# Patient Record
Sex: FEMALE | Race: WHITE | NOT HISPANIC OR LATINO | Employment: OTHER | URBAN - NONMETROPOLITAN AREA
[De-identification: names, ages, dates, MRNs, and addresses within clinical notes are randomized per-mention and may not be internally consistent; named-entity substitution may affect disease eponyms.]

---

## 2017-01-04 ENCOUNTER — ANTICOAGULATION VISIT (OUTPATIENT)
Dept: CARDIOLOGY | Facility: CLINIC | Age: 82
End: 2017-01-04

## 2017-01-04 LAB — INR PPP: 2.4

## 2017-01-10 ENCOUNTER — ANTICOAGULATION VISIT (OUTPATIENT)
Dept: CARDIOLOGY | Facility: CLINIC | Age: 82
End: 2017-01-10

## 2017-01-11 ENCOUNTER — ANTICOAGULATION VISIT (OUTPATIENT)
Dept: CARDIOLOGY | Facility: CLINIC | Age: 82
End: 2017-01-11

## 2017-01-11 LAB — INR PPP: 2.2

## 2017-01-18 ENCOUNTER — ANTICOAGULATION VISIT (OUTPATIENT)
Dept: CARDIOLOGY | Facility: CLINIC | Age: 82
End: 2017-01-18

## 2017-01-18 LAB — INR PPP: 1.3

## 2017-01-18 RX ORDER — WARFARIN SODIUM 2 MG/1
2 TABLET ORAL
Qty: 30 TABLET | Refills: 0 | Status: SHIPPED | OUTPATIENT
Start: 2017-01-18 | End: 2017-02-04 | Stop reason: HOSPADM

## 2017-01-19 LAB — INR PPP: 2.2

## 2017-01-20 ENCOUNTER — ANTICOAGULATION VISIT (OUTPATIENT)
Dept: CARDIOLOGY | Facility: CLINIC | Age: 82
End: 2017-01-20

## 2017-01-20 LAB — INR PPP: 1.1

## 2017-01-26 ENCOUNTER — ANTICOAGULATION VISIT (OUTPATIENT)
Dept: CARDIOLOGY | Facility: CLINIC | Age: 82
End: 2017-01-26

## 2017-01-31 ENCOUNTER — HOSPITAL ENCOUNTER (INPATIENT)
Facility: HOSPITAL | Age: 82
LOS: 4 days | Discharge: HOME | End: 2017-02-04
Attending: EMERGENCY MEDICINE | Admitting: INTERNAL MEDICINE

## 2017-01-31 ENCOUNTER — APPOINTMENT (OUTPATIENT)
Dept: GENERAL RADIOLOGY | Facility: HOSPITAL | Age: 82
End: 2017-01-31

## 2017-01-31 DIAGNOSIS — R09.02 HYPOXIA: ICD-10-CM

## 2017-01-31 DIAGNOSIS — R00.1 BRADYCARDIA WITH 31-40 BEATS PER MINUTE: Primary | ICD-10-CM

## 2017-01-31 DIAGNOSIS — R53.1 WEAKNESS: ICD-10-CM

## 2017-01-31 DIAGNOSIS — R00.1 JUNCTIONAL BRADYCARDIA: ICD-10-CM

## 2017-01-31 DIAGNOSIS — I49.5 SINUS NODE DYSFUNCTION (HCC): ICD-10-CM

## 2017-01-31 LAB
ALBUMIN SERPL-MCNC: 3.8 G/DL (ref 3.5–5)
ALBUMIN/GLOB SERPL: 1.1 G/DL (ref 1.1–2.5)
ALP SERPL-CCNC: 85 U/L (ref 24–120)
ALT SERPL W P-5'-P-CCNC: 41 U/L (ref 0–54)
ANION GAP SERPL CALCULATED.3IONS-SCNC: 10 MMOL/L (ref 4–13)
AST SERPL-CCNC: 30 U/L (ref 7–45)
BASOPHILS # BLD AUTO: 0.03 10*3/MM3 (ref 0–0.2)
BASOPHILS NFR BLD AUTO: 0.3 % (ref 0–2)
BILIRUB SERPL-MCNC: 0.7 MG/DL (ref 0.1–1)
BUN BLD-MCNC: 35 MG/DL (ref 5–21)
BUN/CREAT SERPL: 23.3 (ref 7–25)
CALCIUM SPEC-SCNC: 9 MG/DL (ref 8.4–10.4)
CHLORIDE SERPL-SCNC: 100 MMOL/L (ref 98–110)
CO2 SERPL-SCNC: 26 MMOL/L (ref 24–31)
CREAT BLD-MCNC: 1.5 MG/DL (ref 0.5–1.4)
DEPRECATED RDW RBC AUTO: 44.6 FL (ref 40–54)
EOSINOPHIL # BLD AUTO: 0.17 10*3/MM3 (ref 0–0.7)
EOSINOPHIL NFR BLD AUTO: 1.9 % (ref 0–4)
ERYTHROCYTE [DISTWIDTH] IN BLOOD BY AUTOMATED COUNT: 14.2 % (ref 12–15)
GFR SERPL CREATININE-BSD FRML MDRD: 33 ML/MIN/1.73
GLOBULIN UR ELPH-MCNC: 3.5 GM/DL
GLUCOSE BLD-MCNC: 163 MG/DL (ref 70–100)
GLUCOSE BLDC GLUCOMTR-MCNC: 193 MG/DL (ref 70–130)
GLUCOSE BLDC GLUCOMTR-MCNC: 193 MG/DL (ref 70–130)
HCT VFR BLD AUTO: 31.5 % (ref 37–47)
HGB BLD-MCNC: 10.2 G/DL (ref 12–16)
IMM GRANULOCYTES # BLD: 0.04 10*3/MM3 (ref 0–0.03)
IMM GRANULOCYTES NFR BLD: 0.4 % (ref 0–5)
INR PPP: 2.59 (ref 0.91–1.09)
LYMPHOCYTES # BLD AUTO: 1.85 10*3/MM3 (ref 0.72–4.86)
LYMPHOCYTES NFR BLD AUTO: 20.6 % (ref 15–45)
MAGNESIUM SERPL-MCNC: 2.1 MG/DL (ref 1.4–2.2)
MCH RBC QN AUTO: 28 PG (ref 28–32)
MCHC RBC AUTO-ENTMCNC: 32.4 G/DL (ref 33–36)
MCV RBC AUTO: 86.5 FL (ref 82–98)
MONOCYTES # BLD AUTO: 1.07 10*3/MM3 (ref 0.19–1.3)
MONOCYTES NFR BLD AUTO: 11.9 % (ref 4–12)
NEUTROPHILS # BLD AUTO: 5.81 10*3/MM3 (ref 1.87–8.4)
NEUTROPHILS NFR BLD AUTO: 64.9 % (ref 39–78)
PLATELET # BLD AUTO: 305 10*3/MM3 (ref 130–400)
PMV BLD AUTO: 12.1 FL (ref 6–12)
POTASSIUM BLD-SCNC: 4.5 MMOL/L (ref 3.5–5.3)
PROT SERPL-MCNC: 7.3 G/DL (ref 6.3–8.7)
PROTHROMBIN TIME: 28.7 SECONDS (ref 11.9–14.6)
RBC # BLD AUTO: 3.64 10*6/MM3 (ref 4.2–5.4)
SODIUM BLD-SCNC: 136 MMOL/L (ref 135–145)
TROPONIN I SERPL-MCNC: 0 NG/ML (ref 0–0.07)
TSH SERPL DL<=0.05 MIU/L-ACNC: 1.52 MIU/ML (ref 0.47–4.68)
WBC NRBC COR # BLD: 8.97 10*3/MM3 (ref 4.8–10.8)

## 2017-01-31 PROCEDURE — 82962 GLUCOSE BLOOD TEST: CPT

## 2017-01-31 PROCEDURE — 84484 ASSAY OF TROPONIN QUANT: CPT

## 2017-01-31 PROCEDURE — 93010 ELECTROCARDIOGRAM REPORT: CPT | Performed by: INTERNAL MEDICINE

## 2017-01-31 PROCEDURE — 85025 COMPLETE CBC W/AUTO DIFF WBC: CPT | Performed by: EMERGENCY MEDICINE

## 2017-01-31 PROCEDURE — 84443 ASSAY THYROID STIM HORMONE: CPT | Performed by: EMERGENCY MEDICINE

## 2017-01-31 PROCEDURE — 83735 ASSAY OF MAGNESIUM: CPT | Performed by: EMERGENCY MEDICINE

## 2017-01-31 PROCEDURE — 71010 HC CHEST PA OR AP: CPT

## 2017-01-31 PROCEDURE — 99285 EMERGENCY DEPT VISIT HI MDM: CPT

## 2017-01-31 PROCEDURE — 85610 PROTHROMBIN TIME: CPT | Performed by: EMERGENCY MEDICINE

## 2017-01-31 PROCEDURE — 93005 ELECTROCARDIOGRAM TRACING: CPT

## 2017-01-31 PROCEDURE — 80053 COMPREHEN METABOLIC PANEL: CPT | Performed by: EMERGENCY MEDICINE

## 2017-01-31 RX ORDER — DEXTROSE MONOHYDRATE 25 G/50ML
25 INJECTION, SOLUTION INTRAVENOUS
Status: DISCONTINUED | OUTPATIENT
Start: 2017-01-31 | End: 2017-02-04 | Stop reason: HOSPADM

## 2017-01-31 RX ORDER — NICOTINE POLACRILEX 4 MG
15 LOZENGE BUCCAL
Status: DISCONTINUED | OUTPATIENT
Start: 2017-01-31 | End: 2017-02-04 | Stop reason: HOSPADM

## 2017-01-31 RX ORDER — ACETAMINOPHEN 325 MG/1
650 TABLET ORAL EVERY 4 HOURS PRN
Status: DISCONTINUED | OUTPATIENT
Start: 2017-01-31 | End: 2017-02-04 | Stop reason: HOSPADM

## 2017-01-31 RX ORDER — ONDANSETRON 2 MG/ML
4 INJECTION INTRAMUSCULAR; INTRAVENOUS EVERY 6 HOURS PRN
Status: DISCONTINUED | OUTPATIENT
Start: 2017-01-31 | End: 2017-02-03 | Stop reason: SDUPTHER

## 2017-01-31 RX ORDER — MAGNESIUM HYDROXIDE/ALUMINUM HYDROXICE/SIMETHICONE 120; 1200; 1200 MG/30ML; MG/30ML; MG/30ML
30 SUSPENSION ORAL EVERY 6 HOURS PRN
Status: DISCONTINUED | OUTPATIENT
Start: 2017-01-31 | End: 2017-02-04 | Stop reason: HOSPADM

## 2017-01-31 RX ORDER — SPIRONOLACTONE 25 MG/1
25 TABLET ORAL DAILY
Status: DISCONTINUED | OUTPATIENT
Start: 2017-02-01 | End: 2017-02-04 | Stop reason: HOSPADM

## 2017-01-31 RX ORDER — PANTOPRAZOLE SODIUM 40 MG/1
40 TABLET, DELAYED RELEASE ORAL DAILY
Status: DISCONTINUED | OUTPATIENT
Start: 2017-02-01 | End: 2017-02-04 | Stop reason: HOSPADM

## 2017-01-31 RX ORDER — ASPIRIN 81 MG/1
81 TABLET ORAL DAILY
Status: DISCONTINUED | OUTPATIENT
Start: 2017-02-01 | End: 2017-02-04 | Stop reason: HOSPADM

## 2017-01-31 RX ORDER — VENLAFAXINE 37.5 MG/1
37.5 TABLET ORAL DAILY
Status: DISCONTINUED | OUTPATIENT
Start: 2017-02-01 | End: 2017-02-04 | Stop reason: HOSPADM

## 2017-01-31 RX ORDER — SODIUM CHLORIDE 0.9 % (FLUSH) 0.9 %
10 SYRINGE (ML) INJECTION AS NEEDED
Status: DISCONTINUED | OUTPATIENT
Start: 2017-01-31 | End: 2017-02-04 | Stop reason: HOSPADM

## 2017-01-31 RX ORDER — ATORVASTATIN CALCIUM 40 MG/1
40 TABLET, FILM COATED ORAL NIGHTLY
Status: DISCONTINUED | OUTPATIENT
Start: 2017-01-31 | End: 2017-02-04 | Stop reason: HOSPADM

## 2017-01-31 RX ORDER — ATENOLOL 25 MG/1
25 TABLET ORAL EVERY EVENING
Status: ON HOLD | COMMUNITY
End: 2017-06-26

## 2017-01-31 RX ORDER — SODIUM CHLORIDE 0.9 % (FLUSH) 0.9 %
1-10 SYRINGE (ML) INJECTION AS NEEDED
Status: DISCONTINUED | OUTPATIENT
Start: 2017-01-31 | End: 2017-02-04 | Stop reason: HOSPADM

## 2017-02-01 ENCOUNTER — ANTICOAGULATION VISIT (OUTPATIENT)
Dept: CARDIOLOGY | Facility: CLINIC | Age: 82
End: 2017-02-01

## 2017-02-01 ENCOUNTER — APPOINTMENT (OUTPATIENT)
Dept: CARDIOLOGY | Facility: HOSPITAL | Age: 82
End: 2017-02-01

## 2017-02-01 PROBLEM — Z79.01 CHRONIC ANTICOAGULATION: Status: ACTIVE | Noted: 2017-02-01

## 2017-02-01 PROBLEM — I49.5 SSS (SICK SINUS SYNDROME) (HCC): Status: ACTIVE | Noted: 2017-02-01

## 2017-02-01 PROBLEM — I25.10 CORONARY ARTERY DISEASE INVOLVING NATIVE CORONARY ARTERY OF NATIVE HEART WITHOUT ANGINA PECTORIS: Status: ACTIVE | Noted: 2017-02-01

## 2017-02-01 PROBLEM — I35.0 MODERATE TO SEVERE AORTIC STENOSIS: Status: ACTIVE | Noted: 2017-02-01

## 2017-02-01 PROBLEM — E11.9 TYPE 2 DIABETES MELLITUS (HCC): Status: ACTIVE | Noted: 2017-02-01

## 2017-02-01 PROBLEM — R00.1 JUNCTIONAL BRADYCARDIA: Status: ACTIVE | Noted: 2017-02-01

## 2017-02-01 PROBLEM — I10 ESSENTIAL HYPERTENSION: Status: ACTIVE | Noted: 2017-02-01

## 2017-02-01 LAB
ANION GAP SERPL CALCULATED.3IONS-SCNC: 9 MMOL/L (ref 4–13)
ARTICHOKE IGE QN: 55 MG/DL (ref 0–99)
BH CV ECHO MEAS - AO MAX PG (FULL): 45.2 MMHG
BH CV ECHO MEAS - AO MAX PG: 48.2 MMHG
BH CV ECHO MEAS - AO MEAN PG (FULL): 24 MMHG
BH CV ECHO MEAS - AO MEAN PG: 26 MMHG
BH CV ECHO MEAS - AO ROOT AREA: 7.1 CM^2
BH CV ECHO MEAS - AO ROOT DIAM: 3 CM
BH CV ECHO MEAS - AO V2 MAX: 347 CM/SEC
BH CV ECHO MEAS - AO V2 MEAN: 240 CM/SEC
BH CV ECHO MEAS - AO V2 VTI: 103 CM
BH CV ECHO MEAS - AVA(I,A): 0.68 CM^2
BH CV ECHO MEAS - AVA(I,D): 0.68 CM^2
BH CV ECHO MEAS - AVA(V,A): 0.78 CM^2
BH CV ECHO MEAS - AVA(V,D): 0.78 CM^2
BH CV ECHO MEAS - CONTRAST EF 4CH: 65.3 ML/M^2
BH CV ECHO MEAS - EDV(CUBED): 66.4 ML
BH CV ECHO MEAS - EDV(MOD-SP4): 53.3 ML
BH CV ECHO MEAS - EDV(TEICH): 72.1 ML
BH CV ECHO MEAS - EF(CUBED): 69.7 %
BH CV ECHO MEAS - EF(MOD-SP4): 65.3 %
BH CV ECHO MEAS - EF(TEICH): 61.8 %
BH CV ECHO MEAS - ESV(CUBED): 20.1 ML
BH CV ECHO MEAS - ESV(MOD-SP4): 18.5 ML
BH CV ECHO MEAS - ESV(TEICH): 27.5 ML
BH CV ECHO MEAS - FS: 32.8 %
BH CV ECHO MEAS - IVS/LVPW: 0.9
BH CV ECHO MEAS - IVSD: 0.92 CM
BH CV ECHO MEAS - LA DIMENSION: 3.7 CM
BH CV ECHO MEAS - LA/AO: 1.2
BH CV ECHO MEAS - LAT PEAK E' VEL: 5.4 CM/SEC
BH CV ECHO MEAS - LV MASS(C)D: 123.9 GRAMS
BH CV ECHO MEAS - LV MAX PG: 2.9 MMHG
BH CV ECHO MEAS - LV MEAN PG: 2 MMHG
BH CV ECHO MEAS - LV V1 MAX: 85.8 CM/SEC
BH CV ECHO MEAS - LV V1 MEAN: 55.1 CM/SEC
BH CV ECHO MEAS - LV V1 VTI: 22.4 CM
BH CV ECHO MEAS - LVIDD: 4.1 CM
BH CV ECHO MEAS - LVIDS: 2.7 CM
BH CV ECHO MEAS - LVLD AP4: 6.7 CM
BH CV ECHO MEAS - LVLS AP4: 5.9 CM
BH CV ECHO MEAS - LVOT AREA (M): 3.1 CM^2
BH CV ECHO MEAS - LVOT AREA: 3.1 CM^2
BH CV ECHO MEAS - LVOT DIAM: 2 CM
BH CV ECHO MEAS - LVPWD: 1 CM
BH CV ECHO MEAS - MR MAX PG: 88 MMHG
BH CV ECHO MEAS - MR MAX VEL: 469 CM/SEC
BH CV ECHO MEAS - MV A MAX VEL: 45.9 CM/SEC
BH CV ECHO MEAS - MV DEC TIME: 0.25 SEC
BH CV ECHO MEAS - MV E MAX VEL: 158 CM/SEC
BH CV ECHO MEAS - MV E/A: 3.4
BH CV ECHO MEAS - PA MAX PG: 3.9 MMHG
BH CV ECHO MEAS - PA V2 MAX: 99.1 CM/SEC
BH CV ECHO MEAS - PI END-D VEL: 149 CM/SEC
BH CV ECHO MEAS - RAP SYSTOLE: 5 MMHG
BH CV ECHO MEAS - RVSP: 42.9 MMHG
BH CV ECHO MEAS - SV(AO): 728.1 ML
BH CV ECHO MEAS - SV(CUBED): 46.3 ML
BH CV ECHO MEAS - SV(LVOT): 70.4 ML
BH CV ECHO MEAS - SV(MOD-SP4): 34.8 ML
BH CV ECHO MEAS - SV(TEICH): 44.6 ML
BH CV ECHO MEAS - TR MAX VEL: 308 CM/SEC
BUN BLD-MCNC: 32 MG/DL (ref 5–21)
BUN/CREAT SERPL: 23.4 (ref 7–25)
CALCIUM SPEC-SCNC: 9.2 MG/DL (ref 8.4–10.4)
CHLORIDE SERPL-SCNC: 102 MMOL/L (ref 98–110)
CHOLEST SERPL-MCNC: 110 MG/DL (ref 130–200)
CO2 SERPL-SCNC: 26 MMOL/L (ref 24–31)
CREAT BLD-MCNC: 1.37 MG/DL (ref 0.5–1.4)
E/E' RATIO: 36
GFR SERPL CREATININE-BSD FRML MDRD: 37 ML/MIN/1.73
GLUCOSE BLD-MCNC: 350 MG/DL (ref 70–100)
GLUCOSE BLDC GLUCOMTR-MCNC: 185 MG/DL (ref 70–130)
GLUCOSE BLDC GLUCOMTR-MCNC: 191 MG/DL (ref 70–130)
GLUCOSE BLDC GLUCOMTR-MCNC: 219 MG/DL (ref 70–130)
GLUCOSE BLDC GLUCOMTR-MCNC: 260 MG/DL (ref 70–130)
GLUCOSE BLDC GLUCOMTR-MCNC: 359 MG/DL (ref 70–130)
HBA1C MFR BLD: 9.3 %
HDLC SERPL-MCNC: 25 MG/DL
INR PPP: 2.5
INR PPP: 2.5 (ref 0.91–1.09)
LDLC/HDLC SERPL: 1.71 {RATIO}
LEFT ATRIUM VOLUME: 60 CM3
POTASSIUM BLD-SCNC: 4.4 MMOL/L (ref 3.5–5.3)
PROTHROMBIN TIME: 27.9 SECONDS (ref 11.9–14.6)
SODIUM BLD-SCNC: 137 MMOL/L (ref 135–145)
TRIGL SERPL-MCNC: 211 MG/DL (ref 0–149)
TSH SERPL DL<=0.05 MIU/L-ACNC: 1.25 MIU/ML (ref 0.47–4.68)

## 2017-02-01 PROCEDURE — 93306 TTE W/DOPPLER COMPLETE: CPT

## 2017-02-01 PROCEDURE — 80048 BASIC METABOLIC PNL TOTAL CA: CPT | Performed by: INTERNAL MEDICINE

## 2017-02-01 PROCEDURE — 63710000001 INSULIN LISPRO (HUMAN) PER 5 UNITS: Performed by: INTERNAL MEDICINE

## 2017-02-01 PROCEDURE — 84443 ASSAY THYROID STIM HORMONE: CPT | Performed by: INTERNAL MEDICINE

## 2017-02-01 PROCEDURE — 83036 HEMOGLOBIN GLYCOSYLATED A1C: CPT | Performed by: INTERNAL MEDICINE

## 2017-02-01 PROCEDURE — 85610 PROTHROMBIN TIME: CPT | Performed by: INTERNAL MEDICINE

## 2017-02-01 PROCEDURE — 80061 LIPID PANEL: CPT | Performed by: INTERNAL MEDICINE

## 2017-02-01 PROCEDURE — 63710000001 INSULIN DETEMIR PER 5 UNITS: Performed by: INTERNAL MEDICINE

## 2017-02-01 PROCEDURE — 93306 TTE W/DOPPLER COMPLETE: CPT | Performed by: INTERNAL MEDICINE

## 2017-02-01 PROCEDURE — 82962 GLUCOSE BLOOD TEST: CPT

## 2017-02-01 PROCEDURE — 99222 1ST HOSP IP/OBS MODERATE 55: CPT | Performed by: NURSE PRACTITIONER

## 2017-02-01 RX ORDER — ONDANSETRON 2 MG/ML
4 INJECTION INTRAMUSCULAR; INTRAVENOUS EVERY 6 HOURS PRN
Status: DISCONTINUED | OUTPATIENT
Start: 2017-02-01 | End: 2017-02-03 | Stop reason: HOSPADM

## 2017-02-01 RX ADMIN — INSULIN LISPRO 4 UNITS: 100 INJECTION, SOLUTION INTRAVENOUS; SUBCUTANEOUS at 21:07

## 2017-02-01 RX ADMIN — PANTOPRAZOLE SODIUM 40 MG: 40 TABLET, DELAYED RELEASE ORAL at 12:20

## 2017-02-01 RX ADMIN — INSULIN LISPRO 20 UNITS: 100 INJECTION, SOLUTION INTRAVENOUS; SUBCUTANEOUS at 00:23

## 2017-02-01 RX ADMIN — DESMOPRESSIN ACETATE 40 MG: 0.2 TABLET ORAL at 21:08

## 2017-02-01 RX ADMIN — INSULIN LISPRO 12 UNITS: 100 INJECTION, SOLUTION INTRAVENOUS; SUBCUTANEOUS at 17:57

## 2017-02-01 RX ADMIN — DESMOPRESSIN ACETATE 40 MG: 0.2 TABLET ORAL at 00:24

## 2017-02-01 RX ADMIN — ASPIRIN 81 MG: 81 TABLET ORAL at 12:20

## 2017-02-01 RX ADMIN — VENLAFAXINE HYDROCHLORIDE 37.5 MG: 37.5 TABLET ORAL at 12:20

## 2017-02-01 RX ADMIN — SPIRONOLACTONE 25 MG: 25 TABLET ORAL at 12:20

## 2017-02-01 RX ADMIN — INSULIN DETEMIR 70 UNITS: 100 INJECTION, SOLUTION SUBCUTANEOUS at 12:18

## 2017-02-01 NOTE — H&P
[facility] HEART GROUP HISTORY AND PHYSICAL      Patient Care Team:  FACUNDO Juarez as PCP - General  FACUNDO Juarez as PCP - Family Medicine  Ehsan Crocker MD as Cardiologist (Cardiology)    Chief complaint: Generalized weakness, bradycardia     Subjective     Lakesha Costello is a 81 y.o.  female with a known PMH significant for Type 2 DM, HTN, Atrial Fibrillation/Flutter, chronically anticoagulated with Coumadin, moderate to severe AS, CAD s/p 4 vessel CABG who presented to [facility] via ED with complaints of generalized weakness, falls, dyspnea, fatigue, and slow heart rate x2 days. Her last dose of Atenolol was 2 days ago. Last dose of Coumadin was 2 nights ago. This is her 3rd hospital admission for symptomatic bradycardia, which has previously resolved.     Review of Systems  Review of Systems   Constitution: Positive for weakness and malaise/fatigue. Negative for diaphoresis and fever.   HENT: Negative for nosebleeds.    Cardiovascular: Positive for dyspnea on exertion and near-syncope. Negative for chest pain, irregular heartbeat, leg swelling, orthopnea, palpitations, paroxysmal nocturnal dyspnea and syncope.   Respiratory: Positive for shortness of breath. Negative for cough, hemoptysis, sleep disturbances due to breathing, sputum production and wheezing.    Hematologic/Lymphatic: Negative for bleeding problem.   Skin: Negative for rash.   Musculoskeletal: Positive for falls.   Gastrointestinal: Negative for bloating, abdominal pain, hematemesis, hematochezia, melena, nausea and vomiting.   Neurological: Positive for light-headedness. Negative for focal weakness.   Psychiatric/Behavioral: Negative for altered mental status.        History  Past Medical History   Diagnosis Date   • Aortic stenosis    • Atrial fibrillation    • Atrial fibrillation by electrocardiogram    • Atrial flutter    • Breast cancer    • CAD (coronary artery disease)    • Chronic anticoagulation       Coumadin    • Diabetes mellitus    • Hypertension      Past Surgical History   Procedure Laterality Date   • Mastectomy     • Hysterectomy     • Cholecystectomy     • Cardiac catheterization  1999, 2010   • Coronary artery bypass graft  1999     4 vessel      History reviewed. No pertinent family history.  Social History   Substance Use Topics   • Smoking status: Never Smoker   • Smokeless tobacco: None   • Alcohol use No       Medications  Prior to Admission medications    Medication Sig Start Date End Date Taking? Authorizing Provider   aspirin 81 MG EC tablet Take 81 mg by mouth Daily.   Yes Historical Provider, MD   atenolol (TENORMIN) 25 MG tablet Take 25 mg by mouth Daily. Take 25mg in AM and 50mg in PM   Yes Historical Provider, MD   calcium carbonate (OS-MICHAEL) 600 MG tablet Take 600 mg by mouth 2 (Two) Times a Day With Meals.   Yes Historical Provider, MD   cloNIDine (CATAPRES) 0.1 MG tablet Take 0.1 mg by mouth Every Night.   Yes Historical Provider, MD   glipiZIDE (GLUCOTROL) 5 MG tablet Take 5 mg by mouth 2 (Two) Times a Day Before Meals.   Yes Historical Provider, MD   insulin aspart (novoLOG) 100 UNIT/ML injection Inject  under the skin 3 (Three) Times a Day Before Meals. Sliding scale (bs-100)/2   Yes Historical Provider, MD   insulin detemir (LEVEMIR) 100 UNIT/ML injection Inject 70 Units under the skin Every 12 (Twelve) Hours.   Yes Historical Provider, MD   magnesium oxide (MAGOX) 400 (241.3 MG) MG tablet tablet Take 400 mg by mouth Daily.   Yes Historical Provider, MD   niacin 500 MG tablet Take 500 mg by mouth Daily With Breakfast. Must be flush free   Yes Historical Provider, MD   NON FORMULARY Take  by mouth 2 (Two) Times a Day. Prespervision Lutein x1 tab twice daily   Yes Historical Provider, MD   pantoprazole (PROTONIX) 40 MG EC tablet Take 40 mg by mouth Daily.   Yes Historical Provider, MD   polyethyl glycol-propyl glycol (SYSTANE) 0.4-0.3 % solution ophthalmic solution Administer 1 drop to  the right eye Daily.   Yes Historical Provider, MD   simvastatin (ZOCOR) 80 MG tablet Take 80 mg by mouth Every Night.   Yes Historical Provider, MD   spironolactone (ALDACTONE) 25 MG tablet Take 25 mg by mouth Daily.   Yes Historical Provider, MD   venlafaxine (EFFEXOR) 37.5 MG tablet Take 37.5 mg by mouth Daily.   Yes Historical Provider, MD   vitamin B-12 (CYANOCOBALAMIN) 500 MCG tablet Take 500 mcg by mouth Daily.   Yes Historical Provider, MD   vitamin E 100 UNIT capsule Take 400 Units by mouth Daily.   Yes Historical Provider, MD   warfarin (COUMADIN) 2 MG tablet Take 1 tablet by mouth Daily.  Patient taking differently: Take 2 mg by mouth Every Evening. 1/18/17  Yes Ehsan Crocker MD   amiodarone (PACERONE) 100 MG tablet Take 100 mg by mouth Daily.    Historical Provider, MD       Current Facility-Administered Medications   Medication Dose Route Frequency Provider Last Rate Last Dose   • acetaminophen (TYLENOL) tablet 650 mg  650 mg Oral Q4H PRN Uli Baker MD       • aluminum-magnesium hydroxide-simethicone (MAALOX/MYLANTA) suspension 30 mL  30 mL Oral Q6H PRN Uli Baker MD       • aspirin EC tablet 81 mg  81 mg Oral Daily Uli Baker MD       • atorvastatin (LIPITOR) tablet 40 mg  40 mg Oral Nightly Uli Baker MD   40 mg at 02/01/17 0024   • bisacodyl (DULCOLAX) EC tablet 5 mg  5 mg Oral Daily PRN Uli Baker MD       • dextrose (D50W) solution 25 g  25 g Intravenous Q15 Min PRN Uli Baker MD       • dextrose (GLUTOSE) oral gel 15 g  15 g Oral Q15 Min PRN Uli Baker MD       • glucagon (human recombinant) (GLUCAGEN DIAGNOSTIC) injection 1 mg  1 mg Subcutaneous Q15 Min PRN Uli Baker MD       • insulin detemir (LEVEMIR) injection 70 Units  70 Units Subcutaneous QAM Uli Baker MD       • insulin lispro (humaLOG) injection 4-24 Units  4-24 Units Subcutaneous 4x Daily AC & at Bedtime Uli Baker MD   20 Units at 02/01/17 0023   • ondansetron (ZOFRAN) injection 4 mg   4 mg Intravenous Q6H PRN Uli Baker MD       • pantoprazole (PROTONIX) EC tablet 40 mg  40 mg Oral Daily Uli Bakre MD       • pneumococcal polysaccharide 23-valent (PNEUMOVAX-23) vaccine 0.5 mL  0.5 mL Intramuscular During Hospitalization Ehsan Crocker MD       • polyethyl glycol-propyl glycol (SYSTANE) 0.4-0.3 % ophthalmic solution 1 drop  1 drop Right Eye Daily Uli Baker MD       • sodium chloride 0.9 % flush 1-10 mL  1-10 mL Intravenous PRN Uli Baker MD       • sodium chloride 0.9 % flush 10 mL  10 mL Intravenous PRN Sherif Early Jr., MD       • spironolactone (ALDACTONE) tablet 25 mg  25 mg Oral Daily Uli Baker MD       • venlafaxine (EFFEXOR) tablet 37.5 mg  37.5 mg Oral Daily Uli Baker MD            Allergies:  Dilaudid [hydromorphone hcl]; Dilaudid [hydromorphone]; Enalapril; Kombiglyze [saxagliptin-metformin er]; Lopid [gemfibrozil]; Sulfa antibiotics; and Ultram [tramadol]    Objective     Vital Signs  Temp:  [96.8 °F (36 °C)-98.9 °F (37.2 °C)] 98.9 °F (37.2 °C)  Heart Rate:  [39-81] 45  Resp:  [20] 20  BP: (130-167)/(45-84) 155/45    Labs  Lab Results   Component Value Date    WBC 8.97 01/31/2017    HGB 10.2 (L) 01/31/2017    HCT 31.5 (L) 01/31/2017    MCV 86.5 01/31/2017     01/31/2017     Lab Results   Component Value Date    GLUCOSE 350 (H) 02/01/2017    CALCIUM 9.2 02/01/2017     02/01/2017    K 4.4 02/01/2017    CO2 26.0 02/01/2017     02/01/2017    BUN 32 (H) 02/01/2017    CREATININE 1.37 02/01/2017    EGFRIFNONA 37 (L) 02/01/2017    BCR 23.4 02/01/2017    ANIONGAP 9.0 02/01/2017     Lab Results   Component Value Date    TSH 1.250 02/01/2017     Lab Results   Component Value Date    HGBA1C 9.3 02/01/2017     Lab Results   Component Value Date    INR 2.50 (H) 02/01/2017    INR 2.59 (H) 01/31/2017    INR 1.10 01/20/2017    PROTIME 27.9 (H) 02/01/2017    PROTIME 28.7 (H) 01/31/2017    PROTIME 22.4 (H) 08/29/2016         Physical Exam:  Physical  Exam   Constitutional: She is oriented to person, place, and time. She appears well-developed and well-nourished. She is cooperative. No distress.   HENT:   Head: Normocephalic and atraumatic.   Cardiovascular: Intact distal pulses.  Bradycardia present.    Murmur heard.   Harsh midsystolic murmur is present with a grade of 3/6  at the upper right sternal border radiating to the neck  Telemetry: Junctional rhythm 38-61 BPM    Pulmonary/Chest: Effort normal and breath sounds normal. No respiratory distress. She exhibits no tenderness.   Musculoskeletal: She exhibits no edema.   Neurological: She is alert and oriented to person, place, and time.   Skin: Skin is warm and dry. No rash noted. She is not diaphoretic.   Psychiatric: She has a normal mood and affect. Her speech is normal and behavior is normal.   Vitals reviewed.      Results Review:    I reviewed the patient's new clinical results.  I personally viewed and interpreted the patient's EKG/Telemetry data    Principal Problem:    -Junctional bradycardia    Active Problems:    -SSS (sick sinus syndrome)    -Atrial fibrillation    -Chronic anticoagulation with Coumadin- on hold     -Moderate to severe AS per 8/2016 Echo     -Essential hypertension    -Type 2 diabetes mellitus    -Coronary artery disease involving native coronary artery of native heart without angina pectoris s/p 4 vessel CABG       Plan   1. Monitor telemetry  2. Continue to hold all rate lowering medications, including Atenolol, Amiodarone, and Clonidine.   3. Continue to hold Coumadin   4. PT/INR in am  5. Echo  6. Healthy Heart, Diabetic Diet   7. Plans for pacemaker in 1-2 days once INR decreases     I discussed the patients findings and my recommendations with patient, nursing staff and Dr. Crocker. Further recommendations per Dr. Crocker upon his evaluation of the patient.     Cami Toure, APRN  02/01/17  10:15 AM      Please note this cardiology history and physical note is the result of a  face to face consultation with the patient, in addition to reviewing medical records at length by myself, FACUNDO Carter.     Time: More than 50% of time spent in counseling and coordination of care:  Total face-to-face/floor time 40 min.  Time spent in counseling 25 min. Counseling included the following topics: lab results, ECG/telemetry, assessment, discussing the plan of care with the patient and daughter at the bedside, as well as the RN.

## 2017-02-01 NOTE — ED PROVIDER NOTES
Subjective   History of Present Illness    Review of Systems    Past Medical History   Diagnosis Date   • Aortic stenosis    • Atrial fibrillation by electrocardiogram    • Atrial flutter    • Breast cancer    • CAD (coronary artery disease)    • Chronic anticoagulation    • Diabetes mellitus    • Hypertension        Allergies   Allergen Reactions   • Dilaudid [Hydromorphone Hcl]    • Dilaudid [Hydromorphone] Nausea And Vomiting   • Enalapril Angioedema   • Kombiglyze [Saxagliptin-Metformin Er]    • Lopid [Gemfibrozil] Nausea And Vomiting   • Sulfa Antibiotics Other (See Comments)     Syncope     • Ultram [Tramadol] Itching       Past Surgical History   Procedure Laterality Date   • Mastectomy     • Hysterectomy     • Cholecystectomy     • Coronary artery bypass graft         No family history on file.    Social History     Social History   • Marital status:      Spouse name: N/A   • Number of children: N/A   • Years of education: N/A     Social History Main Topics   • Smoking status: Never Smoker   • Smokeless tobacco: Not on file   • Alcohol use No   • Drug use: Not on file   • Sexual activity: Not on file     Other Topics Concern   • Not on file     Social History Narrative   • No narrative on file           Objective   Physical Exam   Constitutional: No distress.   Cardiovascular: Regular rhythm.   No extrasystoles are present. Bradycardia present.    Pulmonary/Chest: Effort normal and breath sounds normal.   Skin: She is not diaphoretic. There is pallor.   Psychiatric: She has a normal mood and affect.   Nursing note and vitals reviewed.      Procedures         ED Course  ED Course      Labs Reviewed   COMPREHENSIVE METABOLIC PANEL - Abnormal; Notable for the following:        Result Value    Glucose 163 (*)     BUN 35 (*)     Creatinine 1.50 (*)     eGFR Non  Amer 33 (*)     All other components within normal limits    Narrative:     The MDRD GFR formula is only valid for adults with stable renal  function between ages 18 and 70.   PROTIME-INR - Abnormal; Notable for the following:     Protime 28.7 (*)     INR 2.59 (*)     All other components within normal limits   CBC WITH AUTO DIFFERENTIAL - Abnormal; Notable for the following:     RBC 3.64 (*)     Hemoglobin 10.2 (*)     Hematocrit 31.5 (*)     MCHC 32.4 (*)     MPV 12.1 (*)     Immature Grans, Absolute 0.04 (*)     All other components within normal limits   POCT GLUCOSE FINGERSTICK - Abnormal; Notable for the following:     Glucose 193 (*)     All other components within normal limits   POCT GLUCOSE FINGERSTICK - Abnormal; Notable for the following:     Glucose 193 (*)     All other components within normal limits   TSH - Normal   MAGNESIUM - Normal   POCT TROPONIN I, RAPID - Normal   POCT TROPONIN I, RAPID   CBC AND DIFFERENTIAL    Narrative:     The following orders were created for panel order CBC & Differential.  Procedure                               Abnormality         Status                     ---------                               -----------         ------                     CBC Auto Differential[[id number]]         Abnormal            Final result                 Please view results for these tests on the individual orders.                 MDM  Number of Diagnoses or Management Options  Bradycardia with 31-40 beats per minute: new and requires workup  Sinus node dysfunction: new and requires workup  Weakness: established and worsening     Amount and/or Complexity of Data Reviewed  Clinical lab tests: ordered and reviewed  Tests in the radiology section of CPT®: ordered and reviewed  Decide to obtain previous medical records or to obtain history from someone other than the patient: yes  Obtain history from someone other than the patient: yes  Review and summarize past medical records: yes  Discuss the patient with other providers: yes  Independent visualization of images, tracings, or specimens: yes    Risk of Complications, Morbidity, and/or  Mortality  Presenting problems: high  Diagnostic procedures: high  Management options: high    Patient Progress  Patient progress: stable      Final diagnoses:   Bradycardia with 31-40 beats per minute   Weakness   Sinus node dysfunction            Agapito Dominguez MD  01/31/17 2005

## 2017-02-01 NOTE — PLAN OF CARE
Problem: Patient Care Overview (Adult)  Goal: Plan of Care Review  Outcome: Ongoing (interventions implemented as appropriate)    02/01/17 0407   Coping/Psychosocial Response Interventions   Plan Of Care Reviewed With patient;daughter;spouse   Patient Care Overview   Progress no change   Outcome Evaluation   Outcome Summary/Follow up Plan VSS, Telemetry Junctional w/ HR 38-56. Pt has been NPO since midnight for possible intervention today.         Problem: Fall Risk (Adult)  Goal: Identify Related Risk Factors and Signs and Symptoms  Outcome: Ongoing (interventions implemented as appropriate)    02/01/17 0407   Fall Risk   Fall Risk: Related Risk Factors culprit medication(s);history of falls;impaired vision   Fall Risk: Signs and Symptoms presence of risk factors       Goal: Absence of Falls  Outcome: Ongoing (interventions implemented as appropriate)    Problem: Arrhythmia/Dysrhythmia (Symptomatic) (Adult)  Goal: Signs and Symptoms of Listed Potential Problems Will be Absent or Manageable (Arrhythmia/Dysrhythmia)  Outcome: Ongoing (interventions implemented as appropriate)    02/01/17 0407   Arrhythmia/Dysrhythmia (Symptomatic)   Problems Assessed (Arrhythmia/Dysrhythmia) all   Problems Present (Arrhythmia/Dysrhythmia) electrophysiological conduction defect

## 2017-02-01 NOTE — PAYOR COMM NOTE
"FROM: OLMNA CAMPUZANO  PHONE: [phone]  FAX: [phone]    ID: [id number]    Lakesha Costello (81 y.o. Female)     Date of Birth Social Security Number Address Home Phone MRN    1935  [id number] [facility] [address] [phone] [id number]    Jew Marital Status          Other        Admission Date Admission Type Admitting Provider Attending Provider Department, Room/Bed    1/31/17 Emergency Ehsan Crocker MD Ford, James K, MD [facility] 4B, 409/1    Discharge Date Discharge Disposition Discharge Destination                      Attending Provider: Ehsan Crocker MD     Allergies:  Dilaudid [Hydromorphone Hcl], Dilaudid [Hydromorphone], Enalapril, Kombiglyze [Saxagliptin-metformin Er], Lopid [Gemfibrozil], Sulfa Antibiotics, Ultram [Tramadol]    Isolation:  None   Infection:  None   Code Status:  FULL    Ht:  60\" (152.4 cm)   Wt:  135 lb 5 oz (61.4 kg)    Admission Cmt:  None   Principal Problem:  Junctional bradycardia [R00.1]                 Active Insurance as of 1/31/2017     Primary Coverage     Payor Plan Insurance Group Employer/Plan Group    HUMANA MEDICARE REPL HUMANA MEDICARE REPL [id number]     Payor Plan Address Payor Plan Phone Number Effective From Effective To    [address] [address] [phone] 1/1/2014     [place] [address]       Subscriber Name Subscriber Birth Date Member ID       LAKESHA COSTELLO 1935 [id number]                 Emergency Contacts      (Rel.) Home Phone Work Phone Mobile Phone    Margarita Costello (Daughter) [phone] -- --    Richard Pablo (Spouse) [phone] -- --            History & Physical     No notes of this type exist for this encounter.           Emergency Department Notes      Sherif Early Jr., MD at 1/31/2017  5:58 PM          Subjective   HPI Comments: Patient started feeling SOB 2 days ago.  Had fallen and hit right chest on bed table and pain there.  Noted slow pulse couple of days ago.  Used to " be on atenolol and was supposed to have been stopped but daughter looked through meds and found 50 BID in meds and they stopped it 2 days ago.  Also not taking amiodorone any more for same reason.    Patient is a 81 y.o. female presenting with palpitations.   History provided by:  Patient and relative   used: No    Palpitations   Palpitations quality:  Slow  Onset quality:  Gradual  Duration:  2 days  Timing:  Constant  Progression:  Unchanged  Chronicity:  Recurrent  Context: not anxiety, not appetite suppressants, not blood loss, not bronchodilators, not caffeine, not dehydration, not exercise, not hyperventilation, not illicit drugs, not nicotine and not stimulant use    Relieved by:  Nothing  Worsened by:  Nothing  Ineffective treatments:  None tried  Associated symptoms: shortness of breath    Associated symptoms: no back pain, no chest pain, no chest pressure, no cough, no diaphoresis, no dizziness, no hemoptysis, no leg pain, no lower extremity edema, no malaise/fatigue, no nausea, no near-syncope, no numbness, no orthopnea, no PND, no syncope, no vomiting and no weakness    Risk factors: hx of atrial fibrillation        Review of Systems   Constitutional: Negative.  Negative for diaphoresis and malaise/fatigue.   HENT: Negative.    Respiratory: Positive for shortness of breath. Negative for cough and hemoptysis.    Cardiovascular: Positive for palpitations. Negative for chest pain, orthopnea, syncope, PND and near-syncope.   Gastrointestinal: Negative.  Negative for nausea and vomiting.   Genitourinary: Negative.    Musculoskeletal: Negative.  Negative for back pain.   Neurological: Negative for dizziness, weakness and numbness.   Hematological: Negative.  other systems reviewed and are negative.      Past Medical History   Diagnosis Date   • Aortic stenosis    • Atrial fibrillation by electrocardiogram    • Atrial flutter    • Breast cancer    • CAD (coronary artery disease)    • Chronic  anticoagulation    • Diabetes mellitus    • Hypertension        Allergies   Allergen Reactions   • Dilaudid [Hydromorphone Hcl]    • Dilaudid [Hydromorphone] Nausea And Vomiting   • Enalapril Angioedema   • Kombiglyze [Saxagliptin-Metformin Er]    • Lopid [Gemfibrozil] Nausea And Vomiting   • Sulfa Antibiotics Other (See Comments)     Syncope     • Ultram [Tramadol] Itching       Past Surgical History   Procedure Laterality Date   • Mastectomy     • Hysterectomy     • Cholecystectomy     • Coronary artery bypass graft         No family history on file.    Social History     Social History   • Marital status:      Spouse name: N/A   • Number of children: N/A   • Years of education: N/A     Social History Main Topics   • Smoking status: Never Smoker   • Smokeless tobacco: Not on file   • Alcohol use No   • Drug use: Not on file   • Sexual activity: Not on file     Other Topics Concern   • Not on file     Social History Narrative   • No narrative on file       Prior to Admission medications    Medication Sig Start Date End Date Taking? Authorizing Provider   venlafaxine (EFFEXOR) 37.5 MG tablet Take 37.5 mg by mouth Daily.   Yes Historical Provider, MD   amiodarone (PACERONE) 100 MG tablet Take 100 mg by mouth Daily.    Historical Provider, MD   aspirin 81 MG EC tablet Take 81 mg by mouth Daily.    Historical Provider, MD   calcium carbonate (OS-MICHAEL) 600 MG tablet Take 600 mg by mouth 2 (Two) Times a Day With Meals.    Historical Provider, MD   cloNIDine (CATAPRES) 0.1 MG tablet Take 0.1 mg by mouth Every 8 (Eight) Hours.    Historical Provider, MD   glipiZIDE (GLUCOTROL) 5 MG tablet Take 5 mg by mouth 2 (Two) Times a Day Before Meals.    Historical Provider, MD   insulin detemir (LEVEMIR) 100 UNIT/ML injection Inject  under the skin Daily.    Historical Provider, MD   magnesium oxide (MAGOX) 400 (241.3 MG) MG tablet tablet Take 400 mg by mouth Daily.    Historical Provider, MD   niacin 500 MG tablet Take 500 mg  by mouth Every Night.    Historical Provider, MD   pantoprazole (PROTONIX) 40 MG EC tablet Take 40 mg by mouth Daily.    Historical Provider, MD   simvastatin (ZOCOR) 80 MG tablet Take 80 mg by mouth Every Night.    Historical Provider, MD   spironolactone (ALDACTONE) 25 MG tablet Take 25 mg by mouth Daily.    Historical Provider, MD   vitamin B-12 (CYANOCOBALAMIN) 500 MCG tablet Take 500 mcg by mouth Daily.    Historical Provider, MD   vitamin E 100 UNIT capsule Take 100 Units by mouth Daily.    Historical Provider, MD   warfarin (COUMADIN) 2 MG tablet Take 1 tablet by mouth Daily. 1/18/17   Ehsan Crocker MD       Medications   sodium chloride 0.9 % flush 10 mL (not administered)   pneumococcal polysaccharide 23-valent (PNEUMOVAX-23) vaccine 0.5 mL (not administered)   aspirin EC tablet 81 mg (not administered)   pantoprazole (PROTONIX) EC tablet 40 mg (not administered)   polyethyl glycol-propyl glycol (SYSTANE) 0.4-0.3 % ophthalmic solution 1 drop (not administered)   atorvastatin (LIPITOR) tablet 40 mg (40 mg Oral Given 2/1/17 0024)   spironolactone (ALDACTONE) tablet 25 mg (not administered)   venlafaxine (EFFEXOR) tablet 37.5 mg (not administered)   sodium chloride 0.9 % flush 1-10 mL (not administered)   acetaminophen (TYLENOL) tablet 650 mg (not administered)   aluminum-magnesium hydroxide-simethicone (MAALOX/MYLANTA) suspension 30 mL (not administered)   bisacodyl (DULCOLAX) EC tablet 5 mg (not administered)   ondansetron (ZOFRAN) injection 4 mg (not administered)   dextrose (GLUTOSE) oral gel 15 g (not administered)   dextrose (D50W) solution 25 g (not administered)   glucagon (human recombinant) (GLUCAGEN DIAGNOSTIC) injection 1 mg (not administered)   insulin detemir (LEVEMIR) injection 70 Units (not administered)   insulin lispro (humaLOG) injection 4-24 Units (20 Units Subcutaneous Given 2/1/17 0023)       Vitals:    01/31/17 2100   BP: 150/69   Pulse: 81   Resp: 20   Temp: 96.8 °F (36 °C)   SpO2: 95%          Objective   Physical Exam   Constitutional: She is oriented to person, place, and time. She appears well-developed and well-nourished.   HENT:   Head: Normocephalic and atraumatic.   Eyes: Pupils are equal, round, and reactive to light.   Neck: Normal range of motion. Neck supple.   Cardiovascular: Normal rate and regular rhythm.    Pulmonary/Chest: Effort normal and breath sounds normal.   Abdominal: Soft. Bowel sounds are normal.   Musculoskeletal: Normal range of motion.   Neurological: She is alert and oriented to person, place, and time.   Skin: Skin is warm.   Psychiatric: She has a normal mood and affect. Her behavior is normal. note and vitals reviewed.      Procedures        Lab Results (last 24 hours)     Procedure Component Value Units Date/Time    POC Glucose Fingerstick [[id number]]  (Abnormal) Collected:  01/31/17 1750    Specimen:  Blood Updated:  01/31/17 1753     Glucose 193 (A) mg/dL     POC Glucose Fingerstick [[id number]]  (Abnormal) Collected:  01/31/17 1750    Specimen:  Blood Updated:  01/31/17 1802     Glucose 193 (H) mg/dL       [facility] [id number] [id number]       CBC & Differential [[id number]] Collected:  01/31/17 1758    Specimen:  Blood Updated:  01/31/17 1819    Narrative:       The following orders were created for panel order CBC & Differential.  Procedure                               Abnormality         Status                     ---------                               -----------         ------                     CBC Auto Differential[[id number]]         Abnormal            Final result                 Please view results for these tests on the individual orders.    Comprehensive Metabolic Panel [[id number]]  (Abnormal) Collected:  01/31/17 1758    Specimen:  Blood Updated:  01/31/17 1947     Glucose 163 (H) mg/dL      BUN 35 (H) mg/dL      Creatinine 1.50 (H) mg/dL      Sodium 136 mmol/L      Potassium 4.5 mmol/L      Chloride 100 mmol/L      CO2 26.0 mmol/L       Calcium 9.0 mg/dL      Total Protein 7.3 g/dL      Albumin 3.80 g/dL      ALT (SGPT) 41 U/L      AST (SGOT) 30 U/L      Alkaline Phosphatase 85 U/L      Total Bilirubin 0.7 mg/dL      eGFR Non African Amer 33 (L) mL/min/1.73      Globulin 3.5 gm/dL      A/G Ratio 1.1 g/dL      BUN/Creatinine Ratio 23.3      Anion Gap 10.0 mmol/L     Narrative:       The MDRD GFR formula is only valid for adults with stable renal function between ages 18 and 70.    Protime-INR [[id number]]  (Abnormal) Collected:  01/31/17 1758    Specimen:  Blood Updated:  01/31/17 1835     Protime 28.7 (H) Seconds      INR 2.59 (H)     TSH [[id number]]  (Normal) Collected:  01/31/17 1758    Specimen:  Blood Updated:  01/31/17 1926     TSH 1.520 mIU/mL     Magnesium [[id number]]  (Normal) Collected:  01/31/17 1758    Specimen:  Blood Updated:  01/31/17 1947     Magnesium 2.1 mg/dL     CBC Auto Differential [[id number]]  (Abnormal) Collected:  01/31/17 1758    Specimen:  Blood Updated:  01/31/17 1819     WBC 8.97 10*3/mm3      RBC 3.64 (L) 10*6/mm3      Hemoglobin 10.2 (L) g/dL      Hematocrit 31.5 (L) %      MCV 86.5 fL      MCH 28.0 pg      MCHC 32.4 (L) g/dL      RDW 14.2 %      RDW-SD 44.6 fl      MPV 12.1 (H) fL      Platelets 305 10*3/mm3      Neutrophil % 64.9 %      Lymphocyte % 20.6 %      Monocyte % 11.9 %      Eosinophil % 1.9 %      Basophil % 0.3 %      Immature Grans % 0.4 %      Neutrophils, Absolute 5.81 10*3/mm3      Lymphocytes, Absolute 1.85 10*3/mm3      Monocytes, Absolute 1.07 10*3/mm3      Eosinophils, Absolute 0.17 10*3/mm3      Basophils, Absolute 0.03 10*3/mm3      Immature Grans, Absolute 0.04 (H) 10*3/mm3     POC Troponin, Rapid [[id number]]  (Normal) Collected:  01/31/17 1807    Specimen:  Blood Updated:  01/31/17 1820     Troponin I 0.00 ng/mL       Serial Number: [id number]    : [id number]       POC Glucose Fingerstick [[id number]]  (Abnormal) Collected:  02/01/17 0017    Specimen:  Blood Updated:  02/01/17 0029     Glucose 359  (H) mg/dL       : [id number] Cody AriasMeter ID: [id number]       Basic Metabolic Panel [[id number]]  (Abnormal) Collected:  02/01/17 0302    Specimen:  Blood Updated:  02/01/17 0420     Glucose 350 (H) mg/dL      BUN 32 (H) mg/dL      Creatinine 1.37 mg/dL      Sodium 137 mmol/L      Potassium 4.4 mmol/L      Chloride 102 mmol/L      CO2 26.0 mmol/L      Calcium 9.2 mg/dL      eGFR Non African Amer 37 (L) mL/min/1.73      BUN/Creatinine Ratio 23.4      Anion Gap 9.0 mmol/L     Narrative:       The MDRD GFR formula is only valid for adults with stable renal function between ages 18 and 70.    Protime-INR [[id number]]  (Abnormal) Collected:  02/01/17 0302    Specimen:  Blood Updated:  02/01/17 0415     Protime 27.9 (H) Seconds      INR 2.50 (H)     Hemoglobin A1c [[id number]] Collected:  02/01/17 0302    Specimen:  Blood Updated:  02/01/17 0401    TSH [[id number]]  (Normal) Collected:  02/01/17 0302    Specimen:  Blood Updated:  02/01/17 0448     TSH 1.250 mIU/mL     Lipid Panel [[id number]]  (Abnormal) Collected:  02/01/17 0302    Specimen:  Blood Updated:  02/01/17 0430     Total Cholesterol 110 (L) mg/dL      Triglycerides 211 (H) mg/dL      HDL Cholesterol 25 (L) mg/dL      LDL Cholesterol  55 mg/dL      LDL/HDL Ratio 1.71           XR Chest 1 View   Final Result   1. Moderate cardiomegaly without evidence of acute cardiopulmonary   process.           This report was finalized on 01/31/2017 18:06 by Dr. Luther Farias MD.          ED Course  ED Course   Comment By Time   Turned over to Dr. Dominguez at shift change. Sherif Early Jr., MD 02/01 0529          MDM  Number of Diagnoses or Management Options  Bradycardia with 31-40 beats per minute:   Hypoxia:   Sinus node dysfunction:   Weakness:       Final diagnoses:   Bradycardia with 31-40 beats per minute   Weakness   Sinus node dysfunction   Hypoxia       Sherif Early Jr., MD  02/01/17 0529       Electronically signed by Sherif Early Jr., MD at 2/1/2017   5:29 AM      Agapito Dominguez MD at 1/31/2017  6:22 PM          Subjective   History of Present Illness    Review of Systems    Past Medical History   Diagnosis Date   • Aortic stenosis    • Atrial fibrillation by electrocardiogram    • Atrial flutter    • Breast cancer    • CAD (coronary artery disease)    • Chronic anticoagulation    • Diabetes mellitus    • Hypertension        Allergies   Allergen Reactions   • Dilaudid [Hydromorphone Hcl]    • Dilaudid [Hydromorphone] Nausea And Vomiting   • Enalapril Angioedema   • Kombiglyze [Saxagliptin-Metformin Er]    • Lopid [Gemfibrozil] Nausea And Vomiting   • Sulfa Antibiotics Other (See Comments)     Syncope     • Ultram [Tramadol] Itching       Past Surgical History   Procedure Laterality Date   • Mastectomy     • Hysterectomy     • Cholecystectomy     • Coronary artery bypass graft         No family history on file.    Social History     Social History   • Marital status:      Spouse name: N/A   • Number of children: N/A   • Years of education: N/A     Social History Main Topics   • Smoking status: Never Smoker   • Smokeless tobacco: Not on file   • Alcohol use No   • Drug use: Not on file   • Sexual activity: Not on file     Other Topics Concern   • Not on file     Social History Narrative   • No narrative on file           Objective   Physical Exam   Constitutional: No distress.   Cardiovascular: Regular rhythm.   No extrasystoles are present. Bradycardia present.    Pulmonary/Chest: Effort normal and breath sounds normal.   Skin: She is not diaphoretic. There is pallor.   Psychiatric: She has a normal mood and affect. note and vitals reviewed.      Procedures        ED Course  ED Course      Labs Reviewed   COMPREHENSIVE METABOLIC PANEL - Abnormal; Notable for the following:        Result Value    Glucose 163 (*)     BUN 35 (*)     Creatinine 1.50 (*)     eGFR Non  Amer 33 (*)     All other components within normal limits    Narrative:     The MDRD  GFR formula is only valid for adults with stable renal function between ages 18 and 70.   PROTIME-INR - Abnormal; Notable for the following:     Protime 28.7 (*)     INR 2.59 (*)     All other components within normal limits   CBC WITH AUTO DIFFERENTIAL - Abnormal; Notable for the following:     RBC 3.64 (*)     Hemoglobin 10.2 (*)     Hematocrit 31.5 (*)     MCHC 32.4 (*)     MPV 12.1 (*)     Immature Grans, Absolute 0.04 (*)     All other components within normal limits   POCT GLUCOSE FINGERSTICK - Abnormal; Notable for the following:     Glucose 193 (*)     All other components within normal limits   POCT GLUCOSE FINGERSTICK - Abnormal; Notable for the following:     Glucose 193 (*)     All other components within normal limits   TSH - Normal   MAGNESIUM - Normal   POCT TROPONIN I, RAPID - Normal   POCT TROPONIN I, RAPID   CBC AND DIFFERENTIAL    Narrative:     The following orders were created for panel order CBC & Differential.  Procedure                               Abnormality         Status                     ---------                               -----------         ------                     CBC Auto Differential[[id number]]         Abnormal            Final result                 Please view results for these tests on the individual orders.                 MDM  Number of Diagnoses or Management Options  Bradycardia with 31-40 beats per minute: new and requires workup  Sinus node dysfunction: new and requires workup  Weakness: established and worsening     Amount and/or Complexity of Data Reviewed  Clinical lab tests: ordered and reviewed  Tests in the radiology section of CPT®:  ordered and reviewed  Decide to obtain previous medical records or to obtain history from someone other than the patient: yes  Obtain history from someone other than the patient: yes  Review and summarize past medical records: yes  Discuss the patient with other providers: yes  Independent visualization of images, tracings, or  specimens: yes    Risk of Complications, Morbidity, and/or Mortality  Presenting problems: high  Diagnostic procedures: high  Management options: high    Patient Progress  Patient progress: stable      Final diagnoses:   Bradycardia with 31-40 beats per minute   Weakness   Sinus node dysfunction           Agapito Dominguez MD  01/31/17 2005       Electronically signed by Agapito Dominguez MD at 1/31/2017  8:05 PM        Vital Signs (last 24 hours)       01/31 0700  -  02/01 0659 02/01 0700  -  02/01 0941   Most Recent    Temp (°F) 96.8 -  97.4      98.9     98.9 (37.2)    Heart Rate (!)39 -  81      (!)45     (!) 45    Resp   20      20     20    /48 -  167/54      155/45     155/45    SpO2 (%) (!)89 -  100      92     92          Hospital Medications (all)       Dose Frequency Start End    acetaminophen (TYLENOL) tablet 650 mg 650 mg Every 4 Hours PRN 1/31/2017     Sig - Route: Take 2 tablets by mouth Every 4 (Four) Hours As Needed for mild pain (1-3). - Oral    aluminum-magnesium hydroxide-simethicone (MAALOX/MYLANTA) suspension 30 mL 30 mL Every 6 Hours PRN 1/31/2017     Sig - Route: Take 30 mL by mouth Every 6 (Six) Hours As Needed for heartburn. - Oral    aspirin EC tablet 81 mg 81 mg Daily 2/1/2017     Sig - Route: Take 1 tablet by mouth Daily. - Oral    atorvastatin (LIPITOR) tablet 40 mg 40 mg Nightly 1/31/2017     Sig - Route: Take 1 tablet by mouth Every Night. - Oral    bisacodyl (DULCOLAX) EC tablet 5 mg 5 mg Daily PRN 1/31/2017     Sig - Route: Take 1 tablet by mouth Daily As Needed for constipation. - Oral    dextrose (D50W) solution 25 g 25 g Every 15 Minutes PRN 1/31/2017     Sig - Route: Infuse 50 mL into a venous catheter Every 15 (Fifteen) Minutes As Needed for low blood sugar (Blood Sugar Less Than 70, Patient Has IV Access - Unresponsive, NPO or Unable To Safely Swallow). - Intravenous    dextrose (GLUTOSE) oral gel 15 g 15 g Every 15 Minutes PRN 1/31/2017     Sig - Route: Take 15 g by  "mouth Every 15 (Fifteen) Minutes As Needed for low blood sugar (Blood Sugar Less Than 70, Patient Alert, Is Not NPO & Can Safely Swallow). - Oral    glucagon (human recombinant) (GLUCAGEN DIAGNOSTIC) injection 1 mg 1 mg Every 15 Minutes PRN 1/31/2017     Sig - Route: Inject 1 mg under the skin Every 15 (Fifteen) Minutes As Needed (Blood Glucose Less Than 70 - Patient Without IV Access - Unresponsive, NPO or Unable To Safely Swallow). - Subcutaneous    insulin detemir (LEVEMIR) injection 70 Units 70 Units Every Morning 2/1/2017     Sig - Route: Inject 70 Units under the skin Every Morning. - Subcutaneous    insulin lispro (humaLOG) injection 4-24 Units 4-24 Units 4 Times Daily Before Meals & Nightly 2/1/2017     Sig - Route: Inject 4-24 Units under the skin 4 (Four) Times a Day Before Meals & at Bedtime. - Subcutaneous    ondansetron (ZOFRAN) injection 4 mg 4 mg Every 6 Hours PRN 1/31/2017     Sig - Route: Infuse 2 mL into a venous catheter Every 6 (Six) Hours As Needed for nausea or vomiting. - Intravenous    pantoprazole (PROTONIX) EC tablet 40 mg 40 mg Daily 2/1/2017     Sig - Route: Take 1 tablet by mouth Daily. - Oral    pneumococcal polysaccharide 23-valent (PNEUMOVAX-23) vaccine 0.5 mL 0.5 mL During Hospitalization 1/31/2017     Sig - Route: Inject 0.5 mL into the shoulder, thigh, or buttocks During Hospitalization for immunization. - Intramuscular    polyethyl glycol-propyl glycol (SYSTANE) 0.4-0.3 % ophthalmic solution 1 drop 1 drop Daily 2/1/2017     Sig - Route: Administer 1 drop to the right eye Daily. - Right Eye    sodium chloride 0.9 % flush 1-10 mL 1-10 mL As Needed 1/31/2017     Sig - Route: Infuse 1-10 mL into a venous catheter As Needed for line care. - Intravenous    sodium chloride 0.9 % flush 10 mL 10 mL As Needed 1/31/2017     Sig - Route: Infuse 10 mL into a venous catheter As Needed for line care. - Intravenous    Linked Group 1:  \"And\" Linked Group Details        spironolactone (ALDACTONE) " tablet 25 mg 25 mg Daily 2/1/2017     Sig - Route: Take 1 tablet by mouth Daily. - Oral    venlafaxine (EFFEXOR) tablet 37.5 mg 37.5 mg Daily 2/1/2017     Sig - Route: Take 1 tablet by mouth Daily. - Oral          Lab Results (last 24 hours)     Procedure Component Value Units Date/Time    POC Glucose Fingerstick [[id number]]  (Abnormal) Collected:  01/31/17 1750    Specimen:  Blood Updated:  01/31/17 1753     Glucose 193 (A) mg/dL     POC Glucose Fingerstick [[id number]]  (Abnormal) Collected:  01/31/17 1750    Specimen:  Blood Updated:  01/31/17 1802     Glucose 193 (H) mg/dL       : [id number] Granger ID: [id number]       CBC & Differential [[id number]] Collected:  01/31/17 1758    Specimen:  Blood Updated:  01/31/17 1819    Narrative:       The following orders were created for panel order CBC & Differential.  Procedure                               Abnormality         Status                     ---------                               -----------         ------                     CBC Auto Differential[[id number]]         Abnormal            Final result                 Please view results for these tests on the individual orders.    CBC Auto Differential [[id number]]  (Abnormal) Collected:  01/31/17 1758    Specimen:  Blood Updated:  01/31/17 1819     WBC 8.97 10*3/mm3      RBC 3.64 (L) 10*6/mm3      Hemoglobin 10.2 (L) g/dL      Hematocrit 31.5 (L) %      MCV 86.5 fL      MCH 28.0 pg      MCHC 32.4 (L) g/dL      RDW 14.2 %      RDW-SD 44.6 fl      MPV 12.1 (H) fL      Platelets 305 10*3/mm3      Neutrophil % 64.9 %      Lymphocyte % 20.6 %      Monocyte % 11.9 %      Eosinophil % 1.9 %      Basophil % 0.3 %      Immature Grans % 0.4 %      Neutrophils, Absolute 5.81 10*3/mm3      Lymphocytes, Absolute 1.85 10*3/mm3      Monocytes, Absolute 1.07 10*3/mm3      Eosinophils, Absolute 0.17 10*3/mm3      Basophils, Absolute 0.03 10*3/mm3      Immature Grans, Absolute 0.04 (H) 10*3/mm3     POC Troponin, Rapid  [[id number]]  (Normal) Collected:  01/31/17 1807    Specimen:  Blood Updated:  01/31/17 1820     Troponin I 0.00 ng/mL       Serial Number: [id number]    : [id number]       Protime-INR [[id number]]  (Abnormal) Collected:  01/31/17 1758    Specimen:  Blood Updated:  01/31/17 1835     Protime 28.7 (H) Seconds      INR 2.59 (H)     TSH [[id number]]  (Normal) Collected:  01/31/17 1758    Specimen:  Blood Updated:  01/31/17 1926     TSH 1.520 mIU/mL     Comprehensive Metabolic Panel [[id number]]  (Abnormal) Collected:  01/31/17 1758    Specimen:  Blood Updated:  01/31/17 1947     Glucose 163 (H) mg/dL      BUN 35 (H) mg/dL      Creatinine 1.50 (H) mg/dL      Sodium 136 mmol/L      Potassium 4.5 mmol/L      Chloride 100 mmol/L      CO2 26.0 mmol/L      Calcium 9.0 mg/dL      Total Protein 7.3 g/dL      Albumin 3.80 g/dL      ALT (SGPT) 41 U/L      AST (SGOT) 30 U/L      Alkaline Phosphatase 85 U/L      Total Bilirubin 0.7 mg/dL      eGFR Non African Amer 33 (L) mL/min/1.73      Globulin 3.5 gm/dL      A/G Ratio 1.1 g/dL      BUN/Creatinine Ratio 23.3      Anion Gap 10.0 mmol/L     Narrative:       The MDRD GFR formula is only valid for adults with stable renal function between ages 18 and 70.    Magnesium [[id number]]  (Normal) Collected:  01/31/17 1758    Specimen:  Blood Updated:  01/31/17 1947     Magnesium 2.1 mg/dL     POC Glucose Fingerstick [[id number]]  (Abnormal) Collected:  02/01/17 0017    Specimen:  Blood Updated:  02/01/17 0029     Glucose 359 (H) mg/dL       : [id number] Cody MelendrezistieMeter ID: [id number]       Protime-INR [[id number]]  (Abnormal) Collected:  02/01/17 0302    Specimen:  Blood Updated:  02/01/17 0415     Protime 27.9 (H) Seconds      INR 2.50 (H)     Basic Metabolic Panel [[id number]]  (Abnormal) Collected:  02/01/17 0302    Specimen:  Blood Updated:  02/01/17 0420     Glucose 350 (H) mg/dL      BUN 32 (H) mg/dL      Creatinine 1.37 mg/dL      Sodium 137 mmol/L      Potassium 4.4 mmol/L      Chloride  102 mmol/L      CO2 26.0 mmol/L      Calcium 9.2 mg/dL      eGFR Non African Amer 37 (L) mL/min/1.73      BUN/Creatinine Ratio 23.4      Anion Gap 9.0 mmol/L     Narrative:       The MDRD GFR formula is only valid for adults with stable renal function between ages 18 and 70.    Lipid Panel [[id number]]  (Abnormal) Collected:  02/01/17 0302    Specimen:  Blood Updated:  02/01/17 0430     Total Cholesterol 110 (L) mg/dL      Triglycerides 211 (H) mg/dL      HDL Cholesterol 25 (L) mg/dL      LDL Cholesterol  55 mg/dL      LDL/HDL Ratio 1.71     TSH [[id number]]  (Normal) Collected:  02/01/17 0302    Specimen:  Blood Updated:  02/01/17 0448     TSH 1.250 mIU/mL     Hemoglobin A1c [[id number]] Collected:  02/01/17 0302    Specimen:  Blood Updated:  02/01/17 0658     Hemoglobin A1C 9.3 %     Narrative:       Less than 6.0           Non-Diabetic Range  6.0-7.0                 ADA Therapeutic Target  Greater than 7.0        Action Suggested    POC Glucose Fingerstick [[id number]]  (Abnormal) Collected:  02/01/17 0904    Specimen:  Blood Updated:  02/01/17 0916     Glucose 219 (H) mg/dL       : [id number] Nilson TaylorMeter ID: [id number]             Imaging Results (last 24 hours)     Procedure Component Value Units Date/Time    XR Chest 1 View [[id number]] Collected:  01/31/17 1805     Updated:  01/31/17 1808    Narrative:       XR CHEST 1 VW- 1/31/2017 6:03 PM CST     HISTORY: SOB       COMPARISON: 03/23/2016.     FINDINGS:   The lungs are clear. The heart is enlarged. 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      The osseous structures and surrounding soft tissues demonstrate no acute  abnormality.       Impression:       1. Moderate cardiomegaly without evidence of acute cardiopulmonary  process.        This report was finalized on 01/31/2017 18:06 by Dr. Luther Farias MD.          Orders (last 24 hrs)     Start     Ordered    02/01/17 0917  POC Glucose Fingerstick  Once      02/01/17 0916    02/01/17 0900  aspirin EC tablet  81 mg  Daily      01/31/17 2320 02/01/17 0900  pantoprazole (PROTONIX) EC tablet 40 mg  Daily      01/31/17 2320 02/01/17 0900  polyethyl glycol-propyl glycol (SYSTANE) 0.4-0.3 % ophthalmic solution 1 drop  Daily      01/31/17 2320 02/01/17 0900  spironolactone (ALDACTONE) tablet 25 mg  Daily      01/31/17 2320 02/01/17 0900  venlafaxine (EFFEXOR) tablet 37.5 mg  Daily      01/31/17 2320 02/01/17 0700  POC Glucose Fingerstick  4 Times Daily Before Meals & at Bedtime      01/31/17 2323 02/01/17 0700  insulin detemir (LEVEMIR) injection 70 Units  Every Morning      01/31/17 2323 02/01/17 0600  Basic Metabolic Panel  Morning Draw      01/31/17 2320 02/01/17 0600  Protime-INR  Morning Draw      01/31/17 2320 02/01/17 0600  CBC Auto Differential  Morning Draw,   Status:  Canceled      01/31/17 2320 02/01/17 0600  Hemoglobin A1c  Morning Draw      01/31/17 2320 02/01/17 0600  TSH  Morning Draw      01/31/17 2320 02/01/17 0600  Lipid Panel  Morning Draw      01/31/17 2320 02/01/17 0600  POC Glucose Fingerstick  Daily      01/31/17 2323 02/01/17 0030  POC Glucose Fingerstick  Once      02/01/17 0029    02/01/17 0000  Vital Signs  Every 4 Hours      01/31/17 2320 02/01/17 0000  Strict Intake and Output  Every Hour      01/31/17 2320 02/01/17 0000  insulin lispro (humaLOG) injection 4-24 Units  4 Times Daily Before Meals & Nightly      01/31/17 2323 01/31/17 2359  NPO Diet  Diet Effective Now     Comments:  May eat now    01/31/17 2003 01/31/17 2330  atorvastatin (LIPITOR) tablet 40 mg  Nightly      01/31/17 2320 01/31/17 2322  Do NOT Hold Basal Insulin When Patient is NPO, Hold Bolus Dose if NPO  Continuous      01/31/17 2323 01/31/17 2322  Follow [facility] Hypoglycemia Protocol For Blood Glucose Less Than 70 mg/dL  Until Discontinued      01/31/17 2323    01/31/17 2322  Hypoglycemia Treatment - Alert Patient That is Not NPO and Can Safely Swallow  Until Discontinued      Comments:  Administer 4 oz Fruit Juice OR 4 oz Regular Soda OR 8 oz Milk OR 15-30 grams (1 tube) of Glucose Gel  Recheck Blood Glucose 15 Minutes After Ingestion, Repeat Treatment & Continue to Recheck Blood Sugar Every 15 Minutes Until Blood Glucose is 70 or Higher  Once Blood Glucose is 70 or Higher, Give Snack (Peanut Butter & Crackers) if Next Meal Will Be Given in More Than 60 Minutes, Provide Meal Tray As Soon As Possible    01/31/17 2323 01/31/17 2322  Hypoglycemia Treatment - Patient Has IV Access - Unresponsive, NPO or Unable To Safely Swallow  Until Discontinued     Comments:  Administer 25g D50W IV Push (1 Whole Vial)  Recheck Blood Glucose 15 Minutes After Administration, if Blood Glucose Remains Less Than 70, Repeat Treatment   Recheck Blood Glucose 15 Minutes After 2nd Administration, if Blood Glucose Remains Less Than 70 After 2nd Dose of D50W Contact Provider for Further Treatment Orders & Consider Adding IVF With D5 for Maintenance    01/31/17 2323 01/31/17 2322  Hypoglycemia Treatment - Patient Without IV Access - Unresponsive, NPO or Unable To Safely Swallow  Until Discontinued     Comments:  Administer 1mg Glucagon SQ & Establish IV Access  Turn Patient on Side - Nausea / Vomiting May Occur  Recheck Blood Glucose 15 Minutes After Administration  If IV Access Has Not Been Established & Blood Glucose Remains Less Than 70, Repeat Treatment With Glucagon  If IV Access Established, Administer 25g D50W IV Push (1 Whole Vial)  Recheck Blood Glucose 15 Minutes After Administration of 2nd Medication, if Blood Glucose Remains Less Than 70, Contact Provider for Further Treatment Orders & Consider Adding IVF With D5 for Maintenance    01/31/17 2323 01/31/17 2321  dextrose (GLUTOSE) oral gel 15 g  Every 15 Minutes PRN      01/31/17 2323    01/31/17 2321  dextrose (D50W) solution 25 g  Every 15 Minutes PRN      01/31/17 2323    01/31/17 2321  glucagon (human recombinant) (GLUCAGEN DIAGNOSTIC)  injection 1 mg  Every 15 Minutes PRN      01/31/17 2323    01/31/17 2319  pneumococcal polysaccharide 23-valent (PNEUMOVAX-23) vaccine 0.5 mL  During Hospitalization      01/31/17 2319    01/31/17 2314  ondansetron (ZOFRAN) injection 4 mg  Every 6 Hours PRN      01/31/17 2320    01/31/17 2314  bisacodyl (DULCOLAX) EC tablet 5 mg  Daily PRN      01/31/17 2320    01/31/17 2314  aluminum-magnesium hydroxide-simethicone (MAALOX/MYLANTA) suspension 30 mL  Every 6 Hours PRN      01/31/17 2320    01/31/17 2314  acetaminophen (TYLENOL) tablet 650 mg  Every 4 Hours PRN      01/31/17 2320    01/31/17 2313  Weigh patient  Once      01/31/17 2320    01/31/17 2313  Oxygen Therapy-  Continuous      01/31/17 2320    01/31/17 2313  Insert Peripheral IV  Once      01/31/17 2320    01/31/17 2313  Saline Lock & Maintain IV Access  Continuous      01/31/17 2320    01/31/17 2313  Full Code  Continuous      01/31/17 2320    01/31/17 2313  VTE Risk Assessment - Moderate Risk  Once      01/31/17 2320    01/31/17 2313  Pharmacologic VTE Prophylaxis Not Indicated: Anticipated Invasive Procedure / Surgery  Once      01/31/17 2320    01/31/17 2313  Place Sequential Compression Device  Once      01/31/17 2320    01/31/17 2313  Maintain Sequential Compression Device  Continuous      01/31/17 2320 01/31/17 2312  sodium chloride 0.9 % flush 1-10 mL  As Needed      01/31/17 2320    01/31/17 2003  Inpatient Admission  Once      01/31/17 2003 01/31/17 1821  POC Troponin, Rapid  Once      01/31/17 1820    01/31/17 1803  POC Glucose Fingerstick  Once      01/31/17 1802    01/31/17 1747  POC Glucose Fingerstick  Once      01/31/17 1747    01/31/17 1745  TSH  Once      01/31/17 1744    01/31/17 1745  Magnesium  Once      01/31/17 1744    01/31/17 1744  CBC & Differential  Once      01/31/17 1744    01/31/17 1744  Comprehensive Metabolic Panel  Once      01/31/17 1744    01/31/17 1744  Protime-INR  Once      01/31/17 1744    01/31/17 1744  XR Chest  1 View  1 Time Imaging      01/31/17 1744    01/31/17 1744  Insert peripheral IV  Once      01/31/17 1744    01/31/17 1744  Pulse Oximetry, Continuous  Per Hospital Policy      01/31/17 1744    01/31/17 1744  POCT Troponin, rapid  Once      01/31/17 1744    01/31/17 1744  CBC Auto Differential  PROCEDURE ONCE      01/31/17 1744    01/31/17 1743  sodium chloride 0.9 % flush 10 mL  As Needed      01/31/17 1744    01/31/17 1706  ECG 12 Lead  Once      01/31/17 1706    Unscheduled  Oxygen Therapy- Nasal Cannula; 2 LPM; Titrate for spO2: equal to or greater than, 92%  As Needed      01/31/17 1744    --  atenolol (TENORMIN) 25 MG tablet  Daily      01/31/17 1759    --  insulin aspart (novoLOG) 100 UNIT/ML injection  3 Times Daily Before Meals      01/31/17 1759    --  NON FORMULARY  2 Times Daily      01/31/17 2231    --  polyethyl glycol-propyl glycol (SYSTANE) 0.4-0.3 % solution ophthalmic solution  Daily      01/31/17 2231          Physician Progress Notes (last 24 hours) (Notes from 1/31/2017  9:42 AM through 2/1/2017  9:42 AM)     No notes of this type exist for this encounter.        Consult Notes (last 24 hours) (Notes from 1/31/2017  9:42 AM through 2/1/2017  9:42 AM)     No notes of this type exist for this encounter.

## 2017-02-01 NOTE — PROGRESS NOTES
Discharge Planning Assessment  [facility]     Patient Name: Lakesha Costello  MRN: [id number]  Today's Date: 2/1/2017    Admit Date: 1/31/2017          Discharge Needs Assessment       02/01/17 1017    Living Environment    Lives With (P)  spouse    Living Arrangements (P)  house    Quality Of Family Relationships (P)  supportive    Able to Return to Prior Living Arrangements (P)  yes    Discharge Needs Assessment    Concerns To Be Addressed (P)  no discharge needs identified;denies needs/concerns at this time    Anticipated Changes Related to Illness (P)  none    Equipment Currently Used at Home (P)  commode;walker, standard;cane, straight    Equipment Needed After Discharge (P)  none    Transportation Available (P)  family or friend will provide            Discharge Plan       02/01/17 1018    Case Management/Social Work Plan    Plan (P)  Pt plans to dc home with spouse. Pt has no dc needs at this time.     Patient/Family In Agreement With Plan (P)  yes        Discharge Placement     No information found                Demographic Summary     None            Functional Status     None            Psychosocial     None            Abuse/Neglect     None            Legal     None            Substance Abuse     None            Patient Forms     None          Marcella Contreras

## 2017-02-02 PROBLEM — I51.89 DIASTOLIC DYSFUNCTION WITHOUT HEART FAILURE: Status: ACTIVE | Noted: 2017-02-02

## 2017-02-02 LAB
GLUCOSE BLDC GLUCOMTR-MCNC: 119 MG/DL (ref 70–130)
GLUCOSE BLDC GLUCOMTR-MCNC: 152 MG/DL (ref 70–130)
GLUCOSE BLDC GLUCOMTR-MCNC: 210 MG/DL (ref 70–130)
GLUCOSE BLDC GLUCOMTR-MCNC: 314 MG/DL (ref 70–130)
INR PPP: 2.43 (ref 0.91–1.09)
PROTHROMBIN TIME: 27.3 SECONDS (ref 11.9–14.6)

## 2017-02-02 PROCEDURE — 82962 GLUCOSE BLOOD TEST: CPT

## 2017-02-02 PROCEDURE — 63710000001 INSULIN DETEMIR PER 5 UNITS: Performed by: INTERNAL MEDICINE

## 2017-02-02 PROCEDURE — 99233 SBSQ HOSP IP/OBS HIGH 50: CPT | Performed by: INTERNAL MEDICINE

## 2017-02-02 PROCEDURE — 85610 PROTHROMBIN TIME: CPT | Performed by: NURSE PRACTITIONER

## 2017-02-02 RX ORDER — PHYTONADIONE 10 MG/ML
2.5 INJECTION, EMULSION INTRAMUSCULAR; INTRAVENOUS; SUBCUTANEOUS ONCE
Status: DISCONTINUED | OUTPATIENT
Start: 2017-02-02 | End: 2017-02-02 | Stop reason: SDUPTHER

## 2017-02-02 RX ORDER — PHYTONADIONE 5 MG/1
2.5 TABLET ORAL ONCE
Status: COMPLETED | OUTPATIENT
Start: 2017-02-02 | End: 2017-02-02

## 2017-02-02 RX ADMIN — PHYTONADIONE 2.5 MG: 5 TABLET ORAL at 14:58

## 2017-02-02 RX ADMIN — PANTOPRAZOLE SODIUM 40 MG: 40 TABLET, DELAYED RELEASE ORAL at 08:31

## 2017-02-02 RX ADMIN — DESMOPRESSIN ACETATE 40 MG: 0.2 TABLET ORAL at 20:26

## 2017-02-02 RX ADMIN — ASPIRIN 81 MG: 81 TABLET ORAL at 08:31

## 2017-02-02 RX ADMIN — SPIRONOLACTONE 25 MG: 25 TABLET ORAL at 08:31

## 2017-02-02 RX ADMIN — INSULIN DETEMIR 70 UNITS: 100 INJECTION, SOLUTION SUBCUTANEOUS at 08:32

## 2017-02-02 RX ADMIN — INSULIN LISPRO 16 UNITS: 100 INJECTION, SOLUTION INTRAVENOUS; SUBCUTANEOUS at 17:47

## 2017-02-02 RX ADMIN — INSULIN LISPRO 8 UNITS: 100 INJECTION, SOLUTION INTRAVENOUS; SUBCUTANEOUS at 12:59

## 2017-02-02 RX ADMIN — POLYETHYLENE GLYCOL AND PROPYLENE GLYCOL 1 DROP: 4; 3 SOLUTION/ DROPS OPHTHALMIC at 08:32

## 2017-02-02 RX ADMIN — VENLAFAXINE HYDROCHLORIDE 37.5 MG: 37.5 TABLET ORAL at 08:31

## 2017-02-02 NOTE — PLAN OF CARE
Problem: Patient Care Overview (Adult)  Goal: Plan of Care Review  Outcome: Ongoing (interventions implemented as appropriate)    02/02/17 0501   Coping/Psychosocial Response Interventions   Plan Of Care Reviewed With patient   Patient Care Overview   Progress no change   Outcome Evaluation   Outcome Summary/Follow up Plan Junctional rhythm 43-54 on tele. VSS. No compalints. Awaiting pacemaker insertion.          Problem: Fall Risk (Adult)  Goal: Identify Related Risk Factors and Signs and Symptoms  Outcome: Ongoing (interventions implemented as appropriate)  Goal: Absence of Falls  Outcome: Ongoing (interventions implemented as appropriate)    Problem: Arrhythmia/Dysrhythmia (Symptomatic) (Adult)  Goal: Signs and Symptoms of Listed Potential Problems Will be Absent or Manageable (Arrhythmia/Dysrhythmia)  Outcome: Ongoing (interventions implemented as appropriate)

## 2017-02-02 NOTE — PAYOR COMM NOTE
"2/2 additional clinical    Lakesha Costello (81 y.o. Female)     Date of Birth Social Security Number Address Home Phone MRN    1935  [address] [id number] [phone] [id number]    Tenriism Marital Status          Other        Admission Date Admission Type Admitting Provider Attending Provider Department, Room/Bed    1/31/17 Emergency Ehsan Crocker MD Ford, James K, MD [address], 409/1    Discharge Date Discharge Disposition Discharge Destination                      Attending Provider: Ehsan Crocker MD     Allergies:  Dilaudid [Hydromorphone Hcl], Dilaudid [Hydromorphone], Enalapril, Kombiglyze [Saxagliptin-metformin Er], Lopid [Gemfibrozil], Sulfa Antibiotics, Ultram [Tramadol]    Isolation:  None   Infection:  None   Code Status:  FULL    Ht:  60\" (152.4 cm)   Wt:  134 lb 4.8 oz (60.9 kg)    Admission Cmt:  None   Principal Problem:  Junctional bradycardia [R00.1]                 Active Insurance as of 1/31/2017     Primary Coverage     Payor Plan Insurance Group Employer/Plan Group    HUMANA MEDICARE REPL HUMANA MEDICARE REPL [id number]     Payor Plan Address Payor Plan Phone Number Effective From Effective To    [address] [address] [phone] 1/1/2014     [place] [address]       Subscriber Name Subscriber Birth Date Member ID       LAKESHA COSTELLO 1935 [id number]                 Emergency Contacts      (Rel.) Home Phone Work Phone Mobile Phone    Margarita Costello (Daughter) [phone] -- --    Richard Pablo (Spouse) [phone] -- --            Vital Signs (last 24 hours)       02/01 0700  -  02/02 0659 02/02 0700  -  02/02 1231   Most Recent    Temp (°F) 97.1 -  99    97.9 -  98.3     98.3 (36.8)    Heart Rate (!)44 -  52    56 -  61     56    Resp 18 -  22      18     18    /74 -  157/42    (!) 150/37 -  (!) 150/38     (!) 150/38    SpO2 (%) 91 -  99      98     98          Intake & Output (last day)       02/01 0701 - " 02/02 0700 02/02 0701 - 02/03 0700    P.O. 360     Total Intake(mL/kg) 360 (5.9)     Net +360            Unmeasured Urine Occurrence 4 x         Hospital Medications (all)       Dose Frequency Start End    acetaminophen (TYLENOL) tablet 650 mg 650 mg Every 4 Hours PRN 1/31/2017     Sig - Route: Take 2 tablets by mouth Every 4 (Four) Hours As Needed for mild pain (1-3). - Oral    aluminum-magnesium hydroxide-simethicone (MAALOX/MYLANTA) suspension 30 mL 30 mL Every 6 Hours PRN 1/31/2017     Sig - Route: Take 30 mL by mouth Every 6 (Six) Hours As Needed for heartburn. - Oral    aspirin EC tablet 81 mg 81 mg Daily 2/1/2017     Sig - Route: Take 1 tablet by mouth Daily. - Oral    atorvastatin (LIPITOR) tablet 40 mg 40 mg Nightly 1/31/2017     Sig - Route: Take 1 tablet by mouth Every Night. - Oral    bisacodyl (DULCOLAX) EC tablet 5 mg 5 mg Daily PRN 1/31/2017     Sig - Route: Take 1 tablet by mouth Daily As Needed for constipation. - Oral    dextrose (D50W) solution 25 g 25 g Every 15 Minutes PRN 1/31/2017     Sig - Route: Infuse 50 mL into a venous catheter Every 15 (Fifteen) Minutes As Needed for low blood sugar (Blood Sugar Less Than 70, Patient Has IV Access - Unresponsive, NPO or Unable To Safely Swallow). - Intravenous    dextrose (GLUTOSE) oral gel 15 g 15 g Every 15 Minutes PRN 1/31/2017     Sig - Route: Take 15 g by mouth Every 15 (Fifteen) Minutes As Needed for low blood sugar (Blood Sugar Less Than 70, Patient Alert, Is Not NPO & Can Safely Swallow). - Oral    glucagon (human recombinant) (GLUCAGEN DIAGNOSTIC) injection 1 mg 1 mg Every 15 Minutes PRN 1/31/2017     Sig - Route: Inject 1 mg under the skin Every 15 (Fifteen) Minutes As Needed (Blood Glucose Less Than 70 - Patient Without IV Access - Unresponsive, NPO or Unable To Safely Swallow). - Subcutaneous    insulin detemir (LEVEMIR) injection 70 Units 70 Units Every Morning 2/1/2017     Sig - Route: Inject 70 Units under the skin Every Morning. -  "Subcutaneous    insulin lispro (humaLOG) injection 4-24 Units 4-24 Units 4 Times Daily Before Meals & Nightly 2/1/2017     Sig - Route: Inject 4-24 Units under the skin 4 (Four) Times a Day Before Meals & at Bedtime. - Subcutaneous    ondansetron (ZOFRAN) injection 4 mg 4 mg Every 6 Hours PRN 1/31/2017     Sig - Route: Infuse 2 mL into a venous catheter Every 6 (Six) Hours As Needed for nausea or vomiting. - Intravenous    ondansetron (ZOFRAN) injection 4 mg 4 mg Every 6 Hours PRN 2/1/2017     Sig - Route: Infuse 2 mL into a venous catheter Every 6 (Six) Hours As Needed for nausea or vomiting. - Intravenous    pantoprazole (PROTONIX) EC tablet 40 mg 40 mg Daily 2/1/2017     Sig - Route: Take 1 tablet by mouth Daily. - Oral    phytonadione (MEPHYTON, VITAMIN K) tablet 2.5 mg 2.5 mg Once 2/2/2017     Sig - Route: Take 0.5 tablets by mouth 1 (One) Time. - Oral    pneumococcal polysaccharide 23-valent (PNEUMOVAX-23) vaccine 0.5 mL 0.5 mL During Hospitalization 1/31/2017     Sig - Route: Inject 0.5 mL into the shoulder, thigh, or buttocks During Hospitalization for immunization. - Intramuscular    polyethyl glycol-propyl glycol (SYSTANE) 0.4-0.3 % ophthalmic solution 1 drop 1 drop Daily 2/1/2017     Sig - Route: Administer 1 drop to the right eye Daily. - Right Eye    sodium chloride 0.9 % flush 1-10 mL 1-10 mL As Needed 1/31/2017     Sig - Route: Infuse 1-10 mL into a venous catheter As Needed for line care. - Intravenous    sodium chloride 0.9 % flush 10 mL 10 mL As Needed 1/31/2017     Sig - Route: Infuse 10 mL into a venous catheter As Needed for line care. - Intravenous    Linked Group 1:  \"And\" Linked Group Details        spironolactone (ALDACTONE) tablet 25 mg 25 mg Daily 2/1/2017     Sig - Route: Take 1 tablet by mouth Daily. - Oral    venlafaxine (EFFEXOR) tablet 37.5 mg 37.5 mg Daily 2/1/2017     Sig - Route: Take 1 tablet by mouth Daily. - Oral    phytonadione (AQUA-MEPHYTON, VITAMIN K) 2.5 mg in sodium " chloride 0.9 % 50 mL IVPB (Discontinued) 2.5 mg Once 2/2/2017 2/2/2017    Sig - Route: Infuse 2.5 mg into a venous catheter 1 (One) Time. - Intravenous    phytonadione (AQUA-MEPHYTON, VITAMIN K) injection 2.5 mg (Discontinued) 2.5 mg Once 2/2/2017 2/2/2017    Sig - Route: Infuse 0.25 mL into a venous catheter 1 (One) Time. - Intravenous    Notes to Pharmacy: For Coumadin reversal    Reason for Discontinue: Reorder          Lab Results (last 24 hours)     Procedure Component Value Units Date/Time    POC Glucose Fingerstick [[id number]]  (Abnormal) Collected:  02/01/17 1529    Specimen:  Blood Updated:  02/01/17 1546     Glucose 260 (H) mg/dL       : [id number] Nilson TaylorMeter ID: [id number]       POC Glucose Fingerstick [[id number]]  (Abnormal) Collected:  02/01/17 1951    Specimen:  Blood Updated:  02/01/17 2007     Glucose 185 (H) mg/dL       : [id number] Mick GiemnezeyMeter ID: [id number]       Protime-INR [[id number]]  (Abnormal) Collected:  02/02/17 0514    Specimen:  Blood Updated:  02/02/17 0623     Protime 27.3 (H) Seconds      INR 2.43 (H)     POC Glucose Fingerstick [[id number]]  (Abnormal) Collected:  02/02/17 0808    Specimen:  Blood Updated:  02/02/17 0839     Glucose 152 (H) mg/dL       : [id number] Carolina FloresaMeter ID: [id number]       POC Glucose Fingerstick [[id number]]  (Abnormal) Collected:  02/02/17 1113    Specimen:  Blood Updated:  02/02/17 1220     Glucose 210 (H) mg/dL       : [id number] Carolina FloresaMeter ID: [id number]             Imaging Results (last 24 hours)     ** No results found for the last 24 hours. **        ECG/EMG Results (last 24 hours)     Procedure Component Value Units Date/Time    Adult Transthoracic Echo Complete [[id number]] Resulted:  02/01/17 1249     IVSd 0.92 cm Updated:  02/01/17 1254     LVIDd 4.1 cm      LVIDs 2.7 cm      LVPWd 1.0 cm      IVS/LVPW 0.9      FS 32.8 %      EDV(Teich) 72.1 ml      ESV(Teich) 27.5 ml      EF(Teich) 61.8 %       EDV(cubed) 66.4 ml      ESV(cubed) 20.1 ml      EF(cubed) 69.7 %      LV mass(C)d 123.9 grams      SV(Teich) 44.6 ml      SV(cubed) 46.3 ml      Ao root diam 3.0 cm      Ao root area 7.1 cm^2      LA dimension 3.7 cm      LA/Ao 1.2      LVOT diam 2.0 cm      LVOT area 3.1 cm^2      LVOT area(traced) 3.1 cm^2      LVLd ap4 6.7 cm      EDV(MOD-sp4) 53.3 ml      LVLs ap4 5.9 cm      ESV(MOD-sp4) 18.5 ml      EF(MOD-sp4) 65.3 %      SV(MOD-sp4) 34.8 ml      CONTRAST EF 4CH 65.3 ml/m^2      MV E max mau 158.0 cm/sec      MV A max mau 45.9 cm/sec      MV E/A 3.4      MV dec time 0.25 sec      Ao pk mau 347.0 cm/sec      Ao max PG 48.2 mmHg      Ao max PG (full) 45.2 mmHg      Ao V2 mean 240.0 cm/sec      Ao mean PG 26.0 mmHg      Ao mean PG (full) 24.0 mmHg      Ao V2 .0 cm      CHELSEA(I,A) 0.68 cm^2      CHELSEA(I,D) 0.68 cm^2      CHELSEA(V,A) 0.78 cm^2      CHELSEA(V,D) 0.78 cm^2      LV V1 max PG 2.9 mmHg      LV V1 mean PG 2.0 mmHg      LV V1 max 85.8 cm/sec      LV V1 mean 55.1 cm/sec      LV V1 VTI 22.4 cm      MR max mau 469.0 cm/sec      MR max PG 88.0 mmHg      SV(Ao) 728.1 ml      SV(LVOT) 70.4 ml      PA V2 max 99.1 cm/sec      PA max PG 3.9 mmHg      PI end-d mau 149.0 cm/sec      TR max mau 308.0 cm/sec      RVSP(TR) 42.9 mmHg      RAP systole 5.0 mmHg      LA volume 60.0 cm3      E/E' ratio 36.0      Lat Peak E' Mau 5.4 cm/sec     Narrative:       · Left atrial cavity size is mild-to-moderately dilated.  · Left ventricular diastolic dysfunction (grade III) consistent with   reversible restrictive pattern.  · Severe aortic valve stenosis is present.  · Right ventricular cavity is moderately dilated.  · Moderately reduced right ventricular systolic function noted.       ECG 12 Lead [[id number]] Collected:  01/31/17 1712     Updated:  02/02/17 0017    Narrative:       Test Reason : bradycardia  Blood Pressure : **/** mmHG  Vent. Rate : 038 BPM     Atrial Rate : 038 BPM     P-R Int : 000 ms          QRS Dur : 108  ms      QT Int : 610 ms       P-R-T Axes : 000 -59 012 degrees     QTc Int : 484 ms    Junctional bradycardia  Left axis deviation  Anterior infarct (cited on or before 31-JAN-2017)  Abnormal ECG  When compared with ECG of 27-AUG-2016 23:16,  Inverted T waves have replaced nonspecific T wave abnormality in Anterior  leads  QT has lengthened    Referred By:  OTF           Confirmed By:Roc Hudson MD          Orders (last 24 hrs)     Start     Ordered    02/02/17 1300  phytonadione (MEPHYTON, VITAMIN K) tablet 2.5 mg  Once      02/02/17 1215    02/02/17 1220  POC Glucose Fingerstick  Once      02/02/17 1220    02/02/17 1204  EP/CRM Study  Once      02/02/17 1203    02/02/17 1203  Case Request Cath Lab: Pacemaker DC new  Once      02/02/17 1205    02/02/17 1200  phytonadione (AQUA-MEPHYTON, VITAMIN K) injection 2.5 mg  Once,   Status:  Discontinued     Comments:  For Coumadin reversal    02/02/17 1122    02/02/17 1200  phytonadione (AQUA-MEPHYTON, VITAMIN K) 2.5 mg in sodium chloride 0.9 % 50 mL IVPB  Once,   Status:  Discontinued      02/02/17 1124    02/02/17 0840  POC Glucose Fingerstick  Once      02/02/17 0839    02/02/17 0600  Protime-INR  Morning Draw      02/01/17 1016    02/01/17 2326  Record actual height and weight prior to procedure.  Once      02/01/17 2325    02/01/17 2326  Confirm NPO Status.  Until Discontinued      02/01/17 2325    02/01/17 2326  CPAP or BiPAP as indicated per respiratory protocol  Until Discontinued      02/01/17 2325    02/01/17 2326  No telemetry pads or tape over implantation site.  Until Discontinued      02/01/17 2325    02/01/17 2325  ondansetron (ZOFRAN) injection 4 mg  Every 6 Hours PRN      02/01/17 2325    02/01/17 2008  POC Glucose Fingerstick  Once      02/01/17 2007 02/01/17 1547  POC Glucose Fingerstick  Once      02/01/17 1546    02/01/17 1339  Case Request Cath Lab: Pacemaker DC new  Once      02/01/17 1340    02/01/17 0900  aspirin EC tablet 81 mg  Daily       01/31/17 2320 02/01/17 0900  pantoprazole (PROTONIX) EC tablet 40 mg  Daily      01/31/17 2320 02/01/17 0900  polyethyl glycol-propyl glycol (SYSTANE) 0.4-0.3 % ophthalmic solution 1 drop  Daily      01/31/17 2320 02/01/17 0900  spironolactone (ALDACTONE) tablet 25 mg  Daily      01/31/17 2320 02/01/17 0900  venlafaxine (EFFEXOR) tablet 37.5 mg  Daily      01/31/17 2320 02/01/17 0700  POC Glucose Fingerstick  4 Times Daily Before Meals & at Bedtime      01/31/17 2323    02/01/17 0700  insulin detemir (LEVEMIR) injection 70 Units  Every Morning      01/31/17 2323 02/01/17 0600  POC Glucose Fingerstick  Daily      01/31/17 2323 02/01/17 0000  insulin lispro (humaLOG) injection 4-24 Units  4 Times Daily Before Meals & Nightly      01/31/17 2323 01/31/17 2330  atorvastatin (LIPITOR) tablet 40 mg  Nightly      01/31/17 2320 01/31/17 2321  dextrose (GLUTOSE) oral gel 15 g  Every 15 Minutes PRN      01/31/17 2323    01/31/17 2321  dextrose (D50W) solution 25 g  Every 15 Minutes PRN      01/31/17 2323 01/31/17 2321  glucagon (human recombinant) (GLUCAGEN DIAGNOSTIC) injection 1 mg  Every 15 Minutes PRN      01/31/17 2323    01/31/17 2319  pneumococcal polysaccharide 23-valent (PNEUMOVAX-23) vaccine 0.5 mL  During Hospitalization      01/31/17 2319 01/31/17 2314  ondansetron (ZOFRAN) injection 4 mg  Every 6 Hours PRN      01/31/17 2320 01/31/17 2314  bisacodyl (DULCOLAX) EC tablet 5 mg  Daily PRN      01/31/17 2320 01/31/17 2314  aluminum-magnesium hydroxide-simethicone (MAALOX/MYLANTA) suspension 30 mL  Every 6 Hours PRN      01/31/17 2320 01/31/17 2314  acetaminophen (TYLENOL) tablet 650 mg  Every 4 Hours PRN      01/31/17 2320    01/31/17 2312  sodium chloride 0.9 % flush 1-10 mL  As Needed      01/31/17 2320    01/31/17 1743  sodium chloride 0.9 % flush 10 mL  As Needed      01/31/17 1744    Unscheduled  Oxygen Therapy- Nasal Cannula; 2 LPM; Titrate for spO2: equal to or greater  than, 92%  As Needed      01/31/17 1744    Unscheduled  Have patient void prior to procedure.  As Needed      02/01/17 2325    Unscheduled  Oxygen Therapy- Nasal Cannula; 2 LPM; Titrate for spO2: 92%  As Needed      02/01/17 2325    --  atenolol (TENORMIN) 25 MG tablet  Daily      01/31/17 1759    --  insulin aspart (novoLOG) 100 UNIT/ML injection  3 Times Daily Before Meals      01/31/17 1759    --  NON FORMULARY  2 Times Daily      01/31/17 2231    --  polyethyl glycol-propyl glycol (SYSTANE) 0.4-0.3 % solution ophthalmic solution  Daily      01/31/17 2231    Signed and Held  Record Actual Height & Weight Prior to Procedure  Once      Signed and Held    Signed and Held  Cardiac Monitoring  Continuous      Signed and Held    Signed and Held  Confirm NPO Status  Once      Signed and Held    Signed and Held  Insert Peripheral IV x2 - 20G - One in Each Arm  Once      Signed and Held    Signed and Held  Have Patient Void Prior to Procedure.  Once      Signed and Held    Signed and Held  No Telemetry Pads or Tape Over Implantation Site  Continuous      Signed and Held    Signed and Held  Chlorhexidine Skin Prep  Once      Signed and Held    Signed and Held  CPAP or BiPAP As Indicated Per Respiratory Protocol  Continuous      Signed and Held    Signed and Held  Oxygen Therapy- Nasal Cannula; 2 LPM; Titrate for spO2: 92%  As Needed      Signed and Held    Signed and Held  Insert Peripheral IV  Once      Signed and Held    Signed and Held  Saline Lock & Maintain IV Access  Continuous      Signed and Held    Signed and Held  sodium chloride 0.9 % flush 1-10 mL  As Needed      Signed and Held    Signed and Held  lidocaine PF (XYLOCAINE) 1 % injection 0.1 mL  Once As Needed      Signed and Held    Signed and Held  sodium chloride 0.9 % infusion  Continuous      Signed and Held    Signed and Held  ceFAZolin (ANCEF) IVPB 1 g  60 Minutes Pre-Op      Signed and Held             Physician Progress Notes (last 24 hours) (Notes from  2/1/2017 12:31 PM through 2/2/2017 12:31 PM)      Cami FACUNDO Pelletier at 2/2/2017 11:00 AM  Version 2 of 2    Attestation signed by Ehsan Crocker MD at 2/2/2017 12:06 PM        I have personally reviewed EKG and telemetry which reveals sinus jody 30-40's    Heart- RRR with 3/6 sys m  Lungs- Clear  Ext-    Problems that need Evaluation:  Symptomatic irreversible bradycardia, for pacemaker tomorrow afternoon  Severe Aortic Stenosis, will evaluate for AVR in 2 weeks  New problems that are stable:  CAD  HLD  HTN    Medical Decision Making:   High Complexity    I have seen and examined the patient and agree with the findings below                                   [facility] -  Progress Note     LOS: 2 days   Patient Care Team:  FACUNDO Juarez as PCP - General  FACUNDO Juarez as PCP - Family Medicine  Ehsan Crocker MD as Cardiologist (Cardiology)    Chief Complaint: Generalized weakness, bradycardia       Subjective     Interval History: to have Junctional rhythm. Patient denies any concerns.        Review of Systems:   Review of Systems   Constitution: Negative for diaphoresis, fever, weakness and malaise/fatigue.   Cardiovascular: Negative for chest pain, irregular heartbeat, leg swelling, near-syncope, orthopnea, palpitations, paroxysmal nocturnal dyspnea and syncope.   Respiratory: Negative for cough, shortness of breath, sputum production and wheezing.    Skin: Negative for rash.   Musculoskeletal: Negative for falls.   Gastrointestinal: Negative for bloating, abdominal pain, constipation, diarrhea, heartburn, nausea and vomiting.   Neurological: Negative for dizziness and focal weakness.   Psychiatric/Behavioral: Negative for altered mental status.        Objective     Vital Sign Min/Max for last 24 hours  Temp  Min: 97.1 °F (36.2 °C)  Max: 99 °F (37.2 °C)   BP  Min: 110/74  Max: 157/42   Pulse  Min: 45  Max: 61   Resp  Min: 18  Max: 22   SpO2  Min: 95 %  Max: 99 %   No Data  Recorded   Weight  Min: 134 lb 4.8 oz (60.9 kg)  Max: 134 lb 4.8 oz (60.9 kg)     Last Weight    02/01/17 2000   Weight: 134 lb 4.8 oz (60.9 kg)         Intake/Output Summary (Last 24 hours) at 02/02/17 1117  Last data filed at 02/02/17 0400   Gross per 24 hour   Intake    360 ml   Output      0 ml   Net    360 ml         Physical Exam:  Physical Exam   Constitutional: She is oriented to person, place, and time. She appears well-developed and well-nourished. She is cooperative. No distress.   HENT:   Head: Normocephalic and atraumatic.   Cardiovascular: Regular rhythm and intact distal pulses.  Bradycardia present.    Murmur heard.  Telemetry: Junctional rhythm 39-40's.    Pulmonary/Chest: Effort normal and breath sounds normal. No respiratory distress. She exhibits no tenderness.   Abdominal: Soft. Bowel sounds are normal. She exhibits no distension. There is no tenderness.   Musculoskeletal: She exhibits no edema.   Neurological: She is alert and oriented to person, place, and time.   Skin: Skin is dry. No rash noted. She is not diaphoretic.   Psychiatric: She has a normal mood and affect. Her speech is normal and behavior is normal. reviewed.       Results Review:   Lab Results   Component Value Date    WBC 8.97 01/31/2017    HGB 10.2 (L) 01/31/2017    HCT 31.5 (L) 01/31/2017    MCV 86.5 01/31/2017     01/31/2017     Lab Results   Component Value Date    GLUCOSE 350 (H) 02/01/2017    CALCIUM 9.2 02/01/2017     02/01/2017    K 4.4 02/01/2017    CO2 26.0 02/01/2017     02/01/2017    BUN 32 (H) 02/01/2017    CREATININE 1.37 02/01/2017    EGFRIFNONA 37 (L) 02/01/2017    BCR 23.4 02/01/2017    ANIONGAP 9.0 02/01/2017     Lab Results   Component Value Date    INR 2.43 (H) 02/02/2017    INR 2.50 (H) 02/01/2017    INR 2.50 02/01/2017    PROTIME 27.3 (H) 02/02/2017    PROTIME 27.9 (H) 02/01/2017    PROTIME 28.7 (H) 01/31/2017     · 2/1/2017- Echo: Left atrial cavity size is mild-to-moderately  dilated.  · Left ventricular diastolic dysfunction (grade III) consistent with reversible restrictive pattern.  · Severe aortic valve stenosis is present.  · Right ventricular cavity is moderately dilated.          Moderately reduced right ventricular systolic function noted.      Medication Review: yes  Current Facility-Administered Medications   Medication Dose Route Frequency Provider Last Rate Last Dose   • acetaminophen (TYLENOL) tablet 650 mg  650 mg Oral Q4H PRN Uli Baker MD       • aluminum-magnesium hydroxide-simethicone (MAALOX/MYLANTA) suspension 30 mL  30 mL Oral Q6H PRN Uli Baker MD       • aspirin EC tablet 81 mg  81 mg Oral Daily Uli Baker MD   81 mg at 02/02/17 0831   • atorvastatin (LIPITOR) tablet 40 mg  40 mg Oral Nightly Uli Baker MD   40 mg at 02/01/17 2108   • bisacodyl (DULCOLAX) EC tablet 5 mg  5 mg Oral Daily PRN Uli Baker MD       • dextrose (D50W) solution 25 g  25 g Intravenous Q15 Min PRN Uli Baker MD       • dextrose (GLUTOSE) oral gel 15 g  15 g Oral Q15 Min PRN Uli Baker MD       • glucagon (human recombinant) (GLUCAGEN DIAGNOSTIC) injection 1 mg  1 mg Subcutaneous Q15 Min PRN Uli Baker MD       • insulin detemir (LEVEMIR) injection 70 Units  70 Units Subcutaneous QAM Uli Baker MD   70 Units at 02/02/17 0832   • insulin lispro (humaLOG) injection 4-24 Units  4-24 Units Subcutaneous 4x Daily AC & at Bedtime Uli Baker MD   4 Units at 02/01/17 2107   • ondansetron (ZOFRAN) injection 4 mg  4 mg Intravenous Q6H PRN Uli Baker MD       • ondansetron (ZOFRAN) injection 4 mg  4 mg Intravenous Q6H PRN Ehsan Crocker MD       • pantoprazole (PROTONIX) EC tablet 40 mg  40 mg Oral Daily Uli Baker MD   40 mg at 02/02/17 0831   • pneumococcal polysaccharide 23-valent (PNEUMOVAX-23) vaccine 0.5 mL  0.5 mL Intramuscular During Hospitalization Ehsan Crocker MD       • polyethyl glycol-propyl glycol (SYSTANE) 0.4-0.3 % ophthalmic  solution 1 drop  1 drop Right Eye Daily Uli Baker MD   1 drop at 02/02/17 0832   • sodium chloride 0.9 % flush 1-10 mL  1-10 mL Intravenous PRN Uli Baker MD       • sodium chloride 0.9 % flush 10 mL  10 mL Intravenous PRN Sherif Early Jr., MD       • spironolactone (ALDACTONE) tablet 25 mg  25 mg Oral Daily Uli Baker MD   25 mg at 02/02/17 0831   • venlafaxine (EFFEXOR) tablet 37.5 mg  37.5 mg Oral Daily Uli Baker MD   37.5 mg at 02/02/17 0831         Assessment&#47;Plan     Principal Problem:    1. Junctional bradycardia, HR 30-40's.     Active Problems:    2. SSS (sick sinus syndrome)    3. Aortic stenosis, severe    4. Diastolic dysfunction without heart failure, Grade III     5. Atrial fibrillation--> now Junctional     6. Essential hypertension    7. Type 2 diabetes mellitus    8. Chronic anticoagulation with Coumadin on hold     9. Coronary artery disease involving native coronary artery of native heart without angina pectoris. No clinical evidence of ischemia.     Plan   1. Pacemaker once INR acceptable- give 2.5mg Vitamin K IV x1. PT/INR Qam. If INR >2 in the am, will give 2 units FFP.   Monitor telemetry  3. Plans for cath in 2 weeks per Dr. Crocker. Needs AVR in the near future.   4. Continue to hold Coumadin and all rate lowering medications.     FACUNDO Salazar  02/02/17  11:17 AM                 Electronically signed by Ehsan Crocker MD at 2/2/2017 12:06 PM      FACUNDO Salazar at 2/2/2017 11:00 AM  Version 1 of 2         [facility] -  Progress Note     LOS: 2 days   Patient Care Team:  FACUNDO Juarez as PCP - General  FACUNDO Juarez as PCP - Family Medicine  Ehsan Crocker MD as Cardiologist (Cardiology)    Chief Complaint: Generalized weakness, bradycardia       Subjective     Interval History: to have Junctional rhythm. Patient denies any concerns.        Review of Systems:   Review of Systems   Constitution: Negative for  diaphoresis, fever, weakness and malaise/fatigue.   Cardiovascular: Negative for chest pain, irregular heartbeat, leg swelling, near-syncope, orthopnea, palpitations, paroxysmal nocturnal dyspnea and syncope.   Respiratory: Negative for cough, shortness of breath, sputum production and wheezing.    Skin: Negative for rash.   Musculoskeletal: Negative for falls.   Gastrointestinal: Negative for bloating, abdominal pain, constipation, diarrhea, heartburn, nausea and vomiting.   Neurological: Negative for dizziness and focal weakness.   Psychiatric/Behavioral: Negative for altered mental status.        Objective     Vital Sign Min/Max for last 24 hours  Temp  Min: 97.1 °F (36.2 °C)  Max: 99 °F (37.2 °C)   BP  Min: 110/74  Max: 157/42   Pulse  Min: 45  Max: 61   Resp  Min: 18  Max: 22   SpO2  Min: 95 %  Max: 99 %   No Data Recorded   Weight  Min: 134 lb 4.8 oz (60.9 kg)  Max: 134 lb 4.8 oz (60.9 kg)     Last Weight    02/01/17 2000   Weight: 134 lb 4.8 oz (60.9 kg)         Intake/Output Summary (Last 24 hours) at 02/02/17 1117  Last data filed at 02/02/17 0400   Gross per 24 hour   Intake    360 ml   Output      0 ml   Net    360 ml         Physical Exam:  Physical Exam   Constitutional: She is oriented to person, place, and time. She appears well-developed and well-nourished. She is cooperative. No distress.   HENT:   Head: Normocephalic and atraumatic.   Cardiovascular: Regular rhythm and intact distal pulses.  Bradycardia present.    Murmur heard.  Telemetry: Junctional rhythm 39-40's.    Pulmonary/Chest: Effort normal and breath sounds normal. No respiratory distress. She exhibits no tenderness.   Abdominal: Soft. Bowel sounds are normal. She exhibits no distension. There is no tenderness.   Musculoskeletal: She exhibits no edema.   Neurological: She is alert and oriented to person, place, and time.   Skin: Skin is dry. No rash noted. She is not diaphoretic.   Psychiatric: She has a normal mood and affect. Her  speech is normal and behavior is normal. reviewed.       Results Review:   Lab Results   Component Value Date    WBC 8.97 01/31/2017    HGB 10.2 (L) 01/31/2017    HCT 31.5 (L) 01/31/2017    MCV 86.5 01/31/2017     01/31/2017     Lab Results   Component Value Date    GLUCOSE 350 (H) 02/01/2017    CALCIUM 9.2 02/01/2017     02/01/2017    K 4.4 02/01/2017    CO2 26.0 02/01/2017     02/01/2017    BUN 32 (H) 02/01/2017    CREATININE 1.37 02/01/2017    EGFRIFNONA 37 (L) 02/01/2017    BCR 23.4 02/01/2017    ANIONGAP 9.0 02/01/2017     Lab Results   Component Value Date    INR 2.43 (H) 02/02/2017    INR 2.50 (H) 02/01/2017    INR 2.50 02/01/2017    PROTIME 27.3 (H) 02/02/2017    PROTIME 27.9 (H) 02/01/2017    PROTIME 28.7 (H) 01/31/2017     · 2/1/2017- Echo: Left atrial cavity size is mild-to-moderately dilated.  · Left ventricular diastolic dysfunction (grade III) consistent with reversible restrictive pattern.  · Severe aortic valve stenosis is present.  · Right ventricular cavity is moderately dilated.          Moderately reduced right ventricular systolic function noted.      Medication Review: yes  Current Facility-Administered Medications   Medication Dose Route Frequency Provider Last Rate Last Dose   • acetaminophen (TYLENOL) tablet 650 mg  650 mg Oral Q4H PRN Uli Baker MD       • aluminum-magnesium hydroxide-simethicone (MAALOX/MYLANTA) suspension 30 mL  30 mL Oral Q6H PRN Uli Baker MD       • aspirin EC tablet 81 mg  81 mg Oral Daily Uli Baker MD   81 mg at 02/02/17 0831   • atorvastatin (LIPITOR) tablet 40 mg  40 mg Oral Nightly Uli Baker MD   40 mg at 02/01/17 2108   • bisacodyl (DULCOLAX) EC tablet 5 mg  5 mg Oral Daily PRN Uli Baker MD       • dextrose (D50W) solution 25 g  25 g Intravenous Q15 Min PRN Uli Baker MD       • dextrose (GLUTOSE) oral gel 15 g  15 g Oral Q15 Min PRN Uli Baker MD       • glucagon (human recombinant) (GLUCAGEN DIAGNOSTIC)  injection 1 mg  1 mg Subcutaneous Q15 Min PRN Uli Baker MD       • insulin detemir (LEVEMIR) injection 70 Units  70 Units Subcutaneous QAM Uli Baker MD   70 Units at 02/02/17 0832   • insulin lispro (humaLOG) injection 4-24 Units  4-24 Units Subcutaneous 4x Daily AC & at Bedtime Uli Baker MD   4 Units at 02/01/17 2107   • ondansetron (ZOFRAN) injection 4 mg  4 mg Intravenous Q6H PRN Uli Baker MD       • ondansetron (ZOFRAN) injection 4 mg  4 mg Intravenous Q6H PRN Ehsan Crocker MD       • pantoprazole (PROTONIX) EC tablet 40 mg  40 mg Oral Daily Uli Baker MD   40 mg at 02/02/17 0831   • pneumococcal polysaccharide 23-valent (PNEUMOVAX-23) vaccine 0.5 mL  0.5 mL Intramuscular During Hospitalization Ehsan Crocker MD       • polyethyl glycol-propyl glycol (SYSTANE) 0.4-0.3 % ophthalmic solution 1 drop  1 drop Right Eye Daily Uli Baker MD   1 drop at 02/02/17 0832   • sodium chloride 0.9 % flush 1-10 mL  1-10 mL Intravenous PRN Uli Baker MD       • sodium chloride 0.9 % flush 10 mL  10 mL Intravenous PRN Sherif Early Jr., MD       • spironolactone (ALDACTONE) tablet 25 mg  25 mg Oral Daily Uli Baker MD   25 mg at 02/02/17 0831   • venlafaxine (EFFEXOR) tablet 37.5 mg  37.5 mg Oral Daily Uli Baker MD   37.5 mg at 02/02/17 0831         Assessment&#47;Plan     Principal Problem:    1. Junctional bradycardia, HR 30-40's.     Active Problems:    2. SSS (sick sinus syndrome)    3. Aortic stenosis, severe    4. Diastolic dysfunction without heart failure, Grade III     5. Atrial fibrillation--> now Junctional     6. Essential hypertension    7. Type 2 diabetes mellitus    8. Chronic anticoagulation with Coumadin on hold     9. Coronary artery disease involving native coronary artery of native heart without angina pectoris. No clinical evidence of ischemia.     Plan   1. Pacemaker once INR acceptable. PT/INR Qam.   2. Monitor telemetry  3. Plans for cath in 2 weeks per  Dr. Crocker. Needs AVR in the near future.   4. Continue to hold Coumadin and all rate lowering medications.     FACUNDO Salazar  02/02/17  11:17 AM                 Electronically signed by FACUNDO Salazar at 2/2/2017 11:17 AM        Consult Notes (last 24 hours) (Notes from 2/1/2017 12:31 PM through 2/2/2017 12:31 PM)     No notes of this type exist for this encounter.

## 2017-02-02 NOTE — PROGRESS NOTES
[facility] -  Progress Note     LOS: 2 days   Patient Care Team:  FACUNDO Juarez as PCP - General  FACUNDO Juarez as PCP - Family Medicine  Ehsan Crocker MD as Cardiologist (Cardiology)    Chief Complaint: Generalized weakness, bradycardia       Subjective     Interval History:   Continues to have Junctional rhythm. Patient denies any concerns.        Review of Systems:   Review of Systems   Constitution: Negative for diaphoresis, fever, weakness and malaise/fatigue.   Cardiovascular: Negative for chest pain, irregular heartbeat, leg swelling, near-syncope, orthopnea, palpitations, paroxysmal nocturnal dyspnea and syncope.   Respiratory: Negative for cough, shortness of breath, sputum production and wheezing.    Skin: Negative for rash.   Musculoskeletal: Negative for falls.   Gastrointestinal: Negative for bloating, abdominal pain, constipation, diarrhea, heartburn, nausea and vomiting.   Neurological: Negative for dizziness and focal weakness.   Psychiatric/Behavioral: Negative for altered mental status.        Objective     Vital Sign Min/Max for last 24 hours  Temp  Min: 97.1 °F (36.2 °C)  Max: 99 °F (37.2 °C)   BP  Min: 110/74  Max: 157/42   Pulse  Min: 45  Max: 61   Resp  Min: 18  Max: 22   SpO2  Min: 95 %  Max: 99 %   No Data Recorded   Weight  Min: 134 lb 4.8 oz (60.9 kg)  Max: 134 lb 4.8 oz (60.9 kg)     Last Weight    02/01/17 2000   Weight: 134 lb 4.8 oz (60.9 kg)         Intake/Output Summary (Last 24 hours) at 02/02/17 1117  Last data filed at 02/02/17 0400   Gross per 24 hour   Intake    360 ml   Output      0 ml   Net    360 ml         Physical Exam:  Physical Exam   Constitutional: She is oriented to person, place, and time. She appears well-developed and well-nourished. She is cooperative. No distress.   HENT:   Head: Normocephalic and atraumatic.   Cardiovascular: Regular rhythm and intact distal pulses.  Bradycardia present.    Murmur heard.  Telemetry:  Junctional rhythm 39-40's.    Pulmonary/Chest: Effort normal and breath sounds normal. No respiratory distress. She exhibits no tenderness.   Abdominal: Soft. Bowel sounds are normal. She exhibits no distension. There is no tenderness.   Musculoskeletal: She exhibits no edema.   Neurological: She is alert and oriented to person, place, and time.   Skin: Skin is dry. No rash noted. She is not diaphoretic.   Psychiatric: She has a normal mood and affect. Her speech is normal and behavior is normal.   Vitals reviewed.       Results Review:   Lab Results   Component Value Date    WBC 8.97 01/31/2017    HGB 10.2 (L) 01/31/2017    HCT 31.5 (L) 01/31/2017    MCV 86.5 01/31/2017     01/31/2017     Lab Results   Component Value Date    GLUCOSE 350 (H) 02/01/2017    CALCIUM 9.2 02/01/2017     02/01/2017    K 4.4 02/01/2017    CO2 26.0 02/01/2017     02/01/2017    BUN 32 (H) 02/01/2017    CREATININE 1.37 02/01/2017    EGFRIFNONA 37 (L) 02/01/2017    BCR 23.4 02/01/2017    ANIONGAP 9.0 02/01/2017     Lab Results   Component Value Date    INR 2.43 (H) 02/02/2017    INR 2.50 (H) 02/01/2017    INR 2.50 02/01/2017    PROTIME 27.3 (H) 02/02/2017    PROTIME 27.9 (H) 02/01/2017    PROTIME 28.7 (H) 01/31/2017     · 2/1/2017- Echo: Left atrial cavity size is mild-to-moderately dilated.  · Left ventricular diastolic dysfunction (grade III) consistent with reversible restrictive pattern.  · Severe aortic valve stenosis is present.  · Right ventricular cavity is moderately dilated.          Moderately reduced right ventricular systolic function noted.      Medication Review: yes  Current Facility-Administered Medications   Medication Dose Route Frequency Provider Last Rate Last Dose   • acetaminophen (TYLENOL) tablet 650 mg  650 mg Oral Q4H PRN Uli Baker MD       • aluminum-magnesium hydroxide-simethicone (MAALOX/MYLANTA) suspension 30 mL  30 mL Oral Q6H PRN Uli Baker MD       • aspirin EC tablet 81 mg  81 mg  Oral Daily Uli Baker MD   81 mg at 02/02/17 0831   • atorvastatin (LIPITOR) tablet 40 mg  40 mg Oral Nightly Uli Baker MD   40 mg at 02/01/17 2108   • bisacodyl (DULCOLAX) EC tablet 5 mg  5 mg Oral Daily PRN Uli Baker MD       • dextrose (D50W) solution 25 g  25 g Intravenous Q15 Min PRN Uli Baker MD       • dextrose (GLUTOSE) oral gel 15 g  15 g Oral Q15 Min PRN Uli Baker MD       • glucagon (human recombinant) (GLUCAGEN DIAGNOSTIC) injection 1 mg  1 mg Subcutaneous Q15 Min PRN Uli Baker MD       • insulin detemir (LEVEMIR) injection 70 Units  70 Units Subcutaneous QAM Uli Baker MD   70 Units at 02/02/17 0832   • insulin lispro (humaLOG) injection 4-24 Units  4-24 Units Subcutaneous 4x Daily AC & at Bedtime Uli Baker MD   4 Units at 02/01/17 2107   • ondansetron (ZOFRAN) injection 4 mg  4 mg Intravenous Q6H PRN Uli Baker MD       • ondansetron (ZOFRAN) injection 4 mg  4 mg Intravenous Q6H PRN Ehsan Crocker MD       • pantoprazole (PROTONIX) EC tablet 40 mg  40 mg Oral Daily Uli Baker MD   40 mg at 02/02/17 0831   • pneumococcal polysaccharide 23-valent (PNEUMOVAX-23) vaccine 0.5 mL  0.5 mL Intramuscular During Hospitalization Ehsan Crocker MD       • polyethyl glycol-propyl glycol (SYSTANE) 0.4-0.3 % ophthalmic solution 1 drop  1 drop Right Eye Daily Uli Baker MD   1 drop at 02/02/17 0832   • sodium chloride 0.9 % flush 1-10 mL  1-10 mL Intravenous PRN Uli Baker MD       • sodium chloride 0.9 % flush 10 mL  10 mL Intravenous PRN Sherif Early Jr., MD       • spironolactone (ALDACTONE) tablet 25 mg  25 mg Oral Daily Uli Baker MD   25 mg at 02/02/17 0831   • venlafaxine (EFFEXOR) tablet 37.5 mg  37.5 mg Oral Daily Uli Baker MD   37.5 mg at 02/02/17 0831         Assessment/Plan     Principal Problem:    1. Junctional bradycardia, HR 30-40's.     Active Problems:    2. SSS (sick sinus syndrome)    3. Aortic stenosis, severe    4.  Diastolic dysfunction without heart failure, Grade III     5. Atrial fibrillation--> now Junctional     6. Essential hypertension    7. Type 2 diabetes mellitus    8. Chronic anticoagulation with Coumadin on hold     9. Coronary artery disease involving native coronary artery of native heart without angina pectoris. No clinical evidence of ischemia.     Plan   1. Pacemaker once INR acceptable- give 2.5mg Vitamin K IV x1. PT/INR Qam. If INR >2 in the am, will give 2 units FFP.   2. Monitor telemetry  3. Plans for cath in 2 weeks per Dr. Crocker. Needs AVR in the near future.   4. Continue to hold Coumadin and all rate lowering medications.     Cami Toure, FACUNDO  02/02/17  11:17 AM

## 2017-02-02 NOTE — PAYOR COMM NOTE
"ADMIT INPT 1-31-17      Lakesha Costello (81 y.o. Female)     Date of Birth Social Security Number Address Home Phone MRN    1935  [address] [facility] [phone] [id number]    L[employer] Mar[employer] Status          Other        Admission Date Admission Type Admitting Provider Attending Provider Department, Room/Bed    1/31/17 Emergency Ehsan Crocker MD Ford, James K, MD [facility] 4B, 409/1    Discharge Date Discharge Disposition Discharge Destination                      Attending Provider: Ehsan Crocker MD     Allergies:  Dilaudid [Hydromorphone Hcl], Dilaudid [Hydromorphone], Enalapril, Kombiglyze [Saxagliptin-metformin Er], Lopid [Gemfibrozil], Sulfa Antibiotics, Ultram [Tramadol]    Isolation:  None   Infection:  None   Code Status:  FULL    Ht:  60\" (152.4 cm)   Wt:  134 lb 4.8 oz (60.9 kg)    Admission Cmt:  None   Principal Problem:  Junctional bradycardia [R00.1]                 Active Insurance as of 1/31/2017     Primary Coverage     Payor Plan Insurance Group Employer/Plan Group    HUMANA MEDICARE REPL HUMANA MEDICARE REPL [id number]     Payor Plan Address Payor Plan Phone Number Effective From Effective To    [address] [address] [phone] 1/1/2014     [place] [address]       Subscriber Name Subscriber Birth Date Member ID       LAKESHA COSTELLO 1935 [id number]                 Emergency Contacts      (Rel.) Home Phone Work Phone Mobile Phone    Margarita Costello (Daughter) [phone] -- --    Richard Pablo (Spouse) [phone] -- --               History & Physical      FACUNDO Salazar at 2/1/2017  9:02 AM     Attestation signed by Ehsan Crocker MD at 2/1/2017  1:37 PM (Updated)        I have personally reviewed EKG and telemetry which reveals sinus jody    Heart- RRR with 3/6 sys m  Lungs- Clear  Ext-no edema    Problems that need Evaluation:  1. Symptomatic bradycardia. Will plan pacemaker when INR acceptable  2. Severe " AS, will need AVR in near future. Will plan cath in 2 weeks    New problems that are stable:  CAD  HTN  HLD  afib      Medical Decision Making: High Complexity    I have seen and examined the patient and agree with the findings below                                 [facility]ART GROUP HISTORY AND PHYSICAL      Patient Care Team:  FACUNDO Juarez as PCP - General  FACUNDO Juarez as PCP - Family Medicine  Ehsan Crocker MD as Cardiologist (Cardiology)    Chief complaint: Generalized weakness, bradycardia     Subjective     Lakesha Costello is a 81 y.o.  female with a known PMH significant for Type 2 DM, HTN, Atrial Fibrillation/Flutter, chronically anticoagulated with Coumadin, moderate to severe AS, CAD s/p 4 vessel CABG who presented to [facility] via ED with complaints of generalized weakness, falls, dyspnea, fatigue, and slow heart rate x2 days. Her last dose of Atenolol was 2 days ago. Last dose of Coumadin was 2 nights ago. This is her 3rd hospital admission for symptomatic bradycardia, which has previously resolved.     Review of Systems  Review of Systems   Constitution: Positive for weakness and malaise/fatigue. Negative for diaphoresis and fever.   HENT: Negative for nosebleeds.    Cardiovascular: Positive for dyspnea on exertion and near-syncope. Negative for chest pain, irregular heartbeat, leg swelling, orthopnea, palpitations, paroxysmal nocturnal dyspnea and syncope.   Respiratory: Positive for shortness of breath. Negative for cough, hemoptysis, sleep disturbances due to breathing, sputum production and wheezing.    Hematologic/Lymphatic: Negative for bleeding problem.   Skin: Negative for rash.   Musculoskeletal: Positive for falls.   Gastrointestinal: Negative for bloating, abdominal pain, hematemesis, hematochezia, melena, nausea and vomiting.   Neurological: Positive for light-headedness. Negative for focal weakness.   Psychiatric/Behavioral: Negative for altered mental  status.        History  Past Medical History   Diagnosis Date   • Aortic stenosis    • Atrial fibrillation    • Atrial fibrillation by electrocardiogram    • Atrial flutter    • Breast cancer    • CAD (coronary artery disease)    • Chronic anticoagulation      Coumadin    • Diabetes mellitus    • Hypertension      Past Surgical History   Procedure Laterality Date   • Mastectomy     • Hysterectomy     • Cholecystectomy     • Cardiac catheterization  1999, 2010   • Coronary artery bypass graft  1999     4 vessel      History reviewed. No pertinent family history.  Social History   Substance Use Topics   • Smoking status: Never Smoker   • Smokeless tobacco: None   • Alcohol use No       Medications  Prior to Admission medications    Medication Sig Start Date End Date Taking? Authorizing Provider   aspirin 81 MG EC tablet Take 81 mg by mouth Daily.   Yes Historical Provider, MD   atenolol (TENORMIN) 25 MG tablet Take 25 mg by mouth Daily. Take 25mg in AM and 50mg in PM   Yes Historical Provider, MD   calcium carbonate (OS-MICHAEL) 600 MG tablet Take 600 mg by mouth 2 (Two) Times a Day With Meals.   Yes Historical Provider, MD   cloNIDine (CATAPRES) 0.1 MG tablet Take 0.1 mg by mouth Every Night.   Yes Historical Provider, MD   glipiZIDE (GLUCOTROL) 5 MG tablet Take 5 mg by mouth 2 (Two) Times a Day Before Meals.   Yes Historical Provider, MD   insulin aspart (novoLOG) 100 UNIT/ML injection Inject  under the skin 3 (Three) Times a Day Before Meals. Sliding scale (bs-100)/2   Yes Historical Provider, MD   insulin detemir (LEVEMIR) 100 UNIT/ML injection Inject 70 Units under the skin Every 12 (Twelve) Hours.   Yes Historical Provider, MD   magnesium oxide (MAGOX) 400 (241.3 MG) MG tablet tablet Take 400 mg by mouth Daily.   Yes Historical Provider, MD   niacin 500 MG tablet Take 500 mg by mouth Daily With Breakfast. Must be flush free   Yes Historical Provider, MD   NON FORMULARY Take  by mouth 2 (Two) Times a Day.  Prespervision Lutein x1 tab twice daily   Yes Historical Provider, MD   pantoprazole (PROTONIX) 40 MG EC tablet Take 40 mg by mouth Daily.   Yes Historical Provider, MD   polyethyl glycol-propyl glycol (SYSTANE) 0.4-0.3 % solution ophthalmic solution Administer 1 drop to the right eye Daily.   Yes Historical Provider, MD   simvastatin (ZOCOR) 80 MG tablet Take 80 mg by mouth Every Night.   Yes Historical Provider, MD   spironolactone (ALDACTONE) 25 MG tablet Take 25 mg by mouth Daily.   Yes Historical Provider, MD   venlafaxine (EFFEXOR) 37.5 MG tablet Take 37.5 mg by mouth Daily.   Yes Historical Provider, MD   vitamin B-12 (CYANOCOBALAMIN) 500 MCG tablet Take 500 mcg by mouth Daily.   Yes Historical Provider, MD   vitamin E 100 UNIT capsule Take 400 Units by mouth Daily.   Yes Historical Provider, MD   warfarin (COUMADIN) 2 MG tablet Take 1 tablet by mouth Daily.  Patient taking differently: Take 2 mg by mouth Every Evening. 1/18/17  Yes Ehsan Crocker MD   amiodarone (PACERONE) 100 MG tablet Take 100 mg by mouth Daily.    Historical Provider, MD       Current Facility-Administered Medications   Medication Dose Route Frequency Provider Last Rate Last Dose   • acetaminophen (TYLENOL) tablet 650 mg  650 mg Oral Q4H PRN Uli Baker MD       • aluminum-magnesium hydroxide-simethicone (MAALOX/MYLANTA) suspension 30 mL  30 mL Oral Q6H PRN Uil Baker MD       • aspirin EC tablet 81 mg  81 mg Oral Daily Uli Baker MD       • atorvastatin (LIPITOR) tablet 40 mg  40 mg Oral Nightly Uli Baker MD   40 mg at 02/01/17 0024   • bisacodyl (DULCOLAX) EC tablet 5 mg  5 mg Oral Daily PRN Uli Baker MD       • dextrose (D50W) solution 25 g  25 g Intravenous Q15 Min PRN Uli Baker MD       • dextrose (GLUTOSE) oral gel 15 g  15 g Oral Q15 Min PRN Uli Baker MD       • glucagon (human recombinant) (GLUCAGEN DIAGNOSTIC) injection 1 mg  1 mg Subcutaneous Q15 Min PRN Uli Baker MD       • insulin  detemir (LEVEMIR) injection 70 Units  70 Units Subcutaneous QAM Uli Baker MD       • insulin lispro (humaLOG) injection 4-24 Units  4-24 Units Subcutaneous 4x Daily AC & at Bedtime Uli Baker MD   20 Units at 02/01/17 0023   • ondansetron (ZOFRAN) injection 4 mg  4 mg Intravenous Q6H PRN Uli Baker MD       • pantoprazole (PROTONIX) EC tablet 40 mg  40 mg Oral Daily Uli Baker MD       • pneumococcal polysaccharide 23-valent (PNEUMOVAX-23) vaccine 0.5 mL  0.5 mL Intramuscular During Hospitalization Ehsan Crocker MD       • polyethyl glycol-propyl glycol (SYSTANE) 0.4-0.3 % ophthalmic solution 1 drop  1 drop Right Eye Daily Uli Baker MD       • sodium chloride 0.9 % flush 1-10 mL  1-10 mL Intravenous PRN Uli Baker MD       • sodium chloride 0.9 % flush 10 mL  10 mL Intravenous PRN Sherif Early Jr., MD       • spironolactone (ALDACTONE) tablet 25 mg  25 mg Oral Daily Uli Baker MD       • venlafaxine (EFFEXOR) tablet 37.5 mg  37.5 mg Oral Daily Uli Baker MD            Allergies:  Dilaudid [hydromorphone hcl]; Dilaudid [hydromorphone]; Enalapril; Kombiglyze [saxagliptin-metformin er]; Lopid [gemfibrozil]; Sulfa antibiotics; and Ultram [tramadol]    Objective     Vital Signs  Temp:  [96.8 °F (36 °C)-98.9 °F (37.2 °C)] 98.9 °F (37.2 °C)  Heart Rate:  [39-81] 45  Resp:  [20] 20  BP: (130-167)/(45-84) 155/45    Labs  Lab Results   Component Value Date    WBC 8.97 01/31/2017    HGB 10.2 (L) 01/31/2017    HCT 31.5 (L) 01/31/2017    MCV 86.5 01/31/2017     01/31/2017     Lab Results   Component Value Date    GLUCOSE 350 (H) 02/01/2017    CALCIUM 9.2 02/01/2017     02/01/2017    K 4.4 02/01/2017    CO2 26.0 02/01/2017     02/01/2017    BUN 32 (H) 02/01/2017    CREATININE 1.37 02/01/2017    EGFRIFNONA 37 (L) 02/01/2017    BCR 23.4 02/01/2017    ANIONGAP 9.0 02/01/2017     Lab Results   Component Value Date    TSH 1.250 02/01/2017     Lab Results   Component  Value Date    HGBA1C 9.3 02/01/2017     Lab Results   Component Value Date    INR 2.50 (H) 02/01/2017    INR 2.59 (H) 01/31/2017    INR 1.10 01/20/2017    PROTIME 27.9 (H) 02/01/2017    PROTIME 28.7 (H) 01/31/2017    PROTIME 22.4 (H) 08/29/2016         Physical Exam:  Physical Exam   Constitutional: She is oriented to person, place, and time. She appears well-developed and well-nourished. She is cooperative. No distress.   HENT:   Head: Normocephalic and atraumatic.   Cardiovascular: Intact distal pulses.  Bradycardia present.    Murmur heard.   Harsh midsystolic murmur is present with a grade of 3/6  at the upper right sternal border radiating to the neck  Telemetry: Junctional rhythm 38-61 BPM    Pulmonary/Chest: Effort normal and breath sounds normal. No respiratory distress. She exhibits no tenderness.   Musculoskeletal: She exhibits no edema.   Neurological: She is alert and oriented to person, place, and time.   Skin: Skin is warm and dry. No rash noted. She is not diaphoretic.   Psychiatric: She has a normal mood and affect. Her speech is normal and behavior is normal.   Vitals reviewed.      Results Review:    I reviewed the patient's new clinical results.  I personally viewed and interpreted the patient's EKG/Telemetry data    Principal Problem:    -Junctional bradycardia    Active Problems:    -SSS (sick sinus syndrome)    -Atrial fibrillation    -Chronic anticoagulation with Coumadin- on hold     -Moderate to severe AS per 8/2016 Echo     -Essential hypertension    -Type 2 diabetes mellitus    -Coronary artery disease involving native coronary artery of native heart without angina pectoris s/p 4 vessel CABG       Plan   1. Monitor telemetry  2. Continue to hold all rate lowering medications, including Atenolol, Amiodarone, and Clonidine.   3. Continue to hold Coumadin   4. PT/INR in am  5. Echo  6. Healthy Heart, Diabetic Diet   7. Plans for pacemaker in 1-2 days once INR decreases     I discussed the  patients findings and my recommendations with patient, nursing staff and Dr. Crocker. Further recommendations per Dr. Crocker upon his evaluation of the patient.     FACUNDO Salazar  02/01/17  10:15 AM      Please note this cardiology history and physical note is the result of a face to face consultation with the patient, in addition to reviewing medical records at length by myself, FACUNDO Carter.     Time: More than 50% of time spent in counseling and coordination of care:  Total face-to-face/floor time 40 min.  Time spent in counseling 25 min. Counseling included the following topics: lab results, ECG/telemetry, assessment, discussing the plan of care with the patient and daughter at the bedside, as well as the RN.          Electronically signed by Ehsan Crocker MD at 2/1/2017  1:37 PM           Emergency Department Notes      Sherif Early Jr., MD at 1/31/2017  5:58 PM          Subjective   HPI Comments: Patient started feeling SOB 2 days ago.  Had fallen and hit right chest on bed table and pain there.  Noted slow pulse couple of days ago.  Used to be on atenolol and was supposed to have been stopped but daughter looked through meds and found 50 BID in meds and they stopped it 2 days ago.  Also not taking amiodorone any more for same reason.    Patient is a 81 y.o. female presenting with palpitations.   History provided by:  Patient and relative   used: No    Palpitations   Palpitations quality:  Slow  Onset quality:  Gradual  Duration:  2 days  Timing:  Constant  Progression:  Unchanged  Chronicity:  Recurrent  Context: not anxiety, not appetite suppressants, not blood loss, not bronchodilators, not caffeine, not dehydration, not exercise, not hyperventilation, not illicit drugs, not nicotine and not stimulant use    Relieved by:  Nothing  Worsened by:  Nothing  Ineffective treatments:  None tried  Associated symptoms: shortness of breath    Associated symptoms: no back pain, no  chest pain, no chest pressure, no cough, no diaphoresis, no dizziness, no hemoptysis, no leg pain, no lower extremity edema, no malaise/fatigue, no nausea, no near-syncope, no numbness, no orthopnea, no PND, no syncope, no vomiting and no weakness    Risk factors: hx of atrial fibrillation        Review of Systems   Constitutional: Negative.  Negative for diaphoresis and malaise/fatigue.   HENT: Negative.    Respiratory: Positive for shortness of breath. Negative for cough and hemoptysis.    Cardiovascular: Positive for palpitations. Negative for chest pain, orthopnea, syncope, PND and near-syncope.   Gastrointestinal: Negative.  Negative for nausea and vomiting.   Genitourinary: Negative.    Musculoskeletal: Negative.  Negative for back pain.   Neurological: Negative for dizziness, weakness and numbness.   Hematological: Negative.  other systems reviewed and are negative.      Past Medical History   Diagnosis Date   • Aortic stenosis    • Atrial fibrillation by electrocardiogram    • Atrial flutter    • Breast cancer    • CAD (coronary artery disease)    • Chronic anticoagulation    • Diabetes mellitus    • Hypertension        Allergies   Allergen Reactions   • Dilaudid [Hydromorphone Hcl]    • Dilaudid [Hydromorphone] Nausea And Vomiting   • Enalapril Angioedema   • Kombiglyze [Saxagliptin-Metformin Er]    • Lopid [Gemfibrozil] Nausea And Vomiting   • Sulfa Antibiotics Other (See Comments)     Syncope     • Ultram [Tramadol] Itching       Past Surgical History   Procedure Laterality Date   • Mastectomy     • Hysterectomy     • Cholecystectomy     • Coronary artery bypass graft         No family history on file.    Social History     Social History   • Marital status:      Spouse name: N/A   • Number of children: N/A   • Years of education: N/A     Social History Main Topics   • Smoking status: Never Smoker   • Smokeless tobacco: Not on file   • Alcohol use No   • Drug use: Not on file   • Sexual activity:  Not on file     Other Topics Concern   • Not on file     Social History Narrative   • No narrative on file       Prior to Admission medications    Medication Sig Start Date End Date Taking? Authorizing Provider   venlafaxine (EFFEXOR) 37.5 MG tablet Take 37.5 mg by mouth Daily.   Yes Historical Provider, MD   amiodarone (PACERONE) 100 MG tablet Take 100 mg by mouth Daily.    Historical Provider, MD   aspirin 81 MG EC tablet Take 81 mg by mouth Daily.    Historical Provider, MD   calcium carbonate (OS-MICHAEL) 600 MG tablet Take 600 mg by mouth 2 (Two) Times a Day With Meals.    Historical Provider, MD   cloNIDine (CATAPRES) 0.1 MG tablet Take 0.1 mg by mouth Every 8 (Eight) Hours.    Historical Provider, MD   glipiZIDE (GLUCOTROL) 5 MG tablet Take 5 mg by mouth 2 (Two) Times a Day Before Meals.    Historical Provider, MD   insulin detemir (LEVEMIR) 100 UNIT/ML injection Inject  under the skin Daily.    Historical Provider, MD   magnesium oxide (MAGOX) 400 (241.3 MG) MG tablet tablet Take 400 mg by mouth Daily.    Historical Provider, MD   niacin 500 MG tablet Take 500 mg by mouth Every Night.    Historical Provider, MD   pantoprazole (PROTONIX) 40 MG EC tablet Take 40 mg by mouth Daily.    Historical Provider, MD   simvastatin (ZOCOR) 80 MG tablet Take 80 mg by mouth Every Night.    Historical Provider, MD   spironolactone (ALDACTONE) 25 MG tablet Take 25 mg by mouth Daily.    Historical Provider, MD   vitamin B-12 (CYANOCOBALAMIN) 500 MCG tablet Take 500 mcg by mouth Daily.    Historical Provider, MD   vitamin E 100 UNIT capsule Take 100 Units by mouth Daily.    Historical Provider, MD   warfarin (COUMADIN) 2 MG tablet Take 1 tablet by mouth Daily. 1/18/17   Ehsan Crocker MD       Medications   sodium chloride 0.9 % flush 10 mL (not administered)   pneumococcal polysaccharide 23-valent (PNEUMOVAX-23) vaccine 0.5 mL (not administered)   aspirin EC tablet 81 mg (not administered)   pantoprazole (PROTONIX) EC tablet 40 mg  (not administered)   polyethyl glycol-propyl glycol (SYSTANE) 0.4-0.3 % ophthalmic solution 1 drop (not administered)   atorvastatin (LIPITOR) tablet 40 mg (40 mg Oral Given 2/1/17 0024)   spironolactone (ALDACTONE) tablet 25 mg (not administered)   venlafaxine (EFFEXOR) tablet 37.5 mg (not administered)   sodium chloride 0.9 % flush 1-10 mL (not administered)   acetaminophen (TYLENOL) tablet 650 mg (not administered)   aluminum-magnesium hydroxide-simethicone (MAALOX/MYLANTA) suspension 30 mL (not administered)   bisacodyl (DULCOLAX) EC tablet 5 mg (not administered)   ondansetron (ZOFRAN) injection 4 mg (not administered)   dextrose (GLUTOSE) oral gel 15 g (not administered)   dextrose (D50W) solution 25 g (not administered)   glucagon (human recombinant) (GLUCAGEN DIAGNOSTIC) injection 1 mg (not administered)   insulin detemir (LEVEMIR) injection 70 Units (not administered)   insulin lispro (humaLOG) injection 4-24 Units (20 Units Subcutaneous Given 2/1/17 0023)       Vitals:    01/31/17 2100   BP: 150/69   Pulse: 81   Resp: 20   Temp: 96.8 °F (36 °C)   SpO2: 95%         Objective   Physical Exam   Constitutional: She is oriented to person, place, and time. She appears well-developed and well-nourished.   HENT:   Head: Normocephalic and atraumatic.   Eyes: Pupils are equal, round, and reactive to light.   Neck: Normal range of motion. Neck supple.   Cardiovascular: Normal rate and regular rhythm.    Pulmonary/Chest: Effort normal and breath sounds normal.   Abdominal: Soft. Bowel sounds are normal.   Musculoskeletal: Normal range of motion.   Neurological: She is alert and oriented to person, place, and time.   Skin: Skin is warm.   Psychiatric: She has a normal mood and affect. Her behavior is normal. note and vitals reviewed.      Procedures        Lab Results (last 24 hours)     Procedure Component Value Units Date/Time    POC Glucose Fingerstick [[id number]]  (Abnormal) Collected:  01/31/17 1750    Specimen:   Blood Updated:  01/31/17 1753     Glucose 193 (A) mg/dL     POC Glucose Fingerstick [[id number]]  (Abnormal) Collected:  01/31/17 1750    Specimen:  Blood Updated:  01/31/17 1802     Glucose 193 (H) mg/dL       : [id number] Mauro Granger ID: [id number]       CBC & Differential [[id number]] Collected:  01/31/17 1758    Specimen:  Blood Updated:  01/31/17 1819    Narrative:       The following orders were created for panel order CBC & Differential.  Procedure                               Abnormality         Status                     ---------                               -----------         ------                     CBC Auto Differential[[id number]]         Abnormal            Final result                 Please view results for these tests on the individual orders.    Comprehensive Metabolic Panel [[id number]]  (Abnormal) Collected:  01/31/17 1758    Specimen:  Blood Updated:  01/31/17 1947     Glucose 163 (H) mg/dL      BUN 35 (H) mg/dL      Creatinine 1.50 (H) mg/dL      Sodium 136 mmol/L      Potassium 4.5 mmol/L      Chloride 100 mmol/L      CO2 26.0 mmol/L      Calcium 9.0 mg/dL      Total Protein 7.3 g/dL      Albumin 3.80 g/dL      ALT (SGPT) 41 U/L      AST (SGOT) 30 U/L      Alkaline Phosphatase 85 U/L      Total Bilirubin 0.7 mg/dL      eGFR Non African Amer 33 (L) mL/min/1.73      Globulin 3.5 gm/dL      A/G Ratio 1.1 g/dL      BUN/Creatinine Ratio 23.3      Anion Gap 10.0 mmol/L     Narrative:       The MDRD GFR formula is only valid for adults with stable renal function between ages 18 and 70.    Protime-INR [[id number]]  (Abnormal) Collected:  01/31/17 1758    Specimen:  Blood Updated:  01/31/17 1835     Protime 28.7 (H) Seconds      INR 2.59 (H)     TSH [[id number]]  (Normal) Collected:  01/31/17 1758    Specimen:  Blood Updated:  01/31/17 1926     TSH 1.520 mIU/mL     Magnesium [[id number]]  (Normal) Collected:  01/31/17 1758    Specimen:  Blood Updated:  01/31/17 1947     Magnesium 2.1 mg/dL     CBC  Auto Differential [[id number]]  (Abnormal) Collected:  01/31/17 1758    Specimen:  Blood Updated:  01/31/17 1819     WBC 8.97 10*3/mm3      RBC 3.64 (L) 10*6/mm3      Hemoglobin 10.2 (L) g/dL      Hematocrit 31.5 (L) %      MCV 86.5 fL      MCH 28.0 pg      MCHC 32.4 (L) g/dL      RDW 14.2 %      RDW-SD 44.6 fl      MPV 12.1 (H) fL      Platelets 305 10*3/mm3      Neutrophil % 64.9 %      Lymphocyte % 20.6 %      Monocyte % 11.9 %      Eosinophil % 1.9 %      Basophil % 0.3 %      Immature Grans % 0.4 %      Neutrophils, Absolute 5.81 10*3/mm3      Lymphocytes, Absolute 1.85 10*3/mm3      Monocytes, Absolute 1.07 10*3/mm3      Eosinophils, Absolute 0.17 10*3/mm3      Basophils, Absolute 0.03 10*3/mm3      Immature Grans, Absolute 0.04 (H) 10*3/mm3     POC Troponin, Rapid [[id number]]  (Normal) Collected:  01/31/17 1807    Specimen:  Blood Updated:  01/31/17 1820     Troponin I 0.00 ng/mL       Serial Number: [id number]    : [id number]       POC Glucose Fingerstick [[id number]]  (Abnormal) Collected:  02/01/17 0017    Specimen:  Blood Updated:  02/01/17 0029     Glucose 359 (H) mg/dL       [facility] [id number]       Basic Metabolic Panel [[id number]]  (Abnormal) Collected:  02/01/17 0302    Specimen:  Blood Updated:  02/01/17 0420     Glucose 350 (H) mg/dL      BUN 32 (H) mg/dL      Creatinine 1.37 mg/dL      Sodium 137 mmol/L      Potassium 4.4 mmol/L      Chloride 102 mmol/L      CO2 26.0 mmol/L      Calcium 9.2 mg/dL      eGFR Non African Amer 37 (L) mL/min/1.73      BUN/Creatinine Ratio 23.4      Anion Gap 9.0 mmol/L     Narrative:       The MDRD GFR formula is only valid for adults with stable renal function between ages 18 and 70.    Protime-INR [[id number]]  (Abnormal) Collected:  02/01/17 0302    Specimen:  Blood Updated:  02/01/17 0415     Protime 27.9 (H) Seconds      INR 2.50 (H)     Hemoglobin A1c [[id number]] Collected:  02/01/17 0302    Specimen:  Blood Updated:  02/01/17 0401     TSH [[id number]]  (Normal) Collected:  02/01/17 0302    Specimen:  Blood Updated:  02/01/17 0448     TSH 1.250 mIU/mL     Lipid Panel [[id number]]  (Abnormal) Collected:  02/01/17 0302    Specimen:  Blood Updated:  02/01/17 0430     Total Cholesterol 110 (L) mg/dL      Triglycerides 211 (H) mg/dL      HDL Cholesterol 25 (L) mg/dL      LDL Cholesterol  55 mg/dL      LDL/HDL Ratio 1.71           XR Chest 1 View   Final Result   1. Moderate cardiomegaly without evidence of acute cardiopulmonary   process.           This report was finalized on 01/31/2017 18:06 by Dr. Luther Farias MD.          ED Course  ED Course   Comment By Time   Turned over to Dr. Dominguez at shift change. Sherif Early Jr., MD 02/01 0529          MDM  Number of Diagnoses or Management Options  Bradycardia with 31-40 beats per minute:   Hypoxia:   Sinus node dysfunction:   Weakness:       Final diagnoses:   Bradycardia with 31-40 beats per minute   Weakness   Sinus node dysfunction   Hypoxia       Sherif Early Jr., MD  02/01/17 0529       Electronically signed by Sherif Early Jr., MD at 2/1/2017  5:29 AM      Agapito Dominguez MD at 1/31/2017  6:22 PM          Subjective   History of Present Illness    Review of Systems    Past Medical History   Diagnosis Date   • Aortic stenosis    • Atrial fibrillation by electrocardiogram    • Atrial flutter    • Breast cancer    • CAD (coronary artery disease)    • Chronic anticoagulation    • Diabetes mellitus    • Hypertension        Allergies   Allergen Reactions   • Dilaudid [Hydromorphone Hcl]    • Dilaudid [Hydromorphone] Nausea And Vomiting   • Enalapril Angioedema   • Kombiglyze [Saxagliptin-Metformin Er]    • Lopid [Gemfibrozil] Nausea And Vomiting   • Sulfa Antibiotics Other (See Comments)     Syncope     • Ultram [Tramadol] Itching       Past Surgical History   Procedure Laterality Date   • Mastectomy     • Hysterectomy     • Cholecystectomy     • Coronary artery bypass graft         No family  history on file.    Social History     Social History   • Marital status:      Spouse name: N/A   • Number of children: N/A   • Years of education: N/A     Social History Main Topics   • Smoking status: Never Smoker   • Smokeless tobacco: Not on file   • Alcohol use No   • Drug use: Not on file   • Sexual activity: Not on file     Other Topics Concern   • Not on file     Social History Narrative   • No narrative on file           Objective   Physical Exam   Constitutional: No distress.   Cardiovascular: Regular rhythm.   No extrasystoles are present. Bradycardia present.    Pulmonary/Chest: Effort normal and breath sounds normal.   Skin: She is not diaphoretic. There is pallor.   Psychiatric: She has a normal mood and affect. note and vitals reviewed.      Procedures        ED Course  ED Course      Labs Reviewed   COMPREHENSIVE METABOLIC PANEL - Abnormal; Notable for the following:        Result Value    Glucose 163 (*)     BUN 35 (*)     Creatinine 1.50 (*)     eGFR Non  Amer 33 (*)     All other components within normal limits    Narrative:     The MDRD GFR formula is only valid for adults with stable renal function between ages 18 and 70.   PROTIME-INR - Abnormal; Notable for the following:     Protime 28.7 (*)     INR 2.59 (*)     All other components within normal limits   CBC WITH AUTO DIFFERENTIAL - Abnormal; Notable for the following:     RBC 3.64 (*)     Hemoglobin 10.2 (*)     Hematocrit 31.5 (*)     MCHC 32.4 (*)     MPV 12.1 (*)     Immature Grans, Absolute 0.04 (*)     All other components within normal limits   POCT GLUCOSE FINGERSTICK - Abnormal; Notable for the following:     Glucose 193 (*)     All other components within normal limits   POCT GLUCOSE FINGERSTICK - Abnormal; Notable for the following:     Glucose 193 (*)     All other components within normal limits   TSH - Normal   MAGNESIUM - Normal   POCT TROPONIN I, RAPID - Normal   POCT TROPONIN I, RAPID   CBC AND DIFFERENTIAL     Narrative:     The following orders were created for panel order CBC & Differential.  Procedure                               Abnormality         Status                     ---------                               -----------         ------                     CBC Auto Differential[[id number]]         Abnormal            Final result                 Please view results for these tests on the individual orders.                 MDM  Number of Diagnoses or Management Options  Bradycardia with 31-40 beats per minute: new and requires workup  Sinus node dysfunction: new and requires workup  Weakness: established and worsening     Amount and/or Complexity of Data Reviewed  Clinical lab tests: ordered and reviewed  Tests in the radiology section of CPT®:  ordered and reviewed  Decide to obtain previous medical records or to obtain history from someone other than the patient: yes  Obtain history from someone other than the patient: yes  Review and summarize past medical records: yes  Discuss the patient with other providers: yes  Independent visualization of images, tracings, or specimens: yes    Risk of Complications, Morbidity, and/or Mortality  Presenting problems: high  Diagnostic procedures: high  Management options: high    Patient Progress  Patient progress: stable      Final diagnoses:   Bradycardia with 31-40 beats per minute   Weakness   Sinus node dysfunction           Agapito Dominguez MD  01/31/17 2005       Electronically signed by Agapito Dominguez MD at 1/31/2017  8:05 PM        Intake & Output (last 3 days)       01/30 0701 - 01/31 0700 01/31 0701 - 02/01 0700 02/01 0701 - 02/02 0700 02/02 0701 - 02/03 0700    P.O.   360     Total Intake(mL/kg)   360 (5.9)     Net     +360              Unmeasured Urine Occurrence  1 x 4 x         Lines, Drains & Airways    Active LDAs     Name:   Placement date:   Placement time:   Site:   Days:    Peripheral IV Line - Single Lumen 01/31/17 1750 median cubital vein  (antecubital fossa), left 20 gauge  01/31/17    1750      1                Hospital Medications (all)       Dose Frequency Start End    acetaminophen (TYLENOL) tablet 650 mg 650 mg Every 4 Hours PRN 1/31/2017     Sig - Route: Take 2 tablets by mouth Every 4 (Four) Hours As Needed for mild pain (1-3). - Oral    aluminum-magnesium hydroxide-simethicone (MAALOX/MYLANTA) suspension 30 mL 30 mL Every 6 Hours PRN 1/31/2017     Sig - Route: Take 30 mL by mouth Every 6 (Six) Hours As Needed for heartburn. - Oral    aspirin EC tablet 81 mg 81 mg Daily 2/1/2017     Sig - Route: Take 1 tablet by mouth Daily. - Oral    atorvastatin (LIPITOR) tablet 40 mg 40 mg Nightly 1/31/2017     Sig - Route: Take 1 tablet by mouth Every Night. - Oral    bisacodyl (DULCOLAX) EC tablet 5 mg 5 mg Daily PRN 1/31/2017     Sig - Route: Take 1 tablet by mouth Daily As Needed for constipation. - Oral    dextrose (D50W) solution 25 g 25 g Every 15 Minutes PRN 1/31/2017     Sig - Route: Infuse 50 mL into a venous catheter Every 15 (Fifteen) Minutes As Needed for low blood sugar (Blood Sugar Less Than 70, Patient Has IV Access - Unresponsive, NPO or Unable To Safely Swallow). - Intravenous    dextrose (GLUTOSE) oral gel 15 g 15 g Every 15 Minutes PRN 1/31/2017     Sig - Route: Take 15 g by mouth Every 15 (Fifteen) Minutes As Needed for low blood sugar (Blood Sugar Less Than 70, Patient Alert, Is Not NPO & Can Safely Swallow). - Oral    glucagon (human recombinant) (GLUCAGEN DIAGNOSTIC) injection 1 mg 1 mg Every 15 Minutes PRN 1/31/2017     Sig - Route: Inject 1 mg under the skin Every 15 (Fifteen) Minutes As Needed (Blood Glucose Less Than 70 - Patient Without IV Access - Unresponsive, NPO or Unable To Safely Swallow). - Subcutaneous    insulin detemir (LEVEMIR) injection 70 Units 70 Units Every Morning 2/1/2017     Sig - Route: Inject 70 Units under the skin Every Morning. - Subcutaneous    insulin lispro (humaLOG) injection 4-24 Units 4-24 Units 4  "Times Daily Before Meals & Nightly 2/1/2017     Sig - Route: Inject 4-24 Units under the skin 4 (Four) Times a Day Before Meals & at Bedtime. - Subcutaneous    ondansetron (ZOFRAN) injection 4 mg 4 mg Every 6 Hours PRN 1/31/2017     Sig - Route: Infuse 2 mL into a venous catheter Every 6 (Six) Hours As Needed for nausea or vomiting. - Intravenous    ondansetron (ZOFRAN) injection 4 mg 4 mg Every 6 Hours PRN 2/1/2017     Sig - Route: Infuse 2 mL into a venous catheter Every 6 (Six) Hours As Needed for nausea or vomiting. - Intravenous    pantoprazole (PROTONIX) EC tablet 40 mg 40 mg Daily 2/1/2017     Sig - Route: Take 1 tablet by mouth Daily. - Oral    pneumococcal polysaccharide 23-valent (PNEUMOVAX-23) vaccine 0.5 mL 0.5 mL During Hospitalization 1/31/2017     Sig - Route: Inject 0.5 mL into the shoulder, thigh, or buttocks During Hospitalization for immunization. - Intramuscular    polyethyl glycol-propyl glycol (SYSTANE) 0.4-0.3 % ophthalmic solution 1 drop 1 drop Daily 2/1/2017     Sig - Route: Administer 1 drop to the right eye Daily. - Right Eye    sodium chloride 0.9 % flush 1-10 mL 1-10 mL As Needed 1/31/2017     Sig - Route: Infuse 1-10 mL into a venous catheter As Needed for line care. - Intravenous    sodium chloride 0.9 % flush 10 mL 10 mL As Needed 1/31/2017     Sig - Route: Infuse 10 mL into a venous catheter As Needed for line care. - Intravenous    Linked Group 1:  \"And\" Linked Group Details        spironolactone (ALDACTONE) tablet 25 mg 25 mg Daily 2/1/2017     Sig - Route: Take 1 tablet by mouth Daily. - Oral    venlafaxine (EFFEXOR) tablet 37.5 mg 37.5 mg Daily 2/1/2017     Sig - Route: Take 1 tablet by mouth Daily. - Oral          Lab Results (last 72 hours)     Procedure Component Value Units Date/Time    POC Glucose Fingerstick [[id number]]  (Abnormal) Collected:  01/31/17 1750    Specimen:  Blood Updated:  01/31/17 1753     Glucose 193 (A) mg/dL     POC Glucose Fingerstick [[id number]]  " (Abnormal) Collected:  01/31/17 1750    Specimen:  Blood Updated:  01/31/17 1802     Glucose 193 (H) mg/dL       : [id number] Mauro Granger ID: [id number]       CBC & Differential [[id number]] Collected:  01/31/17 1758    Specimen:  Blood Updated:  01/31/17 1819    Narrative:       The following orders were created for panel order CBC & Differential.  Procedure                               Abnormality         Status                     ---------                               -----------         ------                     CBC Auto Differential[[id number]]         Abnormal            Final result                 Please view results for these tests on the individual orders.    CBC Auto Differential [[id number]]  (Abnormal) Collected:  01/31/17 1758    Specimen:  Blood Updated:  01/31/17 1819     WBC 8.97 10*3/mm3      RBC 3.64 (L) 10*6/mm3      Hemoglobin 10.2 (L) g/dL      Hematocrit 31.5 (L) %      MCV 86.5 fL      MCH 28.0 pg      MCHC 32.4 (L) g/dL      RDW 14.2 %      RDW-SD 44.6 fl      MPV 12.1 (H) fL      Platelets 305 10*3/mm3      Neutrophil % 64.9 %      Lymphocyte % 20.6 %      Monocyte % 11.9 %      Eosinophil % 1.9 %      Basophil % 0.3 %      Immature Grans % 0.4 %      Neutrophils, Absolute 5.81 10*3/mm3      Lymphocytes, Absolute 1.85 10*3/mm3      Monocytes, Absolute 1.07 10*3/mm3      Eosinophils, Absolute 0.17 10*3/mm3      Basophils, Absolute 0.03 10*3/mm3      Immature Grans, Absolute 0.04 (H) 10*3/mm3     POC Troponin, Rapid [[id number]]  (Normal) Collected:  01/31/17 1807    Specimen:  Blood Updated:  01/31/17 1820     Troponin I 0.00 ng/mL       Serial Number: [id number]    : [id number]       Protime-INR [[id number]]  (Abnormal) Collected:  01/31/17 1758    Specimen:  Blood Updated:  01/31/17 1835     Protime 28.7 (H) Seconds      INR 2.59 (H)     TSH [[id number]]  (Normal) Collected:  01/31/17 1758    Specimen:  Blood Updated:  01/31/17 1926     TSH 1.520 mIU/mL     Comprehensive Metabolic Panel  [[id number]]  (Abnormal) Collected:  01/31/17 1758    Specimen:  Blood Updated:  01/31/17 1947     Glucose 163 (H) mg/dL      BUN 35 (H) mg/dL      Creatinine 1.50 (H) mg/dL      Sodium 136 mmol/L      Potassium 4.5 mmol/L      Chloride 100 mmol/L      CO2 26.0 mmol/L      Calcium 9.0 mg/dL      Total Protein 7.3 g/dL      Albumin 3.80 g/dL      ALT (SGPT) 41 U/L      AST (SGOT) 30 U/L      Alkaline Phosphatase 85 U/L      Total Bilirubin 0.7 mg/dL      eGFR Non African Amer 33 (L) mL/min/1.73      Globulin 3.5 gm/dL      A/G Ratio 1.1 g/dL      BUN/Creatinine Ratio 23.3      Anion Gap 10.0 mmol/L     Narrative:       The MDRD GFR formula is only valid for adults with stable renal function between ages 18 and 70.    Magnesium [[id number]]  (Normal) Collected:  01/31/17 1758    Specimen:  Blood Updated:  01/31/17 1947     Magnesium 2.1 mg/dL     POC Glucose Fingerstick [[id number]]  (Abnormal) Collected:  02/01/17 0017    Specimen:  Blood Updated:  02/01/17 0029     Glucose 359 (H) mg/dL       : [id number] Cody KristieMeter ID: [id number]       Protime-INR [[id number]]  (Abnormal) Collected:  02/01/17 0302    Specimen:  Blood Updated:  02/01/17 0415     Protime 27.9 (H) Seconds      INR 2.50 (H)     Basic Metabolic Panel [[id number]]  (Abnormal) Collected:  02/01/17 0302    Specimen:  Blood Updated:  02/01/17 0420     Glucose 350 (H) mg/dL      BUN 32 (H) mg/dL      Creatinine 1.37 mg/dL      Sodium 137 mmol/L      Potassium 4.4 mmol/L      Chloride 102 mmol/L      CO2 26.0 mmol/L      Calcium 9.2 mg/dL      eGFR Non African Amer 37 (L) mL/min/1.73      BUN/Creatinine Ratio 23.4      Anion Gap 9.0 mmol/L     Narrative:       The MDRD GFR formula is only valid for adults with stable renal function between ages 18 and 70.    Lipid Panel [[id number]]  (Abnormal) Collected:  02/01/17 0302    Specimen:  Blood Updated:  02/01/17 0430     Total Cholesterol 110 (L) mg/dL      Triglycerides 211 (H) mg/dL      HDL Cholesterol 25 (L)  mg/dL      LDL Cholesterol  55 mg/dL      LDL/HDL Ratio 1.71     TSH [[id number]]  (Normal) Collected:  02/01/17 0302    Specimen:  Blood Updated:  02/01/17 0448     TSH 1.250 mIU/mL     Hemoglobin A1c [[id number]] Collected:  02/01/17 0302    Specimen:  Blood Updated:  02/01/17 0658     Hemoglobin A1C 9.3 %     Narrative:       Less than 6.0           Non-Diabetic Range  6.0-7.0                 ADA Therapeutic Target  Greater than 7.0        Action Suggested    POC Glucose Fingerstick [[id number]]  (Abnormal) Collected:  02/01/17 0904    Specimen:  Blood Updated:  02/01/17 0916     Glucose 219 (H) mg/dL       : [id number] Nilson TaylorMeter ID: [id number]       POC Glucose Fingerstick [[id number]]  (Abnormal) Collected:  02/01/17 1108    Specimen:  Blood Updated:  02/01/17 1123     Glucose 191 (H) mg/dL       : [id number] Nilson TaylorMeter ID: [id number]       POC Glucose Fingerstick [[id number]]  (Abnormal) Collected:  02/01/17 1529    Specimen:  Blood Updated:  02/01/17 1546     Glucose 260 (H) mg/dL       : [id number] Nilson TaylorMeter ID: [id number]       POC Glucose Fingerstick [[id number]]  (Abnormal) Collected:  02/01/17 1951    Specimen:  Blood Updated:  02/01/17 2007     Glucose 185 (H) mg/dL       : [id number] Mick CareytaneyMeter ID: [id number]       Protime-INR [[id number]]  (Abnormal) Collected:  02/02/17 0514    Specimen:  Blood Updated:  02/02/17 0623     Protime 27.3 (H) Seconds      INR 2.43 (H)     POC Glucose Fingerstick [[id number]]  (Abnormal) Collected:  02/02/17 0808    Specimen:  Blood Updated:  02/02/17 0839     Glucose 152 (H) mg/dL       : [id number] Carolina FloresaMeter ID: [id number]             Imaging Results (last 72 hours)     Procedure Component Value Units Date/Time    XR Chest 1 View [[id number]] Collected:  01/31/17 1805     Updated:  01/31/17 1808    Narrative:       XR CHEST 1 VW- 1/31/2017 6:03 PM CST     HISTORY: SOB       COMPARISON: 03/23/2016.     FINDINGS:    The lungs are clear. The heart is enlarged. 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      The osseous structures and surrounding soft tissues demonstrate no acute  abnormality.       Impression:       1. Moderate cardiomegaly without evidence of acute cardiopulmonary  process.        This report was finalized on 01/31/2017 18:06 by Dr. Luther Farias MD.          ECG/EMG Results (last 72 hours)     Procedure Component Value Units Date/Time    Adult Transthoracic Echo Complete [[id number]] Resulted:  02/01/17 1249     IVSd 0.92 cm Updated:  02/01/17 1254     LVIDd 4.1 cm      LVIDs 2.7 cm      LVPWd 1.0 cm      IVS/LVPW 0.9      FS 32.8 %      EDV(Teich) 72.1 ml      ESV(Teich) 27.5 ml      EF(Teich) 61.8 %      EDV(cubed) 66.4 ml      ESV(cubed) 20.1 ml      EF(cubed) 69.7 %      LV mass(C)d 123.9 grams      SV(Teich) 44.6 ml      SV(cubed) 46.3 ml      Ao root diam 3.0 cm      Ao root area 7.1 cm^2      LA dimension 3.7 cm      LA/Ao 1.2      LVOT diam 2.0 cm      LVOT area 3.1 cm^2      LVOT area(traced) 3.1 cm^2      LVLd ap4 6.7 cm      EDV(MOD-sp4) 53.3 ml      LVLs ap4 5.9 cm      ESV(MOD-sp4) 18.5 ml      EF(MOD-sp4) 65.3 %      SV(MOD-sp4) 34.8 ml      CONTRAST EF 4CH 65.3 ml/m^2      MV E max rosalind 158.0 cm/sec      MV A max rosalind 45.9 cm/sec      MV E/A 3.4      MV dec time 0.25 sec      Ao pk rosalind 347.0 cm/sec      Ao max PG 48.2 mmHg      Ao max PG (full) 45.2 mmHg      Ao V2 mean 240.0 cm/sec      Ao mean PG 26.0 mmHg      Ao mean PG (full) 24.0 mmHg      Ao V2 .0 cm      CHELSEA(I,A) 0.68 cm^2      CHELSEA(I,D) 0.68 cm^2      CHELSEA(V,A) 0.78 cm^2      CHELSEA(V,D) 0.78 cm^2      LV V1 max PG 2.9 mmHg      LV V1 mean PG 2.0 mmHg      LV V1 max 85.8 cm/sec      LV V1 mean 55.1 cm/sec      LV V1 VTI 22.4 cm      MR max rosalind 469.0 cm/sec      MR max PG 88.0 mmHg      SV(Ao) 728.1 ml      SV(LVOT) 70.4 ml      PA V2 max 99.1 cm/sec      PA max PG 3.9 mmHg      PI end-d rosalind 149.0 cm/sec      TR max rosalind 308.0 cm/sec       RVSP(TR) 42.9 mmHg      RAP systole 5.0 mmHg      LA volume 60.0 cm3      E/E' ratio 36.0      Lat Peak E' Mau 5.4 cm/sec     Narrative:       · Left atrial cavity size is mild-to-moderately dilated.  · Left ventricular diastolic dysfunction (grade III) consistent with   reversible restrictive pattern.  · Severe aortic valve stenosis is present.  · Right ventricular cavity is moderately dilated.  · Moderately reduced right ventricular systolic function noted.       ECG 12 Lead [[id number]] Collected:  01/31/17 1712     Updated:  02/02/17 0017    Narrative:       Test Reason : bradycardia  Blood Pressure : **/** mmHG  Vent. Rate : 038 BPM     Atrial Rate : 038 BPM     P-R Int : 000 ms          QRS Dur : 108 ms      QT Int : 610 ms       P-R-T Axes : 000 -59 012 degrees     QTc Int : 484 ms    Junctional bradycardia  Left axis deviation  Anterior infarct (cited on or before 31-JAN-2017)  Abnormal ECG  When compared with ECG of 27-AUG-2016 23:16,  Inverted T waves have replaced nonspecific T wave abnormality in Anterior  leads  QT has lengthened    Referred By:  OTF           Confirmed By:Roc Hudson MD          Orders (last 72 hrs)     Start     Ordered    02/02/17 0840  POC Glucose Fingerstick  Once      02/02/17 0839    02/02/17 0600  Protime-INR  Morning Draw      02/01/17 1016    02/01/17 2326  Record actual height and weight prior to procedure.  Once      02/01/17 2325    02/01/17 2326  Confirm NPO Status.  Until Discontinued      02/01/17 2325    02/01/17 2326  CPAP or BiPAP as indicated per respiratory protocol  Until Discontinued      02/01/17 2325    02/01/17 2326  No telemetry pads or tape over implantation site.  Until Discontinued      02/01/17 2325    02/01/17 2325  ondansetron (ZOFRAN) injection 4 mg  Every 6 Hours PRN      02/01/17 2325    02/01/17 2008  POC Glucose Fingerstick  Once      02/01/17 2007 02/01/17 1547  POC Glucose Fingerstick  Once      02/01/17 1546    02/01/17 1339  Case Request Cath  Lab: Pacemaker DC new  Once      02/01/17 1340    02/01/17 1159  Adult Transthoracic Echo Complete  Once      02/01/17 1017    02/01/17 1124  POC Glucose Fingerstick  Once      02/01/17 1123    02/01/17 1018  Diet Regular; Cardiac, Consistent Carbohydrate  Diet Effective Now      02/01/17 1017    02/01/17 1018  Adult Transthoracic Echo Complete With Contrast  Once,   Status:  Canceled      02/01/17 1017    02/01/17 0917  POC Glucose Fingerstick  Once      02/01/17 0916    02/01/17 0900  aspirin EC tablet 81 mg  Daily      01/31/17 2320 02/01/17 0900  pantoprazole (PROTONIX) EC tablet 40 mg  Daily      01/31/17 2320 02/01/17 0900  polyethyl glycol-propyl glycol (SYSTANE) 0.4-0.3 % ophthalmic solution 1 drop  Daily      01/31/17 2320 02/01/17 0900  spironolactone (ALDACTONE) tablet 25 mg  Daily      01/31/17 2320 02/01/17 0900  venlafaxine (EFFEXOR) tablet 37.5 mg  Daily      01/31/17 2320 02/01/17 0700  POC Glucose Fingerstick  4 Times Daily Before Meals & at Bedtime      01/31/17 2323 02/01/17 0700  insulin detemir (LEVEMIR) injection 70 Units  Every Morning      01/31/17 2323 02/01/17 0600  Basic Metabolic Panel  Morning Draw      01/31/17 2320 02/01/17 0600  Protime-INR  Morning Draw      01/31/17 2320 02/01/17 0600  CBC Auto Differential  Morning Draw,   Status:  Canceled      01/31/17 2320 02/01/17 0600  Hemoglobin A1c  Morning Draw      01/31/17 2320 02/01/17 0600  TSH  Morning Draw      01/31/17 2320 02/01/17 0600  Lipid Panel  Morning Draw      01/31/17 2320 02/01/17 0600  POC Glucose Fingerstick  Daily      01/31/17 2323 02/01/17 0030  POC Glucose Fingerstick  Once      02/01/17 0029    02/01/17 0000  Vital Signs  Every 4 Hours      01/31/17 2320 02/01/17 0000  Strict Intake and Output  Every Hour      01/31/17 2320 02/01/17 0000  insulin lispro (humaLOG) injection 4-24 Units  4 Times Daily Before Meals & Nightly      01/31/17 2323 01/31/17 1264  NPO Diet   Diet Effective Now,   Status:  Canceled     Comments:  May eat now    01/31/17 2003 01/31/17 2330  atorvastatin (LIPITOR) tablet 40 mg  Nightly      01/31/17 2320 01/31/17 2322  Do NOT Hold Basal Insulin When Patient is NPO, Hold Bolus Dose if NPO  Continuous      01/31/17 2323 01/31/17 2322  Follow [facility] Hypoglycemia Protocol For Blood Glucose Less Than 70 mg/dL  Until Discontinued      01/31/17 2323 01/31/17 2322  Hypoglycemia Treatment - Alert Patient That is Not NPO and Can Safely Swallow  Until Discontinued     Comments:  Administer 4 oz Fruit Juice OR 4 oz Regular Soda OR 8 oz Milk OR 15-30 grams (1 tube) of Glucose Gel  Recheck Blood Glucose 15 Minutes After Ingestion, Repeat Treatment & Continue to Recheck Blood Sugar Every 15 Minutes Until Blood Glucose is 70 or Higher  Once Blood Glucose is 70 or Higher, Give Snack (Peanut Butter & Crackers) if Next Meal Will Be Given in More Than 60 Minutes, Provide Meal Tray As Soon As Possible    01/31/17 2323 01/31/17 2322  Hypoglycemia Treatment - Patient Has IV Access - Unresponsive, NPO or Unable To Safely Swallow  Until Discontinued     Comments:  Administer 25g D50W IV Push (1 Whole Vial)  Recheck Blood Glucose 15 Minutes After Administration, if Blood Glucose Remains Less Than 70, Repeat Treatment   Recheck Blood Glucose 15 Minutes After 2nd Administration, if Blood Glucose Remains Less Than 70 After 2nd Dose of D50W Contact Provider for Further Treatment Orders & Consider Adding IVF With D5 for Maintenance    01/31/17 2323 01/31/17 2322  Hypoglycemia Treatment - Patient Without IV Access - Unresponsive, NPO or Unable To Safely Swallow  Until Discontinued     Comments:  Administer 1mg Glucagon SQ & Establish IV Access  Turn Patient on Side - Nausea / Vomiting May Occur  Recheck Blood Glucose 15 Minutes After Administration  If IV Access Has Not Been Established & Blood Glucose Remains Less Than 70, Repeat Treatment With Glucagon  If IV Access  Established, Administer 25g D50W IV Push (1 Whole Vial)  Recheck Blood Glucose 15 Minutes After Administration of 2nd Medication, if Blood Glucose Remains Less Than 70, Contact Provider for Further Treatment Orders & Consider Adding IVF With D5 for Maintenance    01/31/17 2323 01/31/17 2321  dextrose (GLUTOSE) oral gel 15 g  Every 15 Minutes PRN      01/31/17 2323    01/31/17 2321  dextrose (D50W) solution 25 g  Every 15 Minutes PRN      01/31/17 2323    01/31/17 2321  glucagon (human recombinant) (GLUCAGEN DIAGNOSTIC) injection 1 mg  Every 15 Minutes PRN      01/31/17 2323    01/31/17 2319  pneumococcal polysaccharide 23-valent (PNEUMOVAX-23) vaccine 0.5 mL  During Hospitalization      01/31/17 2319 01/31/17 2314  ondansetron (ZOFRAN) injection 4 mg  Every 6 Hours PRN      01/31/17 2320 01/31/17 2314  bisacodyl (DULCOLAX) EC tablet 5 mg  Daily PRN      01/31/17 2320 01/31/17 2314  aluminum-magnesium hydroxide-simethicone (MAALOX/MYLANTA) suspension 30 mL  Every 6 Hours PRN      01/31/17 2320 01/31/17 2314  acetaminophen (TYLENOL) tablet 650 mg  Every 4 Hours PRN      01/31/17 2320 01/31/17 2313  Weigh patient  Once      01/31/17 2320 01/31/17 2313  Oxygen Therapy-  Continuous      01/31/17 2320 01/31/17 2313  Insert Peripheral IV  Once      01/31/17 2320 01/31/17 2313  Saline Lock & Maintain IV Access  Continuous      01/31/17 2320 01/31/17 2313  Full Code  Continuous      01/31/17 2320 01/31/17 2313  VTE Risk Assessment - Moderate Risk  Once      01/31/17 2320 01/31/17 2313  Pharmacologic VTE Prophylaxis Not Indicated: Anticipated Invasive Procedure / Surgery  Once      01/31/17 2320 01/31/17 2313  Place Sequential Compression Device  Once      01/31/17 2320 01/31/17 2313  Maintain Sequential Compression Device  Continuous      01/31/17 2320 01/31/17 2312  sodium chloride 0.9 % flush 1-10 mL  As Needed      01/31/17 2320    01/31/17 2003  Inpatient Admission  Once       01/31/17 2003 01/31/17 1821  POC Troponin, Rapid  Once      01/31/17 1820    01/31/17 1803  POC Glucose Fingerstick  Once      01/31/17 1802    01/31/17 1747  POC Glucose Fingerstick  Once      01/31/17 1747    01/31/17 1745  TSH  Once      01/31/17 1744    01/31/17 1745  Magnesium  Once      01/31/17 1744    01/31/17 1744  CBC & Differential  Once      01/31/17 1744    01/31/17 1744  Comprehensive Metabolic Panel  Once      01/31/17 1744    01/31/17 1744  Protime-INR  Once      01/31/17 1744    01/31/17 1744  XR Chest 1 View  1 Time Imaging      01/31/17 1744    01/31/17 1744  Insert peripheral IV  Once      01/31/17 1744    01/31/17 1744  Pulse Oximetry, Continuous  Per Hospital Policy      01/31/17 1744    01/31/17 1744  POCT Troponin, rapid  Once      01/31/17 1744    01/31/17 1744  CBC Auto Differential  PROCEDURE ONCE      01/31/17 1744    01/31/17 1743  sodium chloride 0.9 % flush 10 mL  As Needed      01/31/17 1744    01/31/17 1706  ECG 12 Lead  Once      01/31/17 1706    Unscheduled  Oxygen Therapy- Nasal Cannula; 2 LPM; Titrate for spO2: equal to or greater than, 92%  As Needed      01/31/17 1744    Unscheduled  Have patient void prior to procedure.  As Needed      02/01/17 2325    Unscheduled  Oxygen Therapy- Nasal Cannula; 2 LPM; Titrate for spO2: 92%  As Needed      02/01/17 2325    --  atenolol (TENORMIN) 25 MG tablet  Daily      01/31/17 1759    --  insulin aspart (novoLOG) 100 UNIT/ML injection  3 Times Daily Before Meals      01/31/17 1759    --  NON FORMULARY  2 Times Daily      01/31/17 2231    --  polyethyl glycol-propyl glycol (SYSTANE) 0.4-0.3 % solution ophthalmic solution  Daily      01/31/17 2231          Physician Progress Notes (last 72 hours) (Notes from 1/30/2017  9:05 AM through 2/2/2017  9:05 AM)     No notes of this type exist for this encounter.        Consult Notes (last 72 hours) (Notes from 1/30/2017  9:05 AM through 2/2/2017  9:05 AM)     No notes of this type exist for this  encounter.

## 2017-02-02 NOTE — PAYOR COMM NOTE
"PENDING [id number]  2/2 ADDITIONAL CLINICAL    Lakesha Costello (81 y.o. Female)     Date of Birth Social Security Number Address Home Phone MRN    1935  [address] [facility] [address] [phone] [id number]    Religious Marital Status          Other        Admission Date Admission Type Admitting Provider Attending Provider Department, Room/Bed    1/31/17 Emergency Ehsan Crocker MD Ford, James K, MD [facility] 4B, 409/1    Discharge Date Discharge Disposition Discharge Destination                      Attending Provider: Ehsan Crocker MD     Allergies:  Dilaudid [Hydromorphone Hcl], Dilaudid [Hydromorphone], Enalapril, Kombiglyze [Saxagliptin-metformin Er], Lopid [Gemfibrozil], Sulfa Antibiotics, Ultram [Tramadol]    Isolation:  None   Infection:  None   Code Status:  FULL    Ht:  60\" (152.4 cm)   Wt:  134 lb 4.8 oz (60.9 kg)    Admission Cmt:  None   Principal Problem:  Junctional bradycardia [R00.1]                 Active Insurance as of 1/31/2017     Primary Coverage     Payor Plan Insurance Group Employer/Plan Group    HUMANA MEDICARE REPL HUMANA MEDICARE REPL [id number]     Payor Plan Address Payor Plan Phone Number Effective From Effective To    [address] [address] [phone] 1/1/2014     [place] [address]       Subscriber Name Subscriber Birth Date Member ID       LAKESHA COSTELLO 1935 [id number]                 Emergency Contacts      (Rel.) Home Phone Work Phone Mobile Phone    Margarita Costello (Daughter) [phone] -- --    Richard Pablo (Spouse) [phone] -- --            Vital Signs (last 24 hours)       02/01 0700  -  02/02 0659 02/02 0700  -  02/02 0711   Most Recent    Temp (°F) 97.1 -  99       98 (36.7)    Heart Rate (!)44 -  52       (!) 48    Resp 18 -  22       18    /74 -  157/42       157/42    SpO2 (%) 91 -  99       99          Intake & Output (last day)       02/01 0701 - 02/02 0700 02/02 0701 - 02/03 0700    [address]  "    Total Intake(mL/kg) 360 (5.9)     Net +360            Unmeasured Urine Occurrence 4 x         Hospital Medications (active)       Dose Frequency Start End    acetaminophen (TYLENOL) tablet 650 mg 650 mg Every 4 Hours PRN 1/31/2017     Sig - Route: Take 2 tablets by mouth Every 4 (Four) Hours As Needed for mild pain (1-3). - Oral    aluminum-magnesium hydroxide-simethicone (MAALOX/MYLANTA) suspension 30 mL 30 mL Every 6 Hours PRN 1/31/2017     Sig - Route: Take 30 mL by mouth Every 6 (Six) Hours As Needed for heartburn. - Oral    aspirin EC tablet 81 mg 81 mg Daily 2/1/2017     Sig - Route: Take 1 tablet by mouth Daily. - Oral    atorvastatin (LIPITOR) tablet 40 mg 40 mg Nightly 1/31/2017     Sig - Route: Take 1 tablet by mouth Every Night. - Oral    bisacodyl (DULCOLAX) EC tablet 5 mg 5 mg Daily PRN 1/31/2017     Sig - Route: Take 1 tablet by mouth Daily As Needed for constipation. - Oral    dextrose (D50W) solution 25 g 25 g Every 15 Minutes PRN 1/31/2017     Sig - Route: Infuse 50 mL into a venous catheter Every 15 (Fifteen) Minutes As Needed for low blood sugar (Blood Sugar Less Than 70, Patient Has IV Access - Unresponsive, NPO or Unable To Safely Swallow). - Intravenous    dextrose (GLUTOSE) oral gel 15 g 15 g Every 15 Minutes PRN 1/31/2017     Sig - Route: Take 15 g by mouth Every 15 (Fifteen) Minutes As Needed for low blood sugar (Blood Sugar Less Than 70, Patient Alert, Is Not NPO & Can Safely Swallow). - Oral    glucagon (human recombinant) (GLUCAGEN DIAGNOSTIC) injection 1 mg 1 mg Every 15 Minutes PRN 1/31/2017     Sig - Route: Inject 1 mg under the skin Every 15 (Fifteen) Minutes As Needed (Blood Glucose Less Than 70 - Patient Without IV Access - Unresponsive, NPO or Unable To Safely Swallow). - Subcutaneous    insulin detemir (LEVEMIR) injection 70 Units 70 Units Every Morning 2/1/2017     Sig - Route: Inject 70 Units under the skin Every Morning. - Subcutaneous    insulin lispro (humaLOG) injection  "4-24 Units 4-24 Units 4 Times Daily Before Meals & Nightly 2/1/2017     Sig - Route: Inject 4-24 Units under the skin 4 (Four) Times a Day Before Meals & at Bedtime. - Subcutaneous    ondansetron (ZOFRAN) injection 4 mg 4 mg Every 6 Hours PRN 1/31/2017     Sig - Route: Infuse 2 mL into a venous catheter Every 6 (Six) Hours As Needed for nausea or vomiting. - Intravenous    ondansetron (ZOFRAN) injection 4 mg 4 mg Every 6 Hours PRN 2/1/2017     Sig - Route: Infuse 2 mL into a venous catheter Every 6 (Six) Hours As Needed for nausea or vomiting. - Intravenous    pantoprazole (PROTONIX) EC tablet 40 mg 40 mg Daily 2/1/2017     Sig - Route: Take 1 tablet by mouth Daily. - Oral    pneumococcal polysaccharide 23-valent (PNEUMOVAX-23) vaccine 0.5 mL 0.5 mL During Hospitalization 1/31/2017     Sig - Route: Inject 0.5 mL into the shoulder, thigh, or buttocks During Hospitalization for immunization. - Intramuscular    polyethyl glycol-propyl glycol (SYSTANE) 0.4-0.3 % ophthalmic solution 1 drop 1 drop Daily 2/1/2017     Sig - Route: Administer 1 drop to the right eye Daily. - Right Eye    sodium chloride 0.9 % flush 1-10 mL 1-10 mL As Needed 1/31/2017     Sig - Route: Infuse 1-10 mL into a venous catheter As Needed for line care. - Intravenous    sodium chloride 0.9 % flush 10 mL 10 mL As Needed 1/31/2017     Sig - Route: Infuse 10 mL into a venous catheter As Needed for line care. - Intravenous    Linked Group 1:  \"And\" Linked Group Details        spironolactone (ALDACTONE) tablet 25 mg 25 mg Daily 2/1/2017     Sig - Route: Take 1 tablet by mouth Daily. - Oral    venlafaxine (EFFEXOR) tablet 37.5 mg 37.5 mg Daily 2/1/2017     Sig - Route: Take 1 tablet by mouth Daily. - Oral          Lab Results (last 24 hours)     Procedure Component Value Units Date/Time    POC Glucose Fingerstick [[id number]]  (Abnormal) Collected:  02/01/17 0904    Specimen:  Blood Updated:  02/01/17 0916     Glucose 219 (H) mg/dL       : [id number] " Nilson SimpsonMeter ID: [id number]       POC Glucose Fingerstick [[id number]]  (Abnormal) Collected:  02/01/17 1108    Specimen:  Blood Updated:  02/01/17 1123     Glucose 191 (H) mg/dL       : [id number] Nilson SimpsonMeter ID: [id number]       POC Glucose Fingerstick [[id number]]  (Abnormal) Collected:  02/01/17 1529    Specimen:  Blood Updated:  02/01/17 1546     Glucose 260 (H) mg/dL       : [id number] Nilson SimpsonMeter ID: [id number]       POC Glucose Fingerstick [[id number]]  (Abnormal) Collected:  02/01/17 1951    Specimen:  Blood Updated:  02/01/17 2007     Glucose 185 (H) mg/dL       : [id number] Mick GimenezeyMeter ID: [id number]       Protime-INR [[id number]]  (Abnormal) Collected:  02/02/17 0514    Specimen:  Blood Updated:  02/02/17 0623     Protime 27.3 (H) Seconds      INR 2.43 (H)           Imaging Results (last 24 hours)     ** No results found for the last 24 hours. **        ECG/EMG Results (last 24 hours)     Procedure Component Value Units Date/Time    Adult Transthoracic Echo Complete [[id number]] Resulted:  02/01/17 1249     IVSd 0.92 cm Updated:  02/01/17 1254     LVIDd 4.1 cm      LVIDs 2.7 cm      LVPWd 1.0 cm      IVS/LVPW 0.9      FS 32.8 %      EDV(Teich) 72.1 ml      ESV(Teich) 27.5 ml      EF(Teich) 61.8 %      EDV(cubed) 66.4 ml      ESV(cubed) 20.1 ml      EF(cubed) 69.7 %      LV mass(C)d 123.9 grams      SV(Teich) 44.6 ml      SV(cubed) 46.3 ml      Ao root diam 3.0 cm      Ao root area 7.1 cm^2      LA dimension 3.7 cm      LA/Ao 1.2      LVOT diam 2.0 cm      LVOT area 3.1 cm^2      LVOT area(traced) 3.1 cm^2      LVLd ap4 6.7 cm      EDV(MOD-sp4) 53.3 ml      LVLs ap4 5.9 cm      ESV(MOD-sp4) 18.5 ml      EF(MOD-sp4) 65.3 %      SV(MOD-sp4) 34.8 ml      CONTRAST EF 4CH 65.3 ml/m^2      MV E max rosalind 158.0 cm/sec      MV A max rosalind 45.9 cm/sec      MV E/A 3.4      MV dec time 0.25 sec      Ao pk rosalind 347.0 cm/sec      Ao max PG 48.2 mmHg      Ao max PG (full) 45.2 mmHg      Ao  V2 mean 240.0 cm/sec      Ao mean PG 26.0 mmHg      Ao mean PG (full) 24.0 mmHg      Ao V2 .0 cm      CHELSEA(I,A) 0.68 cm^2      CHELSEA(I,D) 0.68 cm^2      CHELSEA(V,A) 0.78 cm^2      CHELSEA(V,D) 0.78 cm^2      LV V1 max PG 2.9 mmHg      LV V1 mean PG 2.0 mmHg      LV V1 max 85.8 cm/sec      LV V1 mean 55.1 cm/sec      LV V1 VTI 22.4 cm      MR max mau 469.0 cm/sec      MR max PG 88.0 mmHg      SV(Ao) 728.1 ml      SV(LVOT) 70.4 ml      PA V2 max 99.1 cm/sec      PA max PG 3.9 mmHg      PI end-d mau 149.0 cm/sec      TR max mau 308.0 cm/sec      RVSP(TR) 42.9 mmHg      RAP systole 5.0 mmHg      LA volume 60.0 cm3      E/E' ratio 36.0      Lat Peak E' Mau 5.4 cm/sec     Narrative:       · Left atrial cavity size is mild-to-moderately dilated.  · Left ventricular diastolic dysfunction (grade III) consistent with   reversible restrictive pattern.  · Severe aortic valve stenosis is present.  · Right ventricular cavity is moderately dilated.  · Moderately reduced right ventricular systolic function noted.       ECG 12 Lead [[id number]] Collected:  01/31/17 1712     Updated:  02/02/17 0017    Narrative:       Test Reason : bradycardia  Blood Pressure : **/** mmHG  Vent. Rate : 038 BPM     Atrial Rate : 038 BPM     P-R Int : 000 ms          QRS Dur : 108 ms      QT Int : 610 ms       P-R-T Axes : 000 -59 012 degrees     QTc Int : 484 ms    Junctional bradycardia  Left axis deviation  Anterior infarct (cited on or before 31-JAN-2017)  Abnormal ECG  When compared with ECG of 27-AUG-2016 23:16,  Inverted T waves have replaced nonspecific T wave abnormality in Anterior  leads  QT has lengthened    Referred By:  OTF           Confirmed By:Roc Hudson MD          Orders (last 24 hrs)     Start     Ordered    02/02/17 0600  Protime-INR  Morning Draw      02/01/17 1016    02/01/17 2326  Record actual height and weight prior to procedure.  Once      02/01/17 2325 02/01/17 2326  Confirm NPO Status.  Until Discontinued      02/01/17  2325 02/01/17 2326  CPAP or BiPAP as indicated per respiratory protocol  Until Discontinued      02/01/17 2325 02/01/17 2326  No telemetry pads or tape over implantation site.  Until Discontinued      02/01/17 2325 02/01/17 2325  ondansetron (ZOFRAN) injection 4 mg  Every 6 Hours PRN      02/01/17 2325    02/01/17 2008  POC Glucose Fingerstick  Once      02/01/17 2007    02/01/17 1547  POC Glucose Fingerstick  Once      02/01/17 1546    02/01/17 1339  Case Request Cath Lab: Pacemaker DC new  Once      02/01/17 1340    02/01/17 1159  Adult Transthoracic Echo Complete  Once      02/01/17 1017    02/01/17 1124  POC Glucose Fingerstick  Once      02/01/17 1123    02/01/17 1018  Diet Regular; Cardiac, Consistent Carbohydrate  Diet Effective Now      02/01/17 1017    02/01/17 1018  Adult Transthoracic Echo Complete With Contrast  Once,   Status:  Canceled      02/01/17 1017    02/01/17 0917  POC Glucose Fingerstick  Once      02/01/17 0916    02/01/17 0900  aspirin EC tablet 81 mg  Daily      01/31/17 2320 02/01/17 0900  pantoprazole (PROTONIX) EC tablet 40 mg  Daily      01/31/17 2320 02/01/17 0900  polyethyl glycol-propyl glycol (SYSTANE) 0.4-0.3 % ophthalmic solution 1 drop  Daily      01/31/17 2320 02/01/17 0900  spironolactone (ALDACTONE) tablet 25 mg  Daily      01/31/17 2320 02/01/17 0900  venlafaxine (EFFEXOR) tablet 37.5 mg  Daily      01/31/17 2320 02/01/17 0700  POC Glucose Fingerstick  4 Times Daily Before Meals & at Bedtime      01/31/17 2323 02/01/17 0700  insulin detemir (LEVEMIR) injection 70 Units  Every Morning      01/31/17 2323 02/01/17 0600  POC Glucose Fingerstick  Daily      01/31/17 2323 02/01/17 0000  insulin lispro (humaLOG) injection 4-24 Units  4 Times Daily Before Meals & Nightly      01/31/17 2323 01/31/17 2330  atorvastatin (LIPITOR) tablet 40 mg  Nightly      01/31/17 2320 01/31/17 2321  dextrose (GLUTOSE) oral gel 15 g  Every 15 Minutes PRN       01/31/17 2323    01/31/17 2321  dextrose (D50W) solution 25 g  Every 15 Minutes PRN      01/31/17 2323    01/31/17 2321  glucagon (human recombinant) (GLUCAGEN DIAGNOSTIC) injection 1 mg  Every 15 Minutes PRN      01/31/17 2323    01/31/17 2319  pneumococcal polysaccharide 23-valent (PNEUMOVAX-23) vaccine 0.5 mL  During Hospitalization      01/31/17 2319 01/31/17 2314  ondansetron (ZOFRAN) injection 4 mg  Every 6 Hours PRN      01/31/17 2320 01/31/17 2314  bisacodyl (DULCOLAX) EC tablet 5 mg  Daily PRN      01/31/17 2320 01/31/17 2314  aluminum-magnesium hydroxide-simethicone (MAALOX/MYLANTA) suspension 30 mL  Every 6 Hours PRN      01/31/17 2320 01/31/17 2314  acetaminophen (TYLENOL) tablet 650 mg  Every 4 Hours PRN      01/31/17 2320 01/31/17 2312  sodium chloride 0.9 % flush 1-10 mL  As Needed      01/31/17 2320 01/31/17 1743  sodium chloride 0.9 % flush 10 mL  As Needed      01/31/17 1744    Unscheduled  Oxygen Therapy- Nasal Cannula; 2 LPM; Titrate for spO2: equal to or greater than, 92%  As Needed      01/31/17 1744    Unscheduled  Have patient void prior to procedure.  As Needed      02/01/17 2325    Unscheduled  Oxygen Therapy- Nasal Cannula; 2 LPM; Titrate for spO2: 92%  As Needed      02/01/17 2325    --  atenolol (TENORMIN) 25 MG tablet  Daily      01/31/17 1759    --  insulin aspart (novoLOG) 100 UNIT/ML injection  3 Times Daily Before Meals      01/31/17 1759    --  NON FORMULARY  2 Times Daily      01/31/17 2231    --  polyethyl glycol-propyl glycol (SYSTANE) 0.4-0.3 % solution ophthalmic solution  Daily      01/31/17 2231        [facility] CARDIOLOGY  [address] [place] [id number] [phone]             Lakesha Costello   Acquired echocardiogram 2D complete   Order# [id number]   Reading physician: Ehsan Crocker MD Ordering physician: FACUNDO Salazar Study date: 2/1/17   Patient Information   Name MRN Description   Lakesha Costello [id number] 81 y.o.  "Female   Interpretation Summary   · Left atrial cavity size is mild-to-moderately dilated.  · Left ventricular diastolic dysfunction (grade III) consistent with reversible restrictive pattern.  · Severe aortic valve stenosis is present.  · Right ventricular cavity is moderately dilated.  · Moderately reduced right ventricular systolic function noted.       Patient Height & Weight   Height Weight BSA (Calculated - sq m) BMI (kg/m2) Pulse   60\" (152.4 cm) 134 lb 4.8 oz (60.9 kg) 1.58 sq meters 26.48 48   Patient Vitals   BP Pulse   157/42 48   Reason For Exam   Murmur or Click; Known Valve Disease - Moderate to severe AS per 8/2016 Echo   Cardiac History   Diagnosis Date Comment   CAD (coronary artery disease)     Diabetes mellitus  Type II   Hypertension     Study Description   2D echo was performed with color flow and doppler. The study is technically difficult for diagnosis.   Echocardiogram Findings   Left Ventricle  Calculated EF = 65.3%. Left ventricular diastolic dysfunction is noted (grade III w/high LAP) consistent with reversible restrictive pattern.   Right Ventricle  Right ventricular cavity is moderately dilated. Moderately reduced systolic function noted.   Left Atrium  Left atrial cavity size is mild-to-moderately dilated.   Right Atrium  Normal right atrial size noted.   Aortic Valve  The aortic valve is abnormal in structure. No aortic valve regurgitation is present. Severe aortic valve stenosis is present.   Mitral Valve  The mitral valve is abnormal in structure. Moderate mitral annular calcification is present. Trace mitral valve regurgitation is present. No significant mitral valve stenosis is present.   Tricuspid Valve  The tricuspid valve is grossly normal. Trace tricuspid valve regurgitation is present. Estimated right ventricular systolic pressure from tricuspid regurgitation is mildly elevated (35-45 mmHg). Mild pulmonary hypertension is present.   Pulmonic Valve  The pulmonic valve is " grossly normal in structure. There is no significant pulmonic valve stenosis present. There is trace pulmonic valve regurgitation present.   Greater Vessels  No dilation of the aortic root is present. No dilation of the sinuses of Valsalva is present.   Pericardium  There is no evidence of pericardial effusion.      LV Measurements   Dimensions   LVIDd 4.1 cm      LVIDs 2.7 cm      IVSd 0.92 cm      LVPWd 1.0 cm      FS 32.8 %      IVS/LVPW 0.9       ESV(cubed) 20.1 ml      EDV(cubed) 66.4 ml      LVOT area 3.1 cm^2      Diastolic Filling   MV E/A 3.4       MV E max mau 158.0 cm/sec      E/E' ratio 36       MV A max mau 45.9 cm/sec      Lat Peak E' Mau 5.4 cm/sec      MV dec time 0.25 sec       Dimensions   LV mass(C)d 123.9 grams      LVOT diam 2.0 cm      EDV(MOD-sp4) 53.3 ml      ESV(MOD-sp4) 18.5 ml      EF(MOD-sp4) 65.3 %      SV(MOD-sp4) 34.8 ml      Shunt Ratio   SV(LVOT) 70.4 ml         LA Measurements   Measurements   LA dimension 3.7 cm      LA volume 60.0 cm3      LA/Ao 1.2          Aortic Valve Measurements   Stenosis   LVOT diam 2.0 cm      LV V1 max 85.8 cm/sec      LV V1 max PG 2.9 mmHg      LV V1 mean PG 2.0 mmHg      LV V1 VTI 22.4 cm      Ao pk mau 347.0 cm/sec       Stenosis   Ao max PG (full) 45.2 mmHg      Ao mean PG 26.0 mmHg      Ao V2 .0 cm      CHELSEA(I,D) 0.68 cm^2         Mitral Valve Measurements   Stenosis   MV dec time 0.25 sec       Regurgitation   MR max mau 469.0 cm/sec      MR max PG 88.0 mmHg         Tricuspid Valve Measurements   TR max mau 308.0 cm/sec      RVSP(TR) 42.9 mmHg      RAP systole 5.0 mmHg      TR max mau 308.0 cm/sec         Pulmonic Valve Measurements   PA V2 max 99.1 cm/sec      PA max PG 3.9 mmHg      PI end-d mau 149.0 cm/sec         Greater Vessels Measurements   Ao root diam 3.0 cm         Study Information   Physician Technologist Supporting Staff    [facility]    PACS Images   Show images for Adult Transthoracic Echo Complete   Signed   Electronically  signed by Ehsan Crocker MD on 2/1/17 at 1254 CST   Printable Result Report   Result Report    Encounter   View Encounter      Results Routing Tracking   View Results Routing Information   Adult Transthoracic Echo Complete [ECH10] (Order [id number])   Echocardiography   Date: 2/1/2017 Department: [address] Released By: Elias Ortiz Authorizing: FACUNDO Salazar   Procedure Abnormality Status   Adult Transthoracic Echo Complete     Order History   Inpatient   Date/Time Action Taken User Additional Information   02/01/17 1157 Result Interface, Rad Results Venetie IRA In In process   02/01/17 1158 Result Elias Ortiz In process   02/01/17 1158 Release Elias Ortiz From Order: [id number]   02/01/17 1158 Result Elias Ortiz In process   02/01/17 1249 Result Ehsan Crocker MD Final   Order Details   Frequency Duration Priority Order Class   Once 1  occurrence Routine Hospital Performed   Start Date/Time   Start Date Start Time   02/01/17 1159   Order Questions   Question Answer Comment   Where should test be performed? Department    Reason for exam? Murmur or Click    Murmur or Click specification? Known Valve Disease Moderate to severe AS per 8/2016 Echo   Procedures Performed   Code Procedure Name   ECH26 ACQUIRED ECHOCARDIOGRAM 2D COMPLETE   Verbal Order Info   Action Created on Order Mode Entered by Responsible Provider Signed by Signed on   Ordering 02/01/17 1158 Per protocol: cosign required Elias Ortiz  Signature Not Required    Cosign Order Info   Action Created on Responsible Provider Signed by Signed on   Ordering 02/01/17 1158 Cami Toure, FACUNDO Toure, FACUNDO 02/01/17 1326   Acknowledgement Info   For At Acknowledged By Acknowledged On   Placing Order 02/01/17 1017 Elias Ortiz 02/01/17 1158   Reprint Order Requisition   Adult Transthoracic Echo Complete (Order #[id number]) on 2/1/17   Encounter-Level Cardiology Documents:   There are no encounter-level cardiology documents.   Encounter    View Encounter   Appointments for this Order   2/1/2017 11:30 AM - 45 min PAD ECHO ROOM 1 (Resource) [facility]   Order Provider Info       Office phone Pager E-mail   Ordering User Elias Ortiz -- -- --   Authorizing Provider FACUNDO Salazar [phone] -- --   Attending Provider Ehsan Crocker [phone] -- --   Billing Provider Ehsan Crocker [phone] -- --   Linked Charges   Charge Code Quantity Modifiers Diagnosis   HC ECHO 2D COMP W SPEC COLR DOPL ADULT  02862 1      OR ECHO HEART XTHORACIC,COMPLETE W DOPPLER  25057 1      OR ECHO HEART XTHORACIC,COMPLETE W DOPPLER  41960 1      Order Transmittal Tracking   Adult Transthoracic Echo Complete (Order #[id number]) on 2/1/17   Authorized by: FACUNDO Salazar (NPI: [id number])       Reviewed By List   FACUNDO Salazar on 2/1/2017  1:26 PM     Results   ECG 12 Lead (Order [id number])   Order-Level Documents:   Scan on 1/31/2017 5:12 PM : ECG 12-LEADScan on 1/31/2017 5:12 PM : ECG 12-LEAD   Order Questions   Question Answer Comment   Reason for Exam: bradycardia    Time ECG Complete 5:13 PM    Note:  Enter the time the ECG was completed   Date ECG Complete 1/31/2017    Note:  Enter the date the ECG was completed on   Given to physician date 1/31/2017    ECG 12 Lead   Order: [id number]   Status:  Final result   Visible to patient:  No (Not Released)   Next appt:  02/24/2017 at 09:30 AM in Cardiology (Ehsan Crocker MD)   Narrative   Test Reason : bradycardia  Blood Pressure : **/** mmHG  Vent. Rate : 038 BPM     Atrial Rate : 038 BPM     P-R Int : 000 ms          QRS Dur : 108 ms      QT Int : 610 ms       P-R-T Axes : 000 -59 012 degrees     QTc Int : 484 ms    Junctional bradycardia  Left axis deviation  Anterior infarct (cited on or before 31-JAN-2017)  Abnormal ECG  When compared with ECG of 27-AUG-2016 23:16,  Inverted T waves have replaced nonspecific T wave abnormality in Anterior  leads  QT has lengthened    Referred By:   WELLS           Confirmed By:Roc Hudson MD      Specimen Collected: 01/31/17  5:12 PM Last Resulted: 02/02/17 12:17 AM                Scans on Order [id number]   Scan on 1/31/2017  5:12 PM : ECG 12-LEADScan on 1/31/2017  5:12 PM : ECG 12-LEAD               Signed   Electronically signed by Roc Hudson MD on 2/2/17 at 0017 CST   Order History   Inpatient   Date/Time Action Taken User Additional Information   01/31/17 1706 Release Marce Kothari RN (auto-released) From Order: [id number]   01/31/17 1713 Result Interface, Ekg Results In Preliminary   01/31/17 1727 Complete Marce Kothari RN    02/02/17 0017 Result Interface, Ekg Results In Final   ED Provider Notes   Author Status Last  Updated Created   Sherif Early Jr, MD Signed Sherif Early Jr, MD 2/1/2017  5:29 AM 1/31/2017  5:58 PM   Expand All Collapse All    Subjective     HPI Comments: Patient started feeling SOB 2 days ago. Had fallen and hit right chest on bed table and pain there. Noted slow pulse couple of days ago. Used to be on atenolol and was supposed to have been stopped but daughter looked through meds and found 50 BID in meds and they stopped it 2 days ago. Also not taking amiodorone any more for same reason.     Patient is a 81 y.o. female presenting with palpitations.   History provided by: Patient and relative   used: No   Palpitations   Palpitations quality: Slow  Onset quality: Gradual  Duration: 2 days  Timing: Constant  Progression: Unchanged  Chronicity: Recurrent  Context: not anxiety, not appetite suppressants, not blood loss, not bronchodilators, not caffeine, not dehydration, not exercise, not hyperventilation, not illicit drugs, not nicotine and not stimulant use   Relieved by: Nothing  Worsened by: Nothing  Ineffective treatments: None tried  Associated symptoms: shortness of breath   Associated symptoms: no back pain, no chest pain, no chest pressure, no cough, no diaphoresis, no dizziness, no  hemoptysis, no leg pain, no lower extremity edema, no malaise/fatigue, no nausea, no near-syncope, no numbness, no orthopnea, no PND, no syncope, no vomiting and no weakness   Risk factors: hx of atrial fibrillation         Review of Systems   Constitutional: Negative. Negative for diaphoresis and malaise/fatigue.   HENT: Negative.   Respiratory: Positive for shortness of breath. Negative for cough and hemoptysis.   Cardiovascular: Positive for palpitations. Negative for chest pain, orthopnea, syncope, PND and near-syncope.   Gastrointestinal: Negative. Negative for nausea and vomiting.   Genitourinary: Negative.   Musculoskeletal: Negative. Negative for back pain.   Neurological: Negative for dizziness, weakness and numbness.   Hematological: Negative.   All other systems reviewed and are negative.         Medical History         Past Medical History   Diagnosis Date   • Aortic stenosis     • Atrial fibrillation by electrocardiogram     • Atrial flutter     • Breast cancer     • CAD (coronary artery disease)     • Chronic anticoagulation     • Diabetes mellitus     • Hypertension                    Allergies   Allergen Reactions   • Dilaudid [Hydromorphone Hcl]     • Dilaudid [Hydromorphone] Nausea And Vomiting   • Enalapril Angioedema   • Kombiglyze [Saxagliptin-Metformin Er]     • Lopid [Gemfibrozil] Nausea And Vomiting   • Sulfa Antibiotics Other (See Comments)       Syncope   • Ultram [Tramadol] Itching          Surgical History    Past Surgical History   Procedure Laterality Date   • Mastectomy       • Hysterectomy       • Cholecystectomy       • Coronary artery bypass graft                No family history on file.      Social History    Social History            Social History   • Marital status:        Spouse name: N/A   • Number of children: N/A   • Years of education: N/A           Social History Main Topics   • Smoking status: Never Smoker   • Smokeless tobacco: Not on file   • Alcohol use No   •  Drug use: Not on file   • Sexual activity: Not on file           Other Topics Concern   • Not on file          Social History Narrative   • No narrative on file                    Prior to Admission medications    Medication Sig Start Date End Date Taking? Authorizing Provider   venlafaxine (EFFEXOR) 37.5 MG tablet Take 37.5 mg by mouth Daily.     Yes Historical Provider, MD   amiodarone (PACERONE) 100 MG tablet Take 100 mg by mouth Daily.       Historical Provider, MD   aspirin 81 MG EC tablet Take 81 mg by mouth Daily.       Historical Provider, MD   calcium carbonate (OS-MICHAEL) 600 MG tablet Take 600 mg by mouth 2 (Two) Times a Day With Meals.       Historical Provider, MD   cloNIDine (CATAPRES) 0.1 MG tablet Take 0.1 mg by mouth Every 8 (Eight) Hours.       Historical Provider, MD   glipiZIDE (GLUCOTROL) 5 MG tablet Take 5 mg by mouth 2 (Two) Times a Day Before Meals.       Historical Provider, MD   insulin detemir (LEVEMIR) 100 UNIT/ML injection Inject under the skin Daily.       Historical Provider, MD   magnesium oxide (MAGOX) 400 (241.3 MG) MG tablet tablet Take 400 mg by mouth Daily.       Historical Provider, MD   niacin 500 MG tablet Take 500 mg by mouth Every Night.       Historical Provider, MD   pantoprazole (PROTONIX) 40 MG EC tablet Take 40 mg by mouth Daily.       Historical Provider, MD   simvastatin (ZOCOR) 80 MG tablet Take 80 mg by mouth Every Night.       Historical Provider, MD   spironolactone (ALDACTONE) 25 MG tablet Take 25 mg by mouth Daily.       Historical Provider, MD   vitamin B-12 (CYANOCOBALAMIN) 500 MCG tablet Take 500 mcg by mouth Daily.       Historical Provider, MD   vitamin E 100 UNIT capsule Take 100 Units by mouth Daily.       Historical Provider, MD   warfarin (COUMADIN) 2 MG tablet Take 1 tablet by mouth Daily. 1/18/17     Ehsan Crocker MD         Medications   sodium chloride 0.9 % flush 10 mL (not administered)   pneumococcal polysaccharide 23-valent (PNEUMOVAX-23) vaccine  0.5 mL (not administered)   aspirin EC tablet 81 mg (not administered)   pantoprazole (PROTONIX) EC tablet 40 mg (not administered)   polyethyl glycol-propyl glycol (SYSTANE) 0.4-0.3 % ophthalmic solution 1 drop (not administered)   atorvastatin (LIPITOR) tablet 40 mg (40 mg Oral Given 2/1/17 0024)   spironolactone (ALDACTONE) tablet 25 mg (not administered)   venlafaxine (EFFEXOR) tablet 37.5 mg (not administered)   sodium chloride 0.9 % flush 1-10 mL (not administered)   acetaminophen (TYLENOL) tablet 650 mg (not administered)   aluminum-magnesium hydroxide-simethicone (MAALOX/MYLANTA) suspension 30 mL (not administered)   bisacodyl (DULCOLAX) EC tablet 5 mg (not administered)   ondansetron (ZOFRAN) injection 4 mg (not administered)   dextrose (GLUTOSE) oral gel 15 g (not administered)   dextrose (D50W) solution 25 g (not administered)   glucagon (human recombinant) (GLUCAGEN DIAGNOSTIC) injection 1 mg (not administered)   insulin detemir (LEVEMIR) injection 70 Units (not administered)   insulin lispro (humaLOG) injection 4-24 Units (20 Units Subcutaneous Given 2/1/17 0023)             Vitals:     01/31/17 2100   BP: 150/69   Pulse: 81   Resp: 20   Temp: 96.8 °F (36 °C)   SpO2: 95%            Objective     Physical Exam   Constitutional: She is oriented to person, place, and time. She appears well-developed and well-nourished.   HENT:   Head: Normocephalic and atraumatic.   Eyes: Pupils are equal, round, and reactive to light.   Neck: Normal range of motion. Neck supple.   Cardiovascular: Normal rate and regular rhythm.   Pulmonary/Chest: Effort normal and breath sounds normal.   Abdominal: Soft. Bowel sounds are normal.   Musculoskeletal: Normal range of motion.   Neurological: She is alert and oriented to person, place, and time.   Skin: Skin is warm.   Psychiatric: She has a normal mood and affect. Her behavior is normal.   Nursing note and vitals reviewed.        Procedures           Lab Results (last 24  hours)     Procedure Component Value Units Date/Time             POC Glucose Fingerstick [[id number]] (Abnormal) Collected: 01/31/17 1750     Specimen: Blood Updated: 01/31/17 1753       Glucose 193 (A) mg/dL       POC Glucose Fingerstick [[id number]] (Abnormal) Collected: 01/31/17 1750     Specimen: Blood Updated: 01/31/17 1802       Glucose 193 (H) mg/dL           : [id number] Mauro FriedmanylaMeter ID: [id number]         CBC & Differential [[id number]] Collected: 01/31/17 1758     Specimen: Blood Updated: 01/31/17 1819     Narrative:       The following orders were created for panel order CBC & Differential.  Procedure Abnormality Status   --------- ----------- ------   CBC Auto Differential[[id number]] Abnormal Final result      Please view results for these tests on the individual orders.     Comprehensive Metabolic Panel [[id number]] (Abnormal) Collected: 01/31/17 1758     Specimen: Blood Updated: 01/31/17 1947       Glucose 163 (H) mg/dL         BUN 35 (H) mg/dL         Creatinine 1.50 (H) mg/dL         Sodium 136 mmol/L         Potassium 4.5 mmol/L         Chloride 100 mmol/L         CO2 26.0 mmol/L         Calcium 9.0 mg/dL         Total Protein 7.3 g/dL         Albumin 3.80 g/dL         ALT (SGPT) 41 U/L         AST (SGOT) 30 U/L         Alkaline Phosphatase 85 U/L         Total Bilirubin 0.7 mg/dL         eGFR Non African Amer 33 (L) mL/min/1.73         Globulin 3.5 gm/dL         A/G Ratio 1.1 g/dL         BUN/Creatinine Ratio 23.3         Anion Gap 10.0 mmol/L       Narrative:       The MDRD GFR formula is only valid for adults with stable renal function between ages 18 and 70.     Protime-INR [[id number]] (Abnormal) Collected: 01/31/17 1758     Specimen: Blood Updated: 01/31/17 1835       Protime 28.7 (H) Seconds         INR 2.59 (H)       TSH [[id number]] (Normal) Collected: 01/31/17 1758     Specimen: Blood Updated: 01/31/17 1926       TSH 1.520 mIU/mL       Magnesium [[id number]] (Normal) Collected: 01/31/17 4065      Specimen: Blood Updated: 01/31/17 1947       Magnesium 2.1 mg/dL       CBC Auto Differential [[id number]] (Abnormal) Collected: 01/31/17 1758     Specimen: Blood Updated: 01/31/17 1819       WBC 8.97 10*3/mm3         RBC 3.64 (L) 10*6/mm3         Hemoglobin 10.2 (L) g/dL         Hematocrit 31.5 (L) %         MCV 86.5 fL         MCH 28.0 pg         MCHC 32.4 (L) g/dL         RDW 14.2 %         RDW-SD 44.6 fl         MPV 12.1 (H) fL         Platelets 305 10*3/mm3         Neutrophil % 64.9 %         Lymphocyte % 20.6 %         Monocyte % 11.9 %         Eosinophil % 1.9 %         Basophil % 0.3 %         Immature Grans % 0.4 %         Neutrophils, Absolute 5.81 10*3/mm3         Lymphocytes, Absolute 1.85 10*3/mm3         Monocytes, Absolute 1.07 10*3/mm3         Eosinophils, Absolute 0.17 10*3/mm3         Basophils, Absolute 0.03 10*3/mm3         Immature Grans, Absolute 0.04 (H) 10*3/mm3       POC Troponin, Rapid [[id number]] (Normal) Collected: 01/31/17 1807     Specimen: Blood Updated: 01/31/17 1820       Troponin I 0.00 ng/mL           Serial Number: [id number] : [id number]         POC Glucose Fingerstick [[id number]] (Abnormal) Collected: 02/01/17 0017     Specimen: Blood Updated: 02/01/17 0029       Glucose 359 (H) mg/dL           [facility] [id number] [facility] [id number]         Basic Metabolic Panel [[id number]] (Abnormal) Collected: 02/01/17 0302     Specimen: Blood Updated: 02/01/17 0420       Glucose 350 (H) mg/dL         BUN 32 (H) mg/dL         Creatinine 1.37 mg/dL         Sodium 137 mmol/L         Potassium 4.4 mmol/L         Chloride 102 mmol/L         CO2 26.0 mmol/L         Calcium 9.2 mg/dL         eGFR Non African Amer 37 (L) mL/min/1.73         BUN/Creatinine Ratio 23.4         Anion Gap 9.0 mmol/L       Narrative:       The MDRD GFR formula is only valid for adults with stable renal function between ages 18 and 70.     Protime-INR [[id number]] (Abnormal) Collected: 02/01/17 0302     Specimen:  Blood Updated: 02/01/17 0415       Protime 27.9 (H) Seconds         INR 2.50 (H)       Hemoglobin A1c [[id number]] Collected: 02/01/17 0302     Specimen: Blood Updated: 02/01/17 0401     TSH [[id number]] (Normal) Collected: 02/01/17 0302     Specimen: Blood Updated: 02/01/17 0448       TSH 1.250 mIU/mL       Lipid Panel [[id number]] (Abnormal) Collected: 02/01/17 0302     Specimen: Blood Updated: 02/01/17 0430       Total Cholesterol 110 (L) mg/dL         Triglycerides 211 (H) mg/dL         HDL Cholesterol 25 (L) mg/dL         LDL Cholesterol  55 mg/dL         LDL/HDL Ratio 1.71              XR Chest 1 View   Final Result   1. Moderate cardiomegaly without evidence of acute cardiopulmonary   process.             This report was finalized on 01/31/2017 18:06 by Dr. Luther Farias MD.             ED Course        ED Course    Comment By Time   Turned over to Dr. Dominguez at shift change. Sherif Early Jr., MD 02/01 0529            MDM  Number of Diagnoses or Management Options  Bradycardia with 31-40 beats per minute:   Hypoxia:   Sinus node dysfunction:   Weakness:         Final diagnoses:   Bradycardia with 31-40 beats per minute   Weakness   Sinus node dysfunction   Hypoxia      Sherif Early Jr., MD  02/01/17 0529         Author Status Last  Updated Created   Agapito Dominguez MD Signed Agapito Dominguez MD 1/31/2017  8:05 PM 1/31/2017  6:22 PM   Expand All Collapse All    Subjective     History of Present Illness     Review of Systems      Medical History         Past Medical History   Diagnosis Date   • Aortic stenosis     • Atrial fibrillation by electrocardiogram     • Atrial flutter     • Breast cancer     • CAD (coronary artery disease)     • Chronic anticoagulation     • Diabetes mellitus     • Hypertension                    Allergies   Allergen Reactions   • Dilaudid [Hydromorphone Hcl]     • Dilaudid [Hydromorphone] Nausea And Vomiting   • Enalapril Angioedema   • Kombiglyze [Saxagliptin-Metformin  Er]     • Lopid [Gemfibrozil] Nausea And Vomiting   • Sulfa Antibiotics Other (See Comments)       Syncope   • Ultram [Tramadol] Itching          Surgical History          Past Surgical History   Procedure Laterality Date   • Mastectomy       • Hysterectomy       • Cholecystectomy       • Coronary artery bypass graft                No family history on file.      Social History    Social History            Social History   • Marital status:        Spouse name: N/A   • Number of children: N/A   • Years of education: N/A           Social History Main Topics   • Smoking status: Never Smoker   • Smokeless tobacco: Not on file   • Alcohol use No   • Drug use: Not on file   • Sexual activity: Not on file           Other Topics Concern   • Not on file          Social History Narrative   • No narrative on file                  Objective     Physical Exam   Constitutional: No distress.   Cardiovascular: Regular rhythm. No extrasystoles are present. Bradycardia present.   Pulmonary/Chest: Effort normal and breath sounds normal.   Skin: She is not diaphoretic. There is pallor.   Psychiatric: She has a normal mood and affect.   Nursing note and vitals reviewed.        Procedures           ED Course  ED Course            Labs Reviewed   COMPREHENSIVE METABOLIC PANEL - Abnormal; Notable for the following:    Result Value      Glucose 163 (*)       BUN 35 (*)       Creatinine 1.50 (*)       eGFR Non  Amer 33 (*)       All other components within normal limits     Narrative:      The MDRD GFR formula is only valid for adults with stable renal function between ages 18 and 70.   PROTIME-INR - Abnormal; Notable for the following:      Protime 28.7 (*)       INR 2.59 (*)       All other components within normal limits   CBC WITH AUTO DIFFERENTIAL - Abnormal; Notable for the following:      RBC 3.64 (*)       Hemoglobin 10.2 (*)       Hematocrit 31.5 (*)       MCHC 32.4 (*)       MPV 12.1 (*)       Immature Grans, Absolute  0.04 (*)       All other components within normal limits   POCT GLUCOSE FINGERSTICK - Abnormal; Notable for the following:      Glucose 193 (*)       All other components within normal limits   POCT GLUCOSE FINGERSTICK - Abnormal; Notable for the following:      Glucose 193 (*)       All other components within normal limits   TSH - Normal   MAGNESIUM - Normal   POCT TROPONIN I, RAPID - Normal   POCT TROPONIN I, RAPID   CBC AND DIFFERENTIAL     Narrative:      The following orders were created for panel order CBC & Differential.  Procedure Abnormality Status   --------- ----------- ------   CBC Auto Differential[[id number]] Abnormal Final result      Please view results for these tests on the individual orders.                      MDM  Number of Diagnoses or Management Options  Bradycardia with 31-40 beats per minute: new and requires workup  Sinus node dysfunction: new and requires workup  Weakness: established and worsening  Amount and/or Complexity of Data Reviewed  Clinical lab tests: ordered and reviewed  Tests in the radiology section of CPT®: ordered and reviewed  Decide to obtain previous medical records or to obtain history from someone other than the patient: yes  Obtain history from someone other than the patient: yes  Review and summarize past medical records: yes  Discuss the patient with other providers: yes  Independent visualization of images, tracings, or specimens: yes  Risk of Complications, Morbidity, and/or Mortality  Presenting problems: high  Diagnostic procedures: high  Management options: high   Patient Progress  Patient progress: stable        Final diagnoses:   Bradycardia with 31-40 beats per minute   Weakness   Sinus node dysfunction            Agapito Dominguez MD  01/31/17 2005         Routing History   Priority Sent On From To Message Type    2/1/2017 12:54 PM MD Cami Bloom APRN Results   Results Routing Tracking   View Results Routing Information   ECG 12 Lead  [ECG1] (Order [id number])   ECG   Date: 1/31/2017 Department: [address] Released By: Marce Kothrai RN (auto-released) Authorizing: Sherif Early Jr., MD   Order-Level Documents:   Scan on 1/31/2017 5:12 PM : ECG 12-LEADScan on 1/31/2017 5:12 PM : ECG 12-LEAD   Order History   Inpatient   Date/Time Action Taken User Additional Information   01/31/17 1706 Release Marce Kothari RN (auto-released) From Order: [id number]   01/31/17 1713 Result Interface, Ekg Results In Preliminary   01/31/17 1727 Complete Marce Kothari RN    02/02/17 0017 Result Interface, Ekg Results In Final   Order Details   Frequency Duration Priority Order Class   Once 1  occurrence STAT ED Performed   Start Date/Time   Start Date Start Time   01/31/17 1706   Order Questions   Question Answer Comment   Reason for Exam: bradycardia    Time ECG Complete 5:13 PM    Note:  Enter the time the ECG was completed   Date ECG Complete 1/31/2017    Note:  Enter the date the ECG was completed on   Given to physician date 1/31/2017    Verbal Order Info   Action Created on Order Mode Entered by Responsible Provider Signed by Signed on   Ordering 01/31/17 1706 Per protocol: cosign required Marce Kothari RN  Signature Not Required    Cosign Order Info   Action Created on Responsible Provider Signed by Signed on   Ordering 01/31/17 1706 MD Sherif Vanessa Jr., Jr., MD 02/01/17 0513   Completion Info   User Date/Time   Marce Kothari RN 01/31/17 1727   Acknowledgement Info   For At Acknowledged By Acknowledged On   Placing Order 01/31/17 1706 Marce Kothari RN 01/31/17 1727   Reprint Order Requisition   ECG 12 Lead (Order #[id number]) on 1/31/17   Encounter-Level Cardiology Documents:   There are no encounter-level cardiology documents.   Encounter   View Encounter   Order Provider Info       Office phone Pager E-mail   Ordering User Marce Kothari RN -- -- --   Authorizing Provider Sherif Early Jr., MD  [phone] -- --   Attending Provider Ehsan Crocker [phone] -- --   Billing Provider Triage Protocol Emergency, [phone] -- --   Linked Charges   Charge Code Quantity Modifiers Diagnosis   [facility] EKG 12 LEAD  67452 1      [place] ELECTROCARDIOGRAM, COMPLETE  25154 1      Order Transmittal Tracking   ECG 12 Lead (Order #[id number]) on 1/31/17   Authorized by: Sherif Early Jr, MD (NPI: [id number])

## 2017-02-03 ENCOUNTER — APPOINTMENT (OUTPATIENT)
Dept: GENERAL RADIOLOGY | Facility: HOSPITAL | Age: 82
End: 2017-02-03

## 2017-02-03 ENCOUNTER — ANTICOAGULATION VISIT (OUTPATIENT)
Dept: CARDIOLOGY | Facility: CLINIC | Age: 82
End: 2017-02-03

## 2017-02-03 LAB
GLUCOSE BLDC GLUCOMTR-MCNC: 122 MG/DL (ref 70–130)
GLUCOSE BLDC GLUCOMTR-MCNC: 218 MG/DL (ref 70–130)
GLUCOSE BLDC GLUCOMTR-MCNC: 221 MG/DL (ref 70–130)
INR PPP: 1.72 (ref 0.91–1.09)
PROTHROMBIN TIME: 20.8 SECONDS (ref 11.9–14.6)

## 2017-02-03 PROCEDURE — 25010000003 CEFAZOLIN PER 500 MG: Performed by: INTERNAL MEDICINE

## 2017-02-03 PROCEDURE — 02HK3JZ INSERTION OF PACEMAKER LEAD INTO RIGHT VENTRICLE, PERCUTANEOUS APPROACH: ICD-10-PCS | Performed by: INTERNAL MEDICINE

## 2017-02-03 PROCEDURE — C1898 LEAD, PMKR, OTHER THAN TRANS: HCPCS | Performed by: INTERNAL MEDICINE

## 2017-02-03 PROCEDURE — 71010 HC CHEST PA OR AP: CPT

## 2017-02-03 PROCEDURE — 85610 PROTHROMBIN TIME: CPT | Performed by: INTERNAL MEDICINE

## 2017-02-03 PROCEDURE — C1785 PMKR, DUAL, RATE-RESP: HCPCS | Performed by: INTERNAL MEDICINE

## 2017-02-03 PROCEDURE — 25010000002 MIDAZOLAM PER 1 MG: Performed by: INTERNAL MEDICINE

## 2017-02-03 PROCEDURE — 0JH606Z INSERTION OF PACEMAKER, DUAL CHAMBER INTO CHEST SUBCUTANEOUS TISSUE AND FASCIA, OPEN APPROACH: ICD-10-PCS | Performed by: INTERNAL MEDICINE

## 2017-02-03 PROCEDURE — 82962 GLUCOSE BLOOD TEST: CPT

## 2017-02-03 PROCEDURE — C1892 INTRO/SHEATH,FIXED,PEEL-AWAY: HCPCS | Performed by: INTERNAL MEDICINE

## 2017-02-03 PROCEDURE — 25010000002 FENTANYL CITRATE (PF) 100 MCG/2ML SOLUTION: Performed by: INTERNAL MEDICINE

## 2017-02-03 PROCEDURE — 33208 INSRT HEART PM ATRIAL & VENT: CPT | Performed by: INTERNAL MEDICINE

## 2017-02-03 PROCEDURE — 02H73JZ INSERTION OF PACEMAKER LEAD INTO LEFT ATRIUM, PERCUTANEOUS APPROACH: ICD-10-PCS | Performed by: INTERNAL MEDICINE

## 2017-02-03 PROCEDURE — 93641 EP EVL 1/2CHMB PAC CVDFB TST: CPT | Performed by: INTERNAL MEDICINE

## 2017-02-03 DEVICE — IMPLANTABLE DEVICE: Type: IMPLANTABLE DEVICE | Status: FUNCTIONAL

## 2017-02-03 DEVICE — ENDOCARDIAL STEROID-ELUTING MR CONDITIONAL STYLET DELIVERED PACE/SENSE LEAD
Type: IMPLANTABLE DEVICE | Status: FUNCTIONAL
Brand: INGEVITY™ MRI

## 2017-02-03 DEVICE — PACEMAKER
Type: IMPLANTABLE DEVICE | Status: FUNCTIONAL
Brand: ESSENTIO™ MRI EL DR

## 2017-02-03 RX ORDER — SODIUM CHLORIDE 9 MG/ML
50 INJECTION, SOLUTION INTRAVENOUS CONTINUOUS
Status: DISCONTINUED | OUTPATIENT
Start: 2017-02-03 | End: 2017-02-04 | Stop reason: HOSPADM

## 2017-02-03 RX ORDER — LIDOCAINE HYDROCHLORIDE 20 MG/ML
INJECTION, SOLUTION INFILTRATION; PERINEURAL AS NEEDED
Status: DISCONTINUED | OUTPATIENT
Start: 2017-02-03 | End: 2017-02-03 | Stop reason: HOSPADM

## 2017-02-03 RX ORDER — NEOMYCIN AND POLYMYXIN B SULFATES 40; 200000 MG/ML; [USP'U]/ML
SOLUTION IRRIGATION AS NEEDED
Status: DISCONTINUED | OUTPATIENT
Start: 2017-02-03 | End: 2017-02-03 | Stop reason: HOSPADM

## 2017-02-03 RX ORDER — FENTANYL CITRATE 50 UG/ML
INJECTION, SOLUTION INTRAMUSCULAR; INTRAVENOUS AS NEEDED
Status: DISCONTINUED | OUTPATIENT
Start: 2017-02-03 | End: 2017-02-03 | Stop reason: HOSPADM

## 2017-02-03 RX ORDER — ONDANSETRON 2 MG/ML
4 INJECTION INTRAMUSCULAR; INTRAVENOUS EVERY 6 HOURS PRN
Status: DISCONTINUED | OUTPATIENT
Start: 2017-02-03 | End: 2017-02-04 | Stop reason: HOSPADM

## 2017-02-03 RX ORDER — ATENOLOL 25 MG/1
25 TABLET ORAL DAILY
Status: DISCONTINUED | OUTPATIENT
Start: 2017-02-03 | End: 2017-02-04 | Stop reason: HOSPADM

## 2017-02-03 RX ORDER — MIDAZOLAM HYDROCHLORIDE 1 MG/ML
INJECTION INTRAMUSCULAR; INTRAVENOUS AS NEEDED
Status: DISCONTINUED | OUTPATIENT
Start: 2017-02-03 | End: 2017-02-03 | Stop reason: HOSPADM

## 2017-02-03 RX ORDER — AMIODARONE HYDROCHLORIDE 200 MG/1
100 TABLET ORAL DAILY
Status: DISCONTINUED | OUTPATIENT
Start: 2017-02-03 | End: 2017-02-04 | Stop reason: HOSPADM

## 2017-02-03 RX ORDER — LIDOCAINE HYDROCHLORIDE 10 MG/ML
0.1 INJECTION, SOLUTION INFILTRATION; PERINEURAL ONCE AS NEEDED
Status: DISCONTINUED | OUTPATIENT
Start: 2017-02-03 | End: 2017-02-03 | Stop reason: HOSPADM

## 2017-02-03 RX ORDER — SODIUM CHLORIDE 0.9 % (FLUSH) 0.9 %
1-10 SYRINGE (ML) INJECTION AS NEEDED
Status: DISCONTINUED | OUTPATIENT
Start: 2017-02-03 | End: 2017-02-03 | Stop reason: HOSPADM

## 2017-02-03 RX ADMIN — SPIRONOLACTONE 25 MG: 25 TABLET ORAL at 09:43

## 2017-02-03 RX ADMIN — AMIODARONE HYDROCHLORIDE 100 MG: 200 TABLET ORAL at 18:12

## 2017-02-03 RX ADMIN — DESMOPRESSIN ACETATE 40 MG: 0.2 TABLET ORAL at 20:53

## 2017-02-03 RX ADMIN — CEFAZOLIN SODIUM 1 G: 1 INJECTION, SOLUTION INTRAVENOUS at 20:57

## 2017-02-03 RX ADMIN — INSULIN LISPRO 8 UNITS: 100 INJECTION, SOLUTION INTRAVENOUS; SUBCUTANEOUS at 18:11

## 2017-02-03 RX ADMIN — PANTOPRAZOLE SODIUM 40 MG: 40 TABLET, DELAYED RELEASE ORAL at 09:45

## 2017-02-03 RX ADMIN — ASPIRIN 81 MG: 81 TABLET ORAL at 09:44

## 2017-02-03 RX ADMIN — CEFAZOLIN 1 G: 1 INJECTION, POWDER, FOR SOLUTION INTRAMUSCULAR; INTRAVENOUS; PARENTERAL at 12:45

## 2017-02-03 RX ADMIN — ATENOLOL 25 MG: 25 TABLET ORAL at 18:11

## 2017-02-03 RX ADMIN — INSULIN LISPRO 8 UNITS: 100 INJECTION, SOLUTION INTRAVENOUS; SUBCUTANEOUS at 21:04

## 2017-02-03 RX ADMIN — SODIUM CHLORIDE 50 ML/HR: 9 INJECTION, SOLUTION INTRAVENOUS at 12:22

## 2017-02-03 RX ADMIN — ACETAMINOPHEN 650 MG: 325 TABLET ORAL at 20:53

## 2017-02-03 RX ADMIN — POLYETHYLENE GLYCOL AND PROPYLENE GLYCOL 1 DROP: 4; 3 SOLUTION/ DROPS OPHTHALMIC at 09:44

## 2017-02-03 RX ADMIN — VENLAFAXINE HYDROCHLORIDE 37.5 MG: 37.5 TABLET ORAL at 09:43

## 2017-02-03 NOTE — NURSING NOTE
Sling to left arm per order.  Instructions given to patient regarding use of left arm post pacemaker insertion

## 2017-02-03 NOTE — PROGRESS NOTES
Referring Provider: Ehsan Crocker MD    Length of Stay: 3    Chief Complaint:   Chief Complaint   Patient presents with   • Slow Heart Rate       Subjective: ready to go    Medications  Current Facility-Administered Medications   Medication Dose Route Frequency Provider Last Rate Last Dose   • [MAR Hold] acetaminophen (TYLENOL) tablet 650 mg  650 mg Oral Q4H PRN Uli Baker MD       • [MAR Hold] aluminum-magnesium hydroxide-simethicone (MAALOX/MYLANTA) suspension 30 mL  30 mL Oral Q6H PRN Uli Baker MD       • [MAR Hold] aspirin EC tablet 81 mg  81 mg Oral Daily Uli Baker MD   81 mg at 02/03/17 0944   • [MAR Hold] atorvastatin (LIPITOR) tablet 40 mg  40 mg Oral Nightly Uli Baker MD   40 mg at 02/02/17 2026   • [MAR Hold] bisacodyl (DULCOLAX) EC tablet 5 mg  5 mg Oral Daily PRN Uli Baker MD       • ceFAZolin (ANCEF) 1 g/100 mL 0.9% NS IVPB (mbp)  1 g Intravenous 60 Min Pre-Op FACUNDO Salazar       • [MAR Hold] dextrose (D50W) solution 25 g  25 g Intravenous Q15 Min PRN Uli Baker MD       • [MAR Hold] dextrose (GLUTOSE) oral gel 15 g  15 g Oral Q15 Min PRN Uli Baker MD       • [MAR Hold] glucagon (human recombinant) (GLUCAGEN DIAGNOSTIC) injection 1 mg  1 mg Subcutaneous Q15 Min PRN Uli Baker MD       • [MAR Hold] insulin detemir (LEVEMIR) injection 70 Units  70 Units Subcutaneous QAM Uli Baker MD   70 Units at 02/02/17 0832   • [MAR Hold] insulin lispro (humaLOG) injection 4-24 Units  4-24 Units Subcutaneous 4x Daily AC & at Bedtime Uli Baker MD   16 Units at 02/02/17 1747   • lidocaine (XYLOCAINE) 1 % injection 0.1 mL  0.1 mL Intradermal Once PRN FACUNDO Salazar       • [MAR Hold] ondansetron (ZOFRAN) injection 4 mg  4 mg Intravenous Q6H PRN Uli Bakre MD       • ondansetron (ZOFRAN) injection 4 mg  4 mg Intravenous Q6H PRN Ehsan Crocker MD       • [MAR Hold] pantoprazole (PROTONIX) EC tablet 40 mg  40 mg Oral Daily Uli Baker MD    40 mg at 02/03/17 0945   • [MAR Hold] pneumococcal polysaccharide 23-valent (PNEUMOVAX-23) vaccine 0.5 mL  0.5 mL Intramuscular During Hospitalization Ehsan Crocker MD       • [MAR Hold] polyethyl glycol-propyl glycol (SYSTANE) 0.4-0.3 % ophthalmic solution 1 drop  1 drop Right Eye Daily Uli Baker MD   1 drop at 02/03/17 0944   • [MAR Hold] sodium chloride 0.9 % flush 1-10 mL  1-10 mL Intravenous PRN Uli Baker MD       • sodium chloride 0.9 % flush 1-10 mL  1-10 mL Intravenous PRN Cami Toure APRN       • [MAR Hold] sodium chloride 0.9 % flush 10 mL  10 mL Intravenous PRN Sherif Early Jr., MD       • sodium chloride 0.9 % infusion  50 mL/hr Intravenous Continuous Cami Toure, APRN 50 mL/hr at 02/03/17 1222 50 mL/hr at 02/03/17 1222   • [MAR Hold] spironolactone (ALDACTONE) tablet 25 mg  25 mg Oral Daily Uli Baker MD   25 mg at 02/03/17 0943   • [MAR Hold] venlafaxine (EFFEXOR) tablet 37.5 mg  37.5 mg Oral Daily Uli Baker MD   37.5 mg at 02/03/17 0943       Past Medical History   Diagnosis Date   • Aortic stenosis    • Atrial fibrillation    • Atrial fibrillation by electrocardiogram    • Atrial flutter    • Breast cancer    • CAD (coronary artery disease)    • Chronic anticoagulation      Coumadin    • Diabetes mellitus      Type II   • Hospital discharge follow-up    • Hypertension    ,   Past Surgical History   Procedure Laterality Date   • Mastectomy     • Hysterectomy     • Cholecystectomy     • Cardiac catheterization  1999, 2010   • Coronary artery bypass graft  1999     4 vessel    ,   Family History   Problem Relation Age of Onset   • Coronary artery disease Father    ,   Social History   Substance Use Topics   • Smoking status: Never Smoker   • Smokeless tobacco: None   • Alcohol use No   ,    Review of Systems  Review of Systems   HENT: Negative for nosebleeds.    Cardiovascular: Negative for chest pain, claudication, dyspnea on exertion, irregular heartbeat, leg  swelling, near-syncope, orthopnea, palpitations, paroxysmal nocturnal dyspnea and syncope.   Respiratory: Negative for cough, hemoptysis and shortness of breath.    Gastrointestinal: Negative for dysphagia, hematemesis and melena.   Genitourinary: Negative for hematuria.       Objective     Physical Exam:  Patient Vitals for the past 24 hrs:   BP Temp Temp src Pulse Resp SpO2 Weight   02/03/17 1215 167/59 98.5 °F (36.9 °C) - 60 14 99 % -   02/03/17 0700 134/50 98 °F (36.7 °C) Temporal Art 51 18 98 % -   02/03/17 0402 139/53 98 °F (36.7 °C) Oral 54 16 95 % -   02/02/17 2342 153/43 97.8 °F (36.6 °C) Oral 51 16 100 % -   02/02/17 2030 159/52 98.3 °F (36.8 °C) Oral 52 20 94 % 133 lb 6.4 oz (60.5 kg)   02/02/17 1500 148/51 98.2 °F (36.8 °C) Temporal Art 50 18 98 % -     Physical Exam   Constitutional: She is oriented to person, place, and time. She appears well-developed and well-nourished. No distress.   HENT:   Head: Normocephalic and atraumatic.   Cardiovascular: Normal rate, regular rhythm, S1 normal and S2 normal.  Exam reveals no gallop and no friction rub.    Murmur heard.   Harsh midsystolic murmur is present with a grade of 3/6  at the upper right sternal border radiating to the neck  Pulmonary/Chest: Effort normal and breath sounds normal. No respiratory distress. She has no wheezes. She has no rales.   Abdominal: Soft. Bowel sounds are normal. She exhibits no distension. There is no tenderness. There is no rebound and no guarding.   Musculoskeletal: Normal range of motion. She exhibits no edema.   Neurological: She is alert and oriented to person, place, and time. No cranial nerve deficit.   Skin: Skin is warm and dry. No rash noted. She is not diaphoretic. No erythema.   Psychiatric: She has a normal mood and affect. Her behavior is normal.       Results Review:   I reviewed the patient's new clinical results.  Lab Results (last 24 hours)     Procedure Component Value Units Date/Time    POC Glucose  Fingerstick [[id number]]  (Abnormal) Collected:  02/02/17 1606    Specimen:  Blood Updated:  02/02/17 1626     Glucose 314 (H) mg/dL       : [id number] Carolina WoodndaMeter ID: [id number]       POC Glucose Fingerstick [[id number]]  (Normal) Collected:  02/02/17 2035    Specimen:  Blood Updated:  02/02/17 2046     Glucose 119 mg/dL       : [id number] Martinez SmallahMeter ID: [id number]       Protime-INR [[id number]]  (Abnormal) Collected:  02/03/17 0420    Specimen:  Blood Updated:  02/03/17 0501     Protime 20.8 (H) Seconds      INR 1.72 (H)     POC Glucose Fingerstick [[id number]]  (Normal) Collected:  02/03/17 0724    Specimen:  Blood Updated:  02/03/17 0809     Glucose 122 mg/dL       : [id number] GraemeLorriamara WoodndaMeter ID: [id number]           No results found for: ECHOEFEST  Imaging Results (last 24 hours)     ** No results found for the last 24 hours. **          I have reviewed telemetry which reveals SR    Assessment/Plan   Principal Problem:    Junctional bradycardia  Active Problems:    Atrial fibrillation [I48.91]    Essential hypertension    Type 2 diabetes mellitus    Chronic anticoagulation    Coronary artery disease involving native coronary artery of native heart without angina pectoris    SSS (sick sinus syndrome)    Aortic stenosis, severe    Diastolic dysfunction without heart failure      New Problems that need treatment:  1. Symptomatic irreversible bradycardia  For pacemaker today  2. Severe AS cath in 2 weeks

## 2017-02-03 NOTE — PLAN OF CARE
Problem: Patient Care Overview (Adult)  Goal: Plan of Care Review  Outcome: Ongoing (interventions implemented as appropriate)    02/03/17 1566   Coping/Psychosocial Response Interventions   Plan Of Care Reviewed With patient   Patient Care Overview   Progress no change   Outcome Evaluation   Outcome Summary/Follow up Plan Initial RD assessment. Pt's BMI wnl for age. reports very good appetite. Pt refuses Cardiac/ADA diet education, reports she has all the info at home and does not want any further edu at this time.

## 2017-02-04 VITALS
DIASTOLIC BLOOD PRESSURE: 52 MMHG | BODY MASS INDEX: 26.16 KG/M2 | HEIGHT: 60 IN | OXYGEN SATURATION: 96 % | WEIGHT: 133.25 LBS | HEART RATE: 61 BPM | SYSTOLIC BLOOD PRESSURE: 148 MMHG | RESPIRATION RATE: 18 BRPM | TEMPERATURE: 97.4 F

## 2017-02-04 LAB — GLUCOSE BLDC GLUCOMTR-MCNC: 177 MG/DL (ref 70–130)

## 2017-02-04 PROCEDURE — 93010 ELECTROCARDIOGRAM REPORT: CPT | Performed by: INTERNAL MEDICINE

## 2017-02-04 PROCEDURE — 99238 HOSP IP/OBS DSCHRG MGMT 30/<: CPT | Performed by: NURSE PRACTITIONER

## 2017-02-04 PROCEDURE — 93005 ELECTROCARDIOGRAM TRACING: CPT | Performed by: INTERNAL MEDICINE

## 2017-02-04 PROCEDURE — 82962 GLUCOSE BLOOD TEST: CPT

## 2017-02-04 PROCEDURE — 63710000001 INSULIN DETEMIR PER 5 UNITS: Performed by: INTERNAL MEDICINE

## 2017-02-04 PROCEDURE — 25010000003 CEFAZOLIN PER 500 MG: Performed by: INTERNAL MEDICINE

## 2017-02-04 RX ORDER — ACETAMINOPHEN 325 MG/1
650 TABLET ORAL EVERY 4 HOURS PRN
Refills: 0 | Status: ON HOLD
Start: 2017-02-04 | End: 2017-06-06

## 2017-02-04 RX ADMIN — ACETAMINOPHEN 650 MG: 325 TABLET ORAL at 04:37

## 2017-02-04 RX ADMIN — ATENOLOL 25 MG: 25 TABLET ORAL at 08:44

## 2017-02-04 RX ADMIN — POLYETHYLENE GLYCOL AND PROPYLENE GLYCOL 1 DROP: 4; 3 SOLUTION/ DROPS OPHTHALMIC at 08:44

## 2017-02-04 RX ADMIN — ASPIRIN 81 MG: 81 TABLET ORAL at 08:43

## 2017-02-04 RX ADMIN — PANTOPRAZOLE SODIUM 40 MG: 40 TABLET, DELAYED RELEASE ORAL at 08:44

## 2017-02-04 RX ADMIN — INSULIN DETEMIR 70 UNITS: 100 INJECTION, SOLUTION SUBCUTANEOUS at 08:44

## 2017-02-04 RX ADMIN — VENLAFAXINE HYDROCHLORIDE 37.5 MG: 37.5 TABLET ORAL at 08:44

## 2017-02-04 RX ADMIN — CEFAZOLIN SODIUM 1 G: 1 INJECTION, SOLUTION INTRAVENOUS at 05:41

## 2017-02-04 RX ADMIN — INSULIN LISPRO 4 UNITS: 100 INJECTION, SOLUTION INTRAVENOUS; SUBCUTANEOUS at 08:44

## 2017-02-04 RX ADMIN — AMIODARONE HYDROCHLORIDE 100 MG: 200 TABLET ORAL at 08:43

## 2017-02-04 RX ADMIN — SPIRONOLACTONE 25 MG: 25 TABLET ORAL at 08:44

## 2017-02-04 NOTE — DISCHARGE INSTRUCTIONS
1. Closely monitor blood pressure daily at home.   2. Monitor pacemaker insertion site and call with any drainage, signs and symptoms of infection, hematoma. No lifting >5lbs x2 weeks. Wear left arm sling as a reminder to not lifting your arm above the level of the shoulder x2 weeks.   3. Plans for cardiac cath to evaluate aortic stenosis in 2 weeks.   4. Hold Coumadin.   5. Call with any questions or concerns.

## 2017-02-04 NOTE — PLAN OF CARE
Problem: Patient Care Overview (Adult)  Goal: Plan of Care Review  Outcome: Ongoing (interventions implemented as appropriate)    02/03/17 2015   Coping/Psychosocial Response Interventions   Plan Of Care Reviewed With patient   Outcome Evaluation   Outcome Summary/Follow up Plan Pt had pacemaker this afternoon, to the right subclavian with dermabond.        Goal: Adult Individualization and Mutuality  Outcome: Ongoing (interventions implemented as appropriate)  Goal: Discharge Needs Assessment  Outcome: Ongoing (interventions implemented as appropriate)    Problem: Fall Risk (Adult)  Goal: Identify Related Risk Factors and Signs and Symptoms  Outcome: Ongoing (interventions implemented as appropriate)  Goal: Absence of Falls  Outcome: Ongoing (interventions implemented as appropriate)    Problem: Arrhythmia/Dysrhythmia (Symptomatic) (Adult)  Goal: Signs and Symptoms of Listed Potential Problems Will be Absent or Manageable (Arrhythmia/Dysrhythmia)  Outcome: Ongoing (interventions implemented as appropriate)

## 2017-02-04 NOTE — DISCHARGE SUMMARY
[facility] DISCHARGE    Date of Admission: 1/31/2017   Date of Discharge:  2/4/2017    Attending: Dr. ALYX Crocker     Discharge Diagnosis:     Junctional bradycardia s/p pacemaker     Active Problems:    SSS (sick sinus syndrome)    Aortic stenosis, severe    Diastolic dysfunction without heart failure    Atrial fibrillation- no events during the hospital course     Essential hypertension    Type 2 diabetes mellitus    Chronic anticoagulation with Coumadin    Coronary artery disease involving native coronary artery of native heart without angina pectoris      Presenting Problem/History of Present Illness  Bradycardia with 31-40 beats per minute [R00.1]      Hospital Course  Lakesha Costello is a pleasant 81 y.o. female who presented to [facility] with a complaint of generalized weakness, falls, dyspnea, fatigue, and slow heart rate x2 days. She was admitted to  telemetry under the care of Dr. Crocker. She was found to be in a Junctional Rhythm with a HR in the 30's and 40's which did not resolved after discontinuation of all HR lowering medications. Her Coumadin was held in preparation for pacemaker implantation. Vitamin K was administered for reversal on 2/2/17, and she underwent pacemaker implantation per Dr. Crocker once her INR was <2. Pacemaker ([employer] MRI model 7741 serial #[id number],DDD mode) was implanted on 2/3. Patient continues to do well for the remainder of the hospital course and she was ready for discharge on 2/4 with plans for close PCP follow up and cardiac cath in 2 weeks to further evaluate her severe AS. There is consideration for referring the patient to [place] for TAVR. Patient and daughter are comfortable being discharged home today. Please note that her Coumadin has been held until her cardiac cath in 2 weeks, Amiodarone has been discontinued (she has not taken in several months), and Atenolol was restarted.     Procedures Performed  Procedure(s):  Pacemaker DC new    · 2/1/17- Echo: Left atrial cavity size is mild-to-moderately dilated.  · Left ventricular diastolic dysfunction (grade III) consistent with reversible restrictive pattern.  · Severe aortic valve stenosis is present.  · Right ventricular cavity is moderately dilated.          Moderately reduced right ventricular systolic function noted.         Pertinent Test Results:   Lab Results   Component Value Date    WBC 8.97 01/31/2017    HGB 10.2 (L) 01/31/2017    HCT 31.5 (L) 01/31/2017    MCV 86.5 01/31/2017     01/31/2017     Lab Results   Component Value Date    GLUCOSE 350 (H) 02/01/2017    CALCIUM 9.2 02/01/2017     02/01/2017    K 4.4 02/01/2017    CO2 26.0 02/01/2017     02/01/2017    BUN 32 (H) 02/01/2017    CREATININE 1.37 02/01/2017    EGFRIFNONA 37 (L) 02/01/2017    BCR 23.4 02/01/2017    ANIONGAP 9.0 02/01/2017     Lab Results   Component Value Date    INR 1.72 (H) 02/03/2017    INR 2.43 (H) 02/02/2017    INR 2.50 (H) 02/01/2017    PROTIME 20.8 (H) 02/03/2017    PROTIME 27.3 (H) 02/02/2017    PROTIME 27.9 (H) 02/01/2017       Condition on Discharge:  Stable     Physical Exam at Discharge  Patient Vitals for the past 24 hrs:   BP Temp Temp src Pulse Resp SpO2 Weight   02/04/17 0700 148/52 97.4 °F (36.3 °C) Temporal Art 61 18 96 % -   02/04/17 0446 153/57 97.2 °F (36.2 °C) Temporal Art 63 18 92 % -   02/03/17 2346 138/42 98 °F (36.7 °C) Temporal Art 60 18 93 % -   02/03/17 2000 159/53 98.3 °F (36.8 °C) Temporal Art 59 20 99 % 133 lb 4 oz (60.4 kg)   02/03/17 1445 166/55 - - 60 12 99 % -   02/03/17 1430 172/65 - - 64 14 - -   02/03/17 1425 - - - 60 - 97 % -   02/03/17 1420 157/65 - - 60 14 96 % -   02/03/17 1405 170/65 - - 59 14 99 % -   02/03/17 1402 173/62 - - 59 18 99 % -   02/03/17 1215 167/59 98.5 °F (36.9 °C) - 60 14 99 % -     Physical Exam   Constitutional: She is oriented to person, place, and time. She appears well-developed and well-nourished. She is cooperative.  No distress.   HENT:   Head: Normocephalic and atraumatic.   Cardiovascular: Normal rate, regular rhythm, intact distal pulses and normal pulses.    Murmur heard.   Harsh midsystolic murmur is present with a grade of 4/6  at the upper right sternal border radiating to the neck  Telemetry: A-paced    Pulmonary/Chest: Effort normal and breath sounds normal. No respiratory distress. She exhibits no tenderness.   Left chest pacemaker- well approximately no drainage or hematoma. Minimal ecchymosis. Denies any significant pain.    Abdominal: Soft. Bowel sounds are normal. She exhibits no distension. There is no tenderness.   Musculoskeletal: She exhibits no edema.   Neurological: She is alert and oriented to person, place, and time.   Skin: Skin is warm and dry. No rash noted. She is not diaphoretic.   Psychiatric: She has a normal mood and affect. Her speech is normal and behavior is normal.   Vitals reviewed.      Discharge Disposition  Home or Self Care    Discharge Medications   Lakesha Costello   Home Medication Instructions BRADLEY:[id number]    Printed on:02/04/17 7505   Medication Information                      acetaminophen (TYLENOL) 325 MG tablet  Take 2 tablets by mouth Every 4 (Four) Hours As Needed for mild pain (1-3).             aspirin 81 MG EC tablet  Take 81 mg by mouth Daily.             atenolol (TENORMIN) 25 MG tablet  Take 25 mg by mouth Daily. Take 25mg in AM and 50mg in PM             calcium carbonate (OS-MICHAEL) 600 MG tablet  Take 600 mg by mouth 2 (Two) Times a Day With Meals.             cloNIDine (CATAPRES) 0.1 MG tablet  Take 0.1 mg by mouth Every Night.             glipiZIDE (GLUCOTROL) 5 MG tablet  Take 5 mg by mouth 2 (Two) Times a Day Before Meals.             insulin aspart (novoLOG) 100 UNIT/ML injection  Inject  under the skin 3 (Three) Times a Day Before Meals. Sliding scale (bs-100)/2             insulin detemir (LEVEMIR) 100 UNIT/ML injection  Inject 70 Units under the skin Every 12  (Twelve) Hours.             magnesium oxide (MAGOX) 400 (241.3 MG) MG tablet tablet  Take 400 mg by mouth Daily.             niacin 500 MG tablet  Take 500 mg by mouth Daily With Breakfast. Must be flush free             NON FORMULARY  Take  by mouth 2 (Two) Times a Day. Prespervision Lutein x1 tab twice daily             pantoprazole (PROTONIX) 40 MG EC tablet  Take 40 mg by mouth Daily.             polyethyl glycol-propyl glycol (SYSTANE) 0.4-0.3 % solution ophthalmic solution  Administer 1 drop to the right eye Daily.             simvastatin (ZOCOR) 80 MG tablet  Take 80 mg by mouth Every Night.             spironolactone (ALDACTONE) 25 MG tablet  Take 25 mg by mouth Daily.             venlafaxine (EFFEXOR) 37.5 MG tablet  Take 37.5 mg by mouth Daily.             vitamin B-12 (CYANOCOBALAMIN) 500 MCG tablet  Take 500 mcg by mouth Daily.             vitamin E 100 UNIT capsule  Take 400 Units by mouth Daily.                 Education/Activity at Discharge:   1. Closely monitor blood pressure daily at home.   2. Monitor pacemaker insertion site and call with any drainage, signs and symptoms of infection, hematoma. No lifting >5lbs x2 weeks. Wear left arm sling as a reminder to not lifting your arm above the level of the shoulder x2 weeks.   3. Plans for cardiac cath to evaluate aortic stenosis in 2 weeks.   4. Hold Coumadin.   5. Call with any questions or concerns.     Discharge Diet:   Diet Instructions     Cardiac Diet             Healthy Heart, Diabetic Diet     Follow-up Appointments  Future Appointments  Date Time Provider Department Center   2/24/2017 9:30 AM MD [facility] None   3/14/2017 12:15 PM [facility] None     PCP- 1 week   Cardiac Cath- 2 weeks        Cami Toure, FACUNDO  02/04/17  9:47 AM    Time: Discharge 40 min

## 2017-02-07 ENCOUNTER — TELEPHONE (OUTPATIENT)
Dept: CARDIOLOGY | Facility: CLINIC | Age: 82
End: 2017-02-07

## 2017-02-07 ENCOUNTER — ANTICOAGULATION VISIT (OUTPATIENT)
Dept: CARDIOLOGY | Facility: CLINIC | Age: 82
End: 2017-02-07

## 2017-02-07 DIAGNOSIS — I35.0 SEVERE AORTIC STENOSIS: Primary | ICD-10-CM

## 2017-02-07 NOTE — TELEPHONE ENCOUNTER
Pt's daughter called and asked about her mom's heart cath. I told her we would get it ordered and scheduled soon. Also she asked to have clarification of her mom's atenolol. The DC papers list two dosages. I spoke with FACUNDO Renee and she said 25 mg daily was the correct dose. I told Margarita that. She verbalized understanding.-KK

## 2017-02-15 ENCOUNTER — HOSPITAL ENCOUNTER (OUTPATIENT)
Facility: HOSPITAL | Age: 82
Discharge: HOME | End: 2017-02-15
Attending: INTERNAL MEDICINE | Admitting: INTERNAL MEDICINE

## 2017-02-15 ENCOUNTER — APPOINTMENT (OUTPATIENT)
Dept: CARDIOLOGY | Facility: HOSPITAL | Age: 82
End: 2017-02-15
Attending: INTERNAL MEDICINE

## 2017-02-15 VITALS
BODY MASS INDEX: 23.98 KG/M2 | SYSTOLIC BLOOD PRESSURE: 154 MMHG | OXYGEN SATURATION: 99 % | HEIGHT: 61 IN | WEIGHT: 127 LBS | RESPIRATION RATE: 18 BRPM | HEART RATE: 60 BPM | DIASTOLIC BLOOD PRESSURE: 74 MMHG | TEMPERATURE: 97.8 F

## 2017-02-15 DIAGNOSIS — I35.0 SEVERE AORTIC STENOSIS: ICD-10-CM

## 2017-02-15 LAB
BDY SITE: ABNORMAL
COHGB MFR BLD: 0.6 % (ref 0–5.1)
COHGB MFR BLD: 0.7 % (ref 0–5.1)
COHGB MFR BLD: 1.4 % (ref 0–5.1)
COHGB MFR BLD: 1.9 % (ref 0–5.1)
HGB BLDA-MCNC: 10.6 G/DL (ref 12–16)
HGB BLDA-MCNC: 11.5 G/DL (ref 12–16)
HGB BLDA-MCNC: 12.1 G/DL (ref 12–16)
HGB BLDA-MCNC: 12.8 G/DL (ref 12–16)
METHGB BLD QL: 0.1 % (ref 0.4–1.5)
METHGB BLD QL: 0.3 % (ref 0.4–1.5)
MODALITY: ABNORMAL
OXYHGB MFR BLDV: 63.7 % (ref 94–97)
OXYHGB MFR BLDV: 72.1 % (ref 94–97)
OXYHGB MFR BLDV: 92.2 % (ref 94–97)
OXYHGB MFR BLDV: 92.3 % (ref 94–97)

## 2017-02-15 PROCEDURE — 93461 R&L HRT ART/VENTRICLE ANGIO: CPT | Performed by: INTERNAL MEDICINE

## 2017-02-15 PROCEDURE — C1887 CATHETER, GUIDING: HCPCS | Performed by: INTERNAL MEDICINE

## 2017-02-15 PROCEDURE — 25010000002 ENOXAPARIN PER 10 MG: Performed by: INTERNAL MEDICINE

## 2017-02-15 PROCEDURE — 63710000001 ASPIRIN 325 MG TABLET

## 2017-02-15 PROCEDURE — 25010000002 MIDAZOLAM PER 1 MG: Performed by: INTERNAL MEDICINE

## 2017-02-15 PROCEDURE — 25010000002 DIPHENHYDRAMINE PER 50 MG: Performed by: INTERNAL MEDICINE

## 2017-02-15 PROCEDURE — C1760 CLOSURE DEV, VASC: HCPCS | Performed by: INTERNAL MEDICINE

## 2017-02-15 PROCEDURE — 93662 INTRACARDIAC ECG (ICE): CPT | Performed by: INTERNAL MEDICINE

## 2017-02-15 PROCEDURE — C1769 GUIDE WIRE: HCPCS | Performed by: INTERNAL MEDICINE

## 2017-02-15 PROCEDURE — C1751 CATH, INF, PER/CENT/MIDLINE: HCPCS | Performed by: INTERNAL MEDICINE

## 2017-02-15 PROCEDURE — 93662 INTRACARDIAC ECG (ICE): CPT

## 2017-02-15 PROCEDURE — 93462 L HRT CATH TRNSPTL PUNCTURE: CPT

## 2017-02-15 PROCEDURE — C1759 CATH, INTRA ECHOCARDIOGRAPHY: HCPCS | Performed by: INTERNAL MEDICINE

## 2017-02-15 PROCEDURE — 25010000002 FENTANYL CITRATE (PF) 100 MCG/2ML SOLUTION: Performed by: INTERNAL MEDICINE

## 2017-02-15 PROCEDURE — C1893 INTRO/SHEATH, FIXED,NON-PEEL: HCPCS | Performed by: INTERNAL MEDICINE

## 2017-02-15 PROCEDURE — C1894 INTRO/SHEATH, NON-LASER: HCPCS | Performed by: INTERNAL MEDICINE

## 2017-02-15 PROCEDURE — 0 IOPAMIDOL 61 % SOLUTION: Performed by: INTERNAL MEDICINE

## 2017-02-15 PROCEDURE — A9270 NON-COVERED ITEM OR SERVICE: HCPCS

## 2017-02-15 PROCEDURE — 83050 HGB METHEMOGLOBIN QUAN: CPT

## 2017-02-15 PROCEDURE — 82820 HEMOGLOBIN-OXYGEN AFFINITY: CPT

## 2017-02-15 PROCEDURE — 93457 R HRT ART/GRFT ANGIO: CPT | Performed by: INTERNAL MEDICINE

## 2017-02-15 PROCEDURE — 82375 ASSAY CARBOXYHB QUANT: CPT

## 2017-02-15 RX ORDER — ACETAMINOPHEN 325 MG/1
650 TABLET ORAL EVERY 4 HOURS PRN
Status: DISCONTINUED | OUTPATIENT
Start: 2017-02-15 | End: 2017-02-15 | Stop reason: HOSPADM

## 2017-02-15 RX ORDER — ASPIRIN 325 MG
TABLET ORAL
Status: COMPLETED
Start: 2017-02-15 | End: 2017-02-15

## 2017-02-15 RX ORDER — SODIUM CHLORIDE 9 MG/ML
75 INJECTION, SOLUTION INTRAVENOUS CONTINUOUS
Status: DISCONTINUED | OUTPATIENT
Start: 2017-02-15 | End: 2017-02-15 | Stop reason: HOSPADM

## 2017-02-15 RX ORDER — SODIUM CHLORIDE 9 MG/ML
125 INJECTION, SOLUTION INTRAVENOUS CONTINUOUS
Status: DISPENSED | OUTPATIENT
Start: 2017-02-15 | End: 2017-02-15

## 2017-02-15 RX ORDER — SODIUM CHLORIDE 0.9 % (FLUSH) 0.9 %
1-10 SYRINGE (ML) INJECTION AS NEEDED
Status: DISCONTINUED | OUTPATIENT
Start: 2017-02-15 | End: 2017-02-15 | Stop reason: HOSPADM

## 2017-02-15 RX ORDER — DIPHENHYDRAMINE HYDROCHLORIDE 50 MG/ML
INJECTION INTRAMUSCULAR; INTRAVENOUS AS NEEDED
Status: DISCONTINUED | OUTPATIENT
Start: 2017-02-15 | End: 2017-02-15 | Stop reason: HOSPADM

## 2017-02-15 RX ORDER — ASPIRIN 325 MG
325 TABLET, DELAYED RELEASE (ENTERIC COATED) ORAL DAILY
Status: DISCONTINUED | OUTPATIENT
Start: 2017-02-16 | End: 2017-02-15 | Stop reason: HOSPADM

## 2017-02-15 RX ORDER — LIDOCAINE HYDROCHLORIDE 20 MG/ML
INJECTION, SOLUTION INFILTRATION; PERINEURAL AS NEEDED
Status: DISCONTINUED | OUTPATIENT
Start: 2017-02-15 | End: 2017-02-15 | Stop reason: HOSPADM

## 2017-02-15 RX ORDER — FENTANYL CITRATE 50 UG/ML
INJECTION, SOLUTION INTRAMUSCULAR; INTRAVENOUS AS NEEDED
Status: DISCONTINUED | OUTPATIENT
Start: 2017-02-15 | End: 2017-02-15 | Stop reason: HOSPADM

## 2017-02-15 RX ORDER — MIDAZOLAM HYDROCHLORIDE 1 MG/ML
INJECTION INTRAMUSCULAR; INTRAVENOUS AS NEEDED
Status: DISCONTINUED | OUTPATIENT
Start: 2017-02-15 | End: 2017-02-15 | Stop reason: HOSPADM

## 2017-02-15 RX ORDER — ASPIRIN 325 MG
325 TABLET ORAL ONCE
Status: COMPLETED | OUTPATIENT
Start: 2017-02-15 | End: 2017-02-15

## 2017-02-15 RX ADMIN — SODIUM CHLORIDE 75 ML/HR: 9 INJECTION, SOLUTION INTRAVENOUS at 10:05

## 2017-02-15 RX ADMIN — ENOXAPARIN SODIUM 30 MG: 30 INJECTION SUBCUTANEOUS at 10:50

## 2017-02-15 RX ADMIN — ASPIRIN 325 MG: 325 TABLET, COATED ORAL at 10:05

## 2017-02-15 RX ADMIN — Medication 325 MG: at 10:05

## 2017-02-15 NOTE — H&P (VIEW-ONLY)
Referring Provider: Ehsan Crocker MD    Length of Stay: 3    Chief Complaint:   Chief Complaint   Patient presents with   • Slow Heart Rate       Subjective: ready to go    Medications  Current Facility-Administered Medications   Medication Dose Route Frequency Provider Last Rate Last Dose   • [MAR Hold] acetaminophen (TYLENOL) tablet 650 mg  650 mg Oral Q4H PRN Uli Baker MD       • [MAR Hold] aluminum-magnesium hydroxide-simethicone (MAALOX/MYLANTA) suspension 30 mL  30 mL Oral Q6H PRN Uli Baker MD       • [MAR Hold] aspirin EC tablet 81 mg  81 mg Oral Daily Uli Baker MD   81 mg at 02/03/17 0944   • [MAR Hold] atorvastatin (LIPITOR) tablet 40 mg  40 mg Oral Nightly Uli Baker MD   40 mg at 02/02/17 2026   • [MAR Hold] bisacodyl (DULCOLAX) EC tablet 5 mg  5 mg Oral Daily PRN Uli Baker MD       • ceFAZolin (ANCEF) 1 g/100 mL 0.9% NS IVPB (mbp)  1 g Intravenous 60 Min Pre-Op FACUNDO Salazar       • [MAR Hold] dextrose (D50W) solution 25 g  25 g Intravenous Q15 Min PRN Uli Baker MD       • [MAR Hold] dextrose (GLUTOSE) oral gel 15 g  15 g Oral Q15 Min PRN Uli Baker MD       • [MAR Hold] glucagon (human recombinant) (GLUCAGEN DIAGNOSTIC) injection 1 mg  1 mg Subcutaneous Q15 Min PRN Uli Baker MD       • [MAR Hold] insulin detemir (LEVEMIR) injection 70 Units  70 Units Subcutaneous QAM Uli Baker MD   70 Units at 02/02/17 0832   • [MAR Hold] insulin lispro (humaLOG) injection 4-24 Units  4-24 Units Subcutaneous 4x Daily AC & at Bedtime Uli Baker MD   16 Units at 02/02/17 1747   • lidocaine (XYLOCAINE) 1 % injection 0.1 mL  0.1 mL Intradermal Once PRN FACUNDO Salazar       • [MAR Hold] ondansetron (ZOFRAN) injection 4 mg  4 mg Intravenous Q6H PRN Uli Baker MD       • ondansetron (ZOFRAN) injection 4 mg  4 mg Intravenous Q6H PRN Ehsan Crocker MD       • [MAR Hold] pantoprazole (PROTONIX) EC tablet 40 mg  40 mg Oral Daily Uli Baker MD    40 mg at 02/03/17 0945   • [MAR Hold] pneumococcal polysaccharide 23-valent (PNEUMOVAX-23) vaccine 0.5 mL  0.5 mL Intramuscular During Hospitalization Ehsan Crocker MD       • [MAR Hold] polyethyl glycol-propyl glycol (SYSTANE) 0.4-0.3 % ophthalmic solution 1 drop  1 drop Right Eye Daily Uli Baker MD   1 drop at 02/03/17 0944   • [MAR Hold] sodium chloride 0.9 % flush 1-10 mL  1-10 mL Intravenous PRN Uli Baker MD       • sodium chloride 0.9 % flush 1-10 mL  1-10 mL Intravenous PRN Cami Toure APRN       • [MAR Hold] sodium chloride 0.9 % flush 10 mL  10 mL Intravenous PRN Sherif Early Jr., MD       • sodium chloride 0.9 % infusion  50 mL/hr Intravenous Continuous Cami Toure, APRN 50 mL/hr at 02/03/17 1222 50 mL/hr at 02/03/17 1222   • [MAR Hold] spironolactone (ALDACTONE) tablet 25 mg  25 mg Oral Daily Uli Baker MD   25 mg at 02/03/17 0943   • [MAR Hold] venlafaxine (EFFEXOR) tablet 37.5 mg  37.5 mg Oral Daily Uli Baker MD   37.5 mg at 02/03/17 0943       Past Medical History   Diagnosis Date   • Aortic stenosis    • Atrial fibrillation    • Atrial fibrillation by electrocardiogram    • Atrial flutter    • Breast cancer    • CAD (coronary artery disease)    • Chronic anticoagulation      Coumadin    • Diabetes mellitus      Type II   • Hospital discharge follow-up    • Hypertension    ,   Past Surgical History   Procedure Laterality Date   • Mastectomy     • Hysterectomy     • Cholecystectomy     • Cardiac catheterization  1999, 2010   • Coronary artery bypass graft  1999     4 vessel    ,   Family History   Problem Relation Age of Onset   • Coronary artery disease Father    ,   Social History   Substance Use Topics   • Smoking status: Never Smoker   • Smokeless tobacco: None   • Alcohol use No   ,    Review of Systems  Review of Systems   HENT: Negative for nosebleeds.    Cardiovascular: Negative for chest pain, claudication, dyspnea on exertion, irregular heartbeat, leg  swelling, near-syncope, orthopnea, palpitations, paroxysmal nocturnal dyspnea and syncope.   Respiratory: Negative for cough, hemoptysis and shortness of breath.    Gastrointestinal: Negative for dysphagia, hematemesis and melena.   Genitourinary: Negative for hematuria.       Objective     Physical Exam:  Patient Vitals for the past 24 hrs:   BP Temp Temp src Pulse Resp SpO2 Weight   02/03/17 1215 167/59 98.5 °F (36.9 °C) - 60 14 99 % -   02/03/17 0700 134/50 98 °F (36.7 °C) Temporal Art 51 18 98 % -   02/03/17 0402 139/53 98 °F (36.7 °C) Oral 54 16 95 % -   02/02/17 2342 153/43 97.8 °F (36.6 °C) Oral 51 16 100 % -   02/02/17 2030 159/52 98.3 °F (36.8 °C) Oral 52 20 94 % 133 lb 6.4 oz (60.5 kg)   02/02/17 1500 148/51 98.2 °F (36.8 °C) Temporal Art 50 18 98 % -     Physical Exam   Constitutional: She is oriented to person, place, and time. She appears well-developed and well-nourished. No distress.   HENT:   Head: Normocephalic and atraumatic.   Cardiovascular: Normal rate, regular rhythm, S1 normal and S2 normal.  Exam reveals no gallop and no friction rub.    Murmur heard.   Harsh midsystolic murmur is present with a grade of 3/6  at the upper right sternal border radiating to the neck  Pulmonary/Chest: Effort normal and breath sounds normal. No respiratory distress. She has no wheezes. She has no rales.   Abdominal: Soft. Bowel sounds are normal. She exhibits no distension. There is no tenderness. There is no rebound and no guarding.   Musculoskeletal: Normal range of motion. She exhibits no edema.   Neurological: She is alert and oriented to person, place, and time. No cranial nerve deficit.   Skin: Skin is warm and dry. No rash noted. She is not diaphoretic. No erythema.   Psychiatric: She has a normal mood and affect. Her behavior is normal.       Results Review:   I reviewed the patient's new clinical results.  Lab Results (last 24 hours)     Procedure Component Value Units Date/Time    POC Glucose  Fingerstick [[id number]]  (Abnormal) Collected:  02/02/17 1606    Specimen:  Blood Updated:  02/02/17 1626     Glucose 314 (H) mg/dL       : [id number] Carolina WoodndaMeter ID: [id number]       POC Glucose Fingerstick [[id number]]  (Normal) Collected:  02/02/17 2035    Specimen:  Blood Updated:  02/02/17 2046     Glucose 119 mg/dL       : [id number] Martinez SmallahMeter ID: [id number]       Protime-INR [[id number]]  (Abnormal) Collected:  02/03/17 0420    Specimen:  Blood Updated:  02/03/17 0501     Protime 20.8 (H) Seconds      INR 1.72 (H)     POC Glucose Fingerstick [[id number]]  (Normal) Collected:  02/03/17 0724    Specimen:  Blood Updated:  02/03/17 0809     Glucose 122 mg/dL       : [id number] GraemeLorriamara WoodndaMeter ID: [id number]           No results found for: ECHOEFEST  Imaging Results (last 24 hours)     ** No results found for the last 24 hours. **          I have reviewed telemetry which reveals SR    Assessment/Plan   Principal Problem:    Junctional bradycardia  Active Problems:    Atrial fibrillation [I48.91]    Essential hypertension    Type 2 diabetes mellitus    Chronic anticoagulation    Coronary artery disease involving native coronary artery of native heart without angina pectoris    SSS (sick sinus syndrome)    Aortic stenosis, severe    Diastolic dysfunction without heart failure      New Problems that need treatment:  1. Symptomatic irreversible bradycardia  For pacemaker today  2. Severe AS cath in 2 weeks

## 2017-02-16 ENCOUNTER — TELEPHONE (OUTPATIENT)
Dept: CARDIAC SURGERY | Facility: CLINIC | Age: 82
End: 2017-02-16

## 2017-02-17 ENCOUNTER — ANTICOAGULATION VISIT (OUTPATIENT)
Dept: CARDIOLOGY | Facility: CLINIC | Age: 82
End: 2017-02-17

## 2017-02-20 ENCOUNTER — OFFICE VISIT (OUTPATIENT)
Dept: CARDIAC SURGERY | Facility: CLINIC | Age: 82
End: 2017-02-20

## 2017-02-20 ENCOUNTER — ANTICOAGULATION VISIT (OUTPATIENT)
Dept: CARDIOLOGY | Facility: CLINIC | Age: 82
End: 2017-02-20

## 2017-02-20 VITALS
SYSTOLIC BLOOD PRESSURE: 106 MMHG | BODY MASS INDEX: 24.55 KG/M2 | DIASTOLIC BLOOD PRESSURE: 56 MMHG | WEIGHT: 130 LBS | HEART RATE: 65 BPM | OXYGEN SATURATION: 98 % | HEIGHT: 61 IN

## 2017-02-20 DIAGNOSIS — Z79.01 CHRONIC ANTICOAGULATION: ICD-10-CM

## 2017-02-20 DIAGNOSIS — I48.91 ATRIAL FIBRILLATION, UNSPECIFIED TYPE (HCC): Primary | ICD-10-CM

## 2017-02-20 DIAGNOSIS — I48.91 ATRIAL FIBRILLATION, UNSPECIFIED TYPE (HCC): ICD-10-CM

## 2017-02-20 DIAGNOSIS — I25.10 CORONARY ARTERY DISEASE INVOLVING NATIVE CORONARY ARTERY OF NATIVE HEART WITHOUT ANGINA PECTORIS: ICD-10-CM

## 2017-02-20 DIAGNOSIS — I51.89 DIASTOLIC DYSFUNCTION WITHOUT HEART FAILURE: ICD-10-CM

## 2017-02-20 DIAGNOSIS — I35.0 AORTIC STENOSIS, SEVERE: ICD-10-CM

## 2017-02-20 DIAGNOSIS — I49.5 SSS (SICK SINUS SYNDROME) (HCC): ICD-10-CM

## 2017-02-20 LAB — INR PPP: 1.2

## 2017-02-20 PROCEDURE — 99204 OFFICE O/P NEW MOD 45 MIN: CPT | Performed by: THORACIC SURGERY (CARDIOTHORACIC VASCULAR SURGERY)

## 2017-02-20 RX ORDER — WARFARIN SODIUM 2 MG/1
1 TABLET ORAL
Status: ON HOLD | COMMUNITY
End: 2017-06-06

## 2017-02-24 ENCOUNTER — ANTICOAGULATION VISIT (OUTPATIENT)
Dept: CARDIOLOGY | Facility: CLINIC | Age: 82
End: 2017-02-24

## 2017-02-24 ENCOUNTER — OFFICE VISIT (OUTPATIENT)
Dept: CARDIOLOGY | Facility: CLINIC | Age: 82
End: 2017-02-24

## 2017-02-24 VITALS
WEIGHT: 129 LBS | SYSTOLIC BLOOD PRESSURE: 90 MMHG | HEIGHT: 60 IN | BODY MASS INDEX: 25.32 KG/M2 | DIASTOLIC BLOOD PRESSURE: 60 MMHG | HEART RATE: 61 BPM

## 2017-02-24 DIAGNOSIS — I10 ESSENTIAL HYPERTENSION: ICD-10-CM

## 2017-02-24 DIAGNOSIS — I35.0 AORTIC STENOSIS, SEVERE: Primary | ICD-10-CM

## 2017-02-24 DIAGNOSIS — I51.89 DIASTOLIC DYSFUNCTION WITHOUT HEART FAILURE: ICD-10-CM

## 2017-02-24 DIAGNOSIS — I48.0 PAROXYSMAL ATRIAL FIBRILLATION (HCC): ICD-10-CM

## 2017-02-24 DIAGNOSIS — I25.10 CORONARY ARTERY DISEASE INVOLVING NATIVE CORONARY ARTERY OF NATIVE HEART WITHOUT ANGINA PECTORIS: ICD-10-CM

## 2017-02-24 PROBLEM — R00.1 JUNCTIONAL BRADYCARDIA: Status: RESOLVED | Noted: 2017-02-01 | Resolved: 2017-02-24

## 2017-02-24 LAB — INR PPP: 2.7

## 2017-02-24 PROCEDURE — 99024 POSTOP FOLLOW-UP VISIT: CPT | Performed by: INTERNAL MEDICINE

## 2017-02-24 PROCEDURE — 93000 ELECTROCARDIOGRAM COMPLETE: CPT | Performed by: INTERNAL MEDICINE

## 2017-02-24 RX ORDER — FENOFIBRATE 160 MG/1
160 TABLET ORAL DAILY
COMMUNITY
End: 2019-03-08 | Stop reason: ALTCHOICE

## 2017-02-24 RX ORDER — CLONIDINE HYDROCHLORIDE 0.1 MG/1
0.1 TABLET ORAL 2 TIMES DAILY
Status: ON HOLD | COMMUNITY
End: 2017-06-06

## 2017-02-24 RX ORDER — MELATONIN
1000 DAILY
COMMUNITY
End: 2018-12-20

## 2017-02-24 NOTE — PROGRESS NOTES
Referring Provider: FACUNDO Juarez    Reason for Follow-up Visit: Severe AS    Subjective .   Chief Complaint:   Chief Complaint   Patient presents with   • Follow-up     6 month fu.  she had a pacemaker put in and wants to discuss the rate.  She also wants to discuss the TAVR.  She saw Dr. Fraire.   • Atrial Fibrillation     doing well no compliants   • Coronary Artery Disease     no chest pains   • Shortness of Breath     a little with exertion   • Fatigue     still recovering from the pacemaker inplantatoin.       History of present illness:  Lakesha Costello is a 81 y.o. yo female with history of AS        History  Past Medical History   Diagnosis Date   • Aortic stenosis    • Atrial fibrillation    • Atrial fibrillation by electrocardiogram    • Atrial flutter    • Breast cancer    • CAD (coronary artery disease)    • Chronic anticoagulation      Coumadin    • Diabetes mellitus      Type II   • Hospital discharge follow-up    • Hyperlipidemia    • Hypertension    ,   Past Surgical History   Procedure Laterality Date   • Mastectomy     • Hysterectomy     • Cholecystectomy     • Cardiac catheterization  1999, 2010   • Coronary artery bypass graft  1999     4 vessel    • Cardiac electrophysiology procedure N/A 2/3/2017     Procedure: Pacemaker DC new;  Surgeon: Ehsan Crocker MD;  Location:  [facility];  Service:    • Cardiac catheterization N/A 2/15/2017     Procedure: Left Heart Cath;  Surgeon: Ehsan Crocker MD;  Location:  [facility];  Service:    • Cardiac catheterization N/A 2/15/2017     Procedure: Right Heart Cath;  Surgeon: Ehsan Crocker MD;  Location:  [facility];  Service:    • Cardiac catheterization N/A 2/15/2017     Procedure: Coronary angiography;  Surgeon: Ehsan Crocker MD;  Location:  [facility];  Service:    • Cardiac catheterization N/A 2/15/2017     Procedure: Left ventriculography;  Surgeon: Ehsan Crocker MD;   Location:  [facility];  Service:    • Cardiac catheterization N/A 2/15/2017     Procedure: Intracardiac echocardiogram;  Surgeon: Ehsan Crocker MD;  Location:  [facility];  Service:    • Insert / replace / remove pacemaker     • Cataract extraction     • Belpharoptosis repair     ,   Family History   Problem Relation Age of Onset   • Breast cancer Mother    • Coronary artery disease Father    • Heart attack Father    • Cancer Brother    • No Known Problems Maternal Grandmother    • No Known Problems Maternal Grandfather    • No Known Problems Paternal Grandmother    • No Known Problems Paternal Grandfather    ,   Social History   Substance Use Topics   • Smoking status: Never Smoker   • Smokeless tobacco: Never Used   • Alcohol use No   ,     Medications  Current Outpatient Prescriptions   Medication Sig Dispense Refill   • acetaminophen (TYLENOL) 325 MG tablet Take 2 tablets by mouth Every 4 (Four) Hours As Needed for mild pain (1-3).  0   • aspirin 81 MG EC tablet Take 81 mg by mouth Daily.     • atenolol (TENORMIN) 25 MG tablet Take 25 mg by mouth Daily. Take 25mg in AM and 50mg in PM     • calcium carbonate (OS-MICHAEL) 600 MG tablet Take 600 mg by mouth 2 (Two) Times a Day With Meals.     • cholecalciferol (VITAMIN D3) 1000 UNITS tablet Take 1,000 Units by mouth Daily.     • CloNIDine (CATAPRES) 0.1 MG tablet Take 0.1 mg by mouth 2 (Two) Times a Day.     • fenofibrate 160 MG tablet Take 160 mg by mouth Daily.     • glipiZIDE (GLUCOTROL) 5 MG tablet Take 5 mg by mouth 2 (Two) Times a Day Before Meals.     • insulin aspart (novoLOG) 100 UNIT/ML injection Inject  under the skin 3 (Three) Times a Day Before Meals. Sliding scale (bs-100)/2     • insulin detemir (LEVEMIR) 100 UNIT/ML injection Inject 70 Units under the skin Every 12 (Twelve) Hours.     • magnesium oxide (MAGOX) 400 (241.3 MG) MG tablet tablet Take 400 mg by mouth Daily.     • niacin 500 MG tablet Take 500 mg by mouth  "Daily With Breakfast. Must be flush free     • NON FORMULARY Take  by mouth 2 (Two) Times a Day. Prespervision Lutein x1 tab twice daily     • pantoprazole (PROTONIX) 40 MG EC tablet Take 40 mg by mouth Daily.     • polyethyl glycol-propyl glycol (SYSTANE) 0.4-0.3 % solution ophthalmic solution Administer 1 drop to the right eye Daily.     • simvastatin (ZOCOR) 80 MG tablet Take 80 mg by mouth Every Night.     • spironolactone (ALDACTONE) 25 MG tablet Take 25 mg by mouth Daily.     • venlafaxine (EFFEXOR) 37.5 MG tablet Take 37.5 mg by mouth Daily.     • vitamin B-12 (CYANOCOBALAMIN) 500 MCG tablet Take 500 mcg by mouth Daily.     • vitamin E 100 UNIT capsule Take 400 Units by mouth Daily.     • warfarin (COUMADIN) 2 MG tablet Take 2 mg by mouth Daily.       No current facility-administered medications for this visit.        Allergies:  Dilaudid [hydromorphone hcl]; Dilaudid [hydromorphone]; Enalapril; Janumet [sitagliptin-metformin hcl]; Lopid [gemfibrozil]; Sulfa antibiotics; and Ultram [tramadol]    Review of Systems  Review of Systems   HENT: Negative for nosebleeds.    Cardiovascular: Positive for dyspnea on exertion. Negative for chest pain, claudication, irregular heartbeat, leg swelling, near-syncope, orthopnea, palpitations, paroxysmal nocturnal dyspnea and syncope.   Respiratory: Positive for shortness of breath. Negative for cough and hemoptysis.    Gastrointestinal: Negative for dysphagia, hematemesis and melena.   Genitourinary: Negative for hematuria.       Objective     Physical Exam:  Visit Vitals   • BP 90/60 (BP Location: Left arm, Patient Position: Sitting, Cuff Size: Adult)   • Pulse 61   • Ht 60\" (152.4 cm)   • Wt 129 lb (58.5 kg)   • BMI 25.19 kg/m2     Physical Exam   Constitutional: She is oriented to person, place, and time. She appears well-developed and well-nourished. No distress.   HENT:   Head: Normocephalic and atraumatic.   Cardiovascular: Normal rate, regular rhythm, S1 normal and S2 " normal.  Exam reveals no gallop and no friction rub.    Murmur heard.   Harsh midsystolic murmur is present  at the upper right sternal border radiating to the neck  Pulmonary/Chest: Effort normal and breath sounds normal. No respiratory distress. She has no wheezes. She has no rales. She exhibits no tenderness.   Abdominal: Soft. Bowel sounds are normal. She exhibits no distension. There is no tenderness. There is no rebound and no guarding.   Musculoskeletal: Normal range of motion. She exhibits no edema.   Neurological: She is alert and oriented to person, place, and time. No cranial nerve deficit.   Skin: Skin is warm and dry. No rash noted. She is not diaphoretic. No erythema.   Psychiatric: She has a normal mood and affect. Her behavior is normal.       Results Review:    ECG 12 Lead  Date/Time: 2/24/2017 11:37 AM  Performed by: VINCE RIBERA  Authorized by: VINCE RIBERA   Comparison: compared with previous ECG   Similar to previous ECG  Rhythm: sinus rhythm and paced  Rate: normal  Conduction: conduction normal  ST Segments: ST segments normal  QRS axis: normal  Clinical impression: non-specific ECG  Comments: Atrial paced             Anticoagulation Visit on 02/20/2017   Component Date Value Ref Range Status   • INR 02/20/2017 1.20   Final       Assessment/Plan   Lakesha was seen today for follow-up, atrial fibrillation, coronary artery disease, shortness of breath and fatigue.    Diagnoses and all orders for this visit:    Paroxysmal atrial fibrillation, maintaining Sr    Coronary artery disease involving native coronary artery of native heart without angina pectoris, The patient denies chest pain, shortness of breath, dyspnea on exertion, orthopnea, PND, edema. There is no evidence of ongoing ischemia      Aortic stenosis, severe, apparently not a surgical candidate. Her mean gradient is only 27 mmHg even though her CHELSEA = 0.8cm2. Will repeat echo in 6 mo and consider referral to [facility] for TAVR    Diastolic  dysfunction without heart failure could explain a lot of her symptoms.     Essential hypertension    Chronic renal dysfunction, GFR 37

## 2017-03-03 ENCOUNTER — ANTICOAGULATION VISIT (OUTPATIENT)
Dept: CARDIOLOGY | Facility: CLINIC | Age: 82
End: 2017-03-03

## 2017-03-03 LAB
INR PPP: 4.5

## 2017-03-07 ENCOUNTER — ANTICOAGULATION VISIT (OUTPATIENT)
Dept: CARDIOLOGY | Facility: CLINIC | Age: 82
End: 2017-03-07

## 2017-03-07 LAB — INR PPP: 3.6

## 2017-03-10 ENCOUNTER — ANTICOAGULATION VISIT (OUTPATIENT)
Dept: CARDIOLOGY | Facility: CLINIC | Age: 82
End: 2017-03-10

## 2017-03-10 LAB — INR PPP: 2.6

## 2017-03-12 NOTE — PROGRESS NOTES
Chief Complaint   Patient presents with   • Pre-op Exam     patient states she is here to talk about getting surgery for valve          Subjective     History of Present Illness  Lakesha Costello is a 81 y.o.  female with a history of severe aortic valve stenossi, previous CABG -4vessel bypass grafting, atrial fibrillation, and acquired coagulation factor deficiency presents with weakness, falls, dyspnea, and slow heart rate.  She did undergo previous evaluation identifying a junction rhythm that was symptomatic.  A pacemaker was implanted.  Recently.  She has not had a stroke and continues with no difficulties with coumadin.        Review of Systems   Constitutional: Positive for activity change and fatigue.   HENT: Negative.    Eyes: Negative.    Respiratory: Positive for cough and shortness of breath.    Cardiovascular: Positive for chest pain and leg swelling.   Gastrointestinal: Negative.    Endocrine: Positive for cold intolerance.   Genitourinary: Negative.    Musculoskeletal: Positive for arthralgias.   Allergic/Immunologic: Negative.    Neurological: Positive for dizziness, syncope and light-headedness.        Falling   Hematological: Bruises/bleeds easily.   Psychiatric/Behavioral: The patient is nervous/anxious.           Past Medical History   Diagnosis Date   • Aortic stenosis    • Atrial fibrillation    • Atrial fibrillation by electrocardiogram    • Atrial flutter    • Breast cancer    • CAD (coronary artery disease)    • Chronic anticoagulation      Coumadin    • Diabetes mellitus      Type II   • Hospital discharge follow-up    • Hyperlipidemia    • Hypertension      Past Surgical History   Procedure Laterality Date   • Mastectomy     • Hysterectomy     • Cholecystectomy     • Cardiac catheterization  1999, 2010   • Coronary artery bypass graft  1999     4 vessel    • Cardiac electrophysiology procedure N/A 2/3/2017     Procedure: Pacemaker DC new;  Surgeon: Ehsan Crocker MD;  Location:   "[facility];  Service:    • Cardiac catheterization N/A 2/15/2017     Procedure: Left Heart Cath;  Surgeon: Ehsan Crocker MD;  Location:  [facility];  Service:    • Cardiac catheterization N/A 2/15/2017     Procedure: Right Heart Cath;  Surgeon: Ehsan Crocker MD;  Location:  [facility];  Service:    • Cardiac catheterization N/A 2/15/2017     Procedure: Coronary angiography;  Surgeon: Ehsan Crocker MD;  Location:  [facility];  Service:    • Cardiac catheterization N/A 2/15/2017     Procedure: Left ventriculography;  Surgeon: Ehsan Crocker MD;  Location:  [facility];  Service:    • Cardiac catheterization N/A 2/15/2017     Procedure: Intracardiac echocardiogram;  Surgeon: Ehsan Crocker MD;  Location:  [facility];  Service:    • Insert / replace / remove pacemaker     • Cataract extraction     • Belpharoptosis repair       Family History   Problem Relation Age of Onset   • Breast cancer Mother    • Coronary artery disease Father    • Heart attack Father    • Cancer Brother    • No Known Problems Maternal Grandmother    • No Known Problems Maternal Grandfather    • No Known Problems Paternal Grandmother    • No Known Problems Paternal Grandfather      Social History   Substance Use Topics   • Smoking status: Never Smoker   • Smokeless tobacco: Never Used   • Alcohol use No       (Not in a hospital admission)  Allergies:  Dilaudid [hydromorphone hcl]; Dilaudid [hydromorphone]; Enalapril; Janumet [sitagliptin-metformin hcl]; Lopid [gemfibrozil]; Sulfa antibiotics; and Ultram [tramadol]    Objective      Vital Signs      Visit Vitals   • /56 (BP Location: Left arm, Patient Position: Sitting, Cuff Size: Adult)   • Pulse 65   • Ht 61\" (154.9 cm)   • Wt 130 lb (59 kg)   • SpO2 98%   • BMI 24.56 kg/m2         Physical Exam   Constitutional: She is oriented to person, place, and time. She appears well-developed. "   HENT:   Head: Normocephalic and atraumatic.   Mouth/Throat: Oropharynx is clear and moist.   Eyes: EOM are normal. Pupils are equal, round, and reactive to light.   Neck: Normal range of motion. Neck supple. No JVD present. No tracheal deviation present. No thyromegaly present.   Cardiovascular: Normal rate, regular rhythm and intact distal pulses.  Exam reveals no gallop and no friction rub.    Murmur heard.  Pulmonary/Chest: Effort normal and breath sounds normal. No respiratory distress. She has no wheezes. She has no rales. She exhibits no tenderness.   Abdominal: Soft. She exhibits no distension. There is no tenderness.   Musculoskeletal: Normal range of motion. She exhibits no edema.   Lymphadenopathy:     She has no cervical adenopathy.   Neurological: She is alert and oriented to person, place, and time. No cranial nerve deficit.   Skin: Skin is warm and dry.   Psychiatric: She has a normal mood and affect.       Results Review:   LHC and Echo cardiogram are reviewed.  She has AS severe with RV dilation and reduced systolic function.    Formal cath report is the following:  : ALYX Crocker MD  Secondary : Daryl Perez MD     Procedure: Right and left heart catheterization, atrial septal puncture with direct hemodynamic measurement of the left atrium using intracardiac ultrasound guidance (ICE), ventriculography and selective coronary angiography with angiography of SVG's and LIMA  Indications: Severe AS  Type and Site of Entry: percutaneous right femoral artery and vein using the Seldinger technique   Contrast used: Isovue  Catheters used: 8F SL0, brockenbrough needle, 7F swan darcy, 5 Eritrean pigtail, JL4, JR4,   Findings: Because of the presence of V waves on the wedge tracing, an inaccurate wedge pressure was suspected and transseptal puncture was performed with ICE guidance to accurately measure LA pressure  ICE: The intra atrial septum is intact with no visible defects.  The AV is normal with stenosis or regurgitation. The MV and TV morphology is normal. There is mild TR. There is no pericardial effusion     Hemodynamics: RA (5)  LA 30/15  RV 56/5  PA 52/14 (30)  PCWP (16)  left ventricular pressure 188/12  Aortic pressure 155/46 (85)      Mean AV gradient is 46 mm Hg and the Pk gradient is 81 mmHg.   Calculated CHELSEA = 0.80 cm2      Ventriculography: Left ventricular left ventricular ejection fraction is 65%.     No significant regional wall motion or mitral regurgitation.     Selective coronary angiography:  #1. The left main coronary artery is an average size vessel with no significant disease.  #2. The left anterior descending coronary artery is totally occluded in the mid portion #3. Left circumflex coronary artery is an average size vessel with one visible distal OM. There is no significant disease  #4 Right coronary artery is a small diffusely diseased vessel.     Angiograph of SVG's and LIMA  1. SVG to the LAD is widely patent  2. SVG to the OM is patent but there is a 85% stenosis in the vessel distal to the insertion site  3. SVG to the RPDA is widely patent  4. The LIMA is totally occluded and was apparently never used     Conclusion:  1. Patent SVG's to the LAD, OM, and RPDA  2. Severe AS  3. Normal LV function  4. Prominent V waves due to significant diastolic dysfunction     I reviewed the patient's new clinical results.  Discussed with patient and daughter      Assessment/Plan       Symptomatic aortic valve stenosis  Advanced age  Atrial fibrillation  Acquired coagulation factor deficiency  Diabetes mellitus  Coronary artery disease now post CABG  Mild pulmonary hypertension with right ventricular dysfunction- systolic  frailty features  Physical debilitation.      I discussed the patients findings and my recommendations with patient and wife and daughter, We discussed the options for treatment of aortic valvular disease to include medical therapy, surgical aortic valve  replacement, balloon valvuloplasty, and percutaneous aortic valve replacement. We discussed the pros and cons of each option and how it pertains to the current case. The best option given patient's findings and risk factors is likely to consider TAVR.  She will be moderate to high risk candidate for redo median sternotomy, aortic valve replacement.  The STS Risk score was calculated using the STS Risk Calculator and discussed with the patient and family.  We discussed she is estimated to be at least 7.922% risk of death, 3% risk of stroke, 9% renal failure  and 25% risk of prolonged mechanical ventilation .  The patient and family understand the risks, benefits to both TAVR and SAVR.  She would like to proceed forward with TAVR. We discussed the operative conduct and expected hospital and outpatient recovery for SAVR and TAVR.   I will defer to Dr. Crocker to make referral for TAVR.  She is a non smoker.  We discussed her weight is acceptable for her age and height.  She was congratulated on her success.    I do believe it is very reasonable to proceed with TAVR as this likely is in her best interest,.    Total time was 70 min reviewing cath images, cath data, echo, history in epic, history via patient, exam, and counseling the patient.  Greater than 50% time was spent counseling the patient.

## 2017-03-14 ENCOUNTER — CLINICAL SUPPORT (OUTPATIENT)
Dept: CARDIOLOGY | Facility: CLINIC | Age: 82
End: 2017-03-14

## 2017-03-14 ENCOUNTER — DOCUMENTATION (OUTPATIENT)
Dept: CARDIOLOGY | Facility: CLINIC | Age: 82
End: 2017-03-14

## 2017-03-14 ENCOUNTER — HOSPITAL ENCOUNTER (OUTPATIENT)
Dept: CARDIOLOGY | Facility: HOSPITAL | Age: 82
Discharge: HOME | End: 2017-03-14
Admitting: INTERNAL MEDICINE

## 2017-03-14 DIAGNOSIS — I49.5 SSS (SICK SINUS SYNDROME) (HCC): Primary | ICD-10-CM

## 2017-03-14 DIAGNOSIS — T82.9XXA PACEMAKER COMPLICATIONS, INITIAL ENCOUNTER: Primary | ICD-10-CM

## 2017-03-14 DIAGNOSIS — T82.9XXA PACEMAKER COMPLICATIONS, INITIAL ENCOUNTER: ICD-10-CM

## 2017-03-14 PROCEDURE — 87147 CULTURE TYPE IMMUNOLOGIC: CPT | Performed by: NURSE PRACTITIONER

## 2017-03-14 PROCEDURE — 93280 PM DEVICE PROGR EVAL DUAL: CPT | Performed by: INTERNAL MEDICINE

## 2017-03-14 PROCEDURE — G0463 HOSPITAL OUTPT CLINIC VISIT: HCPCS

## 2017-03-14 PROCEDURE — 87186 SC STD MICRODIL/AGAR DIL: CPT | Performed by: NURSE PRACTITIONER

## 2017-03-14 PROCEDURE — 87070 CULTURE OTHR SPECIMN AEROBIC: CPT | Performed by: NURSE PRACTITIONER

## 2017-03-14 PROCEDURE — 87205 SMEAR GRAM STAIN: CPT | Performed by: NURSE PRACTITIONER

## 2017-03-14 PROCEDURE — 87185 SC STD ENZYME DETCJ PER NZM: CPT | Performed by: NURSE PRACTITIONER

## 2017-03-14 RX ORDER — CEPHALEXIN 500 MG/1
500 CAPSULE ORAL 2 TIMES DAILY
Qty: 14 CAPSULE | Refills: 0 | Status: ON HOLD | OUTPATIENT
Start: 2017-03-14 | End: 2017-06-06

## 2017-03-14 NOTE — PROGRESS NOTES
Patient presented today to the office for pacemaker check. Check is okay. Discussed with Dr. Crocker and starting patient on antibiotic (Keflex X7 days). Patient will also go to the COU for a culture.   Patient got new pacemaker February 3rd. 6 Days ago site started draining. Site looks good other than on the outer left part of pacemaker incision has a small area that is open and draining.   Patient will follow up in 1 week with pacer check. Triple Antibiotic ointment, Keflex and culture.  Patient has had no fevers. Patient will call with fever or site worsens. Patient's daughter is with her today.   Tirso Gonsales, FACUNDO  03/14/17  1:24 PM

## 2017-03-14 NOTE — NURSING NOTE
Patient presented to [facility].  Culture obtained from left subclavian pacer site.  Small, red opening at right end of chest incision.  Patient tolerated well and discharged home.

## 2017-03-15 NOTE — PROGRESS NOTES
Dual Chamber Pacemaker Evaluation Report  In office    March 15, 2017    Primary Cardiologist: Obi  : Guidant Model: Essentio  Implant date: February 3, 2017    Reason for evaluation:routine  Indication for pacemaker: sick sinus syndrome    Measurements  Atrial sensing - P wave: n/r @ 30 ppm  Atrial threshold: 0.4V@ 0.4ms  Atrial lead impedance: 635 ohms  Ventricular sensing - R wave: 16.1 mV  Ventricular threshold: 0.6 V @ 0.4 ms  Ventricular lead impedance:   819 ohms     Diagnostic Data  Atrial paced: 95 %  Ventricular paced: 2 %  Other: no new events   Incision oozing from outside corner.  Lor June & FACUNDO Acosta looked at the incision.  Pt is being cultured and started on antibiotics.  Will have another site check in 1 week.  Advised pt to notify us if she developed fever, worsening drainage, swelling, new redness, or pain in the area before then.     Pt has been c/o fatigue.  Daughter stated Dr. Crocker did not want lower rate raised when they asked last.  It looks like pt may benefit from a little more rate response.  Spoke with Dr. Crocker.  He okayed an adjustment.  Battery status: satisfactory   Est 13 years      Final Parameters  Mode:  DDDR  Lower rate: 60 bpm   Upper rate: 130 bpm  AV Delay: paced- 220-300 ms  Aiqpyu-980-926 ms  Atrial - Amplitude: 2 V   Pulse width: 0.4 ms   Sensitivity: 0.25 mV     Ventricular - Amplitude: 2 V  Pulse width: 0.4 ms  Sensitivity: 0.6 mV    Changes made: increased minute ventilation to 10, was 8 previously, changed atrial and ventricular output to 2V.  Conclusions: normal pacemaker function and stable pacing and sensing thresholds    Follow up: 1 week site check.

## 2017-03-15 NOTE — TELEPHONE ENCOUNTER
Call received from patient's daughter r/t Rx for antibiotic.  Family had requested it be sent to [facility] but it went to [facility].  Call placed to [facility] to request Rx be transferred.  Call returned to patient's daughter w/update.    HV

## 2017-03-16 ENCOUNTER — DOCUMENTATION (OUTPATIENT)
Dept: CARDIOLOGY | Facility: CLINIC | Age: 82
End: 2017-03-16

## 2017-03-16 DIAGNOSIS — T82.9XXA PACEMAKER COMPLICATIONS, INITIAL ENCOUNTER: Primary | ICD-10-CM

## 2017-03-16 DIAGNOSIS — B95.8 STAPHYLOCOCCAL INFECTION: Primary | ICD-10-CM

## 2017-03-16 LAB
B-LACTAMASE USUAL SUSC ISLT: POSITIVE
BACTERIA SPEC AEROBE CULT: ABNORMAL
GRAM STN SPEC: ABNORMAL
GRAM STN SPEC: ABNORMAL

## 2017-03-16 RX ORDER — LEVOFLOXACIN 750 MG/1
750 TABLET ORAL DAILY
Qty: 10 TABLET | Refills: 0 | Status: SHIPPED | OUTPATIENT
Start: 2017-03-16 | End: 2017-03-26

## 2017-03-16 NOTE — PROGRESS NOTES
"Spoke with patient on the phone about results of wound culture and sensitivity. Called in Levofloxacin to pharmacy of patients choice ([facility]). Also instructed patient to have blood cultures done, patient cannot come to [place] will have drawn in [place].  Zoila ANDERSON will discuss with daughter, and fax blood culture orders to [place].  Patient continues to feel good, no fevers, chills, aches, fatigue. Patient states \"I feel great\"  "

## 2017-03-17 ENCOUNTER — ANTICOAGULATION VISIT (OUTPATIENT)
Dept: CARDIOLOGY | Facility: CLINIC | Age: 82
End: 2017-03-17

## 2017-03-17 LAB — INR PPP: 2.6

## 2017-03-24 ENCOUNTER — DOCUMENTATION (OUTPATIENT)
Dept: CARDIOLOGY | Facility: CLINIC | Age: 82
End: 2017-03-24

## 2017-03-24 NOTE — PROGRESS NOTES
Patient came in yesterday for a site check.  She has been on antibiotics for a week now. Incision well approximated, free from redness, drainage, swelling, and warmth.    Pt reports that she has had none of those symptoms in the last several days.  She mentioned that her antibiotic made her feel bad.  I advised to take with food and to be sure to finish the whole prescription.  Pt voiced understanding.

## 2017-03-27 ENCOUNTER — ANTICOAGULATION VISIT (OUTPATIENT)
Dept: CARDIOLOGY | Facility: CLINIC | Age: 82
End: 2017-03-27

## 2017-03-27 LAB — INR PPP: 3.1

## 2017-04-07 ENCOUNTER — ANTICOAGULATION VISIT (OUTPATIENT)
Dept: CARDIOLOGY | Facility: CLINIC | Age: 82
End: 2017-04-07

## 2017-04-10 ENCOUNTER — ANTICOAGULATION VISIT (OUTPATIENT)
Dept: CARDIOLOGY | Facility: CLINIC | Age: 82
End: 2017-04-10

## 2017-04-10 LAB — INR PPP: 3.3

## 2017-04-18 ENCOUNTER — ANTICOAGULATION VISIT (OUTPATIENT)
Dept: CARDIOLOGY | Facility: CLINIC | Age: 82
End: 2017-04-18

## 2017-04-18 LAB — INR PPP: 2.1

## 2017-04-24 ENCOUNTER — ANTICOAGULATION VISIT (OUTPATIENT)
Dept: CARDIOLOGY | Facility: CLINIC | Age: 82
End: 2017-04-24

## 2017-04-24 LAB — INR PPP: 2.2

## 2017-05-01 ENCOUNTER — ANTICOAGULATION VISIT (OUTPATIENT)
Dept: CARDIOLOGY | Facility: CLINIC | Age: 82
End: 2017-05-01

## 2017-05-01 LAB — INR PPP: 2.8

## 2017-05-08 ENCOUNTER — ANTICOAGULATION VISIT (OUTPATIENT)
Dept: CARDIOLOGY | Facility: CLINIC | Age: 82
End: 2017-05-08

## 2017-05-08 LAB — INR PPP: 2.8

## 2017-05-15 ENCOUNTER — ANTICOAGULATION VISIT (OUTPATIENT)
Dept: CARDIOLOGY | Facility: CLINIC | Age: 82
End: 2017-05-15

## 2017-05-15 LAB — INR PPP: 2.4

## 2017-05-22 ENCOUNTER — ANTICOAGULATION VISIT (OUTPATIENT)
Dept: CARDIOLOGY | Facility: CLINIC | Age: 82
End: 2017-05-22

## 2017-05-22 LAB — INR PPP: 3.4

## 2017-05-23 ENCOUNTER — HOSPITAL ENCOUNTER (OUTPATIENT)
Dept: MRI IMAGING | Age: 82
Discharge: HOME | End: 2017-05-23
Payer: MEDICARE

## 2017-05-23 DIAGNOSIS — M54.31 BACK PAIN WITH RIGHT-SIDED SCIATICA: ICD-10-CM

## 2017-05-23 PROCEDURE — 72148 MRI LUMBAR SPINE W/O DYE: CPT

## 2017-05-30 ENCOUNTER — ANTICOAGULATION VISIT (OUTPATIENT)
Dept: CARDIOLOGY | Facility: CLINIC | Age: 82
End: 2017-05-30

## 2017-05-30 LAB — INR PPP: 4.7

## 2017-06-02 ENCOUNTER — ANTICOAGULATION VISIT (OUTPATIENT)
Dept: CARDIOLOGY | Facility: CLINIC | Age: 82
End: 2017-06-02

## 2017-06-02 LAB — INR PPP: 3.1

## 2017-06-05 ENCOUNTER — ANTICOAGULATION VISIT (OUTPATIENT)
Dept: CARDIOLOGY | Facility: CLINIC | Age: 82
End: 2017-06-05

## 2017-06-05 ENCOUNTER — APPOINTMENT (OUTPATIENT)
Dept: GENERAL RADIOLOGY | Facility: HOSPITAL | Age: 82
End: 2017-06-05

## 2017-06-05 ENCOUNTER — HOSPITAL ENCOUNTER (OUTPATIENT)
Facility: HOSPITAL | Age: 82
Setting detail: OBSERVATION
Discharge: HOME | End: 2017-06-07
Attending: FAMILY MEDICINE | Admitting: INTERNAL MEDICINE

## 2017-06-05 DIAGNOSIS — I10 ESSENTIAL HYPERTENSION: ICD-10-CM

## 2017-06-05 DIAGNOSIS — R73.9 HYPERGLYCEMIA: ICD-10-CM

## 2017-06-05 DIAGNOSIS — R07.9 CHEST PAIN, UNSPECIFIED TYPE: Primary | ICD-10-CM

## 2017-06-05 LAB
INR PPP: 1.8
TROPONIN I SERPL-MCNC: 0 NG/ML (ref 0–0.07)

## 2017-06-05 PROCEDURE — 83880 ASSAY OF NATRIURETIC PEPTIDE: CPT | Performed by: FAMILY MEDICINE

## 2017-06-05 PROCEDURE — 82553 CREATINE MB FRACTION: CPT | Performed by: FAMILY MEDICINE

## 2017-06-05 PROCEDURE — 99285 EMERGENCY DEPT VISIT HI MDM: CPT

## 2017-06-05 PROCEDURE — 83874 ASSAY OF MYOGLOBIN: CPT | Performed by: FAMILY MEDICINE

## 2017-06-05 PROCEDURE — 85730 THROMBOPLASTIN TIME PARTIAL: CPT | Performed by: FAMILY MEDICINE

## 2017-06-05 PROCEDURE — 84443 ASSAY THYROID STIM HORMONE: CPT | Performed by: FAMILY MEDICINE

## 2017-06-05 PROCEDURE — 86140 C-REACTIVE PROTEIN: CPT | Performed by: FAMILY MEDICINE

## 2017-06-05 PROCEDURE — 93010 ELECTROCARDIOGRAM REPORT: CPT | Performed by: INTERNAL MEDICINE

## 2017-06-05 PROCEDURE — 83690 ASSAY OF LIPASE: CPT | Performed by: FAMILY MEDICINE

## 2017-06-05 PROCEDURE — 85610 PROTHROMBIN TIME: CPT | Performed by: FAMILY MEDICINE

## 2017-06-05 PROCEDURE — 84439 ASSAY OF FREE THYROXINE: CPT | Performed by: FAMILY MEDICINE

## 2017-06-05 PROCEDURE — 71010 HC CHEST PA OR AP: CPT

## 2017-06-05 PROCEDURE — 80053 COMPREHEN METABOLIC PANEL: CPT | Performed by: FAMILY MEDICINE

## 2017-06-05 PROCEDURE — 82150 ASSAY OF AMYLASE: CPT | Performed by: FAMILY MEDICINE

## 2017-06-05 PROCEDURE — 85025 COMPLETE CBC W/AUTO DIFF WBC: CPT | Performed by: FAMILY MEDICINE

## 2017-06-05 PROCEDURE — 84484 ASSAY OF TROPONIN QUANT: CPT

## 2017-06-05 PROCEDURE — 93005 ELECTROCARDIOGRAM TRACING: CPT

## 2017-06-05 RX ORDER — ONDANSETRON 2 MG/ML
2 INJECTION INTRAMUSCULAR; INTRAVENOUS ONCE
Status: COMPLETED | OUTPATIENT
Start: 2017-06-05 | End: 2017-06-06

## 2017-06-05 RX ORDER — MAGNESIUM HYDROXIDE/ALUMINUM HYDROXICE/SIMETHICONE 120; 1200; 1200 MG/30ML; MG/30ML; MG/30ML
30 SUSPENSION ORAL ONCE
Status: COMPLETED | OUTPATIENT
Start: 2017-06-05 | End: 2017-06-06

## 2017-06-05 RX ORDER — SODIUM CHLORIDE 0.9 % (FLUSH) 0.9 %
10 SYRINGE (ML) INJECTION AS NEEDED
Status: DISCONTINUED | OUTPATIENT
Start: 2017-06-05 | End: 2017-06-07 | Stop reason: HOSPADM

## 2017-06-05 RX ORDER — HYDROCODONE BITARTRATE AND ACETAMINOPHEN 5; 325 MG/1; MG/1
1 TABLET ORAL EVERY 4 HOURS PRN
COMMUNITY
End: 2018-04-13

## 2017-06-06 ENCOUNTER — APPOINTMENT (OUTPATIENT)
Dept: CARDIOLOGY | Facility: HOSPITAL | Age: 82
End: 2017-06-06
Attending: INTERNAL MEDICINE

## 2017-06-06 PROBLEM — R07.9 CHEST PAIN: Status: ACTIVE | Noted: 2017-06-06

## 2017-06-06 LAB
ALBUMIN SERPL-MCNC: 3.8 G/DL (ref 3.5–5)
ALBUMIN/GLOB SERPL: 1.2 G/DL (ref 1.1–2.5)
ALP SERPL-CCNC: 89 U/L (ref 24–120)
ALT SERPL W P-5'-P-CCNC: 18 U/L (ref 0–54)
AMYLASE SERPL-CCNC: 67 U/L (ref 30–110)
ANION GAP SERPL CALCULATED.3IONS-SCNC: 13 MMOL/L (ref 4–13)
APTT PPP: 34.8 SECONDS (ref 24.1–34.8)
AST SERPL-CCNC: 31 U/L (ref 7–45)
BACTERIA UR QL AUTO: ABNORMAL /HPF
BASOPHILS # BLD AUTO: 0.04 10*3/MM3 (ref 0–0.2)
BASOPHILS NFR BLD AUTO: 0.5 % (ref 0–2)
BILIRUB SERPL-MCNC: 0.5 MG/DL (ref 0.1–1)
BILIRUB UR QL STRIP: NEGATIVE
BUN BLD-MCNC: 37 MG/DL (ref 5–21)
BUN/CREAT SERPL: 27 (ref 7–25)
CALCIUM SPEC-SCNC: 9.2 MG/DL (ref 8.4–10.4)
CHLORIDE SERPL-SCNC: 102 MMOL/L (ref 98–110)
CK MB SERPL-CCNC: 1.78 NG/ML (ref 0–5)
CLARITY UR: CLEAR
CO2 SERPL-SCNC: 25 MMOL/L (ref 24–31)
COLOR UR: YELLOW
CREAT BLD-MCNC: 1.37 MG/DL (ref 0.5–1.4)
CRP SERPL-MCNC: <0.5 MG/DL (ref 0–0.99)
DEPRECATED RDW RBC AUTO: 47.4 FL (ref 40–54)
EOSINOPHIL # BLD AUTO: 0.26 10*3/MM3 (ref 0–0.7)
EOSINOPHIL NFR BLD AUTO: 3.3 % (ref 0–4)
ERYTHROCYTE [DISTWIDTH] IN BLOOD BY AUTOMATED COUNT: 14.9 % (ref 12–15)
GFR SERPL CREATININE-BSD FRML MDRD: 37 ML/MIN/1.73
GLOBULIN UR ELPH-MCNC: 3.2 GM/DL
GLUCOSE BLD-MCNC: 248 MG/DL (ref 70–100)
GLUCOSE BLDC GLUCOMTR-MCNC: 180 MG/DL (ref 70–130)
GLUCOSE BLDC GLUCOMTR-MCNC: 206 MG/DL (ref 70–130)
GLUCOSE BLDC GLUCOMTR-MCNC: 274 MG/DL (ref 70–130)
GLUCOSE UR STRIP-MCNC: ABNORMAL MG/DL
HCT VFR BLD AUTO: 32.3 % (ref 37–47)
HGB BLD-MCNC: 10.1 G/DL (ref 12–16)
HGB UR QL STRIP.AUTO: NEGATIVE
HOLD SPECIMEN: NORMAL
HOLD SPECIMEN: NORMAL
HYALINE CASTS UR QL AUTO: ABNORMAL /LPF
IMM GRANULOCYTES # BLD: 0.07 10*3/MM3 (ref 0–0.03)
IMM GRANULOCYTES NFR BLD: 0.9 % (ref 0–5)
INR PPP: 1.71 (ref 0.91–1.09)
KETONES UR QL STRIP: NEGATIVE
LEUKOCYTE ESTERASE UR QL STRIP.AUTO: ABNORMAL
LIPASE SERPL-CCNC: 143 U/L (ref 23–203)
LYMPHOCYTES # BLD AUTO: 1.63 10*3/MM3 (ref 0.72–4.86)
LYMPHOCYTES NFR BLD AUTO: 20.9 % (ref 15–45)
MCH RBC QN AUTO: 27.2 PG (ref 28–32)
MCHC RBC AUTO-ENTMCNC: 31.3 G/DL (ref 33–36)
MCV RBC AUTO: 87.1 FL (ref 82–98)
MONOCYTES # BLD AUTO: 0.9 10*3/MM3 (ref 0.19–1.3)
MONOCYTES NFR BLD AUTO: 11.6 % (ref 4–12)
MYOGLOBIN SERPL-MCNC: 54.7 NG/ML (ref 0–110)
NEUTROPHILS # BLD AUTO: 4.89 10*3/MM3 (ref 1.87–8.4)
NEUTROPHILS NFR BLD AUTO: 62.8 % (ref 39–78)
NITRITE UR QL STRIP: NEGATIVE
NT-PROBNP SERPL-MCNC: 1610 PG/ML (ref 0–1800)
PH UR STRIP.AUTO: 6.5 [PH] (ref 5–8)
PLATELET # BLD AUTO: 345 10*3/MM3 (ref 130–400)
PMV BLD AUTO: 11.5 FL (ref 6–12)
POTASSIUM BLD-SCNC: 4.4 MMOL/L (ref 3.5–5.3)
PROT SERPL-MCNC: 7 G/DL (ref 6.3–8.7)
PROT UR QL STRIP: NEGATIVE
PROTHROMBIN TIME: 20.7 SECONDS (ref 11.9–14.6)
RBC # BLD AUTO: 3.71 10*6/MM3 (ref 4.2–5.4)
RBC # UR: ABNORMAL /HPF
REF LAB TEST METHOD: ABNORMAL
SODIUM BLD-SCNC: 140 MMOL/L (ref 135–145)
SP GR UR STRIP: 1.02 (ref 1–1.03)
SQUAMOUS #/AREA URNS HPF: ABNORMAL /HPF
T4 FREE SERPL-MCNC: 1.59 NG/DL (ref 0.78–2.19)
TROPONIN I SERPL-MCNC: 0.11 NG/ML (ref 0–0.03)
TROPONIN I SERPL-MCNC: 0.53 NG/ML (ref 0–0.03)
TSH SERPL DL<=0.05 MIU/L-ACNC: 1.5 MIU/ML (ref 0.47–4.68)
UROBILINOGEN UR QL STRIP: ABNORMAL
WBC NRBC COR # BLD: 7.79 10*3/MM3 (ref 4.8–10.8)
WBC UR QL AUTO: ABNORMAL /HPF
WHOLE BLOOD HOLD SPECIMEN: NORMAL
WHOLE BLOOD HOLD SPECIMEN: NORMAL

## 2017-06-06 PROCEDURE — G0378 HOSPITAL OBSERVATION PER HR: HCPCS

## 2017-06-06 PROCEDURE — 84484 ASSAY OF TROPONIN QUANT: CPT | Performed by: INTERNAL MEDICINE

## 2017-06-06 PROCEDURE — 93306 TTE W/DOPPLER COMPLETE: CPT | Performed by: INTERNAL MEDICINE

## 2017-06-06 PROCEDURE — 63710000001 INSULIN LISPRO (HUMAN) PER 5 UNITS: Performed by: INTERNAL MEDICINE

## 2017-06-06 PROCEDURE — 99214 OFFICE O/P EST MOD 30 MIN: CPT | Performed by: INTERNAL MEDICINE

## 2017-06-06 PROCEDURE — 82962 GLUCOSE BLOOD TEST: CPT

## 2017-06-06 PROCEDURE — 96375 TX/PRO/DX INJ NEW DRUG ADDON: CPT

## 2017-06-06 PROCEDURE — 96374 THER/PROPH/DIAG INJ IV PUSH: CPT

## 2017-06-06 PROCEDURE — 93306 TTE W/DOPPLER COMPLETE: CPT

## 2017-06-06 PROCEDURE — 81001 URINALYSIS AUTO W/SCOPE: CPT | Performed by: FAMILY MEDICINE

## 2017-06-06 PROCEDURE — 87086 URINE CULTURE/COLONY COUNT: CPT | Performed by: FAMILY MEDICINE

## 2017-06-06 PROCEDURE — 63710000001 INSULIN DETEMIR PER 5 UNITS: Performed by: INTERNAL MEDICINE

## 2017-06-06 PROCEDURE — 25010000002 ONDANSETRON PER 1 MG: Performed by: FAMILY MEDICINE

## 2017-06-06 PROCEDURE — 94799 UNLISTED PULMONARY SVC/PX: CPT

## 2017-06-06 RX ORDER — PANTOPRAZOLE SODIUM 40 MG/1
40 TABLET, DELAYED RELEASE ORAL
Status: DISCONTINUED | OUTPATIENT
Start: 2017-06-06 | End: 2017-06-07 | Stop reason: HOSPADM

## 2017-06-06 RX ORDER — LEVOFLOXACIN 250 MG/1
250 TABLET ORAL
Status: DISCONTINUED | OUTPATIENT
Start: 2017-06-06 | End: 2017-06-06

## 2017-06-06 RX ORDER — IPRATROPIUM BROMIDE AND ALBUTEROL SULFATE 2.5; .5 MG/3ML; MG/3ML
3 SOLUTION RESPIRATORY (INHALATION) EVERY 4 HOURS PRN
Status: DISCONTINUED | OUTPATIENT
Start: 2017-06-06 | End: 2017-06-07 | Stop reason: HOSPADM

## 2017-06-06 RX ORDER — NICOTINE POLACRILEX 4 MG
15 LOZENGE BUCCAL
Status: DISCONTINUED | OUTPATIENT
Start: 2017-06-06 | End: 2017-06-07 | Stop reason: HOSPADM

## 2017-06-06 RX ORDER — WARFARIN SODIUM 3 MG/1
3 TABLET ORAL
Status: DISCONTINUED | OUTPATIENT
Start: 2017-06-06 | End: 2017-06-07

## 2017-06-06 RX ORDER — MAGNESIUM HYDROXIDE/ALUMINUM HYDROXICE/SIMETHICONE 120; 1200; 1200 MG/30ML; MG/30ML; MG/30ML
30 SUSPENSION ORAL EVERY 6 HOURS PRN
Status: DISCONTINUED | OUTPATIENT
Start: 2017-06-06 | End: 2017-06-07 | Stop reason: HOSPADM

## 2017-06-06 RX ORDER — SPIRONOLACTONE 25 MG/1
25 TABLET ORAL DAILY
Status: DISCONTINUED | OUTPATIENT
Start: 2017-06-06 | End: 2017-06-07 | Stop reason: HOSPADM

## 2017-06-06 RX ORDER — DEXTROSE MONOHYDRATE 25 G/50ML
25 INJECTION, SOLUTION INTRAVENOUS
Status: DISCONTINUED | OUTPATIENT
Start: 2017-06-06 | End: 2017-06-07 | Stop reason: HOSPADM

## 2017-06-06 RX ORDER — PANTOPRAZOLE SODIUM 40 MG/10ML
40 INJECTION, POWDER, LYOPHILIZED, FOR SOLUTION INTRAVENOUS
Status: DISCONTINUED | OUTPATIENT
Start: 2017-06-06 | End: 2017-06-06

## 2017-06-06 RX ORDER — TIZANIDINE 4 MG/1
4 TABLET ORAL EVERY 8 HOURS PRN
COMMUNITY
End: 2018-04-13

## 2017-06-06 RX ORDER — VENLAFAXINE HYDROCHLORIDE 37.5 MG/1
37.5 CAPSULE, EXTENDED RELEASE ORAL DAILY
COMMUNITY
End: 2019-01-01 | Stop reason: SDUPTHER

## 2017-06-06 RX ORDER — ONDANSETRON 2 MG/ML
4 INJECTION INTRAMUSCULAR; INTRAVENOUS EVERY 6 HOURS PRN
Status: DISCONTINUED | OUTPATIENT
Start: 2017-06-06 | End: 2017-06-07 | Stop reason: HOSPADM

## 2017-06-06 RX ORDER — WARFARIN SODIUM 1 MG/1
1 TABLET ORAL
COMMUNITY
End: 2017-06-26 | Stop reason: HOSPADM

## 2017-06-06 RX ORDER — ACETAMINOPHEN 325 MG/1
650 TABLET ORAL EVERY 6 HOURS PRN
Status: DISCONTINUED | OUTPATIENT
Start: 2017-06-06 | End: 2017-06-07 | Stop reason: HOSPADM

## 2017-06-06 RX ORDER — ATENOLOL 25 MG/1
25 TABLET ORAL DAILY
Status: DISCONTINUED | OUTPATIENT
Start: 2017-06-06 | End: 2017-06-07 | Stop reason: HOSPADM

## 2017-06-06 RX ORDER — ASPIRIN 81 MG/1
81 TABLET ORAL DAILY
Status: DISCONTINUED | OUTPATIENT
Start: 2017-06-06 | End: 2017-06-07 | Stop reason: HOSPADM

## 2017-06-06 RX ORDER — ATORVASTATIN CALCIUM 40 MG/1
40 TABLET, FILM COATED ORAL NIGHTLY
Status: DISCONTINUED | OUTPATIENT
Start: 2017-06-06 | End: 2017-06-07 | Stop reason: HOSPADM

## 2017-06-06 RX ORDER — SODIUM CHLORIDE 0.9 % (FLUSH) 0.9 %
1-10 SYRINGE (ML) INJECTION AS NEEDED
Status: DISCONTINUED | OUTPATIENT
Start: 2017-06-06 | End: 2017-06-07 | Stop reason: HOSPADM

## 2017-06-06 RX ADMIN — INSULIN LISPRO 2 UNITS: 100 INJECTION, SOLUTION INTRAVENOUS; SUBCUTANEOUS at 17:14

## 2017-06-06 RX ADMIN — LIDOCAINE HYDROCHLORIDE 15 ML: 20 SOLUTION ORAL; TOPICAL at 00:05

## 2017-06-06 RX ADMIN — ASPIRIN 81 MG: 81 TABLET ORAL at 09:11

## 2017-06-06 RX ADMIN — ALUMINUM HYDROXIDE, MAGNESIUM HYDROXIDE, AND SIMETHICONE 30 ML: 200; 200; 20 SUSPENSION ORAL at 02:22

## 2017-06-06 RX ADMIN — ACETAMINOPHEN 650 MG: 325 TABLET, FILM COATED ORAL at 02:37

## 2017-06-06 RX ADMIN — PANTOPRAZOLE SODIUM 40 MG: 40 TABLET, DELAYED RELEASE ORAL at 17:14

## 2017-06-06 RX ADMIN — LEVOFLOXACIN 250 MG: 250 TABLET, FILM COATED ORAL at 09:11

## 2017-06-06 RX ADMIN — INSULIN LISPRO 6 UNITS: 100 INJECTION, SOLUTION INTRAVENOUS; SUBCUTANEOUS at 12:18

## 2017-06-06 RX ADMIN — ALUMINUM HYDROXIDE, MAGNESIUM HYDROXIDE, AND SIMETHICONE 30 ML: 200; 200; 20 SUSPENSION ORAL at 00:04

## 2017-06-06 RX ADMIN — INSULIN LISPRO 4 UNITS: 100 INJECTION, SOLUTION INTRAVENOUS; SUBCUTANEOUS at 21:21

## 2017-06-06 RX ADMIN — ATENOLOL 25 MG: 25 TABLET ORAL at 09:11

## 2017-06-06 RX ADMIN — DESMOPRESSIN ACETATE 40 MG: 0.2 TABLET ORAL at 21:20

## 2017-06-06 RX ADMIN — ACETAMINOPHEN 650 MG: 325 TABLET, FILM COATED ORAL at 12:23

## 2017-06-06 RX ADMIN — INSULIN DETEMIR 50 UNITS: 100 INJECTION, SOLUTION SUBCUTANEOUS at 21:21

## 2017-06-06 RX ADMIN — ONDANSETRON HYDROCHLORIDE 2 MG: 2 SOLUTION INTRAMUSCULAR; INTRAVENOUS at 00:08

## 2017-06-06 RX ADMIN — PANTOPRAZOLE SODIUM 40 MG: 40 INJECTION, POWDER, FOR SOLUTION INTRAVENOUS at 09:37

## 2017-06-06 RX ADMIN — INSULIN DETEMIR 50 UNITS: 100 INJECTION, SOLUTION SUBCUTANEOUS at 12:13

## 2017-06-06 RX ADMIN — WARFARIN SODIUM 3 MG: 3 TABLET ORAL at 17:15

## 2017-06-06 RX ADMIN — SPIRONOLACTONE 25 MG: 25 TABLET, FILM COATED ORAL at 09:11

## 2017-06-06 RX ADMIN — Medication 400 MG: at 12:20

## 2017-06-06 NOTE — ED NOTES
Patient reports that she a surgery on Friday for a lumbar compression fracture. Patient reports that she began experiencing pain in her epigastrium, radiating to her left arm, and occasionally radiating to her right arm. Patient reports that the pain in her left arm is more severe than the pain she feels anywhere else.     Rafia Petersen RN  06/06/17 0021

## 2017-06-06 NOTE — H&P
"   TIME:  04:45 a.m.    ADMITTING PHYSICIAN:  Jenaro Conklin MD for the Hospitalist service.    HISTORY:  Ms. Costello is an 82-year-old  female who presents at [facility] due to a chief complaint of chest pain. Ms. Costello describes her chest pain as \"indigestion.\" Her symptoms started at approximately 8 p.m. last evening. Her symptoms are made \"somewhat better,\" by belching and using Gas-X. Ms. Costello has a history of coronary artery disease and congestive heart failure due to diastolic dysfunction. She also has aortic stenosis. Due to the above, her daughter was concerned and they now present requesting further evaluation and treatment. Presently Ms. Costello is resting comfortably and is in no distress. I am informed that she recently underwent a kyphoplasty procedure.     REVIEW OF SYSTEMS: Otherwise unremarkable from a cardiovascular, pulmonary, gastrointestinal, genitourinary, neurologic, psychiatric, metabolic and constitutional standpoint except as noted. She has had no unusual fatigue or weakness. She has had no fevers, chills, or sweats. Her appetite is good and her weight is stable. She has had no chest pain otherwise. She has had no shortness of breath. She has had no lower extremity swelling, orthopnea, cough, wheezing, or hemoptysis. She relates mid epigastric abdominal pain or perhaps substernal chest pain. It is difficult for her to differentiate between the 2. She has had no nausea or vomiting. She has had no diarrhea or constipation. She has had bloating and prominent belching. She has had no dysphagia or odynophagia. She has had no hematemesis, hematochezia, or melena. She has had no flank pain, pelvic pain, hematuria, or dysuria. She has had no arthralgias, myalgias, swollen joints or skin rashes. She has had no headache, confusion, memory deficits or loss of consciousness. She has had no recent changes in her vision or hearing. She has had no acute motor, sensory, " or gait deficits.     PAST MEDICAL HISTORY:  1.  Hypertension.   2.  Dyslipidemia.   3.  Diabetes mellitus type 2.   4.  Coronary artery disease.   5.  Congestive heart failure due to diastolic dysfunction.   6.  Pneumonia.   7.  Osteoarthritis.   8.  Osteoporosis.   9.  Urinary tract infection.   10.  Aortic stenosis.   11.  Depression.   12.  Vitamin B12 deficiency.   13.  Macular degeneration.   14.  Breast cancer.   15.  Peripheral vascular disease.   16.  Gastroesophageal reflux disease.     PAST SURGICAL HISTORY:  1.  Status post cholecystectomy.   2.  Status post right mastectomy.   3.  Status post hysterectomy.   4.  Status post bilateral cataract resection.   5.  Status post 4 vessel coronary artery bypass grafting.   6.  Status post pacemaker placement.   7.  Status post cardiac catheterization.     ALLERGIES:  1.  DILAUDID.  2.  VASOTEC.    3.  JANUMET.    4.  LOPID.    5.  SULFA.    6.  ULTRAM.     HOME MEDICATIONS:  1.  Atenolol 25 mg p.o. every morning and 50 mg p.o. every evening.  2.  Os-Hank 600 mg p.o. b.i.d.   3.  Glucotrol 5 mg p.o. b.i.d.   4.  Norco 5 mg take 1 p.o. q.4 h. p.r.n. for pain.   5.  NovoLog sliding scale uncertain dose t.i.d. with meals.   6.  Levemir 65 units subcutaneous q.12 h.   7.  Mag-Ox 400 mg p.o. daily.   8.  Protonix 40 mg p.o. daily.   9.  Systane ophthalmic solution 1 one drop to the right eye daily.   10.  Zocor 80 mg p.o. daily.   11.  Aldactone 25 mg p.o. daily.   12.  Effexor 37.5 mg p.o. daily.   13.  Vitamin B12 take 0.5 mg p.o. daily.   14.  Vitamin D 400 units p.o. daily.   15.  Warfarin 2 mg p.o. daily.   16.  Tylenol 650 mg p.o. q.4 h. p.r.n. for fever and/or discomfort.   17.  Aspirin 81 mg p.o. daily.   18.  Keflex 500 mg p.o. b.i.d.   19.  Vitamin D3 take 1000 units p.o. daily.   20.  Clonidine 0.1 mg p.o. b.i.d.   21.  Fenofibrate 160 mg p.o. daily.   22.  Niacin 500 mg p.o. daily.     Note, the above medications cannot be independently confirmed.      SOCIAL HISTORY: Significant for being a resident of [place], [place]. She is . She has 2 daughters in good health. She is a retired homemaker. She has a high school education. She has no history of tobacco, alcohol or drug use. She attends [employer]. She has no recent history of travel outside this region.     She is a FULL CODE.     She designates her daughter, Margarita Ignacio, to serve as a surrogate for healthcare matters should such become necessary.     FAMILY HISTORY: Significant for having a brother now  due to lung cancer. Her father is  due to heart disease. Her mother is  due to breast cancer.     PHYSICAL EXAMINATION:  VITAL SIGNS: Temperature 97.6, pulse is 70, respirations are 18 and unlabored, blood pressure is 132/43 and O2 saturation is 95% breathing ambient air, weight is 130 pounds.     GENERAL: This is an 82-year-old  female appearing her documented age. She is resting comfortably in bed. She is in no apparent distress. She is articulate in her speech. She is interactive and cooperative. She proves to be a fairly good historian, however, her daughter provided much of the information contained in this document. Note that the patient was sleeping soundly upon my arrival and was somewhat difficult to arouse.     HEAD AND NECK: Essentially unremarkable except as noted. I see no signs of acute trauma. Eyes, nose, and throat appear grossly unremarkable. Sclerae are clear. There is no discharge from the nostrils. Mucous membranes are moist. Neck is supple. She has no cervical or clavicular adenopathy. She has no definite carotid bruits. There are no masses of the head or neck. Neck veins do not appear pathologically distended.     CARDIAC: Reveals S1 and S2 with a regular rhythm. She has a 3/6 systolic murmur heard throughout the precordium. This murmur has characteristics of aortic stenosis.     LUNGS: Reveals bilateral breath sounds that are clear  to auscultation throughout. She has no rales, wheezes, or rhonchi.     ABDOMEN: Reveals bowel sounds to be present. Her abdomen is nontender, nondistended and soft.     EXTREMITIES: No lower extremity edema, erythema or calf tenderness although she appears to have a mild sunburn on the left lower leg.     NEUROLOGIC: Reveals the patient to be awake and alert. She seems oriented to person, place, time and situation. Cranial nerves 2-12 appear grossly intact. She exhibits no definite focal, motor or sensory deficits. She seems able to move all extremities without difficulty and at will. Her gait was not tested.     PSYCHIATRIC: Reveals her mood to be stable. Affect seems appropriate. Thought processes are organized in that she is able to answer questions appropriately and provide a coherent history. Speech is fluent. There is no flight of ideas. There are no obvious short-term or long-term memory deficits.     DIAGNOSTIC DATA: Metabolic panel is essentially unremarkable except for glucose of 248 and a BUN of 37.     CBC demonstrates a white blood cell count of 7.8, hemoglobin 10.1, hematocrit 32.3, platelet count of 345,000.     Urinalysis demonstrates a specific gravity of 1.019 with a pH of 6.5. Her urine contains a small amount of leukocyte esterase. She has 3-5 white blood cells per high-power field.     ProBNP is 1610.     Amylase and lipase are unremarkable.     Prothrombin time is 20.7 with an INR 1.71.     Free T4 is 1.59 with a TSH of 1.5.     Chest x-ray demonstrates cardiomegaly. Pacemaker device is present. She has no definite infiltrates. Formal interpretation by the radiologist is pending.     EKG demonstrates a paced rhythm 60 beats per minute.     IMPRESSION:  1.  Atypical chest pain?   2.  Coronary artery disease.   3.  Hypertension.   4.  Dyslipidemia.   5.  Diabetes mellitus type 2.   6.  Anemia.   7.  Chronic anticoagulation with warfarin.     PLAN: At this time, Ms. Costello will be admitted to  [facility] for further evaluation and treatment. Her admitting diagnoses are as noted. Her condition at this time is judged to be stable. She will be placed on telemetry.     I have asked the nursing staff to obtain vital signs per protocol. She will be confined to bedrest with bathroom privileges with assistance. Her allergies are as noted above. I have asked the nursing staff to monitor input and output. Daily weights will be obtained. She will be maintained on ADA cardiac diet. IV fluids will be saline locked. Oxygen will be used as needed to maintain her O2 saturation greater than 92%. She is a FULL CODE. Fall precautions are to be instituted. I will consult the cardiology service.     INITIAL ADMITTING MEDICATIONS:   1.  Aspirin 81 mg p.o. daily.   2.  Tenormin 25 mg p.o. daily.   3.  Lipitor 40 mg p.o. daily.   4.  Protonix 40 mg p.o. daily.   5.  Aldactone 25 mg p.o. daily.   6.  Warfarin 3 mg p.o. at bedtime.   7.  Additional p.r.n. medications will include Tylenol, Mylanta, DuoNeb and Zofran.     I will obtain a follow up laboratory studies.     I will continue to follow Ms. Costello closely through the night pending return of the hospitalist team in the morning. Nursing staff may call should they have any questions or concerns. Please refer to the medical record for additional information, orders and/or comments.         cc:           BEN Whitmore/[id number]  D:  06/06/2017 06:00:20(Eastern Time)  T:  06/06/2017 06:27:25(Eastern Time)  Voice ID:  [id number]/Document ID:  [id number]

## 2017-06-06 NOTE — ED PROVIDER NOTES
Subjective   Patient is a 82 y.o. female presenting with chest pain.   Chest Pain   Pain location:  L chest  Pain quality: sharp    Pain radiates to:  L arm  Pain severity:  Moderate  Onset quality:  Sudden  Duration:  6 hours  Timing:  Intermittent  Progression:  Waxing and waning  Chronicity:  New  Context: at rest    Relieved by: belching.  Worsened by:  Nothing  Ineffective treatments:  None tried  Associated symptoms: anxiety, back pain, heartburn and weakness    Associated symptoms: no abdominal pain, no altered mental status, no anorexia, no claudication, no cough, no diaphoresis, no dizziness, no dysphagia, no fatigue, no fever, no headache, no lower extremity edema, no nausea, no near-syncope, no numbness, no orthopnea, no palpitations, no PND, no shortness of breath, no syncope and no vomiting    Risk factors: aortic disease and coronary artery disease        Review of Systems   Constitutional: Negative for activity change, appetite change, chills, diaphoresis, fatigue, fever and unexpected weight change.   HENT: Negative for congestion, dental problem, ear pain, hearing loss, mouth sores, nosebleeds, postnasal drip, rhinorrhea, sinus pressure, sneezing, sore throat, trouble swallowing and voice change.    Eyes: Negative for photophobia, pain, redness and visual disturbance.   Respiratory: Negative.  Negative for cough and shortness of breath.    Cardiovascular: Positive for chest pain. Negative for palpitations, orthopnea, claudication, leg swelling, syncope, PND and near-syncope.   Gastrointestinal: Positive for heartburn. Negative for abdominal distention, abdominal pain, anorexia, blood in stool, constipation, nausea and vomiting.   Endocrine: Negative for cold intolerance and heat intolerance.   Genitourinary: Negative for decreased urine volume, difficulty urinating, dysuria, flank pain, hematuria, pelvic pain and urgency.   Musculoskeletal: Positive for back pain. Negative for arthralgias, neck pain  and neck stiffness.   Skin: Negative for color change, pallor, rash and wound.   Allergic/Immunologic: Negative for environmental allergies.   Neurological: Positive for weakness. Negative for dizziness, seizures, syncope, facial asymmetry, speech difficulty, light-headedness, numbness and headaches.   Hematological: Negative for adenopathy. Does not bruise/bleed easily.   Psychiatric/Behavioral: Negative for confusion and sleep disturbance. The patient is not nervous/anxious.    All other systems reviewed and are negative.      Past Medical History:   Diagnosis Date   • Aortic stenosis    • Atrial fibrillation    • Atrial fibrillation by electrocardiogram    • Atrial flutter    • Breast cancer    • CAD (coronary artery disease)    • Chronic anticoagulation     Coumadin    • Diabetes mellitus     Type II   • Hospital discharge follow-up    • Hyperlipidemia    • Hypertension        Allergies   Allergen Reactions   • Dilaudid [Hydromorphone Hcl]    • Dilaudid [Hydromorphone] Nausea And Vomiting   • Enalapril Angioedema   • Janumet [Sitagliptin-Metformin Hcl]    • Lopid [Gemfibrozil] Nausea And Vomiting   • Sulfa Antibiotics Other (See Comments)     Syncope     • Ultram [Tramadol] Itching       Past Surgical History:   Procedure Laterality Date   • BELPHAROPTOSIS REPAIR     • CARDIAC CATHETERIZATION  1999, 2010   • CARDIAC CATHETERIZATION N/A 2/15/2017    Procedure: Left Heart Cath;  Surgeon: Ehsan Crocker MD;  Location:  [facility];  Service:    • CARDIAC CATHETERIZATION N/A 2/15/2017    Procedure: Right Heart Cath;  Surgeon: Ehsan Crocker MD;  Location:  [facility];  Service:    • CARDIAC CATHETERIZATION N/A 2/15/2017    Procedure: Coronary angiography;  Surgeon: Ehsan Crocker MD;  Location:  [facility];  Service:    • CARDIAC CATHETERIZATION N/A 2/15/2017    Procedure: Left ventriculography;  Surgeon: Ehsan Crocker MD;  Location:  [facility];   Service:    • CARDIAC CATHETERIZATION N/A 2/15/2017    Procedure: Intracardiac echocardiogram;  Surgeon: Ehsan Crocker MD;  Location:  [facility];  Service:    • CARDIAC ELECTROPHYSIOLOGY PROCEDURE N/A 2/3/2017    Procedure: Pacemaker DC new;  Surgeon: Ehsan Crocker MD;  Location:  [facility];  Service:    • CATARACT EXTRACTION     • CHOLECYSTECTOMY     • CORONARY ARTERY BYPASS GRAFT  1999    4 vessel    • HYSTERECTOMY     • INSERT / REPLACE / REMOVE PACEMAKER     • MASTECTOMY         Family History   Problem Relation Age of Onset   • Breast cancer Mother    • Coronary artery disease Father    • Heart attack Father    • Cancer Brother    • No Known Problems Maternal Grandmother    • No Known Problems Maternal Grandfather    • No Known Problems Paternal Grandmother    • No Known Problems Paternal Grandfather        Social History     Social History   • Marital status:      Spouse name: N/A   • Number of children: N/A   • Years of education: N/A     Social History Main Topics   • Smoking status: Never Smoker   • Smokeless tobacco: Never Used   • Alcohol use No   • Drug use: No   • Sexual activity: Defer     Other Topics Concern   • None     Social History Narrative           Objective   Physical Exam   Constitutional: She is oriented to person, place, and time. She appears well-developed and well-nourished.   HENT:   Head: Normocephalic.   Nose: Nose normal.   Mouth/Throat: Oropharynx is clear and moist.   Eyes: Conjunctivae and EOM are normal. Pupils are equal, round, and reactive to light.   Neck: Normal range of motion. Neck supple. No JVD present. No thyromegaly present.   Cardiovascular: Normal rate, regular rhythm, normal heart sounds and intact distal pulses.    Pulmonary/Chest: Effort normal and breath sounds normal.   Abdominal: Soft. Bowel sounds are normal. She exhibits no distension and no mass. There is no tenderness. There is no rebound and no guarding.    Musculoskeletal: Normal range of motion.   Lymphadenopathy:     She has no cervical adenopathy.   Neurological: She is alert and oriented to person, place, and time.   Skin: Skin is warm and dry. No rash noted. No erythema.   Psychiatric: She has a normal mood and affect. Her behavior is normal. Judgment and thought content normal.   Nursing note and vitals reviewed.      Procedures         ED Course  ED Course            HEART Score  History: Highly suspicious (+2)  ECG: Normal (+0)  Age: Greater than or equal to 65 (+2)  Risk Factors: 3 or more risk factors OR history of atherosclerotic disease (+2)  Troponin: Normal limit or lower (+0)  Total: 6         MDM  Number of Diagnoses or Management Options  Chest pain, unspecified type: new and requires workup  Essential hypertension: established and worsening  Hyperglycemia: established and worsening     Amount and/or Complexity of Data Reviewed  Clinical lab tests: ordered and reviewed  Tests in the radiology section of CPT®: ordered and reviewed  Decide to obtain previous medical records or to obtain history from someone other than the patient: yes  Obtain history from someone other than the patient: yes  Review and summarize past medical records: yes  Discuss the patient with other providers: yes  Independent visualization of images, tracings, or specimens: yes    Risk of Complications, Morbidity, and/or Mortality  Presenting problems: high  Diagnostic procedures: high  Management options: high    Patient Progress  Patient progress: improved      Final diagnoses:   Chest pain, unspecified type   Essential hypertension   Hyperglycemia            Agapito Dominguez MD  06/06/17 0032

## 2017-06-06 NOTE — ED NOTES
Nurse assisted patient to restroom via wheelchair at this time     Rafia Petersen, RN  06/06/17 0022

## 2017-06-06 NOTE — CONSULTS
Patient Care Team:  FACUNDO Juarez as PCP - General  FACUNDO Juarez as PCP - Family Medicine  Ehsan Crocker MD as Cardiologist (Cardiology)  Jenaro Conklin MD  REASON FOR REFERRAL: chest pain   Chief complaint: chest pain     Subjective     Patient is a 82 y.o. female presents with chest pain. She states her pain starting last night around 8 pm. She describes it as a heartburn, substernal region, associated with belching and left arm pain, non exertional. At home and in the ER she was treated with a combination of gas X, GI cocktail, and Mylanta. All of these temporarily relieved her symptoms. The pain lasted for approximately 5 hours and has not returned. This is not similar to her previous angina. She has no associated diaphoresis, dizziness, dyspnea, edema, syncope, pre syncope. Troponin is 0.1. EKG shows NSR with atrial pacing, no acute STT changes. BNP is normal.     Dr. Crocker following the patient for SSS s/p pacemaker, CAD s/p CABG, PAF, and severe aortic stenosis. She underwent recent cath with ICE revealing severe AS, patent SVGs to LAD, OM and RPDA, diastolic dysfunction, and normal LVEF. She was seen by Dr. Fraire and determined not to be a surgical candidate. There are plans for referral to [facility] for TAVR after repeat 6 month echo or with new or worsening symptoms.    Of note, the patient also had kyphoplasty on Friday.    Review of Systems   Review of Systems   Constitutional: Negative for diaphoresis, fatigue, fever and unexpected weight change.   HENT: Negative for nosebleeds.    Respiratory: Negative for apnea, cough, chest tightness, shortness of breath and wheezing.    Cardiovascular: Positive for chest pain. Negative for palpitations and leg swelling.   Gastrointestinal: Negative for abdominal distention, nausea and vomiting.        Belching    Genitourinary: Negative for hematuria.   Musculoskeletal: Negative for gait problem.   Skin: Negative for color change.   Neurological: Negative  for dizziness, syncope, weakness and light-headedness.       History  Past Medical History:   Diagnosis Date   • Aortic stenosis    • Atrial fibrillation    • Atrial fibrillation by electrocardiogram    • Atrial flutter    • Breast cancer    • CAD (coronary artery disease)    • Chronic anticoagulation     Coumadin    • Diabetes mellitus     Type II   • Hospital discharge follow-up    • Hyperlipidemia    • Hypertension      Past Surgical History:   Procedure Laterality Date   • BELPHAROPTOSIS REPAIR     • CARDIAC CATHETERIZATION  1999, 2010   • CARDIAC CATHETERIZATION N/A 2/15/2017    Procedure: Left Heart Cath;  Surgeon: Ehsan Crocker MD;  Location:  [facility];  Service:    • CARDIAC CATHETERIZATION N/A 2/15/2017    Procedure: Right Heart Cath;  Surgeon: Ehsan Crocker MD;  Location:  [facility];  Service:    • CARDIAC CATHETERIZATION N/A 2/15/2017    Procedure: Coronary angiography;  Surgeon: Ehsan Crocker MD;  Location:  [facility];  Service:    • CARDIAC CATHETERIZATION N/A 2/15/2017    Procedure: Left ventriculography;  Surgeon: Ehsan Crocker MD;  Location:  [facility];  Service:    • CARDIAC CATHETERIZATION N/A 2/15/2017    Procedure: Intracardiac echocardiogram;  Surgeon: Ehsan Crocker MD;  Location:  [facility];  Service:    • CARDIAC ELECTROPHYSIOLOGY PROCEDURE N/A 2/3/2017    Procedure: Pacemaker DC new;  Surgeon: Ehsan Crocker MD;  Location:  [facility];  Service:    • CATARACT EXTRACTION     • CHOLECYSTECTOMY     • CORONARY ARTERY BYPASS GRAFT  1999    4 vessel    • HYSTERECTOMY     • INSERT / REPLACE / REMOVE PACEMAKER     • MASTECTOMY       Family History   Problem Relation Age of Onset   • Breast cancer Mother    • Coronary artery disease Father    • Heart attack Father    • Cancer Brother    • No Known Problems Maternal Grandmother    • No Known Problems Maternal Grandfather    • No Known  Problems Paternal Grandmother    • No Known Problems Paternal Grandfather      Social History   Substance Use Topics   • Smoking status: Never Smoker   • Smokeless tobacco: Never Used   • Alcohol use No     Prescriptions Prior to Admission   Medication Sig Dispense Refill Last Dose   • atenolol (TENORMIN) 25 MG tablet Take 25 mg by mouth Daily. Take 25mg in AM and 50mg in PM   Taking   • calcium carbonate (OS-MICHAEL) 600 MG tablet Take 600 mg by mouth 2 (Two) Times a Day With Meals.   Taking   • glipiZIDE (GLUCOTROL) 5 MG tablet Take 5 mg by mouth 2 (Two) Times a Day Before Meals.   Taking   • HYDROcodone-acetaminophen (NORCO) 5-325 MG per tablet Take 1 tablet by mouth Every 4 (Four) Hours As Needed.      • insulin aspart (novoLOG) 100 UNIT/ML injection Inject  under the skin 3 (Three) Times a Day Before Meals. Sliding scale (bs-100)/2   Taking   • insulin detemir (LEVEMIR) 100 UNIT/ML injection Inject 65 Units under the skin Every 12 (Twelve) Hours.   Taking   • magnesium oxide (MAGOX) 400 (241.3 MG) MG tablet tablet Take 400 mg by mouth Daily.   Taking   • pantoprazole (PROTONIX) 40 MG EC tablet Take 40 mg by mouth Daily.   Taking   • polyethyl glycol-propyl glycol (SYSTANE) 0.4-0.3 % solution ophthalmic solution Administer 1 drop to the right eye Daily.   Taking   • simvastatin (ZOCOR) 80 MG tablet Take 80 mg by mouth Every Night.   Taking   • spironolactone (ALDACTONE) 25 MG tablet Take 25 mg by mouth Daily.   Taking   • venlafaxine (EFFEXOR) 37.5 MG tablet Take 37.5 mg by mouth Daily.   Taking   • vitamin B-12 (CYANOCOBALAMIN) 500 MCG tablet Take 500 mcg by mouth Daily.   Taking   • vitamin E 100 UNIT capsule Take 400 Units by mouth Daily.   Taking   • warfarin (COUMADIN) 2 MG tablet Take 2 mg by mouth Daily.   Taking   • acetaminophen (TYLENOL) 325 MG tablet Take 2 tablets by mouth Every 4 (Four) Hours As Needed for mild pain (1-3).  0 Taking   • aspirin 81 MG EC tablet Take 81 mg by mouth Daily.   Taking   •  cephalexin (KEFLEX) 500 MG capsule Take 1 capsule by mouth 2 (Two) Times a Day. 14 capsule 0    • cholecalciferol (VITAMIN D3) 1000 UNITS tablet Take 1,000 Units by mouth Daily.   Taking   • CloNIDine (CATAPRES) 0.1 MG tablet Take 0.1 mg by mouth 2 (Two) Times a Day.   Taking   • fenofibrate 160 MG tablet Take 160 mg by mouth Daily.   Taking   • niacin 500 MG tablet Take 500 mg by mouth Daily With Breakfast. Must be flush free   Taking   • NON FORMULARY Take  by mouth 2 (Two) Times a Day. Prespervision Lutein x1 tab twice daily   Taking       Current Facility-Administered Medications:   •  acetaminophen (TYLENOL) tablet 650 mg, 650 mg, Oral, Q6H PRN, Jenaro Conklin MD, 650 mg at 06/06/17 0237  •  aluminum-magnesium hydroxide-simethicone (MAALOX/MYLANTA) suspension 30 mL, 30 mL, Oral, Q6H PRN, Jenaro Conklin MD, 30 mL at 06/06/17 0222  •  aspirin EC tablet 81 mg, 81 mg, Oral, Daily, Jenaro Conklin MD, 81 mg at 06/06/17 0911  •  atenolol (TENORMIN) tablet 25 mg, 25 mg, Oral, Daily, Jenaro Conklin MD, 25 mg at 06/06/17 0911  •  atorvastatin (LIPITOR) tablet 40 mg, 40 mg, Oral, Nightly, Jenaro Conklin MD  •  dextrose (D50W) solution 25 g, 25 g, Intravenous, Q15 Min PRN, Leland Johnson MD  •  dextrose (GLUTOSE) oral gel 15 g, 15 g, Oral, Q15 Min PRN, Leland Johnson MD  •  glucagon (human recombinant) (GLUCAGEN DIAGNOSTIC) injection 1 mg, 1 mg, Subcutaneous, Q15 Min PRN, Leland Johnson MD  •  insulin detemir (LEVEMIR) injection 50 Units, 50 Units, Subcutaneous, Q12H, Leland Johnson MD  •  insulin lispro (humaLOG) injection 0-9 Units, 0-9 Units, Subcutaneous, 4x Daily With Meals & Nightly, Leland Johnson MD  •  ipratropium-albuterol (DUO-NEB) nebulizer solution 3 mL, 3 mL, Nebulization, Q4H PRN, Jenaro Conklin MD  •  magnesium oxide (MAGOX) tablet 400 mg, 400 mg, Oral, Daily, Leland Johnson MD  •  ondansetron (ZOFRAN) injection 4 mg, 4 mg, Intravenous, Q6H PRN, Jenaro Conklin MD  •   pantoprazole (PROTONIX) EC tablet 40 mg, 40 mg, Oral, BID AC, Leland Johnson MD  •  sodium chloride 0.9 % flush 1-10 mL, 1-10 mL, Intravenous, PRN, Jenaro Conklin MD  •  Insert peripheral IV, , , Once **AND** sodium chloride 0.9 % flush 10 mL, 10 mL, Intravenous, PRN, Agapito Dominguez MD  •  spironolactone (ALDACTONE) tablet 25 mg, 25 mg, Oral, Daily, Jenaro Conklin MD, 25 mg at 06/06/17 0911  •  warfarin (COUMADIN) tablet 3 mg, 3 mg, Oral, Daily, Jenaro Conklin MD  Allergies:  Dilaudid [hydromorphone hcl]; Dilaudid [hydromorphone]; Enalapril; Janumet [sitagliptin-metformin hcl]; Lopid [gemfibrozil]; Sulfa antibiotics; and Ultram [tramadol]    Objective     Vital Signs  Temp:  [97 °F (36.1 °C)-97.8 °F (36.6 °C)] 97 °F (36.1 °C)  Heart Rate:  [60-70] 60  Resp:  [16-20] 16  BP: (130-173)/(43-71) 130/44    Physical Exam:   Physical Exam   Constitutional: She is oriented to person, place, and time. She appears well-developed and well-nourished. No distress.   HENT:   Head: Normocephalic and atraumatic.   Eyes: Pupils are equal, round, and reactive to light.   Neck: Normal range of motion. Neck supple. No JVD present. No thyromegaly present.   Cardiovascular: Normal rate, regular rhythm and intact distal pulses.  Exam reveals no gallop and no friction rub.    Murmur (3/6 systolic murmur ) heard.  Pulses:       Dorsalis pedis pulses are 2+ on the right side, and 2+ on the left side.   Pulmonary/Chest: Effort normal and breath sounds normal. No respiratory distress. She has no wheezes. She has no rales. She exhibits no tenderness.   Abdominal: Soft. Bowel sounds are normal. She exhibits no distension. There is no tenderness.   Musculoskeletal: Normal range of motion. She exhibits no edema.   Neurological: She is alert and oriented to person, place, and time. No cranial nerve deficit.   Skin: Skin is warm and dry. She is not diaphoretic.   Psychiatric: She has a normal mood and affect. Her behavior is normal.      Results Review:     Lab Results (last 72 hours)     Procedure Component Value Units Date/Time    POC Troponin, Rapid [[id number]]  (Normal) Collected:  06/05/17 2334    Specimen:  Blood Updated:  06/05/17 2345     Troponin I 0.00 ng/mL       Serial Number: [id number]    : [id number]       Protime-INR [[id number]]  (Abnormal) Collected:  06/05/17 2325    Specimen:  Blood Updated:  06/06/17 0000     Protime 20.7 (H) Seconds      INR 1.71 (H)    aPTT [[id number]]  (Normal) Collected:  06/05/17 2325    Specimen:  Blood Updated:  06/06/17 0000     PTT 34.8 seconds     Comprehensive Metabolic Panel [[id number]]  (Abnormal) Collected:  06/05/17 2325    Specimen:  Blood Updated:  06/06/17 0005     Glucose 248 (H) mg/dL      BUN 37 (H) mg/dL      Creatinine 1.37 mg/dL      Sodium 140 mmol/L      Potassium 4.4 mmol/L      Chloride 102 mmol/L      CO2 25.0 mmol/L      Calcium 9.2 mg/dL      Total Protein 7.0 g/dL      Albumin 3.80 g/dL      ALT (SGPT) 18 U/L      AST (SGOT) 31 U/L      Alkaline Phosphatase 89 U/L      Total Bilirubin 0.5 mg/dL      eGFR Non African Amer 37 (L) mL/min/1.73      Globulin 3.2 gm/dL      A/G Ratio 1.2 g/dL      BUN/Creatinine Ratio 27.0 (H)     Anion Gap 13.0 mmol/L     Narrative:       The MDRD GFR formula is only valid for adults with stable renal function between ages 18 and 70.    Amylase [[id number]]  (Normal) Collected:  06/05/17 2325    Specimen:  Blood Updated:  06/06/17 0005     Amylase 67 U/L     C-reactive Protein [[id number]]  (Normal) Collected:  06/05/17 2325    Specimen:  Blood Updated:  06/06/17 0005     C-Reactive Protein <0.50 mg/dL     Lipase [[id number]]  (Normal) Collected:  06/05/17 2325    Specimen:  Blood Updated:  06/06/17 0005     Lipase 143 U/L     CBC & Differential [[id number]] Collected:  06/05/17 2325    Specimen:  Blood Updated:  06/06/17 0008    Narrative:       The following orders were created for panel order CBC & Differential.  Procedure                                Abnormality         Status                     ---------                               -----------         ------                     CBC Auto Differential[[id number]]        Abnormal            Final result                 Please view results for these tests on the individual orders.    CBC Auto Differential [[id number]]  (Abnormal) Collected:  06/05/17 2325    Specimen:  Blood Updated:  06/06/17 0008     WBC 7.79 10*3/mm3      RBC 3.71 (L) 10*6/mm3      Hemoglobin 10.1 (L) g/dL      Hematocrit 32.3 (L) %      MCV 87.1 fL      MCH 27.2 (L) pg      MCHC 31.3 (L) g/dL      RDW 14.9 %      RDW-SD 47.4 fl      MPV 11.5 fL      Platelets 345 10*3/mm3      Neutrophil % 62.8 %      Lymphocyte % 20.9 %      Monocyte % 11.6 %      Eosinophil % 3.3 %      Basophil % 0.5 %      Immature Grans % 0.9 %      Neutrophils, Absolute 4.89 10*3/mm3      Lymphocytes, Absolute 1.63 10*3/mm3      Monocytes, Absolute 0.90 10*3/mm3      Eosinophils, Absolute 0.26 10*3/mm3      Basophils, Absolute 0.04 10*3/mm3      Immature Grans, Absolute 0.07 (H) 10*3/mm3     BNP [[id number]]  (Normal) Collected:  06/05/17 2325    Specimen:  Blood Updated:  06/06/17 0012     proBNP 1610.0 pg/mL     Myoglobin, Serum [[id number]]  (Normal) Collected:  06/05/17 2325    Specimen:  Blood Updated:  06/06/17 0012     Myoglobin 54.7 ng/mL     CK-MB [[id number]]  (Normal) Collected:  06/05/17 2325    Specimen:  Blood Updated:  06/06/17 0012     CKMB 1.78 ng/mL     Narrative:       CKMB Index not indicated    T4, Free [[id number]]  (Normal) Collected:  06/05/17 2325    Specimen:  Blood Updated:  06/06/17 0020     Free T4 1.59 ng/dL     Urine Culture [[id number]] Collected:  06/06/17 0027    Specimen:  Urine from Urine, Clean Catch Updated:  06/06/17 0055    Urinalysis With / Culture If Indicated [[id number]]  (Abnormal) Collected:  06/06/17 0027    Specimen:  Urine from Urine, Clean Catch Updated:  06/06/17 0059     Color, UA Yellow     Appearance, UA Clear     pH,  UA 6.5     Specific Gravity, UA 1.019     Glucose,  mg/dL (1+) (A)     Ketones, UA Negative     Bilirubin, UA Negative     Blood, UA Negative     Protein, UA Negative     Leuk Esterase, UA Small (1+) (A)     Nitrite, UA Negative     Urobilinogen, UA 0.2 E.U./dL    Urinalysis, Microscopic Only [[id number]]  (Abnormal) Collected:  06/06/17 0027    Specimen:  Urine from Urine, Clean Catch Updated:  06/06/17 0059     RBC, UA 0-2 (A) /HPF      WBC, UA 3-5 (A) /HPF      Bacteria, UA 1+ (A) /HPF      Squamous Epithelial Cells, UA 7-12 (A) /HPF      Hyaline Casts, UA 3-6 /LPF      Methodology Manual Light Microscopy    [place] Draw [[id number]] Collected:  06/05/17 2325    Specimen:  Blood Updated:  06/06/17 0101    Narrative:       The following orders were created for panel order [place] Draw.  Procedure                               Abnormality         Status                     ---------                               -----------         ------                     Light Blue Top[[id number]]                                    Final result               Green Top (Gel)[[id number]]                                   Final result               Lavender Top[[id number]]                                      Final result               Red Top[[id number]]                                           Final result               Green Top (No Gel)[[id number]]                                                             Please view results for these tests on the individual orders.    Light Blue Top [[id number]] Collected:  06/05/17 2325    Specimen:  Blood Updated:  06/06/17 0101     Extra Tube hold for add-on      Auto resulted       Green Top (Gel) [[id number]] Collected:  06/05/17 2325    Specimen:  Blood Updated:  06/06/17 0101     Extra Tube Hold for add-ons.      Auto resulted.       Lavender Top [[id number]] Collected:  06/05/17 2325    Specimen:  Blood Updated:  06/06/17 0101     Extra Tube hold for add-on      Auto resulted       Red Top  [[id number]] Collected:  06/05/17 2325    Specimen:  Blood Updated:  06/06/17 0101     Extra Tube Hold for add-ons.      Auto resulted.       TSH [[id number]]  (Normal) Collected:  06/05/17 2325    Specimen:  Blood Updated:  06/06/17 0136     TSH 1.500 mIU/mL     Troponin [[id number]]  (Abnormal) Collected:  06/06/17 0624    Specimen:  Blood Updated:  06/06/17 0712     Troponin I 0.113 (H) ng/mL           Assessment/Plan     Atypical chest pain   Severe aortic stenosis  Coronary artery disease  Diastolic dysfunction  Sick sinus syndrome status post pacemaker  Paroxysmal atrial fibrillation- maintaining normal sinus rhythm   Diabetes mellitus   Essential Hypertension  Hyperlipidemia     Plan-  Continue current medical therapy- asa, statin, bb, aldactone, warfarin   Daily INRs; on warfarin   Trend cardiac markers  Await echo results  NPO for now  Will likely need referral for TAVR at [facility] soon  Will discuss the need for further testing with Dr. Crocker    I discussed the patients findings and my recommendations with patient and family.     FACUNDO Joy  06/06/17  10:19 AM

## 2017-06-06 NOTE — PROGRESS NOTES
Discharge Planning Assessment  [facility]     Patient Name: Lakesha Costello  MRN: [id number]  Today's Date: 6/6/2017    Admit Date: 6/5/2017          Discharge Needs Assessment       06/06/17 1057    Living Environment    Lives With spouse    Living Arrangements house    Type of Financial/Environmental Concern none    Transportation Available car    Living Environment    Provides Primary Care For no one    Quality Of Family Relationships supportive    Able to Return to Prior Living Arrangements yes    Discharge Needs Assessment    Concerns To Be Addressed no discharge needs identified    Readmission Within The Last 30 Days no previous admission in last 30 days    Anticipated Changes Related to Illness none    Equipment Currently Used at Home none    Equipment Needed After Discharge none            Discharge Plan       06/06/17 1059    Case Management/Social Work Plan    Plan Home    Additional Comments Spoke with pt to assess for home needs. Pt lives with spouse and plans same. Pt is independent and no home needs identified.         Discharge Placement     No information found                Demographic Summary     None            Functional Status     None            Psychosocial     None            Abuse/Neglect     None            Legal     None            Substance Abuse     None            Patient Forms     None          NORA Martinez

## 2017-06-06 NOTE — PROGRESS NOTES
Patient was admitted earlier this morning by Dr. Conklin given concern for atypical chest pain.  Patient has a known history of severe aortic stenosis in addition to coronary artery disease.  She had a pacemaker placed earlier this year for sick sinus syndrome, and a couple of weeks after her discharge had a left heart catheterization (performed in February 2017) which revealed severe aortic stenosis, normal LV function, there was mention of the SVG to the OM being patent but there was 85% stenosis in the vessel distal to the insertion site.  Patient subsequently was seen by cardiothoracic surgery, and plans were in place for consideration of TAVR, the patient reports that this is not been approved yet through the insurance.    Patient presented to our facility after experiencing pain in her that she attributed to indigestion, however the pain did radiate into her left upper extremity.  This pain was not associated with exertion.  Her first set of cardiac enzymes were negative, but her second troponin has had a small bump.  She is currently chest pain-free.  We will continue to trend her cardiac enzymes.  She is on aspirin, beta blocker, in addition to statin.  She is anticoagulated with Coumadin, but her INR is currently subtherapeutic.  I will ask for cardiology recommendations given her comorbidities and cardiac history followed by Dr. Crocker on an outpatient basis.    Please see Dr. Conklin's history and physical for additional details.  I will go ahead and discontinue Levaquin as patient does not appear to have any signs or symptoms of urinary tract infection.  I have increased her proton pump inhibitor to twice a day therapy and will monitor her GERD/indigestion symptoms closely.  Workup ongoing.

## 2017-06-07 VITALS
SYSTOLIC BLOOD PRESSURE: 128 MMHG | WEIGHT: 135.5 LBS | RESPIRATION RATE: 18 BRPM | TEMPERATURE: 97.8 F | HEIGHT: 61 IN | DIASTOLIC BLOOD PRESSURE: 42 MMHG | BODY MASS INDEX: 25.58 KG/M2 | HEART RATE: 70 BPM | OXYGEN SATURATION: 98 %

## 2017-06-07 PROBLEM — Z95.1 S/P CABG X 3: Status: ACTIVE | Noted: 2017-06-07

## 2017-06-07 PROBLEM — Z95.0 ARTIFICIAL PACEMAKER: Status: ACTIVE | Noted: 2017-06-07

## 2017-06-07 LAB
ANION GAP SERPL CALCULATED.3IONS-SCNC: 7 MMOL/L (ref 4–13)
BH CV ECHO MEAS - AO MAX PG (FULL): 40.8 MMHG
BH CV ECHO MEAS - AO MAX PG: 43.8 MMHG
BH CV ECHO MEAS - AO MEAN PG (FULL): 21 MMHG
BH CV ECHO MEAS - AO MEAN PG: 22 MMHG
BH CV ECHO MEAS - AO ROOT AREA (BSA CORRECTED): 1.5
BH CV ECHO MEAS - AO ROOT AREA: 4.2 CM^2
BH CV ECHO MEAS - AO ROOT DIAM: 2.3 CM
BH CV ECHO MEAS - AO V2 MAX: 331 CM/SEC
BH CV ECHO MEAS - AO V2 MEAN: 209 CM/SEC
BH CV ECHO MEAS - AO V2 VTI: 84.2 CM
BH CV ECHO MEAS - AVA(I,A): 0.8 CM^2
BH CV ECHO MEAS - AVA(I,D): 0.8 CM^2
BH CV ECHO MEAS - AVA(V,A): 0.82 CM^2
BH CV ECHO MEAS - AVA(V,D): 0.82 CM^2
BH CV ECHO MEAS - BSA(HAYCOCK): 1.6 M^2
BH CV ECHO MEAS - BSA: 1.6 M^2
BH CV ECHO MEAS - BZI_BMI: 24.6 KILOGRAMS/M^2
BH CV ECHO MEAS - BZI_METRIC_HEIGHT: 154.9 CM
BH CV ECHO MEAS - BZI_METRIC_WEIGHT: 59 KG
BH CV ECHO MEAS - CONTRAST EF 4CH: 67 ML/M^2
BH CV ECHO MEAS - EDV(CUBED): 91.1 ML
BH CV ECHO MEAS - EDV(MOD-SP4): 97.5 ML
BH CV ECHO MEAS - EDV(TEICH): 92.4 ML
BH CV ECHO MEAS - EF(CUBED): 73.2 %
BH CV ECHO MEAS - EF(MOD-SP4): 67 %
BH CV ECHO MEAS - EF(TEICH): 65.2 %
BH CV ECHO MEAS - ESV(CUBED): 24.4 ML
BH CV ECHO MEAS - ESV(MOD-SP4): 32.2 ML
BH CV ECHO MEAS - ESV(TEICH): 32.2 ML
BH CV ECHO MEAS - FS: 35.6 %
BH CV ECHO MEAS - IVS/LVPW: 1
BH CV ECHO MEAS - IVSD: 1 CM
BH CV ECHO MEAS - LA DIMENSION: 4.6 CM
BH CV ECHO MEAS - LA/AO: 2
BH CV ECHO MEAS - LAT PEAK E' VEL: 3.8 CM/SEC
BH CV ECHO MEAS - LV DIASTOLIC VOL/BSA (35-75): 62 ML/M^2
BH CV ECHO MEAS - LV MASS(C)D: 153.3 GRAMS
BH CV ECHO MEAS - LV MASS(C)DI: 97.4 GRAMS/M^2
BH CV ECHO MEAS - LV MAX PG: 3 MMHG
BH CV ECHO MEAS - LV MEAN PG: 1 MMHG
BH CV ECHO MEAS - LV SYSTOLIC VOL/BSA (12-30): 20.5 ML/M^2
BH CV ECHO MEAS - LV V1 MAX: 86.6 CM/SEC
BH CV ECHO MEAS - LV V1 MEAN: 53.7 CM/SEC
BH CV ECHO MEAS - LV V1 VTI: 21.4 CM
BH CV ECHO MEAS - LVIDD: 4.5 CM
BH CV ECHO MEAS - LVIDS: 2.9 CM
BH CV ECHO MEAS - LVLD AP4: 7.2 CM
BH CV ECHO MEAS - LVLS AP4: 5.7 CM
BH CV ECHO MEAS - LVOT AREA (M): 3.1 CM^2
BH CV ECHO MEAS - LVOT AREA: 3.1 CM^2
BH CV ECHO MEAS - LVOT DIAM: 2 CM
BH CV ECHO MEAS - LVPWD: 1 CM
BH CV ECHO MEAS - MED PEAK E' VEL: 3.59 CM/SEC
BH CV ECHO MEAS - MR ALIAS VEL: 30.5 CM/SEC
BH CV ECHO MEAS - MR ERO: 0.03 CM^2
BH CV ECHO MEAS - MR FLOW RATE: 17.2 CM^3/SEC
BH CV ECHO MEAS - MR MAX PG: 128.6 MMHG
BH CV ECHO MEAS - MR MAX VEL: 567 CM/SEC
BH CV ECHO MEAS - MR MEAN PG: 74 MMHG
BH CV ECHO MEAS - MR MEAN VEL: 378 CM/SEC
BH CV ECHO MEAS - MR PISA RADIUS: 0.3 CM
BH CV ECHO MEAS - MR PISA: 0.57 CM^2
BH CV ECHO MEAS - MR VOLUME: 7.1 ML
BH CV ECHO MEAS - MR VTI: 232 CM
BH CV ECHO MEAS - MV A MAX VEL: 40.6 CM/SEC
BH CV ECHO MEAS - MV DEC TIME: 0.22 SEC
BH CV ECHO MEAS - MV E MAX VEL: 152 CM/SEC
BH CV ECHO MEAS - MV E/A: 3.7
BH CV ECHO MEAS - RAP SYSTOLE: 10 MMHG
BH CV ECHO MEAS - RVDD: 3.5 CM
BH CV ECHO MEAS - RVSP: 40 MMHG
BH CV ECHO MEAS - SI(AO): 222.4 ML/M^2
BH CV ECHO MEAS - SI(CUBED): 42.4 ML/M^2
BH CV ECHO MEAS - SI(LVOT): 42.7 ML/M^2
BH CV ECHO MEAS - SI(MOD-SP4): 41.5 ML/M^2
BH CV ECHO MEAS - SI(TEICH): 38.3 ML/M^2
BH CV ECHO MEAS - SV(AO): 349.8 ML
BH CV ECHO MEAS - SV(CUBED): 66.7 ML
BH CV ECHO MEAS - SV(LVOT): 67.2 ML
BH CV ECHO MEAS - SV(MOD-SP4): 65.3 ML
BH CV ECHO MEAS - SV(TEICH): 60.2 ML
BH CV ECHO MEAS - TR MAX VEL: 274 CM/SEC
BUN BLD-MCNC: 33 MG/DL (ref 5–21)
BUN/CREAT SERPL: 22 (ref 7–25)
CALCIUM SPEC-SCNC: 9.3 MG/DL (ref 8.4–10.4)
CHLORIDE SERPL-SCNC: 106 MMOL/L (ref 98–110)
CO2 SERPL-SCNC: 29 MMOL/L (ref 24–31)
CREAT BLD-MCNC: 1.5 MG/DL (ref 0.5–1.4)
DEPRECATED RDW RBC AUTO: 48.5 FL (ref 40–54)
E/E' RATIO: 42.3
ERYTHROCYTE [DISTWIDTH] IN BLOOD BY AUTOMATED COUNT: 15.2 % (ref 12–15)
GFR SERPL CREATININE-BSD FRML MDRD: 33 ML/MIN/1.73
GLUCOSE BLD-MCNC: 102 MG/DL (ref 70–100)
GLUCOSE BLDC GLUCOMTR-MCNC: 133 MG/DL (ref 70–130)
GLUCOSE BLDC GLUCOMTR-MCNC: 189 MG/DL (ref 70–130)
GLUCOSE BLDC GLUCOMTR-MCNC: 196 MG/DL (ref 70–130)
HCT VFR BLD AUTO: 31.3 % (ref 37–47)
HGB BLD-MCNC: 9.6 G/DL (ref 12–16)
INR PPP: 1.83 (ref 0.91–1.09)
LEFT ATRIUM VOLUME INDEX: 31.7 ML/M2
LEFT ATRIUM VOLUME: 49.8 CM3
LV EF 2D ECHO EST: 65 %
MAGNESIUM SERPL-MCNC: 2.1 MG/DL (ref 1.4–2.2)
MCH RBC QN AUTO: 26.9 PG (ref 28–32)
MCHC RBC AUTO-ENTMCNC: 30.7 G/DL (ref 33–36)
MCV RBC AUTO: 87.7 FL (ref 82–98)
PLATELET # BLD AUTO: 297 10*3/MM3 (ref 130–400)
PMV BLD AUTO: 10.5 FL (ref 6–12)
POTASSIUM BLD-SCNC: 4.6 MMOL/L (ref 3.5–5.3)
PROTHROMBIN TIME: 21.8 SECONDS (ref 11.9–14.6)
RBC # BLD AUTO: 3.57 10*6/MM3 (ref 4.2–5.4)
SODIUM BLD-SCNC: 142 MMOL/L (ref 135–145)
TROPONIN I SERPL-MCNC: 0.26 NG/ML (ref 0–0.03)
WBC NRBC COR # BLD: 6.1 10*3/MM3 (ref 4.8–10.8)

## 2017-06-07 PROCEDURE — 85610 PROTHROMBIN TIME: CPT | Performed by: INTERNAL MEDICINE

## 2017-06-07 PROCEDURE — 90732 PPSV23 VACC 2 YRS+ SUBQ/IM: CPT | Performed by: INTERNAL MEDICINE

## 2017-06-07 PROCEDURE — 85027 COMPLETE CBC AUTOMATED: CPT | Performed by: INTERNAL MEDICINE

## 2017-06-07 PROCEDURE — 84484 ASSAY OF TROPONIN QUANT: CPT | Performed by: INTERNAL MEDICINE

## 2017-06-07 PROCEDURE — 99225 PR SBSQ OBSERVATION CARE/DAY 25 MINUTES: CPT | Performed by: INTERNAL MEDICINE

## 2017-06-07 PROCEDURE — 83735 ASSAY OF MAGNESIUM: CPT | Performed by: INTERNAL MEDICINE

## 2017-06-07 PROCEDURE — G0009 ADMIN PNEUMOCOCCAL VACCINE: HCPCS | Performed by: INTERNAL MEDICINE

## 2017-06-07 PROCEDURE — 82962 GLUCOSE BLOOD TEST: CPT

## 2017-06-07 PROCEDURE — 25010000002 PNEUMOCOCCAL VAC POLYVALENT PER 0.5 ML: Performed by: INTERNAL MEDICINE

## 2017-06-07 PROCEDURE — G0378 HOSPITAL OBSERVATION PER HR: HCPCS

## 2017-06-07 PROCEDURE — 80048 BASIC METABOLIC PNL TOTAL CA: CPT | Performed by: INTERNAL MEDICINE

## 2017-06-07 RX ORDER — WARFARIN SODIUM 1 MG/1
1 TABLET ORAL
Status: DISCONTINUED | OUTPATIENT
Start: 2017-06-08 | End: 2017-06-07

## 2017-06-07 RX ORDER — VENLAFAXINE HYDROCHLORIDE 37.5 MG/1
37.5 CAPSULE, EXTENDED RELEASE ORAL
Status: DISCONTINUED | OUTPATIENT
Start: 2017-06-07 | End: 2017-06-07 | Stop reason: HOSPADM

## 2017-06-07 RX ORDER — WARFARIN SODIUM 2 MG/1
2 TABLET ORAL 3 TIMES WEEKLY
Status: DISCONTINUED | OUTPATIENT
Start: 2017-06-07 | End: 2017-06-07

## 2017-06-07 RX ORDER — NITROGLYCERIN 0.4 MG/1
TABLET SUBLINGUAL
Qty: 30 TABLET | Refills: 3 | Status: SHIPPED | OUTPATIENT
Start: 2017-06-07 | End: 2019-01-01 | Stop reason: SDUPTHER

## 2017-06-07 RX ORDER — WARFARIN SODIUM 3 MG/1
3 TABLET ORAL
Status: DISCONTINUED | OUTPATIENT
Start: 2017-06-07 | End: 2017-06-07 | Stop reason: HOSPADM

## 2017-06-07 RX ADMIN — SPIRONOLACTONE 25 MG: 25 TABLET, FILM COATED ORAL at 08:39

## 2017-06-07 RX ADMIN — VENLAFAXINE HYDROCHLORIDE 37.5 MG: 37.5 CAPSULE, EXTENDED RELEASE ORAL at 10:15

## 2017-06-07 RX ADMIN — PANTOPRAZOLE SODIUM 40 MG: 40 TABLET, DELAYED RELEASE ORAL at 08:37

## 2017-06-07 RX ADMIN — ATENOLOL 25 MG: 25 TABLET ORAL at 08:38

## 2017-06-07 RX ADMIN — PNEUMOCOCCAL VACCINE POLYVALENT 0.5 ML
25; 25; 25; 25; 25; 25; 25; 25; 25; 25; 25; 25; 25; 25; 25; 25; 25; 25; 25; 25; 25; 25; 25 INJECTION, SOLUTION INTRAMUSCULAR; SUBCUTANEOUS at 17:21

## 2017-06-07 RX ADMIN — INSULIN LISPRO 2 UNITS: 100 INJECTION, SOLUTION INTRAVENOUS; SUBCUTANEOUS at 12:42

## 2017-06-07 RX ADMIN — WARFARIN SODIUM 3 MG: 3 TABLET ORAL at 17:21

## 2017-06-07 RX ADMIN — PANTOPRAZOLE SODIUM 40 MG: 40 TABLET, DELAYED RELEASE ORAL at 17:31

## 2017-06-07 RX ADMIN — INSULIN LISPRO 2 UNITS: 100 INJECTION, SOLUTION INTRAVENOUS; SUBCUTANEOUS at 17:25

## 2017-06-07 RX ADMIN — Medication 400 MG: at 08:38

## 2017-06-07 RX ADMIN — ASPIRIN 81 MG: 81 TABLET ORAL at 08:38

## 2017-06-07 RX ADMIN — INSULIN DETEMIR 50 UNITS: 100 INJECTION, SOLUTION SUBCUTANEOUS at 08:39

## 2017-06-07 NOTE — DISCHARGE INSTRUCTIONS
Continue drawing your Protime/INR at home as previously doing, next due on Friday, June 9th, call results to Dr Porras office.

## 2017-06-07 NOTE — PROGRESS NOTES
[facility] -  Progress Note     LOS: 0 days   Patient Care Team:  FACUNDO Juarez as PCP - General  FACUNDO Juarez as PCP - Family Medicine  Ehsan Crocker MD as Cardiologist (Cardiology)    Chief Complaint: Chest pain    Subjective     Interval History: 2D echo reveals left ventricular hypertrophy, ejection fraction of 65%, left ventricular diastolic dysfunction, mild dilation of right ventricle, mild to moderate dilation of left atrium, mild to moderate mitral valve regurgitation and severe aortic stenosis.  Aortic valve area is 0.8 cm^2, peak velocity is 3.31 m/sec and mean pressure gradient is 22.0 mmHg.  Pt states she is feeling better and denies chest pain, shortness of breath, palpitations, dizziness, syncope, orthopnea, PND or swelling.     Patient Complaints: none        Review of Systems:     Review of Systems   Constitutional: Negative for chills, fatigue and fever.   HENT: Negative.    Eyes: Negative.    Respiratory: Negative for cough, chest tightness, shortness of breath, wheezing and stridor.    Cardiovascular: Negative for chest pain, palpitations and leg swelling.   Gastrointestinal: Negative for abdominal distention, abdominal pain, blood in stool, constipation, diarrhea, nausea and vomiting.   Endocrine: Negative.    Genitourinary: Negative for difficulty urinating, dysuria, flank pain and hematuria.   Musculoskeletal: Negative.    Skin: Negative for rash and wound.   Allergic/Immunologic: Negative.    Neurological: Negative for dizziness, syncope, weakness, light-headedness and headaches.   Hematological: Does not bruise/bleed easily.   Psychiatric/Behavioral: Negative for agitation, behavioral problems, confusion, hallucinations, sleep disturbance and suicidal ideas. The patient is not nervous/anxious.      Objective     Vital Sign Min/Max for last 24 hours  Temp  Min: 97.7 °F (36.5 °C)  Max: 98.2 °F (36.8 °C)   BP  Min: 118/48  Max: 151/42   Pulse  Min: 62   Max: 76   Resp  Min: 17  Max: 18   SpO2  Min: 82 %  Max: 99 %   No Data Recorded   Weight  Min: 135 lb 8 oz (61.5 kg)  Max: 135 lb 8 oz (61.5 kg)     Last Weight    06/06/17 1945   Weight: 135 lb 8 oz (61.5 kg)     Telemetry: A-paced 60    Physical Exam:    Physical Exam   Constitutional: She is oriented to person, place, and time. Vital signs are normal. She appears well-developed and well-nourished. No distress.   HENT:   Head: Normocephalic and atraumatic.   Right Ear: External ear normal.   Left Ear: External ear normal.   Nose: Nose normal.   Eyes: Conjunctivae are normal. Pupils are equal, round, and reactive to light. Right eye exhibits no discharge. Left eye exhibits no discharge.   Neck: Normal range of motion. Neck supple. No JVD present. Carotid bruit is not present. No tracheal deviation present. No thyromegaly present.   Cardiovascular: Normal rate, regular rhythm, intact distal pulses and normal pulses.  PMI is not displaced.  Exam reveals no gallop and no friction rub.    Murmur heard.   Harsh midsystolic murmur is present with a grade of 3/6  at the upper right sternal border radiating to the neck  Pulses:       Radial pulses are 2+ on the right side, and 2+ on the left side.        Dorsalis pedis pulses are 2+ on the right side, and 2+ on the left side.        Posterior tibial pulses are 2+ on the right side, and 2+ on the left side.   Pulmonary/Chest: Effort normal and breath sounds normal. No respiratory distress. She has no decreased breath sounds. She has no wheezes. She has no rhonchi. She has no rales. She exhibits no tenderness.   Abdominal: Soft. She exhibits no distension. There is no tenderness.   Musculoskeletal: Normal range of motion. She exhibits no edema, tenderness or deformity.   Neurological: She is alert and oriented to person, place, and time.   Skin: Skin is warm and dry. No rash noted. She is not diaphoretic. No erythema. No pallor.   Psychiatric: She has a normal mood and  affect. Her behavior is normal. Judgment and thought content normal.   Vitals reviewed.    Results Review:   Lab Results (last 72 hours)     Procedure Component Value Units Date/Time    POC Troponin, Rapid [[id number]]  (Normal) Collected:  06/05/17 2334    Specimen:  Blood Updated:  06/05/17 2345     Troponin I 0.00 ng/mL       Serial Number: [id number]    : [id number]       Protime-INR [[id number]]  (Abnormal) Collected:  06/05/17 2325    Specimen:  Blood Updated:  06/06/17 0000     Protime 20.7 (H) Seconds      INR 1.71 (H)    aPTT [[id number]]  (Normal) Collected:  06/05/17 2325    Specimen:  Blood Updated:  06/06/17 0000     PTT 34.8 seconds     Comprehensive Metabolic Panel [[id number]]  (Abnormal) Collected:  06/05/17 2325    Specimen:  Blood Updated:  06/06/17 0005     Glucose 248 (H) mg/dL      BUN 37 (H) mg/dL      Creatinine 1.37 mg/dL      Sodium 140 mmol/L      Potassium 4.4 mmol/L      Chloride 102 mmol/L      CO2 25.0 mmol/L      Calcium 9.2 mg/dL      Total Protein 7.0 g/dL      Albumin 3.80 g/dL      ALT (SGPT) 18 U/L      AST (SGOT) 31 U/L      Alkaline Phosphatase 89 U/L      Total Bilirubin 0.5 mg/dL      eGFR Non African Amer 37 (L) mL/min/1.73      Globulin 3.2 gm/dL      A/G Ratio 1.2 g/dL      BUN/Creatinine Ratio 27.0 (H)     Anion Gap 13.0 mmol/L     Narrative:       The MDRD GFR formula is only valid for adults with stable renal function between ages 18 and 70.    Amylase [[id number]]  (Normal) Collected:  06/05/17 2325    Specimen:  Blood Updated:  06/06/17 0005     Amylase 67 U/L     C-reactive Protein [[id number]]  (Normal) Collected:  06/05/17 2325    Specimen:  Blood Updated:  06/06/17 0005     C-Reactive Protein <0.50 mg/dL     Lipase [[id number]]  (Normal) Collected:  06/05/17 2325    Specimen:  Blood Updated:  06/06/17 0005     Lipase 143 U/L     CBC & Differential [[id number]] Collected:  06/05/17 2325    Specimen:  Blood Updated:  06/06/17 0008    Narrative:       The following orders  were created for panel order CBC & Differential.  Procedure                               Abnormality         Status                     ---------                               -----------         ------                     CBC Auto Differential[[id number]]        Abnormal            Final result                 Please view results for these tests on the individual orders.    CBC Auto Differential [[id number]]  (Abnormal) Collected:  06/05/17 2325    Specimen:  Blood Updated:  06/06/17 0008     WBC 7.79 10*3/mm3      RBC 3.71 (L) 10*6/mm3      Hemoglobin 10.1 (L) g/dL      Hematocrit 32.3 (L) %      MCV 87.1 fL      MCH 27.2 (L) pg      MCHC 31.3 (L) g/dL      RDW 14.9 %      RDW-SD 47.4 fl      MPV 11.5 fL      Platelets 345 10*3/mm3      Neutrophil % 62.8 %      Lymphocyte % 20.9 %      Monocyte % 11.6 %      Eosinophil % 3.3 %      Basophil % 0.5 %      Immature Grans % 0.9 %      Neutrophils, Absolute 4.89 10*3/mm3      Lymphocytes, Absolute 1.63 10*3/mm3      Monocytes, Absolute 0.90 10*3/mm3      Eosinophils, Absolute 0.26 10*3/mm3      Basophils, Absolute 0.04 10*3/mm3      Immature Grans, Absolute 0.07 (H) 10*3/mm3     BNP [[id number]]  (Normal) Collected:  06/05/17 2325    Specimen:  Blood Updated:  06/06/17 0012     proBNP 1610.0 pg/mL     Myoglobin, Serum [[id number]]  (Normal) Collected:  06/05/17 2325    Specimen:  Blood Updated:  06/06/17 0012     Myoglobin 54.7 ng/mL     CK-MB [[id number]]  (Normal) Collected:  06/05/17 2325    Specimen:  Blood Updated:  06/06/17 0012     CKMB 1.78 ng/mL     Narrative:       CKMB Index not indicated    T4, Free [[id number]]  (Normal) Collected:  06/05/17 2325    Specimen:  Blood Updated:  06/06/17 0020     Free T4 1.59 ng/dL     Urinalysis With / Culture If Indicated [[id number]]  (Abnormal) Collected:  06/06/17 0027    Specimen:  Urine from Urine, Clean Catch Updated:  06/06/17 0059     Color, UA Yellow     Appearance, UA Clear     pH, UA 6.5     Specific Gravity, UA 1.019      Glucose,  mg/dL (1+) (A)     Ketones, UA Negative     Bilirubin, UA Negative     Blood, UA Negative     Protein, UA Negative     Leuk Esterase, UA Small (1+) (A)     Nitrite, UA Negative     Urobilinogen, UA 0.2 E.U./dL    Urinalysis, Microscopic Only [[id number]]  (Abnormal) Collected:  06/06/17 0027    Specimen:  Urine from Urine, Clean Catch Updated:  06/06/17 0059     RBC, UA 0-2 (A) /HPF      WBC, UA 3-5 (A) /HPF      Bacteria, UA 1+ (A) /HPF      Squamous Epithelial Cells, UA 7-12 (A) /HPF      Hyaline Casts, UA 3-6 /LPF      Methodology Manual Light Microscopy    TSH [[id number]]  (Normal) Collected:  06/05/17 2325    Specimen:  Blood Updated:  06/06/17 0136     TSH 1.500 mIU/mL     Troponin [[id number]]  (Abnormal) Collected:  06/06/17 0624    Specimen:  Blood Updated:  06/06/17 0712     Troponin I 0.113 (H) ng/mL     POC Glucose Fingerstick [[id number]]  (Abnormal) Collected:  06/06/17 1130    Specimen:  Blood Updated:  06/06/17 1142     Glucose 274 (H) mg/dL       : [id number] Zaheer Caldera ID: [id number]       POC Glucose Fingerstick [[id number]]  (Abnormal) Collected:  06/06/17 1605    Specimen:  Blood Updated:  06/06/17 1617     Glucose 180 (H) mg/dL       : [id number] Isaak Bone ID: [id number]       Troponin [[id number]]  (Abnormal) Collected:  06/06/17 1740    Specimen:  Blood Updated:  06/06/17 1814     Troponin I 0.529 (H) ng/mL     POC Glucose Fingerstick [[id number]]  (Abnormal) Collected:  06/06/17 2118    Specimen:  Blood Updated:  06/06/17 2138     Glucose 206 (H) mg/dL       : [id number] Ashlee Bazan ID: [id number]       CBC (No Diff) [[id number]]  (Abnormal) Collected:  06/07/17 0636    Specimen:  Blood Updated:  06/07/17 0645     WBC 6.10 10*3/mm3      RBC 3.57 (L) 10*6/mm3      Hemoglobin 9.6 (L) g/dL      Hematocrit 31.3 (L) %      MCV 87.7 fL      MCH 26.9 (L) pg      MCHC 30.7 (L) g/dL      RDW 15.2 (H) %      RDW-SD 48.5 fl      MPV 10.5 fL       Platelets 297 10*3/mm3     Protime-INR [[id number]]  (Abnormal) Collected:  06/07/17 0636    Specimen:  Blood Updated:  06/07/17 0654     Protime 21.8 (H) Seconds      INR 1.83 (H)    Basic Metabolic Panel [[id number]]  (Abnormal) Collected:  06/07/17 0636    Specimen:  Blood Updated:  06/07/17 0656     Glucose 102 (H) mg/dL      BUN 33 (H) mg/dL      Creatinine 1.50 (H) mg/dL      Sodium 142 mmol/L      Potassium 4.6 mmol/L      Chloride 106 mmol/L      CO2 29.0 mmol/L      Calcium 9.3 mg/dL      eGFR Non African Amer 33 (L) mL/min/1.73      BUN/Creatinine Ratio 22.0     Anion Gap 7.0 mmol/L     Narrative:       The MDRD GFR formula is only valid for adults with stable renal function between ages 18 and 70.    Magnesium [[id number]]  (Normal) Collected:  06/07/17 0636    Specimen:  Blood Updated:  06/07/17 0656     Magnesium 2.1 mg/dL     POC Glucose Fingerstick [[id number]]  (Abnormal) Collected:  06/07/17 0831    Specimen:  Blood Updated:  06/07/17 0851     Glucose 133 (H) mg/dL       : [id number] Cummings KimberlyMeter ID: [id number]       Troponin [[id number]]  (Abnormal) Collected:  06/07/17 0949    Specimen:  Blood Updated:  06/07/17 1016     Troponin I 0.258 (H) ng/mL     Urine Culture [[id number]]  (Abnormal) Collected:  06/06/17 0027    Specimen:  Urine from Urine, Clean Catch Updated:  06/07/17 1148     Urine Culture >100,000 CFU/mL Mixed Gram Positive [place] (A)    Narrative:       Probable Contaminant    POC Glucose Fingerstick [[id number]]  (Abnormal) Collected:  06/07/17 1241    Specimen:  Blood Updated:  06/07/17 1310     Glucose 189 (H) mg/dL       : [id number] Cummings KimberlyMeter ID: [id number]                 Echo EF Estimated  Lab Results   Component Value Date    ECHOEFEST 65 06/06/2017         Medication Review: yes  Current Facility-Administered Medications   Medication Dose Route Frequency Provider Last Rate Last Dose   • acetaminophen (TYLENOL) tablet 650 mg  650 mg Oral Q6H PRN Jenaro Conklin MD    650 mg at 06/06/17 1223   • aluminum-magnesium hydroxide-simethicone (MAALOX/MYLANTA) suspension 30 mL  30 mL Oral Q6H PRN Jenaro Conklin MD   30 mL at 06/06/17 0222   • aspirin EC tablet 81 mg  81 mg Oral Daily Jenaro Conklin MD   81 mg at 06/07/17 0838   • atenolol (TENORMIN) tablet 25 mg  25 mg Oral Daily Jenaro Conklin MD   25 mg at 06/07/17 0838   • atorvastatin (LIPITOR) tablet 40 mg  40 mg Oral Nightly Jenaro Conklin MD   40 mg at 06/06/17 2120   • dextrose (D50W) solution 25 g  25 g Intravenous Q15 Min PRN Leland Johnson MD       • dextrose (GLUTOSE) oral gel 15 g  15 g Oral Q15 Min PRN Leland Johnson MD       • glucagon (human recombinant) (GLUCAGEN DIAGNOSTIC) injection 1 mg  1 mg Subcutaneous Q15 Min PRN Leland Johnson MD       • insulin detemir (LEVEMIR) injection 50 Units  50 Units Subcutaneous Q12H Leland Johnson MD   50 Units at 06/07/17 0839   • insulin lispro (humaLOG) injection 0-9 Units  0-9 Units Subcutaneous 4x Daily With Meals & Nightly Leland Johnson MD   2 Units at 06/07/17 1242   • ipratropium-albuterol (DUO-NEB) nebulizer solution 3 mL  3 mL Nebulization Q4H PRN Jenaro Conklin MD       • magnesium oxide (MAGOX) tablet 400 mg  400 mg Oral Daily Leland Johnson MD   400 mg at 06/07/17 0838   • ondansetron (ZOFRAN) injection 4 mg  4 mg Intravenous Q6H PRN Jenaro Conklin MD       • pantoprazole (PROTONIX) EC tablet 40 mg  40 mg Oral BID AC Leland Johnson MD   40 mg at 06/07/17 0837   • pneumococcal polysaccharide 23-valent (PNEUMOVAX-23) vaccine 0.5 mL  0.5 mL Intramuscular During Hospitalization Leland Johnson MD       • sodium chloride 0.9 % flush 1-10 mL  1-10 mL Intravenous PRN Jenaro Conklin MD       • sodium chloride 0.9 % flush 10 mL  10 mL Intravenous PRN Agapito Dominguez MD       • spironolactone (ALDACTONE) tablet 25 mg  25 mg Oral Daily Jenaro Conklin MD   25 mg at 06/07/17 0839   • venlafaxine XR (EFFEXOR-XR) 24 hr capsule 37.5 mg  37.5 mg  Oral Daily With Breakfast Leland Johnson MD   37.5 mg at 06/07/17 1015   • [START ON 6/8/2017] warfarin (COUMADIN) tablet 1 mg  1 mg Oral Once per day on Sun Tue Thu Sat FACUNDO Escobar       • warfarin (COUMADIN) tablet 2 mg  2 mg Oral Once per day on Mon Wed Fri FACUNDO Escobar             Assessment/Plan     Principal Problem:    Chest pain  Active Problems:    Paroxysmal atrial fibrillation    Essential hypertension    Type 2 diabetes mellitus    Chronic anticoagulation    Coronary artery disease involving native coronary artery of native heart without angina pectoris    SSS (sick sinus syndrome)    Aortic stenosis, severe    Diastolic dysfunction without heart failure    S/P CABG x 3    Artificial pacemaker      Plan    1. Ok to discharge home from cardiology standpoint.  2. Repeat 2D echo and follow up appointment with Dr. Crocker in September.  3. Call office is symptoms worsen.  4. Continue aspirin, atenolol, atorvastatin, spironolactone and warfarin.  5. PT/INR Friday.      FACUNDO Escobar  06/07/17  2:32 PM

## 2017-06-07 NOTE — DISCHARGE SUMMARY
[facility] Medicine Services  DISCHARGE SUMMARY       Date of Admission: 6/5/2017  Date of Discharge:  6/7/2017  Primary Care Physician: FACUNDO Juarez    Presenting Problem/History of Present Illness:  Chest pain, unspecified type [R07.9]     Final Discharge Diagnoses:  Hospital Problem List     * (Principal)Chest pain    Paroxysmal atrial fibrillation    Essential hypertension    Type 2 diabetes mellitus    Chronic anticoagulation    Overview Signed 2/1/2017  9:21 AM by FACUNDO Salazar     Coumadin          Coronary artery disease involving native coronary artery of native heart without angina pectoris    SSS (sick sinus syndrome)    Aortic stenosis, severe    Overview Signed 2/1/2017 10:16 AM by FACUNDO Salazar     Per 8/2016 Echo         Diastolic dysfunction without heart failure    Overview Signed 2/2/2017 11:04 AM by FACUNDO Salazar     Grade III per 2/1/17 Echo          S/P CABG x 3    Artificial pacemaker        Discharge Diagnosis:  1.  Chest pain  2.  Paroxysmal Atrial Fibrillation - on outpatient Coumadin therapy  3.  Sick Sinus Syndrome with pacemaker  4.  Chronic Diastolic Congestive Heart Failure  5.  Severe Aortic Stenosis  6.  Insulin dependent diabetes mellitus  7.  GERD  8.  Hypertension  9.  Coronary Artery Disease with h/o CABG    Consults: Dr. Crocker with Cardiology    Procedures Performed:   Echocardiogram:  · Left ventricular wall thickness is consistent with mild concentric hypertrophy.  · Left ventricular systolic function is normal. Estimated EF = 65%.  · Left ventricular diastolic dysfunction (grade II) consistent with pseudonormalization.  · Right ventricular cavity is mildly dilated.  · Left atrial cavity size is mild-to-moderately dilated.  · calcification of the aortic valve  · Severe aortic valve stenosis is present.  · Mild-to-moderate mitral valve regurgitation is present        Pertinent Test Results:   Imaging  Results (last 72 hours)     Procedure Component Value Units Date/Time    XR Chest 1 View [[id number]] Collected:  06/06/17 0702     Updated:  06/06/17 0816    Narrative:       EXAMINATION:   XR CHEST 1 VW-  6/6/2017 6:55 AM CDT     HISTORY: Chest pain     Prior exam 02/03/2017     Single view of the chest is obtained. The lungs are clear. The cardiac  silhouette is upper limits of normal. Wires are present from previous  median sternotomy. Pacing device is present soft tissues of the left  chest.       Impression:       Stable chest.  This report was finalized on 06/06/2017 08:13 by Dr. Philip Styles MD.        Lab Results (last 24 hours)     Procedure Component Value Units Date/Time    POC Glucose Fingerstick [[id number]]  (Abnormal) Collected:  06/06/17 1605    Specimen:  Blood Updated:  06/06/17 1617     Glucose 180 (H) mg/dL       : [id number] [facility] [id number]       Troponin [[id number]]  (Abnormal) Collected:  06/06/17 1740    Specimen:  Blood Updated:  06/06/17 1814     Troponin I 0.529 (H) ng/mL     POC Glucose Fingerstick [[id number]]  (Abnormal) Collected:  06/06/17 2118    Specimen:  Blood Updated:  06/06/17 2138     Glucose 206 (H) mg/dL       : [id number] [facility] ID: [id number]       CBC (No Diff) [[id number]]  (Abnormal) Collected:  06/07/17 0636    Specimen:  Blood Updated:  06/07/17 0645     WBC 6.10 10*3/mm3      RBC 3.57 (L) 10*6/mm3      Hemoglobin 9.6 (L) g/dL      Hematocrit 31.3 (L) %      MCV 87.7 fL      MCH 26.9 (L) pg      MCHC 30.7 (L) g/dL      RDW 15.2 (H) %      RDW-SD 48.5 fl      MPV 10.5 fL      Platelets 297 10*3/mm3     Protime-INR [[id number]]  (Abnormal) Collected:  06/07/17 0636    Specimen:  Blood Updated:  06/07/17 0654     Protime 21.8 (H) Seconds      INR 1.83 (H)    Basic Metabolic Panel [[id number]]  (Abnormal) Collected:  06/07/17 0636    Specimen:  Blood Updated:  06/07/17 0656     Glucose 102 (H) mg/dL      BUN 33 (H) mg/dL       "Creatinine 1.50 (H) mg/dL      Sodium 142 mmol/L      Potassium 4.6 mmol/L      Chloride 106 mmol/L      CO2 29.0 mmol/L      Calcium 9.3 mg/dL      eGFR Non African Amer 33 (L) mL/min/1.73      BUN/Creatinine Ratio 22.0     Anion Gap 7.0 mmol/L     Narrative:       The MDRD GFR formula is only valid for adults with stable renal function between ages 18 and 70.    Magnesium [[id number]]  (Normal) Collected:  06/07/17 0636    Specimen:  Blood Updated:  06/07/17 0656     Magnesium 2.1 mg/dL     POC Glucose Fingerstick [[id number]]  (Abnormal) Collected:  06/07/17 0831    Specimen:  Blood Updated:  06/07/17 0851     Glucose 133 (H) mg/dL       : [id number] [facility] ID: [id number]       Troponin [[id number]]  (Abnormal) Collected:  06/07/17 0949    Specimen:  Blood Updated:  06/07/17 1016     Troponin I 0.258 (H) ng/mL     Urine Culture [[id number]]  (Abnormal) Collected:  06/06/17 0027    Specimen:  Urine from Urine, Clean Catch Updated:  06/07/17 1148     Urine Culture >100,000 CFU/mL Mixed Gram Positive [place] (A)    Narrative:       Probable Contaminant    POC Glucose Fingerstick [[id number]]  (Abnormal) Collected:  06/07/17 1241    Specimen:  Blood Updated:  06/07/17 1310     Glucose 189 (H) mg/dL       : [id number] [facility] ID: [id number]             Chief Complaint on Day of Discharge: No complaints; no chest pain        Hospital Course:  The patient is a 82 y.o. female who presented to [facility] with a chief complaint of chest pain.  In fact, patient actually describes having symptoms that she thought were related to indigestion and \"gas\" however proceeded to our facility for further workup and management when she started experiencing pain that radiated up into her left shoulder.  She has a known history of coronary artery disease and coronary artery bypass grafting.  She also has a known history of chronic diastolic congestive heart failure, severe aortic stenosis, in addition " to paroxysmal atrial fibrillation on outpatient anticoagulation therapy with Coumadin.    A repeat echocardiogram was obtained with results as noted above.  Her EKG remained stable.  Her cardiac enzymes initially trended from normal up into the positive range and peaked at 0.529 and trended back down to 0.258.  Please note that patient has essentially remained chest pain-free during this hospitalization.  Dr. Crocker of cardiology has been following patient for an extended period of time, and he evaluated patient during this hospitalization.  Review of old records indicated that during her previous heart catheterization performed in February 2017 there was mention of the SVG to the OM being patent but there was 85% stenosis in the vessel distal to the insertion site.    After cardiology's evaluation today, but have okayed the patient for discharge.  She has remained chest pain-free.  Her cardiac enzymes have trended down.  Her INR is slightly subtherapeutic, and this will continue to be followed on an outpatient basis through the cardiology clinic.  Patient was given a prescription for nitroglycerin.  She was given instructions if she has any recurrence of chest discomfort to take nitroglycerin as directed, and to contact Dr. Crocker who may consider performing a heart catheterization in the future to reevaluate the site seen on the previous heart cath back in February.    Patient does report having some trouble with indigestion and reflux disease, and I recommended that she continue her PPI therapy but transitioned to twice a day dosing for the next couple of weeks to see if this will alleviate some of her symptoms.  I would prefer her not to be on twice a day therapy long-term.  She will follow up closely with her primary care provider on an outpatient basis regarding these issues.  She will continue her other outpatient cardiac medications as prescribed.    All in all, patient is doing well.  She was just evaluated by  "Dr. Crocker has been cleared for discharge with close outpatient follow-up.  Please note that plans are in place for her to have a repeat echocardiogram in August or September of this year to reevaluate her severe aortic stenosis, as she has been evaluated by CT surgery in the past, and it sounds like decision was made to potentially proceed with TAVR therapy in the future.    Condition on Discharge:  stable    Physical Exam on Discharge:  /48 (BP Location: Right arm, Patient Position: Lying)  Pulse 62  Temp 97.8 °F (36.6 °C) (Temporal Artery )   Resp 18  Ht 61\" (154.9 cm)  Wt 135 lb 8 oz (61.5 kg)  SpO2 99%  BMI 25.6 kg/m2  Physical Exam  No acute distress, sitting up in the bedside chair playing Kii on her computer, no chest discomfort, nonlabored breathing, no new lower extremity edema, alert and ready for discharge, she is now put her makeup on and is wearing street clothes and is ready for discharge.    Discharge Disposition:  Home or Self Care    Discharge Medications:   Lakesha Costello   Home Medication Instructions BRADLEY:[id number]    Printed on:06/07/17 6815   Medication Information                      aspirin 81 MG EC tablet  Take 81 mg by mouth Daily.             atenolol (TENORMIN) 25 MG tablet  Take 25 mg by mouth Every Evening.             calcium carbonate (OS-MICHAEL) 600 MG tablet  Take 600 mg by mouth 2 (Two) Times a Day With Meals.             cholecalciferol (VITAMIN D3) 1000 UNITS tablet  Take 1,000 Units by mouth Daily.             fenofibrate 160 MG tablet  Take 160 mg by mouth Daily.             glipiZIDE (GLUCOTROL) 5 MG tablet  Take 5 mg by mouth 2 (Two) Times a Day Before Meals.             HYDROcodone-acetaminophen (NORCO) 5-325 MG per tablet  Take 1 tablet by mouth Every 4 (Four) Hours As Needed.             Inositol Niacinate (NIACIN FLUSH FREE) 500 MG capsule  Take 1 capsule by mouth Daily.             insulin aspart (novoLOG) 100 UNIT/ML injection  Inject 2-3 Units under " the skin 3 (Three) Times a Day Before Meals. Sliding scale (bs-100)/2             insulin detemir (LEVEMIR) 100 UNIT/ML injection  Inject 65 Units under the skin Every 12 (Twelve) Hours.             magnesium oxide (MAGOX) 400 (241.3 MG) MG tablet tablet  Take 400 mg by mouth Daily.             Multiple Vitamins-Minerals (PRESERVISION/LUTEIN PO)  Take 1 tablet by mouth 2 (Two) Times a Day.             nitroglycerin (NITROSTAT) 0.4 MG SL tablet  1 under the tongue as needed for angina, may repeat q5mins for up three doses             pantoprazole (PROTONIX) 40 MG EC tablet  Take 40 mg by mouth Daily.             polyethyl glycol-propyl glycol (SYSTANE) 0.4-0.3 % solution ophthalmic solution  Administer 1 drop to the right eye Daily.             simvastatin (ZOCOR) 80 MG tablet  Take 80 mg by mouth Every Night.             spironolactone (ALDACTONE) 25 MG tablet  Take 25 mg by mouth Daily.             tiZANidine (ZANAFLEX) 4 MG tablet  Take 4 mg by mouth Every 8 (Eight) Hours As Needed for Muscle Spasms.             venlafaxine XR (EFFEXOR-XR) 37.5 MG 24 hr capsule  Take 37.5 mg by mouth Daily.             vitamin B-12 (CYANOCOBALAMIN) 500 MCG tablet  Take 500 mcg by mouth Daily.             vitamin E 100 UNIT capsule  Take 400 Units by mouth Daily.             warfarin (COUMADIN) 1 MG tablet  Take 1 mg by mouth Daily.                 Discharge Diet:  cardiac and diabetic    Activity at Discharge:  as tolerated    Follow-up Appointments:   Future Appointments  Date Time Provider Department Center   8/10/2017 10:00 AM [facility] ECHO ROOM 1  [facility]   8/25/2017 10:15 AM Ehsan Crocker MD [facility] None   9/22/2017 8:45 AM [facility] HE[facility] None   Follow-up with Dr. Crocker in the cardiology clinic, in addition to primary care provider following discharge.  She has plans for repeat PT/INR on Friday.      Leland Johnson MD  06/07/17  3:27 PM    Time: 25 min

## 2017-06-08 LAB — BACTERIA SPEC AEROBE CULT: ABNORMAL

## 2017-06-09 ENCOUNTER — ANTICOAGULATION VISIT (OUTPATIENT)
Dept: CARDIOLOGY | Facility: CLINIC | Age: 82
End: 2017-06-09

## 2017-06-09 LAB — INR PPP: 3.4

## 2017-06-13 ENCOUNTER — ANTICOAGULATION VISIT (OUTPATIENT)
Dept: CARDIOLOGY | Facility: CLINIC | Age: 82
End: 2017-06-13

## 2017-06-13 LAB — INR PPP: 3

## 2017-06-20 ENCOUNTER — CLINICAL SUPPORT (OUTPATIENT)
Dept: CARDIOLOGY | Facility: CLINIC | Age: 82
End: 2017-06-20

## 2017-06-20 DIAGNOSIS — I49.5 SSS (SICK SINUS SYNDROME) (HCC): ICD-10-CM

## 2017-06-20 DIAGNOSIS — Z95.0 CARDIAC PACEMAKER IN SITU: Primary | ICD-10-CM

## 2017-06-20 PROCEDURE — 93296 REM INTERROG EVL PM/IDS: CPT | Performed by: INTERNAL MEDICINE

## 2017-06-20 PROCEDURE — 93294 REM INTERROG EVL PM/LDLS PM: CPT | Performed by: INTERNAL MEDICINE

## 2017-06-20 NOTE — PROGRESS NOTES
Dual Chamber Pacemaker Evaluation Report  [facility]    June 20, 2017    Primary Cardiologist: Obi  : Guidant Model: Essentio  Implant date: February 3, 2017    Reason for evaluation: routine  Indication for pacemaker: sick sinus syndrome    Measurements  Atrial sensing - P wave: 3.2 mV  Atrial threshold: 0.4V@ 0.4ms  Atrial lead impedance: 787 ohms  Ventricular sensing - R wave: 21.5 mV  Ventricular threshold: 2.5 V @ 0.4 ms  Ventricular lead impedance:   920 ohms     Diagnostic Data  Atrial paced: 89 %  Ventricular paced: 5 %  Other: 10 episodes of atrial flutter- longest 20 seconds, highest average v rate 116 bpm   meds include coumadin   Ventricular threshold reading high, looks like most days it is reading below 1 V- likely just did not read accurately this day.  Will monitor.  Battery status: satisfactory   Est 15 years      Final Parameters  Mode:  DDDR  Lower rate: 60 bpm   Upper rate: 130 bpm  AV Delay: paced- 220-300 ms  Dzublr-589-500 ms  Atrial - Amplitude: 2 V   Pulse width: 0.4 ms   Sensitivity: 0.25 mV     Ventricular - Amplitude: 2 V  Pulse width: 0.4 ms  Sensitivity: 0.6 mV    Changes made: n/a  Conclusions: normal pacemaker function    Follow up: 3 months     I have reviewed the above findings and agree with the assessment

## 2017-06-21 ENCOUNTER — TELEPHONE (OUTPATIENT)
Dept: CARDIOLOGY | Facility: CLINIC | Age: 82
End: 2017-06-21

## 2017-06-21 ENCOUNTER — ANTICOAGULATION VISIT (OUTPATIENT)
Dept: CARDIOLOGY | Facility: CLINIC | Age: 82
End: 2017-06-21

## 2017-06-21 LAB — INR PPP: 2

## 2017-06-25 ENCOUNTER — APPOINTMENT (OUTPATIENT)
Dept: GENERAL RADIOLOGY | Facility: HOSPITAL | Age: 82
End: 2017-06-25

## 2017-06-25 ENCOUNTER — HOSPITAL ENCOUNTER (OUTPATIENT)
Facility: HOSPITAL | Age: 82
Setting detail: OBSERVATION
Discharge: HOME | End: 2017-06-26
Attending: EMERGENCY MEDICINE | Admitting: INTERNAL MEDICINE

## 2017-06-25 DIAGNOSIS — I44.7 LBBB (LEFT BUNDLE BRANCH BLOCK): ICD-10-CM

## 2017-06-25 DIAGNOSIS — R00.0 TACHYCARDIA: Primary | ICD-10-CM

## 2017-06-25 DIAGNOSIS — R00.2 PALPITATION: ICD-10-CM

## 2017-06-25 LAB
ALBUMIN SERPL-MCNC: 4.4 G/DL (ref 3.5–5)
ALBUMIN/GLOB SERPL: 1.4 G/DL (ref 1.1–2.5)
ALP SERPL-CCNC: 62 U/L (ref 24–120)
ALT SERPL W P-5'-P-CCNC: 34 U/L (ref 0–54)
ANION GAP SERPL CALCULATED.3IONS-SCNC: 11 MMOL/L (ref 4–13)
APTT PPP: 37.3 SECONDS (ref 24.1–34.8)
AST SERPL-CCNC: 52 U/L (ref 7–45)
BASOPHILS # BLD AUTO: 0.03 10*3/MM3 (ref 0–0.2)
BASOPHILS NFR BLD AUTO: 0.3 % (ref 0–2)
BILIRUB SERPL-MCNC: 0.5 MG/DL (ref 0.1–1)
BUN BLD-MCNC: 38 MG/DL (ref 5–21)
BUN/CREAT SERPL: 27.3 (ref 7–25)
CALCIUM SPEC-SCNC: 9.7 MG/DL (ref 8.4–10.4)
CHLORIDE SERPL-SCNC: 103 MMOL/L (ref 98–110)
CO2 SERPL-SCNC: 28 MMOL/L (ref 24–31)
CREAT BLD-MCNC: 1.39 MG/DL (ref 0.5–1.4)
D DIMER PPP FEU-MCNC: 0.42 MG/L (FEU) (ref 0–0.5)
DEPRECATED RDW RBC AUTO: 46 FL (ref 40–54)
EOSINOPHIL # BLD AUTO: 0.26 10*3/MM3 (ref 0–0.7)
EOSINOPHIL NFR BLD AUTO: 2.9 % (ref 0–4)
ERYTHROCYTE [DISTWIDTH] IN BLOOD BY AUTOMATED COUNT: 14.6 % (ref 12–15)
GFR SERPL CREATININE-BSD FRML MDRD: 36 ML/MIN/1.73
GLOBULIN UR ELPH-MCNC: 3.1 GM/DL
GLUCOSE BLD-MCNC: 112 MG/DL (ref 70–100)
GLUCOSE BLDC GLUCOMTR-MCNC: 202 MG/DL (ref 70–130)
GLUCOSE BLDC GLUCOMTR-MCNC: 265 MG/DL (ref 70–130)
HCT VFR BLD AUTO: 35.3 % (ref 37–47)
HGB BLD-MCNC: 11.3 G/DL (ref 12–16)
HOLD SPECIMEN: NORMAL
HOLD SPECIMEN: NORMAL
IMM GRANULOCYTES # BLD: 0.04 10*3/MM3 (ref 0–0.03)
IMM GRANULOCYTES NFR BLD: 0.4 % (ref 0–5)
INR PPP: 1.79 (ref 0.91–1.09)
LYMPHOCYTES # BLD AUTO: 1.68 10*3/MM3 (ref 0.72–4.86)
LYMPHOCYTES NFR BLD AUTO: 18.9 % (ref 15–45)
MAGNESIUM SERPL-MCNC: 2.1 MG/DL (ref 1.4–2.2)
MCH RBC QN AUTO: 28 PG (ref 28–32)
MCHC RBC AUTO-ENTMCNC: 32 G/DL (ref 33–36)
MCV RBC AUTO: 87.4 FL (ref 82–98)
MONOCYTES # BLD AUTO: 0.86 10*3/MM3 (ref 0.19–1.3)
MONOCYTES NFR BLD AUTO: 9.7 % (ref 4–12)
MYOGLOBIN SERPL-MCNC: 92.1 NG/ML (ref 0–110)
NEUTROPHILS # BLD AUTO: 6.02 10*3/MM3 (ref 1.87–8.4)
NEUTROPHILS NFR BLD AUTO: 67.8 % (ref 39–78)
PLATELET # BLD AUTO: 415 10*3/MM3 (ref 130–400)
PMV BLD AUTO: 11.1 FL (ref 6–12)
POTASSIUM BLD-SCNC: 4.8 MMOL/L (ref 3.5–5.3)
PROT SERPL-MCNC: 7.5 G/DL (ref 6.3–8.7)
PROTHROMBIN TIME: 21.4 SECONDS (ref 11.9–14.6)
RBC # BLD AUTO: 4.04 10*6/MM3 (ref 4.2–5.4)
SODIUM BLD-SCNC: 142 MMOL/L (ref 135–145)
TROPONIN I SERPL-MCNC: 0.01 NG/ML (ref 0–0.07)
TROPONIN I SERPL-MCNC: 0.04 NG/ML (ref 0–0.03)
TSH SERPL DL<=0.05 MIU/L-ACNC: 0.65 MIU/ML (ref 0.47–4.68)
WBC NRBC COR # BLD: 8.89 10*3/MM3 (ref 4.8–10.8)
WHOLE BLOOD HOLD SPECIMEN: NORMAL
WHOLE BLOOD HOLD SPECIMEN: NORMAL

## 2017-06-25 PROCEDURE — 84484 ASSAY OF TROPONIN QUANT: CPT

## 2017-06-25 PROCEDURE — G0378 HOSPITAL OBSERVATION PER HR: HCPCS

## 2017-06-25 PROCEDURE — 99221 1ST HOSP IP/OBS SF/LOW 40: CPT | Performed by: INTERNAL MEDICINE

## 2017-06-25 PROCEDURE — 80053 COMPREHEN METABOLIC PANEL: CPT | Performed by: EMERGENCY MEDICINE

## 2017-06-25 PROCEDURE — 63710000001 INSULIN DETEMIR PER 5 UNITS: Performed by: PHYSICIAN ASSISTANT

## 2017-06-25 PROCEDURE — 84443 ASSAY THYROID STIM HORMONE: CPT | Performed by: PHYSICIAN ASSISTANT

## 2017-06-25 PROCEDURE — 99284 EMERGENCY DEPT VISIT MOD MDM: CPT

## 2017-06-25 PROCEDURE — 25010000002 AMIODARONE IN DEXTROSE 5% 150-4.21 MG/100ML-% SOLUTION: Performed by: EMERGENCY MEDICINE

## 2017-06-25 PROCEDURE — 85610 PROTHROMBIN TIME: CPT | Performed by: EMERGENCY MEDICINE

## 2017-06-25 PROCEDURE — 83735 ASSAY OF MAGNESIUM: CPT | Performed by: PHYSICIAN ASSISTANT

## 2017-06-25 PROCEDURE — 96365 THER/PROPH/DIAG IV INF INIT: CPT

## 2017-06-25 PROCEDURE — 93010 ELECTROCARDIOGRAM REPORT: CPT | Performed by: INTERNAL MEDICINE

## 2017-06-25 PROCEDURE — 82962 GLUCOSE BLOOD TEST: CPT

## 2017-06-25 PROCEDURE — 71010 HC CHEST PA OR AP: CPT

## 2017-06-25 PROCEDURE — 63710000001 INSULIN LISPRO (HUMAN) PER 5 UNITS: Performed by: INTERNAL MEDICINE

## 2017-06-25 PROCEDURE — 85730 THROMBOPLASTIN TIME PARTIAL: CPT | Performed by: EMERGENCY MEDICINE

## 2017-06-25 PROCEDURE — 25010000002 AMIODARONE IN DEXTROSE 5% 360-4.14 MG/200ML-% SOLUTION: Performed by: EMERGENCY MEDICINE

## 2017-06-25 PROCEDURE — 93005 ELECTROCARDIOGRAM TRACING: CPT | Performed by: EMERGENCY MEDICINE

## 2017-06-25 PROCEDURE — 83874 ASSAY OF MYOGLOBIN: CPT | Performed by: EMERGENCY MEDICINE

## 2017-06-25 PROCEDURE — 85379 FIBRIN DEGRADATION QUANT: CPT | Performed by: EMERGENCY MEDICINE

## 2017-06-25 PROCEDURE — 85025 COMPLETE CBC W/AUTO DIFF WBC: CPT | Performed by: EMERGENCY MEDICINE

## 2017-06-25 PROCEDURE — 84484 ASSAY OF TROPONIN QUANT: CPT | Performed by: PHYSICIAN ASSISTANT

## 2017-06-25 RX ORDER — GLIPIZIDE 5 MG/1
5 TABLET ORAL
Status: DISCONTINUED | OUTPATIENT
Start: 2017-06-25 | End: 2017-06-26 | Stop reason: HOSPADM

## 2017-06-25 RX ORDER — CHOLECALCIFEROL (VITAMIN D3) 125 MCG
500 CAPSULE ORAL DAILY
Status: DISCONTINUED | OUTPATIENT
Start: 2017-06-25 | End: 2017-06-26 | Stop reason: HOSPADM

## 2017-06-25 RX ORDER — SPIRONOLACTONE 25 MG/1
25 TABLET ORAL DAILY
Status: DISCONTINUED | OUTPATIENT
Start: 2017-06-25 | End: 2017-06-26 | Stop reason: HOSPADM

## 2017-06-25 RX ORDER — NITROGLYCERIN 0.4 MG/1
0.4 TABLET SUBLINGUAL
Status: DISCONTINUED | OUTPATIENT
Start: 2017-06-25 | End: 2017-06-26 | Stop reason: HOSPADM

## 2017-06-25 RX ORDER — TIZANIDINE 4 MG/1
4 TABLET ORAL EVERY 8 HOURS PRN
Status: DISCONTINUED | OUTPATIENT
Start: 2017-06-25 | End: 2017-06-26 | Stop reason: HOSPADM

## 2017-06-25 RX ORDER — METOPROLOL TARTRATE 5 MG/5ML
5 INJECTION INTRAVENOUS EVERY 6 HOURS PRN
Status: DISCONTINUED | OUTPATIENT
Start: 2017-06-25 | End: 2017-06-26 | Stop reason: HOSPADM

## 2017-06-25 RX ORDER — METOPROLOL TARTRATE 5 MG/5ML
5 INJECTION INTRAVENOUS EVERY 4 HOURS PRN
Status: DISCONTINUED | OUTPATIENT
Start: 2017-06-25 | End: 2017-06-25

## 2017-06-25 RX ORDER — SODIUM CHLORIDE 0.9 % (FLUSH) 0.9 %
1-10 SYRINGE (ML) INJECTION AS NEEDED
Status: DISCONTINUED | OUTPATIENT
Start: 2017-06-25 | End: 2017-06-26 | Stop reason: HOSPADM

## 2017-06-25 RX ORDER — ATENOLOL 25 MG/1
25 TABLET ORAL EVERY 12 HOURS SCHEDULED
Status: DISCONTINUED | OUTPATIENT
Start: 2017-06-25 | End: 2017-06-26 | Stop reason: HOSPADM

## 2017-06-25 RX ORDER — VENLAFAXINE HYDROCHLORIDE 37.5 MG/1
37.5 CAPSULE, EXTENDED RELEASE ORAL DAILY
Status: DISCONTINUED | OUTPATIENT
Start: 2017-06-25 | End: 2017-06-26 | Stop reason: HOSPADM

## 2017-06-25 RX ORDER — PANTOPRAZOLE SODIUM 40 MG/1
40 TABLET, DELAYED RELEASE ORAL DAILY
Status: DISCONTINUED | OUTPATIENT
Start: 2017-06-25 | End: 2017-06-26 | Stop reason: HOSPADM

## 2017-06-25 RX ORDER — ASPIRIN 81 MG/1
81 TABLET ORAL DAILY
Status: DISCONTINUED | OUTPATIENT
Start: 2017-06-25 | End: 2017-06-26 | Stop reason: HOSPADM

## 2017-06-25 RX ORDER — ATORVASTATIN CALCIUM 40 MG/1
40 TABLET, FILM COATED ORAL NIGHTLY
Status: DISCONTINUED | OUTPATIENT
Start: 2017-06-25 | End: 2017-06-26 | Stop reason: HOSPADM

## 2017-06-25 RX ORDER — VITAMIN E 268 MG
400 CAPSULE ORAL DAILY
Status: DISCONTINUED | OUTPATIENT
Start: 2017-06-25 | End: 2017-06-26 | Stop reason: HOSPADM

## 2017-06-25 RX ORDER — MELATONIN
1000 DAILY
Status: DISCONTINUED | OUTPATIENT
Start: 2017-06-25 | End: 2017-06-26 | Stop reason: HOSPADM

## 2017-06-25 RX ORDER — WARFARIN SODIUM 2 MG/1
2 TABLET ORAL
Status: DISCONTINUED | OUTPATIENT
Start: 2017-06-25 | End: 2017-06-26 | Stop reason: HOSPADM

## 2017-06-25 RX ADMIN — DESMOPRESSIN ACETATE 40 MG: 0.2 TABLET ORAL at 21:51

## 2017-06-25 RX ADMIN — CALCIUM CARBONATE 625 MG: 1250 SUSPENSION ORAL at 16:28

## 2017-06-25 RX ADMIN — VITAMIN E CAP 400 UNIT 400 UNITS: 400 CAP at 16:27

## 2017-06-25 RX ADMIN — INSULIN LISPRO 4 UNITS: 100 INJECTION, SOLUTION INTRAVENOUS; SUBCUTANEOUS at 18:19

## 2017-06-25 RX ADMIN — Medication 400 MG: at 16:27

## 2017-06-25 RX ADMIN — Medication 500 MCG: at 16:27

## 2017-06-25 RX ADMIN — VENLAFAXINE HYDROCHLORIDE 37.5 MG: 37.5 CAPSULE, EXTENDED RELEASE ORAL at 16:27

## 2017-06-25 RX ADMIN — GLIPIZIDE 5 MG: 5 TABLET ORAL at 18:19

## 2017-06-25 RX ADMIN — POLYETHYLENE GLYCOL AND PROPYLENE GLYCOL 1 DROP: 4; 3 SOLUTION/ DROPS OPHTHALMIC at 16:29

## 2017-06-25 RX ADMIN — WARFARIN SODIUM 2 MG: 2 TABLET ORAL at 18:19

## 2017-06-25 RX ADMIN — AMIODARONE HYDROCHLORIDE 0.5 MG/MIN: 1.8 INJECTION, SOLUTION INTRAVENOUS at 12:07

## 2017-06-25 RX ADMIN — VITAMIN D, TAB 1000IU (100/BT) 1000 UNITS: 25 TAB at 16:28

## 2017-06-25 RX ADMIN — SPIRONOLACTONE 25 MG: 25 TABLET ORAL at 16:27

## 2017-06-25 RX ADMIN — ASPIRIN 81 MG: 81 TABLET ORAL at 16:28

## 2017-06-25 RX ADMIN — AMIODARONE HYDROCHLORIDE 1 MG/MIN: 1.8 INJECTION, SOLUTION INTRAVENOUS at 13:00

## 2017-06-25 RX ADMIN — ATENOLOL 25 MG: 25 TABLET ORAL at 21:51

## 2017-06-25 RX ADMIN — INSULIN DETEMIR 65 UNITS: 100 INJECTION, SOLUTION SUBCUTANEOUS at 21:51

## 2017-06-25 RX ADMIN — AMIODARONE HYDROCHLORIDE 150 MG: 1.5 INJECTION, SOLUTION INTRAVENOUS at 11:51

## 2017-06-26 ENCOUNTER — ANTICOAGULATION VISIT (OUTPATIENT)
Dept: CARDIOLOGY | Facility: CLINIC | Age: 82
End: 2017-06-26

## 2017-06-26 VITALS
DIASTOLIC BLOOD PRESSURE: 49 MMHG | HEART RATE: 60 BPM | TEMPERATURE: 98 F | SYSTOLIC BLOOD PRESSURE: 147 MMHG | WEIGHT: 126 LBS | OXYGEN SATURATION: 96 % | RESPIRATION RATE: 18 BRPM | HEIGHT: 61 IN | BODY MASS INDEX: 23.79 KG/M2

## 2017-06-26 PROBLEM — R00.2 PALPITATIONS: Status: ACTIVE | Noted: 2017-06-26

## 2017-06-26 LAB
ANION GAP SERPL CALCULATED.3IONS-SCNC: 9 MMOL/L (ref 4–13)
BUN BLD-MCNC: 38 MG/DL (ref 5–21)
BUN/CREAT SERPL: 27.1 (ref 7–25)
CALCIUM SPEC-SCNC: 9.2 MG/DL (ref 8.4–10.4)
CHLORIDE SERPL-SCNC: 103 MMOL/L (ref 98–110)
CO2 SERPL-SCNC: 27 MMOL/L (ref 24–31)
CREAT BLD-MCNC: 1.4 MG/DL (ref 0.5–1.4)
DEPRECATED RDW RBC AUTO: 46.6 FL (ref 40–54)
ERYTHROCYTE [DISTWIDTH] IN BLOOD BY AUTOMATED COUNT: 14.6 % (ref 12–15)
GFR SERPL CREATININE-BSD FRML MDRD: 36 ML/MIN/1.73
GLUCOSE BLD-MCNC: 210 MG/DL (ref 70–100)
GLUCOSE BLDC GLUCOMTR-MCNC: 109 MG/DL (ref 70–130)
GLUCOSE BLDC GLUCOMTR-MCNC: 256 MG/DL (ref 70–130)
HCT VFR BLD AUTO: 31.4 % (ref 37–47)
HGB BLD-MCNC: 9.9 G/DL (ref 12–16)
INR PPP: 1.83 (ref 0.91–1.09)
MCH RBC QN AUTO: 27.4 PG (ref 28–32)
MCHC RBC AUTO-ENTMCNC: 31.5 G/DL (ref 33–36)
MCV RBC AUTO: 87 FL (ref 82–98)
PLATELET # BLD AUTO: 345 10*3/MM3 (ref 130–400)
PMV BLD AUTO: 11.2 FL (ref 6–12)
POTASSIUM BLD-SCNC: 4.2 MMOL/L (ref 3.5–5.3)
PROTHROMBIN TIME: 21.8 SECONDS (ref 11.9–14.6)
RBC # BLD AUTO: 3.61 10*6/MM3 (ref 4.2–5.4)
SODIUM BLD-SCNC: 139 MMOL/L (ref 135–145)
WBC NRBC COR # BLD: 7.89 10*3/MM3 (ref 4.8–10.8)

## 2017-06-26 PROCEDURE — 85027 COMPLETE CBC AUTOMATED: CPT | Performed by: PHYSICIAN ASSISTANT

## 2017-06-26 PROCEDURE — 85610 PROTHROMBIN TIME: CPT | Performed by: PHYSICIAN ASSISTANT

## 2017-06-26 PROCEDURE — 80048 BASIC METABOLIC PNL TOTAL CA: CPT | Performed by: PHYSICIAN ASSISTANT

## 2017-06-26 PROCEDURE — 82962 GLUCOSE BLOOD TEST: CPT

## 2017-06-26 PROCEDURE — 99238 HOSP IP/OBS DSCHRG MGMT 30/<: CPT | Performed by: INTERNAL MEDICINE

## 2017-06-26 PROCEDURE — G0378 HOSPITAL OBSERVATION PER HR: HCPCS

## 2017-06-26 RX ORDER — ATENOLOL 25 MG/1
25 TABLET ORAL 2 TIMES DAILY
Qty: 60 TABLET | Refills: 11 | Status: SHIPPED | OUTPATIENT
Start: 2017-06-26 | End: 2018-04-04 | Stop reason: SDUPTHER

## 2017-06-26 RX ORDER — WARFARIN SODIUM 2 MG/1
TABLET ORAL
Qty: 30 TABLET | Refills: 11 | Status: SHIPPED | OUTPATIENT
Start: 2017-06-26 | End: 2018-04-13 | Stop reason: SDUPTHER

## 2017-06-26 RX ADMIN — ATENOLOL 25 MG: 25 TABLET ORAL at 09:47

## 2017-06-26 RX ADMIN — VENLAFAXINE HYDROCHLORIDE 37.5 MG: 37.5 CAPSULE, EXTENDED RELEASE ORAL at 09:47

## 2017-06-26 RX ADMIN — INSULIN LISPRO 4 UNITS: 100 INJECTION, SOLUTION INTRAVENOUS; SUBCUTANEOUS at 13:02

## 2017-06-26 RX ADMIN — ASPIRIN 81 MG: 81 TABLET ORAL at 09:47

## 2017-06-26 RX ADMIN — CALCIUM CARBONATE 625 MG: 1250 SUSPENSION ORAL at 09:46

## 2017-06-26 RX ADMIN — Medication 500 MCG: at 09:47

## 2017-06-26 RX ADMIN — SPIRONOLACTONE 25 MG: 25 TABLET ORAL at 09:47

## 2017-06-26 RX ADMIN — GLIPIZIDE 5 MG: 5 TABLET ORAL at 09:47

## 2017-06-26 RX ADMIN — VITAMIN E CAP 400 UNIT 400 UNITS: 400 CAP at 09:47

## 2017-06-26 RX ADMIN — VITAMIN D, TAB 1000IU (100/BT) 1000 UNITS: 25 TAB at 09:47

## 2017-06-26 RX ADMIN — PANTOPRAZOLE SODIUM 40 MG: 40 TABLET, DELAYED RELEASE ORAL at 09:47

## 2017-06-26 RX ADMIN — Medication 400 MG: at 09:47

## 2017-06-26 RX ADMIN — POLYETHYLENE GLYCOL AND PROPYLENE GLYCOL 1 DROP: 4; 3 SOLUTION/ DROPS OPHTHALMIC at 09:49

## 2017-06-26 NOTE — PROGRESS NOTES
Spoke to pt's daughter.  Pt is currently admitted to the hospital.  Daughter is with her.  She stated that the pt has not had anymore chest pain.  She was admitted for tachycardia.

## 2017-06-28 ENCOUNTER — ANTICOAGULATION VISIT (OUTPATIENT)
Dept: CARDIOLOGY | Facility: CLINIC | Age: 82
End: 2017-06-28

## 2017-06-28 LAB — INR PPP: 2.9

## 2017-07-05 ENCOUNTER — ANTICOAGULATION VISIT (OUTPATIENT)
Dept: CARDIOLOGY | Facility: CLINIC | Age: 82
End: 2017-07-05

## 2017-07-05 LAB — INR PPP: 2.6

## 2017-07-11 ENCOUNTER — ANTICOAGULATION VISIT (OUTPATIENT)
Dept: CARDIOLOGY | Facility: CLINIC | Age: 82
End: 2017-07-11

## 2017-07-11 LAB
INR PPP: 2.2
INR PPP: 2.3

## 2017-07-18 ENCOUNTER — ANTICOAGULATION VISIT (OUTPATIENT)
Dept: CARDIOLOGY | Facility: CLINIC | Age: 82
End: 2017-07-18

## 2017-07-24 ENCOUNTER — ANTICOAGULATION VISIT (OUTPATIENT)
Dept: CARDIOLOGY | Facility: CLINIC | Age: 82
End: 2017-07-24

## 2017-07-24 LAB — INR PPP: 2.3

## 2017-08-01 ENCOUNTER — ANTICOAGULATION VISIT (OUTPATIENT)
Dept: CARDIOLOGY | Facility: CLINIC | Age: 82
End: 2017-08-01

## 2017-08-01 LAB — INR PPP: 2

## 2017-08-08 ENCOUNTER — ANTICOAGULATION VISIT (OUTPATIENT)
Dept: CARDIOLOGY | Facility: CLINIC | Age: 82
End: 2017-08-08

## 2017-08-08 LAB — INR PPP: 2.6

## 2017-08-18 ENCOUNTER — ANTICOAGULATION VISIT (OUTPATIENT)
Dept: CARDIOLOGY | Facility: CLINIC | Age: 82
End: 2017-08-18

## 2017-08-18 LAB — INR PPP: 2.2

## 2017-08-23 ENCOUNTER — ANTICOAGULATION VISIT (OUTPATIENT)
Dept: CARDIOLOGY | Facility: CLINIC | Age: 82
End: 2017-08-23

## 2017-08-23 LAB — INR PPP: 2.3

## 2017-08-29 ENCOUNTER — ANTICOAGULATION VISIT (OUTPATIENT)
Dept: CARDIOLOGY | Facility: CLINIC | Age: 82
End: 2017-08-29

## 2017-08-29 LAB — INR PPP: 1.7

## 2017-09-06 ENCOUNTER — ANTICOAGULATION VISIT (OUTPATIENT)
Dept: CARDIOLOGY | Facility: CLINIC | Age: 82
End: 2017-09-06

## 2017-09-06 LAB — INR PPP: 1.6

## 2017-09-07 ENCOUNTER — HOSPITAL ENCOUNTER (OUTPATIENT)
Dept: CARDIOLOGY | Facility: HOSPITAL | Age: 82
Discharge: HOME | End: 2017-09-07
Attending: INTERNAL MEDICINE | Admitting: INTERNAL MEDICINE

## 2017-09-07 VITALS
HEIGHT: 61 IN | BODY MASS INDEX: 23.79 KG/M2 | SYSTOLIC BLOOD PRESSURE: 130 MMHG | DIASTOLIC BLOOD PRESSURE: 74 MMHG | WEIGHT: 126 LBS

## 2017-09-07 DIAGNOSIS — I35.0 AORTIC STENOSIS, SEVERE: ICD-10-CM

## 2017-09-07 LAB
BH CV ECHO MEAS - AO MAX PG (FULL): 43.5 MMHG
BH CV ECHO MEAS - AO MAX PG: 45.2 MMHG
BH CV ECHO MEAS - AO MEAN PG (FULL): 25 MMHG
BH CV ECHO MEAS - AO MEAN PG: 26 MMHG
BH CV ECHO MEAS - AO ROOT AREA (BSA CORRECTED): 1.4
BH CV ECHO MEAS - AO ROOT AREA: 3.8 CM^2
BH CV ECHO MEAS - AO ROOT DIAM: 2.2 CM
BH CV ECHO MEAS - AO V2 MAX: 336 CM/SEC
BH CV ECHO MEAS - AO V2 MEAN: 239 CM/SEC
BH CV ECHO MEAS - AO V2 VTI: 99.1 CM
BH CV ECHO MEAS - AVA(I,A): 0.48 CM^2
BH CV ECHO MEAS - AVA(I,D): 0.48 CM^2
BH CV ECHO MEAS - AVA(V,A): 0.55 CM^2
BH CV ECHO MEAS - AVA(V,D): 0.55 CM^2
BH CV ECHO MEAS - BSA(HAYCOCK): 1.6 M^2
BH CV ECHO MEAS - BSA: 1.6 M^2
BH CV ECHO MEAS - BZI_BMI: 23.8 KILOGRAMS/M^2
BH CV ECHO MEAS - BZI_METRIC_HEIGHT: 154.9 CM
BH CV ECHO MEAS - BZI_METRIC_WEIGHT: 57.2 KG
BH CV ECHO MEAS - CONTRAST EF (2CH): 55.6 ML/M^2
BH CV ECHO MEAS - CONTRAST EF 4CH: 51.4 ML/M^2
BH CV ECHO MEAS - EDV(CUBED): 86.4 ML
BH CV ECHO MEAS - EDV(MOD-SP2): 42.8 ML
BH CV ECHO MEAS - EDV(MOD-SP4): 40.3 ML
BH CV ECHO MEAS - EDV(TEICH): 88.6 ML
BH CV ECHO MEAS - EF(CUBED): 82.3 %
BH CV ECHO MEAS - EF(MOD-SP2): 55.6 %
BH CV ECHO MEAS - EF(MOD-SP4): 51.4 %
BH CV ECHO MEAS - EF(TEICH): 75.3 %
BH CV ECHO MEAS - ESV(CUBED): 15.3 ML
BH CV ECHO MEAS - ESV(MOD-SP2): 19 ML
BH CV ECHO MEAS - ESV(MOD-SP4): 19.6 ML
BH CV ECHO MEAS - ESV(TEICH): 21.9 ML
BH CV ECHO MEAS - FS: 43.9 %
BH CV ECHO MEAS - IVS/LVPW: 1.4
BH CV ECHO MEAS - IVSD: 0.93 CM
BH CV ECHO MEAS - LA DIMENSION: 4 CM
BH CV ECHO MEAS - LA/AO: 1.8
BH CV ECHO MEAS - LAT PEAK E' VEL: 6.2 CM/SEC
BH CV ECHO MEAS - LV DIASTOLIC VOL/BSA (35-75): 26 ML/M^2
BH CV ECHO MEAS - LV MASS(C)D: 108.7 GRAMS
BH CV ECHO MEAS - LV MASS(C)DI: 70 GRAMS/M^2
BH CV ECHO MEAS - LV MAX PG: 1.7 MMHG
BH CV ECHO MEAS - LV MEAN PG: 1 MMHG
BH CV ECHO MEAS - LV SYSTOLIC VOL/BSA (12-30): 12.6 ML/M^2
BH CV ECHO MEAS - LV V1 MAX: 65.3 CM/SEC
BH CV ECHO MEAS - LV V1 MEAN: 47.9 CM/SEC
BH CV ECHO MEAS - LV V1 VTI: 16.8 CM
BH CV ECHO MEAS - LVIDD: 4.4 CM
BH CV ECHO MEAS - LVIDS: 2.5 CM
BH CV ECHO MEAS - LVLD AP2: 5.3 CM
BH CV ECHO MEAS - LVLD AP4: 5.1 CM
BH CV ECHO MEAS - LVLS AP2: 4.3 CM
BH CV ECHO MEAS - LVLS AP4: 4.3 CM
BH CV ECHO MEAS - LVOT AREA (M): 2.8 CM^2
BH CV ECHO MEAS - LVOT AREA: 2.8 CM^2
BH CV ECHO MEAS - LVOT DIAM: 1.9 CM
BH CV ECHO MEAS - LVPWD: 0.65 CM
BH CV ECHO MEAS - MED PEAK E' VEL: 4.43 CM/SEC
BH CV ECHO MEAS - MR MAX PG: 132.7 MMHG
BH CV ECHO MEAS - MR MAX VEL: 576 CM/SEC
BH CV ECHO MEAS - MV A MAX VEL: 76.3 CM/SEC
BH CV ECHO MEAS - MV DEC TIME: 0.21 SEC
BH CV ECHO MEAS - MV E MAX VEL: 178 CM/SEC
BH CV ECHO MEAS - MV E/A: 2.3
BH CV ECHO MEAS - RAP SYSTOLE: 5 MMHG
BH CV ECHO MEAS - RVDD: 3.8 CM
BH CV ECHO MEAS - RVSP: 42.2 MMHG
BH CV ECHO MEAS - SI(AO): 242.7 ML/M^2
BH CV ECHO MEAS - SI(CUBED): 45.8 ML/M^2
BH CV ECHO MEAS - SI(LVOT): 30.7 ML/M^2
BH CV ECHO MEAS - SI(MOD-SP2): 15.3 ML/M^2
BH CV ECHO MEAS - SI(MOD-SP4): 13.3 ML/M^2
BH CV ECHO MEAS - SI(TEICH): 43 ML/M^2
BH CV ECHO MEAS - SV(AO): 376.7 ML
BH CV ECHO MEAS - SV(CUBED): 71.1 ML
BH CV ECHO MEAS - SV(LVOT): 47.6 ML
BH CV ECHO MEAS - SV(MOD-SP2): 23.8 ML
BH CV ECHO MEAS - SV(MOD-SP4): 20.7 ML
BH CV ECHO MEAS - SV(TEICH): 66.8 ML
BH CV ECHO MEAS - TR MAX VEL: 305 CM/SEC
E/E' RATIO: 40.2
LEFT ATRIUM VOLUME INDEX: 30.6 ML/M2
LEFT ATRIUM VOLUME: 47.4 CM3
LV EF 2D ECHO EST: 60 %

## 2017-09-07 PROCEDURE — 93306 TTE W/DOPPLER COMPLETE: CPT | Performed by: INTERNAL MEDICINE

## 2017-09-07 PROCEDURE — 93306 TTE W/DOPPLER COMPLETE: CPT

## 2017-09-13 ENCOUNTER — ANTICOAGULATION VISIT (OUTPATIENT)
Dept: CARDIOLOGY | Facility: CLINIC | Age: 82
End: 2017-09-13

## 2017-09-13 ENCOUNTER — CLINICAL SUPPORT (OUTPATIENT)
Dept: CARDIOLOGY | Facility: CLINIC | Age: 82
End: 2017-09-13

## 2017-09-13 DIAGNOSIS — I49.5 SSS (SICK SINUS SYNDROME) (HCC): ICD-10-CM

## 2017-09-13 LAB — INR PPP: 1.8

## 2017-09-13 PROCEDURE — 93294 REM INTERROG EVL PM/LDLS PM: CPT | Performed by: PHYSICIAN ASSISTANT

## 2017-09-13 PROCEDURE — 93296 REM INTERROG EVL PM/IDS: CPT | Performed by: PHYSICIAN ASSISTANT

## 2017-09-17 NOTE — PROGRESS NOTES
Dual Chamber Pacemaker Evaluation Report  [facility]    September 16, 2017    Primary Cardiologist: Obi  : Guidant Model: Essentio  Implant date: 2/3/17    Reason for evaluation: routine  Indication for pacemaker: sick sinus syndrome    Measurements  Atrial sensing - P wave: 3.2 mV  Atrial threshold: 0.4 V@ 0.4 ms  Atrial lead impedance: 725 ohms  Ventricular sensing - R wave: 17.8 mV  Ventricular threshold: 0.7 V @ 0.4 ms  Ventricular lead impedance:   903 ohms     Diagnostic Data  Atrial paced: 91 %  Ventricular paced: 9 %  Other: no significant events noted  Battery status: satisfactory         Final Parameters  Mode:  DDDR  Lower rate: 60 bpm   Upper rate: 130 bpm  AV Delay: paced- 220-300 ms  Wmnfpx-424-486 ms  Atrial - Amplitude: 2.0 V   Pulse width: 0.4 ms   Sensitivity: 0.25 mV     Ventricular - Amplitude: 2.0 V  Pulse width: 0.4 ms  Sensitivity: 0.6 mV    Changes made: n/a  Conclusions: normal pacemaker function    Follow up: 6 months

## 2017-09-18 ENCOUNTER — OFFICE VISIT (OUTPATIENT)
Dept: CARDIOLOGY | Facility: CLINIC | Age: 82
End: 2017-09-18

## 2017-09-18 ENCOUNTER — CLINICAL SUPPORT (OUTPATIENT)
Dept: CARDIOLOGY | Facility: CLINIC | Age: 82
End: 2017-09-18

## 2017-09-18 VITALS
DIASTOLIC BLOOD PRESSURE: 60 MMHG | HEART RATE: 60 BPM | WEIGHT: 130 LBS | SYSTOLIC BLOOD PRESSURE: 141 MMHG | BODY MASS INDEX: 24.55 KG/M2 | HEIGHT: 61 IN

## 2017-09-18 DIAGNOSIS — I35.0 AORTIC STENOSIS, SEVERE: ICD-10-CM

## 2017-09-18 DIAGNOSIS — I10 ESSENTIAL HYPERTENSION: ICD-10-CM

## 2017-09-18 DIAGNOSIS — I48.0 PAROXYSMAL ATRIAL FIBRILLATION (HCC): Primary | ICD-10-CM

## 2017-09-18 DIAGNOSIS — I49.5 SSS (SICK SINUS SYNDROME) (HCC): Primary | ICD-10-CM

## 2017-09-18 DIAGNOSIS — I25.10 CORONARY ARTERY DISEASE INVOLVING NATIVE CORONARY ARTERY OF NATIVE HEART WITHOUT ANGINA PECTORIS: ICD-10-CM

## 2017-09-18 PROCEDURE — 93288 INTERROG EVL PM/LDLS PM IP: CPT | Performed by: INTERNAL MEDICINE

## 2017-09-18 PROCEDURE — 93000 ELECTROCARDIOGRAM COMPLETE: CPT | Performed by: INTERNAL MEDICINE

## 2017-09-18 PROCEDURE — 99214 OFFICE O/P EST MOD 30 MIN: CPT | Performed by: INTERNAL MEDICINE

## 2017-09-18 RX ORDER — ATENOLOL 50 MG/1
50 TABLET ORAL DAILY
COMMUNITY
End: 2018-04-04 | Stop reason: ALTCHOICE

## 2017-09-18 NOTE — PROGRESS NOTES
Dual Chamber Pacemaker Evaluation Report  In Office     September 18, 2017    Primary Cardiologist: Obi  : Guidant Model: Essentio  Implant date: Feb 03,2017    Reason for evaluation: routine  Indication for pacemaker: sick sinus syndrome    Measurements  Atrial sensing - P wave: 2.5 mV  Atrial threshold: 0.8V@ 0.4ms  Atrial lead impedance: 745 ohms  Ventricular sensing - R wave: 16.8 mV  Ventricular threshold: 0.7 V @ 0.4 ms  Ventricular lead impedance:   953 ohms     Diagnostic Data  Atrial paced: 91 %  Ventricular paced: 9 %  Other: No Events   Battery status: satisfactory   Est 15 Years       Final Parameters  Mode:  DDDR  Lower rate: 60 bpm   Upper rate: 130 bpm  AV Delay: paced- 220-300 ms  Jhiovz-485-644 ms  Atrial - Amplitude: 2 V   Pulse width: 0.4 ms   Sensitivity: 0.25 mV     Ventricular - Amplitude: 2 V  Pulse width: 0.4 ms  Sensitivity: 0.6 mV    Changes made: None  Conclusions: normal pacemaker function and stable pacing and sensing thresholds    Follow up: 6 Months [facility].     Interrogation done by company device rep.

## 2017-09-18 NOTE — PROGRESS NOTES
Referring Provider: FACUNDO Juarez    Reason for Follow-up Visit: Severe AS    Subjective .   Chief Complaint:   Chief Complaint   Patient presents with   • Follow-up     3 month fu for Afib, CAD.  last echo 6/6/17   • Atrial Fibrillation     no symptoms   • Coronary Artery Disease     no chest pain but does have sob at times   • Shortness of Breath     when walking to answer the phone she gets sob.(per daughter)       History of present illness:  Lakesha Costello is a 82 y.o. yo female with history of CAD, has slowly progressive AS. She says she is not very SOB but her daughter disagrees        History  Past Medical History:   Diagnosis Date   • Aortic stenosis    • Atrial fibrillation    • Atrial fibrillation by electrocardiogram    • Atrial flutter    • Breast cancer    • CAD (coronary artery disease)    • Chronic anticoagulation     Coumadin    • Diabetes mellitus     Type II   • Hospital discharge follow-up    • Hyperlipidemia    • Hypertension    ,   Past Surgical History:   Procedure Laterality Date   • BACK SURGERY     • BELPHAROPTOSIS REPAIR     • CARDIAC CATHETERIZATION  1999, 2010   • CARDIAC CATHETERIZATION N/A 2/15/2017    Procedure: Left Heart Cath;  Surgeon: Ehsan Crocker MD;  Location: [facility] CATH INVASIVE LOCATION;  Service:    • CARDIAC CATHETERIZATION N/A 2/15/2017    Procedure: Right Heart Cath;  Surgeon: Ehsan Crocker MD;  Location:  [facility] CATH INVASIVE LOCATION;  Service:    • CARDIAC CATHETERIZATION N/A 2/15/2017    Procedure: Coronary angiography;  Surgeon: Ehsan Crocker MD;  Location: [facility] CATH INVASIVE LOCATION;  Service:    • CARDIAC CATHETERIZATION N/A 2/15/2017    Procedure: Left ventriculography;  Surgeon: Ehsan Crocker MD;  Location: [facility] CATH INVASIVE LOCATION;  Service:    • CARDIAC CATHETERIZATION N/A 2/15/2017    Procedure: Intracardiac echocardiogram;  Surgeon: Ehsan Crocker MD;  Location: [facility] CATH INVASIVE LOCATION;  Service:    • CARDIAC ELECTROPHYSIOLOGY PROCEDURE  N/A 2/3/2017    Procedure: Pacemaker DC new;  Surgeon: Ehsan Crocker MD;  Location: [facility] CATH INVASIVE LOCATION;  Service:    • CATARACT EXTRACTION     • CHOLECYSTECTOMY     • CORONARY ARTERY BYPASS GRAFT  1999 4 vessel    • HYSTERECTOMY     • INSERT / REPLACE / REMOVE PACEMAKER     • MASTECTOMY     ,   Family History   Problem Relation Age of Onset   • Breast cancer Mother    • Coronary artery disease Father    • Heart attack Father    • Cancer Brother    • No Known Problems Maternal Grandmother    • No Known Problems Maternal Grandfather    • No Known Problems Paternal Grandmother    • No Known Problems Paternal Grandfather    ,   Social History   Substance Use Topics   • Smoking status: Never Smoker   • Smokeless tobacco: Never Used   • Alcohol use No   ,     Medications  Current Outpatient Prescriptions   Medication Sig Dispense Refill   • aspirin 81 MG EC tablet Take 81 mg by mouth Daily.     • atenolol (TENORMIN) 50 MG tablet Take 50 mg by mouth Daily. 1/2 tablet bid     • calcium carbonate (OS-MICHAEL) 600 MG tablet Take 600 mg by mouth 2 (Two) Times a Day With Meals.     • cholecalciferol (VITAMIN D3) 1000 UNITS tablet Take 1,000 Units by mouth Daily.     • fenofibrate 160 MG tablet Take 160 mg by mouth Daily.     • glipiZIDE (GLUCOTROL) 5 MG tablet Take 5 mg by mouth 2 (Two) Times a Day Before Meals.     • Inositol Niacinate (NIACIN FLUSH FREE) 500 MG capsule Take 1 capsule by mouth Daily.     • insulin aspart (novoLOG) 100 UNIT/ML injection Inject 2-3 Units under the skin 3 (Three) Times a Day Before Meals. Sliding scale (bs-100)/2     • insulin detemir (LEVEMIR) 100 UNIT/ML injection Inject 65 Units under the skin Every 12 (Twelve) Hours.     • magnesium oxide (MAGOX) 400 (241.3 MG) MG tablet tablet Take 400 mg by mouth Daily.     • Multiple Vitamins-Minerals (PRESERVISION/LUTEIN PO) Take 1 tablet by mouth 2 (Two) Times a Day.     • nitroglycerin (NITROSTAT) 0.4 MG SL tablet 1 under the tongue as needed  for angina, may repeat q5mins for up three doses 30 tablet 3   • pantoprazole (PROTONIX) 40 MG EC tablet Take 40 mg by mouth Daily.     • polyethyl glycol-propyl glycol (SYSTANE) 0.4-0.3 % solution ophthalmic solution Administer 1 drop to the right eye Daily.     • simvastatin (ZOCOR) 80 MG tablet Take 80 mg by mouth Every Night.     • venlafaxine XR (EFFEXOR-XR) 37.5 MG 24 hr capsule Take 37.5 mg by mouth Daily.     • vitamin B-12 (CYANOCOBALAMIN) 500 MCG tablet Take 500 mcg by mouth Daily.     • vitamin E 100 UNIT capsule Take 400 Units by mouth Daily.     • warfarin (COUMADIN) 2 MG tablet Take 1 tablet by mouth daily on Monday, Wednesday and Friday. Take 1/2 tablet by mouth daily on Tuesday, Thursday, Saturday and Sunday. 30 tablet 11   • atenolol (TENORMIN) 25 MG tablet Take 1 tablet by mouth 2 (Two) Times a Day. 60 tablet 11   • HYDROcodone-acetaminophen (NORCO) 5-325 MG per tablet Take 1 tablet by mouth Every 4 (Four) Hours As Needed.     • spironolactone (ALDACTONE) 25 MG tablet Take 25 mg by mouth Daily.     • tiZANidine (ZANAFLEX) 4 MG tablet Take 4 mg by mouth Every 8 (Eight) Hours As Needed for Muscle Spasms.       No current facility-administered medications for this visit.        Allergies:  Dilaudid [hydromorphone hcl]; Dilaudid [hydromorphone]; Enalapril; Janumet [sitagliptin-metformin hcl]; Lopid [gemfibrozil]; Sulfa antibiotics; and Ultram [tramadol]    Review of Systems  Review of Systems   HENT: Negative for nosebleeds.    Cardiovascular: Positive for dyspnea on exertion. Negative for chest pain, claudication, irregular heartbeat, leg swelling, near-syncope, orthopnea, palpitations, paroxysmal nocturnal dyspnea and syncope.   Respiratory: Positive for shortness of breath. Negative for cough and hemoptysis.    Gastrointestinal: Negative for dysphagia, hematemesis and melena.   Genitourinary: Negative for hematuria.       Objective     Physical Exam:  /60 (BP Location: Left arm, Patient  "Position: Sitting, Cuff Size: Adult)  Pulse 60  Ht 61\" (154.9 cm)  Wt 130 lb (59 kg)  BMI 24.56 kg/m2  Physical Exam   Constitutional: She is oriented to person, place, and time.   HENT:   Head: Normocephalic and atraumatic.   Cardiovascular: Normal rate, regular rhythm, S1 normal and S2 normal.  Exam reveals no gallop and no friction rub.    Murmur heard.   Harsh midsystolic murmur is present with a grade of 3/6  at the upper right sternal border radiating to the neck  Pulmonary/Chest: Effort normal and breath sounds normal. No respiratory distress. She has no wheezes. She has no rales. She exhibits no tenderness.   Abdominal: Soft. Bowel sounds are normal. She exhibits no distension. There is no tenderness. There is no rebound and no guarding.   Musculoskeletal: Normal range of motion. She exhibits no edema.   Neurological: She is alert and oriented to person, place, and time. No cranial nerve deficit.   Skin: Skin is warm and dry. No rash noted. No erythema.   Psychiatric: She has a normal mood and affect. Her behavior is normal.       Results Review:    ECG 12 Lead  Date/Time: 9/18/2017 9:43 AM  Performed by: VINCE RIBERA  Authorized by: VINCE RIBERA   Comparison: compared with previous ECG   Similar to previous ECG  Rhythm: sinus rhythm and paced  Rate: normal  Conduction: LAFB  ST Segments: ST segments normal  T Waves: T waves normal  QRS axis: left  Q waves: V1, V2 and V3  Clinical impression: abnormal ECG            Anticoagulation Visit on 09/13/2017   Component Date Value Ref Range Status   • INR 09/12/2017 1.80   Final       Assessment/Plan   Lakesha was seen today for follow-up, atrial fibrillation, coronary artery disease and shortness of breath.    Diagnoses and all orders for this visit:    Paroxysmal atrial fibrillation, maintaining SR, anticoagulated    Essential hypertension, adequate control    Coronary artery disease involving native coronary artery of native heart without angina pectoris, " The patient denies chest pain, orthopnea, PND, edema. There is no evidence of ongoing ischemia    Aortic stenosis, severe,  Will refer to [facility] for TAVR consideration

## 2017-09-22 ENCOUNTER — ANTICOAGULATION VISIT (OUTPATIENT)
Dept: CARDIOLOGY | Facility: CLINIC | Age: 82
End: 2017-09-22

## 2017-09-22 LAB — INR PPP: 1.7

## 2017-09-29 ENCOUNTER — TELEPHONE (OUTPATIENT)
Dept: CARDIOLOGY | Facility: CLINIC | Age: 82
End: 2017-09-29

## 2017-09-29 ENCOUNTER — ANTICOAGULATION VISIT (OUTPATIENT)
Dept: CARDIOLOGY | Facility: CLINIC | Age: 82
End: 2017-09-29

## 2017-09-29 LAB — INR PPP: 2

## 2017-10-02 NOTE — TELEPHONE ENCOUNTER
Pt's daughter informed.  I told her if she has not heard by the end of the week to call me back.  Adrian Sweeney, CMA

## 2017-10-09 NOTE — TELEPHONE ENCOUNTER
Who did you give the number to?  They have not called her yet.  She has called several times asking.  Adrian Sweeney, CMA

## 2017-10-10 ENCOUNTER — ANTICOAGULATION VISIT (OUTPATIENT)
Dept: CARDIOLOGY | Facility: CLINIC | Age: 82
End: 2017-10-10

## 2017-10-10 LAB — INR PPP: 1.6

## 2017-10-11 NOTE — TELEPHONE ENCOUNTER
Just letting you know that Dr. Cabrera's office called her and they have appt for Monday the 16 th.  Adrian Sweeney, CMA

## 2017-10-17 ENCOUNTER — ANTICOAGULATION VISIT (OUTPATIENT)
Dept: CARDIOLOGY | Facility: CLINIC | Age: 82
End: 2017-10-17

## 2017-10-17 LAB — INR PPP: 2.3

## 2017-11-01 ENCOUNTER — ANTICOAGULATION VISIT (OUTPATIENT)
Dept: CARDIOLOGY | Facility: CLINIC | Age: 82
End: 2017-11-01

## 2017-11-01 LAB — INR PPP: 1.8

## 2017-11-15 ENCOUNTER — CLINICAL SUPPORT (OUTPATIENT)
Dept: CARDIOLOGY | Facility: CLINIC | Age: 82
End: 2017-11-15

## 2017-11-15 DIAGNOSIS — I49.5 SSS (SICK SINUS SYNDROME) (HCC): ICD-10-CM

## 2017-11-16 NOTE — PROGRESS NOTES
Dual Chamber Pacemaker Evaluation Report  [facility]    November 15, 2017    Primary Cardiologist: Obi  : Guidant Model: Essentio  Implant date: 2/3/17    Reason for evaluation: routine  Indication for pacemaker: sick sinus syndrome    Measurements  Atrial sensing - P wave: 2.3 mV  Atrial threshold: 0.5 V@ 0.4 ms  Atrial lead impedance: 722 ohms  Ventricular sensing - R wave: 23.1 mV  Ventricular threshold: 0.7 V @ 0.4 ms  Ventricular lead impedance:   924 ohms     Diagnostic Data  Atrial paced: 92 %  Ventricular paced: 9 %  Other: 2 episodes of NSVT noted.  28 sec.  Battery status: satisfactory         Final Parameters  Mode:  DDDR  Lower rate: 60 bpm   Upper rate: 130 bpm  AV Delay: paced- 220-300 ms  [id number]-[id number] ms  Atrial - Amplitude: 2.0 V   Pulse width: 0.4 ms   Sensitivity: 0.25 mV     Ventricular - Amplitude: 2.0 V  Pulse width: 0.4 ms  Sensitivity: 0.6 mV    Changes made: none  Conclusions: normal pacemaker function    Follow up: 6 months

## 2017-11-17 ENCOUNTER — ANTICOAGULATION VISIT (OUTPATIENT)
Dept: CARDIOLOGY | Facility: CLINIC | Age: 82
End: 2017-11-17

## 2017-11-17 LAB — INR PPP: 1.5

## 2017-11-22 ENCOUNTER — ANTICOAGULATION VISIT (OUTPATIENT)
Dept: CARDIOLOGY | Facility: CLINIC | Age: 82
End: 2017-11-22

## 2017-11-22 LAB — INR PPP: 3.2

## 2017-11-24 LAB — INR PPP: 4

## 2017-11-27 ENCOUNTER — ANTICOAGULATION VISIT (OUTPATIENT)
Dept: CARDIOLOGY | Facility: CLINIC | Age: 82
End: 2017-11-27

## 2017-11-28 ENCOUNTER — ANTICOAGULATION VISIT (OUTPATIENT)
Dept: CARDIOLOGY | Facility: CLINIC | Age: 82
End: 2017-11-28

## 2017-11-28 LAB — INR PPP: 3.6

## 2017-12-01 ENCOUNTER — ANTICOAGULATION VISIT (OUTPATIENT)
Dept: CARDIOLOGY | Facility: CLINIC | Age: 82
End: 2017-12-01

## 2017-12-01 LAB — INR PPP: 1.6

## 2017-12-11 ENCOUNTER — ANTICOAGULATION VISIT (OUTPATIENT)
Dept: CARDIOLOGY | Facility: CLINIC | Age: 82
End: 2017-12-11

## 2017-12-11 LAB — INR PPP: 3.3

## 2017-12-19 ENCOUNTER — ANTICOAGULATION VISIT (OUTPATIENT)
Dept: CARDIOLOGY | Facility: CLINIC | Age: 82
End: 2017-12-19

## 2017-12-19 LAB — INR PPP: 1.9

## 2017-12-27 ENCOUNTER — ANTICOAGULATION VISIT (OUTPATIENT)
Dept: CARDIOLOGY | Facility: CLINIC | Age: 82
End: 2017-12-27

## 2017-12-27 LAB — INR PPP: 2.5

## 2018-01-03 ENCOUNTER — ANTICOAGULATION VISIT (OUTPATIENT)
Dept: CARDIOLOGY | Facility: CLINIC | Age: 83
End: 2018-01-03

## 2018-01-03 LAB — INR PPP: 2.7

## 2018-01-08 ENCOUNTER — TELEPHONE (OUTPATIENT)
Dept: CARDIOLOGY | Facility: CLINIC | Age: 83
End: 2018-01-08

## 2018-01-08 NOTE — TELEPHONE ENCOUNTER
Spoke with patients daughter to reschedule todays appointment and she said since she has just been to [place] recently she did not think she would need to be seen here so soon. Wants to know when Dr Crocker would like for patient to be seen. Please advise.

## 2018-01-10 LAB — INR PPP: 3

## 2018-01-10 NOTE — TELEPHONE ENCOUNTER
Zoey Crocker wants this patient to be seen in 6-8 weeks. Could you make her an appointment and call her daughter Margarita at [phone] with her date and time? I would do it but I know his schedule is full and you know how to work them in.   Thank You!!!

## 2018-01-17 ENCOUNTER — ANTICOAGULATION VISIT (OUTPATIENT)
Dept: CARDIOLOGY | Facility: CLINIC | Age: 83
End: 2018-01-17

## 2018-01-22 LAB — INR PPP: 3.2

## 2018-01-23 ENCOUNTER — ANTICOAGULATION VISIT (OUTPATIENT)
Dept: CARDIOLOGY | Facility: CLINIC | Age: 83
End: 2018-01-23

## 2018-02-23 ENCOUNTER — CLINICAL SUPPORT NO REQUIREMENTS (OUTPATIENT)
Dept: CARDIOLOGY | Facility: CLINIC | Age: 83
End: 2018-02-23

## 2018-02-23 DIAGNOSIS — Z95.0 ARTIFICIAL PACEMAKER: ICD-10-CM

## 2018-02-23 PROCEDURE — 93294 REM INTERROG EVL PM/LDLS PM: CPT | Performed by: PHYSICIAN ASSISTANT

## 2018-02-23 PROCEDURE — 93296 REM INTERROG EVL PM/IDS: CPT | Performed by: PHYSICIAN ASSISTANT

## 2018-02-24 NOTE — PROGRESS NOTES
Dual Chamber Pacemaker Evaluation Report  [facility]    February 24, 2018    Primary Cardiologist: Obi  : Guidant Model: Essentio  Implant date: 2/3/17    Reason for evaluation: routine  Indication for pacemaker: sick sinus syndrome    Measurements  Atrial sensing - P wave: 2.7 mV  Atrial threshold: n/r  Atrial lead impedance: 735 ohms  Ventricular sensing - R wave: 21.9 mV  Ventricular threshold: 0.8 V @ 0.4 ms  Ventricular lead impedance:   947 ohms     Diagnostic Data  Atrial paced: 83 %  Ventricular paced: 14 %  Other: Few episodes of atrial flutter noted; v rate controlled.  Pt anticoagulated.  Battery status: satisfactory         Final Parameters  Mode:  DDDR  Lower rate: 60 bpm   Upper rate: 130 bpm  AV Delay: paced- 220-300 ms  Viksuq-790-424 ms  Atrial - Amplitude: 2.0 V   Pulse width: 0.4 ms   Sensitivity: 0.25 mV     Ventricular - Amplitude: 2.0 V  Pulse width: 0.4 ms  Sensitivity: 0.6 mV    Changes made: n/a  Conclusions: normal pacemaker function    Follow up: 6 months

## 2018-03-02 ENCOUNTER — LAB (OUTPATIENT)
Dept: LAB | Facility: HOSPITAL | Age: 83
End: 2018-03-02
Attending: INTERNAL MEDICINE

## 2018-03-02 ENCOUNTER — ANTICOAGULATION VISIT (OUTPATIENT)
Dept: CARDIOLOGY | Facility: CLINIC | Age: 83
End: 2018-03-02

## 2018-03-02 ENCOUNTER — OFFICE VISIT (OUTPATIENT)
Dept: CARDIOLOGY | Facility: CLINIC | Age: 83
End: 2018-03-02

## 2018-03-02 VITALS
HEART RATE: 52 BPM | DIASTOLIC BLOOD PRESSURE: 68 MMHG | WEIGHT: 120 LBS | OXYGEN SATURATION: 97 % | BODY MASS INDEX: 22.66 KG/M2 | SYSTOLIC BLOOD PRESSURE: 134 MMHG | HEIGHT: 61 IN

## 2018-03-02 DIAGNOSIS — I35.0 AORTIC STENOSIS, SEVERE: ICD-10-CM

## 2018-03-02 DIAGNOSIS — I48.0 PAROXYSMAL ATRIAL FIBRILLATION (HCC): ICD-10-CM

## 2018-03-02 DIAGNOSIS — I10 ESSENTIAL HYPERTENSION: ICD-10-CM

## 2018-03-02 DIAGNOSIS — Z95.1 S/P CABG X 3: ICD-10-CM

## 2018-03-02 DIAGNOSIS — I48.0 PAROXYSMAL ATRIAL FIBRILLATION (HCC): Primary | ICD-10-CM

## 2018-03-02 LAB
INR PPP: 2.3
TSH SERPL DL<=0.05 MIU/L-ACNC: 0.98 MIU/ML (ref 0.47–4.68)

## 2018-03-02 PROCEDURE — 36415 COLL VENOUS BLD VENIPUNCTURE: CPT

## 2018-03-02 PROCEDURE — 84443 ASSAY THYROID STIM HORMONE: CPT | Performed by: INTERNAL MEDICINE

## 2018-03-02 PROCEDURE — 99214 OFFICE O/P EST MOD 30 MIN: CPT | Performed by: INTERNAL MEDICINE

## 2018-03-02 PROCEDURE — 93000 ELECTROCARDIOGRAM COMPLETE: CPT | Performed by: INTERNAL MEDICINE

## 2018-03-02 RX ORDER — AMLODIPINE BESYLATE 10 MG/1
10 TABLET ORAL DAILY
COMMUNITY
End: 2018-04-04

## 2018-03-02 RX ORDER — SIMVASTATIN 20 MG
20 TABLET ORAL NIGHTLY
COMMUNITY
End: 2018-04-13 | Stop reason: SDUPTHER

## 2018-03-02 NOTE — PROGRESS NOTES
Referring Provider: Jorje Zhong MD    Reason for Follow-up Visit: s/p TAVR    Subjective .   Chief Complaint:   Chief Complaint   Patient presents with   • Follow-up     4 mo follow up after TAVR at [facility].  pt states she has been doing well.   • Edema     her feet have been swelling.  thinks it was from the amlodipine.  she has been off for a few days.   • Atrial Fibrillation     pulse staying high.   • Excessive Sweating     having night sweats to the point she has to change clothes in the middle of the night.  she has been evaluated but no answer.       History of present illness:  Lakesha Costello is a 82 y.o. yo female with history of AS, s/p TAVR at [facility] 4 mo ago. Feeling very well. Exercise tolerance has dramatically improved. Pacemaker interrogation reveals she is having more afib.        History  Past Medical History:   Diagnosis Date   • Aortic stenosis    • Atrial fibrillation    • Atrial fibrillation by electrocardiogram    • Atrial flutter    • Breast cancer    • CAD (coronary artery disease)    • Chronic anticoagulation     Coumadin    • Diabetes mellitus     Type II   • Hospital discharge follow-up    • Hyperlipidemia    • Hypertension    ,   Past Surgical History:   Procedure Laterality Date   • ANOMALOUS PULMONARY VENOUS RETURN REPAIR, TOTAL     • BACK SURGERY     • BELPHAROPTOSIS REPAIR     • CARDIAC CATHETERIZATION  1999, 2010   • CARDIAC CATHETERIZATION N/A 2/15/2017    Procedure: Left Heart Cath;  Surgeon: Ehsan Crocker MD;  Location:  [facility];  Service:    • CARDIAC CATHETERIZATION N/A 2/15/2017    Procedure: Right Heart Cath;  Surgeon: Ehsan Crocker MD;  Location:  [facility];  Service:    • CARDIAC CATHETERIZATION N/A 2/15/2017    Procedure: Coronary angiography;  Surgeon: Ehsan Crocker MD;  Location:  [facility];  Service:    • CARDIAC CATHETERIZATION N/A 2/15/2017    Procedure: Left ventriculography;  Surgeon: Ehsan BOYLE  MD Obi;  Location:  [facility];  Service:    • CARDIAC CATHETERIZATION N/A 2/15/2017    Procedure: Intracardiac echocardiogram;  Surgeon: Ehsan Crocker MD;  Location:  [facility];  Service:    • CARDIAC ELECTROPHYSIOLOGY PROCEDURE N/A 2/3/2017    Procedure: Pacemaker DC new;  Surgeon: Ehsan Crocker MD;  Location:  [facility];  Service:    • CATARACT EXTRACTION     • CHOLECYSTECTOMY     • CORONARY ARTERY BYPASS GRAFT  1999    4 vessel    • CORONARY STENT PLACEMENT     • HYSTERECTOMY     • INSERT / REPLACE / REMOVE PACEMAKER     • MASTECTOMY     ,   Family History   Problem Relation Age of Onset   • Breast cancer Mother    • Coronary artery disease Father    • Heart attack Father    • Cancer Brother    • No Known Problems Maternal Grandmother    • No Known Problems Maternal Grandfather    • No Known Problems Paternal Grandmother    • No Known Problems Paternal Grandfather    ,   Social History   Substance Use Topics   • Smoking status: Never Smoker   • Smokeless tobacco: Never Used   • Alcohol use No   ,     Medications  Current Outpatient Prescriptions   Medication Sig Dispense Refill   • amLODIPine (NORVASC) 10 MG tablet Take 10 mg by mouth Daily.     • aspirin 81 MG EC tablet Take 81 mg by mouth Daily.     • atenolol (TENORMIN) 50 MG tablet Take 50 mg by mouth Daily. 1/2 tablet bid     • calcium carbonate (OS-MICHAEL) 600 MG tablet Take 600 mg by mouth 2 (Two) Times a Day With Meals.     • cholecalciferol (VITAMIN D3) 1000 UNITS tablet Take 1,000 Units by mouth Daily.     • fenofibrate 160 MG tablet Take 160 mg by mouth Daily.     • glipiZIDE (GLUCOTROL) 5 MG tablet Take 5 mg by mouth 2 (Two) Times a Day Before Meals.     • insulin detemir (LEVEMIR) 100 UNIT/ML injection Inject 65 Units under the skin Every 12 (Twelve) Hours.     • magnesium oxide (MAGOX) 400 (241.3 MG) MG tablet tablet Take 400 mg by mouth Daily.     • Multiple Vitamins-Minerals  (PRESERVISION/LUTEIN PO) Take 1 tablet by mouth 2 (Two) Times a Day.     • nitroglycerin (NITROSTAT) 0.4 MG SL tablet 1 under the tongue as needed for angina, may repeat q5mins for up three doses 30 tablet 3   • pantoprazole (PROTONIX) 40 MG EC tablet Take 40 mg by mouth Daily.     • polyethyl glycol-propyl glycol (SYSTANE) 0.4-0.3 % solution ophthalmic solution Administer 1 drop to the right eye Daily.     • simvastatin (ZOCOR) 20 MG tablet Take 20 mg by mouth Every Night.     • spironolactone (ALDACTONE) 25 MG tablet Take 25 mg by mouth Daily.     • venlafaxine XR (EFFEXOR-XR) 37.5 MG 24 hr capsule Take 37.5 mg by mouth Daily.     • vitamin B-12 (CYANOCOBALAMIN) 500 MCG tablet Take 500 mcg by mouth Daily.     • warfarin (COUMADIN) 2 MG tablet Take 1 tablet by mouth daily on Monday, Wednesday and Friday. Take 1/2 tablet by mouth daily on Tuesday, Thursday, Saturday and Sunday. (Patient taking differently: 1 mg. Managed by Dr. Crocker/ Zoila) 30 tablet 11   • atenolol (TENORMIN) 25 MG tablet Take 1 tablet by mouth 2 (Two) Times a Day. 60 tablet 11   • HYDROcodone-acetaminophen (NORCO) 5-325 MG per tablet Take 1 tablet by mouth Every 4 (Four) Hours As Needed.     • Inositol Niacinate (NIACIN FLUSH FREE) 500 MG capsule Take 1 capsule by mouth Daily.     • insulin aspart (novoLOG) 100 UNIT/ML injection Inject 2-3 Units under the skin 3 (Three) Times a Day Before Meals. Sliding scale (bs-100)/2     • simvastatin (ZOCOR) 80 MG tablet Take 40 mg by mouth Every Night.     • tiZANidine (ZANAFLEX) 4 MG tablet Take 4 mg by mouth Every 8 (Eight) Hours As Needed for Muscle Spasms.     • vitamin E 100 UNIT capsule Take 400 Units by mouth Daily.       No current facility-administered medications for this visit.        Allergies:  Dilaudid [hydromorphone hcl]; Dilaudid [hydromorphone]; Enalapril; Janumet [sitagliptin-metformin hcl]; Lopid [gemfibrozil]; Sulfa antibiotics; and Ultram [tramadol]    Review of Systems  Review of  "Systems   HENT: Negative for nosebleeds.    Cardiovascular: Negative for chest pain, claudication, dyspnea on exertion, irregular heartbeat, leg swelling, near-syncope, orthopnea, palpitations, paroxysmal nocturnal dyspnea and syncope.   Respiratory: Negative for cough, hemoptysis and shortness of breath.    Gastrointestinal: Negative for dysphagia, hematemesis and melena.   Genitourinary: Negative for hematuria.   All other systems reviewed and are negative.      Objective     Physical Exam:  /68 (BP Location: Left arm, Patient Position: Sitting, Cuff Size: Adult)  Pulse 52  Ht 154.9 cm (61\")  Wt 54.4 kg (120 lb)  SpO2 97%  BMI 22.67 kg/m2  Physical Exam   Constitutional: She is oriented to person, place, and time. She appears well-developed and well-nourished. No distress.   HENT:   Head: Normocephalic and atraumatic.   Eyes: No scleral icterus.   Neck: Normal range of motion.   Cardiovascular: Normal rate and normal heart sounds.  An irregularly irregular rhythm present. PMI is not displaced.  Exam reveals no gallop and no friction rub.    No murmur heard.  Pulmonary/Chest: Effort normal and breath sounds normal. No respiratory distress. She has no wheezes. She has no rales.   Abdominal: Soft. Normal appearance and bowel sounds are normal. She exhibits no distension. There is no hepatosplenomegaly. There is no tenderness. There is no CVA tenderness.   Musculoskeletal: She exhibits no edema.   Neurological: She is alert and oriented to person, place, and time.   Skin: Skin is warm and dry. She is not diaphoretic. No erythema.   Psychiatric: She has a normal mood and affect. Her behavior is normal.       Results Review:    ECG 12 Lead  Date/Time: 3/2/2018 10:38 AM  Performed by: VINCE RIBERA  Authorized by: VINCE RIBERA   Comparison: compared with previous ECG   Rhythm: atrial fibrillation  Rate: normal  Conduction: incomplete RBBB  Q waves: V2, V3 and V4  Clinical impression: abnormal " ECG            Anticoagulation Visit on 01/23/2018   Component Date Value Ref Range Status   • INR 01/22/2018 3.20   Corrected       Assessment/Plan   Lakesha was seen today for follow-up, edema, atrial fibrillation and excessive sweating.    Diagnoses and all orders for this visit:    Paroxysmal atrial fibrillation, more frequent. Will repeat pacemaker interrogation in 1 mo. If it becomes chronic will repeat cardioversion    Essential hypertension, good control off norvasc    Aortic stenosis, severe s/p TAVR doing very well    S/P CABG x 3, The patient denies chest pain, shortness of breath, dyspnea on exertion, orthopnea, PND, edema. There is no evidence of ongoing ischemia      Other orders  -     ECG 12 Lead

## 2018-03-16 ENCOUNTER — TELEPHONE (OUTPATIENT)
Dept: CARDIOLOGY | Facility: CLINIC | Age: 83
End: 2018-03-16

## 2018-03-16 NOTE — TELEPHONE ENCOUNTER
----- Message from Ehsan Crocker MD sent at 3/2/2018  2:33 PM CST -----  Normal TSH    Pt informed.  Adrian Sweeney, CMA

## 2018-03-20 ENCOUNTER — ANTICOAGULATION VISIT (OUTPATIENT)
Dept: CARDIOLOGY | Facility: CLINIC | Age: 83
End: 2018-03-20

## 2018-03-20 LAB — INR PPP: 2.5

## 2018-03-27 ENCOUNTER — ANTICOAGULATION VISIT (OUTPATIENT)
Dept: CARDIOLOGY | Facility: CLINIC | Age: 83
End: 2018-03-27

## 2018-03-27 LAB — INR PPP: 2.6

## 2018-03-30 ENCOUNTER — ANTICOAGULATION VISIT (OUTPATIENT)
Dept: CARDIOLOGY | Facility: CLINIC | Age: 83
End: 2018-03-30

## 2018-03-30 LAB — INR PPP: 2.5

## 2018-04-04 ENCOUNTER — CLINICAL SUPPORT NO REQUIREMENTS (OUTPATIENT)
Dept: CARDIOLOGY | Facility: CLINIC | Age: 83
End: 2018-04-04

## 2018-04-04 ENCOUNTER — TELEPHONE (OUTPATIENT)
Dept: CARDIOLOGY | Facility: CLINIC | Age: 83
End: 2018-04-04

## 2018-04-04 ENCOUNTER — OFFICE VISIT (OUTPATIENT)
Dept: CARDIOLOGY | Facility: CLINIC | Age: 83
End: 2018-04-04

## 2018-04-04 VITALS
DIASTOLIC BLOOD PRESSURE: 61 MMHG | SYSTOLIC BLOOD PRESSURE: 120 MMHG | HEART RATE: 80 BPM | OXYGEN SATURATION: 96 % | BODY MASS INDEX: 23.11 KG/M2 | WEIGHT: 122.4 LBS | HEIGHT: 61 IN

## 2018-04-04 DIAGNOSIS — I49.5 SSS (SICK SINUS SYNDROME) (HCC): ICD-10-CM

## 2018-04-04 DIAGNOSIS — E78.2 MIXED HYPERLIPIDEMIA: ICD-10-CM

## 2018-04-04 DIAGNOSIS — I25.10 CORONARY ARTERY DISEASE INVOLVING NATIVE CORONARY ARTERY OF NATIVE HEART WITHOUT ANGINA PECTORIS: ICD-10-CM

## 2018-04-04 DIAGNOSIS — I10 ESSENTIAL HYPERTENSION: ICD-10-CM

## 2018-04-04 DIAGNOSIS — Z95.0 ARTIFICIAL PACEMAKER: ICD-10-CM

## 2018-04-04 DIAGNOSIS — I35.0 AORTIC STENOSIS, SEVERE: ICD-10-CM

## 2018-04-04 DIAGNOSIS — I48.0 PAROXYSMAL ATRIAL FIBRILLATION (HCC): Primary | ICD-10-CM

## 2018-04-04 PROCEDURE — 99214 OFFICE O/P EST MOD 30 MIN: CPT | Performed by: NURSE PRACTITIONER

## 2018-04-04 PROCEDURE — 93288 INTERROG EVL PM/LDLS PM IP: CPT | Performed by: PHYSICIAN ASSISTANT

## 2018-04-04 PROCEDURE — 93000 ELECTROCARDIOGRAM COMPLETE: CPT | Performed by: NURSE PRACTITIONER

## 2018-04-04 RX ORDER — ATENOLOL 25 MG/1
25 TABLET ORAL 2 TIMES DAILY
Qty: 60 TABLET | Refills: 11
Start: 2018-04-04 | End: 2018-04-13 | Stop reason: SDUPTHER

## 2018-04-04 NOTE — TELEPHONE ENCOUNTER
----- Message from Ehsan Crocker MD sent at 4/4/2018  1:08 PM CDT -----  No antiarrhythmic yet  ----- Message -----  From: FACUNDO Krueger  Sent: 4/4/2018  12:12 PM  To: Ehsan Crocker MD    See office note. I ordered the cardioversion because she was still in atrial fibrillation today. Her daughter was asking if you would want her on an antiarrhythmic ? She believes she was intolerant to Multaq or it did not work for her in the past. She is asymptomatic and rate controlled at this point and feeling well since her TAVR in 4-5 months ago. Thanks.

## 2018-04-04 NOTE — PROGRESS NOTES
"    Subjective:     Encounter Date:04/04/2018      Patient ID: Lakesha Costello is a 83 y.o. female.    Chief Complaint:  The patient reports she is feeling well overall. She is feeling much better since her TAVR at [place] in November. She denies chest pain, shortness of breath, edema, sudden weight gain, syncope or pre syncope. She admits occasional palpitations. She states there have only been 2 days recently when she has felt poorly/fatigued. Home BP and HR recordings reviewed. BP is under good control. HR 80s-110s, but has been under 100 in recent days.         The following portions of the patient's history were reviewed and updated as appropriate: allergies, current medications, past family history, past medical history, past social history, past surgical history and problem list.  /61 (BP Location: Right arm, Patient Position: Sitting)   Pulse 80   Ht 154.9 cm (61\")   Wt 55.5 kg (122 lb 6.4 oz)   SpO2 96%   BMI 23.13 kg/m²   Allergies   Allergen Reactions   • Dilaudid [Hydromorphone Hcl]    • Dilaudid [Hydromorphone] Nausea And Vomiting   • Enalapril Angioedema   • Janumet [Sitagliptin-Metformin Hcl]    • Lopid [Gemfibrozil] Nausea And Vomiting   • Sulfa Antibiotics Other (See Comments)     Syncope     • Ultram [Tramadol] Itching       Current Outpatient Prescriptions:   •  aspirin 81 MG EC tablet, Take 81 mg by mouth Daily., Disp: , Rfl:   •  atenolol (TENORMIN) 25 MG tablet, Take 1 tablet by mouth 2 (Two) Times a Day. Pt takes 50 mg in am and 25 mg in pm, Disp: 60 tablet, Rfl: 11  •  calcium carbonate (OS-MICHAEL) 600 MG tablet, Take 600 mg by mouth 2 (Two) Times a Day With Meals., Disp: , Rfl:   •  cholecalciferol (VITAMIN D3) 1000 UNITS tablet, Take 1,000 Units by mouth Daily., Disp: , Rfl:   •  fenofibrate 160 MG tablet, Take 160 mg by mouth Daily., Disp: , Rfl:   •  glipiZIDE (GLUCOTROL) 5 MG tablet, Take 5 mg by mouth 2 (Two) Times a Day Before Meals., Disp: , Rfl:   •  " HYDROcodone-acetaminophen (NORCO) 5-325 MG per tablet, Take 1 tablet by mouth Every 4 (Four) Hours As Needed., Disp: , Rfl:   •  Inositol Niacinate (NIACIN FLUSH FREE) 500 MG capsule, Take 1 capsule by mouth Daily., Disp: , Rfl:   •  insulin aspart (novoLOG) 100 UNIT/ML injection, Inject 2-3 Units under the skin 3 (Three) Times a Day Before Meals. Sliding scale (bs-100)/2, Disp: , Rfl:   •  insulin detemir (LEVEMIR) 100 UNIT/ML injection, Inject 65 Units under the skin Every 12 (Twelve) Hours., Disp: , Rfl:   •  magnesium oxide (MAGOX) 400 (241.3 MG) MG tablet tablet, Take 400 mg by mouth Daily., Disp: , Rfl:   •  Multiple Vitamins-Minerals (PRESERVISION/LUTEIN PO), Take 1 tablet by mouth 2 (Two) Times a Day., Disp: , Rfl:   •  nitroglycerin (NITROSTAT) 0.4 MG SL tablet, 1 under the tongue as needed for angina, may repeat q5mins for up three doses, Disp: 30 tablet, Rfl: 3  •  pantoprazole (PROTONIX) 40 MG EC tablet, Take 40 mg by mouth Daily., Disp: , Rfl:   •  polyethyl glycol-propyl glycol (SYSTANE) 0.4-0.3 % solution ophthalmic solution, Administer 1 drop to the right eye Daily., Disp: , Rfl:   •  simvastatin (ZOCOR) 20 MG tablet, Take 20 mg by mouth Every Night., Disp: , Rfl:   •  spironolactone (ALDACTONE) 25 MG tablet, Take 25 mg by mouth Daily., Disp: , Rfl:   •  tiZANidine (ZANAFLEX) 4 MG tablet, Take 4 mg by mouth Every 8 (Eight) Hours As Needed for Muscle Spasms., Disp: , Rfl:   •  venlafaxine XR (EFFEXOR-XR) 37.5 MG 24 hr capsule, Take 37.5 mg by mouth Daily., Disp: , Rfl:   •  vitamin B-12 (CYANOCOBALAMIN) 500 MCG tablet, Take 500 mcg by mouth Daily., Disp: , Rfl:   •  vitamin E 100 UNIT capsule, Take 400 Units by mouth Daily., Disp: , Rfl:   •  warfarin (COUMADIN) 2 MG tablet, Take 1 tablet by mouth daily on Monday, Wednesday and Friday. Take 1/2 tablet by mouth daily on Tuesday, Thursday, Saturday and Sunday. (Patient taking differently: 1 mg. Managed by Dr. Crocker/ Zoila), Disp: 30 tablet, Rfl:  11  Past Medical History:   Diagnosis Date   • Aortic stenosis    • Atrial fibrillation    • Atrial fibrillation by electrocardiogram    • Atrial flutter    • Breast cancer    • CAD (coronary artery disease)    • Chronic anticoagulation     Coumadin    • Diabetes mellitus     Type II   • Hospital discharge follow-up    • Hyperlipidemia    • Hypertension      Past Surgical History:   Procedure Laterality Date   • ANOMALOUS PULMONARY VENOUS RETURN REPAIR, TOTAL     • BACK SURGERY     • BELPHAROPTOSIS REPAIR     • CARDIAC CATHETERIZATION  1999, 2010   • CARDIAC CATHETERIZATION N/A 2/15/2017    Procedure: Left Heart Cath;  Surgeon: Ehsan Crocker MD;  Location:  [facility];  Service:    • CARDIAC CATHETERIZATION N/A 2/15/2017    Procedure: Right Heart Cath;  Surgeon: Ehsan Crocker MD;  Location:  [facility];  Service:    • CARDIAC CATHETERIZATION N/A 2/15/2017    Procedure: Coronary angiography;  Surgeon: Ehsan Crocker MD;  Location:  [facility];  Service:    • CARDIAC CATHETERIZATION N/A 2/15/2017    Procedure: Left ventriculography;  Surgeon: Ehsan Crocker MD;  Location:  [facility];  Service:    • CARDIAC CATHETERIZATION N/A 2/15/2017    Procedure: Intracardiac echocardiogram;  Surgeon: Ehsan Crocker MD;  Location:  [facility];  Service:    • CARDIAC ELECTROPHYSIOLOGY PROCEDURE N/A 2/3/2017    Procedure: Pacemaker DC new;  Surgeon: Ehsan Crocker MD;  Location:  [facility];  Service:    • CATARACT EXTRACTION     • CHOLECYSTECTOMY     • CORONARY ARTERY BYPASS GRAFT  1999    4 vessel    • CORONARY STENT PLACEMENT     • HYSTERECTOMY     • INSERT / REPLACE / REMOVE PACEMAKER     • MASTECTOMY       Social History     Social History   • Marital status:      Spouse name: N/A   • Number of children: N/A   • Years of education: N/A     Occupational History   • Not on file.     Social History Main Topics   • Smoking  status: Never Smoker   • Smokeless tobacco: Never Used   • Alcohol use No   • Drug use: No   • Sexual activity: Defer     Other Topics Concern   • Not on file     Social History Narrative   • No narrative on file     Family History   Problem Relation Age of Onset   • Breast cancer Mother    • Coronary artery disease Father    • Heart attack Father    • Cancer Brother    • No Known Problems Maternal Grandmother    • No Known Problems Maternal Grandfather    • No Known Problems Paternal Grandmother    • No Known Problems Paternal Grandfather        Review of Systems   Constitution: Positive for malaise/fatigue. Negative for chills, diaphoresis, fever and weakness.   HENT: Negative for nosebleeds.    Eyes: Negative for visual disturbance.   Cardiovascular: Positive for palpitations. Negative for chest pain, claudication, cyanosis, dyspnea on exertion, irregular heartbeat, leg swelling, near-syncope, orthopnea, paroxysmal nocturnal dyspnea and syncope.   Respiratory: Negative for cough, hemoptysis, shortness of breath, sputum production and wheezing.    Hematologic/Lymphatic: Negative for bleeding problem.   Skin: Negative for color change and flushing.   Musculoskeletal: Negative for falls.   Gastrointestinal: Negative for bloating, abdominal pain, hematemesis, hematochezia, melena, nausea and vomiting.   Genitourinary: Negative for hematuria.   Neurological: Negative for dizziness and light-headedness.   Psychiatric/Behavioral: Negative for altered mental status.         ECG 12 Lead  Date/Time: 4/4/2018 11:57 AM  Performed by: MELVIN BOOGIE  Authorized by: MELVIN BOOGIE   Comparison: compared with previous ECG from 3/2/2018  Similar to previous ECG  Rhythm: atrial fibrillation               Objective:     Physical Exam   Constitutional: She is oriented to person, place, and time. She appears well-developed and well-nourished. No distress.   HENT:   Head: Normocephalic and atraumatic.   Eyes: Pupils are equal, round, and  reactive to light.   Neck: Normal range of motion. Neck supple. No JVD present. No thyromegaly present.   Cardiovascular: Normal rate, regular rhythm and intact distal pulses.  Exam reveals no gallop and no friction rub.    Murmur (2/6 systolic murmur ) heard.  Pulmonary/Chest: Effort normal and breath sounds normal. No respiratory distress. She has no wheezes. She has no rales. She exhibits no tenderness.   Abdominal: Soft. Bowel sounds are normal. She exhibits no distension. There is no tenderness.   Musculoskeletal: Normal range of motion. She exhibits no edema.   Neurological: She is alert and oriented to person, place, and time. No cranial nerve deficit.   Skin: Skin is warm and dry. She is not diaphoretic.   Psychiatric: She has a normal mood and affect. Her behavior is normal.       Lab Review:       Assessment:          Diagnosis Plan   1. Paroxysmal atrial fibrillation  Interrogation today reveals afib since 12/2017- She is mostly rate controlled in the 60s. HR is 80 today in office.   Anticoagulated with warfarin   Schedule cardioversion as previously recommended by Dr. Crocker    2. Coronary artery disease involving native coronary artery of native heart without angina pectoris  Stable. No angina. The patient reports her last PCI was stent placement at [facility] 10/2017- decision was made per [facility] cardiology not to continue DAPT for a full year, and she is now on ASA 81 and warfarin   3. Aortic stenosis, severe  S/p TAVR 11/2017 at [facility]    4. SSS (sick sinus syndrome)  S/p dc pacer placement; interrogated today   5. Essential hypertension  Controlled    6. Mixed hyperlipidemia  Continue statin, followed by pcp      Peripheral edema resolved after Dr. Crocker stopped Norvasc at last OV.    2/2017 cath at [facility] she had patent VG to OM but 85% stenosis distal to the insertion site- the patient's daughter believes she received a stent for this during her 10/2017 cath at [facility]- request records.   Obtain most recent echo  from [facility]- LVEF normal by most recent [facility] echo.         Plan:       As noted above  Schedule cardioversion   Continue ASA, coumadin, beta blocker, statin, and aldactone   Follow up 6 weeks after cardioversion, sooner if needed for new or worsening symptoms or concerns

## 2018-04-06 ENCOUNTER — ANTICOAGULATION VISIT (OUTPATIENT)
Dept: CARDIOLOGY | Facility: CLINIC | Age: 83
End: 2018-04-06

## 2018-04-06 LAB — INR PPP: 2.7

## 2018-04-12 ENCOUNTER — CLINICAL SUPPORT (OUTPATIENT)
Dept: CARDIOLOGY | Facility: CLINIC | Age: 83
End: 2018-04-12

## 2018-04-12 DIAGNOSIS — Z95.0 ARTIFICIAL PACEMAKER: Primary | ICD-10-CM

## 2018-04-12 DIAGNOSIS — I49.5 SSS (SICK SINUS SYNDROME) (HCC): ICD-10-CM

## 2018-04-12 PROCEDURE — 93296 REM INTERROG EVL PM/IDS: CPT | Performed by: INTERNAL MEDICINE

## 2018-04-12 PROCEDURE — 93294 REM INTERROG EVL PM/LDLS PM: CPT | Performed by: INTERNAL MEDICINE

## 2018-04-13 ENCOUNTER — LAB (OUTPATIENT)
Dept: LAB | Facility: HOSPITAL | Age: 83
End: 2018-04-13

## 2018-04-13 ENCOUNTER — ANTICOAGULATION VISIT (OUTPATIENT)
Dept: CARDIOLOGY | Facility: CLINIC | Age: 83
End: 2018-04-13

## 2018-04-13 ENCOUNTER — OFFICE VISIT (OUTPATIENT)
Dept: INTERNAL MEDICINE | Facility: CLINIC | Age: 83
End: 2018-04-13

## 2018-04-13 VITALS
WEIGHT: 119.2 LBS | DIASTOLIC BLOOD PRESSURE: 50 MMHG | HEIGHT: 61 IN | SYSTOLIC BLOOD PRESSURE: 139 MMHG | RESPIRATION RATE: 16 BRPM | BODY MASS INDEX: 22.51 KG/M2 | HEART RATE: 71 BPM | OXYGEN SATURATION: 97 %

## 2018-04-13 DIAGNOSIS — Z79.4 TYPE 2 DIABETES MELLITUS WITH DIABETIC PERIPHERAL ANGIOPATHY WITHOUT GANGRENE, WITH LONG-TERM CURRENT USE OF INSULIN (HCC): ICD-10-CM

## 2018-04-13 DIAGNOSIS — R61 NIGHT SWEATS: ICD-10-CM

## 2018-04-13 DIAGNOSIS — I50.32 CHRONIC DIASTOLIC CONGESTIVE HEART FAILURE (HCC): ICD-10-CM

## 2018-04-13 DIAGNOSIS — I48.92 ATRIAL FLUTTER, UNSPECIFIED TYPE (HCC): ICD-10-CM

## 2018-04-13 DIAGNOSIS — E11.51 TYPE 2 DIABETES MELLITUS WITH DIABETIC PERIPHERAL ANGIOPATHY WITHOUT GANGRENE, WITH LONG-TERM CURRENT USE OF INSULIN (HCC): ICD-10-CM

## 2018-04-13 DIAGNOSIS — I48.0 PAROXYSMAL ATRIAL FIBRILLATION (HCC): ICD-10-CM

## 2018-04-13 DIAGNOSIS — E78.2 MIXED HYPERLIPIDEMIA: ICD-10-CM

## 2018-04-13 DIAGNOSIS — R25.2 LEG CRAMPS: ICD-10-CM

## 2018-04-13 DIAGNOSIS — I48.0 PAROXYSMAL ATRIAL FIBRILLATION (HCC): Primary | ICD-10-CM

## 2018-04-13 DIAGNOSIS — I49.5 SSS (SICK SINUS SYNDROME) (HCC): ICD-10-CM

## 2018-04-13 DIAGNOSIS — I50.9 CONGESTIVE HEART FAILURE, UNSPECIFIED CONGESTIVE HEART FAILURE CHRONICITY, UNSPECIFIED CONGESTIVE HEART FAILURE TYPE: ICD-10-CM

## 2018-04-13 PROBLEM — C50.919 MALIGNANT NEOPLASM OF BREAST (HCC): Status: ACTIVE | Noted: 2018-04-13

## 2018-04-13 PROBLEM — Z95.2 S/P TAVR (TRANSCATHETER AORTIC VALVE REPLACEMENT): Status: ACTIVE | Noted: 2018-04-13

## 2018-04-13 PROBLEM — Q24.5 CORONARY ARTERY ABNORMALITY: Status: ACTIVE | Noted: 2018-04-13

## 2018-04-13 PROBLEM — I35.0 AORTIC STENOSIS, SEVERE: Status: RESOLVED | Noted: 2017-02-01 | Resolved: 2018-04-13

## 2018-04-13 PROBLEM — M81.0 AGE-RELATED OSTEOPOROSIS WITHOUT CURRENT PATHOLOGICAL FRACTURE: Status: ACTIVE | Noted: 2017-12-15

## 2018-04-13 LAB
ALBUMIN SERPL-MCNC: 4.4 G/DL (ref 3.5–5)
ALBUMIN/GLOB SERPL: 1.3 G/DL (ref 1.1–2.5)
ALP SERPL-CCNC: 53 U/L (ref 24–120)
ALT SERPL W P-5'-P-CCNC: 25 U/L (ref 0–54)
ANION GAP SERPL CALCULATED.3IONS-SCNC: 9 MMOL/L (ref 4–13)
ARTICHOKE IGE QN: 77 MG/DL (ref 0–99)
AST SERPL-CCNC: 43 U/L (ref 7–45)
BILIRUB SERPL-MCNC: 0.2 MG/DL (ref 0.1–1)
BUN BLD-MCNC: 52 MG/DL (ref 5–21)
BUN/CREAT SERPL: 33.5 (ref 7–25)
CALCIUM SPEC-SCNC: 10.2 MG/DL (ref 8.4–10.4)
CHLORIDE SERPL-SCNC: 99 MMOL/L (ref 98–110)
CHOLEST SERPL-MCNC: 158 MG/DL (ref 130–200)
CK SERPL-CCNC: 78 U/L (ref 0–203)
CO2 SERPL-SCNC: 30 MMOL/L (ref 24–31)
CREAT BLD-MCNC: 1.55 MG/DL (ref 0.5–1.4)
CRP SERPL-MCNC: <0.5 MG/DL (ref 0–0.99)
DEPRECATED RDW RBC AUTO: 47.6 FL (ref 40–54)
ERYTHROCYTE [DISTWIDTH] IN BLOOD BY AUTOMATED COUNT: 15.3 % (ref 12–15)
ERYTHROCYTE [SEDIMENTATION RATE] IN BLOOD: 27 MM/HR (ref 0–20)
GFR SERPL CREATININE-BSD FRML MDRD: 32 ML/MIN/1.73
GLOBULIN UR ELPH-MCNC: 3.4 GM/DL
GLUCOSE BLD-MCNC: 167 MG/DL (ref 70–100)
HBA1C MFR BLD: 9.8 %
HCT VFR BLD AUTO: 36.6 % (ref 37–47)
HDLC SERPL-MCNC: 34 MG/DL
HGB BLD-MCNC: 11.6 G/DL (ref 12–16)
INR PPP: 2.4
LDLC/HDLC SERPL: 1.98 {RATIO}
MAGNESIUM SERPL-MCNC: 2.3 MG/DL (ref 1.4–2.2)
MCH RBC QN AUTO: 27 PG (ref 28–32)
MCHC RBC AUTO-ENTMCNC: 31.7 G/DL (ref 33–36)
MCV RBC AUTO: 85.3 FL (ref 82–98)
PLATELET # BLD AUTO: 1022 10*3/MM3 (ref 130–400)
PMV BLD AUTO: 11.5 FL (ref 6–12)
POTASSIUM BLD-SCNC: 4.8 MMOL/L (ref 3.5–5.3)
PROT SERPL-MCNC: 7.8 G/DL (ref 6.3–8.7)
RBC # BLD AUTO: 4.29 10*6/MM3 (ref 4.2–5.4)
SODIUM BLD-SCNC: 138 MMOL/L (ref 135–145)
TRIGL SERPL-MCNC: 284 MG/DL (ref 0–149)
WBC NRBC COR # BLD: 9.31 10*3/MM3 (ref 4.8–10.8)

## 2018-04-13 PROCEDURE — 87040 BLOOD CULTURE FOR BACTERIA: CPT | Performed by: INTERNAL MEDICINE

## 2018-04-13 PROCEDURE — 86140 C-REACTIVE PROTEIN: CPT | Performed by: INTERNAL MEDICINE

## 2018-04-13 PROCEDURE — 80053 COMPREHEN METABOLIC PANEL: CPT | Performed by: INTERNAL MEDICINE

## 2018-04-13 PROCEDURE — 85651 RBC SED RATE NONAUTOMATED: CPT | Performed by: INTERNAL MEDICINE

## 2018-04-13 PROCEDURE — 83036 HEMOGLOBIN GLYCOSYLATED A1C: CPT | Performed by: INTERNAL MEDICINE

## 2018-04-13 PROCEDURE — 85027 COMPLETE CBC AUTOMATED: CPT | Performed by: INTERNAL MEDICINE

## 2018-04-13 PROCEDURE — 83735 ASSAY OF MAGNESIUM: CPT | Performed by: INTERNAL MEDICINE

## 2018-04-13 PROCEDURE — 99204 OFFICE O/P NEW MOD 45 MIN: CPT | Performed by: INTERNAL MEDICINE

## 2018-04-13 PROCEDURE — 80061 LIPID PANEL: CPT | Performed by: INTERNAL MEDICINE

## 2018-04-13 PROCEDURE — 82550 ASSAY OF CK (CPK): CPT | Performed by: INTERNAL MEDICINE

## 2018-04-13 PROCEDURE — 36415 COLL VENOUS BLD VENIPUNCTURE: CPT

## 2018-04-13 RX ORDER — AMOXICILLIN 500 MG/1
2000 CAPSULE ORAL ONCE
Qty: 4 CAPSULE | Refills: 0 | Status: SHIPPED | OUTPATIENT
Start: 2018-04-13 | End: 2018-04-13

## 2018-04-13 RX ORDER — SIMVASTATIN 20 MG
20 TABLET ORAL NIGHTLY
Qty: 90 TABLET | Refills: 3 | Status: SHIPPED | OUTPATIENT
Start: 2018-04-13 | End: 2019-01-01 | Stop reason: SDUPTHER

## 2018-04-13 RX ORDER — WARFARIN SODIUM 1 MG/1
TABLET ORAL
Qty: 120 TABLET | Refills: 3 | Status: SHIPPED | OUTPATIENT
Start: 2018-04-13 | End: 2018-04-17 | Stop reason: SDUPTHER

## 2018-04-13 RX ORDER — ATENOLOL 50 MG/1
TABLET ORAL
Qty: 135 TABLET | Refills: 3 | Status: SHIPPED | OUTPATIENT
Start: 2018-04-13 | End: 2019-01-01 | Stop reason: SDUPTHER

## 2018-04-13 RX ORDER — GLIPIZIDE 5 MG/1
5 TABLET ORAL
Qty: 60 TABLET | Refills: 0 | Status: SHIPPED | OUTPATIENT
Start: 2018-04-13 | End: 2018-04-14

## 2018-04-13 RX ORDER — GLIPIZIDE 5 MG/1
5 TABLET ORAL
Qty: 180 TABLET | Refills: 3 | Status: SHIPPED | OUTPATIENT
Start: 2018-04-13 | End: 2019-01-01 | Stop reason: SDUPTHER

## 2018-04-13 NOTE — PROGRESS NOTES
CC: Establish care for night sweats    History:  Lakesha Costello is a 83 y.o. female who presents today for evaluation of the above problems.  She reports she has been doing reasonably well.  She has a complicated cardiac history including atrial fibrillation, congestive heart failure, coronary artery disease status post CABG, history of severe aortic stenosis status post TAVR in October 2017.  Additionally, she has sick sinus syndrome with implantation of a pacemaker/ICD.  This is a [employer] device and can be MRI compatible.  She notes her overall function has improved dramatically since her TAVR.  Unfortunately, she has continued with cramping and fatigue in her legs since this procedure.  She does continue with atrial fibrillation and is anticoagulated on warfarin with home monitoring per Dr. Crocker's office.  She has continued on Levemir and glipizide related to her diabetes.  She notes no symptoms of hyperglycemia or hypoglycemia, though last labs reviewed were from December 2017 per Dr. Diaz.  She has been having night sweats that occur every night and are drenching.  These happen only at night.    ROS:  Review of Systems   Constitutional: Positive for fatigue. Negative for chills and fever.   HENT: Negative for congestion and sore throat.    Eyes: Negative for visual disturbance.   Respiratory: Negative for cough and shortness of breath.    Cardiovascular: Negative for chest pain and palpitations.   Gastrointestinal: Negative for abdominal pain, constipation and nausea.   Endocrine: Negative for cold intolerance and heat intolerance.   Genitourinary: Negative for difficulty urinating and frequency.   Musculoskeletal: Positive for gait problem (with distance) and myalgias.   Skin: Negative for rash.   Neurological: Positive for weakness. Negative for dizziness and headaches.   Psychiatric/Behavioral: Negative for dysphoric mood. The patient is not nervous/anxious.        Allergies   Allergen Reactions    • Dilaudid [Hydromorphone Hcl]    • Dilaudid [Hydromorphone] Nausea And Vomiting   • Enalapril Angioedema   • Janumet [Sitagliptin-Metformin Hcl]    • Lopid [Gemfibrozil] Nausea And Vomiting   • Sulfa Antibiotics Other (See Comments)     Syncope     • Ultram [Tramadol] Itching     Past Medical History:   Diagnosis Date   • Aortic stenosis    • Atrial fibrillation    • Atrial fibrillation by electrocardiogram    • Atrial flutter    • Breast cancer    • CAD (coronary artery disease)    • Cataract    • Chronic anticoagulation     Coumadin    • Diabetes mellitus     Type II   • GERD (gastroesophageal reflux disease)    • Hospital discharge follow-up    • Hyperlipidemia    • Hypertension    • Macular degeneration    • Pacemaker    • Pancreatitis    • Rheumatic fever     during childhood     Past Surgical History:   Procedure Laterality Date   • ANOMALOUS PULMONARY VENOUS RETURN REPAIR, TOTAL     • BACK SURGERY     • BELPHAROPTOSIS REPAIR     • CARDIAC CATHETERIZATION  1999, 2010   • CARDIAC CATHETERIZATION N/A 2/15/2017    Procedure: Left Heart Cath;  Surgeon: Ehsan Crocker MD;  Location: [facility] CATH INVASIVE LOCATION;  Service:    • CARDIAC CATHETERIZATION N/A 2/15/2017    Procedure: Right Heart Cath;  Surgeon: Ehsan Crocker MD;  Location:  [facility] CATH INVASIVE LOCATION;  Service:    • CARDIAC CATHETERIZATION N/A 2/15/2017    Procedure: Coronary angiography;  Surgeon: Ehsan Crocker MD;  Location:  [facility] CATH INVASIVE LOCATION;  Service:    • CARDIAC CATHETERIZATION N/A 2/15/2017    Procedure: Left ventriculography;  Surgeon: Ehsan Crocker MD;  Location:  [facility] CATH INVASIVE LOCATION;  Service:    • CARDIAC CATHETERIZATION N/A 2/15/2017    Procedure: Intracardiac echocardiogram;  Surgeon: Ehsan Crocker MD;  Location:  [facility] CATH INVASIVE LOCATION;  Service:    • CARDIAC ELECTROPHYSIOLOGY PROCEDURE N/A 2/3/2017    Procedure: Pacemaker DC new;  Surgeon: Ehsan Crocker MD;  Location:  [facility] CATH INVASIVE LOCATION;  Service:     • CATARACT EXTRACTION     • CHOLECYSTECTOMY     • CORONARY ARTERY BYPASS GRAFT  1999    4 vessel    • CORONARY ARTERY BYPASS GRAFT     • CORONARY STENT PLACEMENT     • HYSTERECTOMY  1962   • INSERT / REPLACE / REMOVE PACEMAKER     • MASTECTOMY  2000   • OTHER SURGICAL HISTORY      TAVR 2017     Family History   Problem Relation Age of Onset   • Breast cancer Mother    • Coronary artery disease Father    • Heart attack Father    • Diabetes Father    • Cancer Brother    • No Known Problems Maternal Grandmother    • No Known Problems Maternal Grandfather    • No Known Problems Paternal Grandmother    • No Known Problems Paternal Grandfather       reports that she has never smoked. She has never used smokeless tobacco. She reports that she does not drink alcohol or use drugs.      Current Outpatient Prescriptions:   •  aspirin 81 MG EC tablet, Take 81 mg by mouth Daily., Disp: , Rfl:   •  atenolol (TENORMIN) 50 MG tablet, Take 1 tab PO qam & 1/2 tab PO qpm., Disp: 135 tablet, Rfl: 3  •  calcium carbonate (OS-MICHAEL) 600 MG tablet, Take 600 mg by mouth 2 (Two) Times a Day With Meals., Disp: , Rfl:   •  cholecalciferol (VITAMIN D3) 1000 UNITS tablet, Take 1,000 Units by mouth Daily., Disp: , Rfl:   •  fenofibrate 160 MG tablet, Take 160 mg by mouth Daily., Disp: , Rfl:   •  insulin detemir (LEVEMIR) 100 UNIT/ML injection, Inject 30 Units under the skin Every 12 (Twelve) Hours. 30 units qam 30 units qpm, Disp: , Rfl:   •  magnesium oxide (MAGOX) 400 (241.3 MG) MG tablet tablet, Take 400 mg by mouth Daily., Disp: , Rfl:   •  nitroglycerin (NITROSTAT) 0.4 MG SL tablet, 1 under the tongue as needed for angina, may repeat q5mins for up three doses, Disp: 30 tablet, Rfl: 3  •  pantoprazole (PROTONIX) 40 MG EC tablet, Take 40 mg by mouth Daily., Disp: , Rfl:   •  polyethyl glycol-propyl glycol (SYSTANE) 0.4-0.3 % solution ophthalmic solution, Administer 1 drop to the right eye Daily., Disp: , Rfl:   •  simvastatin (ZOCOR) 20 MG  "tablet, Take 1 tablet by mouth Every Night., Disp: 90 tablet, Rfl: 3  •  spironolactone (ALDACTONE) 25 MG tablet, Take 25 mg by mouth Daily., Disp: , Rfl:   •  venlafaxine XR (EFFEXOR-XR) 37.5 MG 24 hr capsule, Take 37.5 mg by mouth Daily., Disp: , Rfl:   •  vitamin B-12 (CYANOCOBALAMIN) 500 MCG tablet, Take 500 mcg by mouth Daily., Disp: , Rfl:   •  warfarin (COUMADIN) 1 MG tablet, Take 2 tablet by mouth daily on Monday, Wednesday and Friday. Take 1 tablet by mouth daily on Tuesday, Thursday, Saturday and Sunday., Disp: 120 tablet, Rfl: 3  •  glipiZIDE (GLUCOTROL) 5 MG tablet, Take 1 tablet by mouth 2 (Two) Times a Day Before Meals., Disp: 180 tablet, Rfl: 3    OBJECTIVE:  /50 (BP Location: Left arm, Patient Position: Sitting, Cuff Size: Adult)   Pulse 71   Resp 16   Ht 154.9 cm (60.98\")   Wt 54.1 kg (119 lb 3.2 oz)   SpO2 97%   BMI 22.53 kg/m²    Physical Exam   Constitutional: She is oriented to person, place, and time. She appears well-developed and well-nourished. No distress.   HENT:   Head: Normocephalic and atraumatic.   Right Ear: External ear normal.   Left Ear: External ear normal.   Nose: Nose normal.   Mouth/Throat: Oropharynx is clear and moist. No oropharyngeal exudate.   Eyes: EOM are normal. No scleral icterus.   Neck: Normal range of motion. Neck supple. No tracheal deviation present.   Cardiovascular: Normal rate.  An irregularly irregular rhythm present.   Murmur heard.   Systolic murmur is present with a grade of 3/6   Pulmonary/Chest: Effort normal and breath sounds normal. No accessory muscle usage. No respiratory distress. She has no wheezes.   Abdominal: Soft. Bowel sounds are normal. She exhibits no distension. There is no tenderness.   Musculoskeletal: Normal range of motion.   Neurological: She is alert and oriented to person, place, and time. Coordination and gait normal.   Skin: Skin is warm and dry. No cyanosis. Nails show no clubbing.   No jaundice   Psychiatric: She has a " normal mood and affect. Her mood appears not anxious. She does not exhibit a depressed mood.       Assessment/Plan    Diagnoses and all orders for this visit:    Paroxysmal atrial fibrillation  -     atenolol (TENORMIN) 50 MG tablet; Take 1 tab PO qam & 1/2 tab PO qpm.  -     CBC (No Diff); Future  -     warfarin (COUMADIN) 1 MG tablet; Take 2 tablet by mouth daily on Monday, Wednesday and Friday. Take 1 tablet by mouth daily on Tuesday, Thursday, Saturday and Sunday.  She is rate controlled with atenolol.  and with pacemaker.  Anticoagulation managed per Dr. Crocker with a home monitor.    Type 2 diabetes mellitus with diabetic peripheral angiopathy without gangrene, with long-term current use of insulin  -     CBC (No Diff); Future  -     Comprehensive Metabolic Panel; Future  -     Hemoglobin A1c; Future  -     Lipid Panel; Future  -     glipiZIDE (GLUCOTROL) 5 MG tablet; Take 1 tablet by mouth 2 (Two) Times a Day Before Meals.  We will check labs as above.  Plan to continue her current medications unless changes are indicated based on results.    SSS (sick sinus syndrome)  She continues under the care of Dr. Crocker and has pacemaker/ICD.    Congestive heart failure, unspecified congestive heart failure chronicity, unspecified congestive heart failure type  Chronic diastolic congestive heart failure  -     atenolol (TENORMIN) 50 MG tablet; Take 1 tab PO qam & 1/2 tab PO qpm.  She is on atenolol and spironolactone.  She is euvolemic on today's exam.    Mixed hyperlipidemia  -     simvastatin (ZOCOR) 20 MG tablet; Take 1 tablet by mouth Every Night.  Stable on simvastatin, though if de-prescribing is desired in the future, we would consider this among the first.    Night sweats  -     C-reactive protein; Future  -     Sedimentation Rate; Future  -     Blood Culture - Blood,; Future  -     Blood Culture - Blood,; Future  We will check labs as above for inflammatory markers.  I have low suspicion for an ongoing infectious  etiology, though we will begin our workup here.    Leg cramps  -     Magnesium; Future  -     CK; Future  We will check electrolytes and CK for further evaluation.  However, I feel that her leg cramps likely represent claudication and deconditioning related to her severe cardiac comorbidities.    Other orders  -     amoxicillin (AMOXIL) 500 MG capsule; Take 4 capsules by mouth 1 (One) Time for 1 dose.  Amoxicillin for cardiac prophylaxis in the setting of history of rheumatic heart disease and recent TAVR.      An After Visit Summary was printed and given to the patient at discharge.  Return in about 2 months (around 6/13/2018).         Leonardo Zepeda D.O. 4/14/2018

## 2018-04-14 PROBLEM — I50.32 CHRONIC DIASTOLIC CONGESTIVE HEART FAILURE (HCC): Status: ACTIVE | Noted: 2018-04-13

## 2018-04-15 NOTE — PROGRESS NOTES
Dual Chamber Pacemaker Evaluation Report  Clinic Interrogation by Company Rep    April 14, 2018    Primary Cardiologist: Obi  : Guidant Model: [employer] MRI  Implant date: 2/3/17    Reason for evaluation: check for AF burden  Indication for pacemaker: sick sinus syndrome    100% AF burden.  Few high rates lasting only a brief amount of time.  Plans for CV soon.    Follow up: 3 months

## 2018-04-16 ENCOUNTER — TELEPHONE (OUTPATIENT)
Dept: INTERNAL MEDICINE | Facility: CLINIC | Age: 83
End: 2018-04-16

## 2018-04-16 PROBLEM — N18.30 CKD (CHRONIC KIDNEY DISEASE) STAGE 3, GFR 30-59 ML/MIN (HCC): Status: ACTIVE | Noted: 2018-04-16

## 2018-04-16 NOTE — TELEPHONE ENCOUNTER
Gave results and medication change to Margarita.        ----- Message from Leonardo Zepeda DO sent at 4/16/2018  9:20 AM CDT -----  Please call. A1c is 9.8%. I would like her to increase insulin to 35 units BID. No obvious cause of night sweats, but this increase may help.

## 2018-04-17 DIAGNOSIS — I48.0 PAROXYSMAL ATRIAL FIBRILLATION (HCC): ICD-10-CM

## 2018-04-17 RX ORDER — WARFARIN SODIUM 1 MG/1
TABLET ORAL
Qty: 120 TABLET | Refills: 3 | Status: ON HOLD | OUTPATIENT
Start: 2018-04-17 | End: 2018-09-20

## 2018-04-18 LAB
BACTERIA SPEC AEROBE CULT: NORMAL
BACTERIA SPEC AEROBE CULT: NORMAL

## 2018-04-23 ENCOUNTER — ANTICOAGULATION VISIT (OUTPATIENT)
Dept: CARDIOLOGY | Facility: CLINIC | Age: 83
End: 2018-04-23

## 2018-04-23 LAB — INR PPP: 2

## 2018-04-26 ENCOUNTER — ANESTHESIA (OUTPATIENT)
Dept: CARDIOLOGY | Facility: HOSPITAL | Age: 83
End: 2018-04-26

## 2018-04-26 ENCOUNTER — ANESTHESIA EVENT (OUTPATIENT)
Dept: CARDIOLOGY | Facility: HOSPITAL | Age: 83
End: 2018-04-26

## 2018-04-26 ENCOUNTER — HOSPITAL ENCOUNTER (OUTPATIENT)
Dept: CARDIOLOGY | Facility: HOSPITAL | Age: 83
Discharge: HOME | End: 2018-04-26
Admitting: INTERNAL MEDICINE

## 2018-04-26 ENCOUNTER — ANTICOAGULATION VISIT (OUTPATIENT)
Dept: CARDIOLOGY | Facility: CLINIC | Age: 83
End: 2018-04-26

## 2018-04-26 VITALS
DIASTOLIC BLOOD PRESSURE: 70 MMHG | HEIGHT: 60 IN | SYSTOLIC BLOOD PRESSURE: 153 MMHG | WEIGHT: 123.8 LBS | HEART RATE: 77 BPM | BODY MASS INDEX: 24.3 KG/M2 | OXYGEN SATURATION: 100 % | RESPIRATION RATE: 20 BRPM

## 2018-04-26 DIAGNOSIS — I48.0 PAROXYSMAL ATRIAL FIBRILLATION (HCC): ICD-10-CM

## 2018-04-26 LAB
INR PPP: 1.5 (ref 0.91–1.09)
PROTHROMBIN TIME: 17.8 SECONDS (ref 10–13.8)

## 2018-04-26 PROCEDURE — 85610 PROTHROMBIN TIME: CPT

## 2018-04-26 NOTE — NURSING NOTE
INR was 1.5, Dr. Crocker notified. Cardioversion cancelled per Dr. Crocker.  Patient discharged home.

## 2018-04-30 ENCOUNTER — ANTICOAGULATION VISIT (OUTPATIENT)
Dept: CARDIOLOGY | Facility: CLINIC | Age: 83
End: 2018-04-30

## 2018-04-30 LAB — INR PPP: 1.5

## 2018-05-01 ENCOUNTER — ANTICOAGULATION VISIT (OUTPATIENT)
Dept: CARDIOLOGY | Facility: CLINIC | Age: 83
End: 2018-05-01

## 2018-05-01 LAB — INR PPP: 2.1

## 2018-05-07 ENCOUNTER — ANTICOAGULATION VISIT (OUTPATIENT)
Dept: CARDIOLOGY | Facility: CLINIC | Age: 83
End: 2018-05-07

## 2018-05-07 LAB — INR PPP: 1.8

## 2018-05-14 ENCOUNTER — ANTICOAGULATION VISIT (OUTPATIENT)
Dept: CARDIOLOGY | Facility: CLINIC | Age: 83
End: 2018-05-14

## 2018-05-14 LAB — INR PPP: 3.1

## 2018-05-16 ENCOUNTER — ANTICOAGULATION VISIT (OUTPATIENT)
Dept: CARDIOLOGY | Facility: CLINIC | Age: 83
End: 2018-05-16

## 2018-05-16 LAB — INR PPP: 3.1

## 2018-05-21 ENCOUNTER — ANTICOAGULATION VISIT (OUTPATIENT)
Dept: CARDIOLOGY | Facility: CLINIC | Age: 83
End: 2018-05-21

## 2018-05-21 LAB — INR PPP: 4.2

## 2018-05-29 ENCOUNTER — ANTICOAGULATION VISIT (OUTPATIENT)
Dept: CARDIOLOGY | Facility: CLINIC | Age: 83
End: 2018-05-29

## 2018-05-29 LAB — INR PPP: 3.4

## 2018-06-04 ENCOUNTER — ANTICOAGULATION VISIT (OUTPATIENT)
Dept: CARDIOLOGY | Facility: CLINIC | Age: 83
End: 2018-06-04

## 2018-06-04 LAB — INR PPP: 1.9

## 2018-06-11 ENCOUNTER — ANTICOAGULATION VISIT (OUTPATIENT)
Dept: CARDIOLOGY | Facility: CLINIC | Age: 83
End: 2018-06-11

## 2018-06-11 LAB — INR PPP: 2.3

## 2018-06-18 ENCOUNTER — ANTICOAGULATION VISIT (OUTPATIENT)
Dept: CARDIOLOGY | Facility: CLINIC | Age: 83
End: 2018-06-18

## 2018-06-18 LAB — INR PPP: 2.4

## 2018-06-25 ENCOUNTER — ANTICOAGULATION VISIT (OUTPATIENT)
Dept: CARDIOLOGY | Facility: CLINIC | Age: 83
End: 2018-06-25

## 2018-06-25 LAB — INR PPP: 2.3

## 2018-06-26 DIAGNOSIS — I48.19 PERSISTENT ATRIAL FIBRILLATION (HCC): Primary | ICD-10-CM

## 2018-06-29 ENCOUNTER — OFFICE VISIT (OUTPATIENT)
Dept: INTERNAL MEDICINE | Facility: CLINIC | Age: 83
End: 2018-06-29

## 2018-06-29 VITALS
WEIGHT: 121.4 LBS | SYSTOLIC BLOOD PRESSURE: 126 MMHG | BODY MASS INDEX: 23.84 KG/M2 | DIASTOLIC BLOOD PRESSURE: 54 MMHG | HEIGHT: 60 IN | HEART RATE: 95 BPM | OXYGEN SATURATION: 96 % | RESPIRATION RATE: 16 BRPM

## 2018-06-29 DIAGNOSIS — Z79.4 TYPE 2 DIABETES MELLITUS WITH DIABETIC PERIPHERAL ANGIOPATHY WITHOUT GANGRENE, WITH LONG-TERM CURRENT USE OF INSULIN (HCC): ICD-10-CM

## 2018-06-29 DIAGNOSIS — N18.30 CKD (CHRONIC KIDNEY DISEASE) STAGE 3, GFR 30-59 ML/MIN (HCC): ICD-10-CM

## 2018-06-29 DIAGNOSIS — E78.2 MIXED HYPERLIPIDEMIA: ICD-10-CM

## 2018-06-29 DIAGNOSIS — I25.10 CORONARY ARTERY DISEASE INVOLVING NATIVE CORONARY ARTERY OF NATIVE HEART WITHOUT ANGINA PECTORIS: ICD-10-CM

## 2018-06-29 DIAGNOSIS — E11.51 TYPE 2 DIABETES MELLITUS WITH DIABETIC PERIPHERAL ANGIOPATHY WITHOUT GANGRENE, WITH LONG-TERM CURRENT USE OF INSULIN (HCC): ICD-10-CM

## 2018-06-29 DIAGNOSIS — Z23 ENCOUNTER FOR IMMUNIZATION: ICD-10-CM

## 2018-06-29 DIAGNOSIS — I50.32 CHRONIC DIASTOLIC CONGESTIVE HEART FAILURE (HCC): ICD-10-CM

## 2018-06-29 DIAGNOSIS — I48.0 PAROXYSMAL ATRIAL FIBRILLATION (HCC): Primary | ICD-10-CM

## 2018-06-29 DIAGNOSIS — M81.0 AGE-RELATED OSTEOPOROSIS WITHOUT CURRENT PATHOLOGICAL FRACTURE: ICD-10-CM

## 2018-06-29 DIAGNOSIS — I48.92 ATRIAL FLUTTER, UNSPECIFIED TYPE (HCC): ICD-10-CM

## 2018-06-29 PROCEDURE — 99214 OFFICE O/P EST MOD 30 MIN: CPT | Performed by: INTERNAL MEDICINE

## 2018-06-29 PROCEDURE — 96160 PT-FOCUSED HLTH RISK ASSMT: CPT | Performed by: INTERNAL MEDICINE

## 2018-06-29 PROCEDURE — 90670 PCV13 VACCINE IM: CPT | Performed by: INTERNAL MEDICINE

## 2018-06-29 PROCEDURE — G0438 PPPS, INITIAL VISIT: HCPCS | Performed by: INTERNAL MEDICINE

## 2018-06-29 PROCEDURE — G0009 ADMIN PNEUMOCOCCAL VACCINE: HCPCS | Performed by: INTERNAL MEDICINE

## 2018-06-29 NOTE — PROGRESS NOTES
CC: f/u diabetes    History:  Lakesha Costello is a 83 y.o. female who presents today for follow-up for evaluation of the above:  She notes she has been doing reasonably well without any acute illness.  She continues on warfarin given her history of atrial fibrillation.  She is planning on having an ablation next week pending therapeutic range of her INR.  She did have 3 nosebleeds yesterday that stopped with compression.  She notes her blood sugars have been between 130 and 230, though they tend to vary widely with the food she eats.  She did have a value of 61 in the past couple of weeks.  She continues on her current medications and denies regular hyperglycemia or hypoglycemia.  She tolerates her blood pressure medication without side effects and notes no changes in the frequency nor quality of her urine given history of CKD.     ROS:  Review of Systems   Constitutional: Positive for diaphoresis. Negative for chills, fatigue and fever.   HENT: Positive for nosebleeds. Negative for congestion and sore throat.    Respiratory: Negative for cough and shortness of breath.    Cardiovascular: Negative for chest pain, palpitations and leg swelling.   Gastrointestinal: Negative for abdominal pain, constipation and nausea.       Ms. Costello  reports that she has never smoked. She has never used smokeless tobacco. She reports that she does not drink alcohol or use drugs.      Current Outpatient Prescriptions:   •  aspirin 81 MG EC tablet, Take 81 mg by mouth Daily., Disp: , Rfl:   •  atenolol (TENORMIN) 50 MG tablet, Take 1 tab PO qam & 1/2 tab PO qpm., Disp: 135 tablet, Rfl: 3  •  calcium carbonate (OS-MICHAEL) 600 MG tablet, Take 600 mg by mouth 2 (Two) Times a Day With Meals., Disp: , Rfl:   •  cholecalciferol (VITAMIN D3) 1000 UNITS tablet, Take 1,000 Units by mouth Daily., Disp: , Rfl:   •  fenofibrate 160 MG tablet, Take 160 mg by mouth Daily., Disp: , Rfl:   •  glipiZIDE (GLUCOTROL) 5 MG tablet, Take 1 tablet by mouth 2  "(Two) Times a Day Before Meals., Disp: 180 tablet, Rfl: 3  •  insulin detemir (LEVEMIR) 100 UNIT/ML injection, Inject 30 Units under the skin Every 12 (Twelve) Hours. 30 units qam 30 units qpm, Disp: 10 mL, Rfl: 6  •  magnesium oxide (MAGOX) 400 (241.3 MG) MG tablet tablet, Take 400 mg by mouth Daily., Disp: , Rfl:   •  nitroglycerin (NITROSTAT) 0.4 MG SL tablet, 1 under the tongue as needed for angina, may repeat q5mins for up three doses, Disp: 30 tablet, Rfl: 3  •  pantoprazole (PROTONIX) 40 MG EC tablet, Take 40 mg by mouth Daily., Disp: , Rfl:   •  polyethyl glycol-propyl glycol (SYSTANE) 0.4-0.3 % solution ophthalmic solution, Administer 1 drop to the right eye Daily., Disp: , Rfl:   •  simvastatin (ZOCOR) 20 MG tablet, Take 1 tablet by mouth Every Night., Disp: 90 tablet, Rfl: 3  •  spironolactone (ALDACTONE) 25 MG tablet, Take 25 mg by mouth Daily., Disp: , Rfl:   •  venlafaxine XR (EFFEXOR-XR) 37.5 MG 24 hr capsule, Take 37.5 mg by mouth Daily., Disp: , Rfl:   •  vitamin B-12 (CYANOCOBALAMIN) 500 MCG tablet, Take 500 mcg by mouth Daily., Disp: , Rfl:   •  warfarin (COUMADIN) 1 MG tablet, Take 2 tablet by mouth daily on Monday, Wednesday and Friday. Take 1 tablet by mouth daily on Tuesday, Thursday, Saturday and Sunday., Disp: 120 tablet, Rfl: 3      OBJECTIVE:  /54 (BP Location: Left arm, Patient Position: Sitting, Cuff Size: Adult)   Pulse 95   Resp 16   Ht 152.4 cm (60\")   Wt 55.1 kg (121 lb 6.4 oz)   SpO2 96%   BMI 23.71 kg/m²    Physical Exam   Constitutional: She is oriented to person, place, and time. She appears well-nourished. No distress.   Cardiovascular: Normal rate and normal heart sounds.  An irregularly irregular rhythm present.   No murmur heard.  Pulmonary/Chest: Effort normal and breath sounds normal. She has no wheezes.   Abdominal: Soft. There is no tenderness.   Neurological: She is alert and oriented to person, place, and time.   Psychiatric: She has a normal mood and " affect.       Assessment/Plan    Diagnoses and all orders for this visit:    Paroxysmal atrial fibrillation  Atrial flutter, unspecified type  Rate control on atenolol.  Anticoagulation with warfarin.  She is planning for ablation pending therapeutic INR next week.    Chronic diastolic congestive heart failure  She is euvolemic on exam and continues on atenolol.  She does take spironolactone.    Coronary artery disease involving native coronary artery of native heart without angina pectoris  She has no angina and continues on aspirin, beta blocker, and statin.    Type 2 diabetes mellitus with diabetic peripheral angiopathy without gangrene, with long-term current use of insulin  -     Hemoglobin A1c; Future  We will check A1c prior to her next visit, though given variability in her sugar including one hypoglycemic value, we will leave her medication doses stable.    CKD (chronic kidney disease) stage 3, GFR 30-59 ml/min  -     CBC (No Diff); Future  -     Comprehensive Metabolic Panel; Future  -     Urinalysis With Microscopic If Indicated (No Culture) - Urine, Clean Catch; Future  -     Protein / Creatinine Ratio, Urine - Urine, Clean Catch; Future  GFR had decreased on labs 2 months ago.  We will plan on blood and urine prior to her next visit.  She has had angioedema with ACE inhibitor's in the past.    Age-related osteoporosis without current pathological fracture  -     DEXA Bone Density Axial; Future  Given history of osteoporosis, we will order DEXA for further evaluation.    Mixed hyperlipidemia  -     Lipid Panel; Future  Stable on simvastatin without side effects.    Encounter for immunization  -     Pneumococcal Conjugate Vaccine 13-Valent All    An After Visit Summary was printed and given to the patient at discharge.  Return in about 3 months (around 9/29/2018) for Recheck. Sooner if problems arise.         Leonardo Zepeda D.O. 6/29/2018

## 2018-06-29 NOTE — PATIENT INSTRUCTIONS
Medicare Wellness  Personal Prevention Plan of Service     Date of Office Visit:  2018  Encounter Provider:  Leonardo Zepeda DO  Place of Service:  [facility]  Patient Name: Lakesha Costello  :  1935    As part of the Medicare Wellness portion of your visit today, we are providing you with this personalized preventive plan of services (PPPS). This plan is based upon recommendations of the [place] Preventive Services Task Force (USPSTF) and the Advisory Committee on Immunization Practices (ACIP).    This lists the preventive care services that should be considered, and provides dates of when you are due. Items listed as completed are up-to-date and do not require any further intervention.    Health Maintenance   Topic Date Due   • URINE MICROALBUMIN  1935   • TDAP/TD VACCINES (1 - Tdap) 1956   • ZOSTER VACCINE (1 of 2) 1987   • MEDICARE ANNUAL WELLNESS  2017   • DIABETIC FOOT EXAM  2017   • DXA SCAN  2018   • PNEUMOCOCCAL VACCINES (65+ LOW/MEDIUM RISK) (2 of 2 - PCV13) 2018   • INFLUENZA VACCINE  2018   • HEMOGLOBIN A1C  10/13/2018   • LIPID PANEL  2019       Orders Placed This Encounter   Procedures   • DEXA Bone Density Axial     Standing Status:   Future     Standing Expiration Date:   2019     Order Specific Question:   Reason for Exam:     Answer:   screening   • Pneumococcal Conjugate Vaccine 13-Valent All   • CBC (No Diff)     Standing Status:   Future     Standing Expiration Date:   2019   • Comprehensive Metabolic Panel     Standing Status:   Future     Standing Expiration Date:   2019   • Hemoglobin A1c     Standing Status:   Future     Standing Expiration Date:   2019   • Lipid Panel     Standing Status:   Future     Standing Expiration Date:   2019   • Urinalysis With Microscopic If Indicated (No Culture) - Urine, Clean Catch     Standing Status:   Future      Standing Expiration Date:   6/29/2019   • Protein / Creatinine Ratio, Urine - Urine, Clean Catch     Standing Status:   Future     Standing Expiration Date:   6/29/2019       Return in about 3 months (around 9/29/2018) for Recheck.

## 2018-06-29 NOTE — PROGRESS NOTES
QUICK REFERENCE INFORMATION:  The ABCs of the Annual Wellness Visit    Initial Medicare Wellness Visit    HEALTH RISK ASSESSMENT    1935    Recent Hospitalizations:  Recently treated at the following:  [facility] and Other: [facility].        Current Medical Providers:  Patient Care Team:  Leonardo Zepeda DO as PCP - General (Internal Medicine)  Ehsan Corcker MD as Cardiologist (Cardiology)  Gordon Fernandez MD as Consulting Physician (Ophthalmology)        Smoking Status:  History   Smoking Status   • Never Smoker   Smokeless Tobacco   • Never Used       Alcohol Consumption:  History   Alcohol Use No       Depression Screen:   PHQ-2/PHQ-9 Depression Screening 6/29/2018   Little interest or pleasure in doing things 0   Feeling down, depressed, or hopeless 0   Total Score 0       Health Habits and Functional and Cognitive Screening:  Functional & Cognitive Status 6/29/2018   Do you have difficulty preparing food and eating? No   Do you have difficulty bathing yourself, getting dressed or grooming yourself? No   Do you have difficulty using the toilet? No   Do you have difficulty moving around from place to place? No   Do you have trouble with steps or getting out of a bed or a chair? No   In the past year have you fallen or experienced a near fall? No   Current Diet Well Balanced Diet   Dental Exam Up to date   Eye Exam Up to date   Exercise (times per week) 3 times per week   Current Exercise Activities Include Housecleaning   Do you need help using the phone?  No   Are you deaf or do you have serious difficulty hearing?  No   Do you need help with transportation? No   Do you need help shopping? No   Do you need help preparing meals?  No   Do you need help with housework?  No   Do you need help with laundry? No   Do you need help taking your medications? No   Do you need help managing money? No   Do you ever drive or ride in a car without wearing a seat belt? No   Have you felt unusual stress, anger  or loneliness in the last month? No   Who do you live with? Spouse   If you need help, do you have trouble finding someone available to you? No   Have you been bothered in the last four weeks by sexual problems? No   Do you have difficulty concentrating, remembering or making decisions? No           Does the patient have evidence of cognitive impairment? No    Asiprin use counseling: Taking ASA appropriately as indicated      Recent Lab Results:    Visual Acuity:  No exam data present    Age-appropriate Screening Schedule:  Refer to the list below for future screening recommendations based on patient's age, sex and/or medical conditions. Orders for these recommended tests are listed in the plan section. The patient has been provided with a written plan.    Health Maintenance   Topic Date Due   • URINE MICROALBUMIN  1935   • TDAP/TD VACCINES (1 - Tdap) 04/01/1956   • ZOSTER VACCINE (1 of 2) 04/01/1987   • DIABETIC FOOT EXAM  04/07/2017   • DXA SCAN  04/13/2018   • PNEUMOCOCCAL VACCINES (65+ LOW/MEDIUM RISK) (2 of 2 - PCV13) 06/07/2018   • INFLUENZA VACCINE  08/01/2018   • HEMOGLOBIN A1C  10/13/2018   • LIPID PANEL  04/13/2019        Subjective   History of Present Illness    Lakesha Costello is a 83 y.o. female who presents for an Annual Wellness Visit.    The following portions of the patient's history were reviewed and updated as appropriate: allergies, current medications, past family history, past medical history, past social history, past surgical history and problem list.    Outpatient Medications Prior to Visit   Medication Sig Dispense Refill   • aspirin 81 MG EC tablet Take 81 mg by mouth Daily.     • atenolol (TENORMIN) 50 MG tablet Take 1 tab PO qam & 1/2 tab PO qpm. 135 tablet 3   • calcium carbonate (OS-MICHAEL) 600 MG tablet Take 600 mg by mouth 2 (Two) Times a Day With Meals.     • cholecalciferol (VITAMIN D3) 1000 UNITS tablet Take 1,000 Units by mouth Daily.     • fenofibrate 160 MG tablet Take 160  mg by mouth Daily.     • glipiZIDE (GLUCOTROL) 5 MG tablet Take 1 tablet by mouth 2 (Two) Times a Day Before Meals. 180 tablet 3   • insulin detemir (LEVEMIR) 100 UNIT/ML injection Inject 30 Units under the skin Every 12 (Twelve) Hours. 30 units qam 30 units qpm 10 mL 6   • magnesium oxide (MAGOX) 400 (241.3 MG) MG tablet tablet Take 400 mg by mouth Daily.     • nitroglycerin (NITROSTAT) 0.4 MG SL tablet 1 under the tongue as needed for angina, may repeat q5mins for up three doses 30 tablet 3   • pantoprazole (PROTONIX) 40 MG EC tablet Take 40 mg by mouth Daily.     • polyethyl glycol-propyl glycol (SYSTANE) 0.4-0.3 % solution ophthalmic solution Administer 1 drop to the right eye Daily.     • simvastatin (ZOCOR) 20 MG tablet Take 1 tablet by mouth Every Night. 90 tablet 3   • spironolactone (ALDACTONE) 25 MG tablet Take 25 mg by mouth Daily.     • venlafaxine XR (EFFEXOR-XR) 37.5 MG 24 hr capsule Take 37.5 mg by mouth Daily.     • vitamin B-12 (CYANOCOBALAMIN) 500 MCG tablet Take 500 mcg by mouth Daily.     • warfarin (COUMADIN) 1 MG tablet Take 2 tablet by mouth daily on Monday, Wednesday and Friday. Take 1 tablet by mouth daily on Tuesday, Thursday, Saturday and Sunday. 120 tablet 3     No facility-administered medications prior to visit.        Patient Active Problem List   Diagnosis   • Paroxysmal atrial fibrillation   • Atrial flutter   • Essential hypertension   • Type 2 diabetes mellitus with diabetic peripheral angiopathy without gangrene, with long-term current use of insulin   • Chronic anticoagulation   • Coronary artery disease involving native coronary artery of native heart without angina pectoris   • SSS (sick sinus syndrome)   • Chest pain   • S/P CABG x 3   • Artificial pacemaker   • Tachycardia   • Palpitations   • Mixed hyperlipidemia   • S/P TAVR (transcatheter aortic valve replacement)   • Age-related osteoporosis without current pathological fracture   • Coronary artery abnormality   • Chronic  "diastolic congestive heart failure   • Malignant neoplasm of breast   • CKD (chronic kidney disease) stage 3, GFR 30-59 ml/min       Advance Care Planning:  has an advance directive - a copy HAS NOT been provided. Have asked the patient to send this to us to add to record.    Identification of Risk Factors:  Risk factors include: weight , unhealthy diet, cardiovascular risk and inactivity.    Review of Systems See  note    Compared to one year ago, the patient feels her physical health is better.  Compared to one year ago, the patient feels her mental health is the same.    Objective     Physical Exam See  note    Vitals:    06/29/18 1400   BP: 126/54   BP Location: Left arm   Patient Position: Sitting   Cuff Size: Adult   Pulse: 95   Resp: 16   SpO2: 96%   Weight: 55.1 kg (121 lb 6.4 oz)   Height: 152.4 cm (60\")       Patient's Body mass index is 23.71 kg/m². BMI is within normal parameters. No follow-up required.      Assessment/Plan   Patient Self-Management and Personalized Health Advice  The patient has been provided with information about: diet, exercise and weight management and preventive services including:   · Advance directive, Bone densitometry screening, Exercise counseling provided, Fall Risk assessment done, Influenza vaccine, Pneumococcal vaccine , TdaP vaccine, Zostavax vaccine (Herpes Zoster).    Visit Diagnoses:    ICD-10-CM ICD-9-CM   1. Chronic diastolic congestive heart failure I50.32 428.32     428.0   2. CKD (chronic kidney disease) stage 3, GFR 30-59 ml/min N18.3 585.3   3. Atrial flutter, unspecified type I48.92 427.32   4. Age-related osteoporosis without current pathological fracture M81.0 733.01   5. Paroxysmal atrial fibrillation I48.0 427.31   6. Mixed hyperlipidemia E78.2 272.2   7. Type 2 diabetes mellitus with diabetic peripheral angiopathy without gangrene, with long-term current use of insulin E11.51 250.70    Z79.4 443.81     V58.67   8. Coronary artery disease " involving native coronary artery of native heart without angina pectoris I25.10 414.01   9. Encounter for immunization Z23 V03.89       Orders Placed This Encounter   Procedures   • DEXA Bone Density Axial     Standing Status:   Future     Standing Expiration Date:   6/29/2019     Order Specific Question:   Reason for Exam:     Answer:   screening   • Pneumococcal Conjugate Vaccine 13-Valent All   • CBC (No Diff)     Standing Status:   Future     Standing Expiration Date:   6/29/2019   • Comprehensive Metabolic Panel     Standing Status:   Future     Standing Expiration Date:   6/29/2019   • Hemoglobin A1c     Standing Status:   Future     Standing Expiration Date:   6/29/2019   • Lipid Panel     Standing Status:   Future     Standing Expiration Date:   6/29/2019   • Urinalysis With Microscopic If Indicated (No Culture) - Urine, Clean Catch     Standing Status:   Future     Standing Expiration Date:   6/29/2019   • Protein / Creatinine Ratio, Urine - Urine, Clean Catch     Standing Status:   Future     Standing Expiration Date:   6/29/2019       Outpatient Encounter Prescriptions as of 6/29/2018   Medication Sig Dispense Refill   • aspirin 81 MG EC tablet Take 81 mg by mouth Daily.     • atenolol (TENORMIN) 50 MG tablet Take 1 tab PO qam & 1/2 tab PO qpm. 135 tablet 3   • calcium carbonate (OS-MICHAEL) 600 MG tablet Take 600 mg by mouth 2 (Two) Times a Day With Meals.     • cholecalciferol (VITAMIN D3) 1000 UNITS tablet Take 1,000 Units by mouth Daily.     • fenofibrate 160 MG tablet Take 160 mg by mouth Daily.     • glipiZIDE (GLUCOTROL) 5 MG tablet Take 1 tablet by mouth 2 (Two) Times a Day Before Meals. 180 tablet 3   • insulin detemir (LEVEMIR) 100 UNIT/ML injection Inject 30 Units under the skin Every 12 (Twelve) Hours. 30 units qam 30 units qpm 10 mL 6   • magnesium oxide (MAGOX) 400 (241.3 MG) MG tablet tablet Take 400 mg by mouth Daily.     • nitroglycerin (NITROSTAT) 0.4 MG SL tablet 1 under the tongue as  needed for angina, may repeat q5mins for up three doses 30 tablet 3   • pantoprazole (PROTONIX) 40 MG EC tablet Take 40 mg by mouth Daily.     • polyethyl glycol-propyl glycol (SYSTANE) 0.4-0.3 % solution ophthalmic solution Administer 1 drop to the right eye Daily.     • simvastatin (ZOCOR) 20 MG tablet Take 1 tablet by mouth Every Night. 90 tablet 3   • spironolactone (ALDACTONE) 25 MG tablet Take 25 mg by mouth Daily.     • venlafaxine XR (EFFEXOR-XR) 37.5 MG 24 hr capsule Take 37.5 mg by mouth Daily.     • vitamin B-12 (CYANOCOBALAMIN) 500 MCG tablet Take 500 mcg by mouth Daily.     • warfarin (COUMADIN) 1 MG tablet Take 2 tablet by mouth daily on Monday, Wednesday and Friday. Take 1 tablet by mouth daily on Tuesday, Thursday, Saturday and Sunday. 120 tablet 3     No facility-administered encounter medications on file as of 6/29/2018.        Reviewed use of high risk medication in the elderly: yes  Reviewed for potential of harmful drug interactions in the elderly: yes    Follow Up:  Return in about 3 months (around 9/29/2018) for Recheck.     An After Visit Summary and PPPS with all of these plans were given to the patient.

## 2018-07-02 LAB — INR PPP: 1.9

## 2018-07-03 ENCOUNTER — ANTICOAGULATION VISIT (OUTPATIENT)
Dept: CARDIOLOGY | Facility: CLINIC | Age: 83
End: 2018-07-03

## 2018-07-06 ENCOUNTER — HOSPITAL ENCOUNTER (OUTPATIENT)
Dept: BONE DENSITY | Facility: HOSPITAL | Age: 83
Discharge: HOME | End: 2018-07-06
Attending: INTERNAL MEDICINE | Admitting: INTERNAL MEDICINE

## 2018-07-06 DIAGNOSIS — M81.0 AGE-RELATED OSTEOPOROSIS WITHOUT CURRENT PATHOLOGICAL FRACTURE: ICD-10-CM

## 2018-07-06 PROCEDURE — 77080 DXA BONE DENSITY AXIAL: CPT

## 2018-07-09 ENCOUNTER — ANTICOAGULATION VISIT (OUTPATIENT)
Dept: CARDIOLOGY | Facility: CLINIC | Age: 83
End: 2018-07-09

## 2018-07-09 LAB — INR PPP: 2.3

## 2018-07-11 ENCOUNTER — APPOINTMENT (OUTPATIENT)
Dept: CARDIOLOGY | Facility: HOSPITAL | Age: 83
End: 2018-07-11
Attending: INTERNAL MEDICINE

## 2018-07-16 ENCOUNTER — ANTICOAGULATION VISIT (OUTPATIENT)
Dept: CARDIOLOGY | Facility: CLINIC | Age: 83
End: 2018-07-16

## 2018-07-16 LAB — INR PPP: 2

## 2018-07-23 ENCOUNTER — ANTICOAGULATION VISIT (OUTPATIENT)
Dept: CARDIOLOGY | Facility: CLINIC | Age: 83
End: 2018-07-23

## 2018-07-23 LAB — INR PPP: 2.9

## 2018-07-30 ENCOUNTER — ANTICOAGULATION VISIT (OUTPATIENT)
Dept: CARDIOLOGY | Facility: CLINIC | Age: 83
End: 2018-07-30

## 2018-07-30 LAB — INR PPP: 3.3

## 2018-08-02 ENCOUNTER — ANESTHESIA EVENT (OUTPATIENT)
Dept: CARDIOLOGY | Facility: HOSPITAL | Age: 83
End: 2018-08-02

## 2018-08-02 ENCOUNTER — ANESTHESIA (OUTPATIENT)
Dept: CARDIOLOGY | Facility: HOSPITAL | Age: 83
End: 2018-08-02

## 2018-08-02 ENCOUNTER — HOSPITAL ENCOUNTER (OUTPATIENT)
Dept: CARDIOLOGY | Facility: HOSPITAL | Age: 83
Discharge: HOME | End: 2018-08-02
Attending: INTERNAL MEDICINE | Admitting: INTERNAL MEDICINE

## 2018-08-02 VITALS
HEIGHT: 61 IN | BODY MASS INDEX: 23.41 KG/M2 | OXYGEN SATURATION: 91 % | DIASTOLIC BLOOD PRESSURE: 44 MMHG | WEIGHT: 124 LBS | HEART RATE: 60 BPM | SYSTOLIC BLOOD PRESSURE: 131 MMHG | RESPIRATION RATE: 17 BRPM | TEMPERATURE: 97.5 F

## 2018-08-02 DIAGNOSIS — I48.19 PERSISTENT ATRIAL FIBRILLATION (HCC): ICD-10-CM

## 2018-08-02 LAB
INR PPP: 3.33 (ref 0.91–1.09)
PROTHROMBIN TIME: 35.1 SECONDS (ref 11.9–14.6)

## 2018-08-02 PROCEDURE — 92960 CARDIOVERSION ELECTRIC EXT: CPT | Performed by: INTERNAL MEDICINE

## 2018-08-02 PROCEDURE — 85610 PROTHROMBIN TIME: CPT | Performed by: INTERNAL MEDICINE

## 2018-08-02 PROCEDURE — 92960 CARDIOVERSION ELECTRIC EXT: CPT

## 2018-08-02 PROCEDURE — 99235 HOSP IP/OBS SAME DATE MOD 70: CPT | Performed by: INTERNAL MEDICINE

## 2018-08-02 PROCEDURE — 25010000002 PROPOFOL 10 MG/ML EMULSION: Performed by: NURSE ANESTHETIST, CERTIFIED REGISTERED

## 2018-08-02 RX ORDER — PROPOFOL 10 MG/ML
VIAL (ML) INTRAVENOUS AS NEEDED
Status: DISCONTINUED | OUTPATIENT
Start: 2018-08-02 | End: 2018-08-02 | Stop reason: SURG

## 2018-08-02 RX ORDER — LIDOCAINE HYDROCHLORIDE 20 MG/ML
INJECTION, SOLUTION INFILTRATION; PERINEURAL AS NEEDED
Status: DISCONTINUED | OUTPATIENT
Start: 2018-08-02 | End: 2018-08-02 | Stop reason: SURG

## 2018-08-02 RX ADMIN — LIDOCAINE HYDROCHLORIDE 100 MG: 20 INJECTION, SOLUTION INFILTRATION; PERINEURAL at 09:14

## 2018-08-02 RX ADMIN — PROPOFOL 50 MG: 10 INJECTION, EMULSION INTRAVENOUS at 09:14

## 2018-08-02 NOTE — ANESTHESIA PREPROCEDURE EVALUATION
Anesthesia Evaluation     Patient summary reviewed   no history of anesthetic complications:  NPO Solid Status: > 8 hours  NPO Liquid Status: > 8 hours           Airway   Mallampati: II  TM distance: >3 FB  Neck ROM: full  Dental - normal exam         Pulmonary - normal exam    breath sounds clear to auscultation  (-) asthma, recent URI, sleep apnea, not a smoker  Cardiovascular - normal exam  Exercise tolerance: good (4-7 METS)    ECG reviewed  Patient on routine beta blocker and Beta blocker given within 24 hours of surgery  Rhythm: regular  Rate: normal    (+) pacemaker (Guidant Essentio, placed for bradycardia) pacemaker interrogated 3-6 months ago, hypertension well controlled, valvular problems/murmurs (TAVR November 10, 2017, no issues since) AS, CAD, CABG (4 vessels, 2000), dysrhythmias Atrial Fib, hyperlipidemia,   (-) past MI, angina, cardiac stents    ROS comment: TTE 6/7/17 - ·Left ventricular wall thickness is consistent with mild concentric hypertrophy.  ·Left ventricular systolic function is normal. Estimated EF = 65%.  ·Left ventricular diastolic dysfunction (grade II) consistent with pseudonormalization.  ·Right ventricular cavity is mildly dilated.  ·Left atrial cavity size is mild-to-moderately dilated.  ·calcification of the aortic valve  ·Severe aortic valve stenosis is present.  ·Mild-to-moderate mitral valve regurgitation is present    Neuro/Psych  (-) seizures, TIA, CVA  GI/Hepatic/Renal/Endo    (+)  GERD well controlled,  diabetes mellitus,   (-) liver disease, no renal disease, hypothyroidism, hyperthyroidism    Musculoskeletal     Abdominal    Substance History      OB/GYN          Other                      Anesthesia Plan    ASA 3     general   total IV anesthesia  intravenous induction   Anesthetic plan and risks discussed with patient.

## 2018-08-02 NOTE — H&P
No chief complaint on file.      Subjective .     History of present illness:  Lakesha Costello is a 83 y.o. yo female with history of valvular heart disease and persistent afib who presents today for cardioversion.    History  Past Medical History:   Diagnosis Date   • Aortic stenosis    • Atrial fibrillation (CMS/HCC)    • Atrial fibrillation by electrocardiogram (CMS/[facility])    • Atrial flutter (CMS/HCC)    • Breast cancer (CMS/[facility])    • CAD (coronary artery disease)    • Cataract    • Chronic anticoagulation     Coumadin    • CKD (chronic kidney disease) stage 3, GFR 30-59 ml/min 4/16/2018   • Diabetes mellitus (CMS/[facility])     Type II   • GERD (gastroesophageal reflux disease)    • Hospital discharge follow-up    • Hyperlipidemia    • Hypertension    • Macular degeneration    • Pacemaker    • Pancreatitis    • Rheumatic fever     during childhood   ,   Past Surgical History:   Procedure Laterality Date   • ANOMALOUS PULMONARY VENOUS RETURN REPAIR, TOTAL     • BACK SURGERY     • BELPHAROPTOSIS REPAIR     • CARDIAC CATHETERIZATION  1999, 2010   • CARDIAC CATHETERIZATION N/A 2/15/2017    Procedure: Left Heart Cath;  Surgeon: Ehsan Crocker MD;  Location:  [facility] CATH [facility]VASIVE [facility];  Service:    • CARDIAC CATHETERIZATION N/A 2/15/2017    Procedure: Right Heart Cath;  Surgeon: Ehsan Crocker MD;  Location:  [facility];  Service:    • CARDIAC CATHETERIZATION N/A 2/15/2017    Procedure: Coronary angiography;  Surgeon: Ehsan Crocker MD;  Location:  [facility];  Service:    • CARDIAC CATHETERIZATION N/A 2/15/2017    Procedure: Left ventriculography;  Surgeon: Ehsan Crocker MD;  Location:  [facility];  Service:    • CARDIAC CATHETERIZATION N/A 2/15/2017    Procedure: Intracardiac echocardiogram;  Surgeon: Ehsan Crocker MD;  Location:  [facility];  Service:    • CARDIAC ELECTROPHYSIOLOGY PROCEDURE N/A 2/3/2017    Procedure: Pacemaker DC new;  Surgeon: Ehsan  TAMAR Crocker MD;  Location:  [facility]ATH [facility];  Service:    • CATARACT EXTRACTION     • CHOLECYSTECTOMY     • CORONARY ARTERY BYPASS GRAFT  1999    4 vessel    • CORONARY ARTERY BYPASS GRAFT     • CORONARY STENT PLACEMENT     • HYSTERECTOMY  1962   • INSERT / REPLACE / REMOVE PACEMAKER     • MASTECTOMY  2000   • OTHER SURGICAL HISTORY      TAVR 2017   ,   Family History   Problem Relation Age of Onset   • Breast cancer Mother    • Coronary artery disease Father    • Heart attack Father    • Diabetes Father    • Cancer Brother    • No Known Problems Maternal Grandmother    • No Known Problems Maternal Grandfather    • No Known Problems Paternal Grandmother    • No Known Problems Paternal Grandfather    ,   Social History   Substance Use Topics   • Smoking status: Never Smoker   • Smokeless tobacco: Never Used   • Alcohol use No   ,     Medications  Current Outpatient Prescriptions   Medication Sig Dispense Refill   • aspirin 81 MG EC tablet Take 81 mg by mouth Daily.     • atenolol (TENORMIN) 50 MG tablet Take 1 tab PO qam & 1/2 tab PO qpm. 135 tablet 3   • calcium carbonate (OS-MICHAEL) 600 MG tablet Take 600 mg by mouth 2 (Two) Times a Day With Meals.     • cholecalciferol (VITAMIN D3) 1000 UNITS tablet Take 1,000 Units by mouth Daily.     • fenofibrate 160 MG tablet Take 160 mg by mouth Daily.     • glipiZIDE (GLUCOTROL) 5 MG tablet Take 1 tablet by mouth 2 (Two) Times a Day Before Meals. 180 tablet 3   • insulin detemir (LEVEMIR) 100 UNIT/ML injection Inject 30 Units under the skin Every 12 (Twelve) Hours. 30 units qam 30 units qpm 10 mL 6   • magnesium oxide (MAGOX) 400 (241.3 MG) MG tablet tablet Take 400 mg by mouth Daily.     • pantoprazole (PROTONIX) 40 MG EC tablet Take 40 mg by mouth Daily.     • polyethyl glycol-propyl glycol (SYSTANE) 0.4-0.3 % solution ophthalmic solution Administer 1 drop to the right eye Daily.     • simvastatin (ZOCOR) 20 MG tablet Take 1 tablet by mouth Every Night. 90 tablet 3  "  • spironolactone (ALDACTONE) 25 MG tablet Take 25 mg by mouth Daily.     • venlafaxine XR (EFFEXOR-XR) 37.5 MG 24 hr capsule Take 37.5 mg by mouth Daily.     • vitamin B-12 (CYANOCOBALAMIN) 500 MCG tablet Take 500 mcg by mouth Daily.     • warfarin (COUMADIN) 1 MG tablet Take 2 tablet by mouth daily on Monday, Wednesday and Friday. Take 1 tablet by mouth daily on Tuesday, Thursday, Saturday and Sunday. 120 tablet 3   • nitroglycerin (NITROSTAT) 0.4 MG SL tablet 1 under the tongue as needed for angina, may repeat q5mins for up three doses 30 tablet 3     No current facility-administered medications for this encounter.      Facility-Administered Medications Ordered in Other Encounters   Medication Dose Route Frequency Provider Last Rate Last Dose   • lidocaine (XYLOCAINE) 2% injection    PRN Michael Crespo, CRNA   100 mg at 08/02/18 0914   • Propofol (DIPRIVAN) injection    PRN Michael Crespo, CRNA   50 mg at 08/02/18 0914       Allergies:  Dilaudid [hydromorphone hcl]; Dilaudid [hydromorphone]; Enalapril; Janumet [sitagliptin-metformin hcl]; Lopid [gemfibrozil]; Sulfa antibiotics; and Ultram [tramadol]    Review of Systems  Review of Systems   Constitution: Positive for weakness.   HENT: Negative for nosebleeds.    Cardiovascular: Positive for dyspnea on exertion and palpitations. Negative for chest pain, claudication, irregular heartbeat, leg swelling, near-syncope, orthopnea, paroxysmal nocturnal dyspnea and syncope.   Respiratory: Negative for cough, hemoptysis and shortness of breath.    Gastrointestinal: Negative for dysphagia, hematemesis and melena.   Genitourinary: Negative for hematuria.   All other systems reviewed and are negative.      Objective     Physical Exam:  Patient Vitals for the past 24 hrs:   BP Temp Temp src Pulse Resp SpO2 Height Weight   08/02/18 0816 148/56 97.5 °F (36.4 °C) Temporal Art 80 22 99 % 154.9 cm (61\") 56.2 kg (124 lb)     Physical Exam   Constitutional: She is oriented " to person, place, and time. She appears well-developed and well-nourished. No distress.   HENT:   Head: Normocephalic and atraumatic.   Eyes: No scleral icterus.   Neck: Normal range of motion.   Cardiovascular: Normal rate.  An irregularly irregular rhythm present. PMI is not displaced.  Exam reveals no gallop and no friction rub.    Murmur heard.   Harsh midsystolic murmur is present with a grade of 1/6  at the upper right sternal border  Pulmonary/Chest: Effort normal and breath sounds normal. No respiratory distress. She has no wheezes. She has no rales.   Abdominal: Soft. Normal appearance and bowel sounds are normal. She exhibits no distension. There is no hepatosplenomegaly. There is no tenderness. There is no CVA tenderness.   Musculoskeletal: She exhibits no edema.   Neurological: She is alert and oriented to person, place, and time.   Skin: Skin is warm and dry. She is not diaphoretic. No erythema.   Psychiatric: She has a normal mood and affect. Her behavior is normal.       Results Review:   I reviewed the patient's new clinical results.  Lab Results (last 24 hours)     Procedure Component Value Units Date/Time    Protime-INR [[id number]]  (Abnormal) Collected:  08/02/18 0820    Specimen:  Blood Updated:  08/02/18 0853     Protime 35.1 (H) Seconds      INR 3.33 (H)        Imaging Results (last 24 hours)     ** No results found for the last 24 hours. **        Lab Results   Component Value Date    ECHOEFEST 60 09/07/2017         Assessment/Plan   Active Problems:    * No active hospital problems. *    IMP: Persistent afib, for cardioversion today

## 2018-08-02 NOTE — ANESTHESIA POSTPROCEDURE EVALUATION
"Patient: Lakesha Costello    Procedure Summary     Date:  08/02/18 Room / Location:  [facility] CLOSE OBSERVATION UNIT    Anesthesia Start:  0910 Anesthesia Stop:  0923    Procedure:  CARDIOVERSION EXTERNAL IN CARDIOLOGY DEPARTMENT Diagnosis:       Persistent atrial fibrillation (CMS/HCC)      (PAF)    Scheduled Providers:  Ehsan Crocker MD Provider:  Michael Crespo CRNA    Anesthesia Type:  general ASA Status:  3          Anesthesia Type: general  Last vitals  BP   148/56 (08/02/18 0816)   Temp   97.5 °F (36.4 °C) (08/02/18 0816)   Pulse   80 (08/02/18 0816)   Resp   22 (08/02/18 0816)     SpO2   99 % (08/02/18 0816)     Post Anesthesia Care and Evaluation    Patient location during evaluation: PHASE II  Patient participation: complete - patient participated  Level of consciousness: awake and awake and alert  Pain score: 0  Pain management: adequate  Airway patency: patent  Anesthetic complications: No anesthetic complications  PONV Status: none  Cardiovascular status: acceptable and blood pressure returned to baseline  Respiratory status: acceptable  Hydration status: acceptable    Comments: Blood pressure 148/56, pulse 80, temperature 97.5 °F (36.4 °C), temperature source Temporal Artery , resp. rate 22, height 154.9 cm (61\"), weight 56.2 kg (124 lb), SpO2 99 %.        "

## 2018-08-06 ENCOUNTER — ANTICOAGULATION VISIT (OUTPATIENT)
Dept: CARDIOLOGY | Facility: CLINIC | Age: 83
End: 2018-08-06

## 2018-08-06 LAB
INR PPP: 3.3
INR PPP: 3.4

## 2018-08-09 ENCOUNTER — ANTICOAGULATION VISIT (OUTPATIENT)
Dept: CARDIOLOGY | Facility: CLINIC | Age: 83
End: 2018-08-09

## 2018-08-10 ENCOUNTER — CLINICAL SUPPORT (OUTPATIENT)
Dept: CARDIOLOGY | Facility: CLINIC | Age: 83
End: 2018-08-10

## 2018-08-10 DIAGNOSIS — I49.5 SSS (SICK SINUS SYNDROME) (HCC): ICD-10-CM

## 2018-08-10 DIAGNOSIS — I48.0 PAROXYSMAL ATRIAL FIBRILLATION (HCC): Primary | ICD-10-CM

## 2018-08-10 DIAGNOSIS — Z95.0 ARTIFICIAL PACEMAKER: Primary | ICD-10-CM

## 2018-08-13 ENCOUNTER — HOSPITAL ENCOUNTER (OUTPATIENT)
Dept: CARDIOLOGY | Facility: HOSPITAL | Age: 83
Discharge: HOME | End: 2018-08-13
Attending: INTERNAL MEDICINE | Admitting: INTERNAL MEDICINE

## 2018-08-13 DIAGNOSIS — I48.0 PAROXYSMAL ATRIAL FIBRILLATION (HCC): ICD-10-CM

## 2018-08-13 PROCEDURE — 93010 ELECTROCARDIOGRAM REPORT: CPT | Performed by: INTERNAL MEDICINE

## 2018-08-13 PROCEDURE — 93005 ELECTROCARDIOGRAM TRACING: CPT | Performed by: INTERNAL MEDICINE

## 2018-08-14 ENCOUNTER — ANTICOAGULATION VISIT (OUTPATIENT)
Dept: CARDIOLOGY | Facility: CLINIC | Age: 83
End: 2018-08-14

## 2018-08-14 LAB — INR PPP: 2.5

## 2018-08-20 ENCOUNTER — ANTICOAGULATION VISIT (OUTPATIENT)
Dept: CARDIOLOGY | Facility: CLINIC | Age: 83
End: 2018-08-20

## 2018-08-20 DIAGNOSIS — I48.92 ATRIAL FLUTTER, UNSPECIFIED TYPE (HCC): Primary | ICD-10-CM

## 2018-08-20 LAB — INR PPP: 3.2

## 2018-08-21 ENCOUNTER — ANESTHESIA EVENT (OUTPATIENT)
Dept: CARDIOLOGY | Facility: HOSPITAL | Age: 83
End: 2018-08-21

## 2018-08-21 ENCOUNTER — ANESTHESIA (OUTPATIENT)
Dept: CARDIOLOGY | Facility: HOSPITAL | Age: 83
End: 2018-08-21

## 2018-08-21 ENCOUNTER — HOSPITAL ENCOUNTER (OUTPATIENT)
Dept: CARDIOLOGY | Facility: HOSPITAL | Age: 83
Discharge: HOME | End: 2018-08-21
Attending: INTERNAL MEDICINE | Admitting: INTERNAL MEDICINE

## 2018-08-21 VITALS — DIASTOLIC BLOOD PRESSURE: 58 MMHG | OXYGEN SATURATION: 95 % | SYSTOLIC BLOOD PRESSURE: 99 MMHG

## 2018-08-21 VITALS
HEART RATE: 60 BPM | SYSTOLIC BLOOD PRESSURE: 133 MMHG | DIASTOLIC BLOOD PRESSURE: 63 MMHG | BODY MASS INDEX: 23.18 KG/M2 | RESPIRATION RATE: 23 BRPM | TEMPERATURE: 97.8 F | WEIGHT: 122.8 LBS | HEIGHT: 61 IN | OXYGEN SATURATION: 98 %

## 2018-08-21 DIAGNOSIS — I48.92 ATRIAL FLUTTER, UNSPECIFIED TYPE (HCC): ICD-10-CM

## 2018-08-21 PROCEDURE — 25010000002 PROPOFOL 10 MG/ML EMULSION: Performed by: NURSE ANESTHETIST, CERTIFIED REGISTERED

## 2018-08-21 PROCEDURE — 93005 ELECTROCARDIOGRAM TRACING: CPT | Performed by: INTERNAL MEDICINE

## 2018-08-21 PROCEDURE — 92960 CARDIOVERSION ELECTRIC EXT: CPT | Performed by: INTERNAL MEDICINE

## 2018-08-21 PROCEDURE — 92960 CARDIOVERSION ELECTRIC EXT: CPT

## 2018-08-21 PROCEDURE — 93010 ELECTROCARDIOGRAM REPORT: CPT | Performed by: INTERNAL MEDICINE

## 2018-08-21 RX ORDER — PROPOFOL 10 MG/ML
VIAL (ML) INTRAVENOUS AS NEEDED
Status: DISCONTINUED | OUTPATIENT
Start: 2018-08-21 | End: 2018-08-21 | Stop reason: SURG

## 2018-08-21 RX ORDER — LIDOCAINE HYDROCHLORIDE 20 MG/ML
INJECTION, SOLUTION INFILTRATION; PERINEURAL AS NEEDED
Status: DISCONTINUED | OUTPATIENT
Start: 2018-08-21 | End: 2018-08-21 | Stop reason: SURG

## 2018-08-21 RX ORDER — SODIUM CHLORIDE 9 MG/ML
INJECTION, SOLUTION INTRAVENOUS CONTINUOUS PRN
Status: DISCONTINUED | OUTPATIENT
Start: 2018-08-21 | End: 2018-08-21 | Stop reason: SURG

## 2018-08-21 RX ADMIN — SODIUM CHLORIDE: 9 INJECTION, SOLUTION INTRAVENOUS at 09:46

## 2018-08-21 RX ADMIN — PROPOFOL 60 MG: 10 INJECTION, EMULSION INTRAVENOUS at 09:54

## 2018-08-21 RX ADMIN — LIDOCAINE HYDROCHLORIDE 120 MG: 20 INJECTION, SOLUTION INFILTRATION; PERINEURAL at 09:54

## 2018-08-21 NOTE — INTERVAL H&P NOTE
H&P updated. The patient was examined and the following changes are noted:  Her previous cardioversion lasted a week and now pacemaker interrogation reveals recurrent afib /flutter

## 2018-08-21 NOTE — ANESTHESIA PREPROCEDURE EVALUATION
Anesthesia Evaluation     Patient summary reviewed   no history of anesthetic complications:  NPO Solid Status: > 8 hours  NPO Liquid Status: > 8 hours           Airway   Mallampati: II  TM distance: >3 FB  Neck ROM: full  Dental          Pulmonary    (-) asthma, recent URI, sleep apnea, not a smoker  Cardiovascular   Exercise tolerance: good (4-7 METS)    ECG reviewed  Patient on routine beta blocker and Beta blocker given within 24 hours of surgery  Rhythm: irregular    (+) pacemaker (Guidant Essentio, placed for bradycardia) pacemaker interrogated 3-6 months ago, hypertension well controlled, valvular problems/murmurs (TAVR November 10, 2017, no issues since) AS, CAD, CABG (1999,4V), cardiac stents (10/2017) dysrhythmias Atrial Fib, CHF (chronic diastolic), hyperlipidemia,   (-) past MI, angina    ROS comment: TTE 6/7/17 - ·Left ventricular wall thickness is consistent with mild concentric hypertrophy.  ·Left ventricular systolic function is normal. Estimated EF = 65%.  ·Left ventricular diastolic dysfunction (grade II) consistent with pseudonormalization.  ·Right ventricular cavity is mildly dilated.  ·Left atrial cavity size is mild-to-moderately dilated.  ·calcification of the aortic valve  ·Severe aortic valve stenosis is present.  ·Mild-to-moderate mitral valve regurgitation is present    Neuro/Psych  (-) seizures, TIA, CVA  GI/Hepatic/Renal/Endo    (+)  GERD well controlled,  diabetes mellitus,   (-) liver disease, no renal disease, hypothyroidism, hyperthyroidism    Musculoskeletal     Abdominal    Substance History      OB/GYN          Other                          Anesthesia Plan    ASA 3     general   total IV anesthesia  intravenous induction   Anesthetic plan and risks discussed with patient.

## 2018-08-21 NOTE — H&P (VIEW-ONLY)
Dual Chamber Pacemaker Evaluation Report  REMOTE/LATITUDE    August 13, 2018    Primary Cardiologist: Obi  : Guidant Model: [employer]ent[employer] MRI EL L131  Implant date: 2/3/2017    Reason for evaluation: remote transmission post-cardioversion for AF  Indication for pacemaker: sick sinus syndrome    Measurements  Atrial sensing - P wave: 6.1 mV  Atrial threshold: N/R  Atrial lead impedance: 812 ohms  Ventricular sensing - R wave: 20.3 mV  Ventricular threshold: 0.6 V @ 0.4 ms  Ventricular lead impedance:   1036 ohms     Diagnostic Data  Atrial paced: 3 %  Ventricular paced: 3 %      Episodes:  Cardioversion performed on 8/2/2018.  Patient was out of AF until 8/8/2018 when AF episodes recurred.  AF burden has been 100% since 8/8/2018; ventricular rates controlled.  Anticoagulated with coumadin.      Battery status: satisfactory, estimated 10.5 years remaining      Final Parameters  Mode:  DDDR  Lower rate: 60 bpm   Upper rate: 130 bpm  AV Delay: paced- 220-300 ms  Ujnwsh-964-063 ms  Atrial - Amplitude: 2 V   Pulse width: 0.4 ms   Sensitivity: 0.25 mV     Ventricular - Amplitude: 2 V  Pulse width: 0.4 ms  Sensitivity: 0.6 mV    Changes made: N/A--remote transmission  Conclusions: normal pacemaker function and adequate battery reserve    Follow up: Every 6 months

## 2018-08-21 NOTE — ANESTHESIA POSTPROCEDURE EVALUATION
Patient: Lakesha Costello    Procedure Summary     Date:  08/21/18 Room / Location:  [facility] CLOSE OBSERVATION UNIT    Anesthesia Start:  0946 Anesthesia Stop:  0957    Procedure:  CARDIOVERSION EXTERNAL IN CARDIOLOGY DEPARTMENT Diagnosis:       Atrial flutter, unspecified type (CMS/HCC)      (Aflutter)    Scheduled Providers:  Ehsan Crocker MD Provider:  Nghia Pena CRNA    Anesthesia Type:  general ASA Status:  3          Anesthesia Type: general  Last vitals  BP       Temp       Pulse       Resp         SpO2         Post Anesthesia Care and Evaluation    Patient location: cou.  Patient participation: complete - patient participated  Level of consciousness: awake and alert  Pain management: adequate  Airway patency: patent  Anesthetic complications: No anesthetic complications    Cardiovascular status: acceptable  Respiratory status: acceptable  Hydration status: acceptable    Comments: There were no vitals taken for this visit.    Pt discharged from PACU based on low score >8

## 2018-08-23 RX ORDER — AMIODARONE HYDROCHLORIDE 200 MG/1
200 TABLET ORAL 2 TIMES DAILY
Qty: 60 TABLET | Refills: 2 | Status: SHIPPED | OUTPATIENT
Start: 2018-08-23 | End: 2018-11-05

## 2018-08-27 ENCOUNTER — ANTICOAGULATION VISIT (OUTPATIENT)
Dept: CARDIOLOGY | Facility: CLINIC | Age: 83
End: 2018-08-27

## 2018-08-27 LAB — INR PPP: 2.8

## 2018-08-31 ENCOUNTER — CLINICAL SUPPORT NO REQUIREMENTS (OUTPATIENT)
Dept: CARDIOLOGY | Facility: CLINIC | Age: 83
End: 2018-08-31

## 2018-08-31 DIAGNOSIS — Z95.0 ARTIFICIAL PACEMAKER: Primary | ICD-10-CM

## 2018-08-31 DIAGNOSIS — I49.5 SSS (SICK SINUS SYNDROME) (HCC): ICD-10-CM

## 2018-08-31 PROCEDURE — 93280 PM DEVICE PROGR EVAL DUAL: CPT | Performed by: INTERNAL MEDICINE

## 2018-09-03 LAB — INR PPP: 4.5

## 2018-09-04 ENCOUNTER — ANTICOAGULATION VISIT (OUTPATIENT)
Dept: CARDIOLOGY | Facility: CLINIC | Age: 83
End: 2018-09-04

## 2018-09-07 ENCOUNTER — ANTICOAGULATION VISIT (OUTPATIENT)
Dept: CARDIOLOGY | Facility: CLINIC | Age: 83
End: 2018-09-07

## 2018-09-07 LAB — INR PPP: 3.1

## 2018-09-07 NOTE — PROGRESS NOTES
Dual Chamber Pacemaker Evaluation Report  IN OFFICE INTERROGATION    September 7, 2018     Interrogation Date:  8/31/2018    Primary Cardiologist: Obi  : Guidant Model: Essent[employer] MRI EL L131  Implant date: 2/3/2017    Reason for evaluation: patient reported symptoms, dyspnea, fatigue.  Indication for pacemaker: sick sinus syndrome    Measurements  Atrial sensing - P wave: 3.1 mV  Atrial threshold: 0.9V@ 0.4ms  Atrial lead impedance: 772 ohms  Ventricular sensing - R wave: PACED  Ventricular threshold: 0.8 V @ 0.4 ms  Ventricular lead impedance:   1067 ohms     Diagnostic Data  Atrial paced: 12 %  Ventricular paced: 17 %    Episodes recorded since 8/10/2018  No AT/AF noted since cardioversion on 8/21/2018.  No arrhythmias associated with current symptoms.    Battery status: satisfactory, estimated 13 years remaining      Final Parameters  Mode:  DDDR  Lower rate: 60 bpm   Upper rate: 130 bpm  AV Delay: paced- 220-300 ms  Ljwqcw-980-438 ms  Atrial - Amplitude: 1.8 V   Pulse width: 0.4 ms   Sensitivity: 0.25 mV     Ventricular - Amplitude: 2 V  Pulse width: 0.4 ms  Sensitivity: 0.6 mV    Changes made: Normal jody right atrial output changed to 1.8V based on threshold testing.  Conclusions: normal pacemaker function, stable pacing and sensing thresholds and adequate battery reserve    Follow up: Every 3 months via latitude, annually in office

## 2018-09-10 ENCOUNTER — ANTICOAGULATION VISIT (OUTPATIENT)
Dept: CARDIOLOGY | Facility: CLINIC | Age: 83
End: 2018-09-10

## 2018-09-11 ENCOUNTER — ANTICOAGULATION VISIT (OUTPATIENT)
Dept: CARDIOLOGY | Facility: CLINIC | Age: 83
End: 2018-09-11

## 2018-09-11 LAB — INR PPP: 4.4

## 2018-09-12 LAB — INR PPP: 3.1

## 2018-09-13 ENCOUNTER — ANTICOAGULATION VISIT (OUTPATIENT)
Dept: CARDIOLOGY | Facility: CLINIC | Age: 83
End: 2018-09-13

## 2018-09-17 ENCOUNTER — ANTICOAGULATION VISIT (OUTPATIENT)
Dept: CARDIOLOGY | Facility: CLINIC | Age: 83
End: 2018-09-17

## 2018-09-17 LAB — INR PPP: 1.9

## 2018-09-18 ENCOUNTER — APPOINTMENT (OUTPATIENT)
Dept: CT IMAGING | Facility: HOSPITAL | Age: 83
End: 2018-09-18

## 2018-09-18 ENCOUNTER — APPOINTMENT (OUTPATIENT)
Dept: GENERAL RADIOLOGY | Facility: HOSPITAL | Age: 83
End: 2018-09-18

## 2018-09-18 ENCOUNTER — HOSPITAL ENCOUNTER (OUTPATIENT)
Facility: HOSPITAL | Age: 83
Setting detail: OBSERVATION
Discharge: HOME HEALTH | End: 2018-09-20
Attending: EMERGENCY MEDICINE | Admitting: EMERGENCY MEDICINE

## 2018-09-18 DIAGNOSIS — E83.10 DISORDER OF IRON METABOLISM: ICD-10-CM

## 2018-09-18 DIAGNOSIS — I48.0 PAROXYSMAL ATRIAL FIBRILLATION (HCC): ICD-10-CM

## 2018-09-18 DIAGNOSIS — Z74.09 IMPAIRED MOBILITY: ICD-10-CM

## 2018-09-18 DIAGNOSIS — S32.110A CLOSED NONDISPLACED ZONE I FRACTURE OF SACRUM, INITIAL ENCOUNTER (HCC): ICD-10-CM

## 2018-09-18 DIAGNOSIS — S32.10XA CLOSED FRACTURE OF SACRUM AND COCCYX, INITIAL ENCOUNTER (HCC): ICD-10-CM

## 2018-09-18 DIAGNOSIS — N39.0 ACUTE UTI (URINARY TRACT INFECTION): Primary | ICD-10-CM

## 2018-09-18 DIAGNOSIS — Z78.9 DECREASED ACTIVITIES OF DAILY LIVING (ADL): ICD-10-CM

## 2018-09-18 DIAGNOSIS — N28.9 RENAL INSUFFICIENCY: ICD-10-CM

## 2018-09-18 DIAGNOSIS — E61.1 IRON DEFICIENCY: ICD-10-CM

## 2018-09-18 DIAGNOSIS — S32.2XXA CLOSED FRACTURE OF SACRUM AND COCCYX, INITIAL ENCOUNTER (HCC): ICD-10-CM

## 2018-09-18 LAB
ALBUMIN SERPL-MCNC: 4.1 G/DL (ref 3.5–5)
ALBUMIN/GLOB SERPL: 1.4 G/DL (ref 1.1–2.5)
ALP SERPL-CCNC: 46 U/L (ref 24–120)
ALT SERPL W P-5'-P-CCNC: 28 U/L (ref 0–54)
ANION GAP SERPL CALCULATED.3IONS-SCNC: 10 MMOL/L (ref 4–13)
APTT PPP: 47.8 SECONDS (ref 24.1–34.8)
AST SERPL-CCNC: 52 U/L (ref 7–45)
BACTERIA UR QL AUTO: ABNORMAL /HPF
BASOPHILS # BLD AUTO: 0.07 10*3/MM3 (ref 0–0.2)
BASOPHILS NFR BLD AUTO: 0.6 % (ref 0–2)
BILIRUB SERPL-MCNC: 0.4 MG/DL (ref 0.1–1)
BILIRUB UR QL STRIP: NEGATIVE
BUN BLD-MCNC: 42 MG/DL (ref 5–21)
BUN/CREAT SERPL: 24.9 (ref 7–25)
CALCIUM SPEC-SCNC: 9.3 MG/DL (ref 8.4–10.4)
CHLORIDE SERPL-SCNC: 100 MMOL/L (ref 98–110)
CLARITY UR: ABNORMAL
CO2 SERPL-SCNC: 27 MMOL/L (ref 24–31)
COLOR UR: YELLOW
CREAT BLD-MCNC: 1.69 MG/DL (ref 0.5–1.4)
DEPRECATED RDW RBC AUTO: 53.3 FL (ref 40–54)
EOSINOPHIL # BLD AUTO: 0.14 10*3/MM3 (ref 0–0.7)
EOSINOPHIL NFR BLD AUTO: 1.2 % (ref 0–4)
ERYTHROCYTE [DISTWIDTH] IN BLOOD BY AUTOMATED COUNT: 16.3 % (ref 12–15)
GFR SERPL CREATININE-BSD FRML MDRD: 29 ML/MIN/1.73
GLOBULIN UR ELPH-MCNC: 3 GM/DL
GLUCOSE BLD-MCNC: 313 MG/DL (ref 70–100)
GLUCOSE BLDC GLUCOMTR-MCNC: 123 MG/DL (ref 70–130)
GLUCOSE BLDC GLUCOMTR-MCNC: 246 MG/DL (ref 70–130)
GLUCOSE UR STRIP-MCNC: ABNORMAL MG/DL
HCT VFR BLD AUTO: 35.3 % (ref 37–47)
HGB BLD-MCNC: 11.4 G/DL (ref 12–16)
HGB UR QL STRIP.AUTO: NEGATIVE
HYALINE CASTS UR QL AUTO: ABNORMAL /LPF
INR PPP: 1.69 (ref 0.91–1.09)
INR PPP: 1.73 (ref 0.91–1.09)
KETONES UR QL STRIP: NEGATIVE
LEUKOCYTE ESTERASE UR QL STRIP.AUTO: ABNORMAL
LYMPHOCYTES # BLD AUTO: 0.87 10*3/MM3 (ref 0.72–4.86)
LYMPHOCYTES NFR BLD AUTO: 7.5 % (ref 15–45)
MCH RBC QN AUTO: 28.7 PG (ref 28–32)
MCHC RBC AUTO-ENTMCNC: 32.3 G/DL (ref 33–36)
MCV RBC AUTO: 88.9 FL (ref 82–98)
MONOCYTES # BLD AUTO: 0.91 10*3/MM3 (ref 0.19–1.3)
MONOCYTES NFR BLD AUTO: 7.8 % (ref 4–12)
NEUTROPHILS # BLD AUTO: 9.5 10*3/MM3 (ref 1.87–8.4)
NEUTROPHILS NFR BLD AUTO: 81.8 % (ref 39–78)
NITRITE UR QL STRIP: NEGATIVE
PH UR STRIP.AUTO: 5.5 [PH] (ref 5–8)
PLATELET # BLD AUTO: 1179 10*3/MM3 (ref 130–400)
PLATELET # BLD AUTO: 1271 10*3/MM3 (ref 130–400)
PMV BLD AUTO: 11.4 FL (ref 6–12)
POTASSIUM BLD-SCNC: 5.3 MMOL/L (ref 3.5–5.3)
PROT SERPL-MCNC: 7.1 G/DL (ref 6.3–8.7)
PROT UR QL STRIP: ABNORMAL
PROTHROMBIN TIME: 20.5 SECONDS (ref 11.9–14.6)
PROTHROMBIN TIME: 20.9 SECONDS (ref 11.9–14.6)
RBC # BLD AUTO: 3.97 10*6/MM3 (ref 4.2–5.4)
RBC # UR: ABNORMAL /HPF
REF LAB TEST METHOD: ABNORMAL
SODIUM BLD-SCNC: 137 MMOL/L (ref 135–145)
SP GR UR STRIP: 1.02 (ref 1–1.03)
SQUAMOUS #/AREA URNS HPF: ABNORMAL /HPF
UROBILINOGEN UR QL STRIP: ABNORMAL
WBC NRBC COR # BLD: 11.62 10*3/MM3 (ref 4.8–10.8)
WBC UR QL AUTO: ABNORMAL /HPF

## 2018-09-18 PROCEDURE — 82962 GLUCOSE BLOOD TEST: CPT

## 2018-09-18 PROCEDURE — 25010000002 CEFTRIAXONE PER 250 MG: Performed by: EMERGENCY MEDICINE

## 2018-09-18 PROCEDURE — 85730 THROMBOPLASTIN TIME PARTIAL: CPT | Performed by: EMERGENCY MEDICINE

## 2018-09-18 PROCEDURE — 96375 TX/PRO/DX INJ NEW DRUG ADDON: CPT

## 2018-09-18 PROCEDURE — 94799 UNLISTED PULMONARY SVC/PX: CPT

## 2018-09-18 PROCEDURE — 99284 EMERGENCY DEPT VISIT MOD MDM: CPT

## 2018-09-18 PROCEDURE — G0378 HOSPITAL OBSERVATION PER HR: HCPCS

## 2018-09-18 PROCEDURE — 73700 CT LOWER EXTREMITY W/O DYE: CPT

## 2018-09-18 PROCEDURE — 63710000001 INSULIN DETEMIR PER 5 UNITS: Performed by: NURSE PRACTITIONER

## 2018-09-18 PROCEDURE — 85610 PROTHROMBIN TIME: CPT | Performed by: NURSE PRACTITIONER

## 2018-09-18 PROCEDURE — 63710000001 INSULIN LISPRO (HUMAN) PER 5 UNITS: Performed by: NURSE PRACTITIONER

## 2018-09-18 PROCEDURE — 85025 COMPLETE CBC W/AUTO DIFF WBC: CPT | Performed by: EMERGENCY MEDICINE

## 2018-09-18 PROCEDURE — 85610 PROTHROMBIN TIME: CPT | Performed by: EMERGENCY MEDICINE

## 2018-09-18 PROCEDURE — 80053 COMPREHEN METABOLIC PANEL: CPT | Performed by: EMERGENCY MEDICINE

## 2018-09-18 PROCEDURE — 72192 CT PELVIS W/O DYE: CPT

## 2018-09-18 PROCEDURE — 73552 X-RAY EXAM OF FEMUR 2/>: CPT

## 2018-09-18 PROCEDURE — 94760 N-INVAS EAR/PLS OXIMETRY 1: CPT

## 2018-09-18 PROCEDURE — 74176 CT ABD & PELVIS W/O CONTRAST: CPT

## 2018-09-18 PROCEDURE — 85049 AUTOMATED PLATELET COUNT: CPT | Performed by: NURSE PRACTITIONER

## 2018-09-18 PROCEDURE — 81001 URINALYSIS AUTO W/SCOPE: CPT | Performed by: EMERGENCY MEDICINE

## 2018-09-18 RX ORDER — AMIODARONE HYDROCHLORIDE 200 MG/1
200 TABLET ORAL 2 TIMES DAILY
Status: DISCONTINUED | OUTPATIENT
Start: 2018-09-18 | End: 2018-09-20 | Stop reason: HOSPADM

## 2018-09-18 RX ORDER — SPIRONOLACTONE 25 MG/1
25 TABLET ORAL DAILY
Status: DISCONTINUED | OUTPATIENT
Start: 2018-09-18 | End: 2018-09-19

## 2018-09-18 RX ORDER — DOCUSATE SODIUM 100 MG/1
100 CAPSULE, LIQUID FILLED ORAL 2 TIMES DAILY
Status: DISCONTINUED | OUTPATIENT
Start: 2018-09-18 | End: 2018-09-20 | Stop reason: HOSPADM

## 2018-09-18 RX ORDER — ASPIRIN 81 MG/1
81 TABLET ORAL DAILY
Status: DISCONTINUED | OUTPATIENT
Start: 2018-09-19 | End: 2018-09-20 | Stop reason: HOSPADM

## 2018-09-18 RX ORDER — ONDANSETRON 2 MG/ML
4 INJECTION INTRAMUSCULAR; INTRAVENOUS EVERY 6 HOURS PRN
Status: DISCONTINUED | OUTPATIENT
Start: 2018-09-18 | End: 2018-09-20 | Stop reason: HOSPADM

## 2018-09-18 RX ORDER — POLYVINYL ALCOHOL 14 MG/ML
1 SOLUTION/ DROPS OPHTHALMIC DAILY
Status: DISCONTINUED | OUTPATIENT
Start: 2018-09-19 | End: 2018-09-20 | Stop reason: HOSPADM

## 2018-09-18 RX ORDER — PANTOPRAZOLE SODIUM 40 MG/1
40 TABLET, DELAYED RELEASE ORAL
Status: DISCONTINUED | OUTPATIENT
Start: 2018-09-19 | End: 2018-09-20 | Stop reason: HOSPADM

## 2018-09-18 RX ORDER — SODIUM CHLORIDE 0.9 % (FLUSH) 0.9 %
1-10 SYRINGE (ML) INJECTION AS NEEDED
Status: DISCONTINUED | OUTPATIENT
Start: 2018-09-18 | End: 2018-09-20 | Stop reason: HOSPADM

## 2018-09-18 RX ORDER — ONDANSETRON 4 MG/1
4 TABLET, ORALLY DISINTEGRATING ORAL EVERY 6 HOURS PRN
Status: DISCONTINUED | OUTPATIENT
Start: 2018-09-18 | End: 2018-09-20 | Stop reason: HOSPADM

## 2018-09-18 RX ORDER — ATENOLOL 50 MG/1
50 TABLET ORAL DAILY
Status: DISCONTINUED | OUTPATIENT
Start: 2018-09-18 | End: 2018-09-20 | Stop reason: HOSPADM

## 2018-09-18 RX ORDER — ACETAMINOPHEN 500 MG
1000 TABLET ORAL ONCE
Status: COMPLETED | OUTPATIENT
Start: 2018-09-18 | End: 2018-09-18

## 2018-09-18 RX ORDER — ACETAMINOPHEN 500 MG
1000 TABLET ORAL EVERY 6 HOURS PRN
Status: DISCONTINUED | OUTPATIENT
Start: 2018-09-18 | End: 2018-09-20 | Stop reason: HOSPADM

## 2018-09-18 RX ORDER — ATORVASTATIN CALCIUM 10 MG/1
10 TABLET, FILM COATED ORAL NIGHTLY
Status: DISCONTINUED | OUTPATIENT
Start: 2018-09-18 | End: 2018-09-20 | Stop reason: HOSPADM

## 2018-09-18 RX ORDER — NITROGLYCERIN 0.4 MG/1
0.4 TABLET SUBLINGUAL
Status: DISCONTINUED | OUTPATIENT
Start: 2018-09-18 | End: 2018-09-20 | Stop reason: HOSPADM

## 2018-09-18 RX ORDER — SODIUM CHLORIDE 9 MG/ML
50 INJECTION, SOLUTION INTRAVENOUS CONTINUOUS
Status: DISCONTINUED | OUTPATIENT
Start: 2018-09-18 | End: 2018-09-19

## 2018-09-18 RX ORDER — NICOTINE POLACRILEX 4 MG
15 LOZENGE BUCCAL
Status: DISCONTINUED | OUTPATIENT
Start: 2018-09-18 | End: 2018-09-20 | Stop reason: HOSPADM

## 2018-09-18 RX ORDER — WARFARIN SODIUM 1 MG/1
1 TABLET ORAL
Status: DISCONTINUED | OUTPATIENT
Start: 2018-09-18 | End: 2018-09-20 | Stop reason: HOSPADM

## 2018-09-18 RX ORDER — ONDANSETRON 4 MG/1
4 TABLET, FILM COATED ORAL EVERY 6 HOURS PRN
Status: DISCONTINUED | OUTPATIENT
Start: 2018-09-18 | End: 2018-09-20 | Stop reason: HOSPADM

## 2018-09-18 RX ORDER — DEXTROSE MONOHYDRATE 25 G/50ML
25 INJECTION, SOLUTION INTRAVENOUS
Status: DISCONTINUED | OUTPATIENT
Start: 2018-09-18 | End: 2018-09-20 | Stop reason: HOSPADM

## 2018-09-18 RX ORDER — GLIPIZIDE 5 MG/1
5 TABLET ORAL
Status: DISCONTINUED | OUTPATIENT
Start: 2018-09-18 | End: 2018-09-20 | Stop reason: HOSPADM

## 2018-09-18 RX ORDER — VENLAFAXINE HYDROCHLORIDE 37.5 MG/1
37.5 CAPSULE, EXTENDED RELEASE ORAL DAILY
Status: DISCONTINUED | OUTPATIENT
Start: 2018-09-18 | End: 2018-09-20 | Stop reason: HOSPADM

## 2018-09-18 RX ADMIN — INSULIN DETEMIR 30 UNITS: 100 INJECTION, SOLUTION SUBCUTANEOUS at 20:46

## 2018-09-18 RX ADMIN — CEFTRIAXONE SODIUM 1 G: 1 INJECTION, POWDER, FOR SOLUTION INTRAMUSCULAR; INTRAVENOUS at 14:17

## 2018-09-18 RX ADMIN — ATENOLOL 50 MG: 50 TABLET ORAL at 17:10

## 2018-09-18 RX ADMIN — VENLAFAXINE HYDROCHLORIDE 37.5 MG: 37.5 CAPSULE, EXTENDED RELEASE ORAL at 17:11

## 2018-09-18 RX ADMIN — POLYETHYLENE GLYCOL 3350 17 G: 17 POWDER, FOR SOLUTION ORAL at 17:22

## 2018-09-18 RX ADMIN — SPIRONOLACTONE 25 MG: 25 TABLET ORAL at 17:10

## 2018-09-18 RX ADMIN — GLIPIZIDE 5 MG: 5 TABLET ORAL at 17:10

## 2018-09-18 RX ADMIN — SODIUM CHLORIDE 75 ML/HR: 9 INJECTION, SOLUTION INTRAVENOUS at 17:10

## 2018-09-18 RX ADMIN — AMIODARONE HYDROCHLORIDE 200 MG: 200 TABLET ORAL at 22:41

## 2018-09-18 RX ADMIN — INSULIN LISPRO 3 UNITS: 100 INJECTION, SOLUTION INTRAVENOUS; SUBCUTANEOUS at 20:47

## 2018-09-18 RX ADMIN — ATORVASTATIN CALCIUM 10 MG: 10 TABLET, FILM COATED ORAL at 20:23

## 2018-09-18 RX ADMIN — WARFARIN SODIUM 1 MG: 1 TABLET ORAL at 17:11

## 2018-09-18 RX ADMIN — DOCUSATE SODIUM 100 MG: 100 CAPSULE ORAL at 21:00

## 2018-09-18 RX ADMIN — ACETAMINOPHEN 1000 MG: 500 TABLET, FILM COATED ORAL at 15:11

## 2018-09-18 NOTE — CONSULTS
MD Fabian Florze PA-C, [facility]         Orthopaedic Surgery Consult Note      9/18/2018   5:41 PM    Name:  Lakesha Costello  MRN:    [id number]     Acct:     [id number]   Room:  [address] [place] [address] Day: 0     Admit Date: 9/18/2018  PCP: Leonardo Zepeda DO        Subjective:     HPI: 83 y.o. female presented to the ED via family with chief complaint of sacral pain after suffering a fall 3-4 days ago. She states that she was at her cardiologist where they x-rayed her hips and found no fracture. Her pain was worsening and she began to experience left thigh pain and weakness. She denies any bowel or bladder difficulty. This prompted her daughter to bring her to the ED where she was seen to have a non displaced fracture of the 3rd sacral body. She was admitted for pain control and physical therapy and possible placement.        Medications:     Allergies:   Allergies   Allergen Reactions   • Dilaudid [Hydromorphone Hcl]    • Dilaudid [Hydromorphone] Nausea And Vomiting   • Enalapril Angioedema   • Janumet [Sitagliptin-Metformin Hcl] Other (See Comments)     Chest pain   • Lopid [Gemfibrozil] Nausea And Vomiting   • Sulfa Antibiotics Other (See Comments)     Syncope     • Ultram [Tramadol] Itching       Current Meds:   Current Facility-Administered Medications   Medication Dose Route Frequency Provider Last Rate Last Dose   • acetaminophen (TYLENOL) tablet 1,000 mg  1,000 mg Oral Q6H PRN Minal Johnson, APRN       • amiodarone (PACERONE) tablet 200 mg  200 mg Oral BID Minal Johnson, FACUNDO       • [START ON 9/19/2018] aspirin EC tablet 81 mg  81 mg Oral Daily Minal Johnson, APRN       • atenolol (TENORMIN) tablet 50 mg  50 mg Oral Daily Minal Johnson APRN   50 mg at 09/18/18 1710   • atorvastatin (LIPITOR) tablet 10 mg  10 mg Oral Nightly Minal Johnson, APRN       • [START ON 9/19/2018] cefTRIAXone (ROCEPHIN) 1 g/10mL IV PUSH syringe  1 g Intravenous Q24H Minal Johnson, APRN       •  dextrose (D50W) 25 g/ 50mL Intravenous Solution 25 g  25 g Intravenous Q15 Min PRN Minal Johnson, APRN       • dextrose (GLUTOSE) oral gel 15 g  15 g Oral Q15 Min PRN Minal Johnson APRN       • docusate sodium (COLACE) capsule 100 mg  100 mg Oral BID Minal Johnson, APRN       • glipiZIDE (GLUCOTROL) tablet 5 mg  5 mg Oral BID AC Minal Johnson APRN   5 mg at 09/18/18 1710   • glucagon (human recombinant) (GLUCAGEN DIAGNOSTIC) injection 1 mg  1 mg Subcutaneous PRN Minal Johnson, APRN       • insulin detemir (LEVEMIR) injection 30 Units  30 Units Subcutaneous BID Minal Johnson APRN       • insulin lispro (humaLOG) injection 2-7 Units  2-7 Units Subcutaneous 4x Daily With Meals & Nightly Minal Johnson, FACUNDO       • [START ON 9/19/2018] magnesium oxide (MAGOX) tablet 400 mg  400 mg Oral Daily Minal Johnson, FACUNDO       • nitroglycerin (NITROSTAT) SL tablet 0.4 mg  0.4 mg Sublingual Q5 Min PRN Minal Johnson, APRN       • ondansetron (ZOFRAN) tablet 4 mg  4 mg Oral Q6H PRN Minal Johnson APRN        Or   • ondansetron ODT (ZOFRAN-ODT) disintegrating tablet 4 mg  4 mg Oral Q6H PRN Minal Johnson, FACUNDO        Or   • ondansetron (ZOFRAN) injection 4 mg  4 mg Intravenous Q6H PRN Minal Johnson, FACUNDO       • [START ON 9/19/2018] pantoprazole (PROTONIX) EC tablet 40 mg  40 mg Oral Q AM Minal Johnson, APRN       • pneumococcal polysaccharide 23-valent (PNEUMOVAX-23) vaccine 0.5 mL  0.5 mL Intramuscular During Hospitalization Myron Huynh MD       • polyethylene glycol 3350 powder (packet)  17 g Oral Daily Minal Johnson, APRN   17 g at 09/18/18 1722   • [START ON 9/19/2018] polyvinyl alcohol (LIQUIFILM) 1.4 % ophthalmic solution 1 drop  1 drop Right Eye Daily Minal Johnson, APRN       • sodium chloride 0.9 % flush 1-10 mL  1-10 mL Intravenous PRN Minal Johnson APRN       • sodium chloride 0.9 % infusion  75 mL/hr Intravenous Continuous Minal Johnson,  APRN 75 mL/hr at 09/18/18 1710 75 mL/hr at 09/18/18 1710   • spironolactone (ALDACTONE) tablet 25 mg  25 mg Oral Daily Minal Johnson APRN   25 mg at 09/18/18 1710   • venlafaxine XR (EFFEXOR-XR) 24 hr capsule 37.5 mg  37.5 mg Oral Daily Minal Johnson APRN   37.5 mg at 09/18/18 1711   • warfarin (COUMADIN) tablet 1 mg  1 mg Oral Daily Minal Johnson APRN   1 mg at 09/18/18 1711           Data:     Code Status:    Code Status and Medical Interventions:   Ordered at: 09/18/18 1550     Level Of Support Discussed With:    Patient     Code Status:    CPR     Medical Interventions (Level of Support Prior to Arrest):    Full       Family History   Problem Relation Age of Onset   • Breast cancer Mother    • Coronary artery disease Father    • Heart attack Father    • Diabetes Father    • Cancer Brother    • No Known Problems Maternal Grandmother    • No Known Problems Maternal Grandfather    • No Known Problems Paternal Grandmother    • No Known Problems Paternal Grandfather        Social History     Social History   • Marital status:      Spouse name: N/A   • Number of children: N/A   • Years of education: N/A     Occupational History   • Not on file.     Social History Main Topics   • Smoking status: Never Smoker   • Smokeless tobacco: Never Used   • Alcohol use No   • Drug use: No   • Sexual activity: No     Other Topics Concern   • Not on file     Social History Narrative   • No narrative on file       Review of Symptoms:  CONSTITUTIONAL:  negative for  fevers, chills, sweats, fatigue, malaise, anorexia and weight loss  EYES:  negative for  double vision, blurred vision and visual disturbance  HEENT:  negative for  hearing loss, tinnitus, ear drainage, earaches, nasal congestion, epistaxis, snoring, sore mouth, sore throat, hoarseness and voice change  RESPIRATORY:  negative for  dry cough, cough with sputum, dyspnea, wheezing, hemoptysis, chest pain, pleuritic pain and cyanosis  CARDIOVASCULAR:   "negative for  chest pain, palpitations, orthopnea, exertional chest pressure/discomfort, edema  GASTROINTESTINAL:  negative for nausea, vomiting, change in bowel habits, diarrhea, constipation, abdominal pain, jaundice, dysphagia, regurgitation, hematemesis and hemtochezia  GENITOURINARY:  negative for frequency, dysuria, nocturia, hesitancy and hematuria  INTEGUMENT/BREAST:  negative for rash, skin lesion(s), dryness, skin color change, pruritus, changes in hair and changes in nails  HEMATOLOGIC/LYMPHATIC:  negative for easy bruising, bleeding, lymphadenopathy, petechiae and swelling/edema  ALLERGIC/IMMUNOLOGIC:  negative for recurrent infections and urticaria  ENDOCRINE:  negative for heat intolerance, cold intolerance, tremor, weight changes, hair loss and diabetic symptoms including neither polyuria nor polydipsia  MUSCULOSKELETAL:  See HPI  NEUROLOGICAL:  negative for headaches, dizziness, seizures, memory problems, speech problems, visual disturbance, coordination problems, gait problems, tremor, syncope and near syncope  BEHAVIOR/PSYCH:  negative for depressed mood, elated mood, increased agitation and anxiety      Vitals:  /47 (BP Location: Left arm, Patient Position: Sitting)   Pulse 59   Temp 98.2 °F (36.8 °C) (Oral)   Resp 18   Ht 154.9 cm (61\")   Wt 55.3 kg (122 lb)   SpO2 94%   BMI 23.05 kg/m²   Temp (24hrs), Av.9 °F (36.6 °C), Min:97.6 °F (36.4 °C), Max:98.2 °F (36.8 °C)      I/O (24Hr):  No intake or output data in the 24 hours ending 18 8071    Labs:  Lab Results (last 72 hours)     Procedure Component Value Units Date/Time    Protime-INR [[id number]]  (Abnormal) Collected:  18 1627    Specimen:  Blood Updated:  18 1649     Protime 20.9 (H) Seconds      INR 1.73 (H)    Platelet Count [[id number]]  (Abnormal) Collected:  18 1645    Specimen:  Blood Updated:  18 164     Platelets 1,271 (H) 10*3/mm3     Comprehensive Metabolic Panel [[id number]]  (Abnormal) " Collected:  09/18/18 1106    Specimen:  Blood Updated:  09/18/18 1157     Glucose 313 (H) mg/dL      BUN 42 (H) mg/dL      Creatinine 1.69 (H) mg/dL      Sodium 137 mmol/L      Potassium 5.3 mmol/L      Chloride 100 mmol/L      CO2 27.0 mmol/L      Calcium 9.3 mg/dL      Total Protein 7.1 g/dL      Albumin 4.10 g/dL      ALT (SGPT) 28 U/L      AST (SGOT) 52 (H) U/L      Alkaline Phosphatase 46 U/L      Total Bilirubin 0.4 mg/dL      eGFR Non African Amer 29 (L) mL/min/1.73      Globulin 3.0 gm/dL      A/G Ratio 1.4 g/dL      BUN/Creatinine Ratio 24.9     Anion Gap 10.0 mmol/L     Narrative:       The MDRD GFR formula is only valid for adults with stable renal function between ages 18 and 70.    Urinalysis With Microscopic If Indicated (No Culture) - Urine, Catheter [[id number]]  (Abnormal) Collected:  09/18/18 1140    Specimen:  Urine from Urine, Catheter Updated:  09/18/18 1154     Color, UA Yellow     Appearance, UA Cloudy (A)     pH, UA 5.5     Specific Gravity, UA 1.022     Glucose,  mg/dL (2+) (A)     Ketones, UA Negative     Bilirubin, UA Negative     Blood, UA Negative     Protein, UA Trace (A)     Leuk Esterase, UA Large (3+) (A)     Nitrite, UA Negative     Urobilinogen, UA 0.2 E.U./dL    Urinalysis, Microscopic Only - Urine, Clean Catch [[id number]]  (Abnormal) Collected:  09/18/18 1140    Specimen:  Urine from Urine, Catheter Updated:  09/18/18 1154     RBC, UA 0-2 (A) /HPF      WBC, UA Too Numerous to Count (A) /HPF      Bacteria, UA 3+ (A) /HPF      Squamous Epithelial Cells, UA None Seen /HPF      Hyaline Casts, UA 0-2 /LPF      Methodology Automated Microscopy    CBC & Differential [[id number]] Collected:  09/18/18 1106    Specimen:  Blood Updated:  09/18/18 1128    Narrative:       The following orders were created for panel order CBC & Differential.  Procedure                               Abnormality         Status                     ---------                               -----------          ------                     Manual Differential[[id number]]                                                         CBC Auto Differential[[id number]]        Abnormal            Final result                 Please view results for these tests on the individual orders.    CBC Auto Differential [[id number]]  (Abnormal) Collected:  09/18/18 1106    Specimen:  Blood Updated:  09/18/18 1127     WBC 11.62 (H) 10*3/mm3      RBC 3.97 (L) 10*6/mm3      Hemoglobin 11.4 (L) g/dL      Hematocrit 35.3 (L) %      MCV 88.9 fL      MCH 28.7 pg      MCHC 32.3 (L) g/dL      RDW 16.3 (H) %      RDW-SD 53.3 fl      MPV 11.4 fL      Platelets 1,179 (H) 10*3/mm3      Neutrophil % 81.8 (H) %      Lymphocyte % 7.5 (L) %      Monocyte % 7.8 %      Eosinophil % 1.2 %      Basophil % 0.6 %      Neutrophils, Absolute 9.50 (H) 10*3/mm3      Lymphocytes, Absolute 0.87 10*3/mm3      Monocytes, Absolute 0.91 10*3/mm3      Eosinophils, Absolute 0.14 10*3/mm3      Basophils, Absolute 0.07 10*3/mm3     Protime-INR [[id number]]  (Abnormal) Collected:  09/18/18 1106    Specimen:  Blood Updated:  09/18/18 1125     Protime 20.5 (H) Seconds      INR 1.69 (H)    aPTT [[id number]]  (Abnormal) Collected:  09/18/18 1106    Specimen:  Blood Updated:  09/18/18 1125     PTT 47.8 (H) seconds           Imaging:  CT PELVIS WO CONTRAST-, CT LOWER EXTREMITY RIGHT WO CONTRAST- 9/18/2018  1:23 PM CDT     HISTORY: Pelvis trauma, fx known or suspected, xray insufficient       DOSE LENGTH PRODUCT: 135 mGy cm. Automated exposure control was also  utilized to decrease patient radiation dose.     Technique:   Axial CT of the pelvis without IV contrast. Sagittal and coronal  reformations are also provided for review.      Comparison: CT scan dated 3/23/2016.     Findings:      There appears to be a horizontally oriented fracture traversing the S3  vertebra. There is mild buckling of the anterior vertebral body cortex.  No obvious vertically oriented fracture components in the sacral  ala.  Normal alignment at the sacroiliac joints. Bony pelvis is otherwise  intact. Normal alignment at the hip joints. The joint spaces are fairly  well-maintained. No acute fracture identified at the hip joints.     Prominent stool in the rectum. Sigmoid diverticulosis. No pelvic  hematoma.     IMPRESSION:  Impression:    1. Horizontally oriented fracture through the S3 vertebra with mild  buckling in the anterior cortex. Normal alignment at the SI joints.  2. Normal alignment at the hip joints with no visualized hip fracture.  This report was finalized on 09/18/2018 13:59 by Dr Eagle Eric, .        Physical Exam:     General: Pleasant mood and appropriate affect. Well nourished.  HEENT: NC/AT, ANDIE, EOMI, Nares patent bilaterally with no epistaxis noted.  Neck: Soft throughout with no obvious masses.  Chest: +2 distal pulses throughout, no heaves or lifts seen.  Respiratory: Equal rise and fall bilaterally, no retractions or rhonchi or wheezes noted.  Abdomen: Obese and non tender.  Skin: Pink and dry with appropriate turgor.  Neuro: CN II-XII grossly intact, no focal deficits noted.  Pelvis: TTP posterior at level of proximal buttock crease. Globally NTTP about the remainder of pelvis. No pain with compression. Some TTP about left anterior thigh, no numbness noted. NVI distally bilateral LE.      Assessment:     Non displaced 3rd sacral body fracture without obvious neurologic compromise.  New onset left thigh pain with history of lumbar spine fracture and pain, no acute fracture seen.         Plan:     1. Ok for WBAT bilateral LE.  2. Padded seat for comfort, consider donut pillow.  3. PT for ambulation and transfer help and training.  4. Continue to observe left leg, if worsening, will consult ortho spine for eval.        Electronically signed by Fabian Guadalupe Jr., PA on 9/18/2018 at 5:41 PM

## 2018-09-18 NOTE — PLAN OF CARE
Problem: Fall Risk (Adult)  Goal: Identify Related Risk Factors and Signs and Symptoms  Outcome: Outcome(s) achieved Date Met: 09/18/18 09/18/18 1651   Fall Risk (Adult)   Related Risk Factors (Fall Risk) history of falls;fear of falling;environment unfamiliar   Signs and Symptoms (Fall Risk) presence of risk factors       Problem: Fracture Orthopaedic (Adult)  Goal: Signs and Symptoms of Listed Potential Problems Will be Absent, Minimized or Managed (Fracture Orthopaedic)  Outcome: Outcome(s) achieved Date Met: 09/18/18 09/18/18 1651   Goal/Outcome Evaluation   Problems Assessed (Orthopaedic Fracture) pain;situational response   Problems Present (Orthopaedic Fracture) pain;situational response

## 2018-09-18 NOTE — ED PROVIDER NOTES
Subjective   Fall 3-4 days ago still hurting xrays neg        Fall   Mechanism of injury: fall    Injury location:  Pelvis  Pelvic injury location:  R hip  Incident location:  Home  Arrived directly from scene: no    Fall:     Fall occurred:  Standing    Impact surface:  Hard floor    Entrapped after fall: no    Protective equipment: eye protection    Suspicion of alcohol use: no    Suspicion of drug use: no    Tetanus status:  Out of date  Prior to arrival data:     Bystander interventions:  None    Loss of consciousness: no      Amnesic to event: no      Airway condition since incident:  Stable    Breathing condition since incident:  Stable  Associated symptoms: no back pain and no neck pain    Hip Pain       Review of Systems   Constitutional: Negative.    HENT: Negative.    Eyes: Negative.    Respiratory: Negative.    Cardiovascular: Negative.    Gastrointestinal: Negative.    Musculoskeletal: Negative.  Negative for back pain and neck pain.   Skin: Negative.    Neurological: Negative.    All other systems reviewed and are negative.      Past Medical History:   Diagnosis Date   • Aortic stenosis    • Arrhythmia    • Atrial fibrillation (CMS/HCC)    • Atrial fibrillation by electrocardiogram (CMS/HCC)    • Atrial flutter (CMS/HCC)    • Breast cancer (CMS/[facility])    • CAD (coronary artery disease)    • Cataract    • Chronic anticoagulation     Coumadin    • CKD (chronic kidney disease) stage 3, GFR 30-59 ml/min 4/16/2018   • Diabetes mellitus (CMS/[facility])     Type II   • GERD (gastroesophageal reflux disease)    • Hospital discharge follow-up    • Hyperlipidemia    • Hypertension    • Macular degeneration    • Pacemaker    • Pancreatitis    • Rheumatic fever     during childhood       Allergies   Allergen Reactions   • Dilaudid [Hydromorphone Hcl]    • Dilaudid [Hydromorphone] Nausea And Vomiting   • Enalapril Angioedema   • Janumet [Sitagliptin-Metformin Hcl]    • Lopid [Gemfibrozil] Nausea And Vomiting   • Sulfa  Antibiotics Other (See Comments)     Syncope     • Ultram [Tramadol] Itching       Past Surgical History:   Procedure Laterality Date   • ANOMALOUS PULMONARY VENOUS RETURN REPAIR, TOTAL     • BACK SURGERY     • BELPHAROPTOSIS REPAIR     • CARDIAC CATHETERIZATION  1999, 2010   • CARDIAC CATHETERIZATION N/A 2/15/2017    Procedure: Left Heart Cath;  Surgeon: Ehsan Crocker MD;  Location:  [facility] CATH INVASIVE LOCATION;  Service:    • CARDIAC CATHETERIZATION N/A 2/15/2017    Procedure: Right Heart Cath;  Surgeon: Ehsan Crocker MD;  Location:  [facility] CATH INVASIVE LOCATION;  Service:    • CARDIAC CATHETERIZATION N/A 2/15/2017    Procedure: Coronary angiography;  Surgeon: Ehsan Crocker MD;  Location:  [facility] CATH INVASIVE [facility];  Service:    • CARDIAC CATHETERIZATION N/A 2/15/2017    Procedure: Left ventriculography;  Surgeon: Ehsan Crocker MD;  Location:  [facility] CATH INVASIVE LOCATION;  Service:    • CARDIAC CATHETERIZATION N/A 2/15/2017    Procedure: Intracardiac echocardiogram;  Surgeon: Ehsan Crocker MD;  Location:  [facility] CATH INVASIVE LOCATION;  Service:    • CARDIAC ELECTROPHYSIOLOGY PROCEDURE N/A 2/3/2017    Procedure: Pacemaker DC new;  Surgeon: Ehsan Crocker MD;  Location:  [facility] CATH INVASIVE LOCATION;  Service:    • CATARACT EXTRACTION     • CHOLECYSTECTOMY     • CORONARY ARTERY BYPASS GRAFT  1999    4 vessel    • CORONARY ARTERY BYPASS GRAFT     • CORONARY STENT PLACEMENT     • HYSTERECTOMY  1962   • INSERT / REPLACE / REMOVE PACEMAKER     • MASTECTOMY  2000   • OTHER SURGICAL HISTORY      TAVR 2017       Family History   Problem Relation Age of Onset   • Breast cancer Mother    • Coronary artery disease Father    • Heart attack Father    • Diabetes Father    • Cancer Brother    • No Known Problems Maternal Grandmother    • No Known Problems Maternal Grandfather    • No Known Problems Paternal Grandmother    • No Known Problems Paternal Grandfather        Social History     Social History   • Marital status:       Social History Main Topics   • Smoking status: Never Smoker   • Smokeless tobacco: Never Used   • Alcohol use No   • Drug use: No   • Sexual activity: No     Other Topics Concern   • Not on file           Objective   Physical Exam   Constitutional: She is oriented to person, place, and time. She appears well-developed and well-nourished.   HENT:   Head: Normocephalic and atraumatic.   Eyes: Pupils are equal, round, and reactive to light. Conjunctivae and EOM are normal.   Neck: Normal range of motion. Neck supple.   Cardiovascular: Normal rate, regular rhythm, normal heart sounds and intact distal pulses.    Pulmonary/Chest: Effort normal and breath sounds normal.   Abdominal: Soft. Bowel sounds are normal.   Musculoskeletal:        Right hip: She exhibits decreased range of motion and bony tenderness. She exhibits normal strength, no tenderness, no deformity and no laceration.   Neurological: She is alert and oriented to person, place, and time. She has normal reflexes.   Skin: Skin is warm and dry.   Psychiatric: She has a normal mood and affect.   Nursing note and vitals reviewed.      Procedures           ED Course  ED Course as of Sep 18 1456   Tue Sep 18, 2018   1452 Case discussed pt unstable with high rislk of fall along with pain in the sacrum will admit physical therapy and teaching help and support with adls   [TS]      ED Course User Index  [TS] Steve Edwards MD                  [facility]      Final diagnoses:   Acute UTI (urinary tract infection)   Renal insufficiency   Closed nondisplaced zone I fracture of sacrum, initial encounter (CMS/[facility])            Steve Edwards MD  09/18/18 1454

## 2018-09-18 NOTE — H&P
[facility] Medicine Services  HISTORY AND PHYSICAL    Date of Admission: 9/18/2018  Primary Care Physician: Leonardo Zepeda DO    Subjective     Chief Complaint: Pain and increasing inability to ambulate    History of Present Illness  Lakesha Pablo is a 83-year-old  female with a past medical history of paroxysmal atrial fibrillation having undergone 2 recent cardioversions by Dr. Crocker most recent 8/9/2018.  She is also followed by Dr. Glass, cardiology in [facility] and did see him on Friday to discuss AV node ablation and Watchman procedure.  Unfortunately prior to her appointment on Friday she had a fall.  She did not seek evaluation at emergency Department as she did not want to miss her appointment and her daughter did drive her there stating if she became unable to tolerate pain from the fall they would seek evaluation at the emergency department in [place].  Patient returned home, unfortunately she has been having worsening pain and weakness and now is unable to lift her left leg nor ambulate without assistance.  She did present to [facility] emergency department today for evaluation was noted to have sacral fracture, urinary tract infection and significant thrombocytosis of greater than 1 million.  Patient states she has never been told that she had any problems with her platelets in the past.  She is also reporting constipation, burning with urination and foul smell.  She has no complaints of chest pain, shortness of breathing, abdominal pain.  She is admitted for further evaluation and treatment.    Review of Systems   10 point review of system was completed, all negative except for those discussed in HPI.    Past Medical History:   Past Medical History:   Diagnosis Date   • Aortic stenosis    • Breast cancer (CMS/HCC)    • CAD (coronary artery disease)    • Cataract    • Chronic anticoagulation     Coumadin    • CKD (chronic kidney disease)  stage 3, GFR 30-59 ml/min 4/16/2018   • Diabetes mellitus (CMS/HCC)     Type II   • Elevated cholesterol    • GERD (gastroesophageal reflux disease)    • Hyperlipidemia    • Hypertension    • Macular degeneration    • Pancreatitis    • Paroxysmal atrial fibrillation (CMS/HCC)    • Pulmonary hypertension    • Rheumatic fever     during childhood   • Sick sinus syndrome (CMS/HCC)     with pacemaker       Past Surgical History:   Past Surgical History:   Procedure Laterality Date   • ANOMALOUS PULMONARY VENOUS RETURN REPAIR, TOTAL     • BACK SURGERY     • BELPHAROPTOSIS REPAIR     • CARDIAC CATHETERIZATION  1999, 2010   • CARDIAC CATHETERIZATION N/A 2/15/2017    Procedure: Left Heart Cath;  Surgeon: Ehsan Crocker MD;  Location:  [facility] CATH INVASIVE LOCATION;  Service:    • CARDIAC CATHETERIZATION N/A 2/15/2017    Procedure: Right Heart Cath;  Surgeon: Ehsan Crocker MD;  Location:  [facility] CATH INVASIVE LOCATION;  Service:    • CARDIAC CATHETERIZATION N/A 2/15/2017    Procedure: Coronary angiography;  Surgeon: Ehsan Crocker MD;  Location:  [facility] CATH INVASIVE LOC[facility];  Service:    • CARDIAC CATHETERIZATION N/A 2/15/2017    Procedure: Left ventriculography;  Surgeon: Ehsan Crocker MD;  Location:  [facility] CATH INVASIVE LOC[facility];  Service:    • CARDIAC CATHETERIZATION N/A 2/15/2017    Procedure: Intracardiac echocardiogram;  Surgeon: Ehsan Crocker MD;  Location:  [facility] CATH INVASIVE LOCATION;  Service:    • CARDIAC ELECTROPHYSIOLOGY PROCEDURE N/A 2/3/2017    Procedure: Pacemaker DC new;  Surgeon: Ehsan Crocker MD;  Location:  [facility] CATH INVASIVE LOCATION;  Service:    • CATARACT EXTRACTION     • CHOLECYSTECTOMY     • CORONARY ARTERY BYPASS GRAFT  1999    4 vessel    • CORONARY ARTERY BYPASS GRAFT     • CORONARY STENT PLACEMENT     • HYSTERECTOMY  1962   • INSERT / REPLACE / REMOVE PACEMAKER     • MASTECTOMY  2000   • OTHER SURGICAL HISTORY      TAVR 2017       Family History: family history includes Breast cancer in her  mother; Cancer in her brother; Coronary artery disease in her father; Diabetes in her father; Heart attack in her father; No Known Problems in her maternal grandfather, maternal grandmother, paternal grandfather, and paternal grandmother.    Social History:  reports that she has never smoked. She has never used smokeless tobacco. She reports that she does not drink alcohol or use drugs.    Code Status: Full, if unable to speak for herself her daughter DAVON Sood      Allergies:  Allergies   Allergen Reactions   • Dilaudid [Hydromorphone Hcl]    • Dilaudid [Hydromorphone] Nausea And Vomiting   • Enalapril Angioedema   • Janumet [Sitagliptin-Metformin Hcl] Other (See Comments)     Chest pain   • Lopid [Gemfibrozil] Nausea And Vomiting   • Sulfa Antibiotics Other (See Comments)     Syncope     • Ultram [Tramadol] Itching       Medications:  Prior to Admission medications    Medication Sig Start Date End Date Taking? Authorizing Provider   amiodarone (PACERONE) 200 MG tablet Take 1 tablet by mouth 2 (Two) Times a Day. 8/23/18   Ehsan Crocker MD   aspirin 81 MG EC tablet Take 81 mg by mouth Daily.    ProviderMinda MD   atenolol (TENORMIN) 50 MG tablet Take 1 tab PO qam & 1/2 tab PO qpm. 4/13/18   Leonardo Zepeda,    calcium carbonate (OS-MICHAEL) 600 MG tablet Take 600 mg by mouth 2 (Two) Times a Day With Meals.    Minda Salvador MD   cholecalciferol (VITAMIN D3) 1000 UNITS tablet Take 1,000 Units by mouth Daily.    Minda Salvador MD   fenofibrate 160 MG tablet Take 160 mg by mouth Daily.    Minda Salvador MD   glipiZIDE (GLUCOTROL) 5 MG tablet Take 1 tablet by mouth 2 (Two) Times a Day Before Meals. 4/13/18   Leonardo Zepeda DO   insulin detemir (LEVEMIR) 100 UNIT/ML injection Inject 30 Units under the skin Every 12 (Twelve) Hours. 30 units qam 30 units qpm 6/19/18   Linda Johnson APRN   magnesium oxide (MAGOX) 400 (241.3 MG) MG tablet tablet Take 400 mg by mouth Daily.     "Minda Salvador MD   nitroglycerin (NITROSTAT) 0.4 MG SL tablet 1 under the tongue as needed for angina, may repeat q5mins for up three doses 6/7/17   Ehsan Crocker MD   pantoprazole (PROTONIX) 40 MG EC tablet Take 40 mg by mouth Daily.    Minda Salvador MD   polyethyl glycol-propyl glycol (SYSTANE) 0.4-0.3 % solution ophthalmic solution Administer 1 drop to the right eye Daily.    Minda Salvador MD   simvastatin (ZOCOR) 20 MG tablet Take 1 tablet by mouth Every Night. 4/13/18   Leonardo Zepeda DO   spironolactone (ALDACTONE) 25 MG tablet Take 25 mg by mouth Daily.    Minda Salvador MD   venlafaxine XR (EFFEXOR-XR) 37.5 MG 24 hr capsule Take 37.5 mg by mouth Daily.    Minda Salvador MD   vitamin B-12 (CYANOCOBALAMIN) 500 MCG tablet Take 500 mcg by mouth Daily.    Minda Salvador MD   warfarin (COUMADIN) 1 MG tablet Take 2 tablet by mouth daily on Monday, Wednesday and Friday. Take 1 tablet by mouth daily on Tuesday, Thursday, Saturday and Sunday. 4/17/18   Leonardo Zepeda DO       Objective     /47 (BP Location: Left arm, Patient Position: Sitting)   Pulse 59   Temp 98.2 °F (36.8 °C) (Oral)   Resp 18   Ht 154.9 cm (61\")   Wt 55.3 kg (122 lb)   SpO2 94%   BMI 23.05 kg/m²      Physical Exam   Constitutional: She is oriented to person, place, and time. She appears well-developed and well-nourished. No distress.   HENT:   Head: Normocephalic and atraumatic.   Eyes: Pupils are equal, round, and reactive to light. Conjunctivae and EOM are normal. No scleral icterus.   Neck: Normal range of motion. Neck supple. No JVD present. No tracheal deviation present.   Cardiovascular: Normal rate, regular rhythm and intact distal pulses.  Exam reveals no gallop.    Murmur heard.  Pulmonary/Chest: Effort normal and breath sounds normal. No respiratory distress. She has no wheezes. She has no rales.   Abdominal: Soft. Bowel sounds are normal. She exhibits no distension. " There is no tenderness. There is no guarding.   Musculoskeletal: Normal range of motion. She exhibits no edema.   Difficulty rising from sitting position due to severe fracture pain   Neurological: She is alert and oriented to person, place, and time.   No obvious deficits noted.   Skin: Skin is warm and dry. No rash noted. She is not diaphoretic. No erythema. No pallor.   Psychiatric: She has a normal mood and affect. Her behavior is normal.   Vitals reviewed.        Pertinent Data:   Lab Results (last 24 hours)     Procedure Component Value Units Date/Time    Comprehensive Metabolic Panel [[id number]]  (Abnormal) Collected:  09/18/18 1106    Specimen:  Blood Updated:  09/18/18 1157     Glucose 313 (H) mg/dL      BUN 42 (H) mg/dL      Creatinine 1.69 (H) mg/dL      Sodium 137 mmol/L      Potassium 5.3 mmol/L      Chloride 100 mmol/L      CO2 27.0 mmol/L      Calcium 9.3 mg/dL      Total Protein 7.1 g/dL      Albumin 4.10 g/dL      ALT (SGPT) 28 U/L      AST (SGOT) 52 (H) U/L      Alkaline Phosphatase 46 U/L      Total Bilirubin 0.4 mg/dL      eGFR Non African Amer 29 (L) mL/min/1.73      Globulin 3.0 gm/dL      A/G Ratio 1.4 g/dL      BUN/Creatinine Ratio 24.9     Anion Gap 10.0 mmol/L     Narrative:       The MDRD GFR formula is only valid for adults with stable renal function between ages 18 and 70.    Urinalysis With Microscopic If Indicated (No Culture) - Urine, Catheter [[id number]]  (Abnormal) Collected:  09/18/18 1140    Specimen:  Urine from Urine, Catheter Updated:  09/18/18 1154     Color, UA Yellow     Appearance, UA Cloudy (A)     pH, UA 5.5     Specific Gravity, UA 1.022     Glucose,  mg/dL (2+) (A)     Ketones, UA Negative     Bilirubin, UA Negative     Blood, UA Negative     Protein, UA Trace (A)     Leuk Esterase, UA Large (3+) (A)     Nitrite, UA Negative     Urobilinogen, UA 0.2 E.U./dL    Urinalysis, Microscopic Only - Urine, Clean Catch [[id number]]  (Abnormal) Collected:  09/18/18 1140     Specimen:  Urine from Urine, Catheter Updated:  09/18/18 1154     RBC, UA 0-2 (A) /HPF      WBC, UA Too Numerous to Count (A) /HPF      Bacteria, UA 3+ (A) /HPF      Squamous Epithelial Cells, UA None Seen /HPF      Hyaline Casts, UA 0-2 /LPF      Methodology Automated Microscopy    CBC Auto Differential [[id number]]  (Abnormal) Collected:  09/18/18 1106    Specimen:  Blood Updated:  09/18/18 1127     WBC 11.62 (H) 10*3/mm3      RBC 3.97 (L) 10*6/mm3      Hemoglobin 11.4 (L) g/dL      Hematocrit 35.3 (L) %      MCV 88.9 fL      MCH 28.7 pg      MCHC 32.3 (L) g/dL      RDW 16.3 (H) %      RDW-SD 53.3 fl      MPV 11.4 fL      Platelets 1,179 (H) 10*3/mm3      Neutrophil % 81.8 (H) %      Lymphocyte % 7.5 (L) %      Monocyte % 7.8 %      Eosinophil % 1.2 %      Basophil % 0.6 %      Neutrophils, Absolute 9.50 (H) 10*3/mm3      Lymphocytes, Absolute 0.87 10*3/mm3      Monocytes, Absolute 0.91 10*3/mm3      Eosinophils, Absolute 0.14 10*3/mm3      Basophils, Absolute 0.07 10*3/mm3     Protime-INR [[id number]]  (Abnormal) Collected:  09/18/18 1106    Specimen:  Blood Updated:  09/18/18 1125     Protime 20.5 (H) Seconds      INR 1.69 (H)    aPTT [[id number]]  (Abnormal) Collected:  09/18/18 1106    Specimen:  Blood Updated:  09/18/18 1125     PTT 47.8 (H) seconds         Imaging Results (last 24 hours)     Procedure Component Value Units Date/Time    XR Femur 2 View Left [[id number]] Collected:  09/18/18 1451     Updated:  09/18/18 1455    Narrative:       LEFT FEMUR, 2 VIEWS 9/18/2018 2:20 PM CDT     HISTORY: fall, pain     COMPARISON: NONE     FINDINGS:     Frontal and lateral radiographs of the left femur were obtained.     There is no evidence of fracture or worrisome osseous lesion. The soft  tissues are grossly unremarkable. Limited evaluation of the hip and knee  joints reveals normal alignment. Knee joint osteoarthritis.       Impression:       1. No visualized fracture or malalignment at the knee or hip joint.  This  report was finalized on 09/18/2018 14:52 by Dr Eagle Eric, .    CT Abdomen Pelvis Without Contrast [[id number]] Collected:  09/18/18 1348     Updated:  09/18/18 1402    Narrative:       EXAMINATION: CT ABDOMEN PELVIS WO CONTRAST- 9/18/2018 1:48 PM CDT     HISTORY: Fall, low abdominal pain. Flank pain,     DOSE: 226 mGycm (Automatic exposure control technique was implemented in  an effort to keep the radiation dose as low as possible without  compromising image quality)     REPORT: Spiral CT of the abdomen and pelvis was performed without  contrast from the lung bases through the pubic symphysis. Reconstructed  coronal and sagittal images are also reviewed.     COMPARISON: CT abdomen pelvis with contrast 3/23/2016.     Review of lung windows demonstrates mild linear atelectasis as well as  evidence of mild emphysema. Previous median sternotomy is noted.  Pacemaker wires are present. There is evidence of previous aortic valve  replacement. A moderate amount of coronary artery plaque is present.  Cholecystectomy clips are present. Liver and spleen are homogeneous on  this unenhanced study. Ingested food is noted in the stomach which is  incompletely distended. The pancreas and adrenal glands appear grossly  within normal limits. No nephrolithiasis is identified and there is no  evidence of urinary tract obstruction. Heavy calcified plaque is noted  within the abdominal aorta, which is normal in caliber. Calcified plaque  is also seen in the iliac arteries. Bowel loops are normal in caliber.  There appears to be a duodenal diverticulum adjacent to the pancreatic  head. There is a large stool ball in rectal vault and probable fecal  impaction, without evidence of bowel obstruction. There is moderate  sigmoid diverticulosis without evidence of acute diverticulitis.  Previous hysterectomy is noted. The bladder appears within normal  limits. No free fluid or free air is identified. Review of bone windows  shows a chronic  compression fracture L2 status post vertebroplasty.  There is mild spinal stenosis at this level related to slight  retropulsion of the posterior cortex. Advanced degenerative disc disease  is present at L4-5 and to lesser degree L3-4. There is an acute fracture  involving the third sacral level which is described in detail on today's  pelvic CT.       Impression:       1. Large stool ball within the rectum compatible with fecal impaction  but no evidence of bowel obstruction. Moderate sigmoid diverticulosis  proximally without acute diverticulitis.  2. Acute fracture third of the sacral level, described in detail on  today's pelvic CT.  3. Chronic compression fracture at L2 with previous vertebroplasty and  associated mild spinal stenosis. Advanced degenerative disc disease at  L4-5.  4. Other chronic findings and postsurgical changes as above.        This report was finalized on 09/18/2018 13:59 by Dr. Ehsan Waddell MD.    CT Pelvis Without Contrast [[id number]] Collected:  09/18/18 1349     Updated:  09/18/18 1402    Narrative:       CT PELVIS WO CONTRAST-, CT LOWER EXTREMITY RIGHT WO CONTRAST- 9/18/2018  1:23 PM CDT     HISTORY: Pelvis trauma, fx known or suspected, xray insufficient       DOSE LENGTH PRODUCT: 135 mGy cm. Automated exposure control was also  utilized to decrease patient radiation dose.     Technique:   Axial CT of the pelvis without IV contrast. Sagittal and coronal  reformations are also provided for review.      Comparison: CT scan dated 3/23/2016.     Findings:      There appears to be a horizontally oriented fracture traversing the S3  vertebra. There is mild buckling of the anterior vertebral body cortex.  No obvious vertically oriented fracture components in the sacral ala.  Normal alignment at the sacroiliac joints. Bony pelvis is otherwise  intact. Normal alignment at the hip joints. The joint spaces are fairly  well-maintained. No acute fracture identified at the hip joints.      Prominent stool in the rectum. Sigmoid diverticulosis. No pelvic  hematoma.       Impression:       Impression:    1. Horizontally oriented fracture through the S3 vertebra with mild  buckling in the anterior cortex. Normal alignment at the SI joints.  2. Normal alignment at the hip joints with no visualized hip fracture.  This report was finalized on 09/18/2018 13:59 by Dr Eagle Eric, .    CT Lower Extremity Right Without Contrast [[id number]] Collected:  09/18/18 1349     Updated:  09/18/18 1402    Narrative:       CT PELVIS WO CONTRAST-, CT LOWER EXTREMITY RIGHT WO CONTRAST- 9/18/2018  1:23 PM CDT     HISTORY: Pelvis trauma, fx known or suspected, xray insufficient       DOSE LENGTH PRODUCT: 135 mGy cm. Automated exposure control was also  utilized to decrease patient radiation dose.     Technique:   Axial CT of the pelvis without IV contrast. Sagittal and coronal  reformations are also provided for review.      Comparison: CT scan dated 3/23/2016.     Findings:      There appears to be a horizontally oriented fracture traversing the S3  vertebra. There is mild buckling of the anterior vertebral body cortex.  No obvious vertically oriented fracture components in the sacral ala.  Normal alignment at the sacroiliac joints. Bony pelvis is otherwise  intact. Normal alignment at the hip joints. The joint spaces are fairly  well-maintained. No acute fracture identified at the hip joints.     Prominent stool in the rectum. Sigmoid diverticulosis. No pelvic  hematoma.       Impression:       Impression:    1. Horizontally oriented fracture through the S3 vertebra with mild  buckling in the anterior cortex. Normal alignment at the SI joints.  2. Normal alignment at the hip joints with no visualized hip fracture.  This report was finalized on 09/18/2018 13:59 by Dr Eagle Eric, .            9/7/2017 ECHO  Interpretation Summary     · Left ventricular systolic function is normal. Estimated EF = 60%.  · Left  ventricular diastolic dysfunction (grade II) consistent with pseudonormalization.  · Right ventricular cavity is mild-to-moderately dilated.  · Mildly reduced right ventricular systolic function noted.  · Left atrial cavity size is mildly dilated.  · calcification of the aortic valve  · Severe aortic valve stenosis is present.  · Mild mitral valve regurgitation is present       I have personally reviewed and interpreted the radiology studies and ECG obtained at time of admission.     Assessment / Plan     Assessment/Plan:  Thrombocytosis, unknown etiology; repeat stat platelet level, consult hematology, repeat CBC with differential in a.m.    Fractured sacrum-decreased mobility and acute pain; consult orthopedics for recommendations, pain management with Tylenol only due to adverse reaction to narcotics    Urinary tract infection-Rocephin    Chronic kidney disease stage III with slightly increased creatinine-hydrate gently with normal saline at 75 ML/hour, repeat CMP in a.m.    Diabetes mellitus type II insulin-dependent- continue home Levemir, monitor glucose before meals and at bedtime with sliding scale coverage regular insulin     Paroxysmal atrial fibrillation-continue Coumadin therapy increased dose due to subtherapeutic INR, daily PT/INR, DVT prophylaxis with SCDs    Constipation - fecal impaction - attempt digital removal, start bowel regimen    I discussed the patient's findings and my recommendations with: Myron Huynh MD  Time spent: 40 minutes      FACUNDO Li  09/18/18   4:17 PM     I personally evaluated and examined the patient in conjunction with FACUNDO Fuchs and agree with the assessment, treatment plan, and disposition of the patient as recorded by her. My history, exam, and further recommendations are:     Patient fell last Friday.  SInce then she has pain on her back.  An x-ray done at one of her clinic visit with cardiologist did not reveal any fracture.   She has trouble  walking.   Daughter told me that she poorly tolerates opiate and allergic to to tramadol.    There is documentation of mild dysuria. Daughter tells me she has frequent UTI.  We are treating her with Rocephin.     She also has severe elevation of platelel > 1 Thousand.  I checked she had prior elevation in April 2018.  I have not seen any record she had referral or w/u on this.       She has known afib.  She had prior cardioversion.  I am told her INR is always subtherapeutic.  She is planned for av kristofer ablation. Her iNR today is 1.69.  Agree with maintaining on 1 mg daily of coumadin. May need to increase this further.    She is limping needing assistance of one person.  Alert oriented x 3  Pulse appears regular  No distress    Ortho and hematology consulted  cpt    Myron Huynh MD  09/18/18  5:33 PM

## 2018-09-19 LAB
ALBUMIN SERPL-MCNC: 4.4 G/DL (ref 3.5–5)
ALBUMIN/GLOB SERPL: 1.4 G/DL (ref 1.1–2.5)
ALP SERPL-CCNC: 45 U/L (ref 24–120)
ALT SERPL W P-5'-P-CCNC: 28 U/L (ref 0–54)
ANION GAP SERPL CALCULATED.3IONS-SCNC: 11 MMOL/L (ref 4–13)
AST SERPL-CCNC: 67 U/L (ref 7–45)
BASOPHILS # BLD AUTO: 0.08 10*3/MM3 (ref 0–0.2)
BASOPHILS NFR BLD AUTO: 1.1 % (ref 0–2)
BILIRUB SERPL-MCNC: 0.4 MG/DL (ref 0.1–1)
BUN BLD-MCNC: 37 MG/DL (ref 5–21)
BUN/CREAT SERPL: 26.6 (ref 7–25)
CALCIUM SPEC-SCNC: 9.3 MG/DL (ref 8.4–10.4)
CHLORIDE SERPL-SCNC: 101 MMOL/L (ref 98–110)
CO2 SERPL-SCNC: 26 MMOL/L (ref 24–31)
CREAT BLD-MCNC: 1.39 MG/DL (ref 0.5–1.4)
DEPRECATED RDW RBC AUTO: 53 FL (ref 40–54)
EOSINOPHIL # BLD AUTO: 0.21 10*3/MM3 (ref 0–0.7)
EOSINOPHIL NFR BLD AUTO: 2.9 % (ref 0–4)
ERYTHROCYTE [DISTWIDTH] IN BLOOD BY AUTOMATED COUNT: 16.1 % (ref 12–15)
FERRITIN SERPL-MCNC: 46 NG/ML (ref 11.1–264)
GFR SERPL CREATININE-BSD FRML MDRD: 36 ML/MIN/1.73
GLOBULIN UR ELPH-MCNC: 3.1 GM/DL
GLUCOSE BLD-MCNC: 277 MG/DL (ref 70–100)
GLUCOSE BLDC GLUCOMTR-MCNC: 161 MG/DL (ref 70–130)
GLUCOSE BLDC GLUCOMTR-MCNC: 265 MG/DL (ref 70–130)
GLUCOSE BLDC GLUCOMTR-MCNC: 62 MG/DL (ref 70–130)
HCT VFR BLD AUTO: 33.6 % (ref 37–47)
HGB BLD-MCNC: 10.6 G/DL (ref 12–16)
IMM GRANULOCYTES # BLD: 0.12 10*3/MM3 (ref 0–0.03)
IMM GRANULOCYTES NFR BLD: 1.7 % (ref 0–5)
INR PPP: 1.72 (ref 0.91–1.09)
IRON 24H UR-MRATE: 69 MCG/DL (ref 42–180)
IRON SATN MFR SERPL: 11 % (ref 20–45)
LYMPHOCYTES # BLD AUTO: 1.09 10*3/MM3 (ref 0.72–4.86)
LYMPHOCYTES NFR BLD AUTO: 15 % (ref 15–45)
MCH RBC QN AUTO: 28.4 PG (ref 28–32)
MCHC RBC AUTO-ENTMCNC: 31.5 G/DL (ref 33–36)
MCV RBC AUTO: 90.1 FL (ref 82–98)
MONOCYTES # BLD AUTO: 0.77 10*3/MM3 (ref 0.19–1.3)
MONOCYTES NFR BLD AUTO: 10.6 % (ref 4–12)
NEUTROPHILS # BLD AUTO: 4.98 10*3/MM3 (ref 1.87–8.4)
NEUTROPHILS NFR BLD AUTO: 68.7 % (ref 39–78)
NRBC BLD MANUAL-RTO: 0 /100 WBC (ref 0–0)
PLATELET # BLD AUTO: 941 10*3/MM3 (ref 130–400)
PMV BLD AUTO: 11.6 FL (ref 6–12)
POTASSIUM BLD-SCNC: 5.3 MMOL/L (ref 3.5–5.3)
PROT SERPL-MCNC: 7.5 G/DL (ref 6.3–8.7)
PROTHROMBIN TIME: 20.8 SECONDS (ref 11.9–14.6)
RBC # BLD AUTO: 3.73 10*6/MM3 (ref 4.2–5.4)
SODIUM BLD-SCNC: 138 MMOL/L (ref 135–145)
TIBC SERPL-MCNC: 613 MCG/DL (ref 225–420)
WBC NRBC COR # BLD: 7.25 10*3/MM3 (ref 4.8–10.8)

## 2018-09-19 PROCEDURE — 94760 N-INVAS EAR/PLS OXIMETRY 1: CPT

## 2018-09-19 PROCEDURE — 83550 IRON BINDING TEST: CPT | Performed by: INTERNAL MEDICINE

## 2018-09-19 PROCEDURE — 97165 OT EVAL LOW COMPLEX 30 MIN: CPT

## 2018-09-19 PROCEDURE — 85610 PROTHROMBIN TIME: CPT | Performed by: NURSE PRACTITIONER

## 2018-09-19 PROCEDURE — G0378 HOSPITAL OBSERVATION PER HR: HCPCS

## 2018-09-19 PROCEDURE — G8979 MOBILITY GOAL STATUS: HCPCS

## 2018-09-19 PROCEDURE — 83540 ASSAY OF IRON: CPT | Performed by: INTERNAL MEDICINE

## 2018-09-19 PROCEDURE — 82728 ASSAY OF FERRITIN: CPT | Performed by: INTERNAL MEDICINE

## 2018-09-19 PROCEDURE — 63710000001 INSULIN DETEMIR PER 5 UNITS: Performed by: NURSE PRACTITIONER

## 2018-09-19 PROCEDURE — G8988 SELF CARE GOAL STATUS: HCPCS

## 2018-09-19 PROCEDURE — G8978 MOBILITY CURRENT STATUS: HCPCS

## 2018-09-19 PROCEDURE — 97161 PT EVAL LOW COMPLEX 20 MIN: CPT

## 2018-09-19 PROCEDURE — 96376 TX/PRO/DX INJ SAME DRUG ADON: CPT

## 2018-09-19 PROCEDURE — 80053 COMPREHEN METABOLIC PANEL: CPT | Performed by: NURSE PRACTITIONER

## 2018-09-19 PROCEDURE — 94799 UNLISTED PULMONARY SVC/PX: CPT

## 2018-09-19 PROCEDURE — 97116 GAIT TRAINING THERAPY: CPT

## 2018-09-19 PROCEDURE — G8987 SELF CARE CURRENT STATUS: HCPCS

## 2018-09-19 PROCEDURE — 85025 COMPLETE CBC W/AUTO DIFF WBC: CPT | Performed by: NURSE PRACTITIONER

## 2018-09-19 PROCEDURE — 25010000002 IRON SUCROSE PER 1 MG: Performed by: INTERNAL MEDICINE

## 2018-09-19 PROCEDURE — 25010000002 CEFTRIAXONE PER 250 MG: Performed by: INTERNAL MEDICINE

## 2018-09-19 PROCEDURE — 82962 GLUCOSE BLOOD TEST: CPT

## 2018-09-19 PROCEDURE — 63710000001 INSULIN LISPRO (HUMAN) PER 5 UNITS: Performed by: NURSE PRACTITIONER

## 2018-09-19 RX ADMIN — AMIODARONE HYDROCHLORIDE 200 MG: 200 TABLET ORAL at 08:56

## 2018-09-19 RX ADMIN — VENLAFAXINE HYDROCHLORIDE 37.5 MG: 37.5 CAPSULE, EXTENDED RELEASE ORAL at 08:56

## 2018-09-19 RX ADMIN — ATENOLOL 50 MG: 50 TABLET ORAL at 08:56

## 2018-09-19 RX ADMIN — ACETAMINOPHEN 1000 MG: 500 TABLET, FILM COATED ORAL at 02:15

## 2018-09-19 RX ADMIN — INSULIN DETEMIR 30 UNITS: 100 INJECTION, SOLUTION SUBCUTANEOUS at 21:47

## 2018-09-19 RX ADMIN — SPIRONOLACTONE 25 MG: 25 TABLET ORAL at 08:56

## 2018-09-19 RX ADMIN — INSULIN LISPRO 4 UNITS: 100 INJECTION, SOLUTION INTRAVENOUS; SUBCUTANEOUS at 21:47

## 2018-09-19 RX ADMIN — GLIPIZIDE 5 MG: 5 TABLET ORAL at 17:22

## 2018-09-19 RX ADMIN — DOCUSATE SODIUM 100 MG: 100 CAPSULE ORAL at 08:57

## 2018-09-19 RX ADMIN — INSULIN LISPRO 2 UNITS: 100 INJECTION, SOLUTION INTRAVENOUS; SUBCUTANEOUS at 17:25

## 2018-09-19 RX ADMIN — DOCUSATE SODIUM 100 MG: 100 CAPSULE ORAL at 21:39

## 2018-09-19 RX ADMIN — PANTOPRAZOLE SODIUM 40 MG: 40 TABLET, DELAYED RELEASE ORAL at 06:15

## 2018-09-19 RX ADMIN — ASPIRIN 81 MG: 81 TABLET ORAL at 08:56

## 2018-09-19 RX ADMIN — WARFARIN SODIUM 1 MG: 1 TABLET ORAL at 17:22

## 2018-09-19 RX ADMIN — MAGNESIUM GLUCONATE 500 MG ORAL TABLET 400 MG: 500 TABLET ORAL at 08:56

## 2018-09-19 RX ADMIN — CEFTRIAXONE SODIUM 1 G: 1 INJECTION, POWDER, FOR SOLUTION INTRAMUSCULAR; INTRAVENOUS at 15:11

## 2018-09-19 RX ADMIN — GLIPIZIDE 5 MG: 5 TABLET ORAL at 08:56

## 2018-09-19 RX ADMIN — AMIODARONE HYDROCHLORIDE 200 MG: 200 TABLET ORAL at 21:39

## 2018-09-19 RX ADMIN — IRON SUCROSE 200 MG: 20 INJECTION, SOLUTION INTRAVENOUS at 12:53

## 2018-09-19 RX ADMIN — POLYVINYL ALCOHOL 1 DROP: 14 SOLUTION/ DROPS OPHTHALMIC at 17:26

## 2018-09-19 RX ADMIN — ATORVASTATIN CALCIUM 10 MG: 10 TABLET, FILM COATED ORAL at 21:39

## 2018-09-19 NOTE — ACP (ADVANCE CARE PLANNING)
Date of [facility] ACP interview: 9/19/2018  Location of interview:Pt's room  [facility] ACP Facilitator: Cynthia Fields RN  Referral Source: nurse  Present for facilitation: Patient    SUMMARY OF ADVANCE CARE PLANNING DISCUSSION:  Lakesha visited for [facility] facilitation. We reviewed purpose and goals for advance care planning.    Lakesha is alone at this time , but stats her daughter handles everything. Information and contact numbers of facilitators left at bedside for daughter to review with pt.  She thinks maybe she already has one at home but is unsure.    Education materials were provided on: Advance Care Planning and Healthcare Agent Selection    Advance care/living will document finished during this facilitation? no    Time spent on preparation, facilitation and documentation was under 30 minutes.    RECOMMENDATIONS/FOLLOW-UP:  Will f/u when daughter is present if possible    CONSULT/NOTE ROUTED  yes    Cynthia Fields, RN

## 2018-09-19 NOTE — THERAPY TREATMENT NOTE
Acute Care - Physical Therapy Treatment Note  [facility]     Patient Name: Lakesha Costello  : 1935  MRN: [id number]  Today's Date: 2018  Onset of Illness/Injury or Date of Surgery: 18  Date of Referral to PT: 18  Referring Physician: FACUNDO Grace    Admit Date: 2018    Visit Dx:    ICD-10-CM ICD-9-CM   1. Acute UTI (urinary tract infection) N39.0 599.0   2. Renal insufficiency N28.9 593.9   3. Closed nondisplaced zone I fracture of sacrum, initial encounter (CMS/Formerly [facility]) S32.110A 805.6   4. Decreased activities of daily living (ADL) Z78.9 V49.89   5. Impaired mobility Z74.09 799.89     Patient Active Problem List   Diagnosis   • Paroxysmal atrial fibrillation (CMS/Formerly [facility])   • Atrial flutter (CMS/Formerly [facility])   • Essential hypertension   • Type 2 diabetes mellitus with diabetic peripheral angiopathy without gangrene, with long-term current use of insulin (CMS/Formerly [facility])   • Chronic anticoagulation   • Coronary artery disease involving native coronary artery of native heart without angina pectoris   • SSS (sick sinus syndrome) (CMS/Formerly [facility])   • Chest pain   • S/P CABG x 3   • Artificial pacemaker   • Tachycardia   • Palpitations   • Mixed hyperlipidemia   • S/P TAVR (transcatheter aortic valve replacement)   • Age-related osteoporosis without current pathological fracture   • Coronary artery abnormality   • Chronic diastolic congestive heart failure (CMS/Formerly [facility])   • Malignant neoplasm of breast (CMS/Formerly [facility])   • CKD (chronic kidney disease) stage 3, GFR 30-59 ml/min   • Acute UTI (urinary tract infection)       Therapy Treatment          Rehabilitation Treatment Summary     Row Name 18 1345             Treatment Time/Intention    Discipline physical therapy assistant  -      Document Type therapy note (daily note)  -AH2      Subjective Information complains of;weakness;pain  -2      Existing Precautions/Restrictions fall  -2      Recorded by [] Celia Laureano, [facility] 18 5785  [2] Celia Laureano  TYREL, [facility] 09/19/18 1406      Row Name 09/19/18 1345             Bed Mobility Assessment/Treatment    Comment (Bed Mobility) chair  -AH      Recorded by [] Celia Laureano, [facility] 09/19/18 1406      Row Name 09/19/18 1345             Sit-Stand Transfer    Sit-Stand [place] (Transfers) verbal cues;contact guard  -AH      Recorded by [] Celia Laureano, [facility] 09/19/18 1406      Row Name 09/19/18 1345             Stand-Sit Transfer    Stand-Sit [place] (Transfers) verbal cues;contact guard  -AH      Recorded by [] Celia Laureano, [facility] 09/19/18 1406      [facility] Name 09/19/18 1345             Gait/Stairs Assessment/Training    42985 - Gait Training Minutes  15  -      [place] Level (Gait) contact guard;verbal cues  -      Assistive Device (Gait) walker, front-wheeled  -      Distance in Feet (Gait) 100   100 x 2  -AH      Recorded by [] Celia Laureano, [facility] 09/19/18 1410      Row Name 09/19/18 1345             Positioning and Restraints    Pre-Treatment Position sitting in chair/recliner  -AH      Post Treatment Position chair  -AH      In Chair sitting;call light within reach;encouraged to call for assist;with family/caregiver  -AH      Recorded by [] Celia Laureano, [facility] 09/19/18 1410      Row Name 09/19/18 1345             Pain Assessment    Additional Documentation Pain Scale: Numbers Pre/Post-Treatment (Group)  -AH      Recorded by [] Celia Laureano, [facility] 09/19/18 1410      [facility] Name 09/19/18 1345             Pain Scale: Numbers Pre/Post-Treatment    Pain Scale: Numbers, Pretreatment 4/10  -AH      Pain Location - Side Left  -AH      Pain Location - Orientation lower  -AH      Pain Location extremity  -      Pain Intervention(s) Repositioned;Ambulation/increased activity  -      Recorded by [] Celia Laureano, [facility] 09/19/18 1410        User Key  (r) = Recorded By, (t) = Taken By, (c) = Cosigned By    Initials Name Effective Dates Discipline     Celia Laureano, [facility] 08/02/16 -  PT                        PT Rehab Goals     Row Name 09/19/18 0801             Bed Mobility Goal 1 (PT)    Activity/Assistive Device (Bed Mobility Goal 1, PT) bed mobility activities, all  -LH      [place] Level/Cues Needed (Bed Mobility Goal 1, PT) independent  -LH      Time Frame (Bed Mobility Goal 1, PT) by discharge  -      Progress/Outcomes (Bed Mobility Goal 1, PT) goal ongoing  -         Transfer Goal 1 (PT)    Activity/Assistive Device (Transfer Goal 1, PT) sit-to-stand/stand-to-sit;bed-to-chair/chair-to-bed  -LH      [place] Level/Cues Needed (Transfer Goal 1, PT) independent  -LH      Time Frame (Transfer Goal 1, PT) by discharge  -LH      Progress/Outcome (Transfer Goal 1, PT) goal ongoing  -         Gait Training Goal 1 (PT)    Activity/Assistive Device (Gait Training Goal 1, PT) gait (walking locomotion)  -      [place] Level (Gait Training Goal 1, PT) standby assist  -LH      Distance (Gait Goal 1, PT) 200 feet  -LH      Time Frame (Gait Training Goal 1, PT) by discharge  -      Progress/Outcome (Gait Training Goal 1, PT) goal ongoing  -         Stairs Goal 1 (PT)    Activity/Assistive Device (Stairs Goal 1, PT) ascending stairs;descending stairs;using handrail, left  -LH      [place] Level/Cues Needed (Stairs Goal 1, PT) standby assist  -LH      Number of Stairs (Stairs Goal 1, PT) 3  -LH      Time Frame (Stairs Goal 1, PT) by discharge  -      Progress/Outcome (Stairs Goal 1, PT) goal ongoing  -        User Key  (r) = Recorded By, (t) = Taken By, (c) = Cosigned By    Initials Name Provider Type Discipline     Darius Leal, PT Physical Therapist PT          Physical Therapy Education     Title: PT OT SLP Therapies (Active)     Topic: Physical Therapy (Done)     Point: Mobility training (Done)    Learning Progress Summary     Learner Status Readiness Method Response Comment Documented by    Patient Done Acceptance E,DENIS BOYCE benefits of PT and POC, call for assist w/ mobility,  sidelying w/ pillows b/t knees  09/19/18 0925          Point: Precautions (Done)    Learning Progress Summary     Learner Status Readiness Method Response Comment Documented by    Patient Done Acceptance RAFAT CLARKE VU benefits of PT and POC, call for assist w/ mobility, sidelying w/ pillows b/t knees  09/19/18 0925                      User Key     Initials Effective Dates Name Provider Type [facility] 08/02/16 -  Darius Leal, PT Physical Therapist PT                    PT Recommendation and Plan                Outcome Measures     Row Name 09/19/18 0923 09/19/18 0900          How much help from another person do you currently need...    Turning from your back to your side while in flat bed without using bedrails?  -- 4  -LH     Moving from lying on back to sitting on the side of a flat bed without bedrails?  -- 4  -LH     Moving to and from a bed to a chair (including a wheelchair)?  -- 3  -LH     Standing up from a chair using your arms (e.g., wheelchair, bedside chair)?  -- 3  -LH     Climbing 3-5 steps with a railing?  -- 3  -LH     To walk in hospital room?  -- 3  -     AM-PAC 6 Clicks Score  -- 20  -LH        How much help from another is currently needed...    Putting on and taking off regular lower body clothing? 3  -AC  --     Bathing (including washing, rinsing, and drying) 3  -AC  --     Toileting (which includes using toilet bed pan or urinal) 3  -AC  --     Putting on and taking off regular upper body clothing 4  -AC  --     Taking care of personal grooming (such as brushing teeth) 4  -AC  --     Eating meals 4  -AC  --     Score 21  -AC  --        Functional Assessment    Outcome Measure Options AM-PAC 6 Clicks Daily Activity (OT)  - AM-PAC 6 Clicks Basic Mobility (PT)  -       User Key  (r) = Recorded By, (t) = Taken By, (c) = Cosigned By    Initials Name Provider Type    AC Eulogio Blackmon, OTR/L Occupational Therapist     Darius Leal, PT Physical Therapist           Time  Calculation:         PT Charges     Row Name 09/19/18 1410 09/19/18 1345 09/19/18 0935       Time Calculation    Start Time 1345  -  -- 0800  -    Stop Time 1400  -  -- 0900  -    Time Calculation (min) 15 min  -  -- 60 min  -    PT Received On  --  -- 09/19/18  -    PT Goal Re-Cert Due Date  --  -- 09/29/18  -       Time Calculation- PT    Total Timed Code Minutes- PT 15 minute(s)  -  --  --       Timed Charges    80223 - Gait Training Minutes   -- 15  -  --      User Key  (r) = Recorded By, (t) = Taken By, (c) = Cosigned By    Initials Name Provider Type     Celia Laureano, PTA Physical Therapy Assistant     Darius Leal, PT Physical Therapist        Therapy Suggested Charges     Code   Minutes Charges    96531 (CPT®) Hc Pt Neuromusc Re Education Ea 15 Min      70820 (CPT®) Hc Pt Ther Proc Ea 15 Min      55120 (CPT®) Hc Gait Training Ea 15 Min 15 1    72236 (CPT®) Hc Pt Therapeutic Act Ea 15 Min      32509 (CPT®) Hc Pt Manual Therapy Ea 15 Min      49092 (CPT®) Hc Pt Iontophoresis Ea 15 Min      23974 (CPT®) Hc Pt Elec Stim Ea-Per 15 Min      81475 (CPT®) Hc Pt Ultrasound Ea 15 Min      47531 (CPT®) Hc Pt Self Care/Mgmt/Train Ea 15 Min      55974 (CPT®) Hc Pt Prosthetic (S) Train Initial Encounter, Each 15 Min      94237 (CPT®) Hc Pt Orthotic(S)/Prosthetic(S) Encounter, Each 15 Min      50015 (CPT®) Hc Orthotic(S) Mgmt/Train Initial Encounter, Each 15min      Total  15 1        Therapy Charges for Today     Code Description Service Date Service Provider Modifiers Qty    11928613459 HC GAIT TRAINING EA 15 MIN 9/19/2018 Celia Laureano, PTA GP, KX 1          PT G-Codes  Outcome Measure Options: AM-PAC 6 Clicks Daily Activity (OT)  AM-PAC 6 Clicks Score: 20  Score: 21  Functional Limitation: Mobility: Walking and moving around  Mobility: Walking and Moving Around Current Status (): At least 20 percent but less than 40 percent impaired, limited or restricted  Mobility: Walking and Moving  Around Goal Status (): At least 1 percent but less than 20 percent impaired, limited or restricted    Celia Laureano, PTA  9/19/2018

## 2018-09-19 NOTE — PROGRESS NOTES
[facility]  INPATIENT PROGRESS NOTE    Patient Name: Lakesha Costello  Date of Admission: 9/18/2018  Today's Date: 09/19/18  Length of Stay: 0  Primary Care Physician: Leonardo Zepeda DO    Subjective   Chief Complaint: f/u  HPI   Collins Peralta saw the patient in consult.  He recommend PT WBAT BLE, padded seat for comfort  No complaints of pain  Dr. Barahona had visited but no consult report is available as of yet  Worked well with therapist today  + hx of ?TAVR Nov. 2017   Cardioversion x 2 last month   Review of Systems     All pertinent negatives and positives are as above. All other systems have been reviewed and are negative unless otherwise stated.     Objective    Temp:  [98.1 °F (36.7 °C)-98.6 °F (37 °C)] 98.5 °F (36.9 °C)  Heart Rate:  [54-61] 54  Resp:  [16-20] 18  BP: (128-151)/(43-58) 151/54  Physical Exam  She comfortably sitted on the chair with legs crossed over.  NAD  Pleasant  Alert and oriented x 3  Grossly non focal  Non labored breathing; CTA  + murmur  Skin: Skin is warm and dry. No rash noted. She is not diaphoretic. No erythema. No pallor.   Psychiatric: She has a normal mood and affect. Her behavior is normal.  No edema of extremities; prominent varicosities   Vitals reviewed.      Results Review:  I have reviewed the labs, radiology results, and diagnostic studies.    Laboratory Data:     Results from last 7 days  Lab Units 09/19/18  0843 09/18/18  1645 09/18/18  1106   WBC 10*3/mm3 7.25  --  11.62*   HEMOGLOBIN g/dL 10.6*  --  11.4*   HEMATOCRIT % 33.6*  --  35.3*   PLATELETS 10*3/mm3 941* 1,271* 1,179*          Results from last 7 days  Lab Units 09/19/18  1211 09/18/18  1106   SODIUM mmol/L 138 137   POTASSIUM mmol/L 5.3 5.3   CHLORIDE mmol/L 101 100   CO2 mmol/L 26.0 27.0   BUN mg/dL 37* 42*   CREATININE mg/dL 1.39 1.69*   CALCIUM mg/dL 9.3 9.3   BILIRUBIN mg/dL 0.4 0.4   ALK PHOS U/L 45 46   ALT (SGPT) U/L 28 28   AST (SGOT) U/L  67* 52*   GLUCOSE mg/dL 277* 313*       Culture Data:        Radiology Data:   Imaging Results (last 24 hours)     ** No results found for the last 24 hours. **        Iron Profile [[id number]] (Abnormal) Collected: 09/19/18 1214   Lab Status: Final result Specimen: Blood Updated: 09/19/18 1245    Iron 69 mcg/dL     TIBC 613 (H) mcg/dL     Iron Saturation 11 (L) %    Ferritin [[id number]] (Normal) Collected: 09/19/18 1214   Lab Status: Final result Specimen: Blood Updated: 09/19/18 1312    Ferritin 46.00       inr 1.72  Echo in Sept 2017  · Left ventricular systolic function is normal. Estimated EF = 60%.  · Left ventricular diastolic dysfunction (grade II) consistent with pseudonormalization.  · Right ventricular cavity is mild-to-moderately dilated.  · Mildly reduced right ventricular systolic function noted.  · Left atrial cavity size is mildly dilated.  · calcification of the aortic valve  · Severe aortic valve stenosis is present.  · Mild mitral valve regurgitation is present       I have reviewed the patient's current medications.     Assessment/Plan     Hospital Problem List     Acute UTI (urinary tract infection)          · Iron deficiency anemia -venofer  · Thrombocytosis prob related to above  · Fractured sacrum with pain on mobility/intolerant to opiate and allergic to tramadol - sh is doing ok with acetaminophen  · DM T2  · PAfib - she is on chronic anticoagulation. There has been plan for her to undergo watchman procedure in [place].  · Possibly ericka on ckd - improve with hydration   · Constipation - bowel regimen  · Bacteriuria/symtom of dysuria and malodorous urine - no culture noted but with above findings, will cont total of 3 days of rocephin.   · Echo in 2017 sowed Aortic stenosis s/p ?TAVR - she had TTE at [place] on Sept 14 although result from care everywhere does not show this.   Her last known       Discussed with SW.  Discussed with daughter and granddaughter.  Anticipate to go to Home   with HH vs directly to assisted living. Daughter said she hasn't informed the  of the patient.   Cont physical rehab   WBAT, padded seat for comfort per ortho  With her potassium at 5.3 and spironolactone, I am concern that she will end up with hyperkalemia. Her crea ranges from 1.49 to 1. 69.  She must actually have ckd stage 3/borderline 4; will monitor volume status  Home when ok with others  ?arrange outpatient venofer infusion   D/w Lean    Myron Huynh MD   09/19/18   3:20 PM

## 2018-09-19 NOTE — PLAN OF CARE
Problem: Patient Care Overview  Goal: Plan of Care Review  Outcome: Ongoing (interventions implemented as appropriate)   09/19/18 0275   Coping/Psychosocial   Plan of Care Reviewed With patient;family   Plan of Care Review   Progress improving   OTHER   Outcome Summary c/o sacral pain rates at 2 to 3 with movement. up in chair most of the day. started on iv venofer today. to get 3 doses. on iv abx for UTI. safety maintained .

## 2018-09-19 NOTE — THERAPY EVALUATION
Acute Care - Occupational Therapy Initial Evaluation  [facility]     Patient Name: Lakesha Costello  : 1935  MRN: [id number]  Today's Date: 2018  Onset of Illness/Injury or Date of Surgery: 18  Date of Referral to OT: 18  Referring Physician: FACUNDO Grace    Admit Date: 2018       ICD-10-CM ICD-9-CM   1. Acute UTI (urinary tract infection) N39.0 599.0   2. Renal insufficiency N28.9 593.9   3. Closed nondisplaced zone I fracture of sacrum, initial encounter (CMS/[facility]) S32.110A 805.6   4. Decreased activities of daily living (ADL) Z78.9 V49.89     Patient Active Problem List   Diagnosis   • Paroxysmal atrial fibrillation (CMS/[facility])   • Atrial flutter (CMS/[facility])   • Essential hypertension   • Type 2 diabetes mellitus with diabetic peripheral angiopathy without gangrene, with long-term current use of insulin (CMS/[facility])   • Chronic anticoagulation   • Coronary artery disease involving native coronary artery of native heart without angina pectoris   • SSS (sick sinus syndrome) (CMS/[facility])   • Chest pain   • S/P CABG x 3   • Artificial pacemaker   • Tachycardia   • Palpitations   • Mixed hyperlipidemia   • S/P TAVR (transcatheter aortic valve replacement)   • Age-related osteoporosis without current pathological fracture   • Coronary artery abnormality   • Chronic diastolic congestive heart failure (CMS/[facility])   • Malignant neoplasm of breast (CMS/[facility])   • CKD (chronic kidney disease) stage 3, GFR 30-59 ml/min   • Acute UTI (urinary tract infection)     Past Medical History:   Diagnosis Date   • Aortic stenosis    • Breast cancer (CMS/[facility])    • CAD (coronary artery disease)    • Cataract    • Chronic anticoagulation     Coumadin    • CKD (chronic kidney disease) stage 3, GFR 30-59 ml/min 2018   • Diabetes mellitus (CMS/[facility])     Type II   • Elevated cholesterol    • GERD (gastroesophageal reflux disease)    • Hyperlipidemia    • Hypertension    • Macular degeneration    • Pancreatitis    •  Paroxysmal atrial fibrillation (CMS/HCC)    • Pulmonary hypertension    • Rheumatic fever     during childhood   • Sick sinus syndrome (CMS/HCC)     with pacemaker     Past Surgical History:   Procedure Laterality Date   • ANOMALOUS PULMONARY VENOUS RETURN REPAIR, TOTAL     • BACK SURGERY     • BELPHAROPTOSIS REPAIR     • CARDIAC CATHETERIZATION  1999, 2010   • CARDIAC CATHETERIZATION N/A 2/15/2017    Procedure: Left Heart Cath;  Surgeon: Ehsan Crocker MD;  Location:  [facility];  Service:    • CARDIAC CATHETERIZATION N/A 2/15/2017    Procedure: Right Heart Cath;  Surgeon: Ehsan Crocker MD;  Location:  [facility];  Service:    • CARDIAC CATHETERIZATION N/A 2/15/2017    Procedure: Coronary angiography;  Surgeon: Ehsan Crocker MD;  Location:  [facility];  Service:    • CARDIAC CATHETERIZATION N/A 2/15/2017    Procedure: Left ventriculography;  Surgeon: Ehsan Crocker MD;  Location:  [facility];  Service:    • CARDIAC CATHETERIZATION N/A 2/15/2017    Procedure: Intracardiac echocardiogram;  Surgeon: Ehsan Crocker MD;  Location:  [facility];  Service:    • CARDIAC ELECTROPHYSIOLOGY PROCEDURE N/A 2/3/2017    Procedure: Pacemaker DC new;  Surgeon: Ehsan Crocker MD;  Location:  [facility];  Service:    • CATARACT EXTRACTION     • CHOLECYSTECTOMY     • CORONARY ARTERY BYPASS GRAFT  1999    4 vessel    • CORONARY ARTERY BYPASS GRAFT     • CORONARY STENT PLACEMENT     • HYSTERECTOMY  1962   • INSERT / REPLACE / REMOVE PACEMAKER     • MASTECTOMY  2000   • OTHER SURGICAL HISTORY      TAVR 2017          OT ASSESSMENT FLOWSHEET (last 72 hours)      Occupational Therapy Evaluation     Row Name 09/19/18 0800                   OT Evaluation Time/Intention    Subjective Information complains of;pain  -AC        Document Type evaluation  -AC        Mode of Treatment occupational therapy  -AC           General Information     Patient Profile Reviewed? yes  -AC        Onset of Illness/Injury or Date of Surgery 09/18/18  -        Referring Physician Minal Johnson APRN  -        Patient Observations alert;cooperative;agree to therapy  -AC        Patient/Family Observations daughter present  -AC        General Observations of Patient pt is sidelying L on couch, sleeping  -AC        Prior Level of Function independent:;all household mobility;community mobility;gait;transfer;ADL's;home management;cooking;cleaning;driving;shopping  -AC        Equipment Currently Used at Home shower chair;rollator;walker, rolling;commode, bedside   chair lift, transport chair  -AC        Pertinent History of Current Functional Problem fall 3-4 days prior to admit, LLE weakness; Dx: acute UTI, renal insufficiency, nondisplaced 3rd sacral body fx (9/18 CT pelvis, CT RLE, CT abd pelvis)  -AC        Existing Precautions/Restrictions fall  -AC        Limitations/Impairments hearing  -AC        Risks Reviewed patient and family:;LOB;nausea/vomiting;dizziness;increased discomfort;change in vital signs;lines disloged  -AC        Benefits Reviewed patient and family:;improve function;increase independence;increase strength;increase balance;decrease pain;decrease risk of DVT;improve skin integrity;increase knowledge  -AC        Barriers to Rehab hearing deficit  -AC           Relationship/Environment    Lives With spouse  -AC        Primary Roles/Responsibilities caregiver for other(s)  -AC        Concerns About Impact on Relationships 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  -AC           Resource/Environmental Concerns    Current Living Arrangements home/apartment/condo  -           Home Main Entrance    Number of Stairs, Main Entrance three  -AC        Stair Railings, Main Entrance railing on left side (ascending)   grab bar at door  -AC           Stairs Within Home, Primary    Stairs Comment, Within Home, Primary chair lift to basement  -AC            Cognitive Assessment/Interventions    Additional Documentation Cognitive Assessment/Intervention (Group)  -           Cognitive Assessment/Intervention- PT/OT    Orientation Status (Cognition) oriented x 4  -        Follows Commands (Cognition) WNL  -        Personal Safety Interventions fall prevention program maintained;gait belt;muscle strengthening facilitated;nonskid shoes/slippers when out of bed;supervised activity  -           Safety Issues, Functional Mobility    Impairments Affecting Function (Mobility) pain  -           Mobility Assessment/Treatment    Extremity Weight-bearing Status left lower extremity;right lower extremity  -        Left Lower Extremity (Weight-bearing Status) weight-bearing as tolerated (WBAT)  -           Bed Mobility Assessment/Treatment    Bed Mobility Assessment/Treatment sidelying-sit  -        Sidelying-Sit [place] (Bed Mobility) supervision  -        Comment (Bed Mobility) on couch  -           Functional Mobility    Functional Mobility- Ind. Level contact guard assist  -        Functional Mobility- Comment in hallway, BR and back to chair  -           Transfer Assessment/Treatment    Transfer Assessment/Treatment sit-stand transfer;stand-sit transfer;toilet transfer  -           Sit-Stand Transfer    Sit-Stand [place] (Transfers) contact guard;stand by assist  -           Stand-Sit Transfer    Stand-Sit [place] (Transfers) stand by assist  -           Toilet Transfer    Type (Toilet Transfer) sit-stand;stand-sit  -        [place] Level (Toilet Transfer) contact guard  -        Assistive Device (Toilet Transfer) commode;grab bars/safety frame  -           ADL Assessment/Intervention    BADL Assessment/Intervention lower body dressing;grooming;toileting  -           Lower Body Dressing Assessment/Training    Lower Body Dressing [place] Level don;doff;shoes/slippers;moderate assist (50% patient effort)  -         Lower Body Dressing Position unsupported sitting  -AC        Comment (Lower Body Dressing) difficulty crossing LEs up to reach feet for donning shoes  -           Grooming Assessment/Training    [place] Level (Grooming) wash face, hands;supervision  -AC        Grooming Position unsupported standing  -AC           Toileting Assessment/Training    [place] Level (Toileting) toileting skills;perform perineal hygiene;supervision  -        Assistive Devices (Toileting) commode  -        Toileting Position unsupported sitting  -AC           BADL Safety/Performance    Impairments, BADL Safety/Performance pain  -           General ROM    GENERAL ROM COMMENTS WFL AROM BUE  -AC           MMT (Manual Muscle Testing)    General MMT Comments WFL AROM BUE  -AC           Motor Assessment/Interventions    Additional Documentation Balance (Group)  -           Balance    Balance static sitting balance;static standing balance;dynamic sitting balance;dynamic standing balance  -           Static Sitting Balance    Level of [place] (Unsupported Sitting, Static Balance) independent  -        Sitting Position (Unsupported Sitting, Static Balance) sitting on edge of bed  -           Dynamic Sitting Balance    Level of [place], Reaches Outside Midline (Sitting, Dynamic Balance) independent  -AC        Sitting Position, Reaches Outside Midline (Sitting, Dynamic Balance) sitting on edge of bed  -AC           Static Standing Balance    Level of [place] (Supported Standing, Static Balance) standby assist  -           Dynamic Standing Balance    Level of [place], Reaches Outside Midline (Standing, Dynamic Balance) contact guard assist  -           Sensory Assessment/Intervention    Sensory General Assessment no sensation deficits identified  -AC           Positioning and Restraints    Pre-Treatment Position other (comment)   couch  -AC        Post Treatment Position other  -        Other  "Position --   couch  -AC           Pain Assessment    Additional Documentation Pain Scale: FACES Pre/Post-Treatment (Group)  -AC           Pain Scale: Numbers Pre/Post-Treatment    Pain Location - Orientation lower  -AC        Pain Location back  -AC        Pain Intervention(s) Repositioned;Ambulation/increased activity  -AC           Pain Scale: FACES Pre/Post-Treatment    Pain: FACES Scale, Pretreatment 8-->hurts whole lot  -AC        Pain: FACES Scale, Post-Treatment 8-->hurts whole lot  -AC        Pre/Post Treatment Pain Comment \"close to a 10\"  -AC           Plan of Care Review    Plan of Care Reviewed With patient  -AC           Clinical Impression (OT)    Date of Referral to OT 09/18/18  -AC        OT Diagnosis decreased adl  -AC        Prognosis (OT Eval) good  -AC        Criteria for Skilled Therapeutic Interventions Met (OT Eval) yes;treatment indicated  -AC        Rehab Potential (OT Eval) good, to achieve stated therapy goals  -AC        Therapy Frequency (OT Eval) 3 times/wk  -AC        Predicted Duration of Therapy Intervention (Therapy Eval) 10 days  -AC        Care Plan Review (OT) evaluation/treatment results reviewed;care plan/treatment goals reviewed;risks/benefits reviewed;current/potential barriers reviewed;patient/other agree to care plan  -AC        Care Plan Review, Other Participant (OT Eval) daughter  -AC        Anticipated Discharge Disposition (OT) assisted living facility ([facility]);home with home health  -AC           Planned OT Interventions    Planned Therapy Interventions (OT Eval) adaptive equipment training;BADL retraining;functional balance retraining;occupation/activity based interventions;patient/caregiver education/training;transfer/mobility retraining  -AC           OT Goals    Transfer Goal Selection (OT) transfer, OT goal 1  -AC        Dressing Goal Selection (OT) dressing, OT goal 1  -AC        Caregiver Training Goal Selection (OT) caregiver training, OT goal 1  -AC        " Additional Documentation Caregiver Training Goal Selection (OT) (Row)  -AC           Transfer Goal 1 (OT)    Activity/Assistive Device (Transfer Goal 1, OT) toilet;tub;commode  -AC        [place] Level/Cues Needed (Transfer Goal 1, OT) supervision required  -AC        Time Frame (Transfer Goal 1, OT) long term goal (LTG);10 days  -AC        Progress/Outcome (Transfer Goal 1, OT) goal ongoing  -AC           Dressing Goal 1 (OT)    Activity/Assistive Device (Dressing Goal 1, OT) lower body dressing  -AC        [place]/Cues Needed (Dressing Goal 1, OT) supervision required   AE PRN  -AC        Time Frame (Dressing Goal 1, OT) long term goal (LTG);10 days  -AC        Progress/Outcome (Dressing Goal 1, OT) goal ongoing  -AC           Patient Education Goal (OT)    Activity (Patient Education Goal, OT) home safety  -AC        [place]/Cues/Accuracy (Memory Goal 2, OT) demonstrates adequately;independent;verbalizes understanding  -AC        Time Frame (Patient Education Goal, OT) long term goal (LTG);10 days  -AC        Progress/Outcome (Patient Education Goal, OT) goal ongoing  -AC           Living Environment    Home Accessibility stairs to enter home;other (see comments);stairs within home   walk in shower  -AC          User Key  (r) = Recorded By, (t) = Taken By, (c) = Cosigned By    Initials Name Effective Dates    Eulogio De Paz, OTR/L 06/22/15 -                  OT Recommendation and Plan  Outcome Summary/Treatment Plan (OT)  Anticipated Discharge Disposition (OT): assisted living facility (nursing home), home with home health  Planned Therapy Interventions (OT Eval): adaptive equipment training, BADL retraining, functional balance retraining, occupation/activity based interventions, patient/caregiver education/training, transfer/mobility retraining  Therapy Frequency (OT Eval): 3 times/wk  Plan of Care Review  Plan of Care Reviewed With: patient, daughter  Plan of Care Reviewed With: patient,  daughter  Outcome Summary: OT eval completed.  Pt c/o intermittent 8/10 pain in sacrum with activity.  She is able to complete bed mobility, transfers and functional mobility with S to CGA.  She had difficulty donning socks requiring min-modA due to increased pain with B hip flexion/external rotation.  Pt was unsteady while ambulating with 1 LOB requiring CGA and UE support to correct.  She completed toileting and grooming with S.  Pt will benefit from skilled OT to address safety, AE needs, balance and ADL retraining prior to discharge.  Daughter wishes to take pt to [facility].  OT in agreement.           Outcome Measures     Row Name 09/19/18 0923 09/19/18 0900          How much help from another person do you currently need...    Turning from your back to your side while in flat bed without using bedrails?  -- 4  -LH     Moving from lying on back to sitting on the side of a flat bed without bedrails?  -- 4  -LH     Moving to and from a bed to a chair (including a wheelchair)?  -- 3  -LH     Standing up from a chair using your arms (e.g., wheelchair, bedside chair)?  -- 3  -LH     Climbing 3-5 steps with a railing?  -- 3  -LH     To walk in hospital room?  -- 3  -LH     AM-PAC 6 Clicks Score  -- 20  -LH        How much help from another is currently needed...    Putting on and taking off regular lower body clothing? 3  -AC  --     Bathing (including washing, rinsing, and drying) 3  -AC  --     Toileting (which includes using toilet bed pan or urinal) 3  -AC  --     Putting on and taking off regular upper body clothing 4  -AC  --     Taking care of personal grooming (such as brushing teeth) 4  -AC  --     Eating meals 4  -AC  --     Score 21  -AC  --        Functional Assessment    Outcome Measure Options AM-PAC 6 Clicks Daily Activity (OT)  -AC AM-PAC 6 Clicks Basic Mobility (PT)  -       User Key  (r) = Recorded By, (t) = Taken By, (c) = Cosigned By    Initials Name Provider Type    Eulogio De Paz, OTR/L  Occupational Therapist    Darius Henriquez, PT Physical Therapist          Time Calculation:         Time Calculation- OT     Row Name 09/19/18 0927             Time Calculation- OT    OT Start Time 0800  -AC      OT Stop Time 0900  -AC      OT Time Calculation (min) 60 min  -AC      OT Received On 09/19/18  -AC      OT Goal Re-Cert Due Date 09/29/18  -        User Key  (r) = Recorded By, (t) = Taken By, (c) = Cosigned By    Initials Name Provider Type     Eulogio Blackmon, OTR/L Occupational Therapist        Therapy Suggested Charges     Code   Minutes Charges    None           Therapy Charges for Today     Code Description Service Date Service Provider Modifiers Qty    09456994048 HC OT SELFCARE CURRENT 9/19/2018 Eulogio Blackmon OTR/L GO, CJ 1    94662882299 HC OT SELFCARE PROJECTED 9/19/2018 Eulogio Blackmon, OTR/L GO, CI 1    56637249657 HC OT EVAL LOW COMPLEXITY 4 9/19/2018 Eulogio Blackmon OTR/L GO, KX 1          OT G-codes  OT Professional Judgement Used?: Yes  OT Functional Scales Options: AM-PAC 6 Clicks Daily Activity (OT)  Score: 21  Functional Limitation: Self care  Self Care Current Status (): At least 20 percent but less than 40 percent impaired, limited or restricted  Self Care Goal Status (): At least 1 percent but less than 20 percent impaired, limited or restricted    Eulogio Blackmon OTR/L  9/19/2018

## 2018-09-19 NOTE — PLAN OF CARE
Problem: Patient Care Overview  Goal: Plan of Care Review  Outcome: Ongoing (interventions implemented as appropriate)   09/19/18 9908   Coping/Psychosocial   Plan of Care Reviewed With patient;daughter   OTHER   Outcome Summary PT IE complete. Pt ambulaed ~ 100 feet w/ LOB during head turns and changing direction. Required min assist and handrail to recover. Pt to receive skilled PT to address functional mobility deficits and balance impairments. Recommend assisted living w/ home health at [facility]. Thank you for referral.

## 2018-09-19 NOTE — PLAN OF CARE
Problem: Patient Care Overview  Goal: Plan of Care Review  Outcome: Ongoing (interventions implemented as appropriate)   09/19/18 3130   Coping/Psychosocial   Plan of Care Reviewed With patient;daughter   Plan of Care Review   Progress no change   OTHER   Outcome Summary OT eval completed. Pt c/o intermittent 8/10 pain in sacrum with activity. She is able to complete bed mobility, transfers and functional mobility with S to CGA. She had difficulty donning socks requiring min-modA due to increased pain with B hip flexion/external rotation. Pt was unsteady while ambulating with 1 LOB requiring CGA and UE support to correct. She completed toileting and grooming with S. Pt will benefit from skilled OT to address safety, AE needs, balance and ADL retraining prior to discharge. Daughter wishes to take pt to [facility]. OT in agreement.

## 2018-09-19 NOTE — PLAN OF CARE
Problem: Patient Care Overview  Goal: Plan of Care Review  Outcome: Ongoing (interventions implemented as appropriate)   09/19/18 0409   Coping/Psychosocial   Plan of Care Reviewed With patient   Plan of Care Review   Progress no change   OTHER   Outcome Summary vss; alert and oriented x 4; c/o mild pain at rest; prn tylenol given; ivf; up x 1 to br; daughter at bedside; continue to monitor     Goal: Individualization and Mutuality  Outcome: Ongoing (interventions implemented as appropriate)    Goal: Discharge Needs Assessment  Outcome: Ongoing (interventions implemented as appropriate)    Goal: Interprofessional Rounds/Family Conf  Outcome: Ongoing (interventions implemented as appropriate)      Problem: Fall Risk (Adult)  Goal: Absence of Fall  Outcome: Ongoing (interventions implemented as appropriate)      Problem: Fracture Orthopaedic (Adult)  Goal: Signs and Symptoms of Listed Potential Problems Will be Absent, Minimized or Managed (Fracture Orthopaedic)  Outcome: Ongoing (interventions implemented as appropriate)

## 2018-09-19 NOTE — THERAPY EVALUATION
Acute Care - Physical Therapy Initial Evaluation  [facility]     Patient Name: Lakesha Costello  : 1935  MRN: [id number]  Today's Date: 2018   Onset of Illness/Injury or Date of Surgery: 18  Date of Referral to PT: 18  Referring Physician: FACUNDO Grace      Admit Date: 2018    Visit Dx:     ICD-10-CM ICD-9-CM   1. Acute UTI (urinary tract infection) N39.0 599.0   2. Renal insufficiency N28.9 593.9   3. Closed nondisplaced zone I fracture of sacrum, initial encounter (CMS/[facility]) S32.110A 805.6   4. Decreased activities of daily living (ADL) Z78.9 V49.89   5. Impaired mobility Z74.09 799.89     Patient Active Problem List   Diagnosis   • Paroxysmal atrial fibrillation (CMS/[facility])   • Atrial flutter (CMS/[facility])   • Essential hypertension   • Type 2 diabetes mellitus with diabetic peripheral angiopathy without gangrene, with long-term current use of insulin (CMS/[facility])   • Chronic anticoagulation   • Coronary artery disease involving native coronary artery of native heart without angina pectoris   • SSS (sick sinus syndrome) (CMS/[facility])   • Chest pain   • S/P CABG x 3   • Artificial pacemaker   • Tachycardia   • Palpitations   • Mixed hyperlipidemia   • S/P TAVR (transcatheter aortic valve replacement)   • Age-related osteoporosis without current pathological fracture   • Coronary artery abnormality   • Chronic diastolic congestive heart failure (CMS/[facility])   • Malignant neoplasm of breast (CMS/[facility])   • CKD (chronic kidney disease) stage 3, GFR 30-59 ml/min   • Acute UTI (urinary tract infection)     Past Medical History:   Diagnosis Date   • Aortic stenosis    • Breast cancer (CMS/[facility])    • CAD (coronary artery disease)    • Cataract    • Chronic anticoagulation     Coumadin    • CKD (chronic kidney disease) stage 3, GFR 30-59 ml/min 2018   • Diabetes mellitus (CMS/[facility])     Type II   • Elevated cholesterol    • GERD (gastroesophageal reflux disease)    • Hyperlipidemia    • Hypertension     • Macular degeneration    • Pancreatitis    • Paroxysmal atrial fibrillation (CMS/H[facility])    • Pulmonary hypertension    • Rheumatic fever     during childhood   • Sick sinus syndrome (CMS/HCC)     with pacemaker     Past Surgical History:   Procedure Laterality Date   • ANOMALOUS PULMONARY VENOUS RETURN REPAIR, TOTAL     • BACK SURGERY     • BELPHAROPTOSIS REPAIR     • CARDIAC CATHETERIZATION  1999, 2010   • CARDIAC CATHETERIZATION N/A 2/15/2017    Procedure: Left Heart Cath;  Surgeon: Ehsan Crocker MD;  Location:  [facility];  Service:    • CARDIAC CATHETERIZATION N/A 2/15/2017    Procedure: Right Heart Cath;  Surgeon: Ehsan Crocker MD;  Location:  [facility];  Service:    • CARDIAC CATHETERIZATION N/A 2/15/2017    Procedure: Coronary angiography;  Surgeon: Ehsan Crocker MD;  Location:  [facility];  Service:    • CARDIAC CATHETERIZATION N/A 2/15/2017    Procedure: Left ventriculography;  Surgeon: Ehsan Crocker MD;  Location:  [facility];  Service:    • CARDIAC CATHETERIZATION N/A 2/15/2017    Procedure: Intracardiac echocardiogram;  Surgeon: Ehsan Crocker MD;  Location:  [facility];  Service:    • CARDIAC ELECTROPHYSIOLOGY PROCEDURE N/A 2/3/2017    Procedure: Pacemaker DC new;  Surgeon: Ehsan Crocker MD;  Location:  [facility];  Service:    • CATARACT EXTRACTION     • CHOLECYSTECTOMY     • CORONARY ARTERY BYPASS GRAFT  1999    4 vessel    • CORONARY ARTERY BYPASS GRAFT     • CORONARY STENT PLACEMENT     • HYSTERECTOMY  1962   • INSERT / REPLACE / REMOVE PACEMAKER     • MASTECTOMY  2000   • OTHER SURGICAL HISTORY      TAVR 2017        PT ASSESSMENT (last 12 hours)      Physical Therapy Evaluation     Row Name 09/19/18 0801          PT Evaluation Time/Intention    Subjective Information complains of;pain;dizziness  -LH     Document Type evaluation  -     Mode of Treatment physical therapy  -     Row Name  09/19/18 0801          General Information    Patient Profile Reviewed? yes  -     Onset of Illness/Injury or Date of Surgery 09/18/18  -     Referring Physician FACUNDO Grace  -     Patient Observations alert;cooperative;agree to therapy  -     Patient/Family Observations dtr in room  -     General Observations of Patient L sidelying on couch in room  -     Prior Level of Function independent:;all household mobility;community mobility;ADL's;home management;cooking;cleaning;driving;shopping  -     Equipment Currently Used at Home shower chair;rollator;walker, rolling;commode, bedside   chair lift, transport chair  -     Pertinent History of Current Functional Problem s/p fall 3-4 days prior to admit, LLE weakness, Dx:  acute UTI, renal insufficiency, nondisplaced sacral fx  -     Existing Precautions/Restrictions fall  -     Equipment Issued to Patient gait belt;walker, front wheeled  -     Risks Reviewed patient and family:;LOB;nausea/vomiting;dizziness;increased discomfort  -     Benefits Reviewed patient and family:;improve function;increase independence;increase strength;increase balance  -     Barriers to Rehab none identified  -     Row Name 09/19/18 0801          Relationship/Environment    Lives With spouse  -     Primary Roles/Responsibilities caregiver for other(s)  -     Row Name 09/19/18 0801          Resource/Environmental Concerns    Current Living Arrangements home/apartment/condo  -     Row Name 09/19/18 0801          Home Main Entrance    Number of Stairs, Main Entrance three  -     Stair Railings, Main Entrance railing on left side (ascending)   grab bar at door  -     Row Name 09/19/18 0801          Cognitive Assessment/Interventions    Additional Documentation Cognitive Assessment/Intervention (Group)  -     Row Name 09/19/18 0801          Cognitive Assessment/Intervention- PT/OT    Affect/Mental Status (Cognitive) WFL  -     Orientation Status  (Cognition) oriented x 4  -     Personal Safety Interventions fall prevention program maintained;gait belt;nonskid shoes/slippers when out of bed  -[facility] Name 09/19/18 0801          Safety Issues, Functional Mobility    Impairments Affecting Function (Mobility) balance;pain  -[facility] Name 09/19/18 0801          Bed Mobility Assessment/Treatment    Sidelying-Sit [place] (Bed Mobility) supervision  -LH     Row Name 09/19/18 0801          Transfer Assessment/Treatment    Sit-Stand [place] (Transfers) verbal cues;contact guard  -     Stand-Sit [place] (Transfers) verbal cues;contact guard  -[facility] Name 09/19/18 0801          Gait/Stairs Assessment/Training    [place] Level (Gait) contact guard;minimum assist (75% patient effort)  -     Distance in Feet (Gait) 100  -     Comment (Gait/Stairs) LOB w/ head turns and changing direction  -[facility] Name 09/19/18 0801          General ROM    GENERAL ROM COMMENTS WFL  -LH     Row Name 09/19/18 0801          MMT (Manual Muscle Testing)    General MMT Comments functionally 4/5  -LH     Row Name 09/19/18 0801          Pain Assessment    Additional Documentation Pain Scale: Numbers Pre/Post-Treatment (Group)  -LH     Row Name 09/19/18 0801          Pain Scale: Numbers Pre/Post-Treatment    Pain Scale: Numbers, Pretreatment 10/10  -     Pain Location --   sacral area  -     Pain Intervention(s) Medication (See MAR);Repositioned;Ambulation/increased activity  -[facility] Name 09/19/18 0801          Plan of Care Review    Plan of Care Reviewed With patient;daughter  -[facility] Name 09/19/18 0801          Physical Therapy Clinical Impression    Date of Referral to PT 09/18/18  [facility]     PT Diagnosis (PT Clinical Impression) impaired mobility  -     Patient/Family Goals Statement (PT Clinical Impression) return home  -     Criteria for Skilled Interventions Met (PT Clinical Impression) yes;treatment indicated  -     Rehab Potential (PT  Clinical Summary) good, to achieve stated therapy goals  -     Predicted Duration of Therapy (PT) until dc  -     Care Plan Review (PT) evaluation/treatment results reviewed;risks/benefits reviewed;patient/other agree to care plan  -     Row Name 09/19/18 0801          Physical Therapy Goals    Bed Mobility Goal Selection (PT) bed mobility, PT goal 1  -     Transfer Goal Selection (PT) transfer, PT goal 1  -     Gait Training Goal Selection (PT) gait training, PT goal 1  -     Stairs Goal Selection (PT) stairs, PT goal 1  -     Additional Documentation Stairs Goal Selection (PT) (Row)  -     Row Name 09/19/18 0801          Bed Mobility Goal 1 (PT)    Activity/Assistive Device (Bed Mobility Goal 1, PT) bed mobility activities, all  -     [place] Level/Cues Needed (Bed Mobility Goal 1, PT) independent  -     Time Frame (Bed Mobility Goal 1, PT) by discharge  -     Progress/Outcomes (Bed Mobility Goal 1, PT) goal ongoing  -     Row Name 09/19/18 0801          Transfer Goal 1 (PT)    Activity/Assistive Device (Transfer Goal 1, PT) sit-to-stand/stand-to-sit;bed-to-chair/chair-to-bed  -     [place] Level/Cues Needed (Transfer Goal 1, PT) independent  -     Time Frame (Transfer Goal 1, PT) by discharge  -     Progress/Outcome (Transfer Goal 1, PT) goal ongoing  -     Row Name 09/19/18 0801          Gait Training Goal 1 (PT)    Activity/Assistive Device (Gait Training Goal 1, PT) gait (walking locomotion)  -     [place] Level (Gait Training Goal 1, PT) standby assist  -     Distance (Gait Goal 1, PT) 200 feet  -     Time Frame (Gait Training Goal 1, PT) by discharge  -     Progress/Outcome (Gait Training Goal 1, PT) goal ongoing  -     Row Name 09/19/18 0801          Stairs Goal 1 (PT)    Activity/Assistive Device (Stairs Goal 1, PT) ascending stairs;descending stairs;using handrail, left  -     [place] Level/Cues Needed (Stairs Goal 1, PT) standby assist  -      Number of Stairs (Stairs Goal 1, PT) 3  -     Time Frame (Stairs Goal 1, PT) by discharge  -     Progress/Outcome (Stairs Goal 1, PT) goal ongoing  -     Row Name 09/19/18 0801          Positioning and Restraints    Pre-Treatment Position other (comment)  -     Post Treatment Position other   couch  -     Other Position --   couch  -     Row Name 09/19/18 0801          Living Environment    Home Accessibility stairs to enter home;stairs within home   walk in shower  -       User Key  (r) = Recorded By, (t) = Taken By, (c) = Cosigned By    Initials Name Provider Type     Darius Leal, PT Physical Therapist          Physical Therapy Education     Title: PT OT SLP Therapies (Active)     Topic: Physical Therapy (Done)     Point: Mobility training (Done)    Learning Progress Summary     Learner Status Readiness Method Response Comment Documented by    Patient Done Acceptance RAFAT CLARKE,VU benefits of PT and POC, call for assist w/ mobility, sidelying w/ pillows b/t knees  09/19/18 0925          Point: Precautions (Done)    Learning Progress Summary     Learner Status Readiness Method Response Comment Documented by    Patient Done Acceptance VINCED DU,VU benefits of PT and POC, call for assist w/ mobility, sidelying w/ pillows b/t knees  09/19/18 0925                      User Key     Initials Effective Dates Name Provider Type [facility] 08/02/16 -  Darius Leal, PT Physical Therapist PT                PT Recommendation and Plan  Anticipated Discharge Disposition (PT): assisted living facility ([facility]), home with home health  Planned Therapy Interventions (PT Eval): balance training, bed mobility training, gait training, home exercise program, patient/family education, stair training, strengthening, transfer training (RW prn)  Therapy Frequency (PT Clinical Impression): 2 times/day  Outcome Summary/Treatment Plan (PT)  Anticipated Equipment Needs at Discharge (PT): front wheeled walker  Anticipated  Discharge Disposition (PT): assisted living facility (half-way), home with home health  Plan of Care Reviewed With: patient, daughter  Outcome Summary: PT IE complete.  Pt ambulaed ~ 100 feet w/ LOB during head turns and changing direction.  Required min assist and handrail to recover.  Pt to receive skilled PT to address functional mobility deficits and balance impairments.  Recommend assisted living w/ home health at [facility].  Thank you for referral.          Outcome Measures     Row Name 09/19/18 0923 09/19/18 0900          How much help from another person do you currently need...    Turning from your back to your side while in flat bed without using bedrails?  -- 4  -LH     Moving from lying on back to sitting on the side of a flat bed without bedrails?  -- 4  -LH     Moving to and from a bed to a chair (including a wheelchair)?  -- 3  -LH     Standing up from a chair using your arms (e.g., wheelchair, bedside chair)?  -- 3  -LH     Climbing 3-5 steps with a railing?  -- 3  -LH     To walk in hospital room?  -- 3  -     AM-PAC 6 Clicks Score  -- 20  -LH        How much help from another is currently needed...    Putting on and taking off regular lower body clothing? 3  -AC  --     Bathing (including washing, rinsing, and drying) 3  -AC  --     Toileting (which includes using toilet bed pan or urinal) 3  -AC  --     Putting on and taking off regular upper body clothing 4  -AC  --     Taking care of personal grooming (such as brushing teeth) 4  -AC  --     Eating meals 4  -AC  --     Score 21  -AC  --        Functional Assessment    Outcome Measure Options AM-PAC 6 Clicks Daily Activity (OT)  - AM-PAC 6 Clicks Basic Mobility (PT)  -       User Key  (r) = Recorded By, (t) = Taken By, (c) = Cosigned By    Initials Name Provider Type    Eulogio De Paz, OTR/L Occupational Therapist     Darius Leal, PT Physical Therapist           Time Calculation:         PT Charges     Row Name 09/19/18 0974             Time  Calculation    Start Time 0800  -      Stop Time 0900  -      Time Calculation (min) 60 min  -      PT Received On 09/19/18  -      PT Goal Re-Cert Due Date 09/29/18  -        User Key  (r) = Recorded By, (t) = Taken By, (c) = Cosigned By    Initials Name Provider Type     Darius Leal, PT Physical Therapist        Therapy Suggested Charges     Code   Minutes Charges    None           Therapy Charges for Today     Code Description Service Date Service Provider Modifiers Qty    30327341969 HC PT MOBILITY CURRENT 9/19/2018 aDrius Leal, PT GP, CJ 1    79181243354 HC PT MOBILITY PROJECTED 9/19/2018 Darius Leal, PT GP, CI 1    09234713502 HC PT EVAL LOW COMPLEXITY 4 9/19/2018 Darius Leal, PT GP, KX 1          PT G-Codes  Outcome Measure Options: AM-PAC 6 Clicks Daily Activity (OT)  AM-PAC 6 Clicks Score: 20  Score: 21  Functional Limitation: Mobility: Walking and moving around  Mobility: Walking and Moving Around Current Status (): At least 20 percent but less than 40 percent impaired, limited or restricted  Mobility: Walking and Moving Around Goal Status (): At least 1 percent but less than 20 percent impaired, limited or restricted      Darius Leal PT  9/19/2018

## 2018-09-19 NOTE — PROGRESS NOTES
Discharge Planning Assessment  [facility]     Patient Name: Lakesha Costello  MRN: [id number]  Today's Date: 9/19/2018    Admit Date: 9/18/2018          Discharge Needs Assessment     Row Name 09/19/18 1516       Living Environment    Lives With spouse    Current Living Arrangements home/apartment/condo    Primary Care Provided by self;spouse/significant other    Provides Primary Care For no one    Family Caregiver if Needed child(obed), adult    Family Caregiver Names RICH (DTR) [phone]    Quality of Family Relationships helpful;involved;supportive    Able to Return to Prior Arrangements yes    Living Arrangement Comments DTR PLANS ON GETTING PT AND SPOUSE [facility] IN [place]       Resource/Environmental Concerns    Resource/Environmental Concerns none    Transportation Concerns car, none       Transition Planning    Patient/Family Anticipates Transition to home with help/services    Patient/Family Anticipated Services at Transition none    Transportation Anticipated family or friend will provide       Discharge Needs Assessment    Readmission Within the Last 30 Days no previous admission in last 30 days    Concerns to be Addressed denies needs/concerns at this time    Equipment Currently Used at Home none    Anticipated Changes Related to Illness none    Equipment Needed After Discharge walker, rolling    Outpatient/Agency/Support Group Needs homecare agency    Discharge Facility/Level of Care Needs home with home health    Offered/Gave Vendor List yes    Patient's Choice of Community Agency(s) [facility]    Discharge Coordination/Progress SPOKE TO DTR (RICH) REGARDING DC NEEDS. PT LIVES AT HOME WITH SPOUSE. RICH IS DON [facility] [place] [facility] PLAN IS FOR [place] HOME UNTIL THAT IS ARRANGED. RICH IS REQUESTING HOME HEALTH AND PREFERS [facility]. SHE IS ALSO REQUESTING A ROLLING WALKER AT [facility].             Discharge Plan     Luci  Name 09/19/18 1519       Plan    Plan HOME WITH [facility]    Patient/Family in Agreement with Plan yes        Destination     No service coordination in this encounter.      Durable Medical Equipment     No service coordination in this encounter.      Dialysis/Infusion     No service coordination in this encounter.      Home Medical Care     No service coordination in this encounter.      Social Care     No service coordination in this encounter.                Demographic Summary    No documentation.           Functional Status    No documentation.           Psychosocial    No documentation.           Abuse/Neglect    No documentation.           Legal    No documentation.           Substance Abuse    No documentation.           Patient Forms    No documentation.         LUCIA Lehay

## 2018-09-19 NOTE — CONSULTS
[facility]    HEMATOLOGY & ONCOLOGY      CONSULTATION NOTE      REASON FOR CONSULT: REFERRING PHYSICIAN:       ADMISSION DIAGNOSIS  Acute UTI (urinary tract infection) [N39.0]      Subjective     HISTORY OF PRESENT ILLNESS:     83 Yr old WFM with Prior Hx of Breast cancer stage 1 in 2001, s/p Mastectomy NO chemo. At that time the Plts were normal and so were the Plt count normal in 6/2017 Last Yr in 2017 she underwent a heart valve replacement and has been on Coumadin.  Being on Coumadin in elderly patients invaribly tend to microscopically bleed in stools causing Iron deficiency and I fear that Iron deficiecny is causing thrombocytosis,  Although underlying Myeloproliferative disorder cannot be ruled out.    Past Medical History:   Past Medical History:   Diagnosis Date   • Aortic stenosis    • Breast cancer (CMS/HCC)    • CAD (coronary artery disease)    • Cataract    • Chronic anticoagulation     Coumadin    • CKD (chronic kidney disease) stage 3, GFR 30-59 ml/min 4/16/2018   • Diabetes mellitus (CMS/HCC)     Type II   • Elevated cholesterol    • GERD (gastroesophageal reflux disease)    • Hyperlipidemia    • Hypertension    • Macular degeneration    • Pancreatitis    • Paroxysmal atrial fibrillation (CMS/HCC)    • Pulmonary hypertension    • Rheumatic fever     during childhood   • Sick sinus syndrome (CMS/HCC)     with pacemaker     Past Surgical History:   Past Surgical History:   Procedure Laterality Date   • ANOMALOUS PULMONARY VENOUS RETURN REPAIR, TOTAL     • BACK SURGERY     • BELPHAROPTOSIS REPAIR     • CARDIAC CATHETERIZATION  1999, 2010   • CARDIAC CATHETERIZATION N/A 2/15/2017    Procedure: Left Heart Cath;  Surgeon: Ehsan Crocker MD;  Location:  [facility] CATH INVASIVE LOCATION;  Service:    • CARDIAC CATHETERIZATION N/A 2/15/2017    Procedure: Right Heart Cath;  Surgeon: Ehsan Crocker MD;  Location:  [facility] CATH INVASIVE LOCATION;  Service:    • CARDIAC CATHETERIZATION N/A 2/15/2017     Procedure: Coronary angiography;  Surgeon: Ehsan Crocker MD;  Location:  [facility] CATH INVASIVE LOC[facility];  Service:    • CARDIAC CATHETERIZATION N/A 2/15/2017    Procedure: Left ventriculography;  Surgeon: Ehsan Crocker MD;  Location:  [facility] CATH INVASIVE LOC[facility];  Service:    • CARDIAC CATHETERIZATION N/A 2/15/2017    Procedure: Intracardiac echocardiogram;  Surgeon: Ehsan Corcker MD;  Location: [facility] CATH INVASIVE LOCATION;  Service:    • CARDIAC ELECTROPHYSIOLOGY PROCEDURE N/A 2/3/2017    Procedure: Pacemaker DC new;  Surgeon: Ehsan Crocker MD;  Location:  [facility] CATH INVASIVE LOCATION;  Service:    • CATARACT EXTRACTION     • CHOLECYSTECTOMY     • CORONARY ARTERY BYPASS GRAFT  1999    4 vessel    • CORONARY ARTERY BYPASS GRAFT     • CORONARY STENT PLACEMENT     • HYSTERECTOMY  1962   • INSERT / REPLACE / REMOVE PACEMAKER     • MASTECTOMY  2000   • OTHER SURGICAL HISTORY      TAVR 2017     Social History:   Social History     Social History   • Marital status:      Spouse name: N/A   • Number of children: N/A   • Years of education: N/A     Occupational History   • Not on file.     Social History Main Topics   • Smoking status: Never Smoker   • Smokeless tobacco: Never Used   • Alcohol use No   • Drug use: No   • Sexual activity: No     Other Topics Concern   • Not on file     Social History Narrative   • No narrative on file     Family History:   Family History   Problem Relation Age of Onset   • Breast cancer Mother    • Coronary artery disease Father    • Heart attack Father    • Diabetes Father    • Cancer Brother    • No Known Problems Maternal Grandmother    • No Known Problems Maternal Grandfather    • No Known Problems Paternal Grandmother    • No Known Problems Paternal Grandfather        Review of Systems     Medications:    Current Facility-Administered Medications   Medication Dose Route Frequency Provider Last Rate Last Dose   • acetaminophen (TYLENOL) tablet 1,000 mg  1,000 mg Oral Q6H PRN  Minal Johnson, APRN   1,000 mg at 09/19/18 0215   • amiodarone (PACERONE) tablet 200 mg  200 mg Oral BID Minal Johnson, APRN   200 mg at 09/19/18 0856   • aspirin EC tablet 81 mg  81 mg Oral Daily Minal Johnson, APRN   81 mg at 09/19/18 0856   • atenolol (TENORMIN) tablet 50 mg  50 mg Oral Daily Minal Johnson, APRN   50 mg at 09/19/18 0856   • atorvastatin (LIPITOR) tablet 10 mg  10 mg Oral Nightly Minal Johnson, APRN   10 mg at 09/18/18 2023   • cefTRIAXone (ROCEPHIN) 1 g/10mL IV PUSH syringe  1 g Intravenous Q24H Myron Huynh MD       • dextrose (D50W) 25 g/ 50mL Intravenous Solution 25 g  25 g Intravenous Q15 Min PRN Minal Johnson, APRN       • dextrose (GLUTOSE) oral gel 15 g  15 g Oral Q15 Min PRN Minal Johnson, APRN       • docusate sodium (COLACE) capsule 100 mg  100 mg Oral BID Minal Johnson, APRN   100 mg at 09/19/18 0857   • glipiZIDE (GLUCOTROL) tablet 5 mg  5 mg Oral BID AC Minal Johnson, APRN   5 mg at 09/19/18 0856   • glucagon (human recombinant) (GLUCAGEN DIAGNOSTIC) injection 1 mg  1 mg Subcutaneous PRN Minal Johnson, APRN       • insulin detemir (LEVEMIR) injection 30 Units  30 Units Subcutaneous BID Minal Johnson APRN   30 Units at 09/18/18 2046   • insulin lispro (humaLOG) injection 2-7 Units  2-7 Units Subcutaneous 4x Daily With Meals & Nightly Minal Johnson, APRN   3 Units at 09/18/18 2047   • magnesium oxide (MAGOX) tablet 400 mg  400 mg Oral Daily Minal Johnson, APRN   400 mg at 09/19/18 0856   • nitroglycerin (NITROSTAT) SL tablet 0.4 mg  0.4 mg Sublingual Q5 Min PRN Minal Johnson, APRN       • ondansetron (ZOFRAN) tablet 4 mg  4 mg Oral Q6H PRN Minal Johnson APRN        Or   • ondansetron ODT (ZOFRAN-ODT) disintegrating tablet 4 mg  4 mg Oral Q6H PRN Minal Johnson APRN        Or   • ondansetron (ZOFRAN) injection 4 mg  4 mg Intravenous Q6H PRN Minal Johnson APRN       • pantoprazole (PROTONIX) EC tablet 40  "mg  40 mg Oral Q AM Minal Johnson APRN   40 mg at 09/19/18 0615   • pneumococcal polysaccharide 23-valent (PNEUMOVAX-23) vaccine 0.5 mL  0.5 mL Intramuscular During Hospitalization Myron Huynh MD       • polyethylene glycol 3350 powder (packet)  17 g Oral Daily Minal Johnson APRN   17 g at 09/18/18 1722   • polyvinyl alcohol (LIQUIFILM) 1.4 % ophthalmic solution 1 drop  1 drop Right Eye Daily Minal Johnson APRN       • sodium chloride 0.9 % flush 1-10 mL  1-10 mL Intravenous PRN Minal Johnson APRN       • sodium chloride 0.9 % infusion  75 mL/hr Intravenous Continuous Minal Johnson APRN 75 mL/hr at 09/18/18 1927 75 mL/hr at 09/18/18 1927   • spironolactone (ALDACTONE) tablet 25 mg  25 mg Oral Daily Minal Johnson APRN   25 mg at 09/19/18 0856   • venlafaxine XR (EFFEXOR-XR) 24 hr capsule 37.5 mg  37.5 mg Oral Daily Minal Johnson APRN   37.5 mg at 09/19/18 0856   • warfarin (COUMADIN) tablet 1 mg  1 mg Oral Daily Minal Johnson APRN   1 mg at 09/18/18 1711       ALLERGIES:    Allergies   Allergen Reactions   • Dilaudid [Hydromorphone Hcl]    • Dilaudid [Hydromorphone] Nausea And Vomiting   • Enalapril Angioedema   • Janumet [Sitagliptin-Metformin Hcl] Other (See Comments)     Chest pain   • Lopid [Gemfibrozil] Nausea And Vomiting   • Sulfa Antibiotics Other (See Comments)     Syncope     • Ultram [Tramadol] Itching       Objective      Vitals:    09/18/18 2015 09/18/18 2241 09/19/18 0000 09/19/18 0355   BP: 128/50 134/58 144/52 151/54   BP Location: Left arm   Left arm   Patient Position: Sitting   Lying   Pulse:  60 61 60   Resp:   20 20   Temp:   98.1 °F (36.7 °C) 98.5 °F (36.9 °C)   TempSrc:   Oral Oral   SpO2:   92% 94%   Weight:       Height:         /54 (BP Location: Left arm, Patient Position: Lying)   Pulse 60   Temp 98.5 °F (36.9 °C) (Oral)   Resp 20   Ht 154.9 cm (61\")   Wt 55.3 kg (122 lb)   SpO2 94%   BMI 23.05 kg/m²     No flowsheet data " found.      General Appearance:    Alert, cooperative, no distress, appears stated age   Head:    Normocephalic, without obvious abnormality, atraumatic   Eyes:    PERRL, conjunctiva/corneas clear, EOM's intact, fundi     benign, both eyes   Ears:    Normal TM's and external ear canals, both ears   Nose:   Nares normal, septum midline, mucosa normal, no drainage     or sinus tenderness   Throat:   Lips, mucosa, and tongue normal; teeth and gums normal   Neck:   Supple, symmetrical, trachea midline, no adenopathy;     thyroid:  no enlargement/tenderness/nodules; no carotid    bruit or JVD   Back:     Symmetric, no curvature, ROM normal, no CVA tenderness   Lungs:     Clear to auscultation bilaterally, respirations unlabored   Chest Wall:    No tenderness or deformity    Heart:    Regular rate and rhythm, S1 and S2 normal, no murmur, rub    or gallop   Abdomen:     Soft, non-tender, bowel sounds active all four quadrants,     no masses, no organomegaly   Extremities:   Extremities normal, atraumatic, no cyanosis or edema   Pulses:   2+ and symmetric all extremities   Skin:   Skin color, texture, turgor normal, no rashes or lesions   Lymph nodes:   Cervical, supraclavicular, and axillary nodes normal   Neurologic:   CNII-XII intact, normal strength, sensation and reflexes     throughout       RECENT LABS:  Lab Results (last 72 hours)     Procedure Component Value Units Date/Time    CBC Auto Differential [[id number]]  (Abnormal) Collected:  09/19/18 0843    Specimen:  Blood Updated:  09/19/18 0919     WBC 7.25 10*3/mm3      RBC 3.73 (L) 10*6/mm3      Hemoglobin 10.6 (L) g/dL      Hematocrit 33.6 (L) %      MCV 90.1 fL      MCH 28.4 pg      MCHC 31.5 (L) g/dL      RDW 16.1 (H) %      RDW-SD 53.0 fl      MPV 11.6 fL      Platelets 941 (H) 10*3/mm3      Neutrophil % 68.7 %      Lymphocyte % 15.0 %      Monocyte % 10.6 %      Eosinophil % 2.9 %      Basophil % 1.1 %      Immature Grans % 1.7 %      Neutrophils, Absolute 4.98  10*3/mm3      Lymphocytes, Absolute 1.09 10*3/mm3      Monocytes, Absolute 0.77 10*3/mm3      Eosinophils, Absolute 0.21 10*3/mm3      Basophils, Absolute 0.08 10*3/mm3      Immature Grans, Absolute 0.12 (H) 10*3/mm3      nRBC 0.0 /100 WBC     POC Glucose Once [[id number]]  (Abnormal) Collected:  09/19/18 0852    Specimen:  Blood Updated:  09/19/18 0914     Glucose 62 (L) mg/dL      Comment: : [id number] [place] JesscyMeter ID: [id number]       POC Glucose Once [[id number]]  (Abnormal) Collected:  09/18/18 2026    Specimen:  Blood Updated:  09/18/18 2037     Glucose 246 (H) mg/dL      Comment: : [id number] [place] CoreyucMeter ID: [id number]       POC Glucose Once [[id number]]  (Normal) Collected:  09/18/18 1717    Specimen:  Blood Updated:  09/18/18 1747     Glucose 123 mg/dL      Comment: : [id number] Jaden Deutsch AnnMeter ID: [id number]       Protime-INR [[id number]]  (Abnormal) Collected:  09/18/18 1627    Specimen:  Blood Updated:  09/18/18 1649     Protime 20.9 (H) Seconds      INR 1.73 (H)    Platelet Count [[id number]]  (Abnormal) Collected:  09/18/18 1645    Specimen:  Blood Updated:  09/18/18 1646     Platelets 1,271 (H) 10*3/mm3     Comprehensive Metabolic Panel [[id number]]  (Abnormal) Collected:  09/18/18 1106    Specimen:  Blood Updated:  09/18/18 1157     Glucose 313 (H) mg/dL      BUN 42 (H) mg/dL      Creatinine 1.69 (H) mg/dL      Sodium 137 mmol/L      Potassium 5.3 mmol/L      Chloride 100 mmol/L      CO2 27.0 mmol/L      Calcium 9.3 mg/dL      Total Protein 7.1 g/dL      Albumin 4.10 g/dL      ALT (SGPT) 28 U/L      AST (SGOT) 52 (H) U/L      Alkaline Phosphatase 46 U/L      Total Bilirubin 0.4 mg/dL      eGFR Non African Amer 29 (L) mL/min/1.73      Globulin 3.0 gm/dL      A/G Ratio 1.4 g/dL      BUN/Creatinine Ratio 24.9     Anion Gap 10.0 mmol/L     Narrative:       The MDRD GFR formula is only valid for adults with stable renal function between ages 18 and 70.    Urinalysis With Microscopic If  Indicated (No Culture) - Urine, Catheter [[id number]]  (Abnormal) Collected:  09/18/18 1140    Specimen:  Urine from Urine, Catheter Updated:  09/18/18 1154     Color, UA Yellow     Appearance, UA Cloudy (A)     pH, UA 5.5     Specific Gravity, UA 1.022     Glucose,  mg/dL (2+) (A)     Ketones, UA Negative     Bilirubin, UA Negative     Blood, UA Negative     Protein, UA Trace (A)     Leuk Esterase, UA Large (3+) (A)     Nitrite, UA Negative     Urobilinogen, UA 0.2 E.U./dL    Urinalysis, Microscopic Only - Urine, Clean Catch [[id number]]  (Abnormal) Collected:  09/18/18 1140    Specimen:  Urine from Urine, Catheter Updated:  09/18/18 1154     RBC, UA 0-2 (A) /HPF      WBC, UA Too Numerous to Count (A) /HPF      Bacteria, UA 3+ (A) /HPF      Squamous Epithelial Cells, UA None Seen /HPF      Hyaline Casts, UA 0-2 /LPF      Methodology Automated Microscopy    CBC & Differential [[id number]] Collected:  09/18/18 1106    Specimen:  Blood Updated:  09/18/18 1128    Narrative:       The following orders were created for panel order CBC & Differential.  Procedure                               Abnormality         Status                     ---------                               -----------         ------                     Manual Differential[[id number]]                                                         CBC Auto Differential[[id number]]        Abnormal            Final result                 Please view results for these tests on the individual orders.    CBC Auto Differential [[id number]]  (Abnormal) Collected:  09/18/18 1106    Specimen:  Blood Updated:  09/18/18 1127     WBC 11.62 (H) 10*3/mm3      RBC 3.97 (L) 10*6/mm3      Hemoglobin 11.4 (L) g/dL      Hematocrit 35.3 (L) %      MCV 88.9 fL      MCH 28.7 pg      MCHC 32.3 (L) g/dL      RDW 16.3 (H) %      RDW-SD 53.3 fl      MPV 11.4 fL      Platelets 1,179 (H) 10*3/mm3      Neutrophil % 81.8 (H) %      Lymphocyte % 7.5 (L) %      Monocyte % 7.8 %       Eosinophil % 1.2 %      Basophil % 0.6 %      Neutrophils, Absolute 9.50 (H) 10*3/mm3      Lymphocytes, Absolute 0.87 10*3/mm3      Monocytes, Absolute 0.91 10*3/mm3      Eosinophils, Absolute 0.14 10*3/mm3      Basophils, Absolute 0.07 10*3/mm3     Protime-INR [[id number]]  (Abnormal) Collected:  09/18/18 1106    Specimen:  Blood Updated:  09/18/18 1125     Protime 20.5 (H) Seconds      INR 1.69 (H)    aPTT [[id number]]  (Abnormal) Collected:  09/18/18 1106    Specimen:  Blood Updated:  09/18/18 1125     PTT 47.8 (H) seconds           None    RADIOLOGY:  Ct Abdomen Pelvis Without Contrast    Result Date: 9/18/2018  Narrative: EXAMINATION: CT ABDOMEN PELVIS WO CONTRAST- 9/18/2018 1:48 PM CDT  HISTORY: Fall, low abdominal pain. Flank pain,  DOSE: 226 mGycm (Automatic exposure control technique was implemented in an effort to keep the radiation dose as low as possible without compromising image quality)  REPORT: Spiral CT of the abdomen and pelvis was performed without contrast from the lung bases through the pubic symphysis. Reconstructed coronal and sagittal images are also reviewed.  COMPARISON: CT abdomen pelvis with contrast 3/23/2016.  Review of lung windows demonstrates mild linear atelectasis as well as evidence of mild emphysema. Previous median sternotomy is noted. Pacemaker wires are present. There is evidence of previous aortic valve replacement. A moderate amount of coronary artery plaque is present. Cholecystectomy clips are present. Liver and spleen are homogeneous on this unenhanced study. Ingested food is noted in the stomach which is incompletely distended. The pancreas and adrenal glands appear grossly within normal limits. No nephrolithiasis is identified and there is no evidence of urinary tract obstruction. Heavy calcified plaque is noted within the abdominal aorta, which is normal in caliber. Calcified plaque is also seen in the iliac arteries. Bowel loops are normal in caliber. There appears to  be a duodenal diverticulum adjacent to the pancreatic head. There is a large stool ball in rectal vault and probable fecal impaction, without evidence of bowel obstruction. There is moderate sigmoid diverticulosis without evidence of acute diverticulitis. Previous hysterectomy is noted. The bladder appears within normal limits. No free fluid or free air is identified. Review of bone windows shows a chronic compression fracture L2 status post vertebroplasty. There is mild spinal stenosis at this level related to slight retropulsion of the posterior cortex. Advanced degenerative disc disease is present at L4-5 and to lesser degree L3-4. There is an acute fracture involving the third sacral level which is described in detail on today's pelvic CT.      Impression: 1. Large stool ball within the rectum compatible with fecal impaction but no evidence of bowel obstruction. Moderate sigmoid diverticulosis proximally without acute diverticulitis. 2. Acute fracture third of the sacral level, described in detail on today's pelvic CT. 3. Chronic compression fracture at L2 with previous vertebroplasty and associated mild spinal stenosis. Advanced degenerative disc disease at L4-5. 4. Other chronic findings and postsurgical changes as above.   This report was finalized on 09/18/2018 13:59 by Dr. Ehsan Waddell MD.    Xr Femur 2 View Left    Result Date: 9/18/2018  Narrative: LEFT FEMUR, 2 VIEWS 9/18/2018 2:20 PM CDT  HISTORY: fall, pain  COMPARISON: NONE  FINDINGS:  Frontal and lateral radiographs of the left femur were obtained.  There is no evidence of fracture or worrisome osseous lesion. The soft tissues are grossly unremarkable. Limited evaluation of the hip and knee joints reveals normal alignment. Knee joint osteoarthritis.      Impression: 1. No visualized fracture or malalignment at the knee or hip joint. This report was finalized on 09/18/2018 14:52 by Dr Eagle Eric, .    Ct Pelvis Without Contrast    Result Date:  9/18/2018  Narrative: CT PELVIS WO CONTRAST-, CT LOWER EXTREMITY RIGHT WO CONTRAST- 9/18/2018 1:23 PM CDT  HISTORY: Pelvis trauma, fx known or suspected, xray insufficient   DOSE LENGTH PRODUCT: 135 mGy cm. Automated exposure control was also utilized to decrease patient radiation dose.  Technique: Axial CT of the pelvis without IV contrast. Sagittal and coronal reformations are also provided for review.  Comparison: CT scan dated 3/23/2016.  Findings:  There appears to be a horizontally oriented fracture traversing the S3 vertebra. There is mild buckling of the anterior vertebral body cortex. No obvious vertically oriented fracture components in the sacral ala. Normal alignment at the sacroiliac joints. Bony pelvis is otherwise intact. Normal alignment at the hip joints. The joint spaces are fairly well-maintained. No acute fracture identified at the hip joints.  Prominent stool in the rectum. Sigmoid diverticulosis. No pelvic hematoma.      Impression: Impression:  1. Horizontally oriented fracture through the S3 vertebra with mild buckling in the anterior cortex. Normal alignment at the SI joints. 2. Normal alignment at the hip joints with no visualized hip fracture. This report was finalized on 09/18/2018 13:59 by Dr Eagle Eric, .    Ct Lower Extremity Right Without Contrast    Result Date: 9/18/2018  Narrative: CT PELVIS WO CONTRAST-, CT LOWER EXTREMITY RIGHT WO CONTRAST- 9/18/2018 1:23 PM CDT  HISTORY: Pelvis trauma, fx known or suspected, xray insufficient   DOSE LENGTH PRODUCT: 135 mGy cm. Automated exposure control was also utilized to decrease patient radiation dose.  Technique: Axial CT of the pelvis without IV contrast. Sagittal and coronal reformations are also provided for review.  Comparison: CT scan dated 3/23/2016.  Findings:  There appears to be a horizontally oriented fracture traversing the S3 vertebra. There is mild buckling of the anterior vertebral body cortex. No obvious vertically  oriented fracture components in the sacral ala. Normal alignment at the sacroiliac joints. Bony pelvis is otherwise intact. Normal alignment at the hip joints. The joint spaces are fairly well-maintained. No acute fracture identified at the hip joints.  Prominent stool in the rectum. Sigmoid diverticulosis. No pelvic hematoma.      Impression: Impression:  1. Horizontally oriented fracture through the S3 vertebra with mild buckling in the anterior cortex. Normal alignment at the SI joints. 2. Normal alignment at the hip joints with no visualized hip fracture. This report was finalized on 09/18/2018 13:59 by Dr Eagle Eric, .          Assessment/Plan      Thrombocytosis:    Plt count was normal in 6/2017. Last Yr in 2017 she underwent a heart valve replacement and has been on Coumadin since.  Being on Coumadin in elderly patients invaribly tend to microscopically bleed in stools causing Iron deficiency and I fear that Iron deficiecny is causing thrombocytosis,  Although underlying Myeloproliferative disorder cannot be ruled out.    Plan is to check Iron Panel and Ferritin and then give infusional Venofer x 2 doses and recheck CBC as an OTP at the Cancer     Active Problems:    Acute UTI (urinary tract infection)                 Abisai Barahona MD    9/19/2018    9:48 AM      The above documentation occurred with a 30 minute face to face interview and review of medical records per Abisai Barahona MD. Time spent on this consultation was 45 minutes.     Thank you for this consultation and opportunity to participate in this patient's care.

## 2018-09-20 VITALS
WEIGHT: 122 LBS | SYSTOLIC BLOOD PRESSURE: 157 MMHG | DIASTOLIC BLOOD PRESSURE: 53 MMHG | HEART RATE: 59 BPM | BODY MASS INDEX: 23.03 KG/M2 | RESPIRATION RATE: 16 BRPM | OXYGEN SATURATION: 93 % | TEMPERATURE: 98.5 F | HEIGHT: 61 IN

## 2018-09-20 PROBLEM — S32.2XXA CLOSED FRACTURE OF SACRUM AND COCCYX (HCC): Status: ACTIVE | Noted: 2018-09-20

## 2018-09-20 PROBLEM — E61.1 IRON DEFICIENCY: Status: ACTIVE | Noted: 2018-09-20

## 2018-09-20 PROBLEM — D75.839 THROMBOCYTOSIS: Status: ACTIVE | Noted: 2018-09-20

## 2018-09-20 PROBLEM — S32.10XA CLOSED FRACTURE OF SACRUM AND COCCYX (HCC): Status: ACTIVE | Noted: 2018-09-20

## 2018-09-20 LAB
ANION GAP SERPL CALCULATED.3IONS-SCNC: 9 MMOL/L (ref 4–13)
BUN BLD-MCNC: 37 MG/DL (ref 5–21)
BUN/CREAT SERPL: 28 (ref 7–25)
CALCIUM SPEC-SCNC: 9.8 MG/DL (ref 8.4–10.4)
CHLORIDE SERPL-SCNC: 105 MMOL/L (ref 98–110)
CO2 SERPL-SCNC: 25 MMOL/L (ref 24–31)
CREAT BLD-MCNC: 1.32 MG/DL (ref 0.5–1.4)
DEPRECATED RDW RBC AUTO: 51.8 FL (ref 40–54)
ERYTHROCYTE [DISTWIDTH] IN BLOOD BY AUTOMATED COUNT: 15.9 % (ref 12–15)
GFR SERPL CREATININE-BSD FRML MDRD: 39 ML/MIN/1.73
GLUCOSE BLD-MCNC: 126 MG/DL (ref 70–100)
GLUCOSE BLDC GLUCOMTR-MCNC: 246 MG/DL (ref 70–130)
HCT VFR BLD AUTO: 33 % (ref 37–47)
HEMOCCULT STL QL: NEGATIVE
HGB BLD-MCNC: 10.5 G/DL (ref 12–16)
INR PPP: 1.88 (ref 0.91–1.09)
MCH RBC QN AUTO: 28.7 PG (ref 28–32)
MCHC RBC AUTO-ENTMCNC: 31.8 G/DL (ref 33–36)
MCV RBC AUTO: 90.2 FL (ref 82–98)
PLATELET # BLD AUTO: 912 10*3/MM3 (ref 130–400)
PMV BLD AUTO: 11.7 FL (ref 6–12)
POTASSIUM BLD-SCNC: 5.1 MMOL/L (ref 3.5–5.3)
PROTHROMBIN TIME: 22.3 SECONDS (ref 11.9–14.6)
RBC # BLD AUTO: 3.66 10*6/MM3 (ref 4.2–5.4)
SODIUM BLD-SCNC: 139 MMOL/L (ref 135–145)
WBC NRBC COR # BLD: 7.15 10*3/MM3 (ref 4.8–10.8)

## 2018-09-20 PROCEDURE — 96376 TX/PRO/DX INJ SAME DRUG ADON: CPT

## 2018-09-20 PROCEDURE — 97530 THERAPEUTIC ACTIVITIES: CPT

## 2018-09-20 PROCEDURE — 97116 GAIT TRAINING THERAPY: CPT

## 2018-09-20 PROCEDURE — 25010000002 IRON SUCROSE PER 1 MG: Performed by: INTERNAL MEDICINE

## 2018-09-20 PROCEDURE — 63710000001 INSULIN LISPRO (HUMAN) PER 5 UNITS: Performed by: NURSE PRACTITIONER

## 2018-09-20 PROCEDURE — 96365 THER/PROPH/DIAG IV INF INIT: CPT

## 2018-09-20 PROCEDURE — 63710000001 INSULIN DETEMIR PER 5 UNITS: Performed by: NURSE PRACTITIONER

## 2018-09-20 PROCEDURE — G0378 HOSPITAL OBSERVATION PER HR: HCPCS

## 2018-09-20 PROCEDURE — 80048 BASIC METABOLIC PNL TOTAL CA: CPT | Performed by: INTERNAL MEDICINE

## 2018-09-20 PROCEDURE — 97535 SELF CARE MNGMENT TRAINING: CPT

## 2018-09-20 PROCEDURE — 85027 COMPLETE CBC AUTOMATED: CPT | Performed by: INTERNAL MEDICINE

## 2018-09-20 PROCEDURE — 25010000002 CEFTRIAXONE PER 250 MG: Performed by: INTERNAL MEDICINE

## 2018-09-20 PROCEDURE — 82272 OCCULT BLD FECES 1-3 TESTS: CPT | Performed by: INTERNAL MEDICINE

## 2018-09-20 PROCEDURE — 96366 THER/PROPH/DIAG IV INF ADDON: CPT

## 2018-09-20 PROCEDURE — 82962 GLUCOSE BLOOD TEST: CPT

## 2018-09-20 PROCEDURE — 85610 PROTHROMBIN TIME: CPT | Performed by: NURSE PRACTITIONER

## 2018-09-20 RX ORDER — FUROSEMIDE 20 MG/1
20 TABLET ORAL DAILY PRN
Qty: 10 TABLET | Refills: 0 | Status: SHIPPED | OUTPATIENT
Start: 2018-09-20 | End: 2018-10-17 | Stop reason: HOSPADM

## 2018-09-20 RX ORDER — FERROUS SULFATE 325(65) MG
325 TABLET ORAL 2 TIMES DAILY WITH MEALS
Qty: 60 TABLET | Refills: 0 | Status: SHIPPED | OUTPATIENT
Start: 2018-09-20 | End: 2018-10-17 | Stop reason: ALTCHOICE

## 2018-09-20 RX ORDER — WARFARIN SODIUM 1 MG/1
TABLET ORAL
Qty: 120 TABLET | Refills: 3 | Status: SHIPPED | OUTPATIENT
Start: 2018-09-20 | End: 2018-11-05

## 2018-09-20 RX ADMIN — MAGNESIUM GLUCONATE 500 MG ORAL TABLET 400 MG: 500 TABLET ORAL at 09:35

## 2018-09-20 RX ADMIN — ACETAMINOPHEN 1000 MG: 500 TABLET, FILM COATED ORAL at 07:33

## 2018-09-20 RX ADMIN — IRON SUCROSE 200 MG: 20 INJECTION, SOLUTION INTRAVENOUS at 11:17

## 2018-09-20 RX ADMIN — INSULIN LISPRO 3 UNITS: 100 INJECTION, SOLUTION INTRAVENOUS; SUBCUTANEOUS at 13:01

## 2018-09-20 RX ADMIN — AMIODARONE HYDROCHLORIDE 200 MG: 200 TABLET ORAL at 09:36

## 2018-09-20 RX ADMIN — POLYVINYL ALCOHOL 1 DROP: 14 SOLUTION/ DROPS OPHTHALMIC at 09:37

## 2018-09-20 RX ADMIN — ATENOLOL 50 MG: 50 TABLET ORAL at 09:36

## 2018-09-20 RX ADMIN — VENLAFAXINE HYDROCHLORIDE 37.5 MG: 37.5 CAPSULE, EXTENDED RELEASE ORAL at 09:36

## 2018-09-20 RX ADMIN — PANTOPRAZOLE SODIUM 40 MG: 40 TABLET, DELAYED RELEASE ORAL at 06:15

## 2018-09-20 RX ADMIN — INSULIN DETEMIR 30 UNITS: 100 INJECTION, SOLUTION SUBCUTANEOUS at 10:26

## 2018-09-20 RX ADMIN — CEFTRIAXONE SODIUM 1 G: 1 INJECTION, POWDER, FOR SOLUTION INTRAMUSCULAR; INTRAVENOUS at 13:01

## 2018-09-20 RX ADMIN — GLIPIZIDE 5 MG: 5 TABLET ORAL at 09:36

## 2018-09-20 RX ADMIN — DOCUSATE SODIUM 100 MG: 100 CAPSULE ORAL at 09:36

## 2018-09-20 RX ADMIN — POLYETHYLENE GLYCOL 3350 17 G: 17 POWDER, FOR SOLUTION ORAL at 09:36

## 2018-09-20 RX ADMIN — ASPIRIN 81 MG: 81 TABLET ORAL at 09:36

## 2018-09-20 NOTE — DISCHARGE SUMMARY
[facility]  DISCHARGE SUMMARY       Date of Admission: 9/18/2018  Date of Discharge:  9/20/2018  Primary Care Physician: Leonardo Zepeda,     Discharge Diagnoses:  Hospital Problem List     * (Principal)Closed fracture of sacrum and coccyx (CMS/HCC)    Paroxysmal atrial fibrillation (CMS/HCC)    Essential hypertension    S/P TAVR (transcatheter aortic valve replacement)    Acute UTI (urinary tract infection)    Thrombocytosis (CMS/HCC)    Iron deficiency   UTI         Presenting Problem/History of Present Illness:  Acute UTI (urinary tract infection) [N39.0]         Hospital Course  This is an 83-year-old  woman who fell last Friday and since then has been complaining of back pain.  She went to her cardiologist in [place] who did an x-ray.  She reported that nothing was found to continue to have trouble walking.  She is admitted to our hospital she has been found with resolving totally oriented fracture through the S3 vertebral with mild buckling in the anterior cortex.  Normal alignment of the sacroiliac joints was described.  There is no visualized hip fracture.   Patient was seen by orthopedic service who recommends weightbearing as tolerated on bilateral lower extremities, padded seats for comfort and consider donut pillow.  She worked with physical and therapist for ambulation and transfer.  Roto-Rest includes that she can ambulate 125 feet ×2 with roller walker.  Patient performed 3 steps CG.   He had some discomfort however tolerable with use of acetaminophen.  Patient claims that she is intolerant to opiates and has allergies with tramadol.    She was seen in consult by Dr. Barahona regarding thrombocytosis area did she is iron deficient.  She received 2 doses of Venofer while in the hospital.  She is advised to follow-up with him in an outpatient setting with repeat CBC.    She received 3 doses of Rocephin for presumed urinary tract  infection.  Patient claimed to have mild dysuria.  Urinalysis showed bacteriuria and pyuria.  Despite of this findings, culture as not been triggered per protocol.      Patient is on chronic anticoagulation.  She came in with subtherapeutic INR.  We were able to verify her home medication.  Her INR is currently 1.88.  She normally takes 2 mg of Coumadin on Mondays and Fridays and 1 mg on other days for [place] record on September 14.  I will continue her on this medication.  I advised her to have her PT/INR checked on Saturday.  Daughter shared with me that she is anticipated to undergo watchman procedure and ablation in [place].    Patient is felt to be at her best condition and medically appropriate for discharge.  She will go home with daughter and subsequently be transferred to assisted leaving to which her daughter is heading.    Procedures Performed:  none      Consults:   Jonathan ZHANG with Dr. Ruslan Barahona    Pertinent Test Results:   Lab Results (last 48 hours)     Procedure Component Value Units Date/Time    Occult Blood X 1, Stool - Stool, Per Rectum [[id number]]  (Normal) Collected:  09/20/18 1328    Specimen:  Stool from Per Rectum Updated:  09/20/18 1357     Fecal Occult Blood Negative    POC Glucose Once [[id number]]  (Abnormal) Collected:  09/20/18 1106    Specimen:  Blood Updated:  09/20/18 1117     Glucose 246 (H) mg/dL      Comment: : [id number] Martines ShannonMeter ID: [id number]       Basic Metabolic Panel [[id number]]  (Abnormal) Collected:  09/20/18 0542    Specimen:  Blood Updated:  09/20/18 0707     Glucose 126 (H) mg/dL      BUN 37 (H) mg/dL      Creatinine 1.32 mg/dL      Sodium 139 mmol/L      Potassium 5.1 mmol/L      Chloride 105 mmol/L      CO2 25.0 mmol/L      Calcium 9.8 mg/dL      eGFR Non African Amer 39 (L) mL/min/1.73      BUN/Creatinine Ratio 28.0 (H)     Anion Gap 9.0 mmol/L     Narrative:       The MDRD GFR formula is only valid for adults with stable renal  function between ages 18 and 70.    Protime-INR [[id number]]  (Abnormal) Collected:  09/20/18 0542    Specimen:  Blood Updated:  09/20/18 0648     Protime 22.3 (H) Seconds      INR 1.88 (H)    CBC (No Diff) [[id number]]  (Abnormal) Collected:  09/20/18 0542    Specimen:  Blood Updated:  09/20/18 0645     WBC 7.15 10*3/mm3      RBC 3.66 (L) 10*6/mm3      Hemoglobin 10.5 (L) g/dL      Hematocrit 33.0 (L) %      MCV 90.2 fL      MCH 28.7 pg      MCHC 31.8 (L) g/dL      RDW 15.9 (H) %      RDW-SD 51.8 fl      MPV 11.7 fL      Platelets 912 (H) 10*3/mm3     POC Glucose Once [[id number]]  (Abnormal) Collected:  09/19/18 2142    Specimen:  Blood Updated:  09/19/18 2153     Glucose 265 (H) mg/dL      Comment: : [id number] Matthew NicoleucMeter ID: [id number]       POC Glucose Once [[id number]]  (Abnormal) Collected:  09/19/18 1721    Specimen:  Blood Updated:  09/19/18 1740     Glucose 161 (H) mg/dL      Comment: : [id number] Jaden Deutsch AnnMeter ID: [id number]       Ferritin [[id number]]  (Normal) Collected:  09/19/18 1214    Specimen:  Blood Updated:  09/19/18 1312     Ferritin 46.00 ng/mL     Iron Profile [[id number]]  (Abnormal) Collected:  09/19/18 1214    Specimen:  Blood Updated:  09/19/18 1245     Iron 69 mcg/dL      TIBC 613 (H) mcg/dL      Iron Saturation 11 (L) %     Comprehensive Metabolic Panel [[id number]]  (Abnormal) Collected:  09/19/18 1211    Specimen:  Blood Updated:  09/19/18 1240     Glucose 277 (H) mg/dL      BUN 37 (H) mg/dL      Creatinine 1.39 mg/dL      Sodium 138 mmol/L      Potassium 5.3 mmol/L      Chloride 101 mmol/L      CO2 26.0 mmol/L      Calcium 9.3 mg/dL      Total Protein 7.5 g/dL      Albumin 4.40 g/dL      ALT (SGPT) 28 U/L      AST (SGOT) 67 (H) U/L      Alkaline Phosphatase 45 U/L      Total Bilirubin 0.4 mg/dL      eGFR Non African Amer 36 (L) mL/min/1.73      Globulin 3.1 gm/dL      A/G Ratio 1.4 g/dL      BUN/Creatinine Ratio 26.6 (H)     Anion Gap 11.0 mmol/L     Narrative:       The  MDRD GFR formula is only valid for adults with stable renal function between ages 18 and 70.    Protime-INR [[id number]]  (Abnormal) Collected:  09/19/18 1210    Specimen:  Blood Updated:  09/19/18 1231     Protime 20.8 (H) Seconds      INR 1.72 (H)    CBC Auto Differential [[id number]]  (Abnormal) Collected:  09/19/18 0843    Specimen:  Blood Updated:  09/19/18 0919     WBC 7.25 10*3/mm3      RBC 3.73 (L) 10*6/mm3      Hemoglobin 10.6 (L) g/dL      Hematocrit 33.6 (L) %      MCV 90.1 fL      MCH 28.4 pg      MCHC 31.5 (L) g/dL      RDW 16.1 (H) %      RDW-SD 53.0 fl      MPV 11.6 fL      Platelets 941 (H) 10*3/mm3      Neutrophil % 68.7 %      Lymphocyte % 15.0 %      Monocyte % 10.6 %      Eosinophil % 2.9 %      Basophil % 1.1 %      Immature Grans % 1.7 %      Neutrophils, Absolute 4.98 10*3/mm3      Lymphocytes, Absolute 1.09 10*3/mm3      Monocytes, Absolute 0.77 10*3/mm3      Eosinophils, Absolute 0.21 10*3/mm3      Basophils, Absolute 0.08 10*3/mm3      Immature Grans, Absolute 0.12 (H) 10*3/mm3      nRBC 0.0 /100 WBC     POC Glucose Once [[id number]]  (Abnormal) Collected:  09/19/18 0852    Specimen:  Blood Updated:  09/19/18 0914     Glucose 62 (L) mg/dL      Comment: : [id number] Leonard MercadocyMeter ID: [id number]       POC Glucose Once [[id number]]  (Abnormal) Collected:  09/18/18 2026    Specimen:  Blood Updated:  09/18/18 2037     Glucose 246 (H) mg/dL      Comment: : [id number] Matthew NicoleucMeter ID: [id number]       POC Glucose Once [[id number]]  (Normal) Collected:  09/18/18 1717    Specimen:  Blood Updated:  09/18/18 1747     Glucose 123 mg/dL      Comment: : [id number] Jaden Deutsch AnnMeter ID: [id number]       Protime-INR [[id number]]  (Abnormal) Collected:  09/18/18 1627    Specimen:  Blood Updated:  09/18/18 1649     Protime 20.9 (H) Seconds      INR 1.73 (H)    Platelet Count [[id number]]  (Abnormal) Collected:  09/18/18 1645    Specimen:  Blood Updated:  09/18/18 1646     Platelets 1,271 (H)  10*3/mm3         Imaging Results (last 72 hours)     Procedure Component Value Units Date/Time    XR Femur 2 View Left [[id number]] Collected:  09/18/18 1451     Updated:  09/18/18 1455    Narrative:       LEFT FEMUR, 2 VIEWS 9/18/2018 2:20 PM CDT     HISTORY: fall, pain     COMPARISON: NONE     FINDINGS:     Frontal and lateral radiographs of the left femur were obtained.     There is no evidence of fracture or worrisome osseous lesion. The soft  tissues are grossly unremarkable. Limited evaluation of the hip and knee  joints reveals normal alignment. Knee joint osteoarthritis.       Impression:       1. No visualized fracture or malalignment at the knee or hip joint.  This report was finalized on 09/18/2018 14:52 by Dr Eagle Eric, .    CT Abdomen Pelvis Without Contrast [[id number]] Collected:  09/18/18 1348     Updated:  09/18/18 1402    Narrative:       EXAMINATION: CT ABDOMEN PELVIS WO CONTRAST- 9/18/2018 1:48 PM CDT     HISTORY: Fall, low abdominal pain. Flank pain,     DOSE: 226 mGycm (Automatic exposure control technique was implemented in  an effort to keep the radiation dose as low as possible without  compromising image quality)     REPORT: Spiral CT of the abdomen and pelvis was performed without  contrast from the lung bases through the pubic symphysis. Reconstructed  coronal and sagittal images are also reviewed.     COMPARISON: CT abdomen pelvis with contrast 3/23/2016.     Review of lung windows demonstrates mild linear atelectasis as well as  evidence of mild emphysema. Previous median sternotomy is noted.  Pacemaker wires are present. There is evidence of previous aortic valve  replacement. A moderate amount of coronary artery plaque is present.  Cholecystectomy clips are present. Liver and spleen are homogeneous on  this unenhanced study. Ingested food is noted in the stomach which is  incompletely distended. The pancreas and adrenal glands appear grossly  within normal limits. No nephrolithiasis  is identified and there is no  evidence of urinary tract obstruction. Heavy calcified plaque is noted  within the abdominal aorta, which is normal in caliber. Calcified plaque  is also seen in the iliac arteries. Bowel loops are normal in caliber.  There appears to be a duodenal diverticulum adjacent to the pancreatic  head. There is a large stool ball in rectal vault and probable fecal  impaction, without evidence of bowel obstruction. There is moderate  sigmoid diverticulosis without evidence of acute diverticulitis.  Previous hysterectomy is noted. The bladder appears within normal  limits. No free fluid or free air is identified. Review of bone windows  shows a chronic compression fracture L2 status post vertebroplasty.  There is mild spinal stenosis at this level related to slight  retropulsion of the posterior cortex. Advanced degenerative disc disease  is present at L4-5 and to lesser degree L3-4. There is an acute fracture  involving the third sacral level which is described in detail on today's  pelvic CT.       Impression:       1. Large stool ball within the rectum compatible with fecal impaction  but no evidence of bowel obstruction. Moderate sigmoid diverticulosis  proximally without acute diverticulitis.  2. Acute fracture third of the sacral level, described in detail on  today's pelvic CT.  3. Chronic compression fracture at L2 with previous vertebroplasty and  associated mild spinal stenosis. Advanced degenerative disc disease at  L4-5.  4. Other chronic findings and postsurgical changes as above.        This report was finalized on 09/18/2018 13:59 by Dr. Ehsan Waddell MD.    CT Pelvis Without Contrast [[id number]] Collected:  09/18/18 1349     Updated:  09/18/18 1402    Narrative:       CT PELVIS WO CONTRAST-, CT LOWER EXTREMITY RIGHT WO CONTRAST- 9/18/2018  1:23 PM CDT     HISTORY: Pelvis trauma, fx known or suspected, xray insufficient       DOSE LENGTH PRODUCT: 135 mGy cm. Automated exposure  control was also  utilized to decrease patient radiation dose.     Technique:   Axial CT of the pelvis without IV contrast. Sagittal and coronal  reformations are also provided for review.      Comparison: CT scan dated 3/23/2016.     Findings:      There appears to be a horizontally oriented fracture traversing the S3  vertebra. There is mild buckling of the anterior vertebral body cortex.  No obvious vertically oriented fracture components in the sacral ala.  Normal alignment at the sacroiliac joints. Bony pelvis is otherwise  intact. Normal alignment at the hip joints. The joint spaces are fairly  well-maintained. No acute fracture identified at the hip joints.     Prominent stool in the rectum. Sigmoid diverticulosis. No pelvic  hematoma.       Impression:       Impression:    1. Horizontally oriented fracture through the S3 vertebra with mild  buckling in the anterior cortex. Normal alignment at the SI joints.  2. Normal alignment at the hip joints with no visualized hip fracture.  This report was finalized on 09/18/2018 13:59 by Dr Eagle Eric, .    CT Lower Extremity Right Without Contrast [[id number]] Collected:  09/18/18 1349     Updated:  09/18/18 1402    Narrative:       CT PELVIS WO CONTRAST-, CT LOWER EXTREMITY RIGHT WO CONTRAST- 9/18/2018  1:23 PM CDT     HISTORY: Pelvis trauma, fx known or suspected, xray insufficient       DOSE LENGTH PRODUCT: 135 mGy cm. Automated exposure control was also  utilized to decrease patient radiation dose.     Technique:   Axial CT of the pelvis without IV contrast. Sagittal and coronal  reformations are also provided for review.      Comparison: CT scan dated 3/23/2016.     Findings:      There appears to be a horizontally oriented fracture traversing the S3  vertebra. There is mild buckling of the anterior vertebral body cortex.  No obvious vertically oriented fracture components in the sacral ala.  Normal alignment at the sacroiliac joints. Bony pelvis is  "otherwise  intact. Normal alignment at the hip joints. The joint spaces are fairly  well-maintained. No acute fracture identified at the hip joints.     Prominent stool in the rectum. Sigmoid diverticulosis. No pelvic  hematoma.       Impression:       Impression:    1. Horizontally oriented fracture through the S3 vertebra with mild  buckling in the anterior cortex. Normal alignment at the SI joints.  2. Normal alignment at the hip joints with no visualized hip fracture.  This report was finalized on 09/18/2018 13:59 by Dr Eagle Eric, .          Condition on Discharge:   Stable    Physical Exam on Discharge:  /53 (BP Location: Left arm, Patient Position: Sitting)   Pulse 59   Temp 98.5 °F (36.9 °C) (Oral)   Resp 16   Ht 154.9 cm (61\")   Wt 55.3 kg (122 lb)   SpO2 93%   BMI 23.05 kg/m²   Physical Exam    She comfortably sitted on the chair with feet crossed over.  NAD  Pleasant  Alert and oriented x 3  Grossly non focal  Non labored breathing; CTA  + murmur  Skin: Skin is warm and dry. No rash noted. She is not diaphoretic. No erythema. No pallor.   Psychiatric: She has a normal mood and affect. Her behavior is normal.  No edema of extremities; prominent varicosities   Vitals reviewed.        Discharge Disposition:  Home-Health Care [facility]    Discharge Medications:     Discharge Medications      New Medications      Instructions Start Date   ferrous sulfate 325 (65 FE) MG tablet   325 mg, Oral, 2 Times Daily With Meals      furosemide 20 MG tablet  Commonly known as:  LASIX   20 mg, Oral, Daily PRN      pneumococcal polysaccharide 23-valent 25 MCG/0.5ML vaccine  Commonly known as:  PNEUMOVAX-23   0.5 mL, Intramuscular, Once         Changes to Medications      Instructions Start Date   insulin detemir 100 UNIT/ML injection  Commonly known as:  LEVEMIR  What changed:  · when to take this  · additional instructions   30 Units, Subcutaneous, Every 12 Hours, 30 units qam 30 units qpm      warfarin 1 MG " tablet  Commonly known as:  COUMADIN  What changed:  additional instructions   Take 2 tablet by mouth daily on Monday and Friday. Take 1 tablet by mouth daily on Tuesday, Wednesday, Thursday, Saturday and Sunday.         Continue These Medications      Instructions Start Date   amiodarone 200 MG tablet  Commonly known as:  PACERONE   200 mg, Oral, 2 Times Daily      aspirin 81 MG EC tablet   81 mg, Oral, Daily      atenolol 50 MG tablet  Commonly known as:  TENORMIN   Take 1 tab PO qam & 1/2 tab PO qpm.      calcium carbonate 600 MG tablet  Commonly known as:  OS-MICHAEL   600 mg, Oral, 2 Times Daily With Meals      cholecalciferol 1000 units tablet  Commonly known as:  VITAMIN D3   1,000 Units, Oral, Daily      fenofibrate 160 MG tablet   160 mg, Oral, Daily      glipiZIDE 5 MG tablet  Commonly known as:  GLUCOTROL   5 mg, Oral, 2 Times Daily Before Meals      magnesium oxide 400 (241.3 Mg) MG tablet tablet  Commonly known as:  MAGOX   400 mg, Oral, Daily      nitroglycerin 0.4 MG SL tablet  Commonly known as:  NITROSTAT   1 under the tongue as needed for angina, may repeat q5mins for up three doses      pantoprazole 40 MG EC tablet  Commonly known as:  PROTONIX   40 mg, Oral, Daily      polyethyl glycol-propyl glycol 0.4-0.3 % solution ophthalmic solution  Commonly known as:  SYSTANE   1 drop, Right Eye, Daily      simvastatin 20 MG tablet  Commonly known as:  ZOCOR   20 mg, Oral, Nightly      venlafaxine XR 37.5 MG 24 hr capsule  Commonly known as:  EFFEXOR-XR   37.5 mg, Oral, Daily      vitamin B-12 500 MCG tablet  Commonly known as:  CYANOCOBALAMIN   500 mcg, Oral, Daily         Stop These Medications    spironolactone 25 MG tablet  Commonly known as:  ALDACTONE            Discharge Diet:   Diet Instructions     Diet: Regular, Cardiac; Thin       Discharge Diet:   Regular  Cardiac       Fluid Consistency:  Thin          Discharge Care Plan / Instructions: check pt/inr in couple of days. Daily weight and prn Lasix  as discussed with daughter.   Activity at Discharge:   Activity Instructions     Activity as Tolerated       WBAT BLE; seat cushion per ortho instruction          Follow-up Appointments: PCP in 1 wk or sooner; Keep f/u appt with Ade; F/u with Dr. Barahona per his recommendation with cbc  Test Results Pending at Discharge:  None     Myron Huynh MD  09/20/18  2:06 PM    Time: > 30 mins  Please note that portions of this note may have been completed with a voice recognition program. Efforts were made to edit the dictations, but occasionally words are mistranscribed.

## 2018-09-20 NOTE — DISCHARGE PLACEMENT REQUEST
"Patricia Rios [facility] [phone]   Pt will .    Lakesha Costello (83 y.o. Female)     Date of Birth Social Security Number Address Home Phone MRN    1935  [address] [facility] [address] [phone] [id number]    Christian Marital Status          [facility]        Admission Date Admission Type Admitting Provider Attending Provider Department, Room/Bed    18 Emergency Myron Huynh MD [place], Glenn Riego, MD [facility] 3A, 356/    Discharge Date Discharge Disposition Discharge Destination         [facility]              Attending Provider:  Myron Huynh MD    Allergies:  Dilaudid [Hydromorphone Hcl], Dilaudid [Hydromorphone], Enalapril, Janumet [Sitagliptin-metformin Hcl], Lopid [Gemfibrozil], Sulfa Antibiotics, Ultram [Tramadol]    Isolation:  None   Infection:  None   Code Status:  CPR    Ht:  154.9 cm (61\")   Wt:  55.3 kg (122 lb)    Admission Cmt:  None   Principal Problem:  Closed fracture of sacrum and coccyx (CMS/[facility]) [S32.10XA,S32.2XXA]                 Active Insurance as of 2018     Primary Coverage     Payor Plan Insurance Group Employer/Plan Group    HUMANA MEDICARE REPLACEMENT HUMANA MEDICARE REPL [id number]     Payor Plan Address Payor Plan Phone Number Effective From Effective To    [address] [address] [phone] 2018     Formerly [facility] [id number]       Subscriber Name Subscriber Birth Date Member ID       LAKESHA COSTELLO 1935 [id number]                 Emergency Contacts      (Rel.) Home Phone Work Phone Mobile Phone    Margarita Ignacio (Daughter) [phone] -- [phone]    Richard Pablo (Spouse) [phone] -- --         [facility] [address]A  [address] [place] [id number]  Dept. Phone:  [phone]  Dept. Fax:   Date Ordered: Sep 20, 2018         Patient:  Lakesha Costello MRN:  [id number]   [address] [facility] [address] :  1935  SSN:  " "  Phone: [phone] Sex:  F     Weight: 55.3 kg (122 lb)         Ht Readings from Last 1 Encounters:   09/18/18 154.9 cm (61\")         Walker               (Order ID: [id number])    Diagnosis:  Closed fracture of sacrum and coccyx, initial encounter (CMS/[facility]) (S32.10XA,S32.2XXA [ICD-10-CM] 805.6 [ICD-9-CM])   Quantity:  1     Equipment:  Walker Folding with Wheels  Length of Need (99 Months = Lifetime): 99 Months = Lifetime        Authorizing Provider's Phone: [phone]         Verbal Order Mode: Telephone with readback   Authorizing Provider: Myron Huynh MD  Authorizing Provider's NPI: [id number]     Order Entered By: Akosua Martines RN 9/20/2018  1:41 PM     Electronically signed by:                  History & Physical      Myron Huynh MD at 9/18/2018  3:04 PM              [facility]  HISTORY AND PHYSICAL    Date of Admission: 9/18/2018  Primary Care Physician: Leonardo Zepeda DO    Subjective     Chief Complaint: Pain and increasing inability to ambulate    History of Present Illness  Lakesha Pablo is a 83-year-old  female with a past medical history of paroxysmal atrial fibrillation having undergone 2 recent cardioversions by Dr. Crocker most recent 8/9/2018.  She is also followed by Dr. Glass, cardiology in [facility] and did see him on Friday to discuss AV node ablation and Watchman procedure.  Unfortunately prior to her appointment on Friday she had a fall.  She did not seek evaluation at emergency Department as she did not want to miss her appointment and her daughter did drive her there stating if she became unable to tolerate pain from the fall they would seek evaluation at the emergency department in [place].  Patient returned home, unfortunately she has been having worsening pain and weakness and now is unable to lift her left leg nor ambulate without assistance.  She did present to [facility] " health emergency department today for evaluation was noted to have sacral fracture, urinary tract infection and significant thrombocytosis of greater than 1 million.  Patient states she has never been told that she had any problems with her platelets in the past.  She is also reporting constipation, burning with urination and foul smell.  She has no complaints of chest pain, shortness of breathing, abdominal pain.  She is admitted for further evaluation and treatment.    Review of Systems   10 point review of system was completed, all negative except for those discussed in HPI.    Past Medical History:   Past Medical History:   Diagnosis Date   • Aortic stenosis    • Breast cancer (CMS/HCC)    • CAD (coronary artery disease)    • Cataract    • Chronic anticoagulation     Coumadin    • CKD (chronic kidney disease) stage 3, GFR 30-59 ml/min 4/16/2018   • Diabetes mellitus (CMS/HCC)     Type II   • Elevated cholesterol    • GERD (gastroesophageal reflux disease)    • Hyperlipidemia    • Hypertension    • Macular degeneration    • Pancreatitis    • Paroxysmal atrial fibrillation (CMS/HCC)    • Pulmonary hypertension    • Rheumatic fever     during childhood   • Sick sinus syndrome (CMS/HCC)     with pacemaker       Past Surgical History:   Past Surgical History:   Procedure Laterality Date   • ANOMALOUS PULMONARY VENOUS RETURN REPAIR, TOTAL     • BACK SURGERY     • BELPHAROPTOSIS REPAIR     • CARDIAC CATHETERIZATION  1999, 2010   • CARDIAC CATHETERIZATION N/A 2/15/2017    Procedure: Left Heart Cath;  Surgeon: Ehsan Crocker MD;  Location:  [facility] CATH INVASIVE LOCATION;  Service:    • CARDIAC CATHETERIZATION N/A 2/15/2017    Procedure: Right Heart Cath;  Surgeon: Ehsan Crocker MD;  Location:  [facility] CATH [facility];  Service:    • CARDIAC CATHETERIZATION N/A 2/15/2017    Procedure: Coronary angiography;  Surgeon: Ehsan Crocker MD;  Location:  [facility] CATH INVAS[facility] LOC[facility];  Service:    • CARDIAC CATHETERIZATION N/A  2/15/2017    Procedure: Left ventriculography;  Surgeon: Ehsan Crocker MD;  Location:  [facility] CATH INV[facility] LOC[facility];  Service:    • CARDIAC CATHETERIZATION N/A 2/15/2017    Procedure: Intracardiac echocardiogram;  Surgeon: Ehsan Crocker MD;  Location:  [facility] CATH INVASIVE LOC[facility];  Service:    • CARDIAC ELECTROPHYSIOLOGY PROCEDURE N/A 2/3/2017    Procedure: Pacemaker DC new;  Surgeon: Ehsan Crocker MD;  Location:  [facility] CATH INVASIVE LOC[facility];  Service:    • CATARACT EXTRACTION     • CHOLECYSTECTOMY     • CORONARY ARTERY BYPASS GRAFT  1999    4 vessel    • CORONARY ARTERY BYPASS GRAFT     • CORONARY STENT PLACEMENT     • HYSTERECTOMY  1962   • INSERT / REPLACE / REMOVE PACEMAKER     • MASTECTOMY  2000   • OTHER SURGICAL HISTORY      TAVR 2017       Family History: family history includes Breast cancer in her mother; Cancer in her brother; Coronary artery disease in her father; Diabetes in her father; Heart attack in her father; No Known Problems in her maternal grandfather, maternal grandmother, paternal grandfather, and paternal grandmother.    Social History:  reports that she has never smoked. She has never used smokeless tobacco. She reports that she does not drink alcohol or use drugs.    Code Status: Full, if unable to speak for herself her daughter DAVON Sood      Allergies:  Allergies   Allergen Reactions   • Dilaudid [Hydromorphone Hcl]    • Dilaudid [Hydromorphone] Nausea And Vomiting   • Enalapril Angioedema   • Janumet [Sitagliptin-Metformin Hcl] Other (See Comments)     Chest pain   • Lopid [Gemfibrozil] Nausea And Vomiting   • Sulfa Antibiotics Other (See Comments)     Syncope     • Ultram [Tramadol] Itching       Medications:  Prior to Admission medications    Medication Sig Start Date End Date Taking? Authorizing Provider   amiodarone (PACERONE) 200 MG tablet Take 1 tablet by mouth 2 (Two) Times a Day. 8/23/18   Ehsan Crocker MD   aspirin 81 MG EC tablet Take 81 mg by mouth Daily.     Minda Salvador MD   atenolol (TENORMIN) 50 MG tablet Take 1 tab PO qam & 1/2 tab PO qpm. 4/13/18   Leonardo Zepeda DO   calcium carbonate (OS-MICHAEL) 600 MG tablet Take 600 mg by mouth 2 (Two) Times a Day With Meals.    Minda Salvador MD   cholecalciferol (VITAMIN D3) 1000 UNITS tablet Take 1,000 Units by mouth Daily.    Minda Salvador MD   fenofibrate 160 MG tablet Take 160 mg by mouth Daily.    Minda Salvador MD   glipiZIDE (GLUCOTROL) 5 MG tablet Take 1 tablet by mouth 2 (Two) Times a Day Before Meals. 4/13/18   Leonardo Zepeda DO   insulin detemir (LEVEMIR) 100 UNIT/ML injection Inject 30 Units under the skin Every 12 (Twelve) Hours. 30 units qam 30 units qpm 6/19/18   Linda Johnson APRN   magnesium oxide (MAGOX) 400 (241.3 MG) MG tablet tablet Take 400 mg by mouth Daily.    Minda Salvador MD   nitroglycerin (NITROSTAT) 0.4 MG SL tablet 1 under the tongue as needed for angina, may repeat q5mins for up three doses 6/7/17   Ehsan Crocker MD   pantoprazole (PROTONIX) 40 MG EC tablet Take 40 mg by mouth Daily.    Minda Salvador MD   polyethyl glycol-propyl glycol (SYSTANE) 0.4-0.3 % solution ophthalmic solution Administer 1 drop to the right eye Daily.    Minda Salvador MD   simvastatin (ZOCOR) 20 MG tablet Take 1 tablet by mouth Every Night. 4/13/18   Leonardo Zepeda DO   spironolactone (ALDACTONE) 25 MG tablet Take 25 mg by mouth Daily.    Minda Salvador MD   venlafaxine XR (EFFEXOR-XR) 37.5 MG 24 hr capsule Take 37.5 mg by mouth Daily.    Minda Salvador MD   vitamin B-12 (CYANOCOBALAMIN) 500 MCG tablet Take 500 mcg by mouth Daily.    Minda Salvador MD   warfarin (COUMADIN) 1 MG tablet Take 2 tablet by mouth daily on Monday, Wednesday and Friday. Take 1 tablet by mouth daily on Tuesday, Thursday, Saturday and Sunday. 4/17/18   Learch, Leonardo Ehsan, DO       Objective     /47 (BP Location: Left arm, Patient Position:  "Sitting)   Pulse 59   Temp 98.2 °F (36.8 °C) (Oral)   Resp 18   Ht 154.9 cm (61\")   Wt 55.3 kg (122 lb)   SpO2 94%   BMI 23.05 kg/m²       Physical Exam   Constitutional: She is oriented to person, place, and time. She appears well-developed and well-nourished. No distress.   HENT:   Head: Normocephalic and atraumatic.   Eyes: Pupils are equal, round, and reactive to light. Conjunctivae and EOM are normal. No scleral icterus.   Neck: Normal range of motion. Neck supple. No JVD present. No tracheal deviation present.   Cardiovascular: Normal rate, regular rhythm and intact distal pulses.  Exam reveals no gallop.    Murmur heard.  Pulmonary/Chest: Effort normal and breath sounds normal. No respiratory distress. She has no wheezes. She has no rales.   Abdominal: Soft. Bowel sounds are normal. She exhibits no distension. There is no tenderness. There is no guarding.   Musculoskeletal: Normal range of motion. She exhibits no edema.   Difficulty rising from sitting position due to severe fracture pain   Neurological: She is alert and oriented to person, place, and time.   No obvious deficits noted.   Skin: Skin is warm and dry. No rash noted. She is not diaphoretic. No erythema. No pallor.   Psychiatric: She has a normal mood and affect. Her behavior is normal.   Vitals reviewed.        Pertinent Data:   Lab Results (last 24 hours)     Procedure Component Value Units Date/Time    Comprehensive Metabolic Panel [[id number]]  (Abnormal) Collected:  09/18/18 1106    Specimen:  Blood Updated:  09/18/18 1157     Glucose 313 (H) mg/dL      BUN 42 (H) mg/dL      Creatinine 1.69 (H) mg/dL      Sodium 137 mmol/L      Potassium 5.3 mmol/L      Chloride 100 mmol/L      CO2 27.0 mmol/L      Calcium 9.3 mg/dL      Total Protein 7.1 g/dL      Albumin 4.10 g/dL      ALT (SGPT) 28 U/L      AST (SGOT) 52 (H) U/L      Alkaline Phosphatase 46 U/L      Total Bilirubin 0.4 mg/dL      eGFR Non African Amer 29 (L) mL/min/1.73      Globulin " 3.0 gm/dL      A/G Ratio 1.4 g/dL      BUN/Creatinine Ratio 24.9     Anion Gap 10.0 mmol/L     Narrative:       The MDRD GFR formula is only valid for adults with stable renal function between ages 18 and 70.    Urinalysis With Microscopic If Indicated (No Culture) - Urine, Catheter [[id number]]  (Abnormal) Collected:  09/18/18 1140    Specimen:  Urine from Urine, Catheter Updated:  09/18/18 1154     Color, UA Yellow     Appearance, UA Cloudy (A)     pH, UA 5.5     Specific Gravity, UA 1.022     Glucose,  mg/dL (2+) (A)     Ketones, UA Negative     Bilirubin, UA Negative     Blood, UA Negative     Protein, UA Trace (A)     Leuk Esterase, UA Large (3+) (A)     Nitrite, UA Negative     Urobilinogen, UA 0.2 E.U./dL    Urinalysis, Microscopic Only - Urine, Clean Catch [[id number]]  (Abnormal) Collected:  09/18/18 1140    Specimen:  Urine from Urine, Catheter Updated:  09/18/18 1154     RBC, UA 0-2 (A) /HPF      WBC, UA Too Numerous to Count (A) /HPF      Bacteria, UA 3+ (A) /HPF      Squamous Epithelial Cells, UA None Seen /HPF      Hyaline Casts, UA 0-2 /LPF      Methodology Automated Microscopy    CBC Auto Differential [[id number]]  (Abnormal) Collected:  09/18/18 1106    Specimen:  Blood Updated:  09/18/18 1127     WBC 11.62 (H) 10*3/mm3      RBC 3.97 (L) 10*6/mm3      Hemoglobin 11.4 (L) g/dL      Hematocrit 35.3 (L) %      MCV 88.9 fL      MCH 28.7 pg      MCHC 32.3 (L) g/dL      RDW 16.3 (H) %      RDW-SD 53.3 fl      MPV 11.4 fL      Platelets 1,179 (H) 10*3/mm3      Neutrophil % 81.8 (H) %      Lymphocyte % 7.5 (L) %      Monocyte % 7.8 %      Eosinophil % 1.2 %      Basophil % 0.6 %      Neutrophils, Absolute 9.50 (H) 10*3/mm3      Lymphocytes, Absolute 0.87 10*3/mm3      Monocytes, Absolute 0.91 10*3/mm3      Eosinophils, Absolute 0.14 10*3/mm3      Basophils, Absolute 0.07 10*3/mm3     Protime-INR [[id number]]  (Abnormal) Collected:  09/18/18 1106    Specimen:  Blood Updated:  09/18/18 1125     Protime 20.5  (H) Seconds      INR 1.69 (H)    aPTT [[id number]]  (Abnormal) Collected:  09/18/18 1106    Specimen:  Blood Updated:  09/18/18 1125     PTT 47.8 (H) seconds         Imaging Results (last 24 hours)     Procedure Component Value Units Date/Time    XR Femur 2 View Left [[id number]] Collected:  09/18/18 1451     Updated:  09/18/18 1455    Narrative:       LEFT FEMUR, 2 VIEWS 9/18/2018 2:20 PM CDT     HISTORY: fall, pain     COMPARISON: NONE     FINDINGS:     Frontal and lateral radiographs of the left femur were obtained.     There is no evidence of fracture or worrisome osseous lesion. The soft  tissues are grossly unremarkable. Limited evaluation of the hip and knee  joints reveals normal alignment. Knee joint osteoarthritis.       Impression:       1. No visualized fracture or malalignment at the knee or hip joint.  This report was finalized on 09/18/2018 14:52 by Dr Eagle Eric, .    CT Abdomen Pelvis Without Contrast [[id number]] Collected:  09/18/18 1348     Updated:  09/18/18 1402    Narrative:       EXAMINATION: CT ABDOMEN PELVIS WO CONTRAST- 9/18/2018 1:48 PM CDT     HISTORY: Fall, low abdominal pain. Flank pain,     DOSE: 226 mGycm (Automatic exposure control technique was implemented in  an effort to keep the radiation dose as low as possible without  compromising image quality)     REPORT: Spiral CT of the abdomen and pelvis was performed without  contrast from the lung bases through the pubic symphysis. Reconstructed  coronal and sagittal images are also reviewed.     COMPARISON: CT abdomen pelvis with contrast 3/23/2016.     Review of lung windows demonstrates mild linear atelectasis as well as  evidence of mild emphysema. Previous median sternotomy is noted.  Pacemaker wires are present. There is evidence of previous aortic valve  replacement. A moderate amount of coronary artery plaque is present.  Cholecystectomy clips are present. Liver and spleen are homogeneous on  this unenhanced study. Ingested  food is noted in the stomach which is  incompletely distended. The pancreas and adrenal glands appear grossly  within normal limits. No nephrolithiasis is identified and there is no  evidence of urinary tract obstruction. Heavy calcified plaque is noted  within the abdominal aorta, which is normal in caliber. Calcified plaque  is also seen in the iliac arteries. Bowel loops are normal in caliber.  There appears to be a duodenal diverticulum adjacent to the pancreatic  head. There is a large stool ball in rectal vault and probable fecal  impaction, without evidence of bowel obstruction. There is moderate  sigmoid diverticulosis without evidence of acute diverticulitis.  Previous hysterectomy is noted. The bladder appears within normal  limits. No free fluid or free air is identified. Review of bone windows  shows a chronic compression fracture L2 status post vertebroplasty.  There is mild spinal stenosis at this level related to slight  retropulsion of the posterior cortex. Advanced degenerative disc disease  is present at L4-5 and to lesser degree L3-4. There is an acute fracture  involving the third sacral level which is described in detail on today's  pelvic CT.       Impression:       1. Large stool ball within the rectum compatible with fecal impaction  but no evidence of bowel obstruction. Moderate sigmoid diverticulosis  proximally without acute diverticulitis.  2. Acute fracture third of the sacral level, described in detail on  today's pelvic CT.  3. Chronic compression fracture at L2 with previous vertebroplasty and  associated mild spinal stenosis. Advanced degenerative disc disease at  L4-5.  4. Other chronic findings and postsurgical changes as above.        This report was finalized on 09/18/2018 13:59 by Dr. Ehsan Waddell MD.    CT Pelvis Without Contrast [[id number]] Collected:  09/18/18 1349     Updated:  09/18/18 1402    Narrative:       CT PELVIS WO CONTRAST-, CT LOWER EXTREMITY RIGHT WO CONTRAST-  9/18/2018  1:23 PM CDT     HISTORY: Pelvis trauma, fx known or suspected, xray insufficient       DOSE LENGTH PRODUCT: 135 mGy cm. Automated exposure control was also  utilized to decrease patient radiation dose.     Technique:   Axial CT of the pelvis without IV contrast. Sagittal and coronal  reformations are also provided for review.      Comparison: CT scan dated 3/23/2016.     Findings:      There appears to be a horizontally oriented fracture traversing the S3  vertebra. There is mild buckling of the anterior vertebral body cortex.  No obvious vertically oriented fracture components in the sacral ala.  Normal alignment at the sacroiliac joints. Bony pelvis is otherwise  intact. Normal alignment at the hip joints. The joint spaces are fairly  well-maintained. No acute fracture identified at the hip joints.     Prominent stool in the rectum. Sigmoid diverticulosis. No pelvic  hematoma.       Impression:       Impression:    1. Horizontally oriented fracture through the S3 vertebra with mild  buckling in the anterior cortex. Normal alignment at the SI joints.  2. Normal alignment at the hip joints with no visualized hip fracture.  This report was finalized on 09/18/2018 13:59 by Dr Eagle Eric, .    CT Lower Extremity Right Without Contrast [[id number]] Collected:  09/18/18 1349     Updated:  09/18/18 1402    Narrative:       CT PELVIS WO CONTRAST-, CT LOWER EXTREMITY RIGHT WO CONTRAST- 9/18/2018  1:23 PM CDT     HISTORY: Pelvis trauma, fx known or suspected, xray insufficient       DOSE LENGTH PRODUCT: 135 mGy cm. Automated exposure control was also  utilized to decrease patient radiation dose.     Technique:   Axial CT of the pelvis without IV contrast. Sagittal and coronal  reformations are also provided for review.      Comparison: CT scan dated 3/23/2016.     Findings:      There appears to be a horizontally oriented fracture traversing the S3  vertebra. There is mild buckling of the anterior vertebral body  cortex.  No obvious vertically oriented fracture components in the sacral ala.  Normal alignment at the sacroiliac joints. Bony pelvis is otherwise  intact. Normal alignment at the hip joints. The joint spaces are fairly  well-maintained. No acute fracture identified at the hip joints.     Prominent stool in the rectum. Sigmoid diverticulosis. No pelvic  hematoma.       Impression:       Impression:    1. Horizontally oriented fracture through the S3 vertebra with mild  buckling in the anterior cortex. Normal alignment at the SI joints.  2. Normal alignment at the hip joints with no visualized hip fracture.  This report was finalized on 09/18/2018 13:59 by Dr Eagle Eric, .            9/7/2017 ECHO  Interpretation Summary     · Left ventricular systolic function is normal. Estimated EF = 60%.  · Left ventricular diastolic dysfunction (grade II) consistent with pseudonormalization.  · Right ventricular cavity is mild-to-moderately dilated.  · Mildly reduced right ventricular systolic function noted.  · Left atrial cavity size is mildly dilated.  · calcification of the aortic valve  · Severe aortic valve stenosis is present.  · Mild mitral valve regurgitation is present       I have personally reviewed and interpreted the radiology studies and ECG obtained at time of admission.     Assessment / Plan     Assessment/Plan:  Thrombocytosis, unknown etiology; repeat stat platelet level, consult hematology, repeat CBC with differential in a.m.    Fractured sacrum-decreased mobility and acute pain; consult orthopedics for recommendations, pain management with Tylenol only due to adverse reaction to narcotics    Urinary tract infection-Rocephin    Chronic kidney disease stage III with slightly increased creatinine-hydrate gently with normal saline at 75 ML/hour, repeat CMP in a.m.    Diabetes mellitus type II insulin-dependent- continue home Levemir, monitor glucose before meals and at bedtime with sliding scale coverage  regular insulin     Paroxysmal atrial fibrillation-continue Coumadin therapy increased dose due to subtherapeutic INR, daily PT/INR, DVT prophylaxis with SCDs    Constipation - fecal impaction - attempt digital removal, start bowel regimen    I discussed the patient's findings and my recommendations with: Myron Huynh MD  Time spent: 40 minutes      FACUNDO Li  09/18/18   4:17 PM     I personally evaluated and examined the patient in conjunction with FACUNDO Fuchs and agree with the assessment, treatment plan, and disposition of the patient as recorded by her. My history, exam, and further recommendations are:     Patient fell last Friday.  SInce then she has pain on her back.  An x-ray done at one of her clinic visit with cardiologist did not reveal any fracture.   She has trouble walking.   Daughter told me that she poorly tolerates opiate and allergic to to tramadol.    There is documentation of mild dysuria. Daughter tells me she has frequent UTI.  We are treating her with Rocephin.     She also has severe elevation of platelel > 1 Thousand.  I checked she had prior elevation in April 2018.  I have not seen any record she had referral or w/u on this.       She has known afib.  She had prior cardioversion.  I am told her INR is always subtherapeutic.  She is planned for av kristofer ablation. Her iNR today is 1.69.  Agree with maintaining on 1 mg daily of coumadin. May need to increase this further.    She is limping needing assistance of one person.  Alert oriented x 3  Pulse appears regular  No distress    Ortho and hematology consulted  cpt    Myron Huynh MD  09/18/18  5:33 PM      Electronically signed by Myron Huynh MD at 9/18/2018  5:54 PM       Discharge Summary     No notes of this type exist for this encounter.

## 2018-09-20 NOTE — DISCHARGE PLACEMENT REQUEST
"[facility]  [phone]    Lakesha Costello (83 y.o. Female)     Date of Birth Social Security Number Address Home Phone MRN    1935  [address] [id number] [phone] [id number]    Mandaeism Marital Status          [facility]        Admission Date Admission Type Admitting Provider Attending Provider Department, Room/Bed    18 Emergency Myron Huynh MD [place], Glenn Riego, MD [facility] 3A, 356/    Discharge Date Discharge Disposition Discharge Destination         Home-Health Care [facility]              Attending Provider:  Myron Huynh MD    Allergies:  Dilaudid [Hydromorphone Hcl], Dilaudid [Hydromorphone], Enalapril, Janumet [Sitagliptin-metformin Hcl], Lopid [Gemfibrozil], Sulfa Antibiotics, Ultram [Tramadol]    Isolation:  None   Infection:  None   Code Status:  CPR    Ht:  154.9 cm (61\")   Wt:  55.3 kg (122 lb)    Admission Cmt:  None   Principal Problem:  Closed fracture of sacrum and coccyx (CMS/[facility]) [S32.10XA,S32.2XXA]                 Active Insurance as of 2018     Primary Coverage     Payor Plan Insurance Group Employer/Plan Group    HUMANA MEDICARE REPLACEMENT HUMANA MEDICARE REPL [id number]     Payor Plan Address Payor Plan Phone Number Effective From Effective To    [address] [address] [phone] 2018     [facility] [address]       Subscriber Name Subscriber Birth Date Member ID       LAKESHA COSTELLO 1935 [id number]                 Emergency Contacts      (Rel.) Home Phone Work Phone Mobile Phone    Margarita Ignacio (Daughter) [phone] -- [phone]    Richard Pablo (Spouse) [phone] -- --         [facility] [address]  [address] [place] [address]  Phone:  [phone]  Fax:   Date: Sep 20, 2018      Referral to Home Health     Patient:  Lakesha Costello MRN:  [id number]   [address] [facility] [address] :  1935  SSN:    Phone: " [phone] Sex:  F      INSURANCE PAYOR PLAN GROUP # SUBSCRIBER ID   Primary:    HUMANA MEDICARE REPLACEMENT [id number]      Referring Provider Information:  MYRON HUYNH Phone: [phone] Fax:       Referral Information:   # Visits:  1 Referral Type: Home Health [42]   Urgency:  Routine Referral Reason: Specialty Services Required   Start Date: Sep 20, 2018 End Date:  To be determined by Insurer   Diagnosis: Closed fracture of sacrum and coccyx, initial encounter (CMS/[facility]) (S32.10XA,S32.2XXA [ICD-10-CM] 805.6 [ICD-9-CM])      Refer to Dept:   Refer to Provider:   Refer to Facility:       Face to Face Visit Date: 9/20/2018  Follow-up Provider for Plan of Care? I treated the patient in an acute care facility and will not continue treatment after discharge.  Follow-up Provider: KARLENE GARRIDO [[id number]]  Reason/Clinical Findings: sacral fracture  Describe mobility limitations that make leaving home difficult: sacral fracture  Nursing/Therapeutic Services Requested: Physical Therapy  Nursing/Therapeutic Services Requested: Occupational Therapy  PT orders: Gait Training  PT orders: Transfer training  PT orders: Strengthening  PT orders: Home safety assessment  Weight Bearing Status: As Tolerated  Occupational orders: Activities of daily living  Occupational orders: Home safety assessment  Frequency: 1 Week 1     This document serves as a request of services and does not constitute Insurance authorization or approval of services.  To determine eligibility, please contact the members Insurance carrier to verify and review coverage.     If you have medical questions regarding this request for services. Please contact [address] at [phone] between the hours of 8:00am - 5:00pm (Mon-Fri).             Authorizing Provider:Myron Huynh MD  Authorizing Provider's NPI: [id number]  Order Entered By: Myron Huynh MD 9/20/2018 11:49  AM     Electronically signed by: Myron Huynh MD 9/20/2018 11:49 AM                History & Physical      Myron Huynh MD at 9/18/2018  3:04 PM              [facility] Medicine Services  HISTORY AND PHYSICAL    Date of Admission: 9/18/2018  Primary Care Physician: Leonardo Zepeda,     Subjective     Chief Complaint: Pain and increasing inability to ambulate    History of Present Illness  Lakesha Pablo is a 83-year-old  female with a past medical history of paroxysmal atrial fibrillation having undergone 2 recent cardioversions by Dr. Crocker most recent 8/9/2018.  She is also followed by Dr. Glass, cardiology in [facility], [place], operated by [facility] and did see him on Friday to discuss AV node ablation and Watchman procedure.  Unfortunately prior to her appointment on Friday she had a fall.  She did not seek evaluation at emergency Department as she did not want to miss her appointment and her daughter did drive her there stating if she became unable to tolerate pain from the fall they would seek evaluation at the emergency department in [place].  Patient returned home, unfortunately she has been having worsening pain and weakness and now is unable to lift her left leg nor ambulate without assistance.  She did present to [facility] emergency department today for evaluation was noted to have sacral fracture, urinary tract infection and significant thrombocytosis of greater than 1 million.  Patient states she has never been told that she had any problems with her platelets in the past.  She is also reporting constipation, burning with urination and foul smell.  She has no complaints of chest pain, shortness of breathing, abdominal pain.  She is admitted for further evaluation and treatment.    Review of Systems   10 point review of system was completed, all negative except for those discussed in HPI.    Past Medical History:   Past Medical History:    Diagnosis Date   • Aortic stenosis    • Breast cancer (CMS/[facility])    • CAD (coronary artery disease)    • Cataract    • Chronic anticoagulation     Coumadin    • CKD (chronic kidney disease) stage 3, GFR 30-59 ml/min 4/16/2018   • Diabetes mellitus (CMS/[facility])     Type II   • Elevated cholesterol    • GERD (gastroesophageal reflux disease)    • Hyperlipidemia    • Hypertension    • Macular degeneration    • Pancreatitis    • Paroxysmal atrial fibrillation (CMS/[facility])    • Pulmonary hypertension    • Rheumatic fever     during childhood   • Sick sinus syndrome (CMS/[facility])     with pacemaker       Past Surgical History:   Past Surgical History:   Procedure Laterality Date   • ANOMALOUS PULMONARY VENOUS RETURN REPAIR, TOTAL     • BACK SURGERY     • BELPHAROPTOSIS REPAIR     • CARDIAC CATHETERIZATION  1999, 2010   • CARDIAC CATHETERIZATION N/A 2/15/2017    Procedure: Left Heart Cath;  Surgeon: Ehsan Crocker MD;  Location:  [facility] CATH INVASIVE LOCATION;  Service:    • CARDIAC CATHETERIZATION N/A 2/15/2017    Procedure: Right Heart Cath;  Surgeon: Ehsan Crocker MD;  Location:  [facility] CATH INV[facility];  Service:    • CARDIAC CATHETERIZATION N/A 2/15/2017    Procedure: Coronary angiography;  Surgeon: Ehsan Crocker MD;  Location:  [facility] CATH INVASIVE [facility];  Service:    • CARDIAC CATHETERIZATION N/A 2/15/2017    Procedure: Left ventriculography;  Surgeon: Ehsan Crocker MD;  Location:  [facility] CATH INVAS[facility];  Service:    • CARDIAC CATHETERIZATION N/A 2/15/2017    Procedure: Intracardiac echocardiogram;  Surgeon: Ehsan Crocker MD;  Location:  [facility] CATH INVASIVE LOC[facility];  Service:    • CARDIAC ELECTROPHYSIOLOGY PROCEDURE N/A 2/3/2017    Procedure: Pacemaker DC new;  Surgeon: Ehsan Crocker MD;  Location:  [facility] CATH INVASIVE LOC[facility];  Service:    • CATARACT EXTRACTION     • CHOLECYSTECTOMY     • CORONARY ARTERY BYPASS GRAFT  1999    4 vessel    • CORONARY ARTERY BYPASS GRAFT     • CORONARY STENT PLACEMENT     •  HYSTERECTOMY  1962   • INSERT / REPLACE / REMOVE PACEMAKER     • MASTECTOMY  2000   • OTHER SURGICAL HISTORY      TAVR 2017       Family History: family history includes Breast cancer in her mother; Cancer in her brother; Coronary artery disease in her father; Diabetes in her father; Heart attack in her father; No Known Problems in her maternal grandfather, maternal grandmother, paternal grandfather, and paternal grandmother.    Social History:  reports that she has never smoked. She has never used smokeless tobacco. She reports that she does not drink alcohol or use drugs.    Code Status: Full, if unable to speak for herself her daughter DAVON Sood      Allergies:  Allergies   Allergen Reactions   • Dilaudid [Hydromorphone Hcl]    • Dilaudid [Hydromorphone] Nausea And Vomiting   • Enalapril Angioedema   • Janumet [Sitagliptin-Metformin Hcl] Other (See Comments)     Chest pain   • Lopid [Gemfibrozil] Nausea And Vomiting   • Sulfa Antibiotics Other (See Comments)     Syncope     • Ultram [Tramadol] Itching       Medications:  Prior to Admission medications    Medication Sig Start Date End Date Taking? Authorizing Provider   amiodarone (PACERONE) 200 MG tablet Take 1 tablet by mouth 2 (Two) Times a Day. 8/23/18   Ehsan Crocker MD   aspirin 81 MG EC tablet Take 81 mg by mouth Daily.    ProviderMinda MD   atenolol (TENORMIN) 50 MG tablet Take 1 tab PO qam & 1/2 tab PO qpm. 4/13/18   Leonardo Zepeda, DO   calcium carbonate (OS-MICHAEL) 600 MG tablet Take 600 mg by mouth 2 (Two) Times a Day With Meals.    Minda Salvador MD   cholecalciferol (VITAMIN D3) 1000 UNITS tablet Take 1,000 Units by mouth Daily.    Minda Salvador MD   fenofibrate 160 MG tablet Take 160 mg by mouth Daily.    Minda Salvador MD   glipiZIDE (GLUCOTROL) 5 MG tablet Take 1 tablet by mouth 2 (Two) Times a Day Before Meals. 4/13/18   Leonardo Zepeda, DO   insulin detemir (LEVEMIR) 100 UNIT/ML injection Inject 30  "Units under the skin Every 12 (Twelve) Hours. 30 units qam 30 units qpm 6/19/18   Elizabeth Linda APRHARRY   magnesium oxide (MAGOX) 400 (241.3 MG) MG tablet tablet Take 400 mg by mouth Daily.    Minda Salvador MD   nitroglycerin (NITROSTAT) 0.4 MG SL tablet 1 under the tongue as needed for angina, may repeat q5mins for up three doses 6/7/17   Ehsan Crocker MD   pantoprazole (PROTONIX) 40 MG EC tablet Take 40 mg by mouth Daily.    Minda Salvador MD   polyethyl glycol-propyl glycol (SYSTANE) 0.4-0.3 % solution ophthalmic solution Administer 1 drop to the right eye Daily.    Minda Salvador MD   simvastatin (ZOCOR) 20 MG tablet Take 1 tablet by mouth Every Night. 4/13/18   Leonardo Zepeda DO   spironolactone (ALDACTONE) 25 MG tablet Take 25 mg by mouth Daily.    Minda Salvador MD   venlafaxine XR (EFFEXOR-XR) 37.5 MG 24 hr capsule Take 37.5 mg by mouth Daily.    Minda Salvador MD   vitamin B-12 (CYANOCOBALAMIN) 500 MCG tablet Take 500 mcg by mouth Daily.    Minda Salvador MD   warfarin (COUMADIN) 1 MG tablet Take 2 tablet by mouth daily on Monday, Wednesday and Friday. Take 1 tablet by mouth daily on Tuesday, Thursday, Saturday and Sunday. 4/17/18   Leonardo Zepeda DO       Objective     /47 (BP Location: Left arm, Patient Position: Sitting)   Pulse 59   Temp 98.2 °F (36.8 °C) (Oral)   Resp 18   Ht 154.9 cm (61\")   Wt 55.3 kg (122 lb)   SpO2 94%   BMI 23.05 kg/m²       Physical Exam   Constitutional: She is oriented to person, place, and time. She appears well-developed and well-nourished. No distress.   HENT:   Head: Normocephalic and atraumatic.   Eyes: Pupils are equal, round, and reactive to light. Conjunctivae and EOM are normal. No scleral icterus.   Neck: Normal range of motion. Neck supple. No JVD present. No tracheal deviation present.   Cardiovascular: Normal rate, regular rhythm and intact distal pulses.  Exam reveals no gallop.    Murmur " heard.  Pulmonary/Chest: Effort normal and breath sounds normal. No respiratory distress. She has no wheezes. She has no rales.   Abdominal: Soft. Bowel sounds are normal. She exhibits no distension. There is no tenderness. There is no guarding.   Musculoskeletal: Normal range of motion. She exhibits no edema.   Difficulty rising from sitting position due to severe fracture pain   Neurological: She is alert and oriented to person, place, and time.   No obvious deficits noted.   Skin: Skin is warm and dry. No rash noted. She is not diaphoretic. No erythema. No pallor.   Psychiatric: She has a normal mood and affect. Her behavior is normal.   Vitals reviewed.        Pertinent Data:   Lab Results (last 24 hours)     Procedure Component Value Units Date/Time    Comprehensive Metabolic Panel [[id number]]  (Abnormal) Collected:  09/18/18 1106    Specimen:  Blood Updated:  09/18/18 1157     Glucose 313 (H) mg/dL      BUN 42 (H) mg/dL      Creatinine 1.69 (H) mg/dL      Sodium 137 mmol/L      Potassium 5.3 mmol/L      Chloride 100 mmol/L      CO2 27.0 mmol/L      Calcium 9.3 mg/dL      Total Protein 7.1 g/dL      Albumin 4.10 g/dL      ALT (SGPT) 28 U/L      AST (SGOT) 52 (H) U/L      Alkaline Phosphatase 46 U/L      Total Bilirubin 0.4 mg/dL      eGFR Non African Amer 29 (L) mL/min/1.73      Globulin 3.0 gm/dL      A/G Ratio 1.4 g/dL      BUN/Creatinine Ratio 24.9     Anion Gap 10.0 mmol/L     Narrative:       The MDRD GFR formula is only valid for adults with stable renal function between ages 18 and 70.    Urinalysis With Microscopic If Indicated (No Culture) - Urine, Catheter [[id number]]  (Abnormal) Collected:  09/18/18 1140    Specimen:  Urine from Urine, Catheter Updated:  09/18/18 1154     Color, UA Yellow     Appearance, UA Cloudy (A)     pH, UA 5.5     Specific Gravity, UA 1.022     Glucose,  mg/dL (2+) (A)     Ketones, UA Negative     Bilirubin, UA Negative     Blood, UA Negative     Protein, UA Trace (A)      Leuk Esterase, UA Large (3+) (A)     Nitrite, UA Negative     Urobilinogen, UA 0.2 E.U./dL    Urinalysis, Microscopic Only - Urine, Clean Catch [[id number]]  (Abnormal) Collected:  09/18/18 1140    Specimen:  Urine from Urine, Catheter Updated:  09/18/18 1154     RBC, UA 0-2 (A) /HPF      WBC, UA Too Numerous to Count (A) /HPF      Bacteria, UA 3+ (A) /HPF      Squamous Epithelial Cells, UA None Seen /HPF      Hyaline Casts, UA 0-2 /LPF      Methodology Automated Microscopy    CBC Auto Differential [[id number]]  (Abnormal) Collected:  09/18/18 1106    Specimen:  Blood Updated:  09/18/18 1127     WBC 11.62 (H) 10*3/mm3      RBC 3.97 (L) 10*6/mm3      Hemoglobin 11.4 (L) g/dL      Hematocrit 35.3 (L) %      MCV 88.9 fL      MCH 28.7 pg      MCHC 32.3 (L) g/dL      RDW 16.3 (H) %      RDW-SD 53.3 fl      MPV 11.4 fL      Platelets 1,179 (H) 10*3/mm3      Neutrophil % 81.8 (H) %      Lymphocyte % 7.5 (L) %      Monocyte % 7.8 %      Eosinophil % 1.2 %      Basophil % 0.6 %      Neutrophils, Absolute 9.50 (H) 10*3/mm3      Lymphocytes, Absolute 0.87 10*3/mm3      Monocytes, Absolute 0.91 10*3/mm3      Eosinophils, Absolute 0.14 10*3/mm3      Basophils, Absolute 0.07 10*3/mm3     Protime-INR [[id number]]  (Abnormal) Collected:  09/18/18 1106    Specimen:  Blood Updated:  09/18/18 1125     Protime 20.5 (H) Seconds      INR 1.69 (H)    aPTT [[id number]]  (Abnormal) Collected:  09/18/18 1106    Specimen:  Blood Updated:  09/18/18 1125     PTT 47.8 (H) seconds         Imaging Results (last 24 hours)     Procedure Component Value Units Date/Time    XR Femur 2 View Left [[id number]] Collected:  09/18/18 1451     Updated:  09/18/18 1455    Narrative:       LEFT FEMUR, 2 VIEWS 9/18/2018 2:20 PM CDT     HISTORY: fall, pain     COMPARISON: NONE     FINDINGS:     Frontal and lateral radiographs of the left femur were obtained.     There is no evidence of fracture or worrisome osseous lesion. The soft  tissues are grossly unremarkable.  Limited evaluation of the hip and knee  joints reveals normal alignment. Knee joint osteoarthritis.       Impression:       1. No visualized fracture or malalignment at the knee or hip joint.  This report was finalized on 09/18/2018 14:52 by Dr Eagle Eric, .    CT Abdomen Pelvis Without Contrast [[id number]] Collected:  09/18/18 1348     Updated:  09/18/18 1402    Narrative:       EXAMINATION: CT ABDOMEN PELVIS WO CONTRAST- 9/18/2018 1:48 PM CDT     HISTORY: Fall, low abdominal pain. Flank pain,     DOSE: 226 mGycm (Automatic exposure control technique was implemented in  an effort to keep the radiation dose as low as possible without  compromising image quality)     REPORT: Spiral CT of the abdomen and pelvis was performed without  contrast from the lung bases through the pubic symphysis. Reconstructed  coronal and sagittal images are also reviewed.     COMPARISON: CT abdomen pelvis with contrast 3/23/2016.     Review of lung windows demonstrates mild linear atelectasis as well as  evidence of mild emphysema. Previous median sternotomy is noted.  Pacemaker wires are present. There is evidence of previous aortic valve  replacement. A moderate amount of coronary artery plaque is present.  Cholecystectomy clips are present. Liver and spleen are homogeneous on  this unenhanced study. Ingested food is noted in the stomach which is  incompletely distended. The pancreas and adrenal glands appear grossly  within normal limits. No nephrolithiasis is identified and there is no  evidence of urinary tract obstruction. Heavy calcified plaque is noted  within the abdominal aorta, which is normal in caliber. Calcified plaque  is also seen in the iliac arteries. Bowel loops are normal in caliber.  There appears to be a duodenal diverticulum adjacent to the pancreatic  head. There is a large stool ball in rectal vault and probable fecal  impaction, without evidence of bowel obstruction. There is moderate  sigmoid diverticulosis  without evidence of acute diverticulitis.  Previous hysterectomy is noted. The bladder appears within normal  limits. No free fluid or free air is identified. Review of bone windows  shows a chronic compression fracture L2 status post vertebroplasty.  There is mild spinal stenosis at this level related to slight  retropulsion of the posterior cortex. Advanced degenerative disc disease  is present at L4-5 and to lesser degree L3-4. There is an acute fracture  involving the third sacral level which is described in detail on today's  pelvic CT.       Impression:       1. Large stool ball within the rectum compatible with fecal impaction  but no evidence of bowel obstruction. Moderate sigmoid diverticulosis  proximally without acute diverticulitis.  2. Acute fracture third of the sacral level, described in detail on  today's pelvic CT.  3. Chronic compression fracture at L2 with previous vertebroplasty and  associated mild spinal stenosis. Advanced degenerative disc disease at  L4-5.  4. Other chronic findings and postsurgical changes as above.        This report was finalized on 09/18/2018 13:59 by Dr. Ehsan Waddell MD.    CT Pelvis Without Contrast [[id number]] Collected:  09/18/18 1349     Updated:  09/18/18 1402    Narrative:       CT PELVIS WO CONTRAST-, CT LOWER EXTREMITY RIGHT WO CONTRAST- 9/18/2018  1:23 PM CDT     HISTORY: Pelvis trauma, fx known or suspected, xray insufficient       DOSE LENGTH PRODUCT: 135 mGy cm. Automated exposure control was also  utilized to decrease patient radiation dose.     Technique:   Axial CT of the pelvis without IV contrast. Sagittal and coronal  reformations are also provided for review.      Comparison: CT scan dated 3/23/2016.     Findings:      There appears to be a horizontally oriented fracture traversing the S3  vertebra. There is mild buckling of the anterior vertebral body cortex.  No obvious vertically oriented fracture components in the sacral ala.  Normal alignment  at the sacroiliac joints. Bony pelvis is otherwise  intact. Normal alignment at the hip joints. The joint spaces are fairly  well-maintained. No acute fracture identified at the hip joints.     Prominent stool in the rectum. Sigmoid diverticulosis. No pelvic  hematoma.       Impression:       Impression:    1. Horizontally oriented fracture through the S3 vertebra with mild  buckling in the anterior cortex. Normal alignment at the SI joints.  2. Normal alignment at the hip joints with no visualized hip fracture.  This report was finalized on 09/18/2018 13:59 by Dr Eagle Eric, .    CT Lower Extremity Right Without Contrast [[id number]] Collected:  09/18/18 1349     Updated:  09/18/18 1402    Narrative:       CT PELVIS WO CONTRAST-, CT LOWER EXTREMITY RIGHT WO CONTRAST- 9/18/2018  1:23 PM CDT     HISTORY: Pelvis trauma, fx known or suspected, xray insufficient       DOSE LENGTH PRODUCT: 135 mGy cm. Automated exposure control was also  utilized to decrease patient radiation dose.     Technique:   Axial CT of the pelvis without IV contrast. Sagittal and coronal  reformations are also provided for review.      Comparison: CT scan dated 3/23/2016.     Findings:      There appears to be a horizontally oriented fracture traversing the S3  vertebra. There is mild buckling of the anterior vertebral body cortex.  No obvious vertically oriented fracture components in the sacral ala.  Normal alignment at the sacroiliac joints. Bony pelvis is otherwise  intact. Normal alignment at the hip joints. The joint spaces are fairly  well-maintained. No acute fracture identified at the hip joints.     Prominent stool in the rectum. Sigmoid diverticulosis. No pelvic  hematoma.       Impression:       Impression:    1. Horizontally oriented fracture through the S3 vertebra with mild  buckling in the anterior cortex. Normal alignment at the SI joints.  2. Normal alignment at the hip joints with no visualized hip fracture.  This report was  finalized on 09/18/2018 13:59 by Dr Eagle Eric, .            9/7/2017 ECHO  Interpretation Summary     · Left ventricular systolic function is normal. Estimated EF = 60%.  · Left ventricular diastolic dysfunction (grade II) consistent with pseudonormalization.  · Right ventricular cavity is mild-to-moderately dilated.  · Mildly reduced right ventricular systolic function noted.  · Left atrial cavity size is mildly dilated.  · calcification of the aortic valve  · Severe aortic valve stenosis is present.  · Mild mitral valve regurgitation is present       I have personally reviewed and interpreted the radiology studies and ECG obtained at time of admission.     Assessment / Plan     Assessment/Plan:  Thrombocytosis, unknown etiology; repeat stat platelet level, consult hematology, repeat CBC with differential in a.m.    Fractured sacrum-decreased mobility and acute pain; consult orthopedics for recommendations, pain management with Tylenol only due to adverse reaction to narcotics    Urinary tract infection-Rocephin    Chronic kidney disease stage III with slightly increased creatinine-hydrate gently with normal saline at 75 ML/hour, repeat CMP in a.m.    Diabetes mellitus type II insulin-dependent- continue home Levemir, monitor glucose before meals and at bedtime with sliding scale coverage regular insulin     Paroxysmal atrial fibrillation-continue Coumadin therapy increased dose due to subtherapeutic INR, daily PT/INR, DVT prophylaxis with SCDs    Constipation - fecal impaction - attempt digital removal, start bowel regimen    I discussed the patient's findings and my recommendations with: Myron Huynh MD  Time spent: 40 minutes      FACUNDO Li  09/18/18   4:17 PM     I personally evaluated and examined the patient in conjunction with FACUNDO Fuchs and agree with the assessment, treatment plan, and disposition of the patient as recorded by her. My history, exam, and further recommendations are:      Patient fell last Friday.  SInce then she has pain on her back.  An x-ray done at one of her clinic visit with cardiologist did not reveal any fracture.   She has trouble walking.   Daughter told me that she poorly tolerates opiate and allergic to to tramadol.    There is documentation of mild dysuria. Daughter tells me she has frequent UTI.  We are treating her with Rocephin.     She also has severe elevation of platelel > 1 Thousand.  I checked she had prior elevation in April 2018.  I have not seen any record she had referral or w/u on this.       She has known afib.  She had prior cardioversion.  I am told her INR is always subtherapeutic.  She is planned for av kristofer ablation. Her iNR today is 1.69.  Agree with maintaining on 1 mg daily of coumadin. May need to increase this further.    She is limping needing assistance of one person.  Alert oriented x 3  Pulse appears regular  No distress    Ortho and hematology consulted  cpt    Myron Huynh MD  09/18/18  5:33 PM      Electronically signed by Myron Huynh MD at 9/18/2018  5:54 PM          Physician Progress Notes (last 24 hours) (Notes from 9/19/2018 11:57 AM through 9/20/2018 11:57 AM)      Myron Huynh MD at 9/19/2018  3:20 PM              [facility] Medicine Services  INPATIENT PROGRESS NOTE    Patient Name: Lakesha Costello  Date of Admission: 9/18/2018  Today's Date: 09/19/18  Length of Stay: 0  Primary Care Physician: Leonardo Zepeda DO    Subjective   Chief Complaint: f/u  HPI   Collins Peralta saw the patient in consult.  He recommend PT WBAT BLE, padded seat for comfort  No complaints of pain  Dr. Barahona had visited but no consult report is available as of yet  Worked well with therapist today  + hx of ?TAVR Nov. 2017   Cardioversion x 2 last month   Review of Systems     All pertinent negatives and positives are as above. All other systems have been reviewed and are negative  unless otherwise stated.     Objective    Temp:  [98.1 °F (36.7 °C)-98.6 °F (37 °C)] 98.5 °F (36.9 °C)  Heart Rate:  [54-61] 54  Resp:  [16-20] 18  BP: (128-151)/(43-58) 151/54  Physical Exam  She comfortably sitted on the chair with legs crossed over.  NAD  Pleasant  Alert and oriented x 3  Grossly non focal  Non labored breathing; CTA  + murmur  Skin: Skin is warm and dry. No rash noted. She is not diaphoretic. No erythema. No pallor.   Psychiatric: She has a normal mood and affect. Her behavior is normal.  No edema of extremities; prominent varicosities   Vitals reviewed.      Results Review:  I have reviewed the labs, radiology results, and diagnostic studies.    Laboratory Data:     Results from last 7 days  Lab Units 09/19/18  0843 09/18/18  1645 09/18/18  1106   WBC 10*3/mm3 7.25  --  11.62*   HEMOGLOBIN g/dL 10.6*  --  11.4*   HEMATOCRIT % 33.6*  --  35.3*   PLATELETS 10*3/mm3 941* 1,271* 1,179*          Results from last 7 days  Lab Units 09/19/18  1211 09/18/18  1106   SODIUM mmol/L 138 137   POTASSIUM mmol/L 5.3 5.3   CHLORIDE mmol/L 101 100   CO2 mmol/L 26.0 27.0   BUN mg/dL 37* 42*   CREATININE mg/dL 1.39 1.69*   CALCIUM mg/dL 9.3 9.3   BILIRUBIN mg/dL 0.4 0.4   ALK PHOS U/L 45 46   ALT (SGPT) U/L 28 28   AST (SGOT) U/L 67* 52*   GLUCOSE mg/dL 277* 313*       Culture Data:        Radiology Data:   Imaging Results (last 24 hours)     ** No results found for the last 24 hours. **        Iron Profile [[id number]] (Abnormal) Collected: 09/19/18 1214   Lab Status: Final result Specimen: Blood Updated: 09/19/18 1245    Iron 69 mcg/dL     TIBC 613 (H) mcg/dL     Iron Saturation 11 (L) %    Ferritin [[id number]] (Normal) Collected: 09/19/18 1214   Lab Status: Final result Specimen: Blood Updated: 09/19/18 1312    Ferritin 46.00       inr 1.72  Echo in Sept 2017  · Left ventricular systolic function is normal. Estimated EF = 60%.  · Left ventricular diastolic dysfunction (grade II) consistent with  pseudonormalization.  · Right ventricular cavity is mild-to-moderately dilated.  · Mildly reduced right ventricular systolic function noted.  · Left atrial cavity size is mildly dilated.  · calcification of the aortic valve  · Severe aortic valve stenosis is present.  · Mild mitral valve regurgitation is present       I have reviewed the patient's current medications.     Assessment/Plan     Hospital Problem List     Acute UTI (urinary tract infection)          · Iron deficiency anemia -venofer  · Thrombocytosis prob related to above  · Fractured sacrum with pain on mobility/intolerant to opiate and allergic to tramadol - sh is doing ok with acetaminophen  · DM T2  · PAfib - she is on chronic anticoagulation. There has been plan for her to undergo watchman procedure in [place].  · Possibly ericka on ckd - improve with hydration   · Constipation - bowel regimen  · Bacteriuria/symtom of dysuria and malodorous urine - no culture noted but with above findings, will cont total of 3 days of rocephin.   · Echo in 2017 sowed Aortic stenosis s/p ?TAVR - she had TTE at [place] on Sept 14 although result from care everywhere does not show this.   Her last known       Discussed with SW.  Discussed with daughter and granddaughter.  Anticipate to go to Home  with HH vs directly to assisted living. Daughter said she hasn't informed the  of the patient.   Cont physical rehab   WBAT, padded seat for comfort per ortho  With her potassium at 5.3 and spironolactone, I am concern that she will end up with hyperkalemia. Her crea ranges from 1.49 to 1. 69.  She must actually have ckd stage 3/borderline 4; will monitor volume status  Home when ok with others  ?arrange outpatient venofer infusion   D/w Lean    Myron Huynh MD   09/19/18   3:20 PM      Electronically signed by Myron Huynh MD at 9/19/2018  4:12 PM          Consult Notes (last 72 hours) (Notes from 9/17/2018 11:57 AM through 9/20/2018 11:57  AM)      Fabian Guadalupe Jr., PA at 9/18/2018  5:41 PM      Consult Orders:    1. Inpatient Orthopedic Surgery Consult [[id number]] ordered by Minal Johnson APRN at 09/18/18 1537           Attestation signed by Uli Mercer MD at 9/19/2018  3:07 PM    I have reviewed the documentation above and agree.                    MD Fabian Florez PA-C, [facility]         Orthopaedic Surgery Consult Note      9/18/2018   5:41 PM    Name:  Lakesha Costello  MRN:    [id number]     Acct:     [id number]   Room:  [address] [place] [address] Day: 0     Admit Date: 9/18/2018  PCP: Leonardo Zepeda DO        Subjective:     HPI: 83 y.o. female presented to the ED via family with chief complaint of sacral pain after suffering a fall 3-4 days ago. She states that she was at her cardiologist where they x-rayed her hips and found no fracture. Her pain was worsening and she began to experience left thigh pain and weakness. She denies any bowel or bladder difficulty. This prompted her daughter to bring her to the ED where she was seen to have a non displaced fracture of the 3rd sacral body. She was admitted for pain control and physical therapy and possible placement.        Medications:     Allergies:   Allergies   Allergen Reactions   • Dilaudid [Hydromorphone Hcl]    • Dilaudid [Hydromorphone] Nausea And Vomiting   • Enalapril Angioedema   • Janumet [Sitagliptin-Metformin Hcl] Other (See Comments)     Chest pain   • Lopid [Gemfibrozil] Nausea And Vomiting   • Sulfa Antibiotics Other (See Comments)     Syncope     • Ultram [Tramadol] Itching       Current Meds:   Current Facility-Administered Medications   Medication Dose Route Frequency Provider Last Rate Last Dose   • acetaminophen (TYLENOL) tablet 1,000 mg  1,000 mg Oral Q6H PRN Minal Johnson APRN       • amiodarone (PACERONE) tablet 200 mg  200 mg Oral BID Minal Johnson APRN       • [START ON 9/19/2018] aspirin EC tablet 81 mg  81 mg Oral Daily Minal Johnsno,  APRN       • atenolol (TENORMIN) tablet 50 mg  50 mg Oral Daily Minal Johnson APRN   50 mg at 09/18/18 1710   • atorvastatin (LIPITOR) tablet 10 mg  10 mg Oral Nightly Minal Johnson, FACUNDO       • [START ON 9/19/2018] cefTRIAXone (ROCEPHIN) 1 g/10mL IV PUSH syringe  1 g Intravenous Q24H Minal Johnson, APRHARRY       • dextrose (D50W) 25 g/ 50mL Intravenous Solution 25 g  25 g Intravenous Q15 Min PRN Minal Johnson, APRN       • dextrose (GLUTOSE) oral gel 15 g  15 g Oral Q15 Min PRN Minal Johnson, APRN       • docusate sodium (COLACE) capsule 100 mg  100 mg Oral BID Minal Johnson APRN       • glipiZIDE (GLUCOTROL) tablet 5 mg  5 mg Oral BID AC Minal Johnson APRN   5 mg at 09/18/18 1710   • glucagon (human recombinant) (GLUCAGEN DIAGNOSTIC) injection 1 mg  1 mg Subcutaneous PRN Minal Johnson, APRN       • insulin detemir (LEVEMIR) injection 30 Units  30 Units Subcutaneous BID Minal Johnson, APRN       • insulin lispro (humaLOG) injection 2-7 Units  2-7 Units Subcutaneous 4x Daily With Meals & Nightly Minal Johnson, FACUNDO       • [START ON 9/19/2018] magnesium oxide (MAGOX) tablet 400 mg  400 mg Oral Daily Minal Johnson, APRN       • nitroglycerin (NITROSTAT) SL tablet 0.4 mg  0.4 mg Sublingual Q5 Min PRN Minal Johnson, APRHARRY       • ondansetron (ZOFRAN) tablet 4 mg  4 mg Oral Q6H PRN Minal Johnson APRN        Or   • ondansetron ODT (ZOFRAN-ODT) disintegrating tablet 4 mg  4 mg Oral Q6H PRN Minal Johnson APRN        Or   • ondansetron (ZOFRAN) injection 4 mg  4 mg Intravenous Q6H PRN Minal Johnson, APRN       • [START ON 9/19/2018] pantoprazole (PROTONIX) EC tablet 40 mg  40 mg Oral Q AM Minal Johnson, APRN       • pneumococcal polysaccharide 23-valent (PNEUMOVAX-23) vaccine 0.5 mL  0.5 mL Intramuscular During Hospitalization Myron Huynh MD       • polyethylene glycol 3350 powder (packet)  17 g Oral Daily Minal Johnson APRN   17 g at  09/18/18 1722   • [START ON 9/19/2018] polyvinyl alcohol (LIQUIFILM) 1.4 % ophthalmic solution 1 drop  1 drop Right Eye Daily Minal Johnson APRN       • sodium chloride 0.9 % flush 1-10 mL  1-10 mL Intravenous PRN Minal Johnson APRN       • sodium chloride 0.9 % infusion  75 mL/hr Intravenous Continuous Minal Johnson APRN 75 mL/hr at 09/18/18 1710 75 mL/hr at 09/18/18 1710   • spironolactone (ALDACTONE) tablet 25 mg  25 mg Oral Daily Minal Johnson APRN   25 mg at 09/18/18 1710   • venlafaxine XR (EFFEXOR-XR) 24 hr capsule 37.5 mg  37.5 mg Oral Daily Minal Johnson APRN   37.5 mg at 09/18/18 1711   • warfarin (COUMADIN) tablet 1 mg  1 mg Oral Daily Minal Johnson APRN   1 mg at 09/18/18 1711           Data:     Code Status:    Code Status and Medical Interventions:   Ordered at: 09/18/18 1550     Level Of Support Discussed With:    Patient     Code Status:    CPR     Medical Interventions (Level of Support Prior to Arrest):    Full       Family History   Problem Relation Age of Onset   • Breast cancer Mother    • Coronary artery disease Father    • Heart attack Father    • Diabetes Father    • Cancer Brother    • No Known Problems Maternal Grandmother    • No Known Problems Maternal Grandfather    • No Known Problems Paternal Grandmother    • No Known Problems Paternal Grandfather        Social History     Social History   • Marital status:      Spouse name: N/A   • Number of children: N/A   • Years of education: N/A     Occupational History   • Not on file.     Social History Main Topics   • Smoking status: Never Smoker   • Smokeless tobacco: Never Used   • Alcohol use No   • Drug use: No   • Sexual activity: No     Other Topics Concern   • Not on file     Social History Narrative   • No narrative on file       Review of Symptoms:  CONSTITUTIONAL:  negative for  fevers, chills, sweats, fatigue, malaise, anorexia and weight loss  EYES:  negative for  double vision, blurred vision  "and visual disturbance  HEENT:  negative for  hearing loss, tinnitus, ear drainage, earaches, nasal congestion, epistaxis, snoring, sore mouth, sore throat, hoarseness and voice change  RESPIRATORY:  negative for  dry cough, cough with sputum, dyspnea, wheezing, hemoptysis, chest pain, pleuritic pain and cyanosis  CARDIOVASCULAR:  negative for  chest pain, palpitations, orthopnea, exertional chest pressure/discomfort, edema  GASTROINTESTINAL:  negative for nausea, vomiting, change in bowel habits, diarrhea, constipation, abdominal pain, jaundice, dysphagia, regurgitation, hematemesis and hemtochezia  GENITOURINARY:  negative for frequency, dysuria, nocturia, hesitancy and hematuria  INTEGUMENT/BREAST:  negative for rash, skin lesion(s), dryness, skin color change, pruritus, changes in hair and changes in nails  HEMATOLOGIC/LYMPHATIC:  negative for easy bruising, bleeding, lymphadenopathy, petechiae and swelling/edema  ALLERGIC/IMMUNOLOGIC:  negative for recurrent infections and urticaria  ENDOCRINE:  negative for heat intolerance, cold intolerance, tremor, weight changes, hair loss and diabetic symptoms including neither polyuria nor polydipsia  MUSCULOSKELETAL:  See HPI  NEUROLOGICAL:  negative for headaches, dizziness, seizures, memory problems, speech problems, visual disturbance, coordination problems, gait problems, tremor, syncope and near syncope  BEHAVIOR/PSYCH:  negative for depressed mood, elated mood, increased agitation and anxiety      Vitals:  /47 (BP Location: Left arm, Patient Position: Sitting)   Pulse 59   Temp 98.2 °F (36.8 °C) (Oral)   Resp 18   Ht 154.9 cm (61\")   Wt 55.3 kg (122 lb)   SpO2 94%   BMI 23.05 kg/m²    Temp (24hrs), Av.9 °F (36.6 °C), Min:97.6 °F (36.4 °C), Max:98.2 °F (36.8 °C)      I/O (24Hr):  No intake or output data in the 24 hours ending 18 8018    Labs:  Lab Results (last 72 hours)     Procedure Component Value Units Date/Time    Protime-INR [[id number]] "  (Abnormal) Collected:  09/18/18 1627    Specimen:  Blood Updated:  09/18/18 1649     Protime 20.9 (H) Seconds      INR 1.73 (H)    Platelet Count [[id number]]  (Abnormal) Collected:  09/18/18 1645    Specimen:  Blood Updated:  09/18/18 1646     Platelets 1,271 (H) 10*3/mm3     Comprehensive Metabolic Panel [[id number]]  (Abnormal) Collected:  09/18/18 1106    Specimen:  Blood Updated:  09/18/18 1157     Glucose 313 (H) mg/dL      BUN 42 (H) mg/dL      Creatinine 1.69 (H) mg/dL      Sodium 137 mmol/L      Potassium 5.3 mmol/L      Chloride 100 mmol/L      CO2 27.0 mmol/L      Calcium 9.3 mg/dL      Total Protein 7.1 g/dL      Albumin 4.10 g/dL      ALT (SGPT) 28 U/L      AST (SGOT) 52 (H) U/L      Alkaline Phosphatase 46 U/L      Total Bilirubin 0.4 mg/dL      eGFR Non African Amer 29 (L) mL/min/1.73      Globulin 3.0 gm/dL      A/G Ratio 1.4 g/dL      BUN/Creatinine Ratio 24.9     Anion Gap 10.0 mmol/L     Narrative:       The MDRD GFR formula is only valid for adults with stable renal function between ages 18 and 70.    Urinalysis With Microscopic If Indicated (No Culture) - Urine, Catheter [[id number]]  (Abnormal) Collected:  09/18/18 1140    Specimen:  Urine from Urine, Catheter Updated:  09/18/18 1154     Color, UA Yellow     Appearance, UA Cloudy (A)     pH, UA 5.5     Specific Gravity, UA 1.022     Glucose,  mg/dL (2+) (A)     Ketones, UA Negative     Bilirubin, UA Negative     Blood, UA Negative     Protein, UA Trace (A)     Leuk Esterase, UA Large (3+) (A)     Nitrite, UA Negative     Urobilinogen, UA 0.2 E.U./dL    Urinalysis, Microscopic Only - Urine, Clean Catch [[id number]]  (Abnormal) Collected:  09/18/18 1140    Specimen:  Urine from Urine, Catheter Updated:  09/18/18 1154     RBC, UA 0-2 (A) /HPF      WBC, UA Too Numerous to Count (A) /HPF      Bacteria, UA 3+ (A) /HPF      Squamous Epithelial Cells, UA None Seen /HPF      Hyaline Casts, UA 0-2 /LPF      Methodology Automated Microscopy    CBC &  Differential [[id number]] Collected:  09/18/18 1106    Specimen:  Blood Updated:  09/18/18 1128    Narrative:       The following orders were created for panel order CBC & Differential.  Procedure                               Abnormality         Status                     ---------                               -----------         ------                     Manual Differential[[id number]]                                                         CBC Auto Differential[[id number]]        Abnormal            Final result                 Please view results for these tests on the individual orders.    CBC Auto Differential [[id number]]  (Abnormal) Collected:  09/18/18 1106    Specimen:  Blood Updated:  09/18/18 1127     WBC 11.62 (H) 10*3/mm3      RBC 3.97 (L) 10*6/mm3      Hemoglobin 11.4 (L) g/dL      Hematocrit 35.3 (L) %      MCV 88.9 fL      MCH 28.7 pg      MCHC 32.3 (L) g/dL      RDW 16.3 (H) %      RDW-SD 53.3 fl      MPV 11.4 fL      Platelets 1,179 (H) 10*3/mm3      Neutrophil % 81.8 (H) %      Lymphocyte % 7.5 (L) %      Monocyte % 7.8 %      Eosinophil % 1.2 %      Basophil % 0.6 %      Neutrophils, Absolute 9.50 (H) 10*3/mm3      Lymphocytes, Absolute 0.87 10*3/mm3      Monocytes, Absolute 0.91 10*3/mm3      Eosinophils, Absolute 0.14 10*3/mm3      Basophils, Absolute 0.07 10*3/mm3     Protime-INR [[id number]]  (Abnormal) Collected:  09/18/18 1106    Specimen:  Blood Updated:  09/18/18 1125     Protime 20.5 (H) Seconds      INR 1.69 (H)    aPTT [[id number]]  (Abnormal) Collected:  09/18/18 1106    Specimen:  Blood Updated:  09/18/18 1125     PTT 47.8 (H) seconds           Imaging:  CT PELVIS WO CONTRAST-, CT LOWER EXTREMITY RIGHT WO CONTRAST- 9/18/2018  1:23 PM CDT     HISTORY: Pelvis trauma, fx known or suspected, xray insufficient       DOSE LENGTH PRODUCT: 135 mGy cm. Automated exposure control was also  utilized to decrease patient radiation dose.     Technique:   Axial CT of the pelvis without IV contrast.  Sagittal and coronal  reformations are also provided for review.      Comparison: CT scan dated 3/23/2016.     Findings:      There appears to be a horizontally oriented fracture traversing the S3  vertebra. There is mild buckling of the anterior vertebral body cortex.  No obvious vertically oriented fracture components in the sacral ala.  Normal alignment at the sacroiliac joints. Bony pelvis is otherwise  intact. Normal alignment at the hip joints. The joint spaces are fairly  well-maintained. No acute fracture identified at the hip joints.     Prominent stool in the rectum. Sigmoid diverticulosis. No pelvic  hematoma.     IMPRESSION:  Impression:    1. Horizontally oriented fracture through the S3 vertebra with mild  buckling in the anterior cortex. Normal alignment at the SI joints.  2. Normal alignment at the hip joints with no visualized hip fracture.  This report was finalized on 09/18/2018 13:59 by Dr Eagle Eric, .        Physical Exam:     General: Pleasant mood and appropriate affect. Well nourished.  HEENT: NC/AT, ANDIE, EOMI, Nares patent bilaterally with no epistaxis noted.  Neck: Soft throughout with no obvious masses.  Chest: +2 distal pulses throughout, no heaves or lifts seen.  Respiratory: Equal rise and fall bilaterally, no retractions or rhonchi or wheezes noted.  Abdomen: Obese and non tender.  Skin: Pink and dry with appropriate turgor.  Neuro: CN II-XII grossly intact, no focal deficits noted.  Pelvis: TTP posterior at level of proximal buttock crease. Globally NTTP about the remainder of pelvis. No pain with compression. Some TTP about left anterior thigh, no numbness noted. NVI distally bilateral LE.      Assessment:     Non displaced 3rd sacral body fracture without obvious neurologic compromise.  New onset left thigh pain with history of lumbar spine fracture and pain, no acute fracture seen.         Plan:     1. Ok for WBAT bilateral LE.  2. Padded seat for comfort, consider donut  pillow.  3. PT for ambulation and transfer help and training.  4. Continue to observe left leg, if worsening, will consult ortho spine for eval.        Electronically signed by Fabian Guadalupe Jr., PA on 9/18/2018 at 5:41 PM         Electronically signed by Uli Mercer MD at 9/19/2018  3:07 PM       Discharge Summary     No notes of this type exist for this encounter.

## 2018-09-20 NOTE — PROGRESS NOTES
Continued Stay Note  BRITTANY Bennett     Patient Name: Lakesha Costello  MRN: [id number]  Today's Date: 9/20/2018    Admit Date: 9/18/2018          Discharge Plan     Row Name 09/20/18 1352       Plan    Plan Home with [facility]    Patient/Family in Agreement with Plan yes    Final Note Pt also has orders for a RW. Choices offered and she wants [facility] but plans to  on her way home. Did fax the orders to them at [phone] so they can have it ready.    Row Name 09/20/18 1151       Plan    Plan Home with [facility]    Patient/Family in Agreement with Plan yes    Final Discharge Disposition Code 06 - home with home health care    Final Note Pt is being d/c'ed home today. She has orders for home health and has already chosen [facility]. Referral faxed to them at [phone].              Discharge Codes    No documentation.       Expected Discharge Date and Time     Expected Discharge Date Expected Discharge Time    Sep 20, 2018             NORA Banks

## 2018-09-20 NOTE — PLAN OF CARE
Problem: Patient Care Overview  Goal: Plan of Care Review  Outcome: Ongoing (interventions implemented as appropriate)   09/20/18 0301   Coping/Psychosocial   Plan of Care Reviewed With patient   Plan of Care Review   Progress no change   OTHER   Outcome Summary vss; alert and oriented x 4; c/o sacrum pain; no request for pain med; iv abx; prefers to sleep on room bench than bed; up x 1 with walker to br; bm this shift; continue to monitor     Goal: Individualization and Mutuality  Outcome: Ongoing (interventions implemented as appropriate)    Goal: Discharge Needs Assessment  Outcome: Ongoing (interventions implemented as appropriate)    Goal: Interprofessional Rounds/Family Conf  Outcome: Ongoing (interventions implemented as appropriate)      Problem: Fall Risk (Adult)  Goal: Absence of Fall  Outcome: Ongoing (interventions implemented as appropriate)      Problem: Fracture Orthopaedic (Adult)  Goal: Signs and Symptoms of Listed Potential Problems Will be Absent, Minimized or Managed (Fracture Orthopaedic)  Outcome: Ongoing (interventions implemented as appropriate)

## 2018-09-20 NOTE — PLAN OF CARE
Problem: Patient Care Overview  Goal: Plan of Care Review  Outcome: Ongoing (interventions implemented as appropriate)   09/20/18 4896   Coping/Psychosocial   Plan of Care Reviewed With patient   Plan of Care Review   Progress improving   OTHER   Outcome Summary Pt transfers and ambulates with S with RW. Pt completed toileting with S along with UB/LB sponge bath at side sink and TB dressing. Pt plans to discharge to assisted living facility. Continue OT POC

## 2018-09-20 NOTE — THERAPY DISCHARGE NOTE
Acute Care - Physical Therapy Discharge Summary  [facility]       Patient Name: Lakesha Costello  : 1935  MRN: [id number]    Today's Date: 2018  Onset of Illness/Injury or Date of Surgery: 18    Date of Referral to PT: 18  Referring Physician: FACUNDO Grace      Admit Date: 2018      PT Recommendation and Plan    Visit Dx:    ICD-10-CM ICD-9-CM   1. Acute UTI (urinary tract infection) N39.0 599.0   2. Renal insufficiency N28.9 593.9   3. Closed nondisplaced zone I fracture of sacrum, initial encounter (CMS/[facility]) S32.110A 805.6   4. Decreased activities of daily living (ADL) Z78.9 V49.89   5. Impaired mobility Z74.09 799.89   6. Paroxysmal atrial fibrillation (CMS/[facility]) I48.0 427.31   7. Closed fracture of sacrum and coccyx, initial encounter (CMS/[facility]) S32.10XA 805.6    S32.2XXA    8. Iron deficiency E61.1 280.9   9. 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    Turning from your back to your side while in flat bed without using bedrails?  --  -- 4  -LH    Moving from lying on back to sitting on the side of a flat bed without bedrails?  --  -- 4  -LH    Moving to and from a bed to a chair (including a wheelchair)?  --  -- 3  -LH    Standing up from a chair using your arms (e.g., wheelchair, bedside chair)?  --  -- 3  -LH    Climbing 3-5 steps with a railing?  --  -- 3  -LH    To walk in hospital room?  --  -- 3  -LH    AM-PAC 6 Clicks Score  --  -- 20  -LH       How much help from another is currently needed...    Putting on and taking off regular lower body clothing? 3  -TS 3  -AC  --    Bathing (including washing, rinsing, and drying) 3  -TS 3  -AC  --    Toileting (which includes using toilet bed pan or urinal) 4  -TS 3  -AC  --    Putting on and taking off regular upper body clothing 4  -TS 4  -AC  --    Taking care of personal  grooming (such as brushing teeth) 4  -TS 4  -AC  --    Eating meals 4  -TS 4  -AC  --    Score 22  -TS 21  -AC  --       Functional Assessment    Outcome Measure Options AM-PAC 6 Clicks Daily Activity (OT)  -TS AM-PAC 6 Clicks Daily Activity (OT)  -AC AM-PAC 6 Clicks Basic Mobility (PT)  -      User Key  (r) = Recorded By, (t) = Taken By, (c) = Cosigned By    Initials Name Provider Type    AC Eulogio Blackmon, OTR/L Occupational Therapist    LH Darius Leal, PT Physical Therapist    TS Ame Carrero, PRINCE/L Occupational Therapy Assistant                PT Charges     Row Name 09/20/18 1030             Time Calculation    Start Time 0955  -KJ      Stop Time 1020  -KJ      Time Calculation (min) 25 min  -KJ      PT Received On 09/19/18  -KJ      PT Goal Re-Cert Due Date 09/29/18  -KJ         Time Calculation- PT    Total Timed Code Minutes- PT 25 minute(s)  -KJ        User Key  (r) = Recorded By, (t) = Taken By, (c) = Cosigned By    Initials Name Provider Type    KJ Rebecca Schulte, PTA Physical Therapy Assistant        Therapy Suggested Charges     Code   Minutes Charges    01724 (CPT®) Hc Pt Neuromusc Re Education Ea 15 Min      66614 (CPT®) Hc Pt Ther Proc Ea 15 Min      35763 (CPT®) Hc Gait Training Ea 15 Min 15 1    52300 (CPT®) Hc Pt Therapeutic Act Ea 15 Min      18068 (CPT®) Hc Pt Manual Therapy Ea 15 Min      94773 (CPT®) Hc Pt Iontophoresis Ea 15 Min      07604 (CPT®) Hc Pt Elec Stim Ea-Per 15 Min      51367 (CPT®) Hc Pt Ultrasound Ea 15 Min      87674 (CPT®) Hc Pt Self Care/Mgmt/Train Ea 15 Min      25552 (CPT®) Hc Pt Prosthetic (S) Train Initial Encounter, Each 15 Min      83991 (CPT®) Hc Pt Orthotic(S)/Prosthetic(S) Encounter, Each 15 Min      25477 (CPT®) Hc Orthotic(S) Mgmt/Train Initial Encounter, Each 15min      Total  15 1                PT Rehab Goals     Row Name 09/20/18 1551             Bed Mobility Goal 1 (PT)    Activity/Assistive Device (Bed Mobility Goal 1, PT) bed mobility  activities, all  -AH      [place] Level/Cues Needed (Bed Mobility Goal 1, PT) independent  -AH      Time Frame (Bed Mobility Goal 1, PT) by discharge  -      Progress/Outcomes (Bed Mobility Goal 1, PT) goal not met  -AH         Transfer Goal 1 (PT)    Activity/Assistive Device (Transfer Goal 1, PT) sit-to-stand/stand-to-sit;bed-to-chair/chair-to-bed  -AH      [place] Level/Cues Needed (Transfer Goal 1, PT) independent  -AH      Time Frame (Transfer Goal 1, PT) by discharge  -AH      Progress/Outcome (Transfer Goal 1, PT) goal not met  -AH         Gait Training Goal 1 (PT)    Activity/Assistive Device (Gait Training Goal 1, PT) gait (walking locomotion)  -AH      [place] Level (Gait Training Goal 1, PT) standby assist  -AH      Distance (Gait Goal 1, PT) 200 feet  -AH      Time Frame (Gait Training Goal 1, PT) by discharge  -      Progress/Outcome (Gait Training Goal 1, PT) goal not met  -AH         Stairs Goal 1 (PT)    Activity/Assistive Device (Stairs Goal 1, PT) ascending stairs;descending stairs;using handrail, left  -AH      [place] Level/Cues Needed (Stairs Goal 1, PT) standby assist  -AH      Number of Stairs (Stairs Goal 1, PT) 3  -AH      Time Frame (Stairs Goal 1, PT) by discharge  -      Progress/Outcome (Stairs Goal 1, PT) goal not met  -        User Key  (r) = Recorded By, (t) = Taken By, (c) = Cosigned By    Initials Name Provider Type [facility] Celia Laureano PTA Physical Therapy Assistant PT          Therapy Charges for Today     Code Description Service Date Service Provider Modifiers Qty    [id number]  GAIT TRAINING EA 15 MIN 9/19/2018 Celia Laureano PTA GP, KX 1          PT Discharge Summary  Reason for Discharge: Discharge from facility  Outcomes Achieved: Refer to plan of care for updates on goals achieved  Discharge Destination: Home with home health      Celia Laureano PTA   9/20/2018

## 2018-09-20 NOTE — PROGRESS NOTES
Continued Stay Note  [facility]     Patient Name: Lakesha Costello  MRN: [id number]  Today's Date: 9/20/2018    Admit Date: 9/18/2018          Discharge Plan     Row Name 09/20/18 1158       Plan    Plan Home with [facility]    Patient/Family in Agreement with Plan yes    Final Discharge Disposition Code 06 - home with home health care    Final Note Pt is being d/c'ed home today. She has orders for home health and has already chosen [facility]. Referral faxed to them at [phone].              Discharge Codes    No documentation.       Expected Discharge Date and Time     Expected Discharge Date Expected Discharge Time    Sep 20, 2018             NORA Banks

## 2018-09-20 NOTE — THERAPY TREATMENT NOTE
Acute Care - Occupational Therapy Treatment Note  [facility]     Patient Name: Lakesha Costello  : 1935  MRN: [id number]  Today's Date: 2018  Onset of Illness/Injury or Date of Surgery: 18  Date of Referral to OT: 18  Referring Physician: FACUNDO Grace    Admit Date: 2018       ICD-10-CM ICD-9-CM   1. Acute UTI (urinary tract infection) N39.0 599.0   2. Renal insufficiency N28.9 593.9   3. Closed nondisplaced zone I fracture of sacrum, initial encounter (CMS/[facility]) S32.110A 805.6   4. Decreased activities of daily living (ADL) Z78.9 V49.89   5. Impaired mobility Z74.09 799.89     Patient Active Problem List   Diagnosis   • Paroxysmal atrial fibrillation (CMS/[facility])   • Atrial flutter (CMS/[facility])   • Essential hypertension   • Type 2 diabetes mellitus with diabetic peripheral angiopathy without gangrene, with long-term current use of insulin (CMS/[facility])   • Chronic anticoagulation   • Coronary artery disease involving native coronary artery of native heart without angina pectoris   • SSS (sick sinus syndrome) (CMS/[facility])   • Chest pain   • S/P CABG x 3   • Artificial pacemaker   • Tachycardia   • Palpitations   • Mixed hyperlipidemia   • S/P TAVR (transcatheter aortic valve replacement)   • Age-related osteoporosis without current pathological fracture   • Coronary artery abnormality   • Chronic diastolic congestive heart failure (CMS/[facility])   • Malignant neoplasm of breast (CMS/[facility])   • CKD (chronic kidney disease) stage 3, GFR 30-59 ml/min   • Acute UTI (urinary tract infection)     Past Medical History:   Diagnosis Date   • Aortic stenosis    • Breast cancer (CMS/[facility])    • CAD (coronary artery disease)    • Cataract    • Chronic anticoagulation     Coumadin    • CKD (chronic kidney disease) stage 3, GFR 30-59 ml/min 2018   • Diabetes mellitus (CMS/[facility])     Type II   • Elevated cholesterol    • GERD (gastroesophageal reflux disease)    • Hyperlipidemia    • Hypertension    • Macular  degeneration    • Pancreatitis    • Paroxysmal atrial fibrillation (CMS/H[facility])    • Pulmonary hypertension    • Rheumatic fever     during childhood   • Sick sinus syndrome (CMS/HCC)     with pacemaker     Past Surgical History:   Procedure Laterality Date   • ANOMALOUS PULMONARY VENOUS RETURN REPAIR, TOTAL     • BACK SURGERY     • BELPHAROPTOSIS REPAIR     • CARDIAC CATHETERIZATION  1999, 2010   • CARDIAC CATHETERIZATION N/A 2/15/2017    Procedure: Left Heart Cath;  Surgeon: Ehsan Crocker MD;  Location:  [facility];  Service:    • CARDIAC CATHETERIZATION N/A 2/15/2017    Procedure: Right Heart Cath;  Surgeon: Ehsan Crocker MD;  Location:  [facility];  Service:    • CARDIAC CATHETERIZATION N/A 2/15/2017    Procedure: Coronary angiography;  Surgeon: Ehsan Crocker MD;  Location:  [facility];  Service:    • CARDIAC CATHETERIZATION N/A 2/15/2017    Procedure: Left ventriculography;  Surgeon: Ehsan Crocker MD;  Location:  [facility];  Service:    • CARDIAC CATHETERIZATION N/A 2/15/2017    Procedure: Intracardiac echocardiogram;  Surgeon: Ehsan Crocker MD;  Location:  [facility];  Service:    • CARDIAC ELECTROPHYSIOLOGY PROCEDURE N/A 2/3/2017    Procedure: Pacemaker DC new;  Surgeon: Ehsan Crocker MD;  Location:  [facility];  Service:    • CATARACT EXTRACTION     • CHOLECYSTECTOMY     • CORONARY ARTERY BYPASS GRAFT  1999    4 vessel    • CORONARY ARTERY BYPASS GRAFT     • CORONARY STENT PLACEMENT     • HYSTERECTOMY  1962   • INSERT / REPLACE / REMOVE PACEMAKER     • MASTECTOMY  2000   • OTHER SURGICAL HISTORY      TAVR 2017       Therapy Treatment          Rehabilitation Treatment Summary     Row Name 09/20/18 0850             Treatment Time/Intention    Discipline occupational therapy assistant  -TS      Document Type therapy note (daily note)  -TS      Subjective Information complains of;pain  -TS2      Existing  Precautions/Restrictions fall  -TS2      Recorded by [TS] Ame Carrero PRINCE/L 09/20/18 0850  [TS2] Ame Carrero PRINCE/L 09/20/18 0923      Row Name 09/20/18 0850             Cognitive Assessment/Intervention- PT/OT    Personal Safety Interventions fall prevention program maintained;nonskid shoes/slippers when out of bed  -TS      Recorded by [TS] Ame Carrero COTA/L 09/20/18 0923      Row Name 09/20/18 0850             Bed Mobility Assessment/Treatment    Comment (Bed Mobility) pt preferred to sleep on couch due to back issue prior to admit  -TS      Recorded by [TS] Ame Carrero COTA/L 09/20/18 0923      Row Name 09/20/18 0850             Functional Mobility    Functional Mobility- Ind. Level supervision required  -TS      Functional Mobility- Device rolling walker  -TS      Functional Mobility- Comment in room, in bathroom  -TS      Recorded by [TS] Ame Carrero COTA/L 09/20/18 0923      Row Name 09/20/18 0850             Transfer Assessment/Treatment    Transfer Assessment/Treatment sit-stand transfer;stand-sit transfer;toilet transfer  -TS      Recorded by [TS] Ame Carrero COTA/L 09/20/18 0923      Row Name 09/20/18 0850             Sit-Stand Transfer    Sit-Stand [place] (Transfers) supervision  -TS      Assistive Device (Sit-Stand Transfers) walker, front-wheeled  -TS      Recorded by [TS] Ame Carrero COTA/L 09/20/18 0923      Row Name 09/20/18 0850             Stand-Sit Transfer    Stand-Sit [place] (Transfers) supervision  -TS      Assistive Device (Stand-Sit Transfers) walker, front-wheeled  -TS      Recorded by [TS] Ame Carrero PRINCE/L 09/20/18 0923      Row Name 09/20/18 0850             Toilet Transfer    Type (Toilet Transfer) sit-stand;stand-sit  -TS      [place] Level (Toilet Transfer) supervision  -TS      Assistive Device (Toilet Transfer) commode  -TS      Recorded by [TS] Ame Carrero PRINCE/L 09/20/18  0923      Row Name 09/20/18 0850             ADL Assessment/Intervention    77594 - OT Self Care/Mgmt Minutes 40  -TS      BADL Assessment/Intervention upper body dressing;lower body dressing;bathing;toileting  -TS2      Recorded by [TS] Ame Carrero PRINCE/L 09/20/18 0933  [TS2] Ame Carrero PRINCE/L 09/20/18 0923      Row Name 09/20/18 0850             Bathing Assessment/Intervention    Bathing [place] Level upper body;lower body;set up;supervision  -TS      Assistive Devices (Bathing) shower chair  -TS      Bathing Position supported sitting;unsupported standing;sink side  -TS      Recorded by [TS] Ame Carrero COTA/L 09/20/18 0923      Row Name 09/20/18 0850             Upper Body Dressing Assessment/Training    Upper Body Dressing [place] Level don;doff;pull-over garment;set up  -TS      Upper Body Dressing Position supported sitting  -TS      Recorded by [TS] Ame Carrero COTA/L 09/20/18 0923      Row Name 09/20/18 0850             Lower Body Dressing Assessment/Training    Lower Body Dressing [place] Level don;doff;pants/bottoms;undergarment;shoes/slippers;supervision  -TS      Lower Body Dressing Position supported sitting;unsupported standing  -TS      Recorded by [TS] Ame Carrero COTA/L 09/20/18 0923      Row Name 09/20/18 0850             Toileting Assessment/Training    [place] Level (Toileting) toileting skills;supervision  -TS      Assistive Devices (Toileting) commode  -TS      Toileting Position unsupported sitting;unsupported standing  -TS      Recorded by [TS] Ame Carrero PRINCE/L 09/20/18 0923      Row Name 09/20/18 0850             Positioning and Restraints    Pre-Treatment Position other (comment)   on couch  -TS      Post Treatment Position --   on couch   -TS      In Chair sitting;call light within reach;encouraged to call for assist  -TS      Recorded by [TS] Ame Carrero PRINCE/L 09/20/18 0923      Row Name  09/20/18 0850             Pain Scale: Numbers Pre/Post-Treatment    Pain Scale: Numbers, Pretreatment 2/10  -TS      Pain Location - Side Left  -TS      Pain Location - Orientation lower  -TS      Pain Location extremity  -TS      Pain Intervention(s) Repositioned  -TS      Recorded by [TS] Ame Carrero COTA/L 09/20/18 0923      Row Name 09/20/18 0850             Outcome Summary/Treatment Plan (OT)    Daily Summary of Progress (OT) progress toward functional goals is good  -TS      Recorded by [TS] Ame Carrero COTA/L 09/20/18 0923        User Key  (r) = Recorded By, (t) = Taken By, (c) = Cosigned By    Initials Name Effective Dates Discipline    TS Ame Carrero COTA/L 08/02/16 -  OT               Occupational Therapy Education     Title: PT OT SLP Therapies (Active)     Topic: Occupational Therapy (Active)     Point: ADL training (Done)     Description: Instruct learner(s) on proper safety adaptation and remediation techniques during self care or transfers.   Instruct in proper use of assistive devices.   Learning Progress Summary     Learner Status Readiness Method Response Comment Documented by    Patient Done Acceptance E VU transfers, ADL modification TS 09/20/18 0923                      User Key     Initials Effective Dates Name Provider Type Discipline     08/02/16 -  Ame Carrero COTA/L Occupational Therapy Assistant OT                OT Recommendation and Plan  Outcome Summary/Treatment Plan (OT)  Daily Summary of Progress (OT): progress toward functional goals is good  Daily Summary of Progress (OT): progress toward functional goals is good  Plan of Care Review  Plan of Care Reviewed With: patient  Plan of Care Reviewed With: patient  Outcome Summary: Pt transfers and ambulates with S with RW. Pt completed toileting with S along with UB/LB sponge bath at side sink and TB dressing. Pt plans to discharge to assisted living facility. Continue OT POC         Outcome  Measures     Row Name 09/20/18 0900 09/19/18 0923 09/19/18 0900       How much help from another person do you currently need...    Turning from your back to your side while in flat bed without using bedrails?  --  -- 4  -LH    Moving from lying on back to sitting on the side of a flat bed without bedrails?  --  -- 4  -LH    Moving to and from a bed to a chair (including a wheelchair)?  --  -- 3  -LH    Standing up from a chair using your arms (e.g., wheelchair, bedside chair)?  --  -- 3  -LH    Climbing 3-5 steps with a railing?  --  -- 3  -LH    To walk in hospital room?  --  -- 3  -LH    AM-PAC 6 Clicks Score  --  -- 20  -LH       How much help from another is currently needed...    Putting on and taking off regular lower body clothing? 3  -TS 3  -AC  --    Bathing (including washing, rinsing, and drying) 3  -TS 3  -AC  --    Toileting (which includes using toilet bed pan or urinal) 4  -TS 3  -AC  --    Putting on and taking off regular upper body clothing 4  -TS 4  -AC  --    Taking care of personal grooming (such as brushing teeth) 4  -TS 4  -AC  --    Eating meals 4  -TS 4  -AC  --    Score 22  -TS 21  -AC  --       Functional Assessment    Outcome Measure Options AM-PAC 6 Clicks Daily Activity (OT)  -TS AM-PAC 6 Clicks Daily Activity (OT)  -AC AM-PAC 6 Clicks Basic Mobility (PT)  -      User Key  (r) = Recorded By, (t) = Taken By, (c) = Cosigned By    Initials Name Provider Type     Eulogio Blackmon, OTR/L Occupational Therapist     Darius Leal, PT Physical Therapist    TS Ame Carrero, PRINCE/L Occupational Therapy Assistant           Time Calculation:         Time Calculation- OT     Row Name 09/20/18 0933 09/20/18 0850          Time Calculation- OT    OT Start Time 0850  -TS  --     OT Stop Time 0930  -TS  --     OT Time Calculation (min) 40 min  -TS  --     Total Timed Code Minutes- OT 40 minute(s)  -TS  --     OT Received On 09/20/18  -TS  --        Timed Charges    81820 - OT Self  Care/Mgmt Minutes  -- 40  -TS       User Key  (r) = Recorded By, (t) = Taken By, (c) = Cosigned By    Initials Name Provider Type    TS Ame Carrero COTA/L Occupational Therapy Assistant           Therapy Suggested Charges     Code   Minutes Charges    08556 (CPT®) Hc Ot Neuromusc Re Education Ea 15 Min      73981 (CPT®) Hc Ot Ther Proc Ea 15 Min      88643 (CPT®) Hc Ot Therapeutic Act Ea 15 Min      30448 (CPT®) Hc Ot Manual Therapy Ea 15 Min      59075 (CPT®) Hc Ot Iontophoresis Ea 15 Min      18968 (CPT®) Hc Ot Elec Stim Ea-Per 15 Min      36847 (CPT®) Hc Ot Ultrasound Ea 15 Min      68163 (CPT®) Hc Ot Self Care/Mgmt/Train Ea 15 Min 40 3    Total  40 3        Therapy Charges for Today     Code Description Service Date Service Provider Modifiers Qty    [id number] HC OT SELF CARE/MGMT/TRAIN EA 15 MIN 9/20/2018 Ame Carrero COTA/L GO, KX 3          OT G-codes  OT Professional Judgement Used?: Yes  OT Functional Scales Options: AM-PAC 6 Clicks Daily Activity (OT)  Score: 21  Functional Limitation: Self care  Self Care Current Status (): At least 20 percent but less than 40 percent impaired, limited or restricted  Self Care Goal Status (): At least 1 percent but less than 20 percent impaired, limited or restricted    JACKSON Perez  9/20/2018

## 2018-09-20 NOTE — PLAN OF CARE
Problem: Patient Care Overview  Goal: Plan of Care Review  Outcome: Ongoing (interventions implemented as appropriate)   09/20/18 1031   Coping/Psychosocial   Plan of Care Reviewed With patient   Plan of Care Review   Progress improving   OTHER   Outcome Summary PT tx completed.Pt sitting in chair in room. Only c/o pn right sacrum region when do steps or touches it. S sit<>stand, amb 125' x 2 with r wx S. LOB x 1. Performed 3 steps CG.Plan for d/c tomorrow to [place].

## 2018-09-20 NOTE — THERAPY TREATMENT NOTE
Acute Care - Physical Therapy Treatment Note  [facility]     Patient Name: Lakesha Costello  : 1935  MRN: [id number]  Today's Date: 2018  Onset of Illness/Injury or Date of Surgery: 18  Date of Referral to PT: 18  Referring Physician: FACUNDO Grace    Admit Date: 2018    Visit Dx:    ICD-10-CM ICD-9-CM   1. Acute UTI (urinary tract infection) N39.0 599.0   2. Renal insufficiency N28.9 593.9   3. Closed nondisplaced zone I fracture of sacrum, initial encounter (CMS/[facility]) S32.110A 805.6   4. Decreased activities of daily living (ADL) Z78.9 V49.89   5. Impaired mobility Z74.09 799.89     Patient Active Problem List   Diagnosis   • Paroxysmal atrial fibrillation (CMS/[facility])   • Atrial flutter (CMS/[facility])   • Essential hypertension   • Type 2 diabetes mellitus with diabetic peripheral angiopathy without gangrene, with long-term current use of insulin (CMS/[facility])   • Chronic anticoagulation   • Coronary artery disease involving native coronary artery of native heart without angina pectoris   • SSS (sick sinus syndrome) (CMS/[facility])   • Chest pain   • S/P CABG x 3   • Artificial pacemaker   • Tachycardia   • Palpitations   • Mixed hyperlipidemia   • S/P TAVR (transcatheter aortic valve replacement)   • Age-related osteoporosis without current pathological fracture   • Coronary artery abnormality   • Chronic diastolic congestive heart failure (CMS/[facility])   • Malignant neoplasm of breast (CMS/[facility])   • CKD (chronic kidney disease) stage 3, GFR 30-59 ml/min   • Acute UTI (urinary tract infection)       Therapy Treatment          Rehabilitation Treatment Summary     Row Name 18 0955 18 0850          Treatment Time/Intention    Discipline physical therapy assistant  -KJ occupational therapy assistant  -TS     Document Type therapy note (daily note)  -KJ therapy note (daily note)  -TS     Subjective Information complains of;pain  -KJ2 complains of;pain  -TS2     Mode of Treatment physical  therapy  -KJ2  --     Patient Effort excellent  -KJ2  --     Existing Precautions/Restrictions fall  -KJ2 fall  -TS2     Recorded by [KJ] Rebecca Schulte, PTA 09/20/18 1011  [KJ2] Rebecca Schulte, PTA 09/20/18 1030 [TS] Ame Carrero PRINCE/L 09/20/18 0850  [TS2] Ame Carrero PRINCE/L 09/20/18 0923     Row Name 09/20/18 0850             Cognitive Assessment/Intervention- PT/OT    Personal Safety Interventions fall prevention program maintained;nonskid shoes/slippers when out of bed  -TS      Recorded by [TS] Ame Carrero PRINCE/L 09/20/18 0923      Row Name 09/20/18 0955 09/20/18 0850          Bed Mobility Assessment/Treatment    Comment (Bed Mobility) sitting on bench in room  -KJ pt preferred to sleep on couch due to back issue prior to admit  -TS     Recorded by [KJ] Rebecca Schulte, PTA 09/20/18 1030 [TS] Ame Carrero PRINCE/L 09/20/18 0923     Row Name 09/20/18 0850             Functional Mobility    Functional Mobility- Ind. Level supervision required  -TS      Functional Mobility- Device rolling walker  -TS      Functional Mobility- Comment in room, in bathroom  -TS      Recorded by [TS] Ame Carrero PRINCE/L 09/20/18 0923      Row Name 09/20/18 0850             Transfer Assessment/Treatment    Transfer Assessment/Treatment sit-stand transfer;stand-sit transfer;toilet transfer  -TS      Recorded by [TS] Ame Carrero PRINCE/L 09/20/18 0923      Row Name 09/20/18 0955 09/20/18 0850          Sit-Stand Transfer    Sit-Stand [place] (Transfers) supervision  -KJ supervision  -TS     Assistive Device (Sit-Stand Transfers)  -- walker, front-wheeled  -TS     Recorded by [KJ] Rebecca Schulte, PTA 09/20/18 1030 [TS] Ame Carrero PRINCE/L 09/20/18 0923     Row Name 09/20/18 0955 09/20/18 0850          Stand-Sit Transfer    Stand-Sit [place] (Transfers) supervision  -KJ supervision  -TS     Assistive Device (Stand-Sit Transfers)  -- walker, front-wheeled  -TS      Recorded by [KJ] Rebecca Schulte, PTA 09/20/18 1030 [TS] Ame Carrero PRINCE/L 09/20/18 0923     Row Name 09/20/18 0850             Toilet Transfer    Type (Toilet Transfer) sit-stand;stand-sit  -TS      [place] Level (Toilet Transfer) supervision  -TS      Assistive Device (Toilet Transfer) commode  -TS      Recorded by [TS] Ame Carrero PRINCE/L 09/20/18 0923      Row Name 09/20/18 0955             Gait/Stairs Assessment/Training    [place] Level (Gait) supervision  -KJ      Assistive Device (Gait) walker, front-wheeled  -KJ      Distance in Feet (Gait) 125' x 2  -KJ      Handrail Location (Stairs) both sides  -KJ      Number of Steps (Stairs) 3  -KJ      Descending Technique (Stairs) step-to-step  -KJ      Comment (Gait/Stairs) LOB when let go of wx with hand   -KJ      Recorded by [KJ] Rebecca Schulte, PTA 09/20/18 1030      Row Name 09/20/18 0850             ADL Assessment/Intervention    35233 - OT Self Care/Mgmt Minutes 40  -TS      BADL Assessment/Intervention upper body dressing;lower body dressing;bathing;toileting  -TS2      Recorded by [TS] Ame Carrero COTA/L 09/20/18 0933  [TS2] Ame Carrero PRINCE/L 09/20/18 0923      Row Name 09/20/18 0850             Bathing Assessment/Intervention    Bathing [place] Level upper body;lower body;set up;supervision  -TS      Assistive Devices (Bathing) shower chair  -TS      Bathing Position supported sitting;unsupported standing;sink side  -TS      Recorded by [TS] Ame Carrero COTA/L 09/20/18 0923      Row Name 09/20/18 0850             Upper Body Dressing Assessment/Training    Upper Body Dressing [place] Level don;doff;pull-over garment;set up  -TS      Upper Body Dressing Position supported sitting  -TS      Recorded by [TS] Ame Carrero COTA/L 09/20/18 0923      Row Name 09/20/18 0850             Lower Body Dressing Assessment/Training    Lower Body Dressing [place] Level  don;doff;pants/bottoms;undergarment;shoes/slippers;supervision  -TS      Lower Body Dressing Position supported sitting;unsupported standing  -TS      Recorded by [TS] Ame Carrero COTA/L 09/20/18 0923      Row Name 09/20/18 0850             Toileting Assessment/Training    [place] Level (Toileting) toileting skills;supervision  -TS      Assistive Devices (Toileting) commode  -TS      Toileting Position unsupported sitting;unsupported standing  -TS      Recorded by [TS] Ame Carrero COTA/L 09/20/18 0923      Row Name 09/20/18 0955 09/20/18 0850          Positioning and Restraints    Pre-Treatment Position sitting in chair/recliner  -KJ other (comment)   on couch  -TS     Post Treatment Position chair  -KJ --   on couch   -TS     In Chair  -- sitting;call light within reach;encouraged to call for assist  -TS     Recorded by [KJ] Rebecca Schulte, PTA 09/20/18 1030 [TS] Ame Carrero COTA/L 09/20/18 0923     Row Name 09/20/18 0955 09/20/18 0850          Pain Scale: Numbers Pre/Post-Treatment    Pain Scale: Numbers, Pretreatment 2/10  -KJ 2/10  -TS     Pain Scale: Numbers, Post-Treatment 8/10  -KJ  --     Pain Location - Side Left  -KJ Left  -TS     Pain Location - Orientation lower  -KJ lower  -TS     Pain Location --   sacrum region  -KJ extremity  -TS     Pain Intervention(s)  -- Repositioned  -TS     Recorded by [KJ] Rebecca Schulte, PTA 09/20/18 1030 [TS] Ame Carrero COTA/L 09/20/18 0923     Row Name 09/20/18 0850             Outcome Summary/Treatment Plan (OT)    Daily Summary of Progress (OT) progress toward functional goals is good  -TS      Recorded by [TS] Ame Carrero PRINCE/L 09/20/18 0923        User Key  (r) = Recorded By, (t) = Taken By, (c) = Cosigned By    Initials Name Effective Dates Discipline    KJ Rebecca Schulte, PTA 08/02/16 -  PT    Ame Gonzalez, PRINCE/L 08/02/16 -  OT                     Physical Therapy Education     Title: PT OT SLP  Therapies (Active)     Topic: Physical Therapy (Done)     Point: Mobility training (Done)    Learning Progress Summary     Learner Status Readiness Method Response Comment Documented by    Patient Done Acceptance E,D DU,VU benefits of PT and POC, call for assist w/ mobility, sidelying w/ pillows b/t knees  09/19/18 0925          Point: Precautions (Done)    Learning Progress Summary     Learner Status Readiness Method Response Comment Documented by    Patient Done Acceptance E,D DU,VU benefits of PT and POC, call for assist w/ mobility, sidelying w/ pillows b/t knees  09/19/18 0925                      User Key     Initials Effective Dates Name Provider Type Discipline     08/02/16 -  Darius Leal, PT Physical Therapist PT                    PT Recommendation and Plan     Plan of Care Reviewed With: patient  Progress: improving  Outcome Summary: PT tx completed.Pt sitting in chair in room. Only c/o pn right sacrum region when do steps or touches it. S sit<>stand, amb 125' x 2 with r wx S. LOB x 1. Performed 3 steps CG.Plan for d/c tomorrow to [place].          Outcome Measures     Row Name 09/20/18 0900 09/19/18 0923 09/19/18 0900       How much help from another person do you currently need...    Turning from your back to your side while in flat bed without using bedrails?  --  -- 4  -LH    Moving from lying on back to sitting on the side of a flat bed without bedrails?  --  -- 4  -LH    Moving to and from a bed to a chair (including a wheelchair)?  --  -- 3  -LH    Standing up from a chair using your arms (e.g., wheelchair, bedside chair)?  --  -- 3  -LH    Climbing 3-5 steps with a railing?  --  -- 3  -LH    To walk in hospital room?  --  -- 3  -LH    AM-PAC 6 Clicks Score  --  -- 20  -LH       How much help from another is currently needed...    Putting on and taking off regular lower body clothing? 3  -TS 3  -AC  --    Bathing (including washing, rinsing, and drying) 3  -TS 3  -AC  --    Toileting  (which includes using toilet bed pan or urinal) 4  -TS 3  -AC  --    Putting on and taking off regular upper body clothing 4  -TS 4  -AC  --    Taking care of personal grooming (such as brushing teeth) 4  -TS 4  -AC  --    Eating meals 4  -TS 4  -AC  --    Score 22  -TS 21  -AC  --       Functional Assessment    Outcome Measure Options AM-PAC 6 Clicks Daily Activity (OT)  -TS AM-PAC 6 Clicks Daily Activity (OT)  -AC AM-PAC 6 Clicks Basic Mobility (PT)  -      User Key  (r) = Recorded By, (t) = Taken By, (c) = Cosigned By    Initials Name Provider Type    AC Eulogio Blackmon, OTR/L Occupational Therapist    LH Darius Leal, PT Physical Therapist    TS Ame Carrero, PRINCE/L Occupational Therapy Assistant           Time Calculation:         PT Charges     Row Name 09/20/18 1030             Time Calculation    Start Time 0955  -KJ      Stop Time 1020  -KJ      Time Calculation (min) 25 min  -KJ      PT Received On 09/19/18  -KJ      PT Goal Re-Cert Due Date 09/29/18  -KJ         Time Calculation- PT    Total Timed Code Minutes- PT 25 minute(s)  -KJ        User Key  (r) = Recorded By, (t) = Taken By, (c) = Cosigned By    Initials Name Provider Type    Rebecca Flanagan, PTA Physical Therapy Assistant        Therapy Suggested Charges     Code   Minutes Charges    67208 (CPT®) Hc Pt Neuromusc Re Education Ea 15 Min      20546 (CPT®) Hc Pt Ther Proc Ea 15 Min      42841 (CPT®) Hc Gait Training Ea 15 Min 15 1    69274 (CPT®) Hc Pt Therapeutic Act Ea 15 Min      00256 (CPT®) Hc Pt Manual Therapy Ea 15 Min      26327 (CPT®) Hc Pt Iontophoresis Ea 15 Min      44909 (CPT®) Hc Pt Elec Stim Ea-Per 15 Min      84356 (CPT®) Hc Pt Ultrasound Ea 15 Min      53458 (CPT®) Hc Pt Self Care/Mgmt/Train Ea 15 Min      71644 (CPT®) Hc Pt Prosthetic (S) Train Initial Encounter, Each 15 Min      38501 (CPT®) Hc Pt Orthotic(S)/Prosthetic(S) Encounter, Each 15 Min      11711 (CPT®) Hc Orthotic(S) Mgmt/Train Initial Encounter, Each  15min      Total  15 1        Therapy Charges for Today     Code Description Service Date Service Provider Modifiers Qty    77446156585 HC GAIT TRAINING EA 15 MIN 9/20/2018 Rebecca Schulte, PTA GP, KX 1    16936537897 HC PT THERAPEUTIC ACT EA 15 MIN 9/20/2018 Rebecca Schulte, BEVERLEY GP, KX 1          PT G-Codes  Outcome Measure Options: AM-PAC 6 Clicks Daily Activity (OT)  AM-PAC 6 Clicks Score: 20  Score: 22  Functional Limitation: Mobility: Walking and moving around  Mobility: Walking and Moving Around Current Status (): At least 20 percent but less than 40 percent impaired, limited or restricted  Mobility: Walking and Moving Around Goal Status (): At least 1 percent but less than 20 percent impaired, limited or restricted    Rebecca Schulte PTA  9/20/2018

## 2018-09-21 ENCOUNTER — READMISSION MANAGEMENT (OUTPATIENT)
Dept: CALL CENTER | Facility: HOSPITAL | Age: 83
End: 2018-09-21

## 2018-09-21 ENCOUNTER — ANTICOAGULATION VISIT (OUTPATIENT)
Dept: CARDIOLOGY | Facility: CLINIC | Age: 83
End: 2018-09-21

## 2018-09-21 NOTE — OUTREACH NOTE
Prep Survey      Responses   Facility patient discharged from?  [place]   Is patient eligible?  Yes   Discharge diagnosis  cclosed fracture of sacrum and coccyx   Does the patient have one of the following disease processes/diagnoses(primary or secondary)?  Other   Does the patient have Home health ordered?  Yes   What is the Home health agency?   [facility]   Is there a DME ordered?  Yes   What DME was ordered?  RW   Prep survey completed?  Yes          Amira Koch RN

## 2018-09-21 NOTE — THERAPY DISCHARGE NOTE
Acute Care - Occupational Therapy Discharge Summary  [facility]     Patient Name: Lakesha Costello  : 1935  MRN: [id number]    Today's Date: 2018  Onset of Illness/Injury or Date of Surgery: 18    Date of Referral to OT: 18  Referring Physician: FACUNDO Grace      Admit Date: 2018        OT Recommendation and Plan    Visit Dx:    ICD-10-CM ICD-9-CM   1. Acute UTI (urinary tract infection) N39.0 599.0   2. Renal insufficiency N28.9 593.9   3. Closed nondisplaced zone I fracture of sacrum, initial encounter (CMS/[facility]) S32.110A 805.6   4. Decreased activities of daily living (ADL) Z78.9 V49.89   5. Impaired mobility Z74.09 799.89   6. Paroxysmal atrial fibrillation (CMS/[facility]) I48.0 427.31   7. Closed fracture of sacrum and coccyx, initial encounter (CMS/[facility]) S32.10XA 805.6    S32.2XXA    8. Iron deficiency E61.1 280.9   9. 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                     OT Rehab Goals     Row Name 18 0710             Transfer Goal 1 (OT)    Activity/Assistive Device (Transfer Goal 1, OT) toilet;tub;commode  -TS      [place] Level/Cues Needed (Transfer Goal 1, OT) supervision required  -TS      Time Frame (Transfer Goal 1, OT) long term goal (LTG);10 days  -TS      Progress/Outcome (Transfer Goal 1, OT) goal not met  -TS         Dressing Goal 1 (OT)    Activity/Assistive Device (Dressing Goal 1, OT) lower body dressing  -TS      [place]/Cues Needed (Dressing Goal 1, OT) supervision required   AE PRN  -TS      Time Frame (Dressing Goal 1, OT) long term goal (LTG);10 days  -TS      Progress/Outcome (Dressing Goal 1, OT) goal met  -TS         Patient Education Goal (OT)    Activity (Patient Education Goal, OT) home safety  -TS      [place]/Cues/Accuracy (Memory Goal 2, OT) demonstrates adequately;independent;verbalizes understanding  -TS      Time Frame (Patient Education Goal, OT) long term goal (LTG);10 days  -TS      Progress/Outcome (Patient Education  Goal, OT) goal met  -TS        User Key  (r) = Recorded By, (t) = Taken By, (c) = Cosigned By    Initials Name Provider Type Discipline    TS Ame Carrero COTA/L Occupational Therapy Assistant OT                Outcome Measures     Row Name 09/20/18 0900 09/19/18 0923 09/19/18 0900       How much help from another person do you currently need...    Turning from your back to your side while in flat bed without using bedrails?  --  -- 4  -LH    Moving from lying on back to sitting on the side of a flat bed without bedrails?  --  -- 4  -LH    Moving to and from a bed to a chair (including a wheelchair)?  --  -- 3  -LH    Standing up from a chair using your arms (e.g., wheelchair, bedside chair)?  --  -- 3  -LH    Climbing 3-5 steps with a railing?  --  -- 3  -LH    To walk in hospital room?  --  -- 3  -LH    AM-PAC 6 Clicks Score  --  -- 20  -LH       How much help from another is currently needed...    Putting on and taking off regular lower body clothing? 3  -TS 3  -AC  --    Bathing (including washing, rinsing, and drying) 3  -TS 3  -AC  --    Toileting (which includes using toilet bed pan or urinal) 4  -TS 3  -AC  --    Putting on and taking off regular upper body clothing 4  -TS 4  -AC  --    Taking care of personal grooming (such as brushing teeth) 4  -TS 4  -AC  --    Eating meals 4  -TS 4  -AC  --    Score 22  -TS 21  -AC  --       Functional Assessment    Outcome Measure Options AM-PAC 6 Clicks Daily Activity (OT)  -TS AM-PAC 6 Clicks Daily Activity (OT)  -AC AM-PAC 6 Clicks Basic Mobility (PT)  -      User Key  (r) = Recorded By, (t) = Taken By, (c) = Cosigned By    Initials Name Provider Type    AC Eulogio Blackmon, OTR/L Occupational Therapist     Darius Leal, PT Physical Therapist    TS Ame Carrero PRINCE/L Occupational Therapy Assistant          Therapy Suggested Charges     Code   Minutes Charges    13243 (CPT®) Hc Ot Neuromusc Re Education Ea 15 Min      30995 (CPT®) Hc Ot Ther  Proc Ea 15 Min      59666 (CPT®) Hc Ot Therapeutic Act Ea 15 Min      04315 (CPT®) Hc Ot Manual Therapy Ea 15 Min      97135 (CPT®) Hc Ot Iontophoresis Ea 15 Min      82825 (CPT®) Hc Ot Elec Stim Ea-Per 15 Min      88244 (CPT®) Hc Ot Ultrasound Ea 15 Min      47776 (CPT®) Hc Ot Self Care/Mgmt/Train Ea 15 Min 40 3    Total  40 3          Therapy Charges for Today     Code Description Service Date Service Provider Modifiers Qty    43978286640 HC OT SELF CARE/MGMT/TRAIN EA 15 MIN 9/20/2018 Ame Carrero COTA/L GO, KX 3          OT Discharge Summary  Reason for Discharge: Discharge from facility  Outcomes Achieved: Refer to plan of care for updates on goals achieved  Discharge Destination: Home with assist, Home with home health      NIKI Perez/VENU  9/21/2018

## 2018-09-23 ENCOUNTER — READMISSION MANAGEMENT (OUTPATIENT)
Dept: CALL CENTER | Facility: HOSPITAL | Age: 83
End: 2018-09-23

## 2018-09-23 NOTE — OUTREACH NOTE
Medical Week 1 Survey      Responses   Facility patient discharged from?  [place]   Does the patient have one of the following disease processes/diagnoses(primary or secondary)?  Other   Is there a successful TCM telephone encounter documented?  No   Week 1 attempt successful?  Yes   Call start time  1016   Call end time  1022   General alerts for this patient  Use cell phone, pt just moved into assisted living at [facility]   Discharge diagnosis  cclosed fracture of sacrum and coccyx   Is patient permission given to speak with other caregiver?  Yes   List who call center can speak with  Richard,    Meds reviewed with patient/caregiver?  Yes   Is the patient having any side effects they believe may be caused by any medication additions or changes?  No   Does the patient have all medications ordered at discharge?  No   What is keeping the patient from filling the prescriptions?  Transportation   Nursing Interventions  No intervention needed   Prescription comments  Daughter forgot to , due to moving parents into assisted living this weekend. Will get tomorrow.    Is the patient taking all medications as directed (includes completed medication regime)?  No   What is preventing the patient from taking all medications as directed?  Other   Nursing Interventions  Nurse provided patient education   Does the patient have a primary care provider?   Yes   Does the patient have an appointment with their PCP within 7 days of discharge?  Yes   Has the patient kept scheduled appointments due by today?  Yes   What is the Home health agency?   [facility]   Has home health visited the patient within 72 hours of discharge?  Yes   What DME was ordered?  RW   Has all DME been delivered?  Yes   Did the patient receive a copy of their discharge instructions?  Yes   Nursing interventions  Reviewed instructions with patient   What is the patient's perception of their health status since discharge?  Improving   Is the  patient/caregiver able to teach back signs and symptoms related to disease process for when to call PCP?  Yes   Is the patient/caregiver able to teach back signs and symptoms related to disease process for when to call 911?  Yes   Is the patient/caregiver able to teach back the hierarchy of who to call/visit for symptoms/problems? PCP, Specialist, Home health nurse, Urgent Care, ED, 911  Yes   Week 1 call completed?  Yes   Wrap up additional comments  Pain is present but managed. Daughter forgot to get her meds, picking up tomorrow. Moved into [facility] assisted living yesterday.           Haylie Marino, RN

## 2018-09-25 DIAGNOSIS — I48.0 PAROXYSMAL ATRIAL FIBRILLATION (HCC): Primary | ICD-10-CM

## 2018-09-26 ENCOUNTER — ANTICOAGULATION VISIT (OUTPATIENT)
Dept: CARDIOLOGY | Facility: CLINIC | Age: 83
End: 2018-09-26

## 2018-09-26 DIAGNOSIS — C50.919 MALIGNANT NEOPLASM OF FEMALE BREAST, UNSPECIFIED ESTROGEN RECEPTOR STATUS, UNSPECIFIED LATERALITY, UNSPECIFIED SITE OF BREAST (HCC): Primary | ICD-10-CM

## 2018-09-26 LAB — INR PPP: 1.4

## 2018-09-27 ENCOUNTER — ANTICOAGULATION VISIT (OUTPATIENT)
Dept: CARDIOLOGY | Facility: CLINIC | Age: 83
End: 2018-09-27

## 2018-09-27 ENCOUNTER — LAB (OUTPATIENT)
Dept: LAB | Facility: HOSPITAL | Age: 83
End: 2018-09-27

## 2018-09-27 ENCOUNTER — OFFICE VISIT (OUTPATIENT)
Dept: ONCOLOGY | Facility: CLINIC | Age: 83
End: 2018-09-27

## 2018-09-27 VITALS
OXYGEN SATURATION: 90 % | RESPIRATION RATE: 16 BRPM | TEMPERATURE: 98.2 F | DIASTOLIC BLOOD PRESSURE: 68 MMHG | BODY MASS INDEX: 24.2 KG/M2 | HEIGHT: 61 IN | SYSTOLIC BLOOD PRESSURE: 138 MMHG | HEART RATE: 80 BPM | WEIGHT: 128.2 LBS

## 2018-09-27 DIAGNOSIS — C50.919 MALIGNANT NEOPLASM OF FEMALE BREAST, UNSPECIFIED ESTROGEN RECEPTOR STATUS, UNSPECIFIED LATERALITY, UNSPECIFIED SITE OF BREAST (HCC): Primary | ICD-10-CM

## 2018-09-27 DIAGNOSIS — E11.51 TYPE 2 DIABETES MELLITUS WITH DIABETIC PERIPHERAL ANGIOPATHY WITHOUT GANGRENE, WITH LONG-TERM CURRENT USE OF INSULIN (HCC): ICD-10-CM

## 2018-09-27 DIAGNOSIS — D47.1 MPN (MYELOPROLIFERATIVE NEOPLASM) (HCC): ICD-10-CM

## 2018-09-27 DIAGNOSIS — E61.1 IRON DEFICIENCY: ICD-10-CM

## 2018-09-27 DIAGNOSIS — I48.0 PAROXYSMAL ATRIAL FIBRILLATION (HCC): Primary | ICD-10-CM

## 2018-09-27 DIAGNOSIS — Z79.4 TYPE 2 DIABETES MELLITUS WITH DIABETIC PERIPHERAL ANGIOPATHY WITHOUT GANGRENE, WITH LONG-TERM CURRENT USE OF INSULIN (HCC): ICD-10-CM

## 2018-09-27 LAB
ALBUMIN SERPL-MCNC: 4.2 G/DL (ref 3.5–5)
ALBUMIN/GLOB SERPL: 1.4 G/DL (ref 1.1–2.5)
ALP SERPL-CCNC: 78 U/L (ref 24–120)
ALT SERPL W P-5'-P-CCNC: 47 U/L (ref 0–54)
ANION GAP SERPL CALCULATED.3IONS-SCNC: 11 MMOL/L (ref 4–13)
AST SERPL-CCNC: 56 U/L (ref 7–45)
BASOPHILS # BLD AUTO: 0.07 10*3/MM3 (ref 0–0.2)
BASOPHILS NFR BLD AUTO: 0.8 % (ref 0–2)
BILIRUB SERPL-MCNC: 0.6 MG/DL (ref 0.1–1)
BUN BLD-MCNC: 40 MG/DL (ref 5–21)
BUN/CREAT SERPL: 25.8 (ref 7–25)
CALCIUM SPEC-SCNC: 9.2 MG/DL (ref 8.4–10.4)
CHLORIDE SERPL-SCNC: 102 MMOL/L (ref 98–110)
CO2 SERPL-SCNC: 26 MMOL/L (ref 24–31)
CREAT BLD-MCNC: 1.55 MG/DL (ref 0.5–1.4)
DEPRECATED RDW RBC AUTO: 53.8 FL (ref 40–54)
EOSINOPHIL # BLD AUTO: 0.18 10*3/MM3 (ref 0–0.7)
EOSINOPHIL NFR BLD AUTO: 2 % (ref 0–4)
ERYTHROCYTE [DISTWIDTH] IN BLOOD BY AUTOMATED COUNT: 16.8 % (ref 12–15)
GFR SERPL CREATININE-BSD FRML MDRD: 32 ML/MIN/1.73
GLOBULIN UR ELPH-MCNC: 3 GM/DL
GLUCOSE BLD-MCNC: 162 MG/DL (ref 70–100)
HBA1C MFR BLD: 9.4 %
HCT VFR BLD AUTO: 32.8 % (ref 37–47)
HGB BLD-MCNC: 10.3 G/DL (ref 12–16)
HOLD SPECIMEN: NORMAL
HOLD SPECIMEN: NORMAL
IMM GRANULOCYTES # BLD: 0.35 10*3/MM3 (ref 0–0.03)
IMM GRANULOCYTES NFR BLD: 4 % (ref 0–5)
LYMPHOCYTES # BLD AUTO: 1.16 10*3/MM3 (ref 0.72–4.86)
LYMPHOCYTES NFR BLD AUTO: 13.1 % (ref 15–45)
MCH RBC QN AUTO: 28.3 PG (ref 28–32)
MCHC RBC AUTO-ENTMCNC: 31.4 G/DL (ref 33–36)
MCV RBC AUTO: 90.1 FL (ref 82–98)
MONOCYTES # BLD AUTO: 0.7 10*3/MM3 (ref 0.19–1.3)
MONOCYTES NFR BLD AUTO: 7.9 % (ref 4–12)
NEUTROPHILS # BLD AUTO: 6.38 10*3/MM3 (ref 1.87–8.4)
NEUTROPHILS NFR BLD AUTO: 72.2 % (ref 39–78)
NRBC BLD MANUAL-RTO: 0 /100 WBC (ref 0–0)
PLATELET # BLD AUTO: 922 10*3/MM3 (ref 130–400)
PMV BLD AUTO: 11.4 FL (ref 6–12)
POTASSIUM BLD-SCNC: 4.6 MMOL/L (ref 3.5–5.3)
PROT SERPL-MCNC: 7.2 G/DL (ref 6.3–8.7)
RBC # BLD AUTO: 3.64 10*6/MM3 (ref 4.2–5.4)
SODIUM BLD-SCNC: 139 MMOL/L (ref 135–145)
WBC NRBC COR # BLD: 8.84 10*3/MM3 (ref 4.8–10.8)

## 2018-09-27 PROCEDURE — 80053 COMPREHEN METABOLIC PANEL: CPT | Performed by: INTERNAL MEDICINE

## 2018-09-27 PROCEDURE — 36415 COLL VENOUS BLD VENIPUNCTURE: CPT

## 2018-09-27 PROCEDURE — 85025 COMPLETE CBC W/AUTO DIFF WBC: CPT | Performed by: INTERNAL MEDICINE

## 2018-09-27 PROCEDURE — 99214 OFFICE O/P EST MOD 30 MIN: CPT | Performed by: INTERNAL MEDICINE

## 2018-09-27 PROCEDURE — 83036 HEMOGLOBIN GLYCOSYLATED A1C: CPT | Performed by: INTERNAL MEDICINE

## 2018-09-27 NOTE — PROGRESS NOTES
[facility]  HEMATOLOGY & ONCOLOGY        Subjective     VISIT DIAGNOSIS:   Encounter Diagnoses   Name Primary?   • Malignant neoplasm of female breast, unspecified estrogen receptor status, unspecified laterality, unspecified site of breast (CMS/HCC) Yes   • Iron deficiency    • MPN (myeloproliferative neoplasm) (CMS/HCC)        REASON FOR VISIT:   No chief complaint on file.       HEMATOLOGY / ONCOLOGY HISTORY:    No history exists.     [No treatment plan]  Cancer Staging Information:  Cancer Staging  No matching staging information was found for the patient.      INTERVAL HISTORY  Patient ID: Lakesha Costello is a 83 y.o. year old female with Thrombocytoses, Plts over a million. Plt count was normal in 6/2017. Last Yr in 2017 she underwent a heart valve replacement and has been on Coumadin since.  Being on Coumadin in elderly patients invaribly tend to microscopically bleed in stools causing Iron deficiency and I fear that Iron deficiecny is causing thrombocytosis,  Although underlying Myeloproliferative disorder cannot be ruled out.     In house she received 2 doses of IV Venifer and today desouite IV Irin her Plts are still high at 912K.        Review of Systems         Medications:    Current Outpatient Prescriptions   Medication Sig Dispense Refill   • amiodarone (PACERONE) 200 MG tablet Take 1 tablet by mouth 2 (Two) Times a Day. 60 tablet 2   • aspirin 81 MG EC tablet Take 81 mg by mouth Daily.     • atenolol (TENORMIN) 50 MG tablet Take 1 tab PO qam & 1/2 tab PO qpm. 135 tablet 3   • calcium carbonate (OS-MICHAEL) 600 MG tablet Take 600 mg by mouth 2 (Two) Times a Day With Meals.     • cholecalciferol (VITAMIN D3) 1000 UNITS tablet Take 1,000 Units by mouth Daily.     • fenofibrate 160 MG tablet Take 160 mg by mouth Daily.     • ferrous sulfate 325 (65 FE) MG tablet Take 1 tablet by mouth 2 (Two) Times a Day With Meals. 60 tablet 0   • furosemide (LASIX) 20 MG tablet Take 1 tablet by mouth  Daily As Needed (for weight gain > 2 lbs in a day or 2). 10 tablet 0   • glipiZIDE (GLUCOTROL) 5 MG tablet Take 1 tablet by mouth 2 (Two) Times a Day Before Meals. 180 tablet 3   • insulin detemir (LEVEMIR) 100 UNIT/ML injection Inject 30 Units under the skin Every 12 (Twelve) Hours. 30 units qam 30 units qpm (Patient taking differently: Inject 30 Units under the skin into the appropriate area as directed Every Night. 30 units qam 30 units qpm) 10 mL 6   • magnesium oxide (MAGOX) 400 (241.3 MG) MG tablet tablet Take 400 mg by mouth Daily.     • nitroglycerin (NITROSTAT) 0.4 MG SL tablet 1 under the tongue as needed for angina, may repeat q5mins for up three doses 30 tablet 3   • pantoprazole (PROTONIX) 40 MG EC tablet Take 40 mg by mouth Daily.     • polyethyl glycol-propyl glycol (SYSTANE) 0.4-0.3 % solution ophthalmic solution Administer 1 drop to the right eye Daily.     • simvastatin (ZOCOR) 20 MG tablet Take 1 tablet by mouth Every Night. 90 tablet 3   • venlafaxine XR (EFFEXOR-XR) 37.5 MG 24 hr capsule Take 37.5 mg by mouth Daily.     • vitamin B-12 (CYANOCOBALAMIN) 500 MCG tablet Take 500 mcg by mouth Daily.     • warfarin (COUMADIN) 1 MG tablet Take 2 tablet by mouth daily on Monday and Friday. Take 1 tablet by mouth daily on Tuesday, Wednesday, Thursday, Saturday and Sunday. 120 tablet 3     No current facility-administered medications for this visit.        ALLERGIES:    Allergies   Allergen Reactions   • Dilaudid [Hydromorphone Hcl]    • Dilaudid [Hydromorphone] Nausea And Vomiting   • Enalapril Angioedema   • Janumet [Sitagliptin-Metformin Hcl] Other (See Comments)     Chest pain   • Lopid [Gemfibrozil] Nausea And Vomiting   • Sulfa Antibiotics Other (See Comments)     Syncope     • Ultram [Tramadol] Itching       Objective      @VITALS    Current Status 9/27/2018   ECOG score 1       General Appearance: Patient is awake, alert, oriented and in no acute distress. Patient is welldeveloped, wellnourished,  and appears stated age.  HEENT: Normocephalic. Sclerae clear, conjunctiva pink, extraocular movements intact, pupils, round, reactive to light and  accommodation. Mouth and throat are clear with moist oral mucosa.  NECK: Supple, no jugular venous distention, thyroid not enlarged.  LYMPH: No cervical, supraclavicular, axillary, or inguinal lymphadenopathy.  CHEST: Equal bilateral expansion, AP  diameter normal, resonant percussion note  LUNGS: Good air movement, no rales, rhonchi, rubs or wheezes with auscultation  CARDIO: Regular sinus rhythm, no murmurs, gallops or rubs.  ABDOMEN: Nondistended, soft, No tenderness, no guarding, no rebound, No hepatosplenomegaly. No abdominal masses. Bowel sounds positive. No hernia  GENITALIA: Not examined.  BREASTS: Not examined.  MUSKEL: No joint swelling, decreased motion, or inflammation  EXTREMS: No edema, clubbing, cyanosis, No varicose veins.  NEURO: Grossly nonfocal, Gait is coordinated and smooth, Cognition is preserved.  SKIN: No rashes, no ecchymoses, no petechia.  PSYCH: Oriented to time, place and person. Memory is preserved. Mood and affect appear normal      RECENT LABS:  Lab on 09/27/2018   Component Date Value Ref Range Status   • Hemoglobin A1C 09/27/2018 9.4  % Final   Orders Only on 09/26/2018   Component Date Value Ref Range Status   • Glucose 09/27/2018 162* 70 - 100 mg/dL Final   • BUN 09/27/2018 40* 5 - 21 mg/dL Final   • Creatinine 09/27/2018 1.55* 0.50 - 1.40 mg/dL Final   • Sodium 09/27/2018 139  135 - 145 mmol/L Final   • Potassium 09/27/2018 4.6  3.5 - 5.3 mmol/L Final   • Chloride 09/27/2018 102  98 - 110 mmol/L Final   • CO2 09/27/2018 26.0  24.0 - 31.0 mmol/L Final   • Calcium 09/27/2018 9.2  8.4 - 10.4 mg/dL Final   • Total Protein 09/27/2018 7.2  6.3 - 8.7 g/dL Final   • Albumin 09/27/2018 4.20  3.50 - 5.00 g/dL Final   • ALT (SGPT) 09/27/2018 47  0 - 54 U/L Final   • AST (SGOT) 09/27/2018 56* 7 - 45 U/L Final   • Alkaline Phosphatase 09/27/2018 78   24 - 120 U/L Final   • Total Bilirubin 09/27/2018 0.6  0.1 - 1.0 mg/dL Final   • eGFR Non  Amer 09/27/2018 32* >60 mL/min/1.73 Final   • Globulin 09/27/2018 3.0  gm/dL Final   • A/G Ratio 09/27/2018 1.4  1.1 - 2.5 g/dL Final   • BUN/Creatinine Ratio 09/27/2018 25.8* 7.0 - 25.0 Final   • Anion Gap 09/27/2018 11.0  4.0 - 13.0 mmol/L Final   • WBC 09/27/2018 8.84  4.80 - 10.80 10*3/mm3 Final   • RBC 09/27/2018 3.64* 4.20 - 5.40 10*6/mm3 Final   • Hemoglobin 09/27/2018 10.3* 12.0 - 16.0 g/dL Final   • Hematocrit 09/27/2018 32.8* 37.0 - 47.0 % Final   • MCV 09/27/2018 90.1  82.0 - 98.0 fL Final   • MCH 09/27/2018 28.3  28.0 - 32.0 pg Final   • MCHC 09/27/2018 31.4* 33.0 - 36.0 g/dL Final   • RDW 09/27/2018 16.8* 12.0 - 15.0 % Final   • RDW-SD 09/27/2018 53.8  40.0 - 54.0 fl Final   • MPV 09/27/2018 11.4  6.0 - 12.0 fL Final   • Platelets 09/27/2018 922* 130 - 400 10*3/mm3 Final   • Neutrophil % 09/27/2018 72.2  39.0 - 78.0 % Final   • Lymphocyte % 09/27/2018 13.1* 15.0 - 45.0 % Final   • Monocyte % 09/27/2018 7.9  4.0 - 12.0 % Final   • Eosinophil % 09/27/2018 2.0  0.0 - 4.0 % Final   • Basophil % 09/27/2018 0.8  0.0 - 2.0 % Final   • Immature Grans % 09/27/2018 4.0  0.0 - 5.0 % Final   • Neutrophils, Absolute 09/27/2018 6.38  1.87 - 8.40 10*3/mm3 Final   • Lymphocytes, Absolute 09/27/2018 1.16  0.72 - 4.86 10*3/mm3 Final   • Monocytes, Absolute 09/27/2018 0.70  0.19 - 1.30 10*3/mm3 Final   • Eosinophils, Absolute 09/27/2018 0.18  0.00 - 0.70 10*3/mm3 Final   • Basophils, Absolute 09/27/2018 0.07  0.00 - 0.20 10*3/mm3 Final   • Immature Grans, Absolute 09/27/2018 0.35* 0.00 - 0.03 10*3/mm3 Final   • nRBC 09/27/2018 0.0  0.0 - 0.0 /100 WBC Final   Anticoagulation Visit on 09/26/2018   Component Date Value Ref Range Status   • INR 09/26/2018 1.40   Final       RADIOLOGY:  Ct Abdomen Pelvis Without Contrast    Result Date: 9/18/2018  Narrative: EXAMINATION: CT ABDOMEN PELVIS WO CONTRAST- 9/18/2018 1:48 PM CDT   HISTORY: Fall, low abdominal pain. Flank pain,  DOSE: 226 mGycm (Automatic exposure control technique was implemented in an effort to keep the radiation dose as low as possible without compromising image quality)  REPORT: Spiral CT of the abdomen and pelvis was performed without contrast from the lung bases through the pubic symphysis. Reconstructed coronal and sagittal images are also reviewed.  COMPARISON: CT abdomen pelvis with contrast 3/23/2016.  Review of lung windows demonstrates mild linear atelectasis as well as evidence of mild emphysema. Previous median sternotomy is noted. Pacemaker wires are present. There is evidence of previous aortic valve replacement. A moderate amount of coronary artery plaque is present. Cholecystectomy clips are present. Liver and spleen are homogeneous on this unenhanced study. Ingested food is noted in the stomach which is incompletely distended. The pancreas and adrenal glands appear grossly within normal limits. No nephrolithiasis is identified and there is no evidence of urinary tract obstruction. Heavy calcified plaque is noted within the abdominal aorta, which is normal in caliber. Calcified plaque is also seen in the iliac arteries. Bowel loops are normal in caliber. There appears to be a duodenal diverticulum adjacent to the pancreatic head. There is a large stool ball in rectal vault and probable fecal impaction, without evidence of bowel obstruction. There is moderate sigmoid diverticulosis without evidence of acute diverticulitis. Previous hysterectomy is noted. The bladder appears within normal limits. No free fluid or free air is identified. Review of bone windows shows a chronic compression fracture L2 status post vertebroplasty. There is mild spinal stenosis at this level related to slight retropulsion of the posterior cortex. Advanced degenerative disc disease is present at L4-5 and to lesser degree L3-4. There is an acute fracture involving the third sacral  level which is described in detail on today's pelvic CT.      Impression: 1. Large stool ball within the rectum compatible with fecal impaction but no evidence of bowel obstruction. Moderate sigmoid diverticulosis proximally without acute diverticulitis. 2. Acute fracture third of the sacral level, described in detail on today's pelvic CT. 3. Chronic compression fracture at L2 with previous vertebroplasty and associated mild spinal stenosis. Advanced degenerative disc disease at L4-5. 4. Other chronic findings and postsurgical changes as above.   This report was finalized on 09/18/2018 13:59 by Dr. Ehsan Waddell MD.    Xr Femur 2 View Left    Result Date: 9/18/2018  Narrative: LEFT FEMUR, 2 VIEWS 9/18/2018 2:20 PM CDT  HISTORY: fall, pain  COMPARISON: NONE  FINDINGS:  Frontal and lateral radiographs of the left femur were obtained.  There is no evidence of fracture or worrisome osseous lesion. The soft tissues are grossly unremarkable. Limited evaluation of the hip and knee joints reveals normal alignment. Knee joint osteoarthritis.      Impression: 1. No visualized fracture or malalignment at the knee or hip joint. This report was finalized on 09/18/2018 14:52 by Dr Eagle Eric, .    Ct Pelvis Without Contrast    Result Date: 9/18/2018  Narrative: CT PELVIS WO CONTRAST-, CT LOWER EXTREMITY RIGHT WO CONTRAST- 9/18/2018 1:23 PM CDT  HISTORY: Pelvis trauma, fx known or suspected, xray insufficient   DOSE LENGTH PRODUCT: 135 mGy cm. Automated exposure control was also utilized to decrease patient radiation dose.  Technique: Axial CT of the pelvis without IV contrast. Sagittal and coronal reformations are also provided for review.  Comparison: CT scan dated 3/23/2016.  Findings:  There appears to be a horizontally oriented fracture traversing the S3 vertebra. There is mild buckling of the anterior vertebral body cortex. No obvious vertically oriented fracture components in the sacral ala. Normal alignment at the  sacroiliac joints. Bony pelvis is otherwise intact. Normal alignment at the hip joints. The joint spaces are fairly well-maintained. No acute fracture identified at the hip joints.  Prominent stool in the rectum. Sigmoid diverticulosis. No pelvic hematoma.      Impression: Impression:  1. Horizontally oriented fracture through the S3 vertebra with mild buckling in the anterior cortex. Normal alignment at the SI joints. 2. Normal alignment at the hip joints with no visualized hip fracture. This report was finalized on 09/18/2018 13:59 by Dr Eagle Eric, .    Ct Lower Extremity Right Without Contrast    Result Date: 9/18/2018  Narrative: CT PELVIS WO CONTRAST-, CT LOWER EXTREMITY RIGHT WO CONTRAST- 9/18/2018 1:23 PM CDT  HISTORY: Pelvis trauma, fx known or suspected, xray insufficient   DOSE LENGTH PRODUCT: 135 mGy cm. Automated exposure control was also utilized to decrease patient radiation dose.  Technique: Axial CT of the pelvis without IV contrast. Sagittal and coronal reformations are also provided for review.  Comparison: CT scan dated 3/23/2016.  Findings:  There appears to be a horizontally oriented fracture traversing the S3 vertebra. There is mild buckling of the anterior vertebral body cortex. No obvious vertically oriented fracture components in the sacral ala. Normal alignment at the sacroiliac joints. Bony pelvis is otherwise intact. Normal alignment at the hip joints. The joint spaces are fairly well-maintained. No acute fracture identified at the hip joints.  Prominent stool in the rectum. Sigmoid diverticulosis. No pelvic hematoma.      Impression: Impression:  1. Horizontally oriented fracture through the S3 vertebra with mild buckling in the anterior cortex. Normal alignment at the SI joints. 2. Normal alignment at the hip joints with no visualized hip fracture. This report was finalized on 09/18/2018 13:59 by Dr Eagle Eric, .           Assessment/Plan       Thrombocytoses, Plts over a  million. Plt count was normal in 6/2017. Last Yr in 2017 she underwent a heart valve replacement and has been on Coumadin since.  Being on Coumadin in elderly patients invaribly tend to microscopically bleed in stools causing Iron deficiency and I fear that Iron deficiecny is causing thrombocytosis,  Although underlying Myeloproliferative disorder cannot be ruled out.     In house she received 2 doses of IV Venifer and today desouite IV Irin her Plts are still high at 912K.    Therefore, the plan is to get a BM Bx. I have asked her to hold off her Coumadin 3 days prior to BM and take Lovonox 100mcg s/q given by her daughter RN. Then resume Coumadind day after the BM Bx. I will check Kenny 2 mutation to r/o Essential Thrombocytoses as well check the BM for Iron Staining.                    Abisai Barahona MD    9/27/2018    11:53 AM

## 2018-09-27 NOTE — TELEPHONE ENCOUNTER
Per Dr Barahona instructions after calculation patients current weight today of 128lb and dividing by 2.2 Dr Barahona reduced patient dose of Lovenox to 50 mg SQ daily x 5 days script sent to patients preferred [facility].

## 2018-10-01 ENCOUNTER — READMISSION MANAGEMENT (OUTPATIENT)
Dept: CALL CENTER | Facility: HOSPITAL | Age: 83
End: 2018-10-01

## 2018-10-01 ENCOUNTER — HOSPITAL ENCOUNTER (OUTPATIENT)
Facility: HOSPITAL | Age: 83
Setting detail: OBSERVATION
Discharge: HOME HEALTH | End: 2018-10-03
Attending: FAMILY MEDICINE | Admitting: FAMILY MEDICINE

## 2018-10-01 DIAGNOSIS — R06.02 SHORTNESS OF BREATH: ICD-10-CM

## 2018-10-01 DIAGNOSIS — D75.839 THROMBOCYTOSIS: ICD-10-CM

## 2018-10-01 DIAGNOSIS — S32.020A CLOSED COMPRESSION FRACTURE OF SECOND LUMBAR VERTEBRA, INITIAL ENCOUNTER: ICD-10-CM

## 2018-10-01 DIAGNOSIS — K59.00 CONSTIPATION, UNSPECIFIED CONSTIPATION TYPE: ICD-10-CM

## 2018-10-01 DIAGNOSIS — R10.13 EPIGASTRIC ABDOMINAL PAIN: ICD-10-CM

## 2018-10-01 DIAGNOSIS — I50.9 ACUTE ON CHRONIC CONGESTIVE HEART FAILURE, UNSPECIFIED HEART FAILURE TYPE (HCC): Primary | ICD-10-CM

## 2018-10-01 LAB
ALBUMIN SERPL-MCNC: 4.2 G/DL (ref 3.5–5)
ALBUMIN/GLOB SERPL: 1.3 G/DL (ref 1.1–2.5)
ALP SERPL-CCNC: 97 U/L (ref 24–120)
ALT SERPL W P-5'-P-CCNC: 34 U/L (ref 0–54)
AMYLASE SERPL-CCNC: 64 U/L (ref 30–110)
ANION GAP SERPL CALCULATED.3IONS-SCNC: 11 MMOL/L (ref 4–13)
APTT PPP: 36.5 SECONDS (ref 24.1–34.8)
AST SERPL-CCNC: 50 U/L (ref 7–45)
BASOPHILS # BLD AUTO: 0.08 10*3/MM3 (ref 0–0.2)
BASOPHILS NFR BLD AUTO: 1 % (ref 0–2)
BILIRUB SERPL-MCNC: 0.5 MG/DL (ref 0.1–1)
BUN BLD-MCNC: 38 MG/DL (ref 5–21)
BUN/CREAT SERPL: 26.2 (ref 7–25)
CALCIUM SPEC-SCNC: 9.2 MG/DL (ref 8.4–10.4)
CHLORIDE SERPL-SCNC: 105 MMOL/L (ref 98–110)
CO2 SERPL-SCNC: 25 MMOL/L (ref 24–31)
CREAT BLD-MCNC: 1.45 MG/DL (ref 0.5–1.4)
DEPRECATED RDW RBC AUTO: 57.5 FL (ref 40–54)
EOSINOPHIL # BLD AUTO: 0.18 10*3/MM3 (ref 0–0.7)
EOSINOPHIL NFR BLD AUTO: 2.3 % (ref 0–4)
ERYTHROCYTE [DISTWIDTH] IN BLOOD BY AUTOMATED COUNT: 17.8 % (ref 12–15)
GFR SERPL CREATININE-BSD FRML MDRD: 35 ML/MIN/1.73
GLOBULIN UR ELPH-MCNC: 3.2 GM/DL
GLUCOSE BLD-MCNC: 233 MG/DL (ref 70–100)
HCT VFR BLD AUTO: 33 % (ref 37–47)
HGB BLD-MCNC: 10.6 G/DL (ref 12–16)
IMM GRANULOCYTES # BLD: 0.25 10*3/MM3 (ref 0–0.03)
IMM GRANULOCYTES NFR BLD: 3.3 % (ref 0–5)
INR PPP: 1.2 (ref 0.91–1.09)
LIPASE SERPL-CCNC: 381 U/L (ref 23–203)
LYMPHOCYTES # BLD AUTO: 1.14 10*3/MM3 (ref 0.72–4.86)
LYMPHOCYTES NFR BLD AUTO: 14.9 % (ref 15–45)
MCH RBC QN AUTO: 29.1 PG (ref 28–32)
MCHC RBC AUTO-ENTMCNC: 32.1 G/DL (ref 33–36)
MCV RBC AUTO: 90.7 FL (ref 82–98)
MONOCYTES # BLD AUTO: 0.79 10*3/MM3 (ref 0.19–1.3)
MONOCYTES NFR BLD AUTO: 10.3 % (ref 4–12)
NEUTROPHILS # BLD AUTO: 5.22 10*3/MM3 (ref 1.87–8.4)
NEUTROPHILS NFR BLD AUTO: 68.2 % (ref 39–78)
NRBC BLD MANUAL-RTO: 0 /100 WBC (ref 0–0)
NT-PROBNP SERPL-MCNC: ABNORMAL PG/ML (ref 0–1800)
PLATELET # BLD AUTO: 922 10*3/MM3 (ref 130–400)
PMV BLD AUTO: 11.5 FL (ref 6–12)
POTASSIUM BLD-SCNC: 4.5 MMOL/L (ref 3.5–5.3)
PROT SERPL-MCNC: 7.4 G/DL (ref 6.3–8.7)
PROTHROMBIN TIME: 15.6 SECONDS (ref 11.9–14.6)
RBC # BLD AUTO: 3.64 10*6/MM3 (ref 4.2–5.4)
SODIUM BLD-SCNC: 141 MMOL/L (ref 135–145)
TROPONIN I SERPL-MCNC: <0.012 NG/ML (ref 0–0.03)
WBC NRBC COR # BLD: 7.66 10*3/MM3 (ref 4.8–10.8)

## 2018-10-01 PROCEDURE — 84484 ASSAY OF TROPONIN QUANT: CPT | Performed by: NURSE PRACTITIONER

## 2018-10-01 PROCEDURE — 80053 COMPREHEN METABOLIC PANEL: CPT | Performed by: NURSE PRACTITIONER

## 2018-10-01 PROCEDURE — 93010 ELECTROCARDIOGRAM REPORT: CPT | Performed by: INTERNAL MEDICINE

## 2018-10-01 PROCEDURE — 83880 ASSAY OF NATRIURETIC PEPTIDE: CPT | Performed by: NURSE PRACTITIONER

## 2018-10-01 PROCEDURE — 85025 COMPLETE CBC W/AUTO DIFF WBC: CPT | Performed by: NURSE PRACTITIONER

## 2018-10-01 PROCEDURE — 93005 ELECTROCARDIOGRAM TRACING: CPT | Performed by: NURSE PRACTITIONER

## 2018-10-01 PROCEDURE — 82150 ASSAY OF AMYLASE: CPT | Performed by: NURSE PRACTITIONER

## 2018-10-01 PROCEDURE — 83690 ASSAY OF LIPASE: CPT | Performed by: NURSE PRACTITIONER

## 2018-10-01 PROCEDURE — 93005 ELECTROCARDIOGRAM TRACING: CPT | Performed by: FAMILY MEDICINE

## 2018-10-01 PROCEDURE — 99284 EMERGENCY DEPT VISIT MOD MDM: CPT

## 2018-10-01 PROCEDURE — 96374 THER/PROPH/DIAG INJ IV PUSH: CPT

## 2018-10-01 PROCEDURE — 85610 PROTHROMBIN TIME: CPT | Performed by: NURSE PRACTITIONER

## 2018-10-01 PROCEDURE — 93005 ELECTROCARDIOGRAM TRACING: CPT

## 2018-10-01 PROCEDURE — 85730 THROMBOPLASTIN TIME PARTIAL: CPT | Performed by: NURSE PRACTITIONER

## 2018-10-01 RX ORDER — FAMOTIDINE 10 MG/ML
20 INJECTION, SOLUTION INTRAVENOUS ONCE
Status: COMPLETED | OUTPATIENT
Start: 2018-10-01 | End: 2018-10-01

## 2018-10-01 RX ADMIN — FAMOTIDINE 20 MG: 10 INJECTION, SOLUTION INTRAVENOUS at 23:16

## 2018-10-01 NOTE — OUTREACH NOTE
Medical Week 2 Survey      Responses   Facility patient discharged from?  [facility]   Does the patient have one of the following disease processes/diagnoses(primary or secondary)?  Other   Week 2 attempt successful?  No   Unsuccessful attempts  Attempt 1          Maryjo Arreola RN

## 2018-10-02 ENCOUNTER — APPOINTMENT (OUTPATIENT)
Dept: GENERAL RADIOLOGY | Facility: HOSPITAL | Age: 83
End: 2018-10-02

## 2018-10-02 ENCOUNTER — READMISSION MANAGEMENT (OUTPATIENT)
Dept: CALL CENTER | Facility: HOSPITAL | Age: 83
End: 2018-10-02

## 2018-10-02 ENCOUNTER — APPOINTMENT (OUTPATIENT)
Dept: CT IMAGING | Facility: HOSPITAL | Age: 83
End: 2018-10-02

## 2018-10-02 PROBLEM — I50.9 ACUTE ON CHRONIC CONGESTIVE HEART FAILURE (HCC): Status: ACTIVE | Noted: 2018-10-02

## 2018-10-02 LAB
ANION GAP SERPL CALCULATED.3IONS-SCNC: 11 MMOL/L (ref 4–13)
BASOPHILS # BLD AUTO: 0.07 10*3/MM3 (ref 0–0.2)
BASOPHILS NFR BLD AUTO: 1 % (ref 0–2)
BUN BLD-MCNC: 35 MG/DL (ref 5–21)
BUN/CREAT SERPL: 24.3 (ref 7–25)
CALCIUM SPEC-SCNC: 9 MG/DL (ref 8.4–10.4)
CHLORIDE SERPL-SCNC: 100 MMOL/L (ref 98–110)
CO2 SERPL-SCNC: 28 MMOL/L (ref 24–31)
CREAT BLD-MCNC: 1.44 MG/DL (ref 0.5–1.4)
DEPRECATED RDW RBC AUTO: 58.5 FL (ref 40–54)
EOSINOPHIL # BLD AUTO: 0.15 10*3/MM3 (ref 0–0.7)
EOSINOPHIL NFR BLD AUTO: 2.2 % (ref 0–4)
ERYTHROCYTE [DISTWIDTH] IN BLOOD BY AUTOMATED COUNT: 17.8 % (ref 12–15)
GFR SERPL CREATININE-BSD FRML MDRD: 35 ML/MIN/1.73
GLUCOSE BLD-MCNC: 259 MG/DL (ref 70–100)
GLUCOSE BLDC GLUCOMTR-MCNC: 192 MG/DL (ref 70–130)
GLUCOSE BLDC GLUCOMTR-MCNC: 215 MG/DL (ref 70–130)
GLUCOSE BLDC GLUCOMTR-MCNC: 228 MG/DL (ref 70–130)
GLUCOSE BLDC GLUCOMTR-MCNC: 244 MG/DL (ref 70–130)
HCT VFR BLD AUTO: 32.5 % (ref 37–47)
HGB BLD-MCNC: 10.2 G/DL (ref 12–16)
IMM GRANULOCYTES # BLD: 0.18 10*3/MM3 (ref 0–0.03)
IMM GRANULOCYTES NFR BLD: 2.7 % (ref 0–5)
LYMPHOCYTES # BLD AUTO: 0.82 10*3/MM3 (ref 0.72–4.86)
LYMPHOCYTES NFR BLD AUTO: 12.1 % (ref 15–45)
MCH RBC QN AUTO: 29.1 PG (ref 28–32)
MCHC RBC AUTO-ENTMCNC: 31.4 G/DL (ref 33–36)
MCV RBC AUTO: 92.9 FL (ref 82–98)
MONOCYTES # BLD AUTO: 0.7 10*3/MM3 (ref 0.19–1.3)
MONOCYTES NFR BLD AUTO: 10.4 % (ref 4–12)
NEUTROPHILS # BLD AUTO: 4.83 10*3/MM3 (ref 1.87–8.4)
NEUTROPHILS NFR BLD AUTO: 71.6 % (ref 39–78)
NRBC BLD MANUAL-RTO: 0 /100 WBC (ref 0–0)
PLATELET # BLD AUTO: 792 10*3/MM3 (ref 130–400)
PMV BLD AUTO: 11.3 FL (ref 6–12)
POTASSIUM BLD-SCNC: 4.1 MMOL/L (ref 3.5–5.3)
RBC # BLD AUTO: 3.5 10*6/MM3 (ref 4.2–5.4)
SODIUM BLD-SCNC: 139 MMOL/L (ref 135–145)
TROPONIN I SERPL-MCNC: <0.012 NG/ML (ref 0–0.03)
TROPONIN I SERPL-MCNC: <0.012 NG/ML (ref 0–0.03)
WBC NRBC COR # BLD: 6.75 10*3/MM3 (ref 4.8–10.8)

## 2018-10-02 PROCEDURE — 74176 CT ABD & PELVIS W/O CONTRAST: CPT

## 2018-10-02 PROCEDURE — 96375 TX/PRO/DX INJ NEW DRUG ADDON: CPT

## 2018-10-02 PROCEDURE — 93005 ELECTROCARDIOGRAM TRACING: CPT | Performed by: PHYSICIAN ASSISTANT

## 2018-10-02 PROCEDURE — 25010000002 FUROSEMIDE PER 20 MG: Performed by: FAMILY MEDICINE

## 2018-10-02 PROCEDURE — 93010 ELECTROCARDIOGRAM REPORT: CPT | Performed by: INTERNAL MEDICINE

## 2018-10-02 PROCEDURE — 82962 GLUCOSE BLOOD TEST: CPT

## 2018-10-02 PROCEDURE — 36415 COLL VENOUS BLD VENIPUNCTURE: CPT | Performed by: FAMILY MEDICINE

## 2018-10-02 PROCEDURE — 94799 UNLISTED PULMONARY SVC/PX: CPT

## 2018-10-02 PROCEDURE — 25010000002 ENOXAPARIN PER 10 MG: Performed by: FAMILY MEDICINE

## 2018-10-02 PROCEDURE — G0378 HOSPITAL OBSERVATION PER HR: HCPCS

## 2018-10-02 PROCEDURE — 84484 ASSAY OF TROPONIN QUANT: CPT | Performed by: PHYSICIAN ASSISTANT

## 2018-10-02 PROCEDURE — 80048 BASIC METABOLIC PNL TOTAL CA: CPT | Performed by: NURSE PRACTITIONER

## 2018-10-02 PROCEDURE — 96372 THER/PROPH/DIAG INJ SC/IM: CPT

## 2018-10-02 PROCEDURE — 96376 TX/PRO/DX INJ SAME DRUG ADON: CPT

## 2018-10-02 PROCEDURE — 71045 X-RAY EXAM CHEST 1 VIEW: CPT

## 2018-10-02 PROCEDURE — 85025 COMPLETE CBC W/AUTO DIFF WBC: CPT | Performed by: NURSE PRACTITIONER

## 2018-10-02 PROCEDURE — 63710000001 INSULIN DETEMIR PER 5 UNITS: Performed by: FAMILY MEDICINE

## 2018-10-02 PROCEDURE — 25010000002 FUROSEMIDE PER 20 MG: Performed by: PHYSICIAN ASSISTANT

## 2018-10-02 PROCEDURE — 94760 N-INVAS EAR/PLS OXIMETRY 1: CPT

## 2018-10-02 PROCEDURE — 84484 ASSAY OF TROPONIN QUANT: CPT | Performed by: FAMILY MEDICINE

## 2018-10-02 PROCEDURE — 63710000001 INSULIN LISPRO (HUMAN) PER 5 UNITS: Performed by: FAMILY MEDICINE

## 2018-10-02 RX ORDER — FUROSEMIDE 10 MG/ML
20 INJECTION INTRAMUSCULAR; INTRAVENOUS EVERY 12 HOURS
Status: DISCONTINUED | OUTPATIENT
Start: 2018-10-02 | End: 2018-10-03

## 2018-10-02 RX ORDER — ONDANSETRON 2 MG/ML
4 INJECTION INTRAMUSCULAR; INTRAVENOUS EVERY 6 HOURS PRN
Status: DISCONTINUED | OUTPATIENT
Start: 2018-10-02 | End: 2018-10-03 | Stop reason: HOSPADM

## 2018-10-02 RX ORDER — NICOTINE POLACRILEX 4 MG
15 LOZENGE BUCCAL
Status: DISCONTINUED | OUTPATIENT
Start: 2018-10-02 | End: 2018-10-03 | Stop reason: HOSPADM

## 2018-10-02 RX ORDER — FUROSEMIDE 10 MG/ML
20 INJECTION INTRAMUSCULAR; INTRAVENOUS ONCE
Status: COMPLETED | OUTPATIENT
Start: 2018-10-02 | End: 2018-10-02

## 2018-10-02 RX ORDER — DEXTROSE MONOHYDRATE 25 G/50ML
25 INJECTION, SOLUTION INTRAVENOUS
Status: DISCONTINUED | OUTPATIENT
Start: 2018-10-02 | End: 2018-10-03 | Stop reason: HOSPADM

## 2018-10-02 RX ORDER — VENLAFAXINE HYDROCHLORIDE 37.5 MG/1
37.5 CAPSULE, EXTENDED RELEASE ORAL DAILY
Status: DISCONTINUED | OUTPATIENT
Start: 2018-10-02 | End: 2018-10-03 | Stop reason: HOSPADM

## 2018-10-02 RX ORDER — PANTOPRAZOLE SODIUM 40 MG/1
40 TABLET, DELAYED RELEASE ORAL DAILY
Status: DISCONTINUED | OUTPATIENT
Start: 2018-10-02 | End: 2018-10-03 | Stop reason: HOSPADM

## 2018-10-02 RX ORDER — ATENOLOL 50 MG/1
50 TABLET ORAL
Status: DISCONTINUED | OUTPATIENT
Start: 2018-10-02 | End: 2018-10-03 | Stop reason: HOSPADM

## 2018-10-02 RX ORDER — SENNA AND DOCUSATE SODIUM 50; 8.6 MG/1; MG/1
2 TABLET, FILM COATED ORAL NIGHTLY
Status: DISCONTINUED | OUTPATIENT
Start: 2018-10-02 | End: 2018-10-03 | Stop reason: HOSPADM

## 2018-10-02 RX ORDER — VIT C/E/CUPERIC/ZINC/LUTEIN 226-90-0.8
1 CAPSULE ORAL 2 TIMES DAILY
COMMUNITY
End: 2020-01-01 | Stop reason: HOSPADM

## 2018-10-02 RX ORDER — BISACODYL 10 MG
10 SUPPOSITORY, RECTAL RECTAL DAILY
Status: DISCONTINUED | OUTPATIENT
Start: 2018-10-02 | End: 2018-10-03 | Stop reason: HOSPADM

## 2018-10-02 RX ORDER — SUCRALFATE ORAL 1 G/10ML
1 SUSPENSION ORAL
Status: DISCONTINUED | OUTPATIENT
Start: 2018-10-02 | End: 2018-10-03 | Stop reason: HOSPADM

## 2018-10-02 RX ORDER — ATORVASTATIN CALCIUM 10 MG/1
10 TABLET, FILM COATED ORAL DAILY
Status: DISCONTINUED | OUTPATIENT
Start: 2018-10-02 | End: 2018-10-03 | Stop reason: HOSPADM

## 2018-10-02 RX ORDER — ACETAMINOPHEN 325 MG/1
650 TABLET ORAL EVERY 4 HOURS PRN
Status: DISCONTINUED | OUTPATIENT
Start: 2018-10-02 | End: 2018-10-03 | Stop reason: HOSPADM

## 2018-10-02 RX ORDER — AMIODARONE HYDROCHLORIDE 200 MG/1
200 TABLET ORAL 2 TIMES DAILY
Status: DISCONTINUED | OUTPATIENT
Start: 2018-10-02 | End: 2018-10-03 | Stop reason: HOSPADM

## 2018-10-02 RX ORDER — SODIUM CHLORIDE 0.9 % (FLUSH) 0.9 %
1-10 SYRINGE (ML) INJECTION AS NEEDED
Status: DISCONTINUED | OUTPATIENT
Start: 2018-10-02 | End: 2018-10-03 | Stop reason: HOSPADM

## 2018-10-02 RX ADMIN — INSULIN LISPRO 3 UNITS: 100 INJECTION, SOLUTION INTRAVENOUS; SUBCUTANEOUS at 12:26

## 2018-10-02 RX ADMIN — INSULIN DETEMIR 30 UNITS: 100 INJECTION, SOLUTION SUBCUTANEOUS at 22:15

## 2018-10-02 RX ADMIN — SUCRALFATE 1 G: 1 SUSPENSION ORAL at 22:45

## 2018-10-02 RX ADMIN — VENLAFAXINE HYDROCHLORIDE 37.5 MG: 37.5 CAPSULE, EXTENDED RELEASE ORAL at 08:32

## 2018-10-02 RX ADMIN — AMIODARONE HYDROCHLORIDE 200 MG: 200 TABLET ORAL at 22:17

## 2018-10-02 RX ADMIN — FUROSEMIDE 20 MG: 10 INJECTION, SOLUTION INTRAMUSCULAR; INTRAVENOUS at 02:09

## 2018-10-02 RX ADMIN — INSULIN LISPRO 3 UNITS: 100 INJECTION, SOLUTION INTRAVENOUS; SUBCUTANEOUS at 22:17

## 2018-10-02 RX ADMIN — ATENOLOL 50 MG: 50 TABLET ORAL at 08:32

## 2018-10-02 RX ADMIN — AMIODARONE HYDROCHLORIDE 200 MG: 200 TABLET ORAL at 08:32

## 2018-10-02 RX ADMIN — DOCUSATE SODIUM -SENNOSIDES 2 TABLET: 50; 8.6 TABLET, COATED ORAL at 22:17

## 2018-10-02 RX ADMIN — PANTOPRAZOLE SODIUM 40 MG: 40 TABLET, DELAYED RELEASE ORAL at 08:32

## 2018-10-02 RX ADMIN — ENOXAPARIN SODIUM 60 MG: 120 INJECTION SUBCUTANEOUS at 17:00

## 2018-10-02 RX ADMIN — SUCRALFATE 1 G: 1 SUSPENSION ORAL at 12:26

## 2018-10-02 RX ADMIN — SUCRALFATE 1 G: 1 SUSPENSION ORAL at 17:01

## 2018-10-02 RX ADMIN — ATORVASTATIN CALCIUM 10 MG: 10 TABLET, FILM COATED ORAL at 08:32

## 2018-10-02 RX ADMIN — FUROSEMIDE 20 MG: 10 INJECTION, SOLUTION INTRAMUSCULAR; INTRAVENOUS at 17:01

## 2018-10-02 RX ADMIN — INSULIN LISPRO 3 UNITS: 100 INJECTION, SOLUTION INTRAVENOUS; SUBCUTANEOUS at 17:01

## 2018-10-02 RX ADMIN — INSULIN LISPRO 2 UNITS: 100 INJECTION, SOLUTION INTRAVENOUS; SUBCUTANEOUS at 08:32

## 2018-10-02 RX ADMIN — FUROSEMIDE 20 MG: 10 INJECTION, SOLUTION INTRAMUSCULAR; INTRAVENOUS at 06:11

## 2018-10-02 NOTE — OUTREACH NOTE
Medical Week 2 Survey      Responses   Facility patient discharged from?  [facility]   Does the patient have one of the following disease processes/diagnoses(primary or secondary)?  Other   Week 2 attempt successful?  No   Revoke  Readmitted          Kim Flor RN

## 2018-10-02 NOTE — PLAN OF CARE
Problem: Patient Care Overview  Goal: Plan of Care Review  Outcome: Ongoing (interventions implemented as appropriate)   10/02/18 3283   Coping/Psychosocial   Plan of Care Reviewed With patient   Plan of Care Review   Progress no change   OTHER   Outcome Summary Patient has no c/o pain. Soap sods enema x2, with effective outcome. VSS. Safety Maintained. Will continue to monitor.        Problem: Fluid Volume Excess (Adult)  Goal: Optimal Fluid Balance  Outcome: Ongoing (interventions implemented as appropriate)      Problem: Fall Risk (Adult)  Goal: Absence of Fall  Outcome: Ongoing (interventions implemented as appropriate)      Problem: Cardiac: Heart Failure (Adult)  Goal: Signs and Symptoms of Listed Potential Problems Will be Absent, Minimized or Managed (Cardiac: Heart Failure)  Outcome: Ongoing (interventions implemented as appropriate)

## 2018-10-02 NOTE — PROGRESS NOTES
[facility] Medicine Services  INPATIENT PROGRESS NOTE    Length of Stay: 0  Date of Admission: 10/1/2018  Primary Care Physician: Leonardo Zepeda DO    Subjective   Chief Complaint: Follow-up  HPI   Patient seen after midnight by Dr. Shirley.    The patient is sitting in the chair eating breakfast with daughter bedside.  She tells me that she is feeling okay today.  The main symptom that brought her to the emergency department was that of belching.  Her daughter states this started on Saturday and has been occurring frequently throughout the day since that time.  She denies any nausea or vomiting.  She denies reflux symptoms.  She states that she had a bowel movement in the emergency department last night and has had 2 this morning.  Her daughter describes these as carmen-colored.  The patient has been having orthopnea.  Her daughter has noticed increased swelling in her ankles over the past 2-3 days.  She also states they have not been weighing the patient regularly since her discharge.  In addition, Aldactone was taken off of her regimen recently and when necessary Lasix was added, and this has not been taken.     Patient has been scheduled for a bone marrow biopsy tomorrow per Dr. Barahona for thrombocytosis.  Her Coumadin was discontinued on Thursday in favor of Lovenox injections.  The patient is scheduled to have a watchman procedure performed in the near future at [place].    Review of Systems   All pertinent negatives and positives are as above. All other systems have been reviewed and are negative unless otherwise stated.     Objective    Temp:  [97.8 °F (36.6 °C)-98 °F (36.7 °C)] 97.8 °F (36.6 °C)  Heart Rate:  [60-65] 64  Resp:  [18-20] 18  BP: (127-184)/(50-61) 127/50  Physical Exam   Constitutional: She is oriented to person, place, and time. She appears well-developed and well-nourished. No distress.   HENT:   Head: Normocephalic and atraumatic.   Neck: Normal  range of motion. Neck supple. No JVD present. No tracheal deviation present. No thyromegaly present.   Cardiovascular: Normal rate, regular rhythm and intact distal pulses.  Exam reveals no gallop and no friction rub.    Murmur heard.  AV pacing 62-65   Pulmonary/Chest: Effort normal. She has no wheezes. She has rales (Bilateral bases).   Abdominal: Bowel sounds are normal. She exhibits distension. There is no tenderness. There is no guarding.   Abdomen is firm   Musculoskeletal: Normal range of motion. She exhibits edema (Trace Bilateral ankle/pedal edema). She exhibits no tenderness.   Lymphadenopathy:     She has no cervical adenopathy.   Neurological: She is alert and oriented to person, place, and time. No cranial nerve deficit.   Skin: Skin is warm and dry. No rash noted. No erythema.   Psychiatric: She has a normal mood and affect. Her behavior is normal. Judgment and thought content normal.   Vitals reviewed.    Results Review:  I have reviewed the labs, radiology results, and diagnostic studies.    Laboratory Data:     Results from last 7 days  Lab Units 10/01/18  2236 09/27/18  1115   WBC 10*3/mm3 7.66 8.84   HEMOGLOBIN g/dL 10.6* 10.3*   HEMATOCRIT % 33.0* 32.8*   PLATELETS 10*3/mm3 922* 922*       Results from last 7 days  Lab Units 10/01/18  2236 09/27/18  1115   SODIUM mmol/L 141 139   POTASSIUM mmol/L 4.5 4.6   CHLORIDE mmol/L 105 102   CO2 mmol/L 25.0 26.0   BUN mg/dL 38* 40*   CREATININE mg/dL 1.45* 1.55*   CALCIUM mg/dL 9.2 9.2   BILIRUBIN mg/dL 0.5 0.6   ALK PHOS U/L 97 78   ALT (SGPT) U/L 34 47   AST (SGOT) U/L 50* 56*   GLUCOSE mg/dL 233* 162*     Radiology Data:   Imaging Results (last 24 hours)     Procedure Component Value Units Date/Time    CT Abdomen Pelvis Without Contrast [[id number]] Collected:  10/02/18 0716     Updated:  10/02/18 0726    Narrative:       CT ABDOMEN PELVIS WO CONTRAST- 10/2/2018 12:44 AM CDT     HISTORY: epigastric abd pain      COMPARISON: 9/18/2018     DOSE LENGTH  PRODUCT: 256 mGy cm. Automated exposure control was also  utilized to decrease patient radiation dose.     TECHNIQUE: Axial images of the and pelvis are obtained without IV or  oral contrast     FINDINGS:  The nonenhanced liver, spleen, pancreas, and adrenal glands  are stable. Hypodense prominence of the adrenal glands unchanged.  Punctate calcification suspected within the head of the pancreas. There  is no peripancreatic inflammation. The gallbladder surgically absent.  There is no abnormal perinephric fluid collection. There is no  hydronephrosis. Bladder appears intact. The uterus is absent. There is  no adnexal mass. Moderate to large volume of stool seen within the  rectum distending rectum to measurement of 8 cm. There is a redundant  sigmoid colon. There is mild sigmoid colonic diverticulosis. There is no  evidence for bowel obstruction. Stomach is underdistended. Small hiatal  hernia considered. The diverticulum of the duodenum. There is diffuse  vascular calcification with no aneurysm. No pathologic lymphadenopathy  visualized. There is a cardiac pacer device.     There are emphysematous changes within the visible lung bases.     There are old right lumbar transverse process fractures with L2  kyphoplasty changes. There is moderate to advanced degenerative changes  of the lower lumbar spine.       Impression:       1. Large volume of stool within the rectum concerning for impaction.  Mild lower rectal versus anal wall thickening.  2. Sigmoid colonic diverticulosis with no acute diverticulitis.  3. Diffuse vascular calcification with no aneurysm or dissection.  4. Emphysema.  5. Duodenal diverticula. Mild hernia.  6. Stranding of the subcutaneous tissues of the anterior abdominal wall  with no loculated fluid collection.     Comments: A preliminary report is issued to the ER by the [facility]  radiology service.  This report was finalized on 10/02/2018 07:23 by Dr. Lucy Carson MD.    XR Chest 1 View  [[id number]] Collected:  10/02/18 0720     Updated:  10/02/18 0725    Narrative:       History:  83-year-old shortness of breath.     Reference:  Chest radiograph June 25, 2017     Findings:  Frontal chest radiograph performed.     Cardiomegaly. Prior median sternotomy/CABG. Interval TAVR. Mild  interstitial edema is suspected. No consolidation. No pleural effusion  or pneumothorax. Old left clavicle fracture. Dual-lead left-sided  transvenous pacemaker is intact and unchanged.          Impression:       Suspect mild interstitial edema.  This report was finalized on 10/02/2018 07:22 by Dr Cullen Ambrose, .        I have reviewed the patient current medications.     Assessment/Plan   Assessment:  1.  Acute on chronic diastolic congestive heart failure with last known ejection fraction 60%  2.  Epigastric pain/belching-slightly elevated lipase with history of pancreatitis  3.  History of severe aortic stenosis-status post TAVR 11/2017  4.  Paroxysmal atrial fibrillation status post cardioversion ×2.  Chronically anticoagulated with Coumadin.  Patient is scheduled for watchman procedure in the near future  5.  Sick sinus syndrome status post pacemaker  6.  Coronary artery disease status post three-vessel CABG  7.  Essential hypertension  8.  Mixed hyperlipidemia  9.  Chronic kidney disease stage III  10.  Thrombocytosis-scheduled for bone marrow biopsy tomorrow as per Dr. Barahona  11.  Insulin-dependent diabetes mellitus type II with hyperglycemia, hemoglobin A1c 9.4%  12.  Constipation-large volume of stool within the rectum noted on CT    Plan:  1.  Have requested records from [facility] for transthoracic echocardiogram performed 2 weeks ago.  Continue diuresis with IV Lasix twice daily for today, will likely convert to oral therapy tomorrow.  Strict intake and output and daily weight.  Consider cardiology consult for worsening symptoms.  PA and lateral chest x-ray in a.m.  Discussed with the patient and her daughter at  length as to how to monitor her symptoms at home.  2.  Continue proton pump inhibitor.  We will begin a trial of Carafate.  Start Senokot and Dulcolax suppository for bowel regimen and will give soapsuds enema today. Recheck lipase in AM. Patient is currently not experiencing any pain and is tolerating a regular diet  3.  Physical therapy consult.  Increase activity as tolerated.  4.  Continue to monitor the patient's blood pressure closely.  Currently, her only antihypertensive medication is atenolol.  Her daughter states she has been on numerous medications in the past and her blood pressure dropped too low.  5.  Patient has an appointment at 9 AM tomorrow for her bone marrow biopsy.  If we do not feel that she is ready for discharge before this time, we will see if the bone marrow biopsy can still be accomplished as not to delay this process.  6.  CBC with differential, BMP in AM and will check today as well    Discharge Planning: I expect the patient to be discharged to home in 1-2 days.    FACUNDO Hernandez   10/02/18   8:53 AM    I personally evaluated and examined the patient in conjunction with FACUNDO Hensley and agree with the assessment, treatment plan, and disposition of the patient as recorded by her. My history, exam, and further recommendations are: We will obtain echocardiogram report from [facility] (was performed a proximally 2 weeks ago).  No need to repeat echocardiogram at our facility at this time.  Agree with IV diuresis.  Talked with patient and daughter about the importance of daily weight monitoring.  Patient recently taken off of Aldactone due to his low blood pressures in addition to some hyperkalemia.  Plan for bone marrow biopsy tomorrow given recent, and significant, thrombocytosis.  Patient has been on bridging Lovenox therapy in anticipation of this procedure.  Bowel regimen in place.        Leland Johnson MD  10/02/18  11:48 AM

## 2018-10-02 NOTE — ED PROVIDER NOTES
Subjective   Patient is 82 yo female presents with abdominal discomfort, heartburn, and belching. She states she has had these sxs since Saturday describing nearly constant belching. She complains of right upper and epigastric discomfort described as burning associated with nausea. She denies any vomiting or diarrhea. She states that she noted her upper abdomen to be distended earlier tonight while belching. She reports shortness of breath at times in particular when attempting to lie flat in the bed. Patient has had no chest pain. She was recently admitted to this facility on 9/18/18-9/20/18 with diagnoses closed fracture of sacrum and coccyx, paroxysmal atrial fibrillation, essential hypertension, s/p TAVR, uti, thrombocytosis, and iron deficiency. Record indicates patient had fallen last Friday and was complaining of back pain. She had cardiology appt at [place] and told them about the fall who ordered xray. Nothing was noted on plain film but patient remained with pain in particular with walking. Patient had S3 vertebral fracture with buckling in the anterior cortex. Orthopedics were consulted who recommended weightbearing as tolerated, donut pillow, and physical therapy. She was also seen in consult by Dr. Barahona regarding thrombocytosis and patient was noted to be iron deficient. She received 2 doses of venofer and advised to f/u with him for repeat CBC. She received rocephin for uti. Patient is on chronic anticoagulation, was noted to be subtherapeutic. This regimen was adjusted as well. Patient was discharged home in daughter's care to [facility] (daughter is head of this facility). See note for complete details.  As described above, patient presents with 2 day history of belching, abdominal discomfort with distention, shortness of breath, and heartburn.         Abdominal Pain   Pain location:  RUQ and epigastric  Pain quality: bloating, burning, fullness and stiffness    Pain severity:   Moderate  Onset quality:  Gradual  Timing:  Intermittent  Chronicity:  New  Relieved by:  None tried  Worsened by:  Nothing  Ineffective treatments:  None tried  Associated symptoms: belching, nausea and shortness of breath    Associated symptoms: no chest pain, no chills, no constipation, no cough, no diarrhea, no dysuria, no fever, no flatus and no vomiting    Risk factors: recent hospitalization        Review of Systems   Constitutional: Negative for chills and fever.   HENT: Negative for congestion.    Respiratory: Positive for shortness of breath. Negative for cough and chest tightness.    Cardiovascular: Negative for chest pain, palpitations and leg swelling.   Gastrointestinal: Positive for abdominal pain and nausea. Negative for blood in stool, constipation, diarrhea, flatus and vomiting.   Genitourinary: Negative for dysuria.   Musculoskeletal: Negative for back pain, gait problem, joint swelling, myalgias, neck pain and neck stiffness.   Skin: Negative for color change, pallor, rash and wound.   All other systems reviewed and are negative.      Past Medical History:   Diagnosis Date   • Aortic stenosis    • Breast cancer (CMS/[facility])    • CAD (coronary artery disease)    • Cataract    • Chronic anticoagulation     Coumadin    • CKD (chronic kidney disease) stage 3, GFR 30-59 ml/min (CMS/[facility]) 4/16/2018   • Diabetes mellitus (CMS/[facility])     Type II   • Elevated cholesterol    • GERD (gastroesophageal reflux disease)    • Hyperlipidemia    • Hypertension    • Macular degeneration    • Pancreatitis    • Paroxysmal atrial fibrillation (CMS/[facility])    • Pulmonary hypertension (CMS/[facility])    • Rheumatic fever     during childhood   • Sick sinus syndrome (CMS/[facility])     with pacemaker       Allergies   Allergen Reactions   • Dilaudid [Hydromorphone Hcl]    • Dilaudid [Hydromorphone] Nausea And Vomiting   • Enalapril Angioedema   • Janumet [Sitagliptin-Metformin Hcl] Other (See Comments)     Chest pain   • Lopid [Gemfibrozil]  Nausea And Vomiting   • Sulfa Antibiotics Other (See Comments)     Syncope     • Ultram [Tramadol] Itching       Past Surgical History:   Procedure Laterality Date   • ANOMALOUS PULMONARY VENOUS RETURN REPAIR, TOTAL     • BACK SURGERY     • BELPHAROPTOSIS REPAIR     • CARDIAC CATHETERIZATION  1999, 2010   • CARDIAC CATHETERIZATION N/A 2/15/2017    Procedure: Left Heart Cath;  Surgeon: Ehsan Crocker MD;  Location:  [facility] CATH INVASIVE LOCATION;  Service:    • CARDIAC CATHETERIZATION N/A 2/15/2017    Procedure: Right Heart Cath;  Surgeon: Ehsan rCocker MD;  Location:  [facility] CATH [facility]VAS[facility];  Service:    • CARDIAC CATHETERIZATION N/A 2/15/2017    Procedure: Coronary angiography;  Surgeon: Ehsan Crocker MD;  Location:  [facility] CATH [facility]VAS[facility];  Service:    • CARDIAC CATHETERIZATION N/A 2/15/2017    Procedure: Left ventriculography;  Surgeon: Ehsan Crocker MD;  Location:  [facility] CATH INVASIVE [facility];  Service:    • CARDIAC CATHETERIZATION N/A 2/15/2017    Procedure: Intracardiac echocardiogram;  Surgeon: Ehsan Crocker MD;  Location:  [facility] CATH INVASIVE [facility];  Service:    • CARDIAC ELECTROPHYSIOLOGY PROCEDURE N/A 2/3/2017    Procedure: Pacemaker DC new;  Surgeon: Ehsan Crocker MD;  Location:  [facility] CATH INVASIVE LOC[facility];  Service:    • CATARACT EXTRACTION     • CHOLECYSTECTOMY     • CORONARY ARTERY BYPASS GRAFT  1999    4 vessel    • CORONARY ARTERY BYPASS GRAFT     • CORONARY STENT PLACEMENT     • HYSTERECTOMY  1962   • INSERT / REPLACE / REMOVE PACEMAKER     • MASTECTOMY  2000   • OTHER SURGICAL HISTORY      TAVR 2017       Family History   Problem Relation Age of Onset   • Breast cancer Mother    • Coronary artery disease Father    • Heart attack Father    • Diabetes Father    • Cancer Brother    • No Known Problems Maternal Grandmother    • No Known Problems Maternal Grandfather    • No Known Problems Paternal Grandmother    • No Known Problems Paternal Grandfather        Social History     Social  History   • Marital status:      Social History Main Topics   • Smoking status: Never Smoker   • Smokeless tobacco: Never Used   • Alcohol use No   • Drug use: No   • Sexual activity: No     Other Topics Concern   • Not on file           Objective   Physical Exam   Constitutional: She is oriented to person, place, and time. She appears well-developed and well-nourished.   Patient is belching intermittently on exam   HENT:   Head: Normocephalic.   Right Ear: External ear normal.   Left Ear: External ear normal.   Nose: Nose normal.   Eyes: Pupils are equal, round, and reactive to light. Conjunctivae and EOM are normal.   Neck: Normal range of motion. Neck supple.   Cardiovascular: Normal rate, regular rhythm, normal heart sounds and intact distal pulses.    Pulmonary/Chest: Effort normal and breath sounds normal.   Abdominal: Soft. Bowel sounds are normal.   Musculoskeletal: Normal range of motion.   Neurological: She is alert and oriented to person, place, and time.   Skin: Skin is warm and dry. Capillary refill takes less than 2 seconds.   Psychiatric: She has a normal mood and affect. Her behavior is normal. Judgment and thought content normal.   Nursing note and vitals reviewed.      Procedures           ED Course This will be a turn over for Rafa ZHANG. See his note for details.  ED Course as of Oct 02 1434   Tue Oct 02, 2018   0209 Cxr read by Dr. Appiah show mild pulmonary edema.   [CP]   0213 proBNP: (!) 11,400.0 [CP]   0213 Platelets: (!) 922 [CP]   0213 eGFR Non  Amer: (!) 35 [CP]   0221 Ekg at 2:14 am: av paced rate 60.   [CP]      ED Course User Index  [CP] Jorje Azevedo PA-C                  MDM  Number of Diagnoses or Management Options  Acute on chronic congestive heart failure, unspecified heart failure type (CMS/HCC):   Closed compression fracture of second lumbar vertebra, initial encounter (CMS/HCC):   Constipation, unspecified constipation type:   Epigastric abdominal  pain:   Shortness of breath:   Thrombocytosis (CMS/HCC):   Diagnosis management comments: 83-year-old  female presents to the emergency department with 3 days of worsening shortness of breath, epigastric abdominal pain, mild ankle swelling.  Patient does have a complicated medical history but essentially it appears she is in a mild CHF exacerbation.  Patient was recently taken off spire lactone and was prescribed when necessary Lasix but this prescription was never filled.  Chest x-ray does show mild pulmonary edema as read by Dr. Appiah.  Patient was recently discharged from this hospital and sent to a nursing facility.  Patient was admitted for compression fractures of L2 and was also found to have thrombocytosis.  Patient currently undergoing investigation by hematology and has an upcoming bone marrow abscesses on Wednesday of this coming week by Dr. Barahona.  Patient is also to have a watchman procedure done as she is at a higher risk for being on anticoagulation.  He was recently taken off Coumadin and put on Lovenox as she is a difficult person to get therapeutic and her INR. She does have history of pancreatitis, chronic kidney disease stage III., prior cholecystectomy, hysterectomy.  Patient vital signs are normal and she is in no acute distress will admit to hospitalist for further care at this time for mild heart failure exacerbation.       Amount and/or Complexity of Data Reviewed  Clinical lab tests: reviewed and ordered  Tests in the radiology section of CPT®: reviewed and ordered  Tests in the medicine section of CPT®: reviewed  Decide to obtain previous medical records or to obtain history from someone other than the patient: yes  Discuss the patient with other providers: yes    Risk of Complications, Morbidity, and/or Mortality  Presenting problems: moderate  Diagnostic procedures: moderate  Management options: moderate    Patient Progress  Patient progress: stable        Final diagnoses:    Acute on chronic congestive heart failure, unspecified heart failure type (CMS/HCC)   Thrombocytosis (CMS/HCC)   Closed compression fracture of second lumbar vertebra, initial encounter (CMS/[facility])   Constipation, unspecified constipation type   Epigastric abdominal pain   Shortness of breath            Jorje Azevedo PA-C  10/02/18 3608

## 2018-10-02 NOTE — PLAN OF CARE
Problem: Patient Care Overview  Goal: Plan of Care Review  Outcome: Ongoing (interventions implemented as appropriate)  Just arrived will probably be gently diuresed has expectation of bone marrow aspiration test scheduled for today being done    Problem: Fluid Volume Excess (Adult)  Goal: Identify Related Risk Factors and Signs and Symptoms  Outcome: Ongoing (interventions implemented as appropriate)  Just admitted for CHF - had recent medication change to lasix didn't take it because it got lost when she moved

## 2018-10-02 NOTE — PROGRESS NOTES
Discharge Planning Assessment  [facility]     Patient Name: Lakesha Costello  MRN: [id number]  Today's Date: 10/2/2018    Admit Date: 10/1/2018          Discharge Needs Assessment    No documentation.             Discharge Plan    No documentation.       Destination     No service coordination in this encounter.      Durable Medical Equipment     No service coordination in this encounter.      Dialysis/Infusion     No service coordination in this encounter.      Home Medical Care     No service coordination in this encounter.      Social Care     No service coordination in this encounter.                Demographic Summary    No documentation.           Functional Status    No documentation.           Psychosocial    No documentation.           Abuse/Neglect    No documentation.           Legal    No documentation.           Substance Abuse    No documentation.           Patient Forms    No documentation.         Anu Mccabe RN

## 2018-10-02 NOTE — PROGRESS NOTES
Discharge Planning Assessment  [facility]     Patient Name: Lakesha Costello  MRN: [id number]  Today's Date: 10/2/2018    Admit Date: 10/1/2018          Discharge Needs Assessment     Row Name 10/02/18 0915       Discharge Needs Assessment    Readmission Within the Last 30 Days no previous admission in last 30 days            Discharge Plan    No documentation.       Destination     No service coordination in this encounter.      Durable Medical Equipment     No service coordination in this encounter.      Dialysis/Infusion     No service coordination in this encounter.      Home Medical Care     No service coordination in this encounter.      Social Care     No service coordination in this encounter.                Demographic Summary    No documentation.           Functional Status    No documentation.           Psychosocial    No documentation.           Abuse/Neglect    No documentation.           Legal    No documentation.           Substance Abuse    No documentation.           Patient Forms    No documentation.         Anu Mccabe RN  .

## 2018-10-02 NOTE — H&P
[facility] Medicine Services  HISTORY AND PHYSICAL    Date of Admission: 10/1/2018  Primary Care Physician: Leonardo Zepeda DO    Subjective     Chief Complaint: right sided abdominal pain, epigastric pain with belching    History of Present Illness  She is an 83-year-old white female with a fairly significant complicated past medical history with aortic stenosis status post have her, A. fib status post pacemaker cardioversion ×2 and plans for ablation and watchman's procedure in [place], recent discovery of thrombocytosis followed by Dr. Barahona with plans for bone marrow biopsy in the morning, chronic Coumadin for A. fib currently on hold for this biopsy, recent hospital stay with us after a fall and a sacrum fracture, hypertension, diabetes who presents to the ER now with a 1-2 day history of intermittent epigastric and right upper quadrant discomfort associated with belching.  The patient describes it as feeling almost like a gallbladder issue however she has had a cholecystectomy.  She does not describe true chest pain.  The daughter states she had her first issue like this 2 evenings ago however her symptoms subsided.  She states again last evening she began having symptoms complaining of right sided discomfort associated with fairly extreme belching.  Recent changes for her than that of stay at [facility] after a fall which did include some dietary changes possibly eating more and not necessarily watching salt content for her.  Also recent changes include after being discharged from recent hospital stay Aldactone was discontinued from her home med list with instructions to use as needed Lasix.  The daughter does state however they have not been doing this as this had slipped her mind and was not a normal regimen for them.  Patient did describe some sensations of feeling possibly short of breath especially when she tried to lie down.  In the ER she did have a newly  elevated BNP.  Previous echo does reveal some diastolic dysfunction.  Her troponin is within normal limits.  EKG unremarkable.  Chest x-ray revealed evidence of some pulmonary edema.  Patient does have a history of stent placement as well as CABG in mid 90s.  Again she has plans for bone marrow biopsy tomorrow by Dr. Barahona for concerns of fairly elevated platelet count.  Her Coumadin is on hold at daughter has been administering Lovenox injections.  Plans also are to perform ablation and watchman in [place] for her ongoing issues with A. fib.  Patient is feeling better at this time with no further symptoms.  In the ER she was given IV Pepcid and IV Lasix.  It does seem as if her weight is possibly up about 6 pounds from her previous admit date about 2 weeks ago.        Review of Systems     Otherwise complete ROS reviewed and negative except as mentioned in the HPI.    Past Medical History:   Past Medical History:   Diagnosis Date   • Aortic stenosis    • Breast cancer (CMS/HCC)    • CAD (coronary artery disease)    • Cataract    • Chronic anticoagulation     Coumadin    • CKD (chronic kidney disease) stage 3, GFR 30-59 ml/min (CMS/HCC) 4/16/2018   • Diabetes mellitus (CMS/HCC)     Type II   • Elevated cholesterol    • GERD (gastroesophageal reflux disease)    • Hyperlipidemia    • Hypertension    • Macular degeneration    • Pancreatitis    • Paroxysmal atrial fibrillation (CMS/HCC)    • Pulmonary hypertension (CMS/HCC)    • Rheumatic fever     during childhood   • Sick sinus syndrome (CMS/HCC)     with pacemaker     Past Surgical History:  Past Surgical History:   Procedure Laterality Date   • ANOMALOUS PULMONARY VENOUS RETURN REPAIR, TOTAL     • BACK SURGERY     • BELPHAROPTOSIS REPAIR     • CARDIAC CATHETERIZATION  1999, 2010   • CARDIAC CATHETERIZATION N/A 2/15/2017    Procedure: Left Heart Cath;  Surgeon: Ehsan Crocker MD;  Location:  [facility] CATH INVASIVE LOCATION;  Service:    • CARDIAC CATHETERIZATION N/A  2/15/2017    Procedure: Right Heart Cath;  Surgeon: Ehsan Crocker MD;  Location:  [facility] CATH [facility]VASIVE [facility];  Service:    • CARDIAC CATHETERIZATION N/A 2/15/2017    Procedure: Coronary angiography;  Surgeon: Ehsan Crocker MD;  Location:  [facility] CATH [facility]IVE [facility];  Service:    • CARDIAC CATHETERIZATION N/A 2/15/2017    Procedure: Left ventriculography;  Surgeon: Ehsan Crocker MD;  Location:  [facility] CATH INV[facility]IVE [facility];  Service:    • CARDIAC CATHETERIZATION N/A 2/15/2017    Procedure: Intracardiac echocardiogram;  Surgeon: Ehsan Crocker MD;  Location:  [facility] CATH INVASIVE [facility];  Service:    • CARDIAC ELECTROPHYSIOLOGY PROCEDURE N/A 2/3/2017    Procedure: Pacemaker DC new;  Surgeon: Ehsan Crocker MD;  Location:  [facility] CATH INVASIVE LOC[facility];  Service:    • CATARACT EXTRACTION     • CHOLECYSTECTOMY     • CORONARY ARTERY BYPASS GRAFT  1999    4 vessel    • CORONARY ARTERY BYPASS GRAFT     • CORONARY STENT PLACEMENT     • HYSTERECTOMY  1962   • INSERT / REPLACE / REMOVE PACEMAKER     • MASTECTOMY  2000   • OTHER SURGICAL HISTORY      TAVR 2017     Social History:  reports that she has never smoked. She has never used smokeless tobacco. She reports that she does not drink alcohol or use drugs.    Family History: family history includes Breast cancer in her mother; Cancer in her brother; Coronary artery disease in her father; Diabetes in her father; Heart attack in her father; No Known Problems in her maternal grandfather, maternal grandmother, paternal grandfather, and paternal grandmother.       Allergies:  Allergies   Allergen Reactions   • Dilaudid [Hydromorphone Hcl]    • Dilaudid [Hydromorphone] Nausea And Vomiting   • Enalapril Angioedema   • Janumet [Sitagliptin-Metformin Hcl] Other (See Comments)     Chest pain   • Lopid [Gemfibrozil] Nausea And Vomiting   • Sulfa Antibiotics Other (See Comments)     Syncope     • Ultram [Tramadol] Itching     Medications:  Prior to Admission medications    Medication  Sig Start Date End Date Taking? Authorizing Provider   amiodarone (PACERONE) 200 MG tablet Take 1 tablet by mouth 2 (Two) Times a Day. 8/23/18   Ehsan Crocker MD   aspirin 81 MG EC tablet Take 81 mg by mouth Daily.    Midna Salvador MD   atenolol (TENORMIN) 50 MG tablet Take 1 tab PO qam & 1/2 tab PO qpm. 4/13/18   Leonardo Zepeda DO   calcium carbonate (OS-MICHAEL) 600 MG tablet Take 600 mg by mouth 2 (Two) Times a Day With Meals.    Minda Salvador MD   cholecalciferol (VITAMIN D3) 1000 UNITS tablet Take 1,000 Units by mouth Daily.    Minda Salvador MD   enoxaparin (LOVENOX) 120 MG/0.8ML solution syringe 50 mg SQ daily x 5 days 9/27/18   Abisai Barahona MD   fenofibrate 160 MG tablet Take 160 mg by mouth Daily.    Minda Salvador MD   ferrous sulfate 325 (65 FE) MG tablet Take 1 tablet by mouth 2 (Two) Times a Day With Meals. 9/20/18   Myron Huynh MD   furosemide (LASIX) 20 MG tablet Take 1 tablet by mouth Daily As Needed (for weight gain > 2 lbs in a day or 2). 9/20/18   Myron Huynh MD   glipiZIDE (GLUCOTROL) 5 MG tablet Take 1 tablet by mouth 2 (Two) Times a Day Before Meals. 4/13/18   Leonardo Zepeda DO   insulin detemir (LEVEMIR) 100 UNIT/ML injection Inject 30 Units under the skin Every 12 (Twelve) Hours. 30 units qam 30 units qpm  Patient taking differently: Inject 30 Units under the skin into the appropriate area as directed Every Night. 30 units qam 30 units qpm 6/19/18   Linda Johnson APRN   magnesium oxide (MAGOX) 400 (241.3 MG) MG tablet tablet Take 400 mg by mouth Daily.    Minda Salvador MD   nitroglycerin (NITROSTAT) 0.4 MG SL tablet 1 under the tongue as needed for angina, may repeat q5mins for up three doses 6/7/17   Ehsan Crocker MD   pantoprazole (PROTONIX) 40 MG EC tablet Take 40 mg by mouth Daily.    Minda Salvador MD   polyethyl glycol-propyl glycol (SYSTANE) 0.4-0.3 % solution ophthalmic solution Administer 1 drop to  "the right eye Daily.    ProviderMinda MD   simvastatin (ZOCOR) 20 MG tablet Take 1 tablet by mouth Every Night. 4/13/18   Leonardo Zepeda DO   venlafaxine XR (EFFEXOR-XR) 37.5 MG 24 hr capsule Take 37.5 mg by mouth Daily.    ProviderMinda MD   vitamin B-12 (CYANOCOBALAMIN) 500 MCG tablet Take 500 mcg by mouth Daily.    ProviderMinda MD   warfarin (COUMADIN) 1 MG tablet Take 2 tablet by mouth daily on Monday and Friday. Take 1 tablet by mouth daily on Tuesday, Wednesday, Thursday, Saturday and Sunday. 9/20/18   Myron Huynh MD     Objective     Vital Signs: /55   Pulse 60   Temp 98 °F (36.7 °C)   Resp 18   Ht 154.9 cm (61\")   Wt 58.1 kg (128 lb)   SpO2 94%   BMI 24.19 kg/m²   Physical Exam        Results Reviewed:  Lab Results (last 24 hours)     Procedure Component Value Units Date/Time    Troponin [[id number]]  (Normal) Collected:  10/02/18 0218    Specimen:  Blood Updated:  10/02/18 0248     Troponin I <0.012 ng/mL     Troponin [[id number]]  (Normal) Collected:  10/01/18 2236    Specimen:  Blood Updated:  10/01/18 2310     Troponin I <0.012 ng/mL     BNP [[id number]]  (Abnormal) Collected:  10/01/18 2236    Specimen:  Blood Updated:  10/01/18 2305     proBNP 11,400.0 (H) pg/mL     aPTT [[id number]]  (Abnormal) Collected:  10/01/18 2236    Specimen:  Blood Updated:  10/01/18 2300     PTT 36.5 (H) seconds     Protime-INR [[id number]]  (Abnormal) Collected:  10/01/18 2236    Specimen:  Blood Updated:  10/01/18 2300     Protime 15.6 (H) Seconds      INR 1.20 (H)    Comprehensive Metabolic Panel [[id number]]  (Abnormal) Collected:  10/01/18 2236    Specimen:  Blood Updated:  10/01/18 2259     Glucose 233 (H) mg/dL      BUN 38 (H) mg/dL      Creatinine 1.45 (H) mg/dL      Sodium 141 mmol/L      Potassium 4.5 mmol/L      Chloride 105 mmol/L      CO2 25.0 mmol/L      Calcium 9.2 mg/dL      Total Protein 7.4 g/dL      Albumin 4.20 g/dL      ALT (SGPT) 34 U/L      AST (SGOT) " 50 (H) U/L      Alkaline Phosphatase 97 U/L      Total Bilirubin 0.5 mg/dL      eGFR Non African Amer 35 (L) mL/min/1.73      Globulin 3.2 gm/dL      A/G Ratio 1.3 g/dL      BUN/Creatinine Ratio 26.2 (H)     Anion Gap 11.0 mmol/L     Narrative:       The MDRD GFR formula is only valid for adults with stable renal function between ages 18 and 70.    Lipase [[id number]]  (Abnormal) Collected:  10/01/18 2236    Specimen:  Blood Updated:  10/01/18 2259     Lipase 381 (H) U/L     Amylase [[id number]]  (Normal) Collected:  10/01/18 2236    Specimen:  Blood Updated:  10/01/18 2259     Amylase 64 U/L     CBC & Differential [[id number]] Collected:  10/01/18 2236    Specimen:  Blood Updated:  10/01/18 2255    Narrative:       The following orders were created for panel order CBC & Differential.  Procedure                               Abnormality         Status                     ---------                               -----------         ------                     CBC Auto Differential[[id number]]        Abnormal            Final result                 Please view results for these tests on the individual orders.    CBC Auto Differential [[id number]]  (Abnormal) Collected:  10/01/18 2236    Specimen:  Blood Updated:  10/01/18 2255     WBC 7.66 10*3/mm3      RBC 3.64 (L) 10*6/mm3      Hemoglobin 10.6 (L) g/dL      Hematocrit 33.0 (L) %      MCV 90.7 fL      MCH 29.1 pg      MCHC 32.1 (L) g/dL      RDW 17.8 (H) %      RDW-SD 57.5 (H) fl      MPV 11.5 fL      Platelets 922 (H) 10*3/mm3      Neutrophil % 68.2 %      Lymphocyte % 14.9 (L) %      Monocyte % 10.3 %      Eosinophil % 2.3 %      Basophil % 1.0 %      Immature Grans % 3.3 %      Neutrophils, Absolute 5.22 10*3/mm3      Lymphocytes, Absolute 1.14 10*3/mm3      Monocytes, Absolute 0.79 10*3/mm3      Eosinophils, Absolute 0.18 10*3/mm3      Basophils, Absolute 0.08 10*3/mm3      Immature Grans, Absolute 0.25 (H) 10*3/mm3      nRBC 0.0 /100 WBC         Imaging Results (last  24 hours)     Procedure Component Value Units Date/Time    XR Chest 1 View [[id number]] Updated:  10/02/18 0204    CT Abdomen Pelvis Without Contrast [[id number]] Updated:  10/02/18 0101        I have personally reviewed and interpreted the radiology studies and ECG obtained at time of admission.     Assessment / Plan     Assessment:   Hospital Problem List     Acute on chronic congestive heart failure (CMS/HCC)        1. Acute/chronic diastolic chf   Elevated bnp, pulm edema, check echo, low dose lasix, I/o, weights. Echo done at [facility] just 2 weeks ago. Will ask for this to be sent here for our records. I cannot view the results    2. Epigastric pain   Serial troponins, tele, PPI    3. Aortic stenosis   Hx of TAVR 11 2017, echo, lasix    4. Atrial fib   Cardioversion x 2 previously. Pacemaker previously. Plans for cardiac ablation and watchmans procedure per cardiology in [place]. Currently on coumadin (on hold now for bone marrow bx tomorrow), amiodrione    5. DM with hyperglycemia   On oral meds and insulin. Will hold oral agents for now. Reduce levemir for today. SSI. Diabetic diet    6. HTN   Monitor. Resume home meds    7. CKD stage III-IV   Aldactone had been stopped at recent hospital stay. Will continue to hold this while diuresing with lasix, however may need to go back on this as a home med or be more regular with lasix    8. Recent admit for fall   Consult PT/OT    9. Thrombocytosis   Has a bone marrow bx scheduled for in the am per dr cummings. Would discuss with scheduling during the day to see if she could get this while she is here                     Code Status: full     I discussed the patient's findings and my recommendations with the pt and daughter    Estimated length of stay 1-2 days    Elsa Shirley MD   10/02/18   3:49 AM

## 2018-10-02 NOTE — PLAN OF CARE
Problem: Patient Care Overview  Goal: Plan of Care Review  Outcome: Ongoing (interventions implemented as appropriate)   10/02/18 1602   Coping/Psychosocial   Plan of Care Reviewed With patient;daughter   Plan of Care Review   Progress no change   OTHER   Outcome Summary Initial RDN eval. Pt reports good appetite. We discussed low Na diet and how Pt can prevent added salt in her diet despite being provided meals in assissted living facility. Encouraged diet compliance and will continue to follow up.

## 2018-10-03 ENCOUNTER — HOSPITAL ENCOUNTER (OUTPATIENT)
Dept: CT IMAGING | Facility: HOSPITAL | Age: 83
Discharge: HOME | End: 2018-10-03
Attending: INTERNAL MEDICINE | Admitting: RADIOLOGY

## 2018-10-03 ENCOUNTER — ANTICOAGULATION VISIT (OUTPATIENT)
Dept: CARDIOLOGY | Facility: CLINIC | Age: 83
End: 2018-10-03

## 2018-10-03 ENCOUNTER — APPOINTMENT (OUTPATIENT)
Dept: GENERAL RADIOLOGY | Facility: HOSPITAL | Age: 83
End: 2018-10-03

## 2018-10-03 VITALS
BODY MASS INDEX: 23.52 KG/M2 | WEIGHT: 124.56 LBS | SYSTOLIC BLOOD PRESSURE: 153 MMHG | RESPIRATION RATE: 16 BRPM | TEMPERATURE: 98.4 F | DIASTOLIC BLOOD PRESSURE: 59 MMHG | HEIGHT: 61 IN | HEART RATE: 63 BPM | OXYGEN SATURATION: 95 %

## 2018-10-03 VITALS
DIASTOLIC BLOOD PRESSURE: 69 MMHG | HEART RATE: 60 BPM | SYSTOLIC BLOOD PRESSURE: 173 MMHG | OXYGEN SATURATION: 100 % | RESPIRATION RATE: 10 BRPM

## 2018-10-03 DIAGNOSIS — C50.919 MALIGNANT NEOPLASM OF FEMALE BREAST, UNSPECIFIED ESTROGEN RECEPTOR STATUS, UNSPECIFIED LATERALITY, UNSPECIFIED SITE OF BREAST (HCC): ICD-10-CM

## 2018-10-03 DIAGNOSIS — E61.1 IRON DEFICIENCY: ICD-10-CM

## 2018-10-03 LAB
ANION GAP SERPL CALCULATED.3IONS-SCNC: 10 MMOL/L (ref 4–13)
BASOPHILS # BLD AUTO: 0.06 10*3/MM3 (ref 0–0.2)
BASOPHILS NFR BLD AUTO: 0.9 % (ref 0–2)
BUN BLD-MCNC: 35 MG/DL (ref 5–21)
BUN/CREAT SERPL: 25.7 (ref 7–25)
CALCIUM SPEC-SCNC: 9.4 MG/DL (ref 8.4–10.4)
CHLORIDE SERPL-SCNC: 102 MMOL/L (ref 98–110)
CO2 SERPL-SCNC: 28 MMOL/L (ref 24–31)
CREAT BLD-MCNC: 1.36 MG/DL (ref 0.5–1.4)
DEPRECATED RDW RBC AUTO: 55.1 FL (ref 40–54)
EOSINOPHIL # BLD AUTO: 0.1 10*3/MM3 (ref 0–0.7)
EOSINOPHIL NFR BLD AUTO: 1.5 % (ref 0–4)
ERYTHROCYTE [DISTWIDTH] IN BLOOD BY AUTOMATED COUNT: 17.6 % (ref 12–15)
GFR SERPL CREATININE-BSD FRML MDRD: 37 ML/MIN/1.73
GLUCOSE BLD-MCNC: 108 MG/DL (ref 70–100)
GLUCOSE BLDC GLUCOMTR-MCNC: 271 MG/DL (ref 70–130)
GLUCOSE BLDC GLUCOMTR-MCNC: 92 MG/DL (ref 70–130)
HCT VFR BLD AUTO: 29.8 % (ref 37–47)
HGB BLD-MCNC: 9.6 G/DL (ref 12–16)
IMM GRANULOCYTES # BLD: 0.17 10*3/MM3 (ref 0–0.03)
IMM GRANULOCYTES NFR BLD: 2.5 % (ref 0–5)
LIPASE SERPL-CCNC: 262 U/L (ref 23–203)
LYMPHOCYTES # BLD AUTO: 0.95 10*3/MM3 (ref 0.72–4.86)
LYMPHOCYTES NFR BLD AUTO: 14.2 % (ref 15–45)
MCH RBC QN AUTO: 28.8 PG (ref 28–32)
MCHC RBC AUTO-ENTMCNC: 32.2 G/DL (ref 33–36)
MCV RBC AUTO: 89.5 FL (ref 82–98)
MONOCYTES # BLD AUTO: 0.84 10*3/MM3 (ref 0.19–1.3)
MONOCYTES NFR BLD AUTO: 12.6 % (ref 4–12)
NEUTROPHILS # BLD AUTO: 4.57 10*3/MM3 (ref 1.87–8.4)
NEUTROPHILS NFR BLD AUTO: 68.3 % (ref 39–78)
NRBC BLD MANUAL-RTO: 0 /100 WBC (ref 0–0)
PLATELET # BLD AUTO: 738 10*3/MM3 (ref 130–400)
PMV BLD AUTO: 11.9 FL (ref 6–12)
POTASSIUM BLD-SCNC: 4.2 MMOL/L (ref 3.5–5.3)
RBC # BLD AUTO: 3.33 10*6/MM3 (ref 4.2–5.4)
SODIUM BLD-SCNC: 140 MMOL/L (ref 135–145)
WBC NRBC COR # BLD: 6.69 10*3/MM3 (ref 4.8–10.8)

## 2018-10-03 PROCEDURE — 88313 SPECIAL STAINS GROUP 2: CPT | Performed by: INTERNAL MEDICINE

## 2018-10-03 PROCEDURE — 25010000002 MIDAZOLAM PER 1 MG: Performed by: RADIOLOGY

## 2018-10-03 PROCEDURE — 25010000002 FUROSEMIDE PER 20 MG: Performed by: FAMILY MEDICINE

## 2018-10-03 PROCEDURE — 85025 COMPLETE CBC W/AUTO DIFF WBC: CPT | Performed by: FAMILY MEDICINE

## 2018-10-03 PROCEDURE — 88237 TISSUE CULTURE BONE MARROW: CPT | Performed by: INTERNAL MEDICINE

## 2018-10-03 PROCEDURE — 88280 CHROMOSOME KARYOTYPE STUDY: CPT | Performed by: INTERNAL MEDICINE

## 2018-10-03 PROCEDURE — 88264 CHROMOSOME ANALYSIS 20-25: CPT | Performed by: INTERNAL MEDICINE

## 2018-10-03 PROCEDURE — G0378 HOSPITAL OBSERVATION PER HR: HCPCS

## 2018-10-03 PROCEDURE — 96376 TX/PRO/DX INJ SAME DRUG ADON: CPT

## 2018-10-03 PROCEDURE — 88311 DECALCIFY TISSUE: CPT | Performed by: INTERNAL MEDICINE

## 2018-10-03 PROCEDURE — 71046 X-RAY EXAM CHEST 2 VIEWS: CPT

## 2018-10-03 PROCEDURE — 83690 ASSAY OF LIPASE: CPT | Performed by: NURSE PRACTITIONER

## 2018-10-03 PROCEDURE — 88305 TISSUE EXAM BY PATHOLOGIST: CPT | Performed by: INTERNAL MEDICINE

## 2018-10-03 PROCEDURE — 63710000001 INSULIN LISPRO (HUMAN) PER 5 UNITS: Performed by: FAMILY MEDICINE

## 2018-10-03 PROCEDURE — 80048 BASIC METABOLIC PNL TOTAL CA: CPT | Performed by: FAMILY MEDICINE

## 2018-10-03 PROCEDURE — 77012 CT SCAN FOR NEEDLE BIOPSY: CPT

## 2018-10-03 PROCEDURE — 88184 FLOWCYTOMETRY/ TC 1 MARKER: CPT | Performed by: INTERNAL MEDICINE

## 2018-10-03 PROCEDURE — 88185 FLOWCYTOMETRY/TC ADD-ON: CPT | Performed by: INTERNAL MEDICINE

## 2018-10-03 PROCEDURE — 25010000002 FENTANYL CITRATE (PF) 100 MCG/2ML SOLUTION: Performed by: RADIOLOGY

## 2018-10-03 PROCEDURE — 82962 GLUCOSE BLOOD TEST: CPT

## 2018-10-03 RX ORDER — FUROSEMIDE 20 MG/1
20 TABLET ORAL DAILY
Status: DISCONTINUED | OUTPATIENT
Start: 2018-10-04 | End: 2018-10-03 | Stop reason: HOSPADM

## 2018-10-03 RX ORDER — MIDAZOLAM HYDROCHLORIDE 1 MG/ML
INJECTION INTRAMUSCULAR; INTRAVENOUS
Status: COMPLETED | OUTPATIENT
Start: 2018-10-03 | End: 2018-10-03

## 2018-10-03 RX ORDER — SENNA AND DOCUSATE SODIUM 50; 8.6 MG/1; MG/1
2 TABLET, FILM COATED ORAL NIGHTLY
Qty: 60 TABLET | Refills: 0 | Status: SHIPPED | OUTPATIENT
Start: 2018-10-03 | End: 2019-03-08

## 2018-10-03 RX ORDER — SODIUM CHLORIDE 0.9 % (FLUSH) 0.9 %
3 SYRINGE (ML) INJECTION EVERY 12 HOURS SCHEDULED
Status: CANCELLED | OUTPATIENT
Start: 2018-10-03

## 2018-10-03 RX ORDER — FENTANYL CITRATE 50 UG/ML
INJECTION, SOLUTION INTRAMUSCULAR; INTRAVENOUS
Status: COMPLETED | OUTPATIENT
Start: 2018-10-03 | End: 2018-10-03

## 2018-10-03 RX ORDER — SODIUM CHLORIDE 0.9 % (FLUSH) 0.9 %
3-10 SYRINGE (ML) INJECTION AS NEEDED
Status: CANCELLED | OUTPATIENT
Start: 2018-10-03

## 2018-10-03 RX ORDER — LIDOCAINE HYDROCHLORIDE 10 MG/ML
INJECTION, SOLUTION EPIDURAL; INFILTRATION; INTRACAUDAL; PERINEURAL
Status: COMPLETED | OUTPATIENT
Start: 2018-10-03 | End: 2018-10-03

## 2018-10-03 RX ADMIN — LIDOCAINE HYDROCHLORIDE 5 ML: 10 INJECTION, SOLUTION EPIDURAL; INFILTRATION; INTRACAUDAL; PERINEURAL at 10:32

## 2018-10-03 RX ADMIN — BISACODYL 10 MG: 10 SUPPOSITORY RECTAL at 08:35

## 2018-10-03 RX ADMIN — ATORVASTATIN CALCIUM 10 MG: 10 TABLET, FILM COATED ORAL at 08:34

## 2018-10-03 RX ADMIN — MIDAZOLAM 1 MG: 1 INJECTION INTRAMUSCULAR; INTRAVENOUS at 10:30

## 2018-10-03 RX ADMIN — SUCRALFATE 1 G: 1 SUSPENSION ORAL at 08:35

## 2018-10-03 RX ADMIN — FENTANYL CITRATE 50 MCG: 50 INJECTION, SOLUTION INTRAMUSCULAR; INTRAVENOUS at 10:29

## 2018-10-03 RX ADMIN — PANTOPRAZOLE SODIUM 40 MG: 40 TABLET, DELAYED RELEASE ORAL at 08:34

## 2018-10-03 RX ADMIN — ATENOLOL 50 MG: 50 TABLET ORAL at 08:34

## 2018-10-03 RX ADMIN — VENLAFAXINE HYDROCHLORIDE 37.5 MG: 37.5 CAPSULE, EXTENDED RELEASE ORAL at 08:34

## 2018-10-03 RX ADMIN — AMIODARONE HYDROCHLORIDE 200 MG: 200 TABLET ORAL at 08:34

## 2018-10-03 RX ADMIN — SUCRALFATE 1 G: 1 SUSPENSION ORAL at 12:09

## 2018-10-03 RX ADMIN — FUROSEMIDE 20 MG: 10 INJECTION, SOLUTION INTRAMUSCULAR; INTRAVENOUS at 06:22

## 2018-10-03 RX ADMIN — INSULIN LISPRO 4 UNITS: 100 INJECTION, SOLUTION INTRAVENOUS; SUBCUTANEOUS at 12:09

## 2018-10-03 NOTE — PROGRESS NOTES
Continued Stay Note   Sabrina     Patient Name: Lakesha Costello  MRN: [id number]  Today's Date: 10/3/2018    Admit Date: 10/1/2018          Discharge Plan     Row Name 10/03/18 1349       Plan    Plan [facility]     Patient/Family in Agreement with Plan yes    Final Discharge Disposition Code 06 - home with home health care    Final Note Pt is current with [facility]  and pt discharging home today. Informed Inder from [facility]  of discharge [phone].              Discharge Codes    No documentation.       Expected Discharge Date and Time     Expected Discharge Date Expected Discharge Time    Oct 3, 2018             NORA Martinez

## 2018-10-03 NOTE — DISCHARGE SUMMARY
[facility] Medicine Services  DISCHARGE SUMMARY       Date of Admission: 10/1/2018  Date of Discharge:  10/3/2018  Primary Care Physician: Leonardo Zepeda DO    Presenting Problem/History of Present Illness:  Acute on chronic congestive heart failure, unspecified heart failure type (CMS/HCC) [I50.9]   Final Discharge Diagnoses:  1.  Acute on chronic diastolic congestive heart failure with last known ejection fraction 60%  2.  Epigastric pain/belching-slightly elevated lipase with history of pancreatitis  3.  History of severe aortic stenosis-status post TAVR 11/2017  4.  Paroxysmal atrial fibrillation status post cardioversion ×2.  Chronically anticoagulated with Coumadin.  Patient is scheduled for watchman procedure in the near future  5.  Sick sinus syndrome status post pacemaker  6.  Coronary artery disease status post three-vessel CABG  7.  Essential hypertension  8.  Mixed hyperlipidemia  9.  Chronic kidney disease stage III  10.  Thrombocytosis-scheduled for bone marrow biopsy tomorrow as per Dr. Barahona  11.  Insulin-dependent diabetes mellitus type II with hyperglycemia, hemoglobin A1c 9.4%  12.  Constipation-large volume of stool within the rectum noted on CT    Consults: None    Procedures Performed:   1.  10/3/2018- CT-guided bone marrow biopsy    Pertinent Test Results:   Lab Results (all)     Procedure Component Value Units Date/Time    POC Glucose Once [[id number]]  (Abnormal) Collected:  10/03/18 1141    Specimen:  Blood Updated:  10/03/18 1201     Glucose 271 (H) mg/dL      Comment: : [id number] Gonsalo PrinceelaMeter ID: [id number]       Basic Metabolic Panel [[id number]]  (Abnormal) Collected:  10/03/18 0828    Specimen:  Blood Updated:  10/03/18 0846     Glucose 108 (H) mg/dL      BUN 35 (H) mg/dL      Creatinine 1.36 mg/dL      Sodium 140 mmol/L      Potassium 4.2 mmol/L      Chloride 102 mmol/L      CO2 28.0 mmol/L      Calcium 9.4 mg/dL      eGFR Non   Amer 37 (L) mL/min/1.73      BUN/Creatinine Ratio 25.7 (H)     Anion Gap 10.0 mmol/L     Narrative:       The MDRD GFR formula is only valid for adults with stable renal function between ages 18 and 70.    POC Glucose Once [[id number]]  (Normal) Collected:  10/03/18 0800    Specimen:  Blood Updated:  10/03/18 0831     Glucose 92 mg/dL      Comment: : [id number] Gonsalo PamelaMeter ID: [id number]       Lipase [[id number]]  (Abnormal) Collected:  10/03/18 0617    Specimen:  Blood Updated:  10/03/18 0731     Lipase 262 (H) U/L     CBC Auto Differential [[id number]]  (Abnormal) Collected:  10/03/18 0617    Specimen:  Blood Updated:  10/03/18 0708     WBC 6.69 10*3/mm3      RBC 3.33 (L) 10*6/mm3      Hemoglobin 9.6 (L) g/dL      Hematocrit 29.8 (L) %      MCV 89.5 fL      MCH 28.8 pg      MCHC 32.2 (L) g/dL      RDW 17.6 (H) %      RDW-SD 55.1 (H) fl      MPV 11.9 fL      Platelets 738 (H) 10*3/mm3      Neutrophil % 68.3 %      Lymphocyte % 14.2 (L) %      Monocyte % 12.6 (H) %      Eosinophil % 1.5 %      Basophil % 0.9 %      Immature Grans % 2.5 %      Neutrophils, Absolute 4.57 10*3/mm3      Lymphocytes, Absolute 0.95 10*3/mm3      Monocytes, Absolute 0.84 10*3/mm3      Eosinophils, Absolute 0.10 10*3/mm3      Basophils, Absolute 0.06 10*3/mm3      Immature Grans, Absolute 0.17 (H) 10*3/mm3      nRBC 0.0 /100 WBC     POC Glucose Once [[id number]]  (Abnormal) Collected:  10/02/18 2210    Specimen:  Blood Updated:  10/02/18 2241     Glucose 244 (H) mg/dL      Comment: : [id number] Jefferson RachelMeter ID: [id number]       POC Glucose Once [[id number]]  (Abnormal) Collected:  10/02/18 1651    Specimen:  Blood Updated:  10/02/18 1721     Glucose 215 (H) mg/dL      Comment: : [id number] Gonsalo AtkinsonMetbyron ID: [id number]       POC Glucose Once [[id number]]  (Abnormal) Collected:  10/02/18 1152    Specimen:  Blood Updated:  10/02/18 1204     Glucose 228 (H) mg/dL      Comment: : [id number] Gonsalo Khan  ID: [id number]       Basic Metabolic Panel [[id number]]  (Abnormal) Collected:  10/02/18 0955    Specimen:  Blood Updated:  10/02/18 1033     Glucose 259 (H) mg/dL      BUN 35 (H) mg/dL      Creatinine 1.44 (H) mg/dL      Sodium 139 mmol/L      Potassium 4.1 mmol/L      Chloride 100 mmol/L      CO2 28.0 mmol/L      Calcium 9.0 mg/dL      eGFR Non African Amer 35 (L) mL/min/1.73      BUN/Creatinine Ratio 24.3     Anion Gap 11.0 mmol/L     CBC Auto Differential [[id number]]  (Abnormal) Collected:  10/02/18 0955    Specimen:  Blood Updated:  10/02/18 1012     WBC 6.75 10*3/mm3      RBC 3.50 (L) 10*6/mm3      Hemoglobin 10.2 (L) g/dL      Hematocrit 32.5 (L) %      MCV 92.9 fL      MCH 29.1 pg      MCHC 31.4 (L) g/dL      RDW 17.8 (H) %      RDW-SD 58.5 (H) fl      MPV 11.3 fL      Platelets 792 (H) 10*3/mm3      Neutrophil % 71.6 %      Lymphocyte % 12.1 (L) %      Monocyte % 10.4 %      Eosinophil % 2.2 %      Basophil % 1.0 %      Immature Grans % 2.7 %      Neutrophils, Absolute 4.83 10*3/mm3      Lymphocytes, Absolute 0.82 10*3/mm3      Monocytes, Absolute 0.70 10*3/mm3      Eosinophils, Absolute 0.15 10*3/mm3      Basophils, Absolute 0.07 10*3/mm3      Immature Grans, Absolute 0.18 (H) 10*3/mm3      nRBC 0.0 /100 WBC     POC Glucose Once [[id number]]  (Abnormal) Collected:  10/02/18 0805    Specimen:  Blood Updated:  10/02/18 0828     Glucose 192 (H) mg/dL      Comment: : [id number] Gonsalo AtkinsonMeter ID: [id number]       Troponin [[id number]]  (Normal) Collected:  10/02/18 0507    Specimen:  Blood Updated:  10/02/18 0549     Troponin I <0.012 ng/mL     Troponin [[id number]]  (Normal) Collected:  10/02/18 0218    Specimen:  Blood Updated:  10/02/18 0248     Troponin I <0.012 ng/mL     Troponin [[id number]]  (Normal) Collected:  10/01/18 2236    Specimen:  Blood Updated:  10/01/18 2310     Troponin I <0.012 ng/mL     BNP [[id number]]  (Abnormal) Collected:  10/01/18 2236    Specimen:  Blood Updated:  10/01/18 2306      proBNP 11,400.0 (H) pg/mL     aPTT [[id number]]  (Abnormal) Collected:  10/01/18 2236    Specimen:  Blood Updated:  10/01/18 2300     PTT 36.5 (H) seconds     Protime-INR [[id number]]  (Abnormal) Collected:  10/01/18 2236    Specimen:  Blood Updated:  10/01/18 2300     Protime 15.6 (H) Seconds      INR 1.20 (H)    Comprehensive Metabolic Panel [[id number]]  (Abnormal) Collected:  10/01/18 2236    Specimen:  Blood Updated:  10/01/18 2259     Glucose 233 (H) mg/dL      BUN 38 (H) mg/dL      Creatinine 1.45 (H) mg/dL      Sodium 141 mmol/L      Potassium 4.5 mmol/L      Chloride 105 mmol/L      CO2 25.0 mmol/L      Calcium 9.2 mg/dL      Total Protein 7.4 g/dL      Albumin 4.20 g/dL      ALT (SGPT) 34 U/L      AST (SGOT) 50 (H) U/L      Alkaline Phosphatase 97 U/L      Total Bilirubin 0.5 mg/dL      eGFR Non African Amer 35 (L) mL/min/1.73      Globulin 3.2 gm/dL      A/G Ratio 1.3 g/dL      BUN/Creatinine Ratio 26.2 (H)     Anion Gap 11.0 mmol/L     Narrative:       The MDRD GFR formula is only valid for adults with stable renal function between ages 18 and 70.    Lipase [[id number]]  (Abnormal) Collected:  10/01/18 2236    Specimen:  Blood Updated:  10/01/18 2259     Lipase 381 (H) U/L     Amylase [[id number]]  (Normal) Collected:  10/01/18 2236    Specimen:  Blood Updated:  10/01/18 2259     Amylase 64 U/L     CBC Auto Differential [[id number]]  (Abnormal) Collected:  10/01/18 2236    Specimen:  Blood Updated:  10/01/18 2255     WBC 7.66 10*3/mm3      RBC 3.64 (L) 10*6/mm3      Hemoglobin 10.6 (L) g/dL      Hematocrit 33.0 (L) %      MCV 90.7 fL      MCH 29.1 pg      MCHC 32.1 (L) g/dL      RDW 17.8 (H) %      RDW-SD 57.5 (H) fl      MPV 11.5 fL      Platelets 922 (H) 10*3/mm3      Neutrophil % 68.2 %      Lymphocyte % 14.9 (L) %      Monocyte % 10.3 %      Eosinophil % 2.3 %      Basophil % 1.0 %      Immature Grans % 3.3 %      Neutrophils, Absolute 5.22 10*3/mm3      Lymphocytes, Absolute 1.14 10*3/mm3      Monocytes,  Absolute 0.79 10*3/mm3      Eosinophils, Absolute 0.18 10*3/mm3      Basophils, Absolute 0.08 10*3/mm3      Immature Grans, Absolute 0.25 (H) 10*3/mm3      nRBC 0.0 /100 WBC         Imaging Results (all)     Procedure Component Value Units Date/Time    XR Chest PA & Lateral [[id number]] Collected:  10/03/18 0721     Updated:  10/03/18 0725    Narrative:       XR CHEST PA AND LATERAL- 10/3/2018 4:30 AM CDT     HISTORY: f/u pulm edema; I50.9-Heart failure, unspecified;  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     Diffuse interstitial opacities are again noted. There is no focal  consolidation. The cardiomediastinal silhouette and pulmonary  vascularity are unchanged. The osseous structures and surrounding soft  tissues demonstrate no acute abnormality.       Impression:       1. No significant change in diffuse interstitial opacities that may  represent resolving pulmonary or interstitial edema.        This report was finalized on 10/03/2018 07:21 by Dr. Luther Farias MD.    CT Abdomen Pelvis Without Contrast [[id number]] Collected:  10/02/18 0716     Updated:  10/02/18 0726    Narrative:       CT ABDOMEN PELVIS WO CONTRAST- 10/2/2018 12:44 AM CDT     HISTORY: epigastric abd pain      COMPARISON: 9/18/2018     DOSE LENGTH PRODUCT: 256 mGy cm. Automated exposure control was also  utilized to decrease patient radiation dose.     TECHNIQUE: Axial images of the and pelvis are obtained without IV or  oral contrast     FINDINGS:  The nonenhanced liver, spleen, pancreas, and adrenal glands  are stable. Hypodense prominence of the adrenal glands unchanged.  Punctate calcification suspected within the head of the pancreas.  There  is no peripancreatic inflammation. The gallbladder surgically absent.  There is no abnormal perinephric fluid collection. There is no  hydronephrosis. Bladder appears intact. The uterus is absent. There is  no adnexal mass. Moderate to large volume of stool seen within the  rectum distending rectum to measurement of 8 cm. There is a redundant  sigmoid colon. There is mild sigmoid colonic diverticulosis. There is no  evidence for bowel obstruction. Stomach is underdistended. Small hiatal  hernia considered. The diverticulum of the duodenum. There is diffuse  vascular calcification with no aneurysm. No pathologic lymphadenopathy  visualized. There is a cardiac pacer device.     There are emphysematous changes within the visible lung bases.     There are old right lumbar transverse process fractures with L2  kyphoplasty changes. There is moderate to advanced degenerative changes  of the lower lumbar spine.       Impression:       1. Large volume of stool within the rectum concerning for impaction.  Mild lower rectal versus anal wall thickening.  2. Sigmoid colonic diverticulosis with no acute diverticulitis.  3. Diffuse vascular calcification with no aneurysm or dissection.  4. Emphysema.  5. Duodenal diverticula. Mild hernia.  6. Stranding of the subcutaneous tissues of the anterior abdominal wall  with no loculated fluid collection.     Comments: A preliminary report is issued to the ER by the [facility]  radiology service.  This report was finalized on 10/02/2018 07:23 by Dr. Lucy Carson MD.    XR Chest 1 View [[id number]] Collected:  10/02/18 0720     Updated:  10/02/18 0725    Narrative:       History:  83-year-old shortness of breath.     Reference:  Chest radiograph June 25, 2017     Findings:  Frontal chest radiograph performed.     Cardiomegaly. Prior median sternotomy/CABG. Interval TAVR. Mild  interstitial edema is suspected. No consolidation. No pleural effusion  or pneumothorax. Old left clavicle  fracture. Dual-lead left-sided  transvenous pacemaker is intact and unchanged.          Impression:       Suspect mild interstitial edema.  This report was finalized on 10/02/2018 07:22 by Dr Cullen Ambrose, .        History of Present Illness on Day of Discharge: The patient is resting in bed with daughter at bedside.  She reports improvement overall.  She had good success with the soapsuds enemas yesterday and has moved her bowels twice already today.  She denies any shortness of breath or chest pain.  Her belching is much improved.    Hospital Course:  Mrs. Costello is an 83-year-old  female who follows Dr. Leonardo Zepeda for primary care.  She has a past medical history significant for severe aortic stenosis status post TAVR, paroxysmal atrial fibrillation status post cardioversion ×2 and plans for watchman procedure at [place], sick sinus syndrome status post pacemaker placement, breast cancer, coronary artery disease, stage III chronic kidney disease, diabetes mellitus type II, hyperlipidemia, hypertension, and recent diagnosis of thrombocytosis.  Patient presented to the [facility] emergency department on 10/01/2018 with a chief complaint of belching and shortness of breath.  The patient states she noticed increased belching on Saturday, and the day prior to admission began experiencing shortness of breath while lying flat.  She was also experiencing abdominal pain, and described her symptoms similar to when she used to have gallbladder attacks prior to her cholecystectomy.  In the emergency department, the patient's BNP level was noted to be 11,400.  Her chronic kidney disease is considered stable.  She did have a slightly elevated lipase at 381.  Platelet count was 922.  CT of the abdomen and pelvis showed a large volume of stool within the rectum concerning for impaction.  There was colonic diverticulosis without diverticulitis.  There was no evidence of peripancreatic inflammation.  Chest  x-ray was suspicious for mild interstitial edema.  A she was admitted to the hospitalist service for further evaluation and management.    The patient was given diuresis with IV Lasix.  She did respond well to this, diuresing over 2 L of fluid.  Her weight has decreased 4 pounds since admission.  She is now able to lie flat without getting short of breath and reports an overall improvement in her symptoms.  We did not repeat an echocardiogram as she has had one within the last 2 weeks at [facility].  We did review these records, which revealed stage III diastolic dysfunction with an estimated ejection fraction of 55%.  The patient was recently taken off Aldactone in favor of as needed Lasix.  With the patient's recent transition to an assisted living facility, the patient and her daughter both admit they have not been keeping track of her weight and dietary salt restriction has not been followed as closely as well.  Now that the patient and her daughter are aware of the parameters as to when to use the Lasix, we feel that it is appropriate to send her back home on this regimen.  Chest x-ray this morning shows resolving interstitial edema.    Given the large volume of stool noted on the CT of the abdomen and pelvis, the patient was given 2 soapsuds enemas yesterday with very good results.  We will place her on a bowel regimen of Senokot, and her daughter states she also uses MiraLAX as needed.  The patient has recently been started on iron supplementation, which is likely the cause of her constipation.    With the patient's recent diagnosis thrombocytosis, she was scheduled to have an outpatient bone marrow biopsy, and was able to receive this while hospitalized.  She will follow-up with Dr. Barahona in 2 weeks for results.  Her daughter is going to contact Dr. Barahona's office to ensure that it is now safe to resume her Coumadin, as she was using Lovenox in the interim while waiting for the biopsy to be  "performed.  The patient is felt to be overall hemodynamically stable and clinically improved compared to admission.  She will need to keep her previously scheduled follow-up with Dr. Zepeda on Friday.    Condition on Discharge:  Stable    Physical Exam on Discharge:  /59 (BP Location: Left arm, Patient Position: Lying)   Pulse 63   Temp 98.4 °F (36.9 °C) (Temporal Artery )   Resp 16   Ht 154.9 cm (61\")   Wt 56.5 kg (124 lb 9 oz)   SpO2 95%   BMI 23.54 kg/m²   Physical Exam   Constitutional: She is oriented to person, place, and time. She appears well-developed and well-nourished. No distress.   HENT:   Head: Normocephalic and atraumatic.   Neck: Normal range of motion. Neck supple. No JVD present. No tracheal deviation present. No thyromegaly present.   Cardiovascular: Normal rate, regular rhythm and intact distal pulses.  Exam reveals no gallop and no friction rub.    Murmur heard.  V pacing 60-68 overnight   Pulmonary/Chest: Effort normal. She has no wheezes. She has no rales.   Diminished in bilateral bases   Abdominal: Soft. Bowel sounds are normal. She exhibits distension. There is no tenderness. There is no guarding.   Much less distended than yesterday   Musculoskeletal: Normal range of motion. She exhibits no edema or tenderness.   Lymphadenopathy:     She has no cervical adenopathy.   Neurological: She is alert and oriented to person, place, and time. No cranial nerve deficit.   Skin: Skin is warm and dry. No rash noted. No erythema.   Band-Aid to the left gluteus is clean/dry/intact following bone marrow biopsy   Psychiatric: She has a normal mood and affect. Her behavior is normal. Judgment and thought content normal.   Vitals reviewed.    Discharge Disposition:  Home-Health Care [facility]    Discharge Medications:     Discharge Medications      New Medications      Instructions Start Date   sennosides-docusate sodium 8.6-50 MG tablet  Commonly known as:  SENOKOT-S   2 tablets, Oral, Nightly       "   Changes to Medications      Instructions Start Date   insulin detemir 100 UNIT/ML injection  Commonly known as:  LEVEMIR  What changed:  · when to take this  · additional instructions   30 Units, Subcutaneous, Every 12 Hours, 30 units qam 30 units qpm         Continue These Medications      Instructions Start Date   amiodarone 200 MG tablet  Commonly known as:  PACERONE   200 mg, Oral, 2 Times Daily      aspirin 81 MG EC tablet   81 mg, Oral, Daily      atenolol 50 MG tablet  Commonly known as:  TENORMIN   Take 1 tab PO qam & 1/2 tab PO qpm.      calcium carbonate 600 MG tablet  Commonly known as:  OS-MICHAEL   600 mg, Oral, 2 Times Daily With Meals      cholecalciferol 1000 units tablet  Commonly known as:  VITAMIN D3   1,000 Units, Oral, Daily      fenofibrate 160 MG tablet   160 mg, Oral, Daily      ferrous sulfate 325 (65 FE) MG tablet   325 mg, Oral, 2 Times Daily With Meals      furosemide 20 MG tablet  Commonly known as:  LASIX   20 mg, Oral, Daily PRN      glipiZIDE 5 MG tablet  Commonly known as:  GLUCOTROL   5 mg, Oral, 2 Times Daily Before Meals      magnesium oxide 400 (241.3 Mg) MG tablet tablet  Commonly known as:  MAGOX   400 mg, Oral, Daily      nitroglycerin 0.4 MG SL tablet  Commonly known as:  NITROSTAT   1 under the tongue as needed for angina, may repeat q5mins for up three doses      pantoprazole 40 MG EC tablet  Commonly known as:  PROTONIX   40 mg, Oral, Daily      polyethyl glycol-propyl glycol 0.4-0.3 % solution ophthalmic solution  Commonly known as:  SYSTANE   1 drop, Both Eyes, Daily      PRESERVISION/LUTEIN capsule   1 capsule, Oral, 2 Times Daily      simvastatin 20 MG tablet  Commonly known as:  ZOCOR   20 mg, Oral, Nightly      venlafaxine XR 37.5 MG 24 hr capsule  Commonly known as:  EFFEXOR-XR   37.5 mg, Oral, Daily      vitamin B-12 500 MCG tablet  Commonly known as:  CYANOCOBALAMIN   500 mcg, Oral, Daily      warfarin 1 MG tablet  Commonly known as:  COUMADIN   Take 2 tablet by  mouth daily on Monday and Friday. Take 1 tablet by mouth daily on Tuesday, Wednesday, Thursday, Saturday and Sunday.         Stop These Medications    enoxaparin 120 MG/0.8ML solution syringe  Commonly known as:  LOVENOX          Discharge Diet:   Diet Instructions     Diet: Consistent Carbohydrate, Cardiac; Thin       Discharge Diet:   Consistent Carbohydrate  Cardiac       Fluid Consistency:  Thin        Activity at Discharge:   Activity Instructions     Activity as Tolerated       Measure Weight       Measure weight daily at the same time each day.  Take Lasix for weight gain of greater than 2 pounds overnight or 5 pounds within 1 week, or with associated shortness of breath or increased swelling.        Discharge Care Plan/Instructions:   1.  Return for any acute or worsening symptoms  2.  Daily weights.  Low salt diet.  3.  Lasix as needed for weight gain greater than 2 pounds overnight or 5 pounds within 1 week, especially associated with shortness of breath or worsening edema.    Follow-up Appointments:   Future Appointments  Date Time Provider Department Center   10/5/2018 1:00 PM Leonardo Zepeda DO [facility] None   10/15/2018 10:45 AM  [facility]  [facility]   10/15/2018 11:15 AM Abisai Barahona MD [facility]   11/5/2018 9:30 AM Ehsan Crocker MD [facility]     Test Results Pending at Discharge: FACUNDO Reyes  10/03/18  12:37 PM    Time: 25 minutes

## 2018-10-03 NOTE — H&P (VIEW-ONLY)
[facility] Medicine Services  HISTORY AND PHYSICAL    Date of Admission: 9/18/2018  Primary Care Physician: Leonardo Zepeda DO    Subjective     Chief Complaint: Pain and increasing inability to ambulate    History of Present Illness  Lakesha Pablo is a 83-year-old  female with a past medical history of paroxysmal atrial fibrillation having undergone 2 recent cardioversions by Dr. Crocker most recent 8/9/2018.  She is also followed by Dr. Glass, cardiology in [facility] and did see him on Friday to discuss AV node ablation and Watchman procedure.  Unfortunately prior to her appointment on Friday she had a fall.  She did not seek evaluation at emergency Department as she did not want to miss her appointment and her daughter did drive her there stating if she became unable to tolerate pain from the fall they would seek evaluation at the emergency department in [place].  Patient returned home, unfortunately she has been having worsening pain and weakness and now is unable to lift her left leg nor ambulate without assistance.  She did present to [facility] emergency department today for evaluation was noted to have sacral fracture, urinary tract infection and significant thrombocytosis of greater than 1 million.  Patient states she has never been told that she had any problems with her platelets in the past.  She is also reporting constipation, burning with urination and foul smell.  She has no complaints of chest pain, shortness of breathing, abdominal pain.  She is admitted for further evaluation and treatment.    Review of Systems   10 point review of system was completed, all negative except for those discussed in HPI.    Past Medical History:   Past Medical History:   Diagnosis Date   • Aortic stenosis    • Breast cancer (CMS/HCC)    • CAD (coronary artery disease)    • Cataract    • Chronic anticoagulation     Coumadin    • CKD (chronic kidney disease)  stage 3, GFR 30-59 ml/min 4/16/2018   • Diabetes mellitus (CMS/HCC)     Type II   • Elevated cholesterol    • GERD (gastroesophageal reflux disease)    • Hyperlipidemia    • Hypertension    • Macular degeneration    • Pancreatitis    • Paroxysmal atrial fibrillation (CMS/HCC)    • Pulmonary hypertension    • Rheumatic fever     during childhood   • Sick sinus syndrome (CMS/HCC)     with pacemaker       Past Surgical History:   Past Surgical History:   Procedure Laterality Date   • ANOMALOUS PULMONARY VENOUS RETURN REPAIR, TOTAL     • BACK SURGERY     • BELPHAROPTOSIS REPAIR     • CARDIAC CATHETERIZATION  1999, 2010   • CARDIAC CATHETERIZATION N/A 2/15/2017    Procedure: Left Heart Cath;  Surgeon: Ehsan Crocker MD;  Location:  [facility] CATH INVASIVE LOCATION;  Service:    • CARDIAC CATHETERIZATION N/A 2/15/2017    Procedure: Right Heart Cath;  Surgeon: Ehsan Crocker MD;  Location:  [facility] CATH INVASIVE LOCATION;  Service:    • CARDIAC CATHETERIZATION N/A 2/15/2017    Procedure: Coronary angiography;  Surgeon: Ehsan Crocker MD;  Location:  [facility] CATH INVASIVE LOCATION;  Service:    • CARDIAC CATHETERIZATION N/A 2/15/2017    Procedure: Left ventriculography;  Surgeon: Ehsan Crocker MD;  Location:  [facility] CATH INVASIVE LOCATION;  Service:    • CARDIAC CATHETERIZATION N/A 2/15/2017    Procedure: Intracardiac echocardiogram;  Surgeon: Ehsan Crocker MD;  Location:  [facility] CATH INVASIVE LOCATION;  Service:    • CARDIAC ELECTROPHYSIOLOGY PROCEDURE N/A 2/3/2017    Procedure: Pacemaker DC new;  Surgeon: Ehsan Crocker MD;  Location:  [facility] CATH INVASIVE LOCATION;  Service:    • CATARACT EXTRACTION     • CHOLECYSTECTOMY     • CORONARY ARTERY BYPASS GRAFT  1999    4 vessel    • CORONARY ARTERY BYPASS GRAFT     • CORONARY STENT PLACEMENT     • HYSTERECTOMY  1962   • INSERT / REPLACE / REMOVE PACEMAKER     • MASTECTOMY  2000   • OTHER SURGICAL HISTORY      TAVR 2017       Family History: family history includes Breast cancer in her  mother; Cancer in her brother; Coronary artery disease in her father; Diabetes in her father; Heart attack in her father; No Known Problems in her maternal grandfather, maternal grandmother, paternal grandfather, and paternal grandmother.    Social History:  reports that she has never smoked. She has never used smokeless tobacco. She reports that she does not drink alcohol or use drugs.    Code Status: Full, if unable to speak for herself her daughter DAVON Sood      Allergies:  Allergies   Allergen Reactions   • Dilaudid [Hydromorphone Hcl]    • Dilaudid [Hydromorphone] Nausea And Vomiting   • Enalapril Angioedema   • Janumet [Sitagliptin-Metformin Hcl] Other (See Comments)     Chest pain   • Lopid [Gemfibrozil] Nausea And Vomiting   • Sulfa Antibiotics Other (See Comments)     Syncope     • Ultram [Tramadol] Itching       Medications:  Prior to Admission medications    Medication Sig Start Date End Date Taking? Authorizing Provider   amiodarone (PACERONE) 200 MG tablet Take 1 tablet by mouth 2 (Two) Times a Day. 8/23/18   Ehsan Crocker MD   aspirin 81 MG EC tablet Take 81 mg by mouth Daily.    ProviderMinda MD   atenolol (TENORMIN) 50 MG tablet Take 1 tab PO qam & 1/2 tab PO qpm. 4/13/18   Leonardo Zepeda,    calcium carbonate (OS-MICHAEL) 600 MG tablet Take 600 mg by mouth 2 (Two) Times a Day With Meals.    Minda Salvador MD   cholecalciferol (VITAMIN D3) 1000 UNITS tablet Take 1,000 Units by mouth Daily.    Minda Salvador MD   fenofibrate 160 MG tablet Take 160 mg by mouth Daily.    Minda Salvador MD   glipiZIDE (GLUCOTROL) 5 MG tablet Take 1 tablet by mouth 2 (Two) Times a Day Before Meals. 4/13/18   Leonardo Zepeda DO   insulin detemir (LEVEMIR) 100 UNIT/ML injection Inject 30 Units under the skin Every 12 (Twelve) Hours. 30 units qam 30 units qpm 6/19/18   Linda Johnson APRN   magnesium oxide (MAGOX) 400 (241.3 MG) MG tablet tablet Take 400 mg by mouth Daily.     "Minda Salvador MD   nitroglycerin (NITROSTAT) 0.4 MG SL tablet 1 under the tongue as needed for angina, may repeat q5mins for up three doses 6/7/17   Ehsan Crocker MD   pantoprazole (PROTONIX) 40 MG EC tablet Take 40 mg by mouth Daily.    Minda Salvador MD   polyethyl glycol-propyl glycol (SYSTANE) 0.4-0.3 % solution ophthalmic solution Administer 1 drop to the right eye Daily.    Minda Salvador MD   simvastatin (ZOCOR) 20 MG tablet Take 1 tablet by mouth Every Night. 4/13/18   Leonardo Zepeda DO   spironolactone (ALDACTONE) 25 MG tablet Take 25 mg by mouth Daily.    Minda Salvador MD   venlafaxine XR (EFFEXOR-XR) 37.5 MG 24 hr capsule Take 37.5 mg by mouth Daily.    Minda Salvador MD   vitamin B-12 (CYANOCOBALAMIN) 500 MCG tablet Take 500 mcg by mouth Daily.    Minda Salvador MD   warfarin (COUMADIN) 1 MG tablet Take 2 tablet by mouth daily on Monday, Wednesday and Friday. Take 1 tablet by mouth daily on Tuesday, Thursday, Saturday and Sunday. 4/17/18   Leonardo Zepeda DO       Objective     /47 (BP Location: Left arm, Patient Position: Sitting)   Pulse 59   Temp 98.2 °F (36.8 °C) (Oral)   Resp 18   Ht 154.9 cm (61\")   Wt 55.3 kg (122 lb)   SpO2 94%   BMI 23.05 kg/m²      Physical Exam   Constitutional: She is oriented to person, place, and time. She appears well-developed and well-nourished. No distress.   HENT:   Head: Normocephalic and atraumatic.   Eyes: Pupils are equal, round, and reactive to light. Conjunctivae and EOM are normal. No scleral icterus.   Neck: Normal range of motion. Neck supple. No JVD present. No tracheal deviation present.   Cardiovascular: Normal rate, regular rhythm and intact distal pulses.  Exam reveals no gallop.    Murmur heard.  Pulmonary/Chest: Effort normal and breath sounds normal. No respiratory distress. She has no wheezes. She has no rales.   Abdominal: Soft. Bowel sounds are normal. She exhibits no distension. " There is no tenderness. There is no guarding.   Musculoskeletal: Normal range of motion. She exhibits no edema.   Difficulty rising from sitting position due to severe fracture pain   Neurological: She is alert and oriented to person, place, and time.   No obvious deficits noted.   Skin: Skin is warm and dry. No rash noted. She is not diaphoretic. No erythema. No pallor.   Psychiatric: She has a normal mood and affect. Her behavior is normal.   Vitals reviewed.        Pertinent Data:   Lab Results (last 24 hours)     Procedure Component Value Units Date/Time    Comprehensive Metabolic Panel [[id number]]  (Abnormal) Collected:  09/18/18 1106    Specimen:  Blood Updated:  09/18/18 1157     Glucose 313 (H) mg/dL      BUN 42 (H) mg/dL      Creatinine 1.69 (H) mg/dL      Sodium 137 mmol/L      Potassium 5.3 mmol/L      Chloride 100 mmol/L      CO2 27.0 mmol/L      Calcium 9.3 mg/dL      Total Protein 7.1 g/dL      Albumin 4.10 g/dL      ALT (SGPT) 28 U/L      AST (SGOT) 52 (H) U/L      Alkaline Phosphatase 46 U/L      Total Bilirubin 0.4 mg/dL      eGFR Non African Amer 29 (L) mL/min/1.73      Globulin 3.0 gm/dL      A/G Ratio 1.4 g/dL      BUN/Creatinine Ratio 24.9     Anion Gap 10.0 mmol/L     Narrative:       The MDRD GFR formula is only valid for adults with stable renal function between ages 18 and 70.    Urinalysis With Microscopic If Indicated (No Culture) - Urine, Catheter [[id number]]  (Abnormal) Collected:  09/18/18 1140    Specimen:  Urine from Urine, Catheter Updated:  09/18/18 1154     Color, UA Yellow     Appearance, UA Cloudy (A)     pH, UA 5.5     Specific Gravity, UA 1.022     Glucose,  mg/dL (2+) (A)     Ketones, UA Negative     Bilirubin, UA Negative     Blood, UA Negative     Protein, UA Trace (A)     Leuk Esterase, UA Large (3+) (A)     Nitrite, UA Negative     Urobilinogen, UA 0.2 E.U./dL    Urinalysis, Microscopic Only - Urine, Clean Catch [[id number]]  (Abnormal) Collected:  09/18/18 1140     Specimen:  Urine from Urine, Catheter Updated:  09/18/18 1154     RBC, UA 0-2 (A) /HPF      WBC, UA Too Numerous to Count (A) /HPF      Bacteria, UA 3+ (A) /HPF      Squamous Epithelial Cells, UA None Seen /HPF      Hyaline Casts, UA 0-2 /LPF      Methodology Automated Microscopy    CBC Auto Differential [[id number]]  (Abnormal) Collected:  09/18/18 1106    Specimen:  Blood Updated:  09/18/18 1127     WBC 11.62 (H) 10*3/mm3      RBC 3.97 (L) 10*6/mm3      Hemoglobin 11.4 (L) g/dL      Hematocrit 35.3 (L) %      MCV 88.9 fL      MCH 28.7 pg      MCHC 32.3 (L) g/dL      RDW 16.3 (H) %      RDW-SD 53.3 fl      MPV 11.4 fL      Platelets 1,179 (H) 10*3/mm3      Neutrophil % 81.8 (H) %      Lymphocyte % 7.5 (L) %      Monocyte % 7.8 %      Eosinophil % 1.2 %      Basophil % 0.6 %      Neutrophils, Absolute 9.50 (H) 10*3/mm3      Lymphocytes, Absolute 0.87 10*3/mm3      Monocytes, Absolute 0.91 10*3/mm3      Eosinophils, Absolute 0.14 10*3/mm3      Basophils, Absolute 0.07 10*3/mm3     Protime-INR [[id number]]  (Abnormal) Collected:  09/18/18 1106    Specimen:  Blood Updated:  09/18/18 1125     Protime 20.5 (H) Seconds      INR 1.69 (H)    aPTT [[id number]]  (Abnormal) Collected:  09/18/18 1106    Specimen:  Blood Updated:  09/18/18 1125     PTT 47.8 (H) seconds         Imaging Results (last 24 hours)     Procedure Component Value Units Date/Time    XR Femur 2 View Left [[id number]] Collected:  09/18/18 1451     Updated:  09/18/18 1455    Narrative:       LEFT FEMUR, 2 VIEWS 9/18/2018 2:20 PM CDT     HISTORY: fall, pain     COMPARISON: NONE     FINDINGS:     Frontal and lateral radiographs of the left femur were obtained.     There is no evidence of fracture or worrisome osseous lesion. The soft  tissues are grossly unremarkable. Limited evaluation of the hip and knee  joints reveals normal alignment. Knee joint osteoarthritis.       Impression:       1. No visualized fracture or malalignment at the knee or hip joint.  This  report was finalized on 09/18/2018 14:52 by Dr Eagle Eric, .    CT Abdomen Pelvis Without Contrast [[id number]] Collected:  09/18/18 1348     Updated:  09/18/18 1402    Narrative:       EXAMINATION: CT ABDOMEN PELVIS WO CONTRAST- 9/18/2018 1:48 PM CDT     HISTORY: Fall, low abdominal pain. Flank pain,     DOSE: 226 mGycm (Automatic exposure control technique was implemented in  an effort to keep the radiation dose as low as possible without  compromising image quality)     REPORT: Spiral CT of the abdomen and pelvis was performed without  contrast from the lung bases through the pubic symphysis. Reconstructed  coronal and sagittal images are also reviewed.     COMPARISON: CT abdomen pelvis with contrast 3/23/2016.     Review of lung windows demonstrates mild linear atelectasis as well as  evidence of mild emphysema. Previous median sternotomy is noted.  Pacemaker wires are present. There is evidence of previous aortic valve  replacement. A moderate amount of coronary artery plaque is present.  Cholecystectomy clips are present. Liver and spleen are homogeneous on  this unenhanced study. Ingested food is noted in the stomach which is  incompletely distended. The pancreas and adrenal glands appear grossly  within normal limits. No nephrolithiasis is identified and there is no  evidence of urinary tract obstruction. Heavy calcified plaque is noted  within the abdominal aorta, which is normal in caliber. Calcified plaque  is also seen in the iliac arteries. Bowel loops are normal in caliber.  There appears to be a duodenal diverticulum adjacent to the pancreatic  head. There is a large stool ball in rectal vault and probable fecal  impaction, without evidence of bowel obstruction. There is moderate  sigmoid diverticulosis without evidence of acute diverticulitis.  Previous hysterectomy is noted. The bladder appears within normal  limits. No free fluid or free air is identified. Review of bone windows  shows a chronic  compression fracture L2 status post vertebroplasty.  There is mild spinal stenosis at this level related to slight  retropulsion of the posterior cortex. Advanced degenerative disc disease  is present at L4-5 and to lesser degree L3-4. There is an acute fracture  involving the third sacral level which is described in detail on today's  pelvic CT.       Impression:       1. Large stool ball within the rectum compatible with fecal impaction  but no evidence of bowel obstruction. Moderate sigmoid diverticulosis  proximally without acute diverticulitis.  2. Acute fracture third of the sacral level, described in detail on  today's pelvic CT.  3. Chronic compression fracture at L2 with previous vertebroplasty and  associated mild spinal stenosis. Advanced degenerative disc disease at  L4-5.  4. Other chronic findings and postsurgical changes as above.        This report was finalized on 09/18/2018 13:59 by Dr. Ehsan Waddell MD.    CT Pelvis Without Contrast [[id number]] Collected:  09/18/18 1349     Updated:  09/18/18 1402    Narrative:       CT PELVIS WO CONTRAST-, CT LOWER EXTREMITY RIGHT WO CONTRAST- 9/18/2018  1:23 PM CDT     HISTORY: Pelvis trauma, fx known or suspected, xray insufficient       DOSE LENGTH PRODUCT: 135 mGy cm. Automated exposure control was also  utilized to decrease patient radiation dose.     Technique:   Axial CT of the pelvis without IV contrast. Sagittal and coronal  reformations are also provided for review.      Comparison: CT scan dated 3/23/2016.     Findings:      There appears to be a horizontally oriented fracture traversing the S3  vertebra. There is mild buckling of the anterior vertebral body cortex.  No obvious vertically oriented fracture components in the sacral ala.  Normal alignment at the sacroiliac joints. Bony pelvis is otherwise  intact. Normal alignment at the hip joints. The joint spaces are fairly  well-maintained. No acute fracture identified at the hip joints.        Prominent stool in the rectum. Sigmoid diverticulosis. No pelvic  hematoma.       Impression:       Impression:    1. Horizontally oriented fracture through the S3 vertebra with mild  buckling in the anterior cortex. Normal alignment at the SI joints.  2. Normal alignment at the hip joints with no visualized hip fracture.  This report was finalized on 09/18/2018 13:59 by Dr Eagle Eric, .    CT Lower Extremity Right Without Contrast [[id number]] Collected:  09/18/18 1349     Updated:  09/18/18 1402    Narrative:       CT PELVIS WO CONTRAST-, CT LOWER EXTREMITY RIGHT WO CONTRAST- 9/18/2018  1:23 PM CDT     HISTORY: Pelvis trauma, fx known or suspected, xray insufficient       DOSE LENGTH PRODUCT: 135 mGy cm. Automated exposure control was also  utilized to decrease patient radiation dose.     Technique:   Axial CT of the pelvis without IV contrast. Sagittal and coronal  reformations are also provided for review.      Comparison: CT scan dated 3/23/2016.     Findings:      There appears to be a horizontally oriented fracture traversing the S3  vertebra. There is mild buckling of the anterior vertebral body cortex.  No obvious vertically oriented fracture components in the sacral ala.  Normal alignment at the sacroiliac joints. Bony pelvis is otherwise  intact. Normal alignment at the hip joints. The joint spaces are fairly  well-maintained. No acute fracture identified at the hip joints.     Prominent stool in the rectum. Sigmoid diverticulosis. No pelvic  hematoma.       Impression:       Impression:    1. Horizontally oriented fracture through the S3 vertebra with mild  buckling in the anterior cortex. Normal alignment at the SI joints.  2. Normal alignment at the hip joints with no visualized hip fracture.  This report was finalized on 09/18/2018 13:59 by Dr Eagle Eric, .            9/7/2017 ECHO  Interpretation Summary     · Left ventricular systolic function is normal. Estimated EF = 60%.  · Left  ventricular diastolic dysfunction (grade II) consistent with pseudonormalization.  · Right ventricular cavity is mild-to-moderately dilated.  · Mildly reduced right ventricular systolic function noted.  · Left atrial cavity size is mildly dilated.  · calcification of the aortic valve  · Severe aortic valve stenosis is present.  · Mild mitral valve regurgitation is present       I have personally reviewed and interpreted the radiology studies and ECG obtained at time of admission.     Assessment / Plan     Assessment/Plan:  Thrombocytosis, unknown etiology; repeat stat platelet level, consult hematology, repeat CBC with differential in a.m.    Fractured sacrum–decreased mobility and acute pain; consult orthopedics for recommendations, pain management with Tylenol only due to adverse reaction to narcotics    Urinary tract infection–Rocephin    Chronic kidney disease stage III with slightly increased creatinine–hydrate gently with normal saline at 75 ML/hour, repeat CMP in a.m.    Diabetes mellitus type II insulin-dependent– continue home Levemir, monitor glucose before meals and at bedtime with sliding scale coverage regular insulin     Paroxysmal atrial fibrillation–continue Coumadin therapy increased dose due to subtherapeutic INR, daily PT/INR, DVT prophylaxis with SCDs    Constipation - fecal impaction - attempt digital removal, start bowel regimen    I discussed the patient's findings and my recommendations with: Myron Huynh MD  Time spent: 40 minutes      FACUNDO Li  09/18/18   4:17 PM     I personally evaluated and examined the patient in conjunction with FACUNDO Fuchs and agree with the assessment, treatment plan, and disposition of the patient as recorded by her. My history, exam, and further recommendations are:     Patient fell last Friday.  SInce then she has pain on her back.  An x-ray done at one of her clinic visit with cardiologist did not reveal any fracture.   She has trouble  walking.   Daughter told me that she poorly tolerates opiate and allergic to to tramadol.    There is documentation of mild dysuria. Daughter tells me she has frequent UTI.  We are treating her with Rocephin.     She also has severe elevation of platelel > 1 Thousand.  I checked she had prior elevation in April 2018.  I have not seen any record she had referral or w/u on this.       She has known afib.  She had prior cardioversion.  I am told her INR is always subtherapeutic.  She is planned for av kristofer ablation. Her iNR today is 1.69.  Agree with maintaining on 1 mg daily of coumadin. May need to increase this further.    She is limping needing assistance of one person.  Alert oriented x 3  Pulse appears regular  No distress    Ortho and hematology consulted  cpt    Myron Huynh MD  09/18/18  5:33 PM

## 2018-10-03 NOTE — DISCHARGE PLACEMENT REQUEST
"Valerie Thapa [phone]  Lakesha Costello (83 y.o. Female)     Date of Birth Social Security Number Address Home Phone MRN    1935  [id number] [facility] [address] [phone] [id number]    Roman Catholic Marital Status          [facility]        Admission Date Admission Type Admitting Provider Attending Provider Department, Room/Bed    10/1/18 Emergency Leland Johnson MD Thompson, Benjamin H, MD [address], 481/1    Discharge Date Discharge Disposition Discharge Destination         [facility]              Attending Provider:  Leland Johnson MD    Allergies:  Dilaudid [Hydromorphone Hcl], Dilaudid [Hydromorphone], Enalapril, Janumet [Sitagliptin-metformin Hcl], Lopid [Gemfibrozil], Sulfa Antibiotics, Ultram [Tramadol]    Isolation:  None   Infection:  None   Code Status:  CPR    Ht:  154.9 cm (61\")   Wt:  56.5 kg (124 lb 9 oz)    Admission Cmt:  None   Principal Problem:  None                Active Insurance as of 10/1/2018     Primary Coverage     Payor Plan Insurance Group Employer/Plan Group    HUMANA MEDICARE REPLACEMENT HUMANA MEDICARE REPL [id number]     Payor Plan Address Payor Plan Phone Number Effective From Effective To    [address] [address] [phone] 1/1/2018     [facility] [id number]       Subscriber Name Subscriber Birth Date Member ID       LAKESHA COSTELLO 1935 [id number]                 Emergency Contacts      (Rel.) Home Phone Work Phone Mobile Phone    Margarita Ignacio (Daughter) [phone] -- [phone]    Richard Pablo (Spouse) [phone] -- --               History & Physical      Elsa Shirley MD at 10/2/2018  3:49 AM              [facility]  HISTORY AND PHYSICAL    Date of Admission: 10/1/2018  Primary Care Physician: Leonardo Zepeda DO    Subjective     Chief Complaint: right sided abdominal pain, epigastric pain with belching    History of " Present Illness  She is an 83-year-old white female with a fairly significant complicated past medical history with aortic stenosis status post have her, A. fib status post pacemaker cardioversion ×2 and plans for ablation and watchman's procedure in [place], recent discovery of thrombocytosis followed by Dr. Barahona with plans for bone marrow biopsy in the morning, chronic Coumadin for A. fib currently on hold for this biopsy, recent hospital stay with us after a fall and a sacrum fracture, hypertension, diabetes who presents to the ER now with a 1-2 day history of intermittent epigastric and right upper quadrant discomfort associated with belching.  The patient describes it as feeling almost like a gallbladder issue however she has had a cholecystectomy.  She does not describe true chest pain.  The daughter states she had her first issue like this 2 evenings ago however her symptoms subsided.  She states again last evening she began having symptoms complaining of right sided discomfort associated with fairly extreme belching.  Recent changes for her than that of stay at [place] after a fall which did include some dietary changes possibly eating more and not necessarily watching salt content for her.  Also recent changes include after being discharged from recent hospital stay Aldactone was discontinued from her home med list with instructions to use as needed Lasix.  The daughter does state however they have not been doing this as this had slipped her mind and was not a normal regimen for them.  Patient did describe some sensations of feeling possibly short of breath especially when she tried to lie down.  In the ER she did have a newly elevated BNP.  Previous echo does reveal some diastolic dysfunction.  Her troponin is within normal limits.  EKG unremarkable.  Chest x-ray revealed evidence of some pulmonary edema.  Patient does have a history of stent placement as well as CABG in mid 90s.  Again she has  plans for bone marrow biopsy tomorrow by Dr. Barahona for concerns of fairly elevated platelet count.  Her Coumadin is on hold at daughter has been administering Lovenox injections.  Plans also are to perform ablation and watchman in [place] for her ongoing issues with A. fib.  Patient is feeling better at this time with no further symptoms.  In the ER she was given IV Pepcid and IV Lasix.  It does seem as if her weight is possibly up about 6 pounds from her previous admit date about 2 weeks ago.        Review of Systems     Otherwise complete ROS reviewed and negative except as mentioned in the HPI.    Past Medical History:   Past Medical History:   Diagnosis Date   • Aortic stenosis    • Breast cancer (CMS/HCC)    • CAD (coronary artery disease)    • Cataract    • Chronic anticoagulation     Coumadin    • CKD (chronic kidney disease) stage 3, GFR 30-59 ml/min (CMS/HCC) 4/16/2018   • Diabetes mellitus (CMS/HCC)     Type II   • Elevated cholesterol    • GERD (gastroesophageal reflux disease)    • Hyperlipidemia    • Hypertension    • Macular degeneration    • Pancreatitis    • Paroxysmal atrial fibrillation (CMS/HCC)    • Pulmonary hypertension (CMS/HCC)    • Rheumatic fever     during childhood   • Sick sinus syndrome (CMS/HCC)     with pacemaker     Past Surgical History:  Past Surgical History:   Procedure Laterality Date   • ANOMALOUS PULMONARY VENOUS RETURN REPAIR, TOTAL     • BACK SURGERY     • BELPHAROPTOSIS REPAIR     • CARDIAC CATHETERIZATION  1999, 2010   • CARDIAC CATHETERIZATION N/A 2/15/2017    Procedure: Left Heart Cath;  Surgeon: Ehsan Crocker MD;  Location:  [facility] CATH INVASIVE LOCATION;  Service:    • CARDIAC CATHETERIZATION N/A 2/15/2017    Procedure: Right Heart Cath;  Surgeon: Ehsan Crocker MD;  Location:  [facility]ATH [facility];  Service:    • CARDIAC CATHETERIZATION N/A 2/15/2017    Procedure: Coronary angiography;  Surgeon: Ehsan Crocker MD;  Location:  [facility]ATH [facility];   Service:    • CARDIAC CATHETERIZATION N/A 2/15/2017    Procedure: Left ventriculography;  Surgeon: Ehsan Crocker MD;  Location:  [facility]ATH [facility];  Service:    • CARDIAC CATHETERIZATION N/A 2/15/2017    Procedure: Intracardiac echocardiogram;  Surgeon: Ehsan Crocker MD;  Location:  [facility] CATH INV[facility];  Service:    • CARDIAC ELECTROPHYSIOLOGY PROCEDURE N/A 2/3/2017    Procedure: Pacemaker DC new;  Surgeon: Ehsan Crocker MD;  Location:  [facility] CATH INV[facility]IVE [facility];  Service:    • CATARACT EXTRACTION     • CHOLECYSTECTOMY     • CORONARY ARTERY BYPASS GRAFT  1999    4 vessel    • CORONARY ARTERY BYPASS GRAFT     • CORONARY STENT PLACEMENT     • HYSTERECTOMY  1962   • INSERT / REPLACE / REMOVE PACEMAKER     • MASTECTOMY  2000   • OTHER SURGICAL HISTORY      TAVR 2017     Social History:  reports that she has never smoked. She has never used smokeless tobacco. She reports that she does not drink alcohol or use drugs.    Family History: family history includes Breast cancer in her mother; Cancer in her brother; Coronary artery disease in her father; Diabetes in her father; Heart attack in her father; No Known Problems in her maternal grandfather, maternal grandmother, paternal grandfather, and paternal grandmother.       Allergies:  Allergies   Allergen Reactions   • Dilaudid [Hydromorphone Hcl]    • Dilaudid [Hydromorphone] Nausea And Vomiting   • Enalapril Angioedema   • Janumet [Sitagliptin-Metformin Hcl] Other (See Comments)     Chest pain   • Lopid [Gemfibrozil] Nausea And Vomiting   • Sulfa Antibiotics Other (See Comments)     Syncope     • Ultram [Tramadol] Itching     Medications:  Prior to Admission medications    Medication Sig Start Date End Date Taking? Authorizing Provider   amiodarone (PACERONE) 200 MG tablet Take 1 tablet by mouth 2 (Two) Times a Day. 8/23/18   Ehsan Crocker MD   aspirin 81 MG EC tablet Take 81 mg by mouth Daily.    Provider, MD Minda   atenolol (TENORMIN) 50 MG  tablet Take 1 tab PO qam & 1/2 tab PO qpm. 4/13/18   Leonardo Zepeda DO   calcium carbonate (OS-MICHAEL) 600 MG tablet Take 600 mg by mouth 2 (Two) Times a Day With Meals.    Minda Salvador MD   cholecalciferol (VITAMIN D3) 1000 UNITS tablet Take 1,000 Units by mouth Daily.    Minda Salvador MD   enoxaparin (LOVENOX) 120 MG/0.8ML solution syringe 50 mg SQ daily x 5 days 9/27/18   Abisai Barahona MD   fenofibrate 160 MG tablet Take 160 mg by mouth Daily.    Minda Salvador MD   ferrous sulfate 325 (65 FE) MG tablet Take 1 tablet by mouth 2 (Two) Times a Day With Meals. 9/20/18   Myron Huynh MD   furosemide (LASIX) 20 MG tablet Take 1 tablet by mouth Daily As Needed (for weight gain > 2 lbs in a day or 2). 9/20/18   Myron Huynh MD   glipiZIDE (GLUCOTROL) 5 MG tablet Take 1 tablet by mouth 2 (Two) Times a Day Before Meals. 4/13/18   Leonardo Zepeda DO   insulin detemir (LEVEMIR) 100 UNIT/ML injection Inject 30 Units under the skin Every 12 (Twelve) Hours. 30 units qam 30 units qpm  Patient taking differently: Inject 30 Units under the skin into the appropriate area as directed Every Night. 30 units qam 30 units qpm 6/19/18   Linda Johnson APRN   magnesium oxide (MAGOX) 400 (241.3 MG) MG tablet tablet Take 400 mg by mouth Daily.    Minda Salvador MD   nitroglycerin (NITROSTAT) 0.4 MG SL tablet 1 under the tongue as needed for angina, may repeat q5mins for up three doses 6/7/17   Ehsan Crocker MD   pantoprazole (PROTONIX) 40 MG EC tablet Take 40 mg by mouth Daily.    Minda Salvador MD   polyethyl glycol-propyl glycol (SYSTANE) 0.4-0.3 % solution ophthalmic solution Administer 1 drop to the right eye Daily.    Minda Salvador MD   simvastatin (ZOCOR) 20 MG tablet Take 1 tablet by mouth Every Night. 4/13/18   Leonardo Zepeda DO   venlafaxine XR (EFFEXOR-XR) 37.5 MG 24 hr capsule Take 37.5 mg by mouth Daily.    Provider, MD Minda  "  vitamin B-12 (CYANOCOBALAMIN) 500 MCG tablet Take 500 mcg by mouth Daily.    Provider, MD Minda   warfarin (COUMADIN) 1 MG tablet Take 2 tablet by mouth daily on Monday and Friday. Take 1 tablet by mouth daily on Tuesday, Wednesday, Thursday, Saturday and Sunday. 9/20/18   Myron Huynh MD     Objective     Vital Signs: /55   Pulse 60   Temp 98 °F (36.7 °C)   Resp 18   Ht 154.9 cm (61\")   Wt 58.1 kg (128 lb)   SpO2 94%   BMI 24.19 kg/m²    Physical Exam        Results Reviewed:  Lab Results (last 24 hours)     Procedure Component Value Units Date/Time    Troponin [[id number]]  (Normal) Collected:  10/02/18 0218    Specimen:  Blood Updated:  10/02/18 0248     Troponin I <0.012 ng/mL     Troponin [[id number]]  (Normal) Collected:  10/01/18 2236    Specimen:  Blood Updated:  10/01/18 2310     Troponin I <0.012 ng/mL     BNP [[id number]]  (Abnormal) Collected:  10/01/18 2236    Specimen:  Blood Updated:  10/01/18 2305     proBNP 11,400.0 (H) pg/mL     aPTT [[id number]]  (Abnormal) Collected:  10/01/18 2236    Specimen:  Blood Updated:  10/01/18 2300     PTT 36.5 (H) seconds     Protime-INR [[id number]]  (Abnormal) Collected:  10/01/18 2236    Specimen:  Blood Updated:  10/01/18 2300     Protime 15.6 (H) Seconds      INR 1.20 (H)    Comprehensive Metabolic Panel [[id number]]  (Abnormal) Collected:  10/01/18 2236    Specimen:  Blood Updated:  10/01/18 2259     Glucose 233 (H) mg/dL      BUN 38 (H) mg/dL      Creatinine 1.45 (H) mg/dL      Sodium 141 mmol/L      Potassium 4.5 mmol/L      Chloride 105 mmol/L      CO2 25.0 mmol/L      Calcium 9.2 mg/dL      Total Protein 7.4 g/dL      Albumin 4.20 g/dL      ALT (SGPT) 34 U/L      AST (SGOT) 50 (H) U/L      Alkaline Phosphatase 97 U/L      Total Bilirubin 0.5 mg/dL      eGFR Non African Amer 35 (L) mL/min/1.73      Globulin 3.2 gm/dL      A/G Ratio 1.3 g/dL      BUN/Creatinine Ratio 26.2 (H)     Anion Gap 11.0 mmol/L     Narrative:       The MDRD " GFR formula is only valid for adults with stable renal function between ages 18 and 70.    Lipase [[id number]]  (Abnormal) Collected:  10/01/18 2236    Specimen:  Blood Updated:  10/01/18 2259     Lipase 381 (H) U/L     Amylase [[id number]]  (Normal) Collected:  10/01/18 2236    Specimen:  Blood Updated:  10/01/18 2259     Amylase 64 U/L     CBC & Differential [[id number]] Collected:  10/01/18 2236    Specimen:  Blood Updated:  10/01/18 2255    Narrative:       The following orders were created for panel order CBC & Differential.  Procedure                               Abnormality         Status                     ---------                               -----------         ------                     CBC Auto Differential[[id number]]        Abnormal            Final result                 Please view results for these tests on the individual orders.    CBC Auto Differential [[id number]]  (Abnormal) Collected:  10/01/18 2236    Specimen:  Blood Updated:  10/01/18 2255     WBC 7.66 10*3/mm3      RBC 3.64 (L) 10*6/mm3      Hemoglobin 10.6 (L) g/dL      Hematocrit 33.0 (L) %      MCV 90.7 fL      MCH 29.1 pg      MCHC 32.1 (L) g/dL      RDW 17.8 (H) %      RDW-SD 57.5 (H) fl      MPV 11.5 fL      Platelets 922 (H) 10*3/mm3      Neutrophil % 68.2 %      Lymphocyte % 14.9 (L) %      Monocyte % 10.3 %      Eosinophil % 2.3 %      Basophil % 1.0 %      Immature Grans % 3.3 %      Neutrophils, Absolute 5.22 10*3/mm3      Lymphocytes, Absolute 1.14 10*3/mm3      Monocytes, Absolute 0.79 10*3/mm3      Eosinophils, Absolute 0.18 10*3/mm3      Basophils, Absolute 0.08 10*3/mm3      Immature Grans, Absolute 0.25 (H) 10*3/mm3      nRBC 0.0 /100 WBC         Imaging Results (last 24 hours)     Procedure Component Value Units Date/Time    XR Chest 1 View [[id number]] Updated:  10/02/18 0204    CT Abdomen Pelvis Without Contrast [[id number]] Updated:  10/02/18 0101        I have personally reviewed and interpreted the radiology studies and  ECG obtained at time of admission.     Assessment / Plan     Assessment:   Hospital Problem List     Acute on chronic congestive heart failure (CMS/HCC)        1. Acute/chronic diastolic chf   Elevated bnp, pulm edema, check echo, low dose lasix, I/o, weights. Echo done at [facility] just 2 weeks ago. Will ask for this to be sent here for our records. I cannot view the results    2. Epigastric pain   Serial troponins, tele, PPI    3. Aortic stenosis   Hx of TAVR 11 2017, echo, lasix    4. Atrial fib   Cardioversion x 2 previously. Pacemaker previously. Plans for cardiac ablation and watchmans procedure per cardiology in [place]. Currently on coumadin (on hold now for bone marrow bx tomorrow), amiodrione    5. DM with hyperglycemia   On oral meds and insulin. Will hold oral agents for now. Reduce levemir for today. SSI. Diabetic diet    6. HTN   Monitor. Resume home meds    7. CKD stage III-IV   Aldactone had been stopped at recent hospital stay. Will continue to hold this while diuresing with lasix, however may need to go back on this as a home med or be more regular with lasix    8. Recent admit for fall   Consult PT/OT    9. Thrombocytosis   Has a bone marrow bx scheduled for in the am per dr cummings. Would discuss with scheduling during the day to see if she could get this while she is here                     Code Status: full     I discussed the patient's findings and my recommendations with the pt and daughter    Estimated length of stay 1-2 days    Elsa Shirley MD   10/02/18   3:49 AM            Electronically signed by Elsa Shirley MD at 10/2/2018  4:41 AM       Operative/Procedure Notes (last 24 hours) (Notes from 10/2/2018  2:17 PM through 10/3/2018  2:17 PM)     No notes of this type exist for this encounter.        Physician Progress Notes (last 24 hours) (Notes from 10/2/2018  2:17 PM through 10/3/2018  2:17 PM)     No notes of this type exist for this encounter.        Consult Notes (last 24  hours) (Notes from 10/2/2018  2:17 PM through 10/3/2018  2:17 PM)     No notes of this type exist for this encounter.           Discharge Summary      Angella Beltrán APRN at 10/3/2018 12:37 PM              [facility]  DISCHARGE SUMMARY       Date of Admission: 10/1/2018  Date of Discharge:  10/3/2018  Primary Care Physician: Leonardo Zepeda DO    Presenting Problem/History of Present Illness:  Acute on chronic congestive heart failure, unspecified heart failure type (CMS/HCC) [I50.9]   Final Discharge Diagnoses:  1.  Acute on chronic diastolic congestive heart failure with last known ejection fraction 60%  2.  Epigastric pain/belching-slightly elevated lipase with history of pancreatitis  3.  History of severe aortic stenosis-status post TAVR 11/2017  4.  Paroxysmal atrial fibrillation status post cardioversion ×2.  Chronically anticoagulated with Coumadin.  Patient is scheduled for watchman procedure in the near future  5.  Sick sinus syndrome status post pacemaker  6.  Coronary artery disease status post three-vessel CABG  7.  Essential hypertension  8.  Mixed hyperlipidemia  9.  Chronic kidney disease stage III  10.  Thrombocytosis-scheduled for bone marrow biopsy tomorrow as per Dr. Barahona  11.  Insulin-dependent diabetes mellitus type II with hyperglycemia, hemoglobin A1c 9.4%  12.  Constipation-large volume of stool within the rectum noted on CT    Consults: None    Procedures Performed:   1.  10/3/2018- CT-guided bone marrow biopsy    Pertinent Test Results:   Lab Results (all)     Procedure Component Value Units Date/Time    POC Glucose Once [[id number]]  (Abnormal) Collected:  10/03/18 1141    Specimen:  Blood Updated:  10/03/18 1201     Glucose 271 (H) mg/dL      Comment: : [id number] Gonsalo NikielaMeter ID: [id number]       Basic Metabolic Panel [[id number]]  (Abnormal) Collected:  10/03/18 0828    Specimen:  Blood Updated:  10/03/18 0846      Glucose 108 (H) mg/dL      BUN 35 (H) mg/dL      Creatinine 1.36 mg/dL      Sodium 140 mmol/L      Potassium 4.2 mmol/L      Chloride 102 mmol/L      CO2 28.0 mmol/L      Calcium 9.4 mg/dL      eGFR Non African Amer 37 (L) mL/min/1.73      BUN/Creatinine Ratio 25.7 (H)     Anion Gap 10.0 mmol/L     Narrative:       The MDRD GFR formula is only valid for adults with stable renal function between ages 18 and 70.    POC Glucose Once [[id number]]  (Normal) Collected:  10/03/18 0800    Specimen:  Blood Updated:  10/03/18 0831     Glucose 92 mg/dL      Comment: : [id number] Gonsalo NikielaMeter ID: [id number]       Lipase [[id number]]  (Abnormal) Collected:  10/03/18 0617    Specimen:  Blood Updated:  10/03/18 0731     Lipase 262 (H) U/L     CBC Auto Differential [[id number]]  (Abnormal) Collected:  10/03/18 0617    Specimen:  Blood Updated:  10/03/18 0708     WBC 6.69 10*3/mm3      RBC 3.33 (L) 10*6/mm3      Hemoglobin 9.6 (L) g/dL      Hematocrit 29.8 (L) %      MCV 89.5 fL      MCH 28.8 pg      MCHC 32.2 (L) g/dL      RDW 17.6 (H) %      RDW-SD 55.1 (H) fl      MPV 11.9 fL      Platelets 738 (H) 10*3/mm3      Neutrophil % 68.3 %      Lymphocyte % 14.2 (L) %      Monocyte % 12.6 (H) %      Eosinophil % 1.5 %      Basophil % 0.9 %      Immature Grans % 2.5 %      Neutrophils, Absolute 4.57 10*3/mm3      Lymphocytes, Absolute 0.95 10*3/mm3      Monocytes, Absolute 0.84 10*3/mm3      Eosinophils, Absolute 0.10 10*3/mm3      Basophils, Absolute 0.06 10*3/mm3      Immature Grans, Absolute 0.17 (H) 10*3/mm3      nRBC 0.0 /100 WBC     POC Glucose Once [[id number]]  (Abnormal) Collected:  10/02/18 2210    Specimen:  Blood Updated:  10/02/18 2241     Glucose 244 (H) mg/dL      Comment: : [id number] Jefferson RachelMeter ID: [id number]       POC Glucose Once [[id number]]  (Abnormal) Collected:  10/02/18 1651    Specimen:  Blood Updated:  10/02/18 1721     Glucose 215 (H) mg/dL      Comment: : [id number] Gonsalo PrinceelaMeter ID:  [id number]       POC Glucose Once [[id number]]  (Abnormal) Collected:  10/02/18 1152    Specimen:  Blood Updated:  10/02/18 1204     Glucose 228 (H) mg/dL      Comment: : [id number] Gonsalo PrinceelaMeter ID: [id number]       Basic Metabolic Panel [[id number]]  (Abnormal) Collected:  10/02/18 0955    Specimen:  Blood Updated:  10/02/18 1033     Glucose 259 (H) mg/dL      BUN 35 (H) mg/dL      Creatinine 1.44 (H) mg/dL      Sodium 139 mmol/L      Potassium 4.1 mmol/L      Chloride 100 mmol/L      CO2 28.0 mmol/L      Calcium 9.0 mg/dL      eGFR Non African Amer 35 (L) mL/min/1.73      BUN/Creatinine Ratio 24.3     Anion Gap 11.0 mmol/L     CBC Auto Differential [[id number]]  (Abnormal) Collected:  10/02/18 0955    Specimen:  Blood Updated:  10/02/18 1012     WBC 6.75 10*3/mm3      RBC 3.50 (L) 10*6/mm3      Hemoglobin 10.2 (L) g/dL      Hematocrit 32.5 (L) %      MCV 92.9 fL      MCH 29.1 pg      MCHC 31.4 (L) g/dL      RDW 17.8 (H) %      RDW-SD 58.5 (H) fl      MPV 11.3 fL      Platelets 792 (H) 10*3/mm3      Neutrophil % 71.6 %      Lymphocyte % 12.1 (L) %      Monocyte % 10.4 %      Eosinophil % 2.2 %      Basophil % 1.0 %      Immature Grans % 2.7 %      Neutrophils, Absolute 4.83 10*3/mm3      Lymphocytes, Absolute 0.82 10*3/mm3      Monocytes, Absolute 0.70 10*3/mm3      Eosinophils, Absolute 0.15 10*3/mm3      Basophils, Absolute 0.07 10*3/mm3      Immature Grans, Absolute 0.18 (H) 10*3/mm3      nRBC 0.0 /100 WBC     POC Glucose Once [[id number]]  (Abnormal) Collected:  10/02/18 0805    Specimen:  Blood Updated:  10/02/18 0828     Glucose 192 (H) mg/dL      Comment: : [id number] Gonsalo PrinceelaMeter ID: [id number]       Troponin [[id number]]  (Normal) Collected:  10/02/18 0507    Specimen:  Blood Updated:  10/02/18 0549     Troponin I <0.012 ng/mL     Troponin [[id number]]  (Normal) Collected:  10/02/18 0218    Specimen:  Blood Updated:  10/02/18 0248     Troponin I <0.012 ng/mL     Troponin [[id number]]   (Normal) Collected:  10/01/18 2236    Specimen:  Blood Updated:  10/01/18 2310     Troponin I <0.012 ng/mL     BNP [[id number]]  (Abnormal) Collected:  10/01/18 2236    Specimen:  Blood Updated:  10/01/18 2305     proBNP 11,400.0 (H) pg/mL     aPTT [[id number]]  (Abnormal) Collected:  10/01/18 2236    Specimen:  Blood Updated:  10/01/18 2300     PTT 36.5 (H) seconds     Protime-INR [[id number]]  (Abnormal) Collected:  10/01/18 2236    Specimen:  Blood Updated:  10/01/18 2300     Protime 15.6 (H) Seconds      INR 1.20 (H)    Comprehensive Metabolic Panel [[id number]]  (Abnormal) Collected:  10/01/18 2236    Specimen:  Blood Updated:  10/01/18 2259     Glucose 233 (H) mg/dL      BUN 38 (H) mg/dL      Creatinine 1.45 (H) mg/dL      Sodium 141 mmol/L      Potassium 4.5 mmol/L      Chloride 105 mmol/L      CO2 25.0 mmol/L      Calcium 9.2 mg/dL      Total Protein 7.4 g/dL      Albumin 4.20 g/dL      ALT (SGPT) 34 U/L      AST (SGOT) 50 (H) U/L      Alkaline Phosphatase 97 U/L      Total Bilirubin 0.5 mg/dL      eGFR Non African Amer 35 (L) mL/min/1.73      Globulin 3.2 gm/dL      A/G Ratio 1.3 g/dL      BUN/Creatinine Ratio 26.2 (H)     Anion Gap 11.0 mmol/L     Narrative:       The MDRD GFR formula is only valid for adults with stable renal function between ages 18 and 70.    Lipase [[id number]]  (Abnormal) Collected:  10/01/18 2236    Specimen:  Blood Updated:  10/01/18 2259     Lipase 381 (H) U/L     Amylase [[id number]]  (Normal) Collected:  10/01/18 2236    Specimen:  Blood Updated:  10/01/18 2259     Amylase 64 U/L     CBC Auto Differential [[id number]]  (Abnormal) Collected:  10/01/18 2236    Specimen:  Blood Updated:  10/01/18 2255     WBC 7.66 10*3/mm3      RBC 3.64 (L) 10*6/mm3      Hemoglobin 10.6 (L) g/dL      Hematocrit 33.0 (L) %      MCV 90.7 fL      MCH 29.1 pg      MCHC 32.1 (L) g/dL      RDW 17.8 (H) %      RDW-SD 57.5 (H) fl      MPV 11.5 fL      Platelets 922 (H) 10*3/mm3      Neutrophil % 68.2 %       Lymphocyte % 14.9 (L) %      Monocyte % 10.3 %      Eosinophil % 2.3 %      Basophil % 1.0 %      Immature Grans % 3.3 %      Neutrophils, Absolute 5.22 10*3/mm3      Lymphocytes, Absolute 1.14 10*3/mm3      Monocytes, Absolute 0.79 10*3/mm3      Eosinophils, Absolute 0.18 10*3/mm3      Basophils, Absolute 0.08 10*3/mm3      Immature Grans, Absolute 0.25 (H) 10*3/mm3      nRBC 0.0 /100 WBC         Imaging Results (all)     Procedure Component Value Units Date/Time    XR Chest PA & Lateral [[id number]] Collected:  10/03/18 0721     Updated:  10/03/18 0725    Narrative:       XR CHEST PA AND LATERAL- 10/3/2018 4:30 AM CDT     HISTORY: f/u pulm edema; I50.9-Heart failure, unspecified;  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     Diffuse interstitial opacities are again noted. There is no focal  consolidation. The cardiomediastinal silhouette and pulmonary  vascularity are unchanged. The osseous structures and surrounding soft  tissues demonstrate no acute abnormality.       Impression:       1. No significant change in diffuse interstitial opacities that may  represent resolving pulmonary or interstitial edema.        This report was finalized on 10/03/2018 07:21 by Dr. Luther Farias MD.    CT Abdomen Pelvis Without Contrast [[id number]] Collected:  10/02/18 0716     Updated:  10/02/18 0726    Narrative:       CT ABDOMEN PELVIS WO CONTRAST- 10/2/2018 12:44 AM CDT     HISTORY: epigastric abd pain      COMPARISON: 9/18/2018     DOSE LENGTH PRODUCT: 256 mGy cm. Automated exposure control was also  utilized to decrease patient radiation dose.     TECHNIQUE: Axial images of the and pelvis are obtained without IV or  oral  contrast     FINDINGS:  The nonenhanced liver, spleen, pancreas, and adrenal glands  are stable. Hypodense prominence of the adrenal glands unchanged.  Punctate calcification suspected within the head of the pancreas. There  is no peripancreatic inflammation. The gallbladder surgically absent.  There is no abnormal perinephric fluid collection. There is no  hydronephrosis. Bladder appears intact. The uterus is absent. There is  no adnexal mass. Moderate to large volume of stool seen within the  rectum distending rectum to measurement of 8 cm. There is a redundant  sigmoid colon. There is mild sigmoid colonic diverticulosis. There is no  evidence for bowel obstruction. Stomach is underdistended. Small hiatal  hernia considered. The diverticulum of the duodenum. There is diffuse  vascular calcification with no aneurysm. No pathologic lymphadenopathy  visualized. There is a cardiac pacer device.     There are emphysematous changes within the visible lung bases.     There are old right lumbar transverse process fractures with L2  kyphoplasty changes. There is moderate to advanced degenerative changes  of the lower lumbar spine.       Impression:       1. Large volume of stool within the rectum concerning for impaction.  Mild lower rectal versus anal wall thickening.  2. Sigmoid colonic diverticulosis with no acute diverticulitis.  3. Diffuse vascular calcification with no aneurysm or dissection.  4. Emphysema.  5. Duodenal diverticula. Mild hernia.  6. Stranding of the subcutaneous tissues of the anterior abdominal wall  with no loculated fluid collection.     Comments: A preliminary report is issued to the ER by the [facility]  radiology service.  This report was finalized on 10/02/2018 07:23 by Dr. Lucy Carson MD.    XR Chest 1 View [[id number]] Collected:  10/02/18 0720     Updated:  10/02/18 0725    Narrative:       History:  83-year-old shortness of breath.     Reference:  Chest radiograph June 25, 2017      Findings:  Frontal chest radiograph performed.     Cardiomegaly. Prior median sternotomy/CABG. Interval TAVR. Mild  interstitial edema is suspected. No consolidation. No pleural effusion  or pneumothorax. Old left clavicle fracture. Dual-lead left-sided  transvenous pacemaker is intact and unchanged.          Impression:       Suspect mild interstitial edema.  This report was finalized on 10/02/2018 07:22 by Dr Cullen Ambrose, .        History of Present Illness on Day of Discharge: The patient is resting in bed with daughter at bedside.  She reports improvement overall.  She had good success with the soapsuds enemas yesterday and has moved her bowels twice already today.  She denies any shortness of breath or chest pain.  Her belching is much improved.    Hospital Course:  Mrs. Costello is an 83-year-old  female who follows Dr. Leonardo Zepeda for primary care.  She has a past medical history significant for severe aortic stenosis status post TAVR, paroxysmal atrial fibrillation status post cardioversion ×2 and plans for watchman procedure at [facility], sick sinus syndrome status post pacemaker placement, breast cancer, coronary artery disease, stage III chronic kidney disease, diabetes mellitus type II, hyperlipidemia, hypertension, and recent diagnosis of thrombocytosis.  Patient presented to the [facility] emergency department on 10/01/2018 with a chief complaint of belching and shortness of breath.  The patient states she noticed increased belching on Saturday, and the day prior to admission began experiencing shortness of breath while lying flat.  She was also experiencing abdominal pain, and described her symptoms similar to when she used to have gallbladder attacks prior to her cholecystectomy.  In the emergency department, the patient's BNP level was noted to be 11,400.  Her chronic kidney disease is considered stable.  She did have a slightly elevated lipase at 381.  Platelet count was 922.   CT of the abdomen and pelvis showed a large volume of stool within the rectum concerning for impaction.  There was colonic diverticulosis without diverticulitis.  There was no evidence of peripancreatic inflammation.  Chest x-ray was suspicious for mild interstitial edema.  A she was admitted to the hospitalist service for further evaluation and management.    The patient was given diuresis with IV Lasix.  She did respond well to this, diuresing over 2 L of fluid.  Her weight has decreased 4 pounds since admission.  She is now able to lie flat without getting short of breath and reports an overall improvement in her symptoms.  We did not repeat an echocardiogram as she has had one within the last 2 weeks at [facility].  We did review these records, which revealed stage III diastolic dysfunction with an estimated ejection fraction of 55%.  The patient was recently taken off Aldactone in favor of as needed Lasix.  With the patient's recent transition to an assisted living facility, the patient and her daughter both admit they have not been keeping track of her weight and dietary salt restriction has not been followed as closely as well.  Now that the patient and her daughter are aware of the parameters as to when to use the Lasix, we feel that it is appropriate to send her back home on this regimen.  Chest x-ray this morning shows resolving interstitial edema.    Given the large volume of stool noted on the CT of the abdomen and pelvis, the patient was given 2 soapsuds enemas yesterday with very good results.  We will place her on a bowel regimen of Senokot, and her daughter states she also uses MiraLAX as needed.  The patient has recently been started on iron supplementation, which is likely the cause of her constipation.    With the patient's recent diagnosis thrombocytosis, she was scheduled to have an outpatient bone marrow biopsy, and was able to receive this while hospitalized.  She will follow-up  "with Dr. Barahona in 2 weeks for results.  Her daughter is going to contact Dr. Barahona's office to ensure that it is now safe to resume her Coumadin, as she was using Lovenox in the interim while waiting for the biopsy to be performed.  The patient is felt to be overall hemodynamically stable and clinically improved compared to admission.  She will need to keep her previously scheduled follow-up with Dr. Zepeda on Friday.    Condition on Discharge:  Stable    Physical Exam on Discharge:  /59 (BP Location: Left arm, Patient Position: Lying)   Pulse 63   Temp 98.4 °F (36.9 °C) (Temporal Artery )   Resp 16   Ht 154.9 cm (61\")   Wt 56.5 kg (124 lb 9 oz)   SpO2 95%   BMI 23.54 kg/m²    Physical Exam   Constitutional: She is oriented to person, place, and time. She appears well-developed and well-nourished. No distress.   HENT:   Head: Normocephalic and atraumatic.   Neck: Normal range of motion. Neck supple. No JVD present. No tracheal deviation present. No thyromegaly present.   Cardiovascular: Normal rate, regular rhythm and intact distal pulses.  Exam reveals no gallop and no friction rub.    Murmur heard.  V pacing 60-68 overnight   Pulmonary/Chest: Effort normal. She has no wheezes. She has no rales.   Diminished in bilateral bases   Abdominal: Soft. Bowel sounds are normal. She exhibits distension. There is no tenderness. There is no guarding.   Much less distended than yesterday   Musculoskeletal: Normal range of motion. She exhibits no edema or tenderness.   Lymphadenopathy:     She has no cervical adenopathy.   Neurological: She is alert and oriented to person, place, and time. No cranial nerve deficit.   Skin: Skin is warm and dry. No rash noted. No erythema.   Band-Aid to the left gluteus is clean/dry/intact following bone marrow biopsy   Psychiatric: She has a normal mood and affect. Her behavior is normal. Judgment and thought content normal.   Vitals reviewed.    Discharge Disposition:  Home-Health " Care [facility]    Discharge Medications:     Discharge Medications      New Medications      Instructions Start Date   sennosides-docusate sodium 8.6-50 MG tablet  Commonly known as:  SENOKOT-S   2 tablets, Oral, Nightly         Changes to Medications      Instructions Start Date   insulin detemir 100 UNIT/ML injection  Commonly known as:  LEVEMIR  What changed:  · when to take this  · additional instructions   30 Units, Subcutaneous, Every 12 Hours, 30 units qam 30 units qpm         Continue These Medications      Instructions Start Date   amiodarone 200 MG tablet  Commonly known as:  PACERONE   200 mg, Oral, 2 Times Daily      aspirin 81 MG EC tablet   81 mg, Oral, Daily      atenolol 50 MG tablet  Commonly known as:  TENORMIN   Take 1 tab PO qam & 1/2 tab PO qpm.      calcium carbonate 600 MG tablet  Commonly known as:  OS-MICHAEL   600 mg, Oral, 2 Times Daily With Meals      cholecalciferol 1000 units tablet  Commonly known as:  VITAMIN D3   1,000 Units, Oral, Daily      fenofibrate 160 MG tablet   160 mg, Oral, Daily      ferrous sulfate 325 (65 FE) MG tablet   325 mg, Oral, 2 Times Daily With Meals      furosemide 20 MG tablet  Commonly known as:  LASIX   20 mg, Oral, Daily PRN      glipiZIDE 5 MG tablet  Commonly known as:  GLUCOTROL   5 mg, Oral, 2 Times Daily Before Meals      magnesium oxide 400 (241.3 Mg) MG tablet tablet  Commonly known as:  MAGOX   400 mg, Oral, Daily      nitroglycerin 0.4 MG SL tablet  Commonly known as:  NITROSTAT   1 under the tongue as needed for angina, may repeat q5mins for up three doses      pantoprazole 40 MG EC tablet  Commonly known as:  PROTONIX   40 mg, Oral, Daily      polyethyl glycol-propyl glycol 0.4-0.3 % solution ophthalmic solution  Commonly known as:  SYSTANE   1 drop, Both Eyes, Daily      PRESERVISION/LUTEIN capsule   1 capsule, Oral, 2 Times Daily      simvastatin 20 MG tablet  Commonly known as:  ZOCOR   20 mg, Oral, Nightly      venlafaxine XR 37.5 MG 24 hr  capsule  Commonly known as:  EFFEXOR-XR   37.5 mg, Oral, Daily      vitamin B-12 500 MCG tablet  Commonly known as:  CYANOCOBALAMIN   500 mcg, Oral, Daily      warfarin 1 MG tablet  Commonly known as:  COUMADIN   Take 2 tablet by mouth daily on Monday and Friday. Take 1 tablet by mouth daily on Tuesday, Wednesday, Thursday, Saturday and Sunday.         Stop These Medications    enoxaparin 120 MG/0.8ML solution syringe  Commonly known as:  LOVENOX          Discharge Diet:   Diet Instructions     Diet: Consistent Carbohydrate, Cardiac; Thin       Discharge Diet:   Consistent Carbohydrate  Cardiac       Fluid Consistency:  Thin        Activity at Discharge:   Activity Instructions     Activity as Tolerated       Measure Weight       Measure weight daily at the same time each day.  Take Lasix for weight gain of greater than 2 pounds overnight or 5 pounds within 1 week, or with associated shortness of breath or increased swelling.        Discharge Care Plan/Instructions:   1.  Return for any acute or worsening symptoms  2.  Daily weights.  Low salt diet.  3.  Lasix as needed for weight gain greater than 2 pounds overnight or 5 pounds within 1 week, especially associated with shortness of breath or worsening edema.    Follow-up Appointments:   Future Appointments  Date Time Provider Department Center   10/5/2018 1:00 PM Leonardo Zepeda DO [facility] None   10/15/2018 10:45 AM  [facility]  [facility]   10/15/2018 11:15 AM Abisai Barahona MD [facility]   11/5/2018 9:30 AM Ehsan Crocker MD [facility]     Test Results Pending at Discharge: None    FACUNDO Hernandez  10/03/18  12:37 PM    Time: 25 minutes          Electronically signed by Angella Beltrán APRN at 10/3/2018 12:55 PM

## 2018-10-03 NOTE — PLAN OF CARE
Problem: Patient Care Overview  Goal: Plan of Care Review  Outcome: Outcome(s) achieved Date Met: 10/03/18   10/03/18 0934   Coping/Psychosocial   Plan of Care Reviewed With patient   OTHER   Outcome Summary Pt is declining PT evaluation. Reports going for bone marrow biopsy then possible discharge. Reports up w/o need of RW or PT assistance. PT to sign off. Please re-consult if PT is deemed appropriate and we will be glad to see her.

## 2018-10-03 NOTE — INTERVAL H&P NOTE
Risks, alternatives and benefits explained to the patient. All questions were answered prior to the exam. Plan is for moderate sedation.        H&P reviewed. The patient was examined and there are no changes to the H&P.

## 2018-10-03 NOTE — PROGRESS NOTES
Continued Stay Note   [place]     Patient Name: Lakesha Costello  MRN: [id number]  Today's Date: 10/3/2018    Admit Date: 10/1/2018          Discharge Plan     Row Name 10/03/18 1410       Plan    Plan Correction:  [facility]    Patient/Family in Agreement with Plan yes    Final Discharge Disposition Code 06 - home with home health care    Final Note Correction: dtr came back to room saying pt is actually a pt of [facility] and not [facility] . Called Inder from [facility] back to cancel order. Called and spoke with Rochelle from [facility] and informed of d/c status and faxed d/c summary [phone] fax [phone].    Row Name 10/03/18 1349       Plan    Plan [facility]    Patient/Family in Agreement with Plan yes    Final Discharge Disposition Code 06 - home with home health care    Final Note Pt is current with [facility] and pt discharging home today. Informed Inder from [facility] of referral [phone].              Discharge Codes    No documentation.       Expected Discharge Date and Time     Expected Discharge Date Expected Discharge Time    Oct 3, 2018             NORA Martinez

## 2018-10-03 NOTE — PLAN OF CARE
Problem: Patient Care Overview  Goal: Plan of Care Review  Outcome: Ongoing (interventions implemented as appropriate)   10/03/18 0600   Coping/Psychosocial   Plan of Care Reviewed With patient   Plan of Care Review   Progress no change   OTHER   Outcome Summary Pt diuresing well with Lasix. Up with assist x1, gait unsteady upon standing. VSS.        Problem: Fluid Volume Excess (Adult)  Goal: Identify Related Risk Factors and Signs and Symptoms  Outcome: Outcome(s) achieved Date Met: 10/03/18    Goal: Optimal Fluid Balance  Outcome: Ongoing (interventions implemented as appropriate)      Problem: Fall Risk (Adult)  Goal: Identify Related Risk Factors and Signs and Symptoms  Outcome: Outcome(s) achieved Date Met: 10/03/18    Goal: Absence of Fall  Outcome: Ongoing (interventions implemented as appropriate)      Problem: Cardiac: Heart Failure (Adult)  Goal: Signs and Symptoms of Listed Potential Problems Will be Absent, Minimized or Managed (Cardiac: Heart Failure)  Outcome: Ongoing (interventions implemented as appropriate)

## 2018-10-03 NOTE — THERAPY DISCHARGE NOTE
Acute Care - Physical Therapy Discharge Summary  [facility]       Patient Name: Lakesha Costello  : 1935  MRN: [id number]    Today's Date: 10/3/2018                 Admit Date: 10/1/2018      PT Recommendation and Plan    Visit Dx:    ICD-10-CM ICD-9-CM   1. Acute on chronic congestive heart failure, unspecified heart failure type (CMS/[facility]) I50.9 428.0   2. Thrombocytosis (CMS/[facility]) D47.3 238.71   3. Closed compression fracture of second lumbar vertebra, initial encounter (CMS/[facility]) S32.020A 805.4   4. Constipation, unspecified constipation type K59.00 564.00   5. Epigastric abdominal pain R10.13 789.06   6. Shortness of breath R06.02 786.05             Therapy Suggested Charges     Code   Minutes Charges    None                     PT Discharge Summary  Anticipated Discharge Disposition (PT): home  Reason for Discharge: Discharge from facility  Outcomes Achieved: Other (Pt refused evaluation)  Discharge Destination: Home with home health      Chanelle Glass, PTA   10/3/2018

## 2018-10-04 ENCOUNTER — TELEPHONE (OUTPATIENT)
Dept: INTERNAL MEDICINE | Facility: CLINIC | Age: 83
End: 2018-10-04

## 2018-10-04 ENCOUNTER — READMISSION MANAGEMENT (OUTPATIENT)
Dept: CALL CENTER | Facility: HOSPITAL | Age: 83
End: 2018-10-04

## 2018-10-04 ENCOUNTER — TRANSITIONAL CARE MANAGEMENT TELEPHONE ENCOUNTER (OUTPATIENT)
Dept: INTERNAL MEDICINE | Facility: CLINIC | Age: 83
End: 2018-10-04

## 2018-10-04 NOTE — TELEPHONE ENCOUNTER
Spoke with DR Ignacio( daughter )of patient she is doing good since discharge. No issues at this time

## 2018-10-04 NOTE — OUTREACH NOTE
Prep Survey      Responses   Facility patient discharged from?  [place]   Is patient eligible?  Yes   Discharge diagnosis  CHF (BNP 08913),  impaction,  shortness of breath   Does the patient have one of the following disease processes/diagnoses(primary or secondary)?  CHF   Does the patient have Home health ordered?  Yes   [facility]   Is there a DME ordered?  No   Prep survey completed?  Yes          Amira Koch RN

## 2018-10-05 ENCOUNTER — ANTICOAGULATION VISIT (OUTPATIENT)
Dept: CARDIOLOGY | Facility: CLINIC | Age: 83
End: 2018-10-05

## 2018-10-05 ENCOUNTER — OFFICE VISIT (OUTPATIENT)
Dept: INTERNAL MEDICINE | Facility: CLINIC | Age: 83
End: 2018-10-05

## 2018-10-05 VITALS
RESPIRATION RATE: 16 BRPM | HEIGHT: 61 IN | BODY MASS INDEX: 23.3 KG/M2 | HEART RATE: 63 BPM | OXYGEN SATURATION: 90 % | SYSTOLIC BLOOD PRESSURE: 144 MMHG | DIASTOLIC BLOOD PRESSURE: 45 MMHG | WEIGHT: 123.4 LBS

## 2018-10-05 DIAGNOSIS — E78.2 MIXED HYPERLIPIDEMIA: ICD-10-CM

## 2018-10-05 DIAGNOSIS — I50.32 CHRONIC DIASTOLIC CONGESTIVE HEART FAILURE (HCC): Primary | ICD-10-CM

## 2018-10-05 DIAGNOSIS — I48.92 ATRIAL FLUTTER, UNSPECIFIED TYPE (HCC): ICD-10-CM

## 2018-10-05 DIAGNOSIS — Z95.1 S/P CABG X 3: ICD-10-CM

## 2018-10-05 DIAGNOSIS — I10 ESSENTIAL HYPERTENSION: ICD-10-CM

## 2018-10-05 DIAGNOSIS — Z79.4 TYPE 2 DIABETES MELLITUS WITH DIABETIC PERIPHERAL ANGIOPATHY WITHOUT GANGRENE, WITH LONG-TERM CURRENT USE OF INSULIN (HCC): ICD-10-CM

## 2018-10-05 DIAGNOSIS — Z95.2 S/P TAVR (TRANSCATHETER AORTIC VALVE REPLACEMENT): ICD-10-CM

## 2018-10-05 DIAGNOSIS — D75.839 THROMBOCYTOSIS: ICD-10-CM

## 2018-10-05 DIAGNOSIS — E11.51 TYPE 2 DIABETES MELLITUS WITH DIABETIC PERIPHERAL ANGIOPATHY WITHOUT GANGRENE, WITH LONG-TERM CURRENT USE OF INSULIN (HCC): ICD-10-CM

## 2018-10-05 DIAGNOSIS — Z79.01 CHRONIC ANTICOAGULATION: ICD-10-CM

## 2018-10-05 DIAGNOSIS — N18.30 CKD (CHRONIC KIDNEY DISEASE) STAGE 3, GFR 30-59 ML/MIN (HCC): ICD-10-CM

## 2018-10-05 DIAGNOSIS — I25.10 CORONARY ARTERY DISEASE INVOLVING NATIVE CORONARY ARTERY OF NATIVE HEART WITHOUT ANGINA PECTORIS: ICD-10-CM

## 2018-10-05 LAB — INR PPP: 1.4

## 2018-10-05 PROCEDURE — 99496 TRANSJ CARE MGMT HIGH F2F 7D: CPT | Performed by: INTERNAL MEDICINE

## 2018-10-05 NOTE — PROGRESS NOTES
CC: hospital follow-up for CHF exacerbation    Date of Admission: 10/1/18  Date of Discharge 10/3/18  Transitional Care Call: 10/4/18 by Irma Young per Epic    History:  Lakesha Costello is a 83 y.o. female who presents today for follow-up for evaluation of the above:  She notes she has been doing well since discharge from [facility], [place] where she was admitted for CHF exacerbation. She responded to IV diuresis and had improvement in her respiratory status. She did have belching, but this was thought not to be severe and she had negative CT except constipation despite an elevated lipase. She was hospitalized prior to that secondary to a recent sacral fracture, from which she has recovered well and has no significant pain at this time. Her thrombocytosis is stable and she recently had a bone marrow biopsy and has planned follow-up with Hematology.      ROS:  Review of Systems   Constitutional: Negative for chills and fever.   HENT: Negative for congestion and sore throat.    Eyes: Negative for visual disturbance.   Respiratory: Negative for cough, shortness of breath and wheezing.    Cardiovascular: Negative for chest pain, palpitations and leg swelling.   Gastrointestinal: Negative for abdominal pain, constipation and nausea.   Endocrine: Negative for cold intolerance and heat intolerance.   Genitourinary: Negative for difficulty urinating and frequency.   Musculoskeletal: Negative for arthralgias, back pain and gait problem.   Skin: Negative for rash.   Neurological: Positive for weakness. Negative for dizziness and headaches.   Psychiatric/Behavioral: Negative for dysphoric mood. The patient is not nervous/anxious.        Ms. Costello  reports that she has never smoked. She has never used smokeless tobacco. She reports that she does not drink alcohol or use drugs.      Current Outpatient Prescriptions:   •  amiodarone (PACERONE) 200 MG tablet, Take 1 tablet by mouth 2 (Two) Times a Day., Disp: 60 tablet, Rfl: 2  •  aspirin  81 MG EC tablet, Take 81 mg by mouth Daily., Disp: , Rfl:   •  atenolol (TENORMIN) 50 MG tablet, Take 1 tab PO qam & 1/2 tab PO qpm. (Patient taking differently: Take 25 mg by mouth Daily. Take 1 tab PO qam & 1/2 tab PO qpm.), Disp: 135 tablet, Rfl: 3  •  calcium carbonate (OS-MICHAEL) 600 MG tablet, Take 600 mg by mouth 2 (Two) Times a Day With Meals., Disp: , Rfl:   •  cholecalciferol (VITAMIN D3) 1000 UNITS tablet, Take 1,000 Units by mouth Daily., Disp: , Rfl:   •  fenofibrate 160 MG tablet, Take 160 mg by mouth Daily., Disp: , Rfl:   •  ferrous sulfate 325 (65 FE) MG tablet, Take 1 tablet by mouth 2 (Two) Times a Day With Meals., Disp: 60 tablet, Rfl: 0  •  furosemide (LASIX) 20 MG tablet, Take 1 tablet by mouth Daily As Needed (for weight gain > 2 lbs in a day or 2)., Disp: 10 tablet, Rfl: 0  •  glipiZIDE (GLUCOTROL) 5 MG tablet, Take 1 tablet by mouth 2 (Two) Times a Day Before Meals., Disp: 180 tablet, Rfl: 3  •  insulin detemir (LEVEMIR) 100 UNIT/ML injection, Inject 30 Units under the skin Every 12 (Twelve) Hours. 30 units qam 30 units qpm (Patient taking differently: Inject 30 Units under the skin into the appropriate area as directed Every Night. 30 units qam 30 units qpm), Disp: 10 mL, Rfl: 6  •  magnesium oxide (MAGOX) 400 (241.3 MG) MG tablet tablet, Take 400 mg by mouth Daily., Disp: , Rfl:   •  Multiple Vitamins-Minerals (PRESERVISION/LUTEIN) capsule, Take 1 capsule by mouth 2 (Two) Times a Day., Disp: , Rfl:   •  nitroglycerin (NITROSTAT) 0.4 MG SL tablet, 1 under the tongue as needed for angina, may repeat q5mins for up three doses, Disp: 30 tablet, Rfl: 3  •  pantoprazole (PROTONIX) 40 MG EC tablet, Take 40 mg by mouth Daily., Disp: , Rfl:   •  polyethyl glycol-propyl glycol (SYSTANE) 0.4-0.3 % solution ophthalmic solution, Administer 1 drop to both eyes Daily., Disp: , Rfl:   •  sennosides-docusate sodium (SENOKOT-S) 8.6-50 MG tablet, Take 2 tablets by mouth Every Night., Disp: 60 tablet, Rfl: 0  •   "simvastatin (ZOCOR) 20 MG tablet, Take 1 tablet by mouth Every Night., Disp: 90 tablet, Rfl: 3  •  venlafaxine XR (EFFEXOR-XR) 37.5 MG 24 hr capsule, Take 37.5 mg by mouth Daily., Disp: , Rfl:   •  vitamin B-12 (CYANOCOBALAMIN) 500 MCG tablet, Take 500 mcg by mouth Daily., Disp: , Rfl:   •  warfarin (COUMADIN) 1 MG tablet, Take 2 tablet by mouth daily on Monday and Friday. Take 1 tablet by mouth daily on Tuesday, Wednesday, Thursday, Saturday and Sunday., Disp: 120 tablet, Rfl: 3  Current outpatient and discharge medications have been reconciled for the patient.  Reviewed by: Leonardo Zepeda DO      OBJECTIVE:  /45 (BP Location: Left arm, Patient Position: Sitting, Cuff Size: Adult)   Pulse 63   Resp 16   Ht 154.9 cm (61\")   Wt 56 kg (123 lb 6.4 oz)   SpO2 90%   Breastfeeding? No   BMI 23.32 kg/m²    Physical Exam   Constitutional: She is oriented to person, place, and time. She appears well-nourished. No distress.   Cardiovascular: Normal rate, regular rhythm and normal heart sounds.    No murmur heard.  Pulmonary/Chest: Effort normal and breath sounds normal. She has no wheezes.   Abdominal: Soft. There is no tenderness.   Neurological: She is alert and oriented to person, place, and time.   Psychiatric: She has a normal mood and affect.       Assessment/Plan    Diagnoses and all orders for this visit:    Chronic diastolic congestive heart failure (CMS/HCC)  Stable and euvolemic on BB & diuretic therapy. This is improved from hospitalization.     Thrombocytosis (CMS/HCC)  Workup pending per hematology.    Type 2 diabetes mellitus with diabetic peripheral angiopathy without gangrene, with long-term current use of insulin (CMS/HCC)  Stable without symptoms of hyperglycemia. A1c was 9.4% in the hospital. However, she has had some hypoglycemic values as well. As such, given upcoming cardiac procedures, we will avoid further titration of medication at this time.     Mixed hyperlipidemia  Stable on " moderate intensity statin therapy per ACC/AHA guidelines.    Essential hypertension  Well controlled, BP goal for age is <140/90 per JNC 8 guidelines and continue current medications    Coronary artery disease involving native coronary artery of native heart without angina pectoris  S/P CABG x 3  S/P TAVR (transcatheter aortic valve replacement)  Atrial flutter, unspecified type (CMS/[facility])  Chronic anticoagulation  She has plans for upcoming ablation for a fib as well as placement of a Watchman device. Stable without angina at this time.     CKD (chronic kidney disease) stage 3, GFR 30-59 ml/min (CMS/[facility])  Not on RAAS blockade at this time. Given polypharmacy and stability of renal function with recent CHF exacerbation, we will not change this, but will consider adding this if cardiac procedures successful.     Other orders  -     insulin detemir (LEVEMIR) 100 UNIT/ML injection; Inject 30 Units under the skin into the appropriate area as directed Every Night. 30 units qam 30 units qpm    As above, hospital records were reviewed and summarized. I have also personally reviewed CT abdomen as well as CXRs. A1c and lipase reviewed as above.     An After Visit Summary was printed and given to the patient at discharge.  Return in about 3 months (around 1/5/2019) for Recheck. Sooner if problems arise.         Leonardo Zepeda D.O. 10/8/2018

## 2018-10-08 ENCOUNTER — READMISSION MANAGEMENT (OUTPATIENT)
Dept: CALL CENTER | Facility: HOSPITAL | Age: 83
End: 2018-10-08

## 2018-10-08 NOTE — OUTREACH NOTE
CHF Week 1 Survey      Responses   Facility patient discharged from?  [facility]   Does the patient have one of the following disease processes/diagnoses(primary or secondary)?  CHF   Is there a successful TCM telephone encounter documented?  No   CHF Week 1 attempt successful?  No   Unsuccessful attempts  Attempt 1          Mamta Sales, RN

## 2018-10-09 ENCOUNTER — READMISSION MANAGEMENT (OUTPATIENT)
Dept: CALL CENTER | Facility: HOSPITAL | Age: 83
End: 2018-10-09

## 2018-10-09 NOTE — OUTREACH NOTE
CHF Week 1 Survey      Responses   Facility patient discharged from?  [facility]   Does the patient have one of the following disease processes/diagnoses(primary or secondary)?  CHF   Is there a successful TCM telephone encounter documented?  Yes          Avni King RN

## 2018-10-10 ENCOUNTER — HOSPITAL ENCOUNTER (EMERGENCY)
Facility: HOSPITAL | Age: 83
Discharge: LEFT BEFORE DISCHARGE | End: 2018-10-10

## 2018-10-10 ENCOUNTER — ANTICOAGULATION VISIT (OUTPATIENT)
Dept: CARDIOLOGY | Facility: CLINIC | Age: 83
End: 2018-10-10

## 2018-10-10 ENCOUNTER — NURSE TRIAGE (OUTPATIENT)
Dept: CALL CENTER | Facility: HOSPITAL | Age: 83
End: 2018-10-10

## 2018-10-10 VITALS
DIASTOLIC BLOOD PRESSURE: 59 MMHG | RESPIRATION RATE: 16 BRPM | HEIGHT: 61 IN | TEMPERATURE: 98 F | HEART RATE: 66 BPM | OXYGEN SATURATION: 94 % | BODY MASS INDEX: 23.79 KG/M2 | SYSTOLIC BLOOD PRESSURE: 172 MMHG | WEIGHT: 126 LBS

## 2018-10-10 VITALS — BODY MASS INDEX: 23.62 KG/M2 | WEIGHT: 125 LBS

## 2018-10-10 LAB — INR PPP: 1.5

## 2018-10-10 NOTE — TELEPHONE ENCOUNTER
"Pt. Has been in hospital lately for CHF, Falls and has had multiple problems, was sent home with lasix prn for weight gain of 2 lbs, she has not been urinating, today just dribbling, has taken lasix and still not urinating regularly, just dribbling, has diarrhea since taking stool softner yesterday, having back pain all day and bp is in 70's and hr 69 pacing, oxygen level is 90% on room air, they have put her husbands oxygen on her. Told needs to be seen by protocol    Reason for Disposition  • [1] Systolic BP < 80 AND [2] NOT dizzy, lightheaded or weak    Additional Information  • Negative: Started suddenly after an allergic medicine, an allergic food, or bee sting  • Negative: Shock suspected (e.g., cold/pale/clammy skin, too weak to stand, low BP, rapid pulse)  • Negative: Difficult to awaken or acting confused (e.g., disoriented, slurred speech)  • Negative: Fainted  • Negative: [1] Systolic BP < 90 AND [2] dizzy, lightheaded, or weak  • Negative: Chest pain  • Negative: Bleeding (e.g., vomiting blood, rectal bleeding or tarry stools, severe vaginal bleeding)(Exception: fainted from sight of small amount of blood; small cut or abrasion)  • Negative: Extra heart beats or heart is beating fast  (i.e., \"palpitations\")  • Negative: Sounds like a life-threatening emergency to the triager  • Negative: Abdominal pain    Answer Assessment - Initial Assessment Questions  1. BLOOD PRESSURE: \"What is the blood pressure?\" \"Did you take at least two measurements 5 minutes apart?\"      [facility] bp 70's  2. ONSET: \"When did you take your blood pressure?\"      Just no  3. HOW: \"How did you obtain the blood pressure?\" (e.g., visiting nurse, automatic home BP monitor)      automatic  4. HISTORY: \"Do you have a history of low blood pressure?\" \"What is your blood pressure normally?\"      no  5. MEDICATIONS: \"Are you taking any medications for blood pressure?\" If yes: \"Have they been changed recently?\"      atenolol  6. PULSE RATE: \"Do " "you know what your pulse rate is?\"       69 pacing 90% time   7. OTHER SYMPTOMS: \"Have you been sick recently?\" \"Have you had a recent injury?\"      Constipation now diarrhea, weakness, CHF, falls  8. PREGNANCY: \"Is there any chance you are pregnant?\" \"When was your last menstrual period?\"      no    Protocols used: LOW BLOOD PRESSURE-ADULT-AH      "

## 2018-10-11 ENCOUNTER — TELEPHONE (OUTPATIENT)
Dept: CARDIOLOGY | Facility: CLINIC | Age: 83
End: 2018-10-11

## 2018-10-11 ENCOUNTER — OFFICE VISIT (OUTPATIENT)
Dept: INTERNAL MEDICINE | Facility: CLINIC | Age: 83
End: 2018-10-11

## 2018-10-11 ENCOUNTER — HOSPITAL ENCOUNTER (OUTPATIENT)
Dept: GENERAL RADIOLOGY | Facility: HOSPITAL | Age: 83
Discharge: HOME | End: 2018-10-11
Admitting: NURSE PRACTITIONER

## 2018-10-11 ENCOUNTER — LAB (OUTPATIENT)
Dept: LAB | Facility: HOSPITAL | Age: 83
End: 2018-10-11

## 2018-10-11 ENCOUNTER — TELEPHONE (OUTPATIENT)
Dept: INTERNAL MEDICINE | Facility: CLINIC | Age: 83
End: 2018-10-11

## 2018-10-11 VITALS
WEIGHT: 126.8 LBS | BODY MASS INDEX: 23.94 KG/M2 | OXYGEN SATURATION: 93 % | HEART RATE: 68 BPM | RESPIRATION RATE: 16 BRPM | DIASTOLIC BLOOD PRESSURE: 60 MMHG | TEMPERATURE: 99.1 F | SYSTOLIC BLOOD PRESSURE: 171 MMHG | HEIGHT: 61 IN

## 2018-10-11 DIAGNOSIS — N18.30 CKD (CHRONIC KIDNEY DISEASE) STAGE 3, GFR 30-59 ML/MIN (HCC): ICD-10-CM

## 2018-10-11 DIAGNOSIS — R79.81 LOW OXYGEN SATURATION: ICD-10-CM

## 2018-10-11 DIAGNOSIS — I50.32 CHRONIC DIASTOLIC CONGESTIVE HEART FAILURE (HCC): ICD-10-CM

## 2018-10-11 DIAGNOSIS — R34 LOW URINE OUTPUT: ICD-10-CM

## 2018-10-11 DIAGNOSIS — R06.02 SHORTNESS OF BREATH: ICD-10-CM

## 2018-10-11 DIAGNOSIS — C50.919 MALIGNANT NEOPLASM OF FEMALE BREAST, UNSPECIFIED ESTROGEN RECEPTOR STATUS, UNSPECIFIED LATERALITY, UNSPECIFIED SITE OF BREAST (HCC): Primary | ICD-10-CM

## 2018-10-11 DIAGNOSIS — R06.02 SHORTNESS OF BREATH: Primary | ICD-10-CM

## 2018-10-11 LAB
ALBUMIN SERPL-MCNC: 4.1 G/DL (ref 3.5–5)
ALBUMIN/GLOB SERPL: 1.3 G/DL (ref 1.1–2.5)
ALP SERPL-CCNC: 78 U/L (ref 24–120)
ALT SERPL W P-5'-P-CCNC: 34 U/L (ref 0–54)
ANION GAP SERPL CALCULATED.3IONS-SCNC: 11 MMOL/L (ref 4–13)
AST SERPL-CCNC: 48 U/L (ref 7–45)
BACTERIA UR QL AUTO: ABNORMAL /HPF
BASOPHILS # BLD AUTO: 0.08 10*3/MM3 (ref 0–0.2)
BASOPHILS NFR BLD AUTO: 1.1 % (ref 0–2)
BILIRUB SERPL-MCNC: 0.5 MG/DL (ref 0.1–1)
BILIRUB UR QL STRIP: NEGATIVE
BUN BLD-MCNC: 34 MG/DL (ref 5–21)
BUN/CREAT SERPL: 25.6 (ref 7–25)
CALCIUM SPEC-SCNC: 9.5 MG/DL (ref 8.4–10.4)
CHLORIDE SERPL-SCNC: 101 MMOL/L (ref 98–110)
CLARITY UR: CLEAR
CO2 SERPL-SCNC: 28 MMOL/L (ref 24–31)
COLOR UR: YELLOW
CREAT BLD-MCNC: 1.33 MG/DL (ref 0.5–1.4)
DEPRECATED RDW RBC AUTO: 61.2 FL (ref 40–54)
EOSINOPHIL # BLD AUTO: 0.09 10*3/MM3 (ref 0–0.7)
EOSINOPHIL NFR BLD AUTO: 1.2 % (ref 0–4)
ERYTHROCYTE [DISTWIDTH] IN BLOOD BY AUTOMATED COUNT: 18.1 % (ref 12–15)
GFR SERPL CREATININE-BSD FRML MDRD: 38 ML/MIN/1.73
GLOBULIN UR ELPH-MCNC: 3.2 GM/DL
GLUCOSE BLD-MCNC: 222 MG/DL (ref 70–100)
GLUCOSE UR STRIP-MCNC: ABNORMAL MG/DL
HCT VFR BLD AUTO: 34 % (ref 37–47)
HGB BLD-MCNC: 10.7 G/DL (ref 12–16)
HGB UR QL STRIP.AUTO: NEGATIVE
HYALINE CASTS UR QL AUTO: ABNORMAL /LPF
IMM GRANULOCYTES # BLD: 0.29 10*3/MM3 (ref 0–0.03)
IMM GRANULOCYTES NFR BLD: 3.9 % (ref 0–5)
KETONES UR QL STRIP: NEGATIVE
LEUKOCYTE ESTERASE UR QL STRIP.AUTO: NEGATIVE
LYMPHOCYTES # BLD AUTO: 0.97 10*3/MM3 (ref 0.72–4.86)
LYMPHOCYTES NFR BLD AUTO: 13.1 % (ref 15–45)
MCH RBC QN AUTO: 29.4 PG (ref 28–32)
MCHC RBC AUTO-ENTMCNC: 31.5 G/DL (ref 33–36)
MCV RBC AUTO: 93.4 FL (ref 82–98)
MONOCYTES # BLD AUTO: 0.85 10*3/MM3 (ref 0.19–1.3)
MONOCYTES NFR BLD AUTO: 11.4 % (ref 4–12)
NEUTROPHILS # BLD AUTO: 5.15 10*3/MM3 (ref 1.87–8.4)
NEUTROPHILS NFR BLD AUTO: 69.3 % (ref 39–78)
NITRITE UR QL STRIP: NEGATIVE
NRBC BLD MANUAL-RTO: 0 /100 WBC (ref 0–0)
NT-PROBNP SERPL-MCNC: ABNORMAL PG/ML (ref 0–1800)
PH UR STRIP.AUTO: 6.5 [PH] (ref 5–8)
PLATELET # BLD AUTO: 706 10*3/MM3 (ref 130–400)
PMV BLD AUTO: 11.8 FL (ref 6–12)
POTASSIUM BLD-SCNC: 4.1 MMOL/L (ref 3.5–5.3)
PROT SERPL-MCNC: 7.3 G/DL (ref 6.3–8.7)
PROT UR QL STRIP: ABNORMAL
RBC # BLD AUTO: 3.64 10*6/MM3 (ref 4.2–5.4)
RBC # UR: ABNORMAL /HPF
REF LAB TEST METHOD: ABNORMAL
SODIUM BLD-SCNC: 140 MMOL/L (ref 135–145)
SP GR UR STRIP: 1.03 (ref 1–1.03)
SQUAMOUS #/AREA URNS HPF: ABNORMAL /HPF
UROBILINOGEN UR QL STRIP: ABNORMAL
WBC NRBC COR # BLD: 7.43 10*3/MM3 (ref 4.8–10.8)
WBC UR QL AUTO: ABNORMAL /HPF

## 2018-10-11 PROCEDURE — 85025 COMPLETE CBC W/AUTO DIFF WBC: CPT | Performed by: NURSE PRACTITIONER

## 2018-10-11 PROCEDURE — 71046 X-RAY EXAM CHEST 2 VIEWS: CPT

## 2018-10-11 PROCEDURE — 99214 OFFICE O/P EST MOD 30 MIN: CPT | Performed by: NURSE PRACTITIONER

## 2018-10-11 PROCEDURE — 80053 COMPREHEN METABOLIC PANEL: CPT | Performed by: NURSE PRACTITIONER

## 2018-10-11 PROCEDURE — 36415 COLL VENOUS BLD VENIPUNCTURE: CPT

## 2018-10-11 PROCEDURE — 83880 ASSAY OF NATRIURETIC PEPTIDE: CPT | Performed by: NURSE PRACTITIONER

## 2018-10-11 PROCEDURE — 81001 URINALYSIS AUTO W/SCOPE: CPT | Performed by: NURSE PRACTITIONER

## 2018-10-11 NOTE — PROGRESS NOTES
CC: SOB, trouble urinating    History:  Lakesha Costello is a 83 y.o. female who presents today for follow-up for evaluation of the above:  Patient states she was SOB this AM and O2 is dropping in the 80s low 90s without O2. She is accompanied by her daughter and presents in a wheelchair today. Her daughter states this same event occurred yesterday with the addition of little to no urine output and they did go to the ER to be seen but waited over an hour and 1/2 and her O2 was normal and she did urinate while she was there so they left without being seen.   Daughter states that the patient is currently living in an assisted living facility and the staff placed her on her husbands oxygen this AM after her O2 was low. With this in place her O2 improved and she feels better. She states that since taking her Lasix yesterday her urine output has normalized.     ROS:  Review of Systems   Constitutional: Negative for fatigue and unexpected weight change.   HENT: Negative.    Eyes: Negative.    Respiratory: Positive for shortness of breath.    Cardiovascular: Negative.    Gastrointestinal: Negative for abdominal pain, constipation and diarrhea.   Endocrine: Negative.    Genitourinary: Positive for difficulty urinating. Negative for dyspareunia, genital sores, menstrual problem, pelvic pain, vaginal bleeding, vaginal discharge and vaginal pain.   Musculoskeletal: Negative.    Skin: Negative.    Neurological: Negative.    Psychiatric/Behavioral: Negative.        Ms. Costello  reports that she has never smoked. She has never used smokeless tobacco. She reports that she does not drink alcohol or use drugs.      Current Outpatient Prescriptions:   •  amiodarone (PACERONE) 200 MG tablet, Take 1 tablet by mouth 2 (Two) Times a Day., Disp: 60 tablet, Rfl: 2  •  aspirin 81 MG EC tablet, Take 81 mg by mouth Daily., Disp: , Rfl:   •  atenolol (TENORMIN) 50 MG tablet, Take 1 tab PO qam & 1/2 tab PO qpm. (Patient taking differently: Take  25 mg by mouth Daily. Take 1 tab PO qam & 1/2 tab PO qpm.), Disp: 135 tablet, Rfl: 3  •  calcium carbonate (OS-MICHAEL) 600 MG tablet, Take 600 mg by mouth 2 (Two) Times a Day With Meals., Disp: , Rfl:   •  cholecalciferol (VITAMIN D3) 1000 UNITS tablet, Take 1,000 Units by mouth Daily., Disp: , Rfl:   •  fenofibrate 160 MG tablet, Take 160 mg by mouth Daily., Disp: , Rfl:   •  ferrous sulfate 325 (65 FE) MG tablet, Take 1 tablet by mouth 2 (Two) Times a Day With Meals., Disp: 60 tablet, Rfl: 0  •  furosemide (LASIX) 20 MG tablet, Take 1 tablet by mouth Daily As Needed (for weight gain > 2 lbs in a day or 2)., Disp: 10 tablet, Rfl: 0  •  glipiZIDE (GLUCOTROL) 5 MG tablet, Take 1 tablet by mouth 2 (Two) Times a Day Before Meals., Disp: 180 tablet, Rfl: 3  •  insulin detemir (LEVEMIR) 100 UNIT/ML injection, Inject 30 Units under the skin into the appropriate area as directed Every Night. 30 units qam 30 units qpm, Disp: 30 mL, Rfl: 3  •  magnesium oxide (MAGOX) 400 (241.3 MG) MG tablet tablet, Take 400 mg by mouth Daily., Disp: , Rfl:   •  nitroglycerin (NITROSTAT) 0.4 MG SL tablet, 1 under the tongue as needed for angina, may repeat q5mins for up three doses, Disp: 30 tablet, Rfl: 3  •  pantoprazole (PROTONIX) 40 MG EC tablet, Take 40 mg by mouth Daily., Disp: , Rfl:   •  polyethyl glycol-propyl glycol (SYSTANE) 0.4-0.3 % solution ophthalmic solution, Administer 1 drop to both eyes Daily., Disp: , Rfl:   •  sennosides-docusate sodium (SENOKOT-S) 8.6-50 MG tablet, Take 2 tablets by mouth Every Night., Disp: 60 tablet, Rfl: 0  •  simvastatin (ZOCOR) 20 MG tablet, Take 1 tablet by mouth Every Night., Disp: 90 tablet, Rfl: 3  •  venlafaxine XR (EFFEXOR-XR) 37.5 MG 24 hr capsule, Take 37.5 mg by mouth Daily., Disp: , Rfl:   •  vitamin B-12 (CYANOCOBALAMIN) 500 MCG tablet, Take 500 mcg by mouth Daily., Disp: , Rfl:   •  warfarin (COUMADIN) 1 MG tablet, Take 2 tablet by mouth daily on Monday and Friday. Take 1 tablet by mouth  "daily on Tuesday, Wednesday, Thursday, Saturday and Sunday., Disp: 120 tablet, Rfl: 3  •  Multiple Vitamins-Minerals (PRESERVISION/LUTEIN) capsule, Take 1 capsule by mouth 2 (Two) Times a Day., Disp: , Rfl:       OBJECTIVE:  /60 (BP Location: Left arm, Patient Position: Sitting)   Pulse 68   Temp 99.1 °F (37.3 °C) (Oral)   Resp 16   Ht 154.9 cm (61\")   Wt 57.5 kg (126 lb 12.8 oz)   SpO2 93%   BMI 23.96 kg/m²    Physical Exam   Constitutional: She is oriented to person, place, and time. She appears well-developed and well-nourished.   HENT:   Head: Normocephalic and atraumatic.   Eyes: Pupils are equal, round, and reactive to light. EOM are normal.   Neck: Normal range of motion. Neck supple.   Cardiovascular: Normal rate, regular rhythm and normal heart sounds.    Pulmonary/Chest: Effort normal. She has decreased breath sounds in the left lower field. She has rhonchi in the right lower field and the left lower field.   Abdominal: Soft. Bowel sounds are normal.   Musculoskeletal: Normal range of motion.   Neurological: She is alert and oriented to person, place, and time.   Skin: Skin is warm and dry.   Psychiatric: She has a normal mood and affect.   Vitals reviewed.      Assessment/Plan    Lakesha was seen today for bladder problem and breathing problem.    Diagnoses and all orders for this visit:    Shortness of breath  -     XR Chest PA & Lateral; Future  -     Overnight Sleep Oximetry Study; Future  -     Ambulatory Referral to Sleep Medicine    Low urine output  -     CBC w AUTO Differential; Future  -     Comprehensive metabolic panel; Future  -     Urinalysis With Culture If Indicated - Urine, Clean Catch; Future    CKD (chronic kidney disease) stage 3, GFR 30-59 ml/min (CMS/[facility])  -     CBC w AUTO Differential; Future  -     Comprehensive metabolic panel; Future  -     Urinalysis With Culture If Indicated - Urine, Clean Catch; Future    Chronic diastolic congestive heart failure (CMS/[facility])  -     " CBC w AUTO Differential; Future  -     Comprehensive metabolic panel; Future  -     Urinalysis With Culture If Indicated - Urine, Clean Catch; Future  -     BNP; Future    Low oxygen saturation  -     Overnight Sleep Oximetry Study; Future  -     Ambulatory Referral to Sleep Medicine    Patient does not appear to be in distress today.  O2 at 93% in office resting in wheelchair. Patient's urine output has improved since taking Lasix.  Patient reports that her oxygen level and feeling of weakness and fatigue does improve when she uses her husbands oxygen.  We will order an oxygen overnight study.   Labs will  be obtained to assess kidney function.  Patient's daughter expressed concern about discontinuation of aldactone by hospitalist.  She states that she would like to discuss this with patient's cardiologist.  Encouraged her to reach out to her cardiologist via telephone as she does not have a follow-up appointment until November to see if he is also okay with this medication being discontinued      An After Visit Summary was printed and given to the patient at discharge.  Return for with dr stewart requires 30 minutes. Sooner if problems arise.         Linda Johnson APRN. 10/11/2018

## 2018-10-11 NOTE — TELEPHONE ENCOUNTER
"Reviewed guideline with caller, Mrs. Costello's pulse ox. Is running 85-89% on Room air, she has not voided any significant amount since taking her Lasix this afternoon. Guideline recommends she be seen and evaluated in ED. Caller agrees to care advice.     Reason for Disposition  • Recent illness requiring prolonged bedrest (i.e., immobilization)    Additional Information  • Negative: [1] Breathing stopped AND [2] hasn't returned  • Negative: Choking on something  • Negative: Severe difficulty breathing (e.g., struggling for each breath, speaks in single words)  • Negative: Bluish (or gray) lips or face now  • Negative: Difficult to awaken or acting confused (e.g., disoriented, slurred speech)  • Negative: Passed out (i.e., lost consciousness, collapsed and was not responding)  • Negative: Wheezing started suddenly after medicine, an allergic food or bee sting  • Negative: Stridor  • Negative: Slow, shallow and weak breathing  • Negative: Sounds like a life-threatening emergency to the triager  • Negative: Chest pain  • Negative: [1] Wheezing (high pitched whistling sound) AND [2] previous asthma attacks or use of asthma medicines  • Negative: [1] Difficulty breathing AND [2] only present when coughing  • Negative: [1] Difficulty breathing AND [2] only from stuffy or runny nose  • Negative: [1] MODERATE difficulty breathing (e.g., speaks in phrases, SOB even at rest, pulse 100-120) AND [2] NEW-onset or WORSE than normal  • Negative: Wheezing can be heard across the room  • Negative: Drooling or spitting out saliva (because can't swallow)  • Negative: History of prior \"blood clot\" in leg or lungs (i.e., deep vein thrombosis, pulmonary embolism)  • Negative: History of inherited increased risk of blood clots (e.g., Factor 5 Leiden, Anti-thrombin 3, Protein C or Protein S deficiency, Prothrombin mutation)    Answer Assessment - Initial Assessment Questions  1. RESPIRATORY STATUS: \"Describe your breathing?\" (e.g., " "wheezing, shortness of breath, unable to speak, severe coughing)       Breathing normally but Oxygen sat. 85-90%  2. ONSET: \"When did this breathing problem begin?\"       today  3. PATTERN \"Does the difficult breathing come and go, or has it been constant since it started?\"       Comes and goes  4. SEVERITY: \"How bad is your breathing?\" (e.g., mild, moderate, severe)     - MILD: No SOB at rest, mild SOB with walking, speaks normally in sentences, can lay down, no retractions, pulse < 100.     - MODERATE: SOB at rest, SOB with minimal exertion and prefers to sit, cannot lie down flat, speaks in phrases, mild retractions, audible wheezing, pulse 100-120.     - SEVERE: Very SOB at rest, speaks in single words, struggling to breathe, sitting hunched forward, retractions, pulse > 120       Mild  5. RECURRENT SYMPTOM: \"Have you had difficulty breathing before?\" If so, ask: \"When was the last time?\" and \"What happened that time?\"       Yes,  Early October, Hospitalized and diagnosed with CHF  6. CARDIAC HISTORY: \"Do you have any history of heart disease?\" (e.g., heart attack, angina, bypass surgery, angioplasty)       AFib, PPM, stent and valve replacement, cardioverted x2 during the last 2 months  7. LUNG HISTORY: \"Do you have any history of lung disease?\"  (e.g., pulmonary embolus, asthma, emphysema)      emphysema  8. CAUSE: \"What do you think is causing the breathing problem?\"       Possibly Afib  9. OTHER SYMPTOMS: \"Do you have any other symptoms? (e.g., dizziness, runny nose, cough, chest pain, fever)      Low urine ouput  10. PREGNANCY: \"Is there any chance you are pregnant?\" \"When was your last menstrual period?\"        NA  11. TRAVEL: \"Have you traveled out of the country in the last month?\" (e.g., travel history, exposures)        No    Protocols used: BREATHING DIFFICULTY-ADULT-AH      "

## 2018-10-11 NOTE — TELEPHONE ENCOUNTER
----- Message from FACUNDO Weiss sent at 10/11/2018 12:45 PM CDT -----  Please let patient know her chest xray show not change from hospital discharge. This is good news. Labs pending at this time.

## 2018-10-13 ENCOUNTER — READMISSION MANAGEMENT (OUTPATIENT)
Dept: CALL CENTER | Facility: HOSPITAL | Age: 83
End: 2018-10-13

## 2018-10-13 NOTE — OUTREACH NOTE
CHF Week 2 Survey      Responses   Facility patient discharged from?  [facility]   Does the patient have one of the following disease processes/diagnoses(primary or secondary)?  CHF   Week 2 attempt successful?  Yes   Call start time  1124   Call end time  1152   General alerts for this patient  Use cell phone, pt just moved into assisted living at [facility]   Discharge diagnosis  CHF (BNP 49678),  impaction,  shortness of breath   Is patient permission given to speak with other caregiver?  Yes   Person spoke with today (if not patient) and relationship  Daughter   Meds reviewed with patient/caregiver?  Yes   Is the patient having any side effects they believe may be caused by any medication additions or changes?  No   Does the patient have all medications ordered at discharge?  Yes   Is the patient taking all medications as directed (includes completed medication regime)?  Yes   Does the patient have a primary care provider?   Yes   Does the patient have an appointment with their PCP within 7 days of discharge?  Yes   Has the patient kept scheduled appointments due by today?  Yes   Comments  Reviewed next appts. They are waiting for a sleep study to be set up.   Did the patient receive a copy of their discharge instructions?  Yes   Nursing interventions  Reviewed instructions with patient   What is the patient's perception of their health status since discharge?  Improving   Nursing interventions  Nurse provided patient education   Is the patient weighing daily?  No   Does the patient have scales?  Yes   Daily weight interventions  Education provided on importance of daily weight   Is the patient able to teach back Heart Failure diet management?  Yes   Is the patient able to teach back Heart Failure Zones?  Yes   Is the patient able to teach back signs and symptoms of worsening condition? (i.e. weight gain, shortness of air, etc.)  Yes   Is the patient/caregiver able to teach back the hierarchy of who to call/visit  for symptoms/problems? PCP, Specialist, Home health nurse, Urgent Care, ED, 911  Yes   Additional teach back comments  RN daughter spoke extensivly about how she has difficulties with pt being compliant with plan of care. She states pt doesnt always log weights and has issues with meds. Pt was moved to a senior residence facility where daughter works for closer monitoring.   CHF Week 2 call completed?  Yes          Avni King RN

## 2018-10-15 ENCOUNTER — OFFICE VISIT (OUTPATIENT)
Dept: ONCOLOGY | Facility: CLINIC | Age: 83
End: 2018-10-15

## 2018-10-15 ENCOUNTER — APPOINTMENT (OUTPATIENT)
Dept: LAB | Facility: HOSPITAL | Age: 83
End: 2018-10-15

## 2018-10-15 VITALS
SYSTOLIC BLOOD PRESSURE: 126 MMHG | DIASTOLIC BLOOD PRESSURE: 64 MMHG | WEIGHT: 126.4 LBS | HEART RATE: 80 BPM | BODY MASS INDEX: 23.86 KG/M2 | HEIGHT: 61 IN | TEMPERATURE: 98.2 F | RESPIRATION RATE: 18 BRPM

## 2018-10-15 DIAGNOSIS — R88.8 VERY LOW IRON STORES IN BONE MARROW: ICD-10-CM

## 2018-10-15 DIAGNOSIS — C50.919 MALIGNANT NEOPLASM OF FEMALE BREAST, UNSPECIFIED ESTROGEN RECEPTOR STATUS, UNSPECIFIED LATERALITY, UNSPECIFIED SITE OF BREAST (HCC): Primary | ICD-10-CM

## 2018-10-15 DIAGNOSIS — C91.02: ICD-10-CM

## 2018-10-15 DIAGNOSIS — E78.2 MIXED HYPERLIPIDEMIA: Primary | ICD-10-CM

## 2018-10-15 LAB
ALBUMIN SERPL-MCNC: 3.6 G/DL (ref 3.5–5)
ALBUMIN/GLOB SERPL: 1.1 G/DL (ref 1.1–2.5)
ALP SERPL-CCNC: 84 U/L (ref 24–120)
ALT SERPL W P-5'-P-CCNC: 22 U/L (ref 0–54)
ANION GAP SERPL CALCULATED.3IONS-SCNC: 10 MMOL/L (ref 4–13)
AST SERPL-CCNC: 41 U/L (ref 7–45)
BASOPHILS # BLD AUTO: 0.07 10*3/MM3 (ref 0–0.2)
BASOPHILS NFR BLD AUTO: 1 % (ref 0–2)
BILIRUB SERPL-MCNC: 0.6 MG/DL (ref 0.1–1)
BUN BLD-MCNC: 31 MG/DL (ref 5–21)
BUN/CREAT SERPL: 23 (ref 7–25)
CALCIUM SPEC-SCNC: 8.8 MG/DL (ref 8.4–10.4)
CHLORIDE SERPL-SCNC: 102 MMOL/L (ref 98–110)
CO2 SERPL-SCNC: 26 MMOL/L (ref 24–31)
CREAT BLD-MCNC: 1.35 MG/DL (ref 0.5–1.4)
DEPRECATED RDW RBC AUTO: 60.7 FL (ref 40–54)
EOSINOPHIL # BLD AUTO: 0.15 10*3/MM3 (ref 0–0.7)
EOSINOPHIL NFR BLD AUTO: 2.1 % (ref 0–4)
ERYTHROCYTE [DISTWIDTH] IN BLOOD BY AUTOMATED COUNT: 17.8 % (ref 12–15)
FERRITIN SERPL-MCNC: 132 NG/ML (ref 11.1–264)
GFR SERPL CREATININE-BSD FRML MDRD: 38 ML/MIN/1.73
GLOBULIN UR ELPH-MCNC: 3.2 GM/DL
GLUCOSE BLD-MCNC: 269 MG/DL (ref 70–100)
HCT VFR BLD AUTO: 33.2 % (ref 37–47)
HGB BLD-MCNC: 10.6 G/DL (ref 12–16)
HOLD SPECIMEN: NORMAL
HOLD SPECIMEN: NORMAL
IMM GRANULOCYTES # BLD: 0.19 10*3/MM3 (ref 0–0.03)
IMM GRANULOCYTES NFR BLD: 2.7 % (ref 0–5)
IRON 24H UR-MRATE: 53 MCG/DL (ref 42–180)
IRON SATN MFR SERPL: 12 % (ref 20–45)
LYMPHOCYTES # BLD AUTO: 0.76 10*3/MM3 (ref 0.72–4.86)
LYMPHOCYTES NFR BLD AUTO: 10.7 % (ref 15–45)
MCH RBC QN AUTO: 29.9 PG (ref 28–32)
MCHC RBC AUTO-ENTMCNC: 31.9 G/DL (ref 33–36)
MCV RBC AUTO: 93.5 FL (ref 82–98)
MONOCYTES # BLD AUTO: 0.57 10*3/MM3 (ref 0.19–1.3)
MONOCYTES NFR BLD AUTO: 8 % (ref 4–12)
NEUTROPHILS # BLD AUTO: 5.35 10*3/MM3 (ref 1.87–8.4)
NEUTROPHILS NFR BLD AUTO: 75.5 % (ref 39–78)
NRBC BLD MANUAL-RTO: 0 /100 WBC (ref 0–0)
PLATELET # BLD AUTO: 736 10*3/MM3 (ref 130–400)
PMV BLD AUTO: 11.5 FL (ref 6–12)
POTASSIUM BLD-SCNC: 4.1 MMOL/L (ref 3.5–5.3)
PROT SERPL-MCNC: 6.8 G/DL (ref 6.3–8.7)
RBC # BLD AUTO: 3.55 10*6/MM3 (ref 4.2–5.4)
SODIUM BLD-SCNC: 138 MMOL/L (ref 135–145)
TIBC SERPL-MCNC: 455 MCG/DL (ref 225–420)
WBC NRBC COR # BLD: 7.09 10*3/MM3 (ref 4.8–10.8)

## 2018-10-15 PROCEDURE — 36415 COLL VENOUS BLD VENIPUNCTURE: CPT | Performed by: INTERNAL MEDICINE

## 2018-10-15 PROCEDURE — 83550 IRON BINDING TEST: CPT | Performed by: INTERNAL MEDICINE

## 2018-10-15 PROCEDURE — 82728 ASSAY OF FERRITIN: CPT | Performed by: INTERNAL MEDICINE

## 2018-10-15 PROCEDURE — 99213 OFFICE O/P EST LOW 20 MIN: CPT | Performed by: INTERNAL MEDICINE

## 2018-10-15 PROCEDURE — 83540 ASSAY OF IRON: CPT | Performed by: INTERNAL MEDICINE

## 2018-10-15 PROCEDURE — 80053 COMPREHEN METABOLIC PANEL: CPT | Performed by: INTERNAL MEDICINE

## 2018-10-15 PROCEDURE — 85025 COMPLETE CBC W/AUTO DIFF WBC: CPT | Performed by: INTERNAL MEDICINE

## 2018-10-15 RX ORDER — MEPERIDINE HYDROCHLORIDE 50 MG/ML
25 INJECTION INTRAMUSCULAR; INTRAVENOUS; SUBCUTANEOUS
Status: CANCELLED | OUTPATIENT
Start: 2018-10-19 | End: 2018-10-20

## 2018-10-15 RX ORDER — SODIUM CHLORIDE 9 MG/ML
250 INJECTION, SOLUTION INTRAVENOUS ONCE
Status: CANCELLED | OUTPATIENT
Start: 2018-10-19

## 2018-10-15 RX ORDER — FAMOTIDINE 10 MG/ML
20 INJECTION, SOLUTION INTRAVENOUS AS NEEDED
Status: CANCELLED | OUTPATIENT
Start: 2018-10-26

## 2018-10-15 RX ORDER — DIPHENHYDRAMINE HYDROCHLORIDE 50 MG/ML
50 INJECTION INTRAMUSCULAR; INTRAVENOUS AS NEEDED
Status: CANCELLED | OUTPATIENT
Start: 2018-10-19

## 2018-10-15 RX ORDER — SODIUM CHLORIDE 9 MG/ML
250 INJECTION, SOLUTION INTRAVENOUS ONCE
Status: CANCELLED | OUTPATIENT
Start: 2018-10-26

## 2018-10-15 RX ORDER — DIPHENHYDRAMINE HYDROCHLORIDE 50 MG/ML
50 INJECTION INTRAMUSCULAR; INTRAVENOUS AS NEEDED
Status: CANCELLED | OUTPATIENT
Start: 2018-10-26

## 2018-10-15 RX ORDER — MEPERIDINE HYDROCHLORIDE 50 MG/ML
25 INJECTION INTRAMUSCULAR; INTRAVENOUS; SUBCUTANEOUS
Status: CANCELLED | OUTPATIENT
Start: 2018-10-26 | End: 2018-10-27

## 2018-10-15 RX ORDER — FAMOTIDINE 10 MG/ML
20 INJECTION, SOLUTION INTRAVENOUS AS NEEDED
Status: CANCELLED | OUTPATIENT
Start: 2018-10-19

## 2018-10-15 NOTE — PROGRESS NOTES
[facility]  HEMATOLOGY & ONCOLOGY        Subjective     VISIT DIAGNOSIS:   Encounter Diagnosis   Name Primary?   • Malignant neoplasm of female breast, unspecified estrogen receptor status, unspecified laterality, unspecified site of breast (CMS/HCC) Yes       REASON FOR VISIT:     Chief Complaint   Patient presents with   • Follow-up     She is here to review bone marrow biopsy        HEMATOLOGY / ONCOLOGY HISTORY:    No history exists.     [No treatment plan]  Cancer Staging Information:  Cancer Staging  No matching staging information was found for the patient.      INTERVAL HISTORY  Patient ID: Lakesha Costello is a 83 y.o. year old female with Thrombocytoses, Plts over a million. Plt count was normal in 6/2017. Last Yr in 2017 she underwent a heart valve replacement and has been on Coumadin since.  Being on Coumadin in elderly patients invaribly tend to microscopically bleed in stools causing Iron deficiency and I fear that Iron deficiecny is causing thrombocytosis,  Although underlying Myeloproliferative disorder cannot be ruled out.     In house she received 2 doses of IV Venifer and today desouite IV Irin her Plts are still high at 912K.        Review of Systems         Medications:    Current Outpatient Prescriptions   Medication Sig Dispense Refill   • amiodarone (PACERONE) 200 MG tablet Take 1 tablet by mouth 2 (Two) Times a Day. 60 tablet 2   • aspirin 81 MG EC tablet Take 81 mg by mouth Daily.     • atenolol (TENORMIN) 50 MG tablet Take 1 tab PO qam & 1/2 tab PO qpm. (Patient taking differently: Take 25 mg by mouth Daily. Take 1 tab PO qam & 1/2 tab PO qpm.) 135 tablet 3   • calcium carbonate (OS-MICHAEL) 600 MG tablet Take 600 mg by mouth 2 (Two) Times a Day With Meals.     • cholecalciferol (VITAMIN D3) 1000 UNITS tablet Take 1,000 Units by mouth Daily.     • fenofibrate 160 MG tablet Take 160 mg by mouth Daily.     • furosemide (LASIX) 20 MG tablet Take 1 tablet by mouth Daily As Needed  (for weight gain > 2 lbs in a day or 2). 10 tablet 0   • glipiZIDE (GLUCOTROL) 5 MG tablet Take 1 tablet by mouth 2 (Two) Times a Day Before Meals. 180 tablet 3   • insulin detemir (LEVEMIR) 100 UNIT/ML injection Inject 30 Units under the skin into the appropriate area as directed Every Night. 30 units qam 30 units qpm 30 mL 3   • magnesium oxide (MAGOX) 400 (241.3 MG) MG tablet tablet Take 400 mg by mouth Daily.     • Multiple Vitamins-Minerals (PRESERVISION/LUTEIN) capsule Take 1 capsule by mouth 2 (Two) Times a Day.     • nitroglycerin (NITROSTAT) 0.4 MG SL tablet 1 under the tongue as needed for angina, may repeat q5mins for up three doses 30 tablet 3   • pantoprazole (PROTONIX) 40 MG EC tablet Take 40 mg by mouth Daily.     • polyethyl glycol-propyl glycol (SYSTANE) 0.4-0.3 % solution ophthalmic solution Administer 1 drop to both eyes Daily.     • sennosides-docusate sodium (SENOKOT-S) 8.6-50 MG tablet Take 2 tablets by mouth Every Night. 60 tablet 0   • simvastatin (ZOCOR) 20 MG tablet Take 1 tablet by mouth Every Night. 90 tablet 3   • venlafaxine XR (EFFEXOR-XR) 37.5 MG 24 hr capsule Take 37.5 mg by mouth Daily.     • vitamin B-12 (CYANOCOBALAMIN) 500 MCG tablet Take 500 mcg by mouth Daily.     • warfarin (COUMADIN) 1 MG tablet Take 2 tablet by mouth daily on Monday and Friday. Take 1 tablet by mouth daily on Tuesday, Wednesday, Thursday, Saturday and Sunday. 120 tablet 3   • ferrous sulfate 325 (65 FE) MG tablet Take 1 tablet by mouth 2 (Two) Times a Day With Meals. 60 tablet 0     No current facility-administered medications for this visit.        ALLERGIES:    Allergies   Allergen Reactions   • Dilaudid [Hydromorphone Hcl]    • Dilaudid [Hydromorphone] Nausea And Vomiting   • Enalapril Angioedema   • Janumet [Sitagliptin-Metformin Hcl] Other (See Comments)     Chest pain   • Lopid [Gemfibrozil] Nausea And Vomiting   • Sulfa Antibiotics Other (See Comments)     Syncope     • Ultram [Tramadol] Itching        Objective      @VITALS    Current Status 10/15/2018   ECOG score 3       General Appearance: Patient is awake, alert, oriented and in no acute distress. Patient is welldeveloped, wellnourished, and appears stated age.  HEENT: Normocephalic. Sclerae clear, conjunctiva pink, extraocular movements intact, pupils, round, reactive to light and  accommodation. Mouth and throat are clear with moist oral mucosa.  NECK: Supple, no jugular venous distention, thyroid not enlarged.  LYMPH: No cervical, supraclavicular, axillary, or inguinal lymphadenopathy.  CHEST: Equal bilateral expansion, AP  diameter normal, resonant percussion note  LUNGS: Good air movement, no rales, rhonchi, rubs or wheezes with auscultation  CARDIO: Regular sinus rhythm, no murmurs, gallops or rubs.  ABDOMEN: Nondistended, soft, No tenderness, no guarding, no rebound, No hepatosplenomegaly. No abdominal masses. Bowel sounds positive. No hernia  GENITALIA: Not examined.  BREASTS: Not examined.  MUSKEL: No joint swelling, decreased motion, or inflammation  EXTREMS: No edema, clubbing, cyanosis, No varicose veins.  NEURO: Grossly nonfocal, Gait is coordinated and smooth, Cognition is preserved.  SKIN: No rashes, no ecchymoses, no petechia.  PSYCH: Oriented to time, place and person. Memory is preserved. Mood and affect appear normal      RECENT LABS:  Orders Only on 10/11/2018   Component Date Value Ref Range Status   • Glucose 10/15/2018 269* 70 - 100 mg/dL Final   • BUN 10/15/2018 31* 5 - 21 mg/dL Final   • Creatinine 10/15/2018 1.35  0.50 - 1.40 mg/dL Final   • Sodium 10/15/2018 138  135 - 145 mmol/L Final   • Potassium 10/15/2018 4.1  3.5 - 5.3 mmol/L Final   • Chloride 10/15/2018 102  98 - 110 mmol/L Final   • CO2 10/15/2018 26.0  24.0 - 31.0 mmol/L Final   • Calcium 10/15/2018 8.8  8.4 - 10.4 mg/dL Final   • Total Protein 10/15/2018 6.8  6.3 - 8.7 g/dL Final   • Albumin 10/15/2018 3.60  3.50 - 5.00 g/dL Final   • ALT (SGPT) 10/15/2018 22  0 -  54 U/L Final   • AST (SGOT) 10/15/2018 41  7 - 45 U/L Final   • Alkaline Phosphatase 10/15/2018 84  24 - 120 U/L Final   • Total Bilirubin 10/15/2018 0.6  0.1 - 1.0 mg/dL Final   • eGFR Non  Amer 10/15/2018 38* >60 mL/min/1.73 Final   • Globulin 10/15/2018 3.2  gm/dL Final   • A/G Ratio 10/15/2018 1.1  1.1 - 2.5 g/dL Final   • BUN/Creatinine Ratio 10/15/2018 23.0  7.0 - 25.0 Final   • Anion Gap 10/15/2018 10.0  4.0 - 13.0 mmol/L Final   • WBC 10/15/2018 7.09  4.80 - 10.80 10*3/mm3 Final   • RBC 10/15/2018 3.55* 4.20 - 5.40 10*6/mm3 Final   • Hemoglobin 10/15/2018 10.6* 12.0 - 16.0 g/dL Final   • Hematocrit 10/15/2018 33.2* 37.0 - 47.0 % Final   • MCV 10/15/2018 93.5  82.0 - 98.0 fL Final   • MCH 10/15/2018 29.9  28.0 - 32.0 pg Final   • MCHC 10/15/2018 31.9* 33.0 - 36.0 g/dL Final   • RDW 10/15/2018 17.8* 12.0 - 15.0 % Final   • RDW-SD 10/15/2018 60.7* 40.0 - 54.0 fl Final   • MPV 10/15/2018 11.5  6.0 - 12.0 fL Final   • Platelets 10/15/2018 736* 130 - 400 10*3/mm3 Final   • Neutrophil % 10/15/2018 75.5  39.0 - 78.0 % Final   • Lymphocyte % 10/15/2018 10.7* 15.0 - 45.0 % Final   • Monocyte % 10/15/2018 8.0  4.0 - 12.0 % Final   • Eosinophil % 10/15/2018 2.1  0.0 - 4.0 % Final   • Basophil % 10/15/2018 1.0  0.0 - 2.0 % Final   • Immature Grans % 10/15/2018 2.7  0.0 - 5.0 % Final   • Neutrophils, Absolute 10/15/2018 5.35  1.87 - 8.40 10*3/mm3 Final   • Lymphocytes, Absolute 10/15/2018 0.76  0.72 - 4.86 10*3/mm3 Final   • Monocytes, Absolute 10/15/2018 0.57  0.19 - 1.30 10*3/mm3 Final   • Eosinophils, Absolute 10/15/2018 0.15  0.00 - 0.70 10*3/mm3 Final   • Basophils, Absolute 10/15/2018 0.07  0.00 - 0.20 10*3/mm3 Final   • Immature Grans, Absolute 10/15/2018 0.19* 0.00 - 0.03 10*3/mm3 Final   • nRBC 10/15/2018 0.0  0.0 - 0.0 /100 WBC Final   Lab on 10/11/2018   Component Date Value Ref Range Status   • Glucose 10/11/2018 222* 70 - 100 mg/dL Final   • BUN 10/11/2018 34* 5 - 21 mg/dL Final   • Creatinine  10/11/2018 1.33  0.50 - 1.40 mg/dL Final   • Sodium 10/11/2018 140  135 - 145 mmol/L Final   • Potassium 10/11/2018 4.1  3.5 - 5.3 mmol/L Final   • Chloride 10/11/2018 101  98 - 110 mmol/L Final   • CO2 10/11/2018 28.0  24.0 - 31.0 mmol/L Final   • Calcium 10/11/2018 9.5  8.4 - 10.4 mg/dL Final   • Total Protein 10/11/2018 7.3  6.3 - 8.7 g/dL Final   • Albumin 10/11/2018 4.10  3.50 - 5.00 g/dL Final   • ALT (SGPT) 10/11/2018 34  0 - 54 U/L Final   • AST (SGOT) 10/11/2018 48* 7 - 45 U/L Final   • Alkaline Phosphatase 10/11/2018 78  24 - 120 U/L Final   • Total Bilirubin 10/11/2018 0.5  0.1 - 1.0 mg/dL Final   • eGFR Non African Amer 10/11/2018 38* >60 mL/min/1.73 Final   • Globulin 10/11/2018 3.2  gm/dL Final   • A/G Ratio 10/11/2018 1.3  1.1 - 2.5 g/dL Final   • BUN/Creatinine Ratio 10/11/2018 25.6* 7.0 - 25.0 Final   • Anion Gap 10/11/2018 11.0  4.0 - 13.0 mmol/L Final   • Color, UA 10/11/2018 Yellow  Yellow, Straw Final   • Appearance, UA 10/11/2018 Clear  Clear Final   • pH, UA 10/11/2018 6.5  5.0 - 8.0 Final   • Specific Gravity, UA 10/11/2018 1.026  1.005 - 1.030 Final   • Glucose, UA 10/11/2018 >=1000 mg/dL (3+)* Negative Final   • Ketones, UA 10/11/2018 Negative  Negative Final   • Bilirubin, UA 10/11/2018 Negative  Negative Final   • Blood, UA 10/11/2018 Negative  Negative Final   • Protein, UA 10/11/2018 100 mg/dL (2+)* Negative Final   • Leuk Esterase, UA 10/11/2018 Negative  Negative Final   • Nitrite, UA 10/11/2018 Negative  Negative Final   • Urobilinogen, UA 10/11/2018 1.0 E.U./dL  0.2 - 1.0 E.U./dL Final   • WBC 10/11/2018 7.43  4.80 - 10.80 10*3/mm3 Final   • RBC 10/11/2018 3.64* 4.20 - 5.40 10*6/mm3 Final   • Hemoglobin 10/11/2018 10.7* 12.0 - 16.0 g/dL Final   • Hematocrit 10/11/2018 34.0* 37.0 - 47.0 % Final   • MCV 10/11/2018 93.4  82.0 - 98.0 fL Final   • MCH 10/11/2018 29.4  28.0 - 32.0 pg Final   • MCHC 10/11/2018 31.5* 33.0 - 36.0 g/dL Final   • RDW 10/11/2018 18.1* 12.0 - 15.0 % Final   •  RDW-SD 10/11/2018 61.2* 40.0 - 54.0 fl Final   • MPV 10/11/2018 11.8  6.0 - 12.0 fL Final   • Platelets 10/11/2018 706* 130 - 400 10*3/mm3 Final   • Neutrophil % 10/11/2018 69.3  39.0 - 78.0 % Final   • Lymphocyte % 10/11/2018 13.1* 15.0 - 45.0 % Final   • Monocyte % 10/11/2018 11.4  4.0 - 12.0 % Final   • Eosinophil % 10/11/2018 1.2  0.0 - 4.0 % Final   • Basophil % 10/11/2018 1.1  0.0 - 2.0 % Final   • Immature Grans % 10/11/2018 3.9  0.0 - 5.0 % Final   • Neutrophils, Absolute 10/11/2018 5.15  1.87 - 8.40 10*3/mm3 Final   • Lymphocytes, Absolute 10/11/2018 0.97  0.72 - 4.86 10*3/mm3 Final   • Monocytes, Absolute 10/11/2018 0.85  0.19 - 1.30 10*3/mm3 Final   • Eosinophils, Absolute 10/11/2018 0.09  0.00 - 0.70 10*3/mm3 Final   • Basophils, Absolute 10/11/2018 0.08  0.00 - 0.20 10*3/mm3 Final   • Immature Grans, Absolute 10/11/2018 0.29* 0.00 - 0.03 10*3/mm3 Final   • nRBC 10/11/2018 0.0  0.0 - 0.0 /100 WBC Final   • proBNP 10/11/2018 70643.0* 0.0-1,800.0 pg/mL Final   • RBC, UA 10/11/2018 3-5* None Seen /HPF Final   • WBC, UA 10/11/2018 None Seen  None Seen /HPF Final   • Bacteria, UA 10/11/2018 None Seen  None Seen /HPF Final   • Squamous Epithelial Cells, UA 10/11/2018 0-2  None Seen, 0-2 /HPF Final   • Hyaline Casts, UA 10/11/2018 None Seen  None Seen /LPF Final   • Methodology 10/11/2018 Automated Microscopy   Final   Anticoagulation Visit on 10/10/2018   Component Date Value Ref Range Status   • INR 10/10/2018 1.50   Final       RADIOLOGY:  Ct Abdomen Pelvis Without Contrast    Result Date: 10/2/2018  Narrative: CT ABDOMEN PELVIS WO CONTRAST- 10/2/2018 12:44 AM CDT  HISTORY: epigastric abd pain  COMPARISON: 9/18/2018  DOSE LENGTH PRODUCT: 256 mGy cm. Automated exposure control was also utilized to decrease patient radiation dose.  TECHNIQUE: Axial images of the and pelvis are obtained without IV or oral contrast  FINDINGS:  The nonenhanced liver, spleen, pancreas, and adrenal glands are stable. Hypodense  prominence of the adrenal glands unchanged. Punctate calcification suspected within the head of the pancreas. There is no peripancreatic inflammation. The gallbladder surgically absent. There is no abnormal perinephric fluid collection. There is no hydronephrosis. Bladder appears intact. The uterus is absent. There is no adnexal mass. Moderate to large volume of stool seen within the rectum distending rectum to measurement of 8 cm. There is a redundant sigmoid colon. There is mild sigmoid colonic diverticulosis. There is no evidence for bowel obstruction. Stomach is underdistended. Small hiatal hernia considered. The diverticulum of the duodenum. There is diffuse vascular calcification with no aneurysm. No pathologic lymphadenopathy visualized. There is a cardiac pacer device.  There are emphysematous changes within the visible lung bases.  There are old right lumbar transverse process fractures with L2 kyphoplasty changes. There is moderate to advanced degenerative changes of the lower lumbar spine.      Impression: 1. Large volume of stool within the rectum concerning for impaction. Mild lower rectal versus anal wall thickening. 2. Sigmoid colonic diverticulosis with no acute diverticulitis. 3. Diffuse vascular calcification with no aneurysm or dissection. 4. Emphysema. 5. Duodenal diverticula. Mild hernia. 6. Stranding of the subcutaneous tissues of the anterior abdominal wall with no loculated fluid collection.  Comments: A preliminary report is issued to the ER by the [facility] radiology service. This report was finalized on 10/02/2018 07:23 by Dr. Lucy Carson MD.    Ct Abdomen Pelvis Without Contrast    Result Date: 9/18/2018  Narrative: EXAMINATION: CT ABDOMEN PELVIS WO CONTRAST- 9/18/2018 1:48 PM CDT  HISTORY: Fall, low abdominal pain. Flank pain,  DOSE: 226 mGycm (Automatic exposure control technique was implemented in an effort to keep the radiation dose as low as possible without compromising image  quality)  REPORT: Spiral CT of the abdomen and pelvis was performed without contrast from the lung bases through the pubic symphysis. Reconstructed coronal and sagittal images are also reviewed.  COMPARISON: CT abdomen pelvis with contrast 3/23/2016.  Review of lung windows demonstrates mild linear atelectasis as well as evidence of mild emphysema. Previous median sternotomy is noted. Pacemaker wires are present. There is evidence of previous aortic valve replacement. A moderate amount of coronary artery plaque is present. Cholecystectomy clips are present. Liver and spleen are homogeneous on this unenhanced study. Ingested food is noted in the stomach which is incompletely distended. The pancreas and adrenal glands appear grossly within normal limits. No nephrolithiasis is identified and there is no evidence of urinary tract obstruction. Heavy calcified plaque is noted within the abdominal aorta, which is normal in caliber. Calcified plaque is also seen in the iliac arteries. Bowel loops are normal in caliber. There appears to be a duodenal diverticulum adjacent to the pancreatic head. There is a large stool ball in rectal vault and probable fecal impaction, without evidence of bowel obstruction. There is moderate sigmoid diverticulosis without evidence of acute diverticulitis. Previous hysterectomy is noted. The bladder appears within normal limits. No free fluid or free air is identified. Review of bone windows shows a chronic compression fracture L2 status post vertebroplasty. There is mild spinal stenosis at this level related to slight retropulsion of the posterior cortex. Advanced degenerative disc disease is present at L4-5 and to lesser degree L3-4. There is an acute fracture involving the third sacral level which is described in detail on today's pelvic CT.      Impression: 1. Large stool ball within the rectum compatible with fecal impaction but no evidence of bowel obstruction. Moderate sigmoid  diverticulosis proximally without acute diverticulitis. 2. Acute fracture third of the sacral level, described in detail on today's pelvic CT. 3. Chronic compression fracture at L2 with previous vertebroplasty and associated mild spinal stenosis. Advanced degenerative disc disease at L4-5. 4. Other chronic findings and postsurgical changes as above.   This report was finalized on 09/18/2018 13:59 by Dr. Ehsan Waddell MD.    Xr Femur 2 View Left    Result Date: 9/18/2018  Narrative: LEFT FEMUR, 2 VIEWS 9/18/2018 2:20 PM CDT  HISTORY: fall, pain  COMPARISON: NONE  FINDINGS:  Frontal and lateral radiographs of the left femur were obtained.  There is no evidence of fracture or worrisome osseous lesion. The soft tissues are grossly unremarkable. Limited evaluation of the hip and knee joints reveals normal alignment. Knee joint osteoarthritis.      Impression: 1. No visualized fracture or malalignment at the knee or hip joint. This report was finalized on 09/18/2018 14:52 by Dr Eagle Eric, .    Ct Pelvis Without Contrast    Result Date: 9/18/2018  Narrative: CT PELVIS WO CONTRAST-, CT LOWER EXTREMITY RIGHT WO CONTRAST- 9/18/2018 1:23 PM CDT  HISTORY: Pelvis trauma, fx known or suspected, xray insufficient   DOSE LENGTH PRODUCT: 135 mGy cm. Automated exposure control was also utilized to decrease patient radiation dose.  Technique: Axial CT of the pelvis without IV contrast. Sagittal and coronal reformations are also provided for review.  Comparison: CT scan dated 3/23/2016.  Findings:  There appears to be a horizontally oriented fracture traversing the S3 vertebra. There is mild buckling of the anterior vertebral body cortex. No obvious vertically oriented fracture components in the sacral ala. Normal alignment at the sacroiliac joints. Bony pelvis is otherwise intact. Normal alignment at the hip joints. The joint spaces are fairly well-maintained. No acute fracture identified at the hip joints.  Prominent stool in  the rectum. Sigmoid diverticulosis. No pelvic hematoma.      Impression: Impression:  1. Horizontally oriented fracture through the S3 vertebra with mild buckling in the anterior cortex. Normal alignment at the SI joints. 2. Normal alignment at the hip joints with no visualized hip fracture. This report was finalized on 09/18/2018 13:59 by Dr Eagle Eric, .    Xr Chest 1 View    Result Date: 10/2/2018  Narrative: History: 83-year-old shortness of breath.  Reference: Chest radiograph June 25, 2017  Findings: Frontal chest radiograph performed.  Cardiomegaly. Prior median sternotomy/CABG. Interval TAVR. Mild interstitial edema is suspected. No consolidation. No pleural effusion or pneumothorax. Old left clavicle fracture. Dual-lead left-sided transvenous pacemaker is intact and unchanged.       Impression: Suspect mild interstitial edema. This report was finalized on 10/02/2018 07:22 by Dr Cullen Ambrose, .    Ct Lower Extremity Right Without Contrast    Result Date: 9/18/2018  Narrative: CT PELVIS WO CONTRAST-, CT LOWER EXTREMITY RIGHT WO CONTRAST- 9/18/2018 1:23 PM CDT  HISTORY: Pelvis trauma, fx known or suspected, xray insufficient   DOSE LENGTH PRODUCT: 135 mGy cm. Automated exposure control was also utilized to decrease patient radiation dose.  Technique: Axial CT of the pelvis without IV contrast. Sagittal and coronal reformations are also provided for review.  Comparison: CT scan dated 3/23/2016.  Findings:  There appears to be a horizontally oriented fracture traversing the S3 vertebra. There is mild buckling of the anterior vertebral body cortex. No obvious vertically oriented fracture components in the sacral ala. Normal alignment at the sacroiliac joints. Bony pelvis is otherwise intact. Normal alignment at the hip joints. The joint spaces are fairly well-maintained. No acute fracture identified at the hip joints.  Prominent stool in the rectum. Sigmoid diverticulosis. No pelvic hematoma.      Impression:  Impression:  1. Horizontally oriented fracture through the S3 vertebra with mild buckling in the anterior cortex. Normal alignment at the SI joints. 2. Normal alignment at the hip joints with no visualized hip fracture. This report was finalized on 09/18/2018 13:59 by Dr Eagle Eric, .    Xr Chest Pa & Lateral    Result Date: 10/11/2018  Narrative: EXAMINATION: Chest 2 views history: Shortness of breath  FINDINGS: Today's exam is compared to previous study of 10/3/2018. Upright frontal and lateral projections of the chest demonstrates diffuse reticulonodular disease of the lungs. There is no lobar pneumonia or effusion. The patient's undergone percutaneous aortic valve replacement. Transvenous pacer is in place.      Impression: 1.. 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 2. No evidence of acute lobar pneumonia or effusion. This report was finalized on 10/11/2018 12:11 by Dr. Aldair Huston MD.    Xr Chest Pa & Lateral    Result Date: 10/3/2018  Narrative: XR CHEST PA AND LATERAL- 10/3/2018 4:30 AM CDT  HISTORY: f/u pulm edema; I50.9-Heart failure, unspecified; D47.3-Essential (hemorrhagic) thrombocythemia; S32.020A-Wedge compression fracture of second lumbar vertebra, initial encounter for closed fracture; K59.00-Constipation, unspecified; R10.13-Epigastric pain; R06.02-Shortness of breath  COMPARISON: 10/2/2018  FINDINGS: Upright frontal and lateral radiographs of the chest were obtained.  Diffuse interstitial opacities are again noted. There is no focal consolidation. The cardiomediastinal silhouette and pulmonary vascularity are unchanged. The osseous structures and surrounding soft tissues demonstrate no acute abnormality.      Impression: 1. No significant change in diffuse interstitial opacities that may represent resolving pulmonary or interstitial edema.   This report was finalized on 10/03/2018 07:21 by Dr. Luther Farias MD.    Ct Guided Biopsy Bone  Marrow    Result Date: 10/3/2018  Narrative: CT GUIDED BIOPSY BONE MARROW- 10/3/2018 10:12 AM CDT  INDICATION: RESTAGING; C50.919-Malignant neoplasm of unspecified site of unspecified female breast; E61.1-Iron deficiency  CONSENT: An informed written consent was obtained from the patient after discussing the risks, benefits, potential complications and alternatives. All questions answered to the patient's satisfaction.   COMPLICATIONS: None.  MEDICATIONS: Versed 1 mg IV, Fentanyl 50 mcg IV.  DOSE LENGTH PRODUCT: 258 mGy cm. Automated exposure control was also utilized to decrease patient radiation dose.  ESTIMATED BLOOD LOSS: Less than 5 ml.  PROCEDURE: An informed consent was obtained as above. Intravenous conscious sedation was administered with blood pressure, EKG and pulse oximeter monitoring by nursing staff throughout the procedure. Prior to sterile preparation and local anesthetic, noninfused axial CT scans were obtained. The optimal site for biopsy was marked. An 13-gauge needle was advanced into the ilium with a drill. Approximately 10 mL of bone marrow were aspirated through the needle. Subsequently, a 2 cm core was obtained utilizing the drill with a 13-gauge sampling needle.  No immediate complications.      Impression: Successful CT guided bone marrow aspirate and bone biopsy.   This report was finalized on 10/03/2018 10:56 by Dr. Luther Farias MD.           Assessment/Plan       Thrombocytoses, Plts over a million. Plt count was normal in 6/2017. Last Yr in 2017 she underwent a heart valve replacement and has been on Coumadin since.  Being on Coumadin in elderly patients invaribly tend to microscopically bleed in stools causing Iron deficiency and I fear that Iron deficiecny is causing thrombocytosis,  Although underlying Myeloproliferative disorder cannot be ruled out.     In house she received 2 doses of IV Venifer and today desouite IV Irin her Plts are still high at 912K.    Bm Bx done 10/03/2018  shows No Iron staining. Therefore after Ins approval plan to geive her additional IV Injectefor x 2 to 4 doses and then recheck her Platelet Count.  The BM also shows increased number of Megas. Plan to Check her BM for AMOS 2 mutation.    RTc afer additional Iron infusions. Plts today 736 on Coumadin.                    Abisai Barahona MD    10/15/2018    11:33 AM

## 2018-10-16 ENCOUNTER — ANTICOAGULATION VISIT (OUTPATIENT)
Dept: CARDIOLOGY | Facility: CLINIC | Age: 83
End: 2018-10-16

## 2018-10-16 LAB — INR PPP: 1.4

## 2018-10-17 ENCOUNTER — OFFICE VISIT (OUTPATIENT)
Dept: CARDIOLOGY | Facility: CLINIC | Age: 83
End: 2018-10-17

## 2018-10-17 VITALS
HEIGHT: 61 IN | RESPIRATION RATE: 18 BRPM | DIASTOLIC BLOOD PRESSURE: 80 MMHG | WEIGHT: 126 LBS | BODY MASS INDEX: 23.79 KG/M2 | SYSTOLIC BLOOD PRESSURE: 130 MMHG | HEART RATE: 67 BPM | OXYGEN SATURATION: 97 %

## 2018-10-17 DIAGNOSIS — I25.10 CORONARY ARTERY DISEASE INVOLVING NATIVE CORONARY ARTERY OF NATIVE HEART WITHOUT ANGINA PECTORIS: ICD-10-CM

## 2018-10-17 DIAGNOSIS — R88.8 VERY LOW IRON STORES IN BONE MARROW: ICD-10-CM

## 2018-10-17 DIAGNOSIS — I49.5 SSS (SICK SINUS SYNDROME) (HCC): ICD-10-CM

## 2018-10-17 DIAGNOSIS — Z95.1 S/P CABG X 3: ICD-10-CM

## 2018-10-17 DIAGNOSIS — E78.2 MIXED HYPERLIPIDEMIA: ICD-10-CM

## 2018-10-17 DIAGNOSIS — R35.89 DIURESIS: ICD-10-CM

## 2018-10-17 DIAGNOSIS — R06.02 SHORTNESS OF BREATH: Primary | ICD-10-CM

## 2018-10-17 DIAGNOSIS — I48.0 PAROXYSMAL ATRIAL FIBRILLATION (HCC): ICD-10-CM

## 2018-10-17 DIAGNOSIS — I10 ESSENTIAL HYPERTENSION: ICD-10-CM

## 2018-10-17 PROCEDURE — 99214 OFFICE O/P EST MOD 30 MIN: CPT | Performed by: NURSE PRACTITIONER

## 2018-10-17 RX ORDER — HYDROCHLOROTHIAZIDE 12.5 MG/1
12.5 CAPSULE, GELATIN COATED ORAL EVERY MORNING
Qty: 30 CAPSULE | Refills: 3 | Status: SHIPPED | OUTPATIENT
Start: 2018-10-17 | End: 2019-01-31

## 2018-10-17 NOTE — PROGRESS NOTES
Subjective:     Encounter Date:10/17/2018      Patient ID: Lakesha Costello is a 83 y.o. female with a history of CAD s/p CABG. Sever AS s/p TAVR at [facility], PAF with planned ablation and Watchman procedure with Dr. Glass in November, HTN, HLD, CKD, DM, and SSS s/p pacemaker. She has a history of diastolic heart failure as well and has been treated for chf with diuresis. She recently has been complaining of fatigue and shortness of breath at home. She presents today with her daughter who is a nurse and caregiver for her mother.    Chief Complaint: Shortness of breath  History of Present Illness  Spoke with the daughter and the patient to provided details of recent events of low O2 sats, shortness of breath, fatigue, change in activity, and swelling. She was having complaints of this on several occassions at home and was recently treated and discharged from the hospital. She had some medication changes at that time and there was worry that the change (which were to diuretics) were not controlling her symptoms.  It appears that the changes - changing aldactone to lasix- were changed due to a concern of rising K.  She has had no complaints of swelling in her legs or bloating in her belly. Her weights have been conisistent.  Noted 1.5 pound gain overnight one day, nothing more than that. Has used her lasix at home some, but struggles with the PRN managed as this is not good for the patient and she doesn't always take it when she should. She has been wearing oxygen at night some (borrowing from her ) because her o2 checks have been below 90.  This usually occurs at night. She has been using a scooter to get to the breakfast pires which is new for her she used to walk, and of recent hasn't felt like going. She has recently seen her PCP NP and has an oximetry order placed and is awaiting to hear from [facility] regarding that.  They are asking about placing the patient on a daily diuretic rather than a PRN  strategy.     Of note, she has been terribly weak from low iron and abnormal studies related to the production of her iron and is following with hematology and receiving iron infusions regularly. She was told by her hematologist that the iron would likely improve once she was able to come off of her coumadin after her Watchman procedure.    The following portions of the patient's history were reviewed and updated as appropriate: allergies, current medications, past family history, past medical history, past social history and past surgical history.     Allergies   Allergen Reactions   • Dilaudid [Hydromorphone Hcl]    • Dilaudid [Hydromorphone] Nausea And Vomiting   • Enalapril Angioedema   • Janumet [Sitagliptin-Metformin Hcl] Other (See Comments)     Chest pain   • Lopid [Gemfibrozil] Nausea And Vomiting   • Sulfa Antibiotics Other (See Comments)     Syncope     • Ultram [Tramadol] Itching   Current outpatient and discharge medications have been reconciled for the patient.  Reviewed by: FACUNDO Kern    Current Outpatient Prescriptions:   •  amiodarone (PACERONE) 200 MG tablet, Take 1 tablet by mouth 2 (Two) Times a Day., Disp: 60 tablet, Rfl: 2  •  aspirin 81 MG EC tablet, Take 81 mg by mouth Daily., Disp: , Rfl:   •  atenolol (TENORMIN) 50 MG tablet, Take 1 tab PO qam & 1/2 tab PO qpm. (Patient taking differently: Take 25 mg by mouth Daily. Take 1 tab PO qam & 1/2 tab PO qpm.), Disp: 135 tablet, Rfl: 3  •  calcium carbonate (OS-MICHAEL) 600 MG tablet, Take 600 mg by mouth 2 (Two) Times a Day With Meals., Disp: , Rfl:   •  cholecalciferol (VITAMIN D3) 1000 UNITS tablet, Take 1,000 Units by mouth Daily., Disp: , Rfl:   •  fenofibrate 160 MG tablet, Take 160 mg by mouth Daily., Disp: , Rfl:   •  glipiZIDE (GLUCOTROL) 5 MG tablet, Take 1 tablet by mouth 2 (Two) Times a Day Before Meals. (Patient taking differently: Take 5 mg by mouth As Needed.), Disp: 180 tablet, Rfl: 3  •  insulin detemir (LEVEMIR) 100 UNIT/ML  injection, Inject 30 Units under the skin into the appropriate area as directed Every Night. 30 units qam 30 units qpm, Disp: 30 mL, Rfl: 3  •  magnesium oxide (MAGOX) 400 (241.3 MG) MG tablet tablet, Take 400 mg by mouth Daily., Disp: , Rfl:   •  Multiple Vitamins-Minerals (PRESERVISION/LUTEIN) capsule, Take 1 capsule by mouth 2 (Two) Times a Day., Disp: , Rfl:   •  nitroglycerin (NITROSTAT) 0.4 MG SL tablet, 1 under the tongue as needed for angina, may repeat q5mins for up three doses, Disp: 30 tablet, Rfl: 3  •  pantoprazole (PROTONIX) 40 MG EC tablet, Take 40 mg by mouth Daily., Disp: , Rfl:   •  polyethyl glycol-propyl glycol (SYSTANE) 0.4-0.3 % solution ophthalmic solution, Administer 1 drop to both eyes Daily., Disp: , Rfl:   •  sennosides-docusate sodium (SENOKOT-S) 8.6-50 MG tablet, Take 2 tablets by mouth Every Night., Disp: 60 tablet, Rfl: 0  •  simvastatin (ZOCOR) 20 MG tablet, Take 1 tablet by mouth Every Night., Disp: 90 tablet, Rfl: 3  •  venlafaxine XR (EFFEXOR-XR) 37.5 MG 24 hr capsule, Take 37.5 mg by mouth Daily., Disp: , Rfl:   •  vitamin B-12 (CYANOCOBALAMIN) 500 MCG tablet, Take 500 mcg by mouth Daily., Disp: , Rfl:   •  warfarin (COUMADIN) 1 MG tablet, Take 2 tablet by mouth daily on Monday and Friday. Take 1 tablet by mouth daily on Tuesday, Wednesday, Thursday, Saturday and Sunday., Disp: 120 tablet, Rfl: 3  •  hydrochlorothiazide (MICROZIDE) 12.5 MG capsule, Take 1 capsule by mouth Every Morning., Disp: 30 capsule, Rfl: 3  No current facility-administered medications for this visit.     Facility-Administered Medications Ordered in Other Visits:   •  diphenhydrAMINE (BENADRYL) injection 50 mg, 50 mg, Intravenous, PRN, Abisai Barahona MD  •  famotidine (PEPCID) injection 20 mg, 20 mg, Intravenous, PRN, Abisai Barahona MD  •  hydrocortisone sodium succinate (Solu-CORTEF) injection 100 mg, 100 mg, Intravenous, PRN, Abisai Barahona MD  •  meperidine (DEMEROL) injection 25 mg, 25 mg, Intravenous,  Q20 Min Jun RUIZ Farooq, MD    Past Medical History:   Diagnosis Date   • Aortic stenosis    • Breast cancer (CMS/HCC)    • CAD (coronary artery disease)    • Cataract    • Chronic anticoagulation     Coumadin    • CKD (chronic kidney disease) stage 3, GFR 30-59 ml/min (CMS/HCC) 4/16/2018   • Diabetes mellitus (CMS/HCC)     Type II   • Elevated cholesterol    • GERD (gastroesophageal reflux disease)    • Hyperlipidemia    • Hypertension    • Macular degeneration    • Pancreatitis    • Paroxysmal atrial fibrillation (CMS/HCC)    • Pulmonary hypertension (CMS/HCC)    • Rheumatic fever     during childhood   • Sick sinus syndrome (CMS/HCC)     with pacemaker     Family History   Problem Relation Age of Onset   • Breast cancer Mother    • Coronary artery disease Father    • Heart attack Father    • Diabetes Father    • Cancer Brother    • No Known Problems Maternal Grandmother    • No Known Problems Maternal Grandfather    • No Known Problems Paternal Grandmother    • No Known Problems Paternal Grandfather      Social History     Social History   • Marital status:      Spouse name: N/A   • Number of children: N/A   • Years of education: N/A     Occupational History   • Not on file.     Social History Main Topics   • Smoking status: Never Smoker   • Smokeless tobacco: Never Used   • Alcohol use No   • Drug use: No   • Sexual activity: No     Other Topics Concern   • Not on file     Social History Narrative   • No narrative on file     Past Surgical History:   Procedure Laterality Date   • ANOMALOUS PULMONARY VENOUS RETURN REPAIR, TOTAL     • BACK SURGERY     • BELPHAROPTOSIS REPAIR     • CARDIAC CATHETERIZATION  1999, 2010   • CARDIAC CATHETERIZATION N/A 2/15/2017    Procedure: Left Heart Cath;  Surgeon: Ehsan Crocker MD;  Location:  [facility];  Service:    • CARDIAC CATHETERIZATION N/A 2/15/2017    Procedure: Right Heart Cath;  Surgeon: Ehsan Crocker MD;  Location:  [facility]ATH IN[facility]  LOCATION;  Service:    • CARDIAC CATHETERIZATION N/A 2/15/2017    Procedure: Coronary angiography;  Surgeon: Ehsan Crocker MD;  Location:  [facility];  Service:    • CARDIAC CATHETERIZATION N/A 2/15/2017    Procedure: Left ventriculography;  Surgeon: Ehsan Crocker MD;  Location:  [facility];  Service:    • CARDIAC CATHETERIZATION N/A 2/15/2017    Procedure: Intracardiac echocardiogram;  Surgeon: Ehsan Crocker MD;  Location:  [facility];  Service:    • CARDIAC ELECTROPHYSIOLOGY PROCEDURE N/A 2/3/2017    Procedure: Pacemaker DC new;  Surgeon: Ehsan Crocker MD;  Location:  [facility];  Service:    • CATARACT EXTRACTION     • CHOLECYSTECTOMY     • CORONARY ARTERY BYPASS GRAFT  1999    4 vessel    • CORONARY ARTERY BYPASS GRAFT     • CORONARY STENT PLACEMENT     • HYSTERECTOMY  1962   • INSERT / REPLACE / REMOVE PACEMAKER     • MASTECTOMY  2000   • OTHER SURGICAL HISTORY      TAVR 2017       Review of Systems   Constitution: Positive for weakness. Negative for chills, diaphoresis, fever and weight gain.   Eyes: Negative for visual disturbance.   Cardiovascular: Negative for chest pain, dyspnea on exertion, leg swelling, orthopnea, palpitations, paroxysmal nocturnal dyspnea and syncope.   Respiratory: Positive for shortness of breath. Negative for cough and wheezing.    Hematologic/Lymphatic: Negative for bleeding problem.   Skin: Negative for dry skin and flushing.   Musculoskeletal: Positive for arthritis. Negative for falls.   Gastrointestinal: Negative for bloating, abdominal pain, nausea and vomiting.   Genitourinary: Negative for dysuria.   Neurological: Negative for dizziness, focal weakness, light-headedness, loss of balance and numbness.   Psychiatric/Behavioral: Negative for altered mental status. The patient is nervous/anxious.    All other systems reviewed and are negative.        ECG 12 Lead  Date/Time: 10/19/2018 2:58 PM  Performed by:  ELINA PEPPER  Authorized by: ELINA PEPPER   Comparison: compared with previous ECG from 10/2/2018  Similar to previous ECG  Rhythm: sinus rhythm  Rate: normal  BPM: 67  Conduction: incomplete LBBB  ST Segments: ST segments normal  T Waves: T waves normal  QRS axis: normal and left  Q waves: V5 and V6  Clinical impression: abnormal ECG          Vitals:    10/17/18 1324   BP: 130/80   Pulse: 67   Resp: 18   SpO2: 97%     1    10/17/18  1324   Weight: 57.2 kg (126 lb)          Objective:     Physical Exam   Constitutional: She is oriented to person, place, and time. She appears well-developed and well-nourished. No distress.   HENT:   Head: Normocephalic and atraumatic.   Eyes: Conjunctivae are normal. No scleral icterus.   Neck: Normal range of motion. Neck supple.   Cardiovascular: Normal rate and regular rhythm.    Murmur heard.  Pulmonary/Chest: Effort normal and breath sounds normal. No respiratory distress. She has no wheezes. She has no rales.   Abdominal: Soft. Normal appearance. She exhibits no distension. There is no tenderness.   Musculoskeletal: Normal range of motion. She exhibits no edema.   Neurological: She is alert and oriented to person, place, and time.   Skin: Skin is warm, dry and intact. No rash noted. She is not diaphoretic. No erythema. No pallor.   Psychiatric: She has a normal mood and affect. Her behavior is normal.   Vitals reviewed.      Lab Review: patient has had several tests and hospital visits which were reviewed in our system as well as [facility] for her [facility] visit.      She has had stents, pacer, TAVR, afib, and need for ablation and watchman which work up is in progress. She is due for DEAN soon in [place].      Assessment:          Diagnosis Plan   1. Shortness of breath     2. Paroxysmal atrial fibrillation (CMS/HCC)     3. Coronary artery disease involving native coronary artery of native heart without angina pectoris     4. S/P CABG x 3     5. Diuresis  Basic  Metabolic Panel   6. Very low iron stores in bone marrow     7. SSS (sick sinus syndrome) (CMS/HCC)     8. Essential hypertension     9. Mixed hyperlipidemia            Plan:       - SOB: add back a diuretic. HCTZ 12.5 mg daily. Monitor K. Check BMP next week. Call about time frame for oximetry testing (daughter will do this) as the PCP office has ordered this.      - paroxysmal AFl: sinus rhythm at this time. On amiodarone. On coumadin. Monitors INR with our office. Stable. Awaiting Watchman procedure in [place].     - CAD: no chest pain, pressure, or similar. On an 81 mg dose of aspirin. Stable without angina. No clinical indication of ischemia.    - IRON: low iron and low iron production: managed per hematology. Receiving infusions of iron weekly.    - SSS: pacemaker in situ.    - HTN: stable    - HLD: on statin      Spoke with patient and daughter. Listened to their concerns and recent hospital visits, nurse line calls, and PCP follow ups. Discussed plan of care with addition of HCTZ, labs, and agree with plans of oximetry study.  No signs of CHF on exam today. Spent 60 minutes face to face time with patient and family. Has appointment soon at [facility].  Has follow up with MD next month. Has follow up with PCP. Next year. Has scheduled iron infusions.  Call sooner if needed or present to ED with worsening symptoms.

## 2018-10-18 ENCOUNTER — ANTICOAGULATION VISIT (OUTPATIENT)
Dept: CARDIOLOGY | Facility: CLINIC | Age: 83
End: 2018-10-18

## 2018-10-18 LAB — INR PPP: 3

## 2018-10-19 ENCOUNTER — INFUSION (OUTPATIENT)
Dept: ONCOLOGY | Facility: HOSPITAL | Age: 83
End: 2018-10-19
Attending: INTERNAL MEDICINE

## 2018-10-19 ENCOUNTER — TELEPHONE (OUTPATIENT)
Dept: INTERNAL MEDICINE | Facility: CLINIC | Age: 83
End: 2018-10-19

## 2018-10-19 VITALS
HEIGHT: 61 IN | RESPIRATION RATE: 18 BRPM | OXYGEN SATURATION: 97 % | TEMPERATURE: 97.7 F | WEIGHT: 126 LBS | DIASTOLIC BLOOD PRESSURE: 45 MMHG | SYSTOLIC BLOOD PRESSURE: 137 MMHG | BODY MASS INDEX: 23.79 KG/M2 | HEART RATE: 69 BPM

## 2018-10-19 DIAGNOSIS — R88.8 VERY LOW IRON STORES IN BONE MARROW: Primary | ICD-10-CM

## 2018-10-19 PROBLEM — R06.02 SHORTNESS OF BREATH: Status: ACTIVE | Noted: 2018-10-19

## 2018-10-19 PROCEDURE — 96365 THER/PROPH/DIAG IV INF INIT: CPT

## 2018-10-19 PROCEDURE — 93000 ELECTROCARDIOGRAM COMPLETE: CPT | Performed by: NURSE PRACTITIONER

## 2018-10-19 PROCEDURE — 25010000002 FERRIC CARBOXYMALTOSE 750 MG/15ML SOLUTION 15 ML VIAL: Performed by: INTERNAL MEDICINE

## 2018-10-19 RX ORDER — MEPERIDINE HYDROCHLORIDE 50 MG/ML
25 INJECTION INTRAMUSCULAR; INTRAVENOUS; SUBCUTANEOUS
Status: DISCONTINUED | OUTPATIENT
Start: 2018-10-19 | End: 2018-10-19 | Stop reason: HOSPADM

## 2018-10-19 RX ORDER — FAMOTIDINE 10 MG/ML
20 INJECTION, SOLUTION INTRAVENOUS AS NEEDED
Status: DISCONTINUED | OUTPATIENT
Start: 2018-10-19 | End: 2018-10-19 | Stop reason: HOSPADM

## 2018-10-19 RX ORDER — SODIUM CHLORIDE 9 MG/ML
250 INJECTION, SOLUTION INTRAVENOUS ONCE
Status: COMPLETED | OUTPATIENT
Start: 2018-10-19 | End: 2018-10-19

## 2018-10-19 RX ORDER — DIPHENHYDRAMINE HYDROCHLORIDE 50 MG/ML
50 INJECTION INTRAMUSCULAR; INTRAVENOUS AS NEEDED
Status: DISCONTINUED | OUTPATIENT
Start: 2018-10-19 | End: 2018-10-19 | Stop reason: HOSPADM

## 2018-10-19 RX ADMIN — SODIUM CHLORIDE 250 ML: 9 INJECTION, SOLUTION INTRAVENOUS at 13:45

## 2018-10-19 RX ADMIN — FERRIC CARBOXYMALTOSE INJECTION 750 MG: 50 INJECTION, SOLUTION INTRAVENOUS at 13:50

## 2018-10-19 NOTE — TELEPHONE ENCOUNTER
"Patient's daughter Margarita called to report they were dropping off the \"box\" from the patient's overnight oximetry to [facility] today.  She would like to know the results as soon as possible because the patient's oxygen is still dropping when she sleeps.  "

## 2018-10-19 NOTE — TELEPHONE ENCOUNTER
Patient's daughter is wanting to know how long it will take to get order in for oxygen/[facility]. Transferred to [place]

## 2018-10-19 NOTE — PATIENT INSTRUCTIONS

## 2018-10-22 ENCOUNTER — READMISSION MANAGEMENT (OUTPATIENT)
Dept: CALL CENTER | Facility: HOSPITAL | Age: 83
End: 2018-10-22

## 2018-10-22 NOTE — OUTREACH NOTE
CHF Week 3 Survey      Responses   Facility patient discharged from?  [facility]   Does the patient have one of the following disease processes/diagnoses(primary or secondary)?  CHF   Week 3 attempt successful?  No   Unsuccessful attempts  Attempt 1          Lisa Kearns RN

## 2018-10-23 ENCOUNTER — READMISSION MANAGEMENT (OUTPATIENT)
Dept: CALL CENTER | Facility: HOSPITAL | Age: 83
End: 2018-10-23

## 2018-10-23 NOTE — OUTREACH NOTE
CHF Week 3 Survey      Responses   Facility patient discharged from?  [facility]   Does the patient have one of the following disease processes/diagnoses(primary or secondary)?  CHF   Week 3 attempt successful?  Yes   Call start time  1504   Call end time  1507   General alerts for this patient  Lives in [address]L apt at [facility].    Discharge diagnosis  CHF (BNP 11638),  impaction,  shortness of breath   Meds reviewed with patient/caregiver?  Yes   Is the patient taking all medications as directed (includes completed medication regime)?  Yes   Has the patient kept scheduled appointments due by today?  Yes   Comments  Pt had sleep study and now wearing O2 while sleeping @ 2L   What is the Home health agency?   [facility]   DME comments  Using RWx and wheelchair   Psychosocial issues?  No   What is the patient's perception of their health status since discharge?  Returned to baseline/stable   Nursing interventions  Nurse provided patient education   Is the patient weighing daily?  Yes   Daily weight interventions  Education provided on importance of daily weight   Is the patient able to teach back Heart Failure Zones?  Yes   CHF Week 3 call completed?  Yes   Revoked  No further contact(revokes)-requires comment   Graduated/Revoked comments  goals met           Mamta Sales, RN

## 2018-10-24 ENCOUNTER — ANTICOAGULATION VISIT (OUTPATIENT)
Dept: CARDIOLOGY | Facility: CLINIC | Age: 83
End: 2018-10-24

## 2018-10-24 LAB — INR PPP: 1.5

## 2018-10-26 ENCOUNTER — INFUSION (OUTPATIENT)
Dept: ONCOLOGY | Facility: HOSPITAL | Age: 83
End: 2018-10-26
Attending: INTERNAL MEDICINE

## 2018-10-26 VITALS
TEMPERATURE: 98 F | SYSTOLIC BLOOD PRESSURE: 137 MMHG | HEART RATE: 66 BPM | BODY MASS INDEX: 23.79 KG/M2 | OXYGEN SATURATION: 93 % | HEIGHT: 61 IN | RESPIRATION RATE: 20 BRPM | DIASTOLIC BLOOD PRESSURE: 47 MMHG | WEIGHT: 126 LBS

## 2018-10-26 DIAGNOSIS — R88.8 VERY LOW IRON STORES IN BONE MARROW: Primary | ICD-10-CM

## 2018-10-26 PROCEDURE — 96365 THER/PROPH/DIAG IV INF INIT: CPT

## 2018-10-26 PROCEDURE — 25010000002 FERRIC CARBOXYMALTOSE 750 MG/15ML SOLUTION 15 ML VIAL: Performed by: INTERNAL MEDICINE

## 2018-10-26 RX ORDER — MEPERIDINE HYDROCHLORIDE 50 MG/ML
25 INJECTION INTRAMUSCULAR; INTRAVENOUS; SUBCUTANEOUS
Status: DISCONTINUED | OUTPATIENT
Start: 2018-10-26 | End: 2018-10-26 | Stop reason: HOSPADM

## 2018-10-26 RX ORDER — DIPHENHYDRAMINE HYDROCHLORIDE 50 MG/ML
50 INJECTION INTRAMUSCULAR; INTRAVENOUS AS NEEDED
Status: DISCONTINUED | OUTPATIENT
Start: 2018-10-26 | End: 2018-10-26 | Stop reason: HOSPADM

## 2018-10-26 RX ORDER — FAMOTIDINE 10 MG/ML
20 INJECTION, SOLUTION INTRAVENOUS AS NEEDED
Status: DISCONTINUED | OUTPATIENT
Start: 2018-10-26 | End: 2018-10-26 | Stop reason: HOSPADM

## 2018-10-26 RX ORDER — SODIUM CHLORIDE 9 MG/ML
250 INJECTION, SOLUTION INTRAVENOUS ONCE
Status: COMPLETED | OUTPATIENT
Start: 2018-10-26 | End: 2018-10-26

## 2018-10-26 RX ADMIN — SODIUM CHLORIDE 250 ML: 9 INJECTION, SOLUTION INTRAVENOUS at 13:55

## 2018-10-26 RX ADMIN — FERRIC CARBOXYMALTOSE INJECTION 750 MG: 50 INJECTION, SOLUTION INTRAVENOUS at 13:57

## 2018-10-30 ENCOUNTER — ANTICOAGULATION VISIT (OUTPATIENT)
Dept: CARDIOLOGY | Facility: CLINIC | Age: 83
End: 2018-10-30

## 2018-10-30 LAB — INR PPP: 2.1

## 2018-11-05 ENCOUNTER — LAB (OUTPATIENT)
Dept: LAB | Facility: HOSPITAL | Age: 83
End: 2018-11-05
Attending: INTERNAL MEDICINE

## 2018-11-05 ENCOUNTER — APPOINTMENT (OUTPATIENT)
Dept: LAB | Facility: HOSPITAL | Age: 83
End: 2018-11-05
Attending: INTERNAL MEDICINE

## 2018-11-05 ENCOUNTER — TELEPHONE (OUTPATIENT)
Dept: INTERNAL MEDICINE | Facility: CLINIC | Age: 83
End: 2018-11-05

## 2018-11-05 ENCOUNTER — OFFICE VISIT (OUTPATIENT)
Dept: CARDIOLOGY | Facility: CLINIC | Age: 83
End: 2018-11-05

## 2018-11-05 VITALS
WEIGHT: 125 LBS | SYSTOLIC BLOOD PRESSURE: 105 MMHG | BODY MASS INDEX: 23.6 KG/M2 | DIASTOLIC BLOOD PRESSURE: 70 MMHG | HEART RATE: 79 BPM | HEIGHT: 61 IN | OXYGEN SATURATION: 96 %

## 2018-11-05 DIAGNOSIS — R35.89 DIURESIS: ICD-10-CM

## 2018-11-05 DIAGNOSIS — I10 ESSENTIAL HYPERTENSION: ICD-10-CM

## 2018-11-05 DIAGNOSIS — I48.0 PAROXYSMAL ATRIAL FIBRILLATION (HCC): ICD-10-CM

## 2018-11-05 DIAGNOSIS — R30.0 DYSURIA: Primary | ICD-10-CM

## 2018-11-05 DIAGNOSIS — I50.32 CHRONIC DIASTOLIC CONGESTIVE HEART FAILURE (HCC): ICD-10-CM

## 2018-11-05 DIAGNOSIS — R30.0 DYSURIA: ICD-10-CM

## 2018-11-05 DIAGNOSIS — Z95.2 S/P TAVR (TRANSCATHETER AORTIC VALVE REPLACEMENT): Primary | ICD-10-CM

## 2018-11-05 DIAGNOSIS — I25.10 CORONARY ARTERY DISEASE INVOLVING NATIVE CORONARY ARTERY OF NATIVE HEART WITHOUT ANGINA PECTORIS: ICD-10-CM

## 2018-11-05 PROBLEM — I50.9 ACUTE ON CHRONIC CONGESTIVE HEART FAILURE (HCC): Status: RESOLVED | Noted: 2018-10-02 | Resolved: 2018-11-05

## 2018-11-05 LAB
ANION GAP SERPL CALCULATED.3IONS-SCNC: 9 MMOL/L (ref 4–13)
BACTERIA UR QL AUTO: ABNORMAL /HPF
BILIRUB UR QL STRIP: NEGATIVE
BUN BLD-MCNC: 28 MG/DL (ref 5–21)
BUN/CREAT SERPL: 21.4 (ref 7–25)
CALCIUM SPEC-SCNC: 8.6 MG/DL (ref 8.4–10.4)
CHLORIDE SERPL-SCNC: 100 MMOL/L (ref 98–110)
CLARITY UR: ABNORMAL
CO2 SERPL-SCNC: 27 MMOL/L (ref 24–31)
COLOR UR: ABNORMAL
CREAT BLD-MCNC: 1.31 MG/DL (ref 0.5–1.4)
GFR SERPL CREATININE-BSD FRML MDRD: 39 ML/MIN/1.73
GLUCOSE BLD-MCNC: 219 MG/DL (ref 70–100)
GLUCOSE UR STRIP-MCNC: ABNORMAL MG/DL
HGB UR QL STRIP.AUTO: ABNORMAL
HYALINE CASTS UR QL AUTO: ABNORMAL /LPF
KETONES UR QL STRIP: ABNORMAL
LEUKOCYTE ESTERASE UR QL STRIP.AUTO: ABNORMAL
NITRITE UR QL STRIP: POSITIVE
PH UR STRIP.AUTO: 5.5 [PH] (ref 5–8)
POTASSIUM BLD-SCNC: 4.2 MMOL/L (ref 3.5–5.3)
PROT UR QL STRIP: ABNORMAL
RBC # UR: ABNORMAL /HPF
REF LAB TEST METHOD: ABNORMAL
SODIUM BLD-SCNC: 136 MMOL/L (ref 135–145)
SP GR UR STRIP: 1.03 (ref 1–1.03)
SQUAMOUS #/AREA URNS HPF: ABNORMAL /HPF
UROBILINOGEN UR QL STRIP: ABNORMAL
WBC UR QL AUTO: ABNORMAL /HPF

## 2018-11-05 PROCEDURE — 36415 COLL VENOUS BLD VENIPUNCTURE: CPT

## 2018-11-05 PROCEDURE — 99214 OFFICE O/P EST MOD 30 MIN: CPT | Performed by: INTERNAL MEDICINE

## 2018-11-05 PROCEDURE — 87086 URINE CULTURE/COLONY COUNT: CPT | Performed by: INTERNAL MEDICINE

## 2018-11-05 PROCEDURE — 93000 ELECTROCARDIOGRAM COMPLETE: CPT | Performed by: INTERNAL MEDICINE

## 2018-11-05 PROCEDURE — 81001 URINALYSIS AUTO W/SCOPE: CPT | Performed by: INTERNAL MEDICINE

## 2018-11-05 PROCEDURE — 87088 URINE BACTERIA CULTURE: CPT | Performed by: INTERNAL MEDICINE

## 2018-11-05 PROCEDURE — 80048 BASIC METABOLIC PNL TOTAL CA: CPT | Performed by: NURSE PRACTITIONER

## 2018-11-05 PROCEDURE — 87186 SC STD MICRODIL/AGAR DIL: CPT | Performed by: INTERNAL MEDICINE

## 2018-11-05 RX ORDER — WARFARIN SODIUM 1 MG/1
TABLET ORAL
Qty: 120 TABLET | Refills: 3 | Status: SHIPPED | OUTPATIENT
Start: 2018-11-05 | End: 2019-01-31

## 2018-11-05 NOTE — PROGRESS NOTES
Referring Provider: Leonardo Zepeda DO    Reason for Follow-up Visit: s/p TAVR    Subjective .   Chief Complaint:   Chief Complaint   Patient presents with   • Follow-up     3 month hospital fu s/p cv 8/21   • Atrial Fibrillation     pt states she has been doing fine.  she has been to EP and is scheduled to have a av  node ablation and a watchman 11/26 with Dr. Glass.   • Shortness of Breath     pt has visited the ER several times because of sob.  they diagnosed her with chf.  she was taken off her aldactone and has been sob since. was put on HCTZ. and has to wear O2 at night.       History of present illness:  Lakesha Costello is a 83 y.o. yo female with history of AS, s/p TAVR 1 yr ago. Is scheduled for the watchman procedure in 3 weeks.         History  Past Medical History:   Diagnosis Date   • Aortic stenosis    • Breast cancer (CMS/HCC)    • CAD (coronary artery disease)    • Cataract    • CHF (congestive heart failure) (CMS/HCC)     recently diagnosed after an ER visit.    • Chronic anticoagulation     Coumadin    • CKD (chronic kidney disease) stage 3, GFR 30-59 ml/min (CMS/HCC) 4/16/2018   • Diabetes mellitus (CMS/HCC)     Type II   • Elevated cholesterol    • GERD (gastroesophageal reflux disease)    • Hyperlipidemia    • Hypertension    • Macular degeneration    • Pancreatitis    • Paroxysmal atrial fibrillation (CMS/HCC)    • Pulmonary hypertension (CMS/HCC)    • Rheumatic fever     during childhood   • Sacrum and coccyx fracture (CMS/HCC)    • Sick sinus syndrome (CMS/HCC)     with pacemaker   • UTI (urinary tract infection)    ,   Past Surgical History:   Procedure Laterality Date   • ANOMALOUS PULMONARY VENOUS RETURN REPAIR, TOTAL     • BACK SURGERY     • BELPHAROPTOSIS REPAIR     • CARDIAC CATHETERIZATION  1999, 2010   • CARDIAC CATHETERIZATION N/A 2/15/2017    Procedure: Left Heart Cath;  Surgeon: Ehsan Crocker MD;  Location:  [facility];  Service:    • CARDIAC  CATHETERIZATION N/A 2/15/2017    Procedure: Right Heart Cath;  Surgeon: Ehsan Crocker MD;  Location:  [facility];  Service:    • CARDIAC CATHETERIZATION N/A 2/15/2017    Procedure: Coronary angiography;  Surgeon: Ehsan Crocker MD;  Location:  [facility];  Service:    • CARDIAC CATHETERIZATION N/A 2/15/2017    Procedure: Left ventriculography;  Surgeon: Ehsan Crocker MD;  Location:  [facility];  Service:    • CARDIAC CATHETERIZATION N/A 2/15/2017    Procedure: Intracardiac echocardiogram;  Surgeon: Ehsan Crocker MD;  Location:  [facility];  Service:    • CARDIAC ELECTROPHYSIOLOGY PROCEDURE N/A 2/3/2017    Procedure: Pacemaker DC new;  Surgeon: Ehsan Crocker MD;  Location:  [facility];  Service:    • CARDIOVERSION     • CATARACT EXTRACTION     • CHOLECYSTECTOMY     • CORONARY ARTERY BYPASS GRAFT  1999    4 vessel    • CORONARY ARTERY BYPASS GRAFT     • CORONARY STENT PLACEMENT     • HYSTERECTOMY  1962   • INSERT / REPLACE / REMOVE PACEMAKER     • MASTECTOMY  2000   • OTHER SURGICAL HISTORY      TAVR 2017   ,   Family History   Problem Relation Age of Onset   • Breast cancer Mother    • Coronary artery disease Father    • Heart attack Father    • Diabetes Father    • Cancer Brother    • No Known Problems Maternal Grandmother    • No Known Problems Maternal Grandfather    • No Known Problems Paternal Grandmother    • No Known Problems Paternal Grandfather    ,   Social History   Substance Use Topics   • Smoking status: Never Smoker   • Smokeless tobacco: Never Used   • Alcohol use No   ,     Medications  Current Outpatient Prescriptions   Medication Sig Dispense Refill   • amiodarone (PACERONE) 200 MG tablet Take 1 tablet by mouth 2 (Two) Times a Day. 60 tablet 2   • aspirin 81 MG EC tablet Take 81 mg by mouth Daily.     • atenolol (TENORMIN) 50 MG tablet Take 1 tab PO qam & 1/2 tab PO qpm. (Patient taking differently: Take 25 mg  by mouth Daily. Take 1 tab PO qam & 1/2 tab PO qpm.) 135 tablet 3   • calcium carbonate (OS-MICHAEL) 600 MG tablet Take 600 mg by mouth 2 (Two) Times a Day With Meals.     • cholecalciferol (VITAMIN D3) 1000 UNITS tablet Take 1,000 Units by mouth Daily.     • fenofibrate 160 MG tablet Take 160 mg by mouth Daily.     • glipiZIDE (GLUCOTROL) 5 MG tablet Take 1 tablet by mouth 2 (Two) Times a Day Before Meals. (Patient taking differently: Take 5 mg by mouth As Needed.) 180 tablet 3   • hydrochlorothiazide (MICROZIDE) 12.5 MG capsule Take 1 capsule by mouth Every Morning. 30 capsule 3   • insulin detemir (LEVEMIR) 100 UNIT/ML injection Inject 30 Units under the skin into the appropriate area as directed Every Night. 30 units qam 30 units qpm 30 mL 3   • magnesium oxide (MAGOX) 400 (241.3 MG) MG tablet tablet Take 400 mg by mouth Daily.     • Multiple Vitamins-Minerals (PRESERVISION/LUTEIN) capsule Take 1 capsule by mouth 2 (Two) Times a Day.     • nitroglycerin (NITROSTAT) 0.4 MG SL tablet 1 under the tongue as needed for angina, may repeat q5mins for up three doses 30 tablet 3   • pantoprazole (PROTONIX) 40 MG EC tablet Take 40 mg by mouth Daily.     • polyethyl glycol-propyl glycol (SYSTANE) 0.4-0.3 % solution ophthalmic solution Administer 1 drop to both eyes Daily.     • sennosides-docusate sodium (SENOKOT-S) 8.6-50 MG tablet Take 2 tablets by mouth Every Night. 60 tablet 0   • simvastatin (ZOCOR) 20 MG tablet Take 1 tablet by mouth Every Night. 90 tablet 3   • venlafaxine XR (EFFEXOR-XR) 37.5 MG 24 hr capsule Take 37.5 mg by mouth Daily.     • vitamin B-12 (CYANOCOBALAMIN) 500 MCG tablet Take 500 mcg by mouth Daily.     • warfarin (COUMADIN) 1 MG tablet Take 2 tablet by mouth daily on Monday and Friday. Take 1 tablet by mouth daily on Tuesday, Wednesday, Thursday, Saturday and Sunday. (Patient taking differently: Take 1 mg by mouth Daily. Monitored by Dr. Crocker's office ([facility])) 120 tablet 3     No current  "facility-administered medications for this visit.        Allergies:  Dilaudid [hydromorphone hcl]; Dilaudid [hydromorphone]; Enalapril; Janumet [sitagliptin-metformin hcl]; Lopid [gemfibrozil]; Sulfa antibiotics; and Ultram [tramadol]    Review of Systems  ROS    Objective     Physical Exam:  /70 (BP Location: Left arm, Patient Position: Sitting, Cuff Size: Adult)   Pulse 79   Ht 154.9 cm (61\")   Wt 56.7 kg (125 lb)   SpO2 96%   BMI 23.62 kg/m²   Physical Exam   Constitutional: She is oriented to person, place, and time.   HENT:   Head: Normocephalic and atraumatic.   Cardiovascular: Normal rate, regular rhythm, S1 normal and S2 normal.  Exam reveals no gallop and no friction rub.    Murmur heard.   Harsh midsystolic murmur is present with a grade of 2/6  at the upper right sternal border radiating to the neck  Pulmonary/Chest: Effort normal and breath sounds normal. No respiratory distress. She has no wheezes. She has no rales. She exhibits no tenderness.   Abdominal: Soft. Bowel sounds are normal. She exhibits no distension. There is no tenderness. There is no rebound and no guarding.   Musculoskeletal: Normal range of motion. She exhibits no edema.   Neurological: She is alert and oriented to person, place, and time. No cranial nerve deficit.   Skin: Skin is warm and dry. No rash noted. No erythema.   Psychiatric: She has a normal mood and affect. Her behavior is normal.       Results Review:    ECG 12 Lead  Date/Time: 11/5/2018 10:20 AM  Performed by: VINCE RIBERA  Authorized by: VINCE RIBERA   Comparison: compared with previous ECG   Rhythm: sinus rhythm  Rate: normal  Conduction: incomplete LBBB  ST Segments: ST segments normal  T Waves: T waves normal  QRS axis: left  Clinical impression: abnormal ECG            Anticoagulation Visit on 10/30/2018   Component Date Value Ref Range Status   • INR 10/30/2018 2.10   Final       Assessment/Plan   Lakesha was seen today for follow-up, atrial fibrillation " and shortness of breath.    Diagnoses and all orders for this visit:    Paroxysmal atrial fibrillation (CMS/HCC), maintaining SR scheduled for Watchman procedure. Having recurrent nausea and peripheral edema. Will d/c amiodarone for now    Essential hypertension, good control    Coronary artery disease involving native coronary artery of native heart without angina pectoris,  There is no evidence of ongoing ischemia    S/P TAVR (transcatheter aortic valve replacement), doing well s/p TAVR. Needs repeat echo as new baseline    Chronic diastolic congestive heart failure (CMS/HCC) should be significantly better since she has had TAVR    Other orders  -     ECG 12 Lead        Patient's Body mass index is 23.62 kg/m². BMI is within normal parameters. No follow-up required.

## 2018-11-06 ENCOUNTER — TELEPHONE (OUTPATIENT)
Dept: INTERNAL MEDICINE | Facility: CLINIC | Age: 83
End: 2018-11-06

## 2018-11-06 DIAGNOSIS — B96.20 E. COLI UTI: Primary | ICD-10-CM

## 2018-11-06 DIAGNOSIS — N39.0 E. COLI UTI: Primary | ICD-10-CM

## 2018-11-06 RX ORDER — CIPROFLOXACIN 500 MG/1
500 TABLET, FILM COATED ORAL EVERY 12 HOURS SCHEDULED
Qty: 10 TABLET | Refills: 0 | Status: SHIPPED | OUTPATIENT
Start: 2018-11-06 | End: 2018-11-11

## 2018-11-06 NOTE — TELEPHONE ENCOUNTER
Patient was informed      ----- Message from Leonardo Zepeda DO sent at 11/6/2018  8:39 AM CST -----  There is E coli in the urine. I have sent a prescription for Cipro to the pharmacy to start ASAP.

## 2018-11-07 LAB — BACTERIA SPEC AEROBE CULT: ABNORMAL

## 2018-11-08 ENCOUNTER — ANTICOAGULATION VISIT (OUTPATIENT)
Dept: CARDIOLOGY | Facility: CLINIC | Age: 83
End: 2018-11-08

## 2018-11-08 LAB — INR PPP: 2.4

## 2018-11-14 DIAGNOSIS — D47.1 MPN (MYELOPROLIFERATIVE NEOPLASM) (HCC): Primary | ICD-10-CM

## 2018-11-15 ENCOUNTER — ANTICOAGULATION VISIT (OUTPATIENT)
Dept: CARDIOLOGY | Facility: CLINIC | Age: 83
End: 2018-11-15

## 2018-11-15 ENCOUNTER — OFFICE VISIT (OUTPATIENT)
Dept: ONCOLOGY | Facility: CLINIC | Age: 83
End: 2018-11-15

## 2018-11-15 ENCOUNTER — LAB (OUTPATIENT)
Dept: LAB | Facility: HOSPITAL | Age: 83
End: 2018-11-15

## 2018-11-15 VITALS
WEIGHT: 119 LBS | HEART RATE: 80 BPM | SYSTOLIC BLOOD PRESSURE: 142 MMHG | TEMPERATURE: 98.1 F | OXYGEN SATURATION: 97 % | RESPIRATION RATE: 16 BRPM | BODY MASS INDEX: 22.47 KG/M2 | DIASTOLIC BLOOD PRESSURE: 68 MMHG | HEIGHT: 61 IN

## 2018-11-15 DIAGNOSIS — D47.1 CHRONIC MYELOSIS (HCC): Primary | ICD-10-CM

## 2018-11-15 DIAGNOSIS — D47.1 MPN (MYELOPROLIFERATIVE NEOPLASM) (HCC): ICD-10-CM

## 2018-11-15 DIAGNOSIS — C50.919 MALIGNANT NEOPLASM OF FEMALE BREAST, UNSPECIFIED ESTROGEN RECEPTOR STATUS, UNSPECIFIED LATERALITY, UNSPECIFIED SITE OF BREAST (HCC): Primary | ICD-10-CM

## 2018-11-15 LAB
ALBUMIN SERPL-MCNC: 3.7 G/DL (ref 3.5–5)
ALBUMIN/GLOB SERPL: 1.2 G/DL (ref 1.1–2.5)
ALP SERPL-CCNC: 64 U/L (ref 24–120)
ALT SERPL W P-5'-P-CCNC: 23 U/L (ref 0–54)
ANION GAP SERPL CALCULATED.3IONS-SCNC: 8 MMOL/L (ref 4–13)
AST SERPL-CCNC: 40 U/L (ref 7–45)
BASOPHILS # BLD AUTO: 0.06 10*3/MM3 (ref 0–0.2)
BASOPHILS NFR BLD AUTO: 1 % (ref 0–2)
BILIRUB SERPL-MCNC: 0.5 MG/DL (ref 0.1–1)
BUN BLD-MCNC: 27 MG/DL (ref 5–21)
BUN/CREAT SERPL: 21.1 (ref 7–25)
CALCIUM SPEC-SCNC: 8.8 MG/DL (ref 8.4–10.4)
CHLORIDE SERPL-SCNC: 101 MMOL/L (ref 98–110)
CO2 SERPL-SCNC: 30 MMOL/L (ref 24–31)
CREAT BLD-MCNC: 1.28 MG/DL (ref 0.5–1.4)
DEPRECATED RDW RBC AUTO: 64.3 FL (ref 40–54)
EOSINOPHIL # BLD AUTO: 0.14 10*3/MM3 (ref 0–0.7)
EOSINOPHIL NFR BLD AUTO: 2.3 % (ref 0–4)
ERYTHROCYTE [DISTWIDTH] IN BLOOD BY AUTOMATED COUNT: 18.7 % (ref 12–15)
GFR SERPL CREATININE-BSD FRML MDRD: 40 ML/MIN/1.73
GLOBULIN UR ELPH-MCNC: 3.2 GM/DL
GLUCOSE BLD-MCNC: 244 MG/DL (ref 70–100)
HCT VFR BLD AUTO: 39.2 % (ref 37–47)
HGB BLD-MCNC: 12.5 G/DL (ref 12–16)
HOLD SPECIMEN: NORMAL
HOLD SPECIMEN: NORMAL
IMM GRANULOCYTES # BLD: 0.05 10*3/MM3 (ref 0–0.03)
IMM GRANULOCYTES NFR BLD: 0.8 % (ref 0–5)
INR PPP: 3.8
LYMPHOCYTES # BLD AUTO: 0.54 10*3/MM3 (ref 0.72–4.86)
LYMPHOCYTES NFR BLD AUTO: 8.9 % (ref 15–45)
MCH RBC QN AUTO: 30 PG (ref 28–32)
MCHC RBC AUTO-ENTMCNC: 31.9 G/DL (ref 33–36)
MCV RBC AUTO: 94 FL (ref 82–98)
MONOCYTES # BLD AUTO: 0.68 10*3/MM3 (ref 0.19–1.3)
MONOCYTES NFR BLD AUTO: 11.2 % (ref 4–12)
NEUTROPHILS # BLD AUTO: 4.6 10*3/MM3 (ref 1.87–8.4)
NEUTROPHILS NFR BLD AUTO: 75.8 % (ref 39–78)
NRBC BLD MANUAL-RTO: 0 /100 WBC (ref 0–0)
PLATELET # BLD AUTO: 777 10*3/MM3 (ref 130–400)
PMV BLD AUTO: 11 FL (ref 6–12)
POTASSIUM BLD-SCNC: 3.9 MMOL/L (ref 3.5–5.3)
PROT SERPL-MCNC: 6.9 G/DL (ref 6.3–8.7)
RBC # BLD AUTO: 4.17 10*6/MM3 (ref 4.2–5.4)
SODIUM BLD-SCNC: 139 MMOL/L (ref 135–145)
WBC NRBC COR # BLD: 6.07 10*3/MM3 (ref 4.8–10.8)

## 2018-11-15 PROCEDURE — 85025 COMPLETE CBC W/AUTO DIFF WBC: CPT

## 2018-11-15 PROCEDURE — 80053 COMPREHEN METABOLIC PANEL: CPT

## 2018-11-15 PROCEDURE — 36415 COLL VENOUS BLD VENIPUNCTURE: CPT

## 2018-11-15 PROCEDURE — 99214 OFFICE O/P EST MOD 30 MIN: CPT | Performed by: INTERNAL MEDICINE

## 2018-11-15 RX ORDER — AMIODARONE HYDROCHLORIDE 100 MG/1
100 TABLET ORAL DAILY
COMMUNITY
End: 2019-01-31

## 2018-11-15 RX ORDER — ANAGRELIDE 1 MG/1
1 CAPSULE ORAL DAILY
Qty: 30 CAPSULE | Refills: 1 | Status: SHIPPED | OUTPATIENT
Start: 2018-11-15 | End: 2018-12-17 | Stop reason: SDUPTHER

## 2018-11-15 NOTE — PROGRESS NOTES
[facility]  HEMATOLOGY & ONCOLOGY        Subjective     VISIT DIAGNOSIS:   Encounter Diagnosis   Name Primary?   • Malignant neoplasm of female breast, unspecified estrogen receptor status, unspecified laterality, unspecified site of breast (CMS/HCC) Yes       REASON FOR VISIT:     Chief Complaint   Patient presents with   • Breast Cancer     1 month follow up   • Anorexia   • Edema     legs   • E. coli in the urine        HEMATOLOGY / ONCOLOGY HISTORY:    No history exists.     [No treatment plan]  Cancer Staging Information:  Cancer Staging  No matching staging information was found for the patient.      INTERVAL HISTORY  Patient ID: Lakesha Costello is a 83 y.o. year old female with Thrombocytoses, Plts over a million. Plt count was normal in 6/2017. Last Yr in 2017 she underwent a heart valve replacement and has been on Coumadin since.  Being on Coumadin in elderly patients invaribly tend to microscopically bleed in stools causing Iron deficiency and I fear that Iron deficiecny is causing thrombocytosis,  Although underlying Myeloproliferative disorder cannot be ruled out.     In house she received 2 doses of IV Venifer and today desouite IV Irin her Plts are still high at 912K.        Review of Systems         Medications:    Current Outpatient Medications   Medication Sig Dispense Refill   • amiodarone (PACERONE) 100 MG tablet Take 100 mg by mouth Daily.     • aspirin 81 MG EC tablet Take 81 mg by mouth Daily.     • atenolol (TENORMIN) 50 MG tablet Take 1 tab PO qam & 1/2 tab PO qpm. (Patient taking differently: Take 25 mg by mouth Daily. Take 1 tab PO qam & 1/2 tab PO qpm.) 135 tablet 3   • calcium carbonate (OS-MICHAEL) 600 MG tablet Take 600 mg by mouth 2 (Two) Times a Day With Meals.     • cholecalciferol (VITAMIN D3) 1000 UNITS tablet Take 1,000 Units by mouth Daily.     • fenofibrate 160 MG tablet Take 160 mg by mouth Daily.     • glipiZIDE (GLUCOTROL) 5 MG tablet Take 1 tablet by mouth 2  (Two) Times a Day Before Meals. (Patient taking differently: Take 5 mg by mouth As Needed.) 180 tablet 3   • hydrochlorothiazide (MICROZIDE) 12.5 MG capsule Take 1 capsule by mouth Every Morning. 30 capsule 3   • insulin detemir (LEVEMIR) 100 UNIT/ML injection Inject 30 Units under the skin into the appropriate area as directed Every Night. 30 units qam 30 units qpm 30 mL 3   • magnesium oxide (MAGOX) 400 (241.3 MG) MG tablet tablet Take 400 mg by mouth Daily.     • Multiple Vitamins-Minerals (PRESERVISION/LUTEIN) capsule Take 1 capsule by mouth 2 (Two) Times a Day.     • nitroglycerin (NITROSTAT) 0.4 MG SL tablet 1 under the tongue as needed for angina, may repeat q5mins for up three doses 30 tablet 3   • pantoprazole (PROTONIX) 40 MG EC tablet Take 40 mg by mouth Daily.     • polyethyl glycol-propyl glycol (SYSTANE) 0.4-0.3 % solution ophthalmic solution Administer 1 drop to both eyes Daily.     • sennosides-docusate sodium (SENOKOT-S) 8.6-50 MG tablet Take 2 tablets by mouth Every Night. 60 tablet 0   • simvastatin (ZOCOR) 20 MG tablet Take 1 tablet by mouth Every Night. 90 tablet 3   • venlafaxine XR (EFFEXOR-XR) 37.5 MG 24 hr capsule Take 37.5 mg by mouth Daily.     • vitamin B-12 (CYANOCOBALAMIN) 500 MCG tablet Take 500 mcg by mouth Daily.     • warfarin (COUMADIN) 1 MG tablet Take 2 tablet by mouth daily on Monday and Friday. Take 1 tablet by mouth daily on Tuesday, Wednesday, Thursday, Saturday and Sunday. 120 tablet 3     No current facility-administered medications for this visit.        ALLERGIES:    Allergies   Allergen Reactions   • Dilaudid [Hydromorphone Hcl]    • Dilaudid [Hydromorphone] Nausea And Vomiting   • Enalapril Angioedema   • Janumet [Sitagliptin-Metformin Hcl] Other (See Comments)     Chest pain   • Lopid [Gemfibrozil] Nausea And Vomiting   • Sulfa Antibiotics Other (See Comments)     Syncope     • Ultram [Tramadol] Itching       Objective      @VITALS    Current Status 11/15/2018   ECOG  score 1       General Appearance: Patient is awake, alert, oriented and in no acute distress. Patient is welldeveloped, wellnourished, and appears stated age.  HEENT: Normocephalic. Sclerae clear, conjunctiva pink, extraocular movements intact, pupils, round, reactive to light and  accommodation. Mouth and throat are clear with moist oral mucosa.  NECK: Supple, no jugular venous distention, thyroid not enlarged.  LYMPH: No cervical, supraclavicular, axillary, or inguinal lymphadenopathy.  CHEST: Equal bilateral expansion, AP  diameter normal, resonant percussion note  LUNGS: Good air movement, no rales, rhonchi, rubs or wheezes with auscultation  CARDIO: Regular sinus rhythm, no murmurs, gallops or rubs.  ABDOMEN: Nondistended, soft, No tenderness, no guarding, no rebound, No hepatosplenomegaly. No abdominal masses. Bowel sounds positive. No hernia  GENITALIA: Not examined.  BREASTS: Not examined.  MUSKEL: No joint swelling, decreased motion, or inflammation  EXTREMS: No edema, clubbing, cyanosis, No varicose veins.  NEURO: Grossly nonfocal, Gait is coordinated and smooth, Cognition is preserved.  SKIN: No rashes, no ecchymoses, no petechia.  PSYCH: Oriented to time, place and person. Memory is preserved. Mood and affect appear normal      RECENT LABS:  Lab on 11/15/2018   Component Date Value Ref Range Status   • WBC 11/15/2018 6.07  4.80 - 10.80 10*3/mm3 Final   • RBC 11/15/2018 4.17* 4.20 - 5.40 10*6/mm3 Final   • Hemoglobin 11/15/2018 12.5  12.0 - 16.0 g/dL Final   • Hematocrit 11/15/2018 39.2  37.0 - 47.0 % Final   • MCV 11/15/2018 94.0  82.0 - 98.0 fL Final   • MCH 11/15/2018 30.0  28.0 - 32.0 pg Final   • MCHC 11/15/2018 31.9* 33.0 - 36.0 g/dL Final   • RDW 11/15/2018 18.7* 12.0 - 15.0 % Final   • RDW-SD 11/15/2018 64.3* 40.0 - 54.0 fl Final   • MPV 11/15/2018 11.0  6.0 - 12.0 fL Final   • Platelets 11/15/2018 777* 130 - 400 10*3/mm3 Final   • Neutrophil % 11/15/2018 75.8  39.0 - 78.0 % Final   • Lymphocyte %  11/15/2018 8.9* 15.0 - 45.0 % Final   • Monocyte % 11/15/2018 11.2  4.0 - 12.0 % Final   • Eosinophil % 11/15/2018 2.3  0.0 - 4.0 % Final   • Basophil % 11/15/2018 1.0  0.0 - 2.0 % Final   • Immature Grans % 11/15/2018 0.8  0.0 - 5.0 % Final   • Neutrophils, Absolute 11/15/2018 4.60  1.87 - 8.40 10*3/mm3 Final   • Lymphocytes, Absolute 11/15/2018 0.54* 0.72 - 4.86 10*3/mm3 Final   • Monocytes, Absolute 11/15/2018 0.68  0.19 - 1.30 10*3/mm3 Final   • Eosinophils, Absolute 11/15/2018 0.14  0.00 - 0.70 10*3/mm3 Final   • Basophils, Absolute 11/15/2018 0.06  0.00 - 0.20 10*3/mm3 Final   • Immature Grans, Absolute 11/15/2018 0.05* 0.00 - 0.03 10*3/mm3 Final   • nRBC 11/15/2018 0.0  0.0 - 0.0 /100 WBC Final       RADIOLOGY:  No results found.         Assessment/Plan       Thrombocytoses, Plts over a million. Plt count was normal in 6/2017. Last Yr in 2017 she underwent a heart valve replacement and has been on Coumadin since.  Being on Coumadin in elderly patients invaribly tend to microscopically bleed in stools causing Iron deficiency and I fear that Iron deficiecny is causing thrombocytosis,  Although underlying Myeloproliferative disorder cannot be ruled out.     In house she received 2 doses of IV Venofer and today desouite IV Irin her Plts are still high at 912K.    Bm Bx done 10/03/2018 shows No Iron staining. However, the Pathologist felt that there were increased number of Megas, perhaps consistent with ET.Therefore after  additional IV Injectefor x 2 to 4 doses the Plts are still High at 777. AMOS 2 is Neg    Considering this will start her on Agrylin 1mg daily.  Recall, pt is on Coumadin because on a arrythmia cardiac issue.                      Abisai Barahona MD    11/15/2018    11:37 AM

## 2018-11-20 ENCOUNTER — ANTICOAGULATION VISIT (OUTPATIENT)
Dept: CARDIOLOGY | Facility: CLINIC | Age: 83
End: 2018-11-20

## 2018-11-20 LAB — INR PPP: 1.5

## 2018-11-21 ENCOUNTER — LAB (OUTPATIENT)
Dept: LAB | Facility: HOSPITAL | Age: 83
End: 2018-11-21
Attending: INTERNAL MEDICINE

## 2018-11-21 ENCOUNTER — ANTICOAGULATION VISIT (OUTPATIENT)
Dept: CARDIOLOGY | Facility: CLINIC | Age: 83
End: 2018-11-21

## 2018-11-21 DIAGNOSIS — I48.0 PAROXYSMAL ATRIAL FIBRILLATION (HCC): ICD-10-CM

## 2018-11-21 LAB
ANION GAP SERPL CALCULATED.3IONS-SCNC: 14 MMOL/L (ref 4–13)
BUN BLD-MCNC: 32 MG/DL (ref 5–21)
BUN/CREAT SERPL: 26 (ref 7–25)
CALCIUM SPEC-SCNC: 9.7 MG/DL (ref 8.4–10.4)
CHLORIDE SERPL-SCNC: 99 MMOL/L (ref 98–110)
CO2 SERPL-SCNC: 28 MMOL/L (ref 24–31)
CREAT BLD-MCNC: 1.23 MG/DL (ref 0.5–1.4)
GFR SERPL CREATININE-BSD FRML MDRD: 42 ML/MIN/1.73
GLUCOSE BLD-MCNC: 271 MG/DL (ref 70–100)
INR PPP: 1.62 (ref 0.91–1.09)
POTASSIUM BLD-SCNC: 4.5 MMOL/L (ref 3.5–5.3)
PROTHROMBIN TIME: 19.8 SECONDS (ref 11.9–14.6)
SODIUM BLD-SCNC: 141 MMOL/L (ref 135–145)

## 2018-11-21 PROCEDURE — 80048 BASIC METABOLIC PNL TOTAL CA: CPT | Performed by: INTERNAL MEDICINE

## 2018-11-21 PROCEDURE — 85610 PROTHROMBIN TIME: CPT | Performed by: INTERNAL MEDICINE

## 2018-11-21 PROCEDURE — 36415 COLL VENOUS BLD VENIPUNCTURE: CPT

## 2018-11-26 ENCOUNTER — ANTICOAGULATION VISIT (OUTPATIENT)
Dept: CARDIOLOGY | Facility: CLINIC | Age: 83
End: 2018-11-26

## 2018-11-26 LAB — INR PPP: 2.1

## 2018-11-30 ENCOUNTER — ANTICOAGULATION VISIT (OUTPATIENT)
Dept: CARDIOLOGY | Facility: CLINIC | Age: 83
End: 2018-11-30

## 2018-11-30 ENCOUNTER — APPOINTMENT (OUTPATIENT)
Dept: CT IMAGING | Facility: HOSPITAL | Age: 83
End: 2018-11-30

## 2018-11-30 ENCOUNTER — HOSPITAL ENCOUNTER (EMERGENCY)
Facility: HOSPITAL | Age: 83
Discharge: HOME | End: 2018-11-30
Attending: EMERGENCY MEDICINE | Admitting: EMERGENCY MEDICINE

## 2018-11-30 VITALS
WEIGHT: 115 LBS | BODY MASS INDEX: 21.71 KG/M2 | HEART RATE: 89 BPM | TEMPERATURE: 97.2 F | RESPIRATION RATE: 18 BRPM | DIASTOLIC BLOOD PRESSURE: 86 MMHG | SYSTOLIC BLOOD PRESSURE: 121 MMHG | OXYGEN SATURATION: 100 % | HEIGHT: 61 IN

## 2018-11-30 DIAGNOSIS — K62.5 RECTAL BLEEDING: Primary | ICD-10-CM

## 2018-11-30 LAB
ABO GROUP BLD: NORMAL
ALBUMIN SERPL-MCNC: 3.8 G/DL (ref 3.5–5)
ALBUMIN/GLOB SERPL: 1.2 G/DL (ref 1.1–2.5)
ALP SERPL-CCNC: 69 U/L (ref 24–120)
ALT SERPL W P-5'-P-CCNC: 26 U/L (ref 0–54)
ANION GAP SERPL CALCULATED.3IONS-SCNC: 11 MMOL/L (ref 4–13)
APTT PPP: 73.3 SECONDS (ref 24.1–34.8)
AST SERPL-CCNC: 69 U/L (ref 7–45)
BASOPHILS # BLD AUTO: 0.07 10*3/MM3 (ref 0–0.2)
BASOPHILS NFR BLD AUTO: 0.7 % (ref 0–2)
BILIRUB SERPL-MCNC: 0.7 MG/DL (ref 0.1–1)
BLD GP AB SCN SERPL QL: POSITIVE
BUN BLD-MCNC: 33 MG/DL (ref 5–21)
BUN/CREAT SERPL: 29.2 (ref 7–25)
CALCIUM SPEC-SCNC: 8.9 MG/DL (ref 8.4–10.4)
CHLORIDE SERPL-SCNC: 97 MMOL/L (ref 98–110)
CO2 SERPL-SCNC: 28 MMOL/L (ref 24–31)
CREAT BLD-MCNC: 1.13 MG/DL (ref 0.5–1.4)
DEPRECATED RDW RBC AUTO: 58.4 FL (ref 40–54)
EOSINOPHIL # BLD AUTO: 0.09 10*3/MM3 (ref 0–0.7)
EOSINOPHIL NFR BLD AUTO: 0.9 % (ref 0–4)
ERYTHROCYTE [DISTWIDTH] IN BLOOD BY AUTOMATED COUNT: 17.4 % (ref 12–15)
GFR SERPL CREATININE-BSD FRML MDRD: 46 ML/MIN/1.73
GLOBULIN UR ELPH-MCNC: 3.3 GM/DL
GLUCOSE BLD-MCNC: 210 MG/DL (ref 70–100)
HCT VFR BLD AUTO: 38 % (ref 37–47)
HGB BLD-MCNC: 12.2 G/DL (ref 12–16)
HOLD SPECIMEN: NORMAL
HOLD SPECIMEN: NORMAL
INR PPP: 1.7 (ref 0.91–1.09)
LYMPHOCYTES # BLD AUTO: 1.15 10*3/MM3 (ref 0.72–4.86)
LYMPHOCYTES NFR BLD AUTO: 11.6 % (ref 15–45)
MCH RBC QN AUTO: 29.3 PG (ref 28–32)
MCHC RBC AUTO-ENTMCNC: 32.1 G/DL (ref 33–36)
MCV RBC AUTO: 91.1 FL (ref 82–98)
MONOCYTES # BLD AUTO: 0.88 10*3/MM3 (ref 0.19–1.3)
MONOCYTES NFR BLD AUTO: 8.9 % (ref 4–12)
NEUTROPHILS # BLD AUTO: 7.42 10*3/MM3 (ref 1.87–8.4)
NEUTROPHILS NFR BLD AUTO: 74.8 % (ref 39–78)
PLATELET # BLD AUTO: 175 10*3/MM3 (ref 130–400)
PMV BLD AUTO: 14.3 FL (ref 6–12)
POTASSIUM BLD-SCNC: 3.8 MMOL/L (ref 3.5–5.3)
PROT SERPL-MCNC: 7.1 G/DL (ref 6.3–8.7)
PROTHROMBIN TIME: 20.6 SECONDS (ref 11.9–14.6)
RBC # BLD AUTO: 4.17 10*6/MM3 (ref 4.2–5.4)
RH BLD: NEGATIVE
SODIUM BLD-SCNC: 136 MMOL/L (ref 135–145)
T&S EXPIRATION DATE: NORMAL
WBC NRBC COR # BLD: 9.92 10*3/MM3 (ref 4.8–10.8)
WHOLE BLOOD HOLD SPECIMEN: NORMAL
WHOLE BLOOD HOLD SPECIMEN: NORMAL

## 2018-11-30 PROCEDURE — 85730 THROMBOPLASTIN TIME PARTIAL: CPT | Performed by: EMERGENCY MEDICINE

## 2018-11-30 PROCEDURE — 86850 RBC ANTIBODY SCREEN: CPT | Performed by: EMERGENCY MEDICINE

## 2018-11-30 PROCEDURE — 93010 ELECTROCARDIOGRAM REPORT: CPT | Performed by: INTERNAL MEDICINE

## 2018-11-30 PROCEDURE — 86901 BLOOD TYPING SEROLOGIC RH(D): CPT | Performed by: EMERGENCY MEDICINE

## 2018-11-30 PROCEDURE — 80053 COMPREHEN METABOLIC PANEL: CPT | Performed by: EMERGENCY MEDICINE

## 2018-11-30 PROCEDURE — 25010000002 IOPAMIDOL 61 % SOLUTION: Performed by: EMERGENCY MEDICINE

## 2018-11-30 PROCEDURE — 86900 BLOOD TYPING SEROLOGIC ABO: CPT | Performed by: EMERGENCY MEDICINE

## 2018-11-30 PROCEDURE — 85025 COMPLETE CBC W/AUTO DIFF WBC: CPT | Performed by: EMERGENCY MEDICINE

## 2018-11-30 PROCEDURE — 93005 ELECTROCARDIOGRAM TRACING: CPT | Performed by: EMERGENCY MEDICINE

## 2018-11-30 PROCEDURE — 74177 CT ABD & PELVIS W/CONTRAST: CPT

## 2018-11-30 PROCEDURE — 99284 EMERGENCY DEPT VISIT MOD MDM: CPT

## 2018-11-30 PROCEDURE — 86870 RBC ANTIBODY IDENTIFICATION: CPT | Performed by: EMERGENCY MEDICINE

## 2018-11-30 PROCEDURE — 85610 PROTHROMBIN TIME: CPT | Performed by: EMERGENCY MEDICINE

## 2018-11-30 RX ORDER — HYDROCORTISONE ACETATE 25 MG/1
25 SUPPOSITORY RECTAL 2 TIMES DAILY PRN
Qty: 10 SUPPOSITORY | Refills: 0 | Status: SHIPPED | OUTPATIENT
Start: 2018-11-30 | End: 2019-01-01 | Stop reason: HOSPADM

## 2018-11-30 RX ORDER — SODIUM CHLORIDE 0.9 % (FLUSH) 0.9 %
10 SYRINGE (ML) INJECTION AS NEEDED
Status: DISCONTINUED | OUTPATIENT
Start: 2018-11-30 | End: 2018-11-30 | Stop reason: HOSPADM

## 2018-11-30 RX ADMIN — IOPAMIDOL 70 ML: 612 INJECTION, SOLUTION INTRAVENOUS at 15:37

## 2018-12-01 LAB
ANTI-C: NORMAL
ANTI-D: NORMAL
ANTI-E: NORMAL

## 2018-12-06 ENCOUNTER — TELEPHONE (OUTPATIENT)
Dept: ONCOLOGY | Facility: CLINIC | Age: 83
End: 2018-12-06

## 2018-12-06 NOTE — TELEPHONE ENCOUNTER
Returned call to patients daughter, she questions the need for new script of Agrylin. She states that the original script was written for 30 tabs one po qd, but had to call back in after mothers surgery was teresa and her PLT count was 775, Dr Barahona wanted the PLT number to be below 500. He instructed her to take 2 tabs po qd. Plt Count at surgery was 505. Post surgery on 11/24/18, patient was placed on Pradaxa and d/c coumadin. She ended up in the ER Dept on 11/30/18 due to rectal bleeding. ER Physician instructed her to go back to one tab po qd, which is what she is currently taking, but daughter states she is now going to be short of medication if her f/u apt is not until mid Jan 2019. Reviewed patient apt's, patient is [facility] for 12/13/18. Patient will attend apt at that date and time.

## 2018-12-12 DIAGNOSIS — D75.839 THROMBOCYTOSIS: Primary | ICD-10-CM

## 2018-12-13 ENCOUNTER — APPOINTMENT (OUTPATIENT)
Dept: LAB | Facility: HOSPITAL | Age: 83
End: 2018-12-13

## 2018-12-13 ENCOUNTER — OFFICE VISIT (OUTPATIENT)
Dept: ONCOLOGY | Facility: CLINIC | Age: 83
End: 2018-12-13

## 2018-12-13 VITALS
HEART RATE: 76 BPM | DIASTOLIC BLOOD PRESSURE: 76 MMHG | HEIGHT: 61 IN | RESPIRATION RATE: 16 BRPM | OXYGEN SATURATION: 97 % | BODY MASS INDEX: 21.73 KG/M2 | SYSTOLIC BLOOD PRESSURE: 118 MMHG

## 2018-12-13 DIAGNOSIS — C50.919 MALIGNANT NEOPLASM OF FEMALE BREAST, UNSPECIFIED ESTROGEN RECEPTOR STATUS, UNSPECIFIED LATERALITY, UNSPECIFIED SITE OF BREAST (HCC): Primary | ICD-10-CM

## 2018-12-13 DIAGNOSIS — D75.839 THROMBOCYTOSIS: Primary | ICD-10-CM

## 2018-12-13 LAB
ALBUMIN SERPL-MCNC: 3.6 G/DL (ref 3.5–5)
ALBUMIN/GLOB SERPL: 1.2 G/DL (ref 1.1–2.5)
ALP SERPL-CCNC: 61 U/L (ref 24–120)
ALT SERPL W P-5'-P-CCNC: <15 U/L (ref 0–54)
ANION GAP SERPL CALCULATED.3IONS-SCNC: 9 MMOL/L (ref 4–13)
AST SERPL-CCNC: 36 U/L (ref 7–45)
BASOPHILS # BLD AUTO: 0.08 10*3/MM3 (ref 0–0.2)
BASOPHILS NFR BLD AUTO: 0.9 % (ref 0–2)
BILIRUB SERPL-MCNC: 0.6 MG/DL (ref 0.1–1)
BUN BLD-MCNC: 28 MG/DL (ref 5–21)
BUN/CREAT SERPL: 21.7 (ref 7–25)
CALCIUM SPEC-SCNC: 9.1 MG/DL (ref 8.4–10.4)
CHLORIDE SERPL-SCNC: 101 MMOL/L (ref 98–110)
CO2 SERPL-SCNC: 28 MMOL/L (ref 24–31)
CREAT BLD-MCNC: 1.29 MG/DL (ref 0.5–1.4)
DEPRECATED RDW RBC AUTO: 63.7 FL (ref 40–54)
EOSINOPHIL # BLD AUTO: 0.15 10*3/MM3 (ref 0–0.7)
EOSINOPHIL NFR BLD AUTO: 1.7 % (ref 0–4)
ERYTHROCYTE [DISTWIDTH] IN BLOOD BY AUTOMATED COUNT: 18.8 % (ref 12–15)
GFR SERPL CREATININE-BSD FRML MDRD: 40 ML/MIN/1.73
GLOBULIN UR ELPH-MCNC: 3.1 GM/DL
GLUCOSE BLD-MCNC: 254 MG/DL (ref 70–100)
HCT VFR BLD AUTO: 33.5 % (ref 37–47)
HGB BLD-MCNC: 10.5 G/DL (ref 12–16)
HOLD SPECIMEN: NORMAL
HOLD SPECIMEN: NORMAL
LYMPHOCYTES # BLD AUTO: 1.16 10*3/MM3 (ref 0.72–4.86)
LYMPHOCYTES NFR BLD AUTO: 13.3 % (ref 15–45)
MCH RBC QN AUTO: 29.5 PG (ref 28–32)
MCHC RBC AUTO-ENTMCNC: 31.3 G/DL (ref 33–36)
MCV RBC AUTO: 94.1 FL (ref 82–98)
MONOCYTES # BLD AUTO: 0.75 10*3/MM3 (ref 0.19–1.3)
MONOCYTES NFR BLD AUTO: 8.6 % (ref 4–12)
NEUTROPHILS # BLD AUTO: 6.49 10*3/MM3 (ref 1.87–8.4)
NEUTROPHILS NFR BLD AUTO: 74.1 % (ref 39–78)
PLATELET # BLD AUTO: 316 10*3/MM3 (ref 130–400)
PMV BLD AUTO: 14 FL (ref 6–12)
POTASSIUM BLD-SCNC: 4.3 MMOL/L (ref 3.5–5.3)
PROT SERPL-MCNC: 6.7 G/DL (ref 6.3–8.7)
RBC # BLD AUTO: 3.56 10*6/MM3 (ref 4.2–5.4)
SODIUM BLD-SCNC: 138 MMOL/L (ref 135–145)
WBC NRBC COR # BLD: 8.75 10*3/MM3 (ref 4.8–10.8)

## 2018-12-13 PROCEDURE — 80053 COMPREHEN METABOLIC PANEL: CPT | Performed by: INTERNAL MEDICINE

## 2018-12-13 PROCEDURE — 99214 OFFICE O/P EST MOD 30 MIN: CPT | Performed by: INTERNAL MEDICINE

## 2018-12-13 PROCEDURE — 85025 COMPLETE CBC W/AUTO DIFF WBC: CPT | Performed by: INTERNAL MEDICINE

## 2018-12-13 PROCEDURE — 36415 COLL VENOUS BLD VENIPUNCTURE: CPT | Performed by: INTERNAL MEDICINE

## 2018-12-13 RX ORDER — ANAGRELIDE 1 MG/1
1 CAPSULE ORAL DAILY
Qty: 90 CAPSULE | Refills: 6 | Status: SHIPPED | OUTPATIENT
Start: 2018-12-13 | End: 2018-12-17 | Stop reason: SDUPTHER

## 2018-12-13 NOTE — PROGRESS NOTES
[facility]  HEMATOLOGY & ONCOLOGY        Subjective     VISIT DIAGNOSIS:   Encounter Diagnosis   Name Primary?   • Malignant neoplasm of female breast, unspecified estrogen receptor status, unspecified laterality, unspecified site of breast (CMS/HCC) Yes       REASON FOR VISIT:     Chief Complaint   Patient presents with   • Breast Cancer     follow up        HEMATOLOGY / ONCOLOGY HISTORY:    No history exists.     [No treatment plan]  Cancer Staging Information:  Cancer Staging  No matching staging information was found for the patient.      INTERVAL HISTORY  Patient ID: Lakesha Costello is a 83 y.o. year old female with Thrombocytoses, Plts over a million. Plt count was normal in 6/2017. Last Yr in 2017 she underwent a heart valve replacement and has been on Coumadin since.  Being on Coumadin in elderly patients invaribly tend to microscopically bleed in stools causing Iron deficiency and I fear that Iron deficiecny is causing thrombocytosis,  Although underlying Myeloproliferative disorder cannot be ruled out.     In house she received 2 doses of IV Venifer and today desouite IV Irin her Plts are still high at 912K.        Review of Systems         Medications:    Current Outpatient Medications   Medication Sig Dispense Refill   • amiodarone (PACERONE) 100 MG tablet Take 100 mg by mouth Daily.     • anagrelide (AGRYLIN) 1 MG capsule Take 1 capsule by mouth Daily. 30 capsule 1   • aspirin 81 MG EC tablet Take 81 mg by mouth Daily.     • atenolol (TENORMIN) 50 MG tablet Take 1 tab PO qam & 1/2 tab PO qpm. (Patient taking differently: Take 25 mg by mouth Daily. Take 1 tab PO qam & 1/2 tab PO qpm.) 135 tablet 3   • calcium carbonate (OS-MICHAEL) 600 MG tablet Take 600 mg by mouth 2 (Two) Times a Day With Meals.     • cholecalciferol (VITAMIN D3) 1000 UNITS tablet Take 1,000 Units by mouth Daily.     • fenofibrate 160 MG tablet Take 160 mg by mouth Daily.     • glipiZIDE (GLUCOTROL) 5 MG tablet Take 1  tablet by mouth 2 (Two) Times a Day Before Meals. (Patient taking differently: Take 5 mg by mouth As Needed.) 180 tablet 3   • hydrochlorothiazide (MICROZIDE) 12.5 MG capsule Take 1 capsule by mouth Every Morning. 30 capsule 3   • hydrocortisone (ANUSOL-HC) 25 MG suppository Insert 1 suppository into the rectum 2 (Two) Times a Day As Needed for Hemorrhoids. 10 suppository 0   • insulin detemir (LEVEMIR) 100 UNIT/ML injection Inject 30 Units under the skin into the appropriate area as directed Every Night. 30 units qam 30 units qpm 30 mL 3   • magnesium oxide (MAGOX) 400 (241.3 MG) MG tablet tablet Take 400 mg by mouth Daily.     • Multiple Vitamins-Minerals (PRESERVISION/LUTEIN) capsule Take 1 capsule by mouth 2 (Two) Times a Day.     • nitroglycerin (NITROSTAT) 0.4 MG SL tablet 1 under the tongue as needed for angina, may repeat q5mins for up three doses 30 tablet 3   • pantoprazole (PROTONIX) 40 MG EC tablet Take 40 mg by mouth Daily.     • polyethyl glycol-propyl glycol (SYSTANE) 0.4-0.3 % solution ophthalmic solution Administer 1 drop to both eyes Daily.     • sennosides-docusate sodium (SENOKOT-S) 8.6-50 MG tablet Take 2 tablets by mouth Every Night. 60 tablet 0   • simvastatin (ZOCOR) 20 MG tablet Take 1 tablet by mouth Every Night. 90 tablet 3   • venlafaxine XR (EFFEXOR-XR) 37.5 MG 24 hr capsule Take 37.5 mg by mouth Daily.     • vitamin B-12 (CYANOCOBALAMIN) 500 MCG tablet Take 500 mcg by mouth Daily.     • warfarin (COUMADIN) 1 MG tablet Take 2 tablet by mouth daily on Monday and Friday. Take 1 tablet by mouth daily on Tuesday, Wednesday, Thursday, Saturday and Sunday. 120 tablet 3     No current facility-administered medications for this visit.        ALLERGIES:    Allergies   Allergen Reactions   • Dilaudid [Hydromorphone Hcl]    • Dilaudid [Hydromorphone] Nausea And Vomiting   • Enalapril Angioedema   • Janumet [Sitagliptin-Metformin Hcl] Other (See Comments)     Chest pain   • Lopid [Gemfibrozil]  Nausea And Vomiting   • Sulfa Antibiotics Other (See Comments)     Syncope     • Ultram [Tramadol] Itching       Objective      @VITALS    Current Status 12/13/2018   ECOG score 1       General Appearance: Patient is awake, alert, oriented and in no acute distress. Patient is welldeveloped, wellnourished, and appears stated age.  HEENT: Normocephalic. Sclerae clear, conjunctiva pink, extraocular movements intact, pupils, round, reactive to light and  accommodation. Mouth and throat are clear with moist oral mucosa.  NECK: Supple, no jugular venous distention, thyroid not enlarged.  LYMPH: No cervical, supraclavicular, axillary, or inguinal lymphadenopathy.  CHEST: Equal bilateral expansion, AP  diameter normal, resonant percussion note  LUNGS: Good air movement, no rales, rhonchi, rubs or wheezes with auscultation  CARDIO: Regular sinus rhythm, no murmurs, gallops or rubs.  ABDOMEN: Nondistended, soft, No tenderness, no guarding, no rebound, No hepatosplenomegaly. No abdominal masses. Bowel sounds positive. No hernia  GENITALIA: Not examined.  BREASTS: Not examined.  MUSKEL: No joint swelling, decreased motion, or inflammation  EXTREMS: No edema, clubbing, cyanosis, No varicose veins.  NEURO: Grossly nonfocal, Gait is coordinated and smooth, Cognition is preserved.  SKIN: No rashes, no ecchymoses, no petechia.  PSYCH: Oriented to time, place and person. Memory is preserved. Mood and affect appear normal      RECENT LABS:  No visits with results within 7 Day(s) from this visit.   Latest known visit with results is:   Admission on 11/30/2018, Discharged on 11/30/2018   Component Date Value Ref Range Status   • Extra Tube 11/30/2018 hold for add-on   Final    Auto resulted   • Extra Tube 11/30/2018 Hold for add-ons.   Final    Auto resulted.   • Extra Tube 11/30/2018 hold for add-on   Final    Auto resulted   • Extra Tube 11/30/2018 Hold for add-ons.   Final    Auto resulted.   • Glucose 11/30/2018 210* 70 - 100 mg/dL  Final   • BUN 11/30/2018 33* 5 - 21 mg/dL Final   • Creatinine 11/30/2018 1.13  0.50 - 1.40 mg/dL Final   • Sodium 11/30/2018 136  135 - 145 mmol/L Final   • Potassium 11/30/2018 3.8  3.5 - 5.3 mmol/L Final   • Chloride 11/30/2018 97* 98 - 110 mmol/L Final   • CO2 11/30/2018 28.0  24.0 - 31.0 mmol/L Final   • Calcium 11/30/2018 8.9  8.4 - 10.4 mg/dL Final   • Total Protein 11/30/2018 7.1  6.3 - 8.7 g/dL Final   • Albumin 11/30/2018 3.80  3.50 - 5.00 g/dL Final   • ALT (SGPT) 11/30/2018 26  0 - 54 U/L Final   • AST (SGOT) 11/30/2018 69* 7 - 45 U/L Final   • Alkaline Phosphatase 11/30/2018 69  24 - 120 U/L Final   • Total Bilirubin 11/30/2018 0.7  0.1 - 1.0 mg/dL Final   • eGFR Non African Amer 11/30/2018 46* >60 mL/min/1.73 Final   • Globulin 11/30/2018 3.3  gm/dL Final   • A/G Ratio 11/30/2018 1.2  1.1 - 2.5 g/dL Final   • BUN/Creatinine Ratio 11/30/2018 29.2* 7.0 - 25.0 Final   • Anion Gap 11/30/2018 11.0  4.0 - 13.0 mmol/L Final   • Protime 11/30/2018 20.6* 11.9 - 14.6 Seconds Final   • INR 11/30/2018 1.70* 0.91 - 1.09 Final   • PTT 11/30/2018 73.3* 24.1 - 34.8 seconds Final   • ABO Type 11/30/2018 A   Final   • RH type 11/30/2018 Negative   Final   • Antibody Screen 11/30/2018 Positive   Final   • T&S Expiration Date 11/30/2018 12/3/2018 11:59:59 PM   Final   • WBC 11/30/2018 9.92  4.80 - 10.80 10*3/mm3 Final   • RBC 11/30/2018 4.17* 4.20 - 5.40 10*6/mm3 Final   • Hemoglobin 11/30/2018 12.2  12.0 - 16.0 g/dL Final   • Hematocrit 11/30/2018 38.0  37.0 - 47.0 % Final   • MCV 11/30/2018 91.1  82.0 - 98.0 fL Final   • MCH 11/30/2018 29.3  28.0 - 32.0 pg Final   • MCHC 11/30/2018 32.1* 33.0 - 36.0 g/dL Final   • RDW 11/30/2018 17.4* 12.0 - 15.0 % Final   • RDW-SD 11/30/2018 58.4* 40.0 - 54.0 fl Final   • MPV 11/30/2018 14.3* 6.0 - 12.0 fL Final   • Platelets 11/30/2018 175  130 - 400 10*3/mm3 Final   • Neutrophil % 11/30/2018 74.8  39.0 - 78.0 % Final   • Lymphocyte % 11/30/2018 11.6* 15.0 - 45.0 % Final   • Monocyte  % 11/30/2018 8.9  4.0 - 12.0 % Final   • Eosinophil % 11/30/2018 0.9  0.0 - 4.0 % Final   • Basophil % 11/30/2018 0.7  0.0 - 2.0 % Final   • Neutrophils, Absolute 11/30/2018 7.42  1.87 - 8.40 10*3/mm3 Final   • Lymphocytes, Absolute 11/30/2018 1.15  0.72 - 4.86 10*3/mm3 Final   • Monocytes, Absolute 11/30/2018 0.88  0.19 - 1.30 10*3/mm3 Final   • Eosinophils, Absolute 11/30/2018 0.09  0.00 - 0.70 10*3/mm3 Final   • Basophils, Absolute 11/30/2018 0.07  0.00 - 0.20 10*3/mm3 Final   • Anti-C 11/30/2018 ANTI-C   Final   • Anti-E 11/30/2018 ANTI-E   Final   • Anti-D 11/30/2018 ANTI-D   Final       RADIOLOGY:  Ct Abdomen Pelvis With Contrast    Result Date: 11/30/2018  Narrative: EXAM: CT Abdomen and Pelvis with contrast      11/30/2018 3:52 PM CST INDICATION: rectal bleeding and pain COMPARISON: CT abdomen pelvis dated 10/2/2018. TECHNIQUE: Abdomen and pelvis were scanned utilizing a multidetector helical scanner from the diaphragm to the ischial tuberosities after administration of IV contrast. Coronal and sagittal reformations were obtained. [Routine protocol is performed.]  Radiation dose equals  mGy-cm.  Automated exposure control dose reduction technique was implemented.   FINDINGS:  LINES and TUBES: None. LOWER THORAX: Scarring of the lung bases. Mild edema suspected. The heart is enlarged. Cardiac device with leads in the right atrium and right ventricle.  HEPATOBILIARY:  No focal hepatic lesions.   No liver laceration.   Mild intrahepatic and extrahepatic biliary duct dilatation, favored to reflect reservoir effect..  GALLBLADDER: Surgically absent..  SPLEEN:  Borderline splenomegaly, with spleen measuring 13.2 cm..    No splenic laceration.  PANCREAS:  No focal masses or ductal dilatation.  ADRENALS:  No adrenal nodules.  KIDNEYS/URETERS:  Kidneys enhance symmetrically.   No hydronephrosis.  Too-small -to characterize hypodensities in the inferior pole of the right kidney..  No stones.  GI TRACT: No bowel  obstruction. Colonic diverticulosis, without evidence of acute diverticulitis. Significant amount of stool in the distal colon and the rectal folds..   No acute appendicitis..  PELVIC ORGANS/BLADDER:  No acute pathology.  LYMPH NODES:  No inguinal, pelvic wall, retroperitoneal or mesenteric lymphadenopathy by size criteria..  VESSELS:  Atherosclerotic disease. No aortic aneurysm. No evidence of acute acute aortic injury..  PERITONEUM / RETROPERITONEUM:  No free air or fluid.  BONES:  Prior L2 vertebral body fracture, status post vertebral body augmentation. No interval new compression deformity..  SOFT TISSUES:  Unremarkable.      Impression: 1.  No acute abdominal pelvic abnormalities. 2.  Colonic diverticulosis, without evidence of acute diverticulitis. 3.  Borderline splenomegaly.  This report was finalized on 11/30/2018 15:57 by Dr. Debbi Palacios MD.           Assessment/Plan       Thrombocytoses, Plts over a million. Plt count was normal in 6/2017. Last Yr in 2017 she underwent a heart valve replacement and has been on Coumadin since.  Being on Coumadin in elderly patients invaribly tend to microscopically bleed in stools causing Iron deficiency and I fear that Iron deficiecny is causing thrombocytosis,  Although underlying Myeloproliferative disorder cannot be ruled out.     In house she received 2 doses of IV Venofer and today desouite IV Irin her Plts are still high at 912K.    Bm Bx done 10/03/2018 shows No Iron staining. However, the Pathologist felt that there were increased number of Megas, perhaps consistent with ET.Therefore after  additional IV Injectefor x 2 to 4 doses the Plts are still High at 777. AMOS 2 is Neg    Considering this will start her on Agrylin 1mg daily.  Rpt is now on Pradaxa Rx by Cardiology post procedure, that caused Lower GI bleed causing her Hgb down to 10.8gm%.   Todays Plts down 316. Will therefore cont Agrylin 1mg q day and also Rx FESO4 Slow FE 1 tab q  day.                      Abisai Barahona MD    12/13/2018    11:50 AM

## 2018-12-14 ENCOUNTER — CLINICAL SUPPORT (OUTPATIENT)
Dept: CARDIOLOGY | Facility: CLINIC | Age: 83
End: 2018-12-14

## 2018-12-14 DIAGNOSIS — I49.5 SSS (SICK SINUS SYNDROME) (HCC): ICD-10-CM

## 2018-12-14 PROCEDURE — 93294 REM INTERROG EVL PM/LDLS PM: CPT | Performed by: PHYSICIAN ASSISTANT

## 2018-12-14 PROCEDURE — 93296 REM INTERROG EVL PM/IDS: CPT | Performed by: PHYSICIAN ASSISTANT

## 2018-12-17 ENCOUNTER — APPOINTMENT (OUTPATIENT)
Dept: CARDIOLOGY | Facility: HOSPITAL | Age: 83
End: 2018-12-17
Attending: INTERNAL MEDICINE

## 2018-12-17 RX ORDER — ANAGRELIDE 1 MG/1
1 CAPSULE ORAL DAILY
Qty: 90 CAPSULE | Refills: 6 | Status: SHIPPED | OUTPATIENT
Start: 2018-12-17 | End: 2019-03-08

## 2018-12-17 NOTE — TELEPHONE ENCOUNTER
Call from [facility] stating that they have been unable to get the anagrelide prescription.  Called [facility] and anagrelide is on back order.  Sent script to [facility].

## 2018-12-20 ENCOUNTER — LAB (OUTPATIENT)
Dept: LAB | Facility: HOSPITAL | Age: 83
End: 2018-12-20

## 2018-12-20 ENCOUNTER — OFFICE VISIT (OUTPATIENT)
Dept: INTERNAL MEDICINE | Facility: CLINIC | Age: 83
End: 2018-12-20

## 2018-12-20 ENCOUNTER — HOSPITAL ENCOUNTER (OUTPATIENT)
Dept: GENERAL RADIOLOGY | Facility: HOSPITAL | Age: 83
Discharge: HOME | End: 2018-12-20
Admitting: NURSE PRACTITIONER

## 2018-12-20 VITALS
TEMPERATURE: 97.8 F | SYSTOLIC BLOOD PRESSURE: 148 MMHG | OXYGEN SATURATION: 89 % | RESPIRATION RATE: 16 BRPM | DIASTOLIC BLOOD PRESSURE: 55 MMHG | WEIGHT: 118 LBS | BODY MASS INDEX: 22.28 KG/M2 | HEIGHT: 61 IN | HEART RATE: 68 BPM

## 2018-12-20 DIAGNOSIS — R06.02 SHORTNESS OF BREATH: ICD-10-CM

## 2018-12-20 DIAGNOSIS — R60.0 LOWER EXTREMITY EDEMA: ICD-10-CM

## 2018-12-20 DIAGNOSIS — R14.0 ABDOMINAL DISTENTION: Primary | ICD-10-CM

## 2018-12-20 DIAGNOSIS — R11.14 BILIOUS VOMITING WITH NAUSEA: ICD-10-CM

## 2018-12-20 DIAGNOSIS — R09.02 HYPOXEMIA: ICD-10-CM

## 2018-12-20 DIAGNOSIS — K64.4 EXTERNAL HEMORRHOIDS: ICD-10-CM

## 2018-12-20 DIAGNOSIS — K62.89 RECTAL PAIN: ICD-10-CM

## 2018-12-20 DIAGNOSIS — R14.0 ABDOMINAL DISTENTION: ICD-10-CM

## 2018-12-20 DIAGNOSIS — R14.2 BELCHING: ICD-10-CM

## 2018-12-20 LAB
ALBUMIN SERPL-MCNC: 3.8 G/DL (ref 3.5–5)
ALBUMIN/GLOB SERPL: 1.2 G/DL (ref 1.1–2.5)
ALP SERPL-CCNC: 66 U/L (ref 24–120)
ALT SERPL W P-5'-P-CCNC: 19 U/L (ref 0–54)
ANION GAP SERPL CALCULATED.3IONS-SCNC: 12 MMOL/L (ref 4–13)
AST SERPL-CCNC: 34 U/L (ref 7–45)
BACTERIA UR QL AUTO: ABNORMAL /HPF
BASOPHILS # BLD AUTO: 0.07 10*3/MM3 (ref 0–0.2)
BASOPHILS NFR BLD AUTO: 0.9 % (ref 0–2)
BILIRUB SERPL-MCNC: 0.7 MG/DL (ref 0.1–1)
BILIRUB UR QL STRIP: NEGATIVE
BUN BLD-MCNC: 31 MG/DL (ref 5–21)
BUN/CREAT SERPL: 26.7 (ref 7–25)
CALCIUM SPEC-SCNC: 9.5 MG/DL (ref 8.4–10.4)
CHLORIDE SERPL-SCNC: 98 MMOL/L (ref 98–110)
CLARITY UR: CLEAR
CO2 SERPL-SCNC: 29 MMOL/L (ref 24–31)
COLOR UR: ABNORMAL
CREAT BLD-MCNC: 1.16 MG/DL (ref 0.5–1.4)
DEPRECATED RDW RBC AUTO: 70.6 FL (ref 40–54)
EOSINOPHIL # BLD AUTO: 0.1 10*3/MM3 (ref 0–0.7)
EOSINOPHIL NFR BLD AUTO: 1.2 % (ref 0–4)
ERYTHROCYTE [DISTWIDTH] IN BLOOD BY AUTOMATED COUNT: 20.7 % (ref 12–15)
GFR SERPL CREATININE-BSD FRML MDRD: 45 ML/MIN/1.73
GLOBULIN UR ELPH-MCNC: 3.1 GM/DL
GLUCOSE BLD-MCNC: 267 MG/DL (ref 70–100)
GLUCOSE UR STRIP-MCNC: NEGATIVE MG/DL
HCT VFR BLD AUTO: 37 % (ref 37–47)
HGB BLD-MCNC: 11.7 G/DL (ref 12–16)
HGB UR QL STRIP.AUTO: NEGATIVE
HYALINE CASTS UR QL AUTO: ABNORMAL /LPF
KETONES UR QL STRIP: ABNORMAL
LEUKOCYTE ESTERASE UR QL STRIP.AUTO: NEGATIVE
LYMPHOCYTES # BLD AUTO: 1.22 10*3/MM3 (ref 0.72–4.86)
LYMPHOCYTES NFR BLD AUTO: 15 % (ref 15–45)
MCH RBC QN AUTO: 30.9 PG (ref 28–32)
MCHC RBC AUTO-ENTMCNC: 31.6 G/DL (ref 33–36)
MCV RBC AUTO: 97.6 FL (ref 82–98)
MONOCYTES # BLD AUTO: 0.74 10*3/MM3 (ref 0.19–1.3)
MONOCYTES NFR BLD AUTO: 9.1 % (ref 4–12)
NEUTROPHILS # BLD AUTO: 5.91 10*3/MM3 (ref 1.87–8.4)
NEUTROPHILS NFR BLD AUTO: 72.3 % (ref 39–78)
NITRITE UR QL STRIP: NEGATIVE
PH UR STRIP.AUTO: <=5 [PH] (ref 5–8)
PLATELET # BLD AUTO: 478 10*3/MM3 (ref 130–400)
PMV BLD AUTO: 13.6 FL (ref 6–12)
POTASSIUM BLD-SCNC: 4.1 MMOL/L (ref 3.5–5.3)
PROT SERPL-MCNC: 6.9 G/DL (ref 6.3–8.7)
PROT UR QL STRIP: ABNORMAL
RBC # BLD AUTO: 3.79 10*6/MM3 (ref 4.2–5.4)
RBC # UR: ABNORMAL /HPF
REF LAB TEST METHOD: ABNORMAL
SODIUM BLD-SCNC: 139 MMOL/L (ref 135–145)
SP GR UR STRIP: >1.03 (ref 1–1.03)
SQUAMOUS #/AREA URNS HPF: ABNORMAL /HPF
UROBILINOGEN UR QL STRIP: ABNORMAL
WBC NRBC COR # BLD: 8.16 10*3/MM3 (ref 4.8–10.8)
WBC UR QL AUTO: ABNORMAL /HPF

## 2018-12-20 PROCEDURE — 99214 OFFICE O/P EST MOD 30 MIN: CPT | Performed by: NURSE PRACTITIONER

## 2018-12-20 PROCEDURE — 80053 COMPREHEN METABOLIC PANEL: CPT | Performed by: NURSE PRACTITIONER

## 2018-12-20 PROCEDURE — 81001 URINALYSIS AUTO W/SCOPE: CPT | Performed by: NURSE PRACTITIONER

## 2018-12-20 PROCEDURE — 36415 COLL VENOUS BLD VENIPUNCTURE: CPT

## 2018-12-20 PROCEDURE — 85025 COMPLETE CBC W/AUTO DIFF WBC: CPT | Performed by: NURSE PRACTITIONER

## 2018-12-20 PROCEDURE — 74018 RADEX ABDOMEN 1 VIEW: CPT

## 2018-12-20 RX ORDER — DABIGATRAN ETEXILATE 75 MG/1
75 CAPSULE ORAL 2 TIMES DAILY
COMMUNITY
End: 2019-03-08 | Stop reason: ALTCHOICE

## 2018-12-20 RX ORDER — LIDOCAINE HCL AND HYDROCORTISONE ACETATE 28; 5.5 MG/G; MG/G
1 GEL RECTAL 2 TIMES DAILY PRN
Qty: 100 G | Refills: 3 | Status: SHIPPED | OUTPATIENT
Start: 2018-12-20 | End: 2019-01-01 | Stop reason: HOSPADM

## 2018-12-20 NOTE — PROGRESS NOTES
CC: joint swelling.     History:  Lakesha Costello is a 83 y.o. female who presents today for follow-up for evaluation of the above:  Patient presents today with her daughter. Her daughter states she made the appointment for ankle swelling but she has multiple complaints today.   Patient's daughter reports that the patient and her  have left [facility] assisted living and are now back at home. The daughter reports that since being home the patient has had an increase of her abdominal discomfort, rectal pain and n/v. The patient states that even water makes her stomach hurt. She feels distended and is rubbing her RUQ.       ROS:  Review of Systems   Constitutional: Negative for fatigue and unexpected weight change.   HENT: Negative.    Eyes: Negative.    Respiratory: Positive for shortness of breath.    Cardiovascular: Positive for leg swelling.   Gastrointestinal: Positive for abdominal distention, abdominal pain, constipation, nausea and rectal pain. Negative for diarrhea.        Belching   Endocrine: Negative.    Genitourinary: Negative for difficulty urinating, dyspareunia, genital sores, menstrual problem, pelvic pain, vaginal bleeding, vaginal discharge and vaginal pain.   Musculoskeletal: Negative.    Skin: Negative.    Neurological: Negative.    Psychiatric/Behavioral: Negative.        Ms. Costello  reports that  has never smoked. she has never used smokeless tobacco. She reports that she does not drink alcohol or use drugs.      Current Outpatient Medications:   •  amiodarone (PACERONE) 100 MG tablet, Take 100 mg by mouth Daily., Disp: , Rfl:   •  anagrelide (AGRYLIN) 1 MG capsule, Take 1 capsule by mouth Daily., Disp: 90 capsule, Rfl: 6  •  aspirin 81 MG EC tablet, Take 81 mg by mouth Daily., Disp: , Rfl:   •  atenolol (TENORMIN) 50 MG tablet, Take 1 tab PO qam & 1/2 tab PO qpm. (Patient taking differently: Take 25 mg by mouth Daily. Take 1 tab PO qam & 1/2 tab PO qpm.), Disp: 135 tablet, Rfl: 3  •   calcium carbonate (OS-MICHAEL) 600 MG tablet, Take 600 mg by mouth 2 (Two) Times a Day With Meals., Disp: , Rfl:   •  cholecalciferol (VITAMIN D3) 1000 UNITS tablet, Take 1,000 Units by mouth Daily., Disp: , Rfl:   •  fenofibrate 160 MG tablet, Take 160 mg by mouth Daily., Disp: , Rfl:   •  Ferrous Sulfate  (45 Fe) MG tablet controlled-release, Take 142 mg by mouth Daily., Disp: 90 tablet, Rfl: 3  •  glipiZIDE (GLUCOTROL) 5 MG tablet, Take 1 tablet by mouth 2 (Two) Times a Day Before Meals. (Patient taking differently: Take 5 mg by mouth As Needed.), Disp: 180 tablet, Rfl: 3  •  hydrochlorothiazide (MICROZIDE) 12.5 MG capsule, Take 1 capsule by mouth Every Morning., Disp: 30 capsule, Rfl: 3  •  hydrocortisone (ANUSOL-HC) 25 MG suppository, Insert 1 suppository into the rectum 2 (Two) Times a Day As Needed for Hemorrhoids., Disp: 10 suppository, Rfl: 0  •  insulin detemir (LEVEMIR) 100 UNIT/ML injection, Inject 30 Units under the skin into the appropriate area as directed Every Night. 30 units qam 30 units qpm, Disp: 30 mL, Rfl: 3  •  magnesium oxide (MAGOX) 400 (241.3 MG) MG tablet tablet, Take 400 mg by mouth Daily., Disp: , Rfl:   •  Multiple Vitamins-Minerals (PRESERVISION/LUTEIN) capsule, Take 1 capsule by mouth 2 (Two) Times a Day., Disp: , Rfl:   •  nitroglycerin (NITROSTAT) 0.4 MG SL tablet, 1 under the tongue as needed for angina, may repeat q5mins for up three doses, Disp: 30 tablet, Rfl: 3  •  pantoprazole (PROTONIX) 40 MG EC tablet, Take 40 mg by mouth Daily., Disp: , Rfl:   •  polyethyl glycol-propyl glycol (SYSTANE) 0.4-0.3 % solution ophthalmic solution, Administer 1 drop to both eyes Daily., Disp: , Rfl:   •  sennosides-docusate sodium (SENOKOT-S) 8.6-50 MG tablet, Take 2 tablets by mouth Every Night., Disp: 60 tablet, Rfl: 0  •  simvastatin (ZOCOR) 20 MG tablet, Take 1 tablet by mouth Every Night., Disp: 90 tablet, Rfl: 3  •  venlafaxine XR (EFFEXOR-XR) 37.5 MG 24 hr capsule, Take 37.5 mg by mouth  "Daily., Disp: , Rfl:   •  vitamin B-12 (CYANOCOBALAMIN) 500 MCG tablet, Take 500 mcg by mouth Daily., Disp: , Rfl:   •  warfarin (COUMADIN) 1 MG tablet, Take 2 tablet by mouth daily on Monday and Friday. Take 1 tablet by mouth daily on Tuesday, Wednesday, Thursday, Saturday and Sunday., Disp: 120 tablet, Rfl: 3      OBJECTIVE:  /55 (BP Location: Left arm, Patient Position: Sitting)   Pulse 68   Temp 97.8 °F (36.6 °C) (Oral)   Resp 16   Ht 154.9 cm (61\")   Wt 53.5 kg (118 lb)   SpO2 (!) 89%   BMI 22.30 kg/m²    Physical Exam   Constitutional: She is oriented to person, place, and time. She appears well-developed and well-nourished.   HENT:   Head: Normocephalic and atraumatic.   Eyes: EOM are normal. Pupils are equal, round, and reactive to light.   Neck: Normal range of motion. Neck supple.   Cardiovascular: Normal rate, regular rhythm and normal heart sounds.   Pulmonary/Chest: Effort normal and breath sounds normal.   Abdominal: Soft. Bowel sounds are normal. She exhibits distension. There is tenderness in the right upper quadrant.   After palpation of abdomen patient began belching and moaning.    Genitourinary: Rectal exam shows external hemorrhoid and tenderness.   Genitourinary Comments: No impaction felt with EVELYN.    Musculoskeletal: Normal range of motion. She exhibits edema (bilateral ankle edema is mild).   Neurological: She is alert and oriented to person, place, and time.   Skin: Skin is warm and dry.   Psychiatric: She has a normal mood and affect.   Vitals reviewed.      Assessment/Plan    Lakesha was seen today for joint swelling.    Diagnoses and all orders for this visit:    Abdominal distention  boilious vomiting with nausea  Belching  -     Cancel: XR Abdomen KUB; Future  -     XR Abdomen KUB; Future  Will further evaluate with imaging.     Hypoxemia  Shortness of breath  -     Comprehensive metabolic panel; Future  -     CBC w AUTO Differential; Future  O2% ranges from 88%-90% while at " rest.   Patient is on oxygen at home but does not have a portable tank currently. I feel she would greatly benefit from portable oxygen. Order given to get this from [facility].       Rectal pain  External hemorrhoids  -     Witch Hazel (TUCKS) 50 % pads; Apply 1 pad topically 6 (Six) Times a Day.  -     Lidocaine-Hydrocortisone Ace 2.8-0.55 % gel; Insert 1 application into the rectum 2 (Two) Times a Day As Needed (hemorrhoid pain).  -     Urinalysis With Culture If Indicated - Urine, Clean Catch; Future  No bleeding on exam today. Will evaluate further with imaging.     Lower extremity edema  Advised patient to wear compression stockings and elevated feet while at rest. No redness to suggests cellulitis. Mild edema    An After Visit Summary was printed and given to the patient at discharge.  Return for Next scheduled follow up. Sooner if problems arise.         Linda LOREDO. 12/21/2018

## 2018-12-20 NOTE — PATIENT INSTRUCTIONS
Witch Hazel topical solution wipes  What is this medicine?  WITCH HAZEL (WICH hey zuhl) is a botanical astringent from the plant Derek edgar. The wipes and pads are used to relieve itching, burning, and irritation caused by hemorrhoids or bowel movements. They may also be used to clean the outer vaginal area after childbirth or the rectal area following rectal surgery.  This medicine may be used for other purposes; ask your health care provider or pharmacist if you have questions.  COMMON BRAND NAME(S): Hemorrhoidal, Medi-Pads, Preparation H Maximum Strength, Preparation H Totables, Sani-Pads with Aloe, Tucks  What should I tell my health care provider before I take this medicine?  They need to know if you have any of these conditions:  -bleeding in the treated area  -an unusual or allergic reaction to witch hazel, other medicines, foods, dyes, or preservatives  How should I use this medicine?  This medicine is for external use only. Follow the directions on the package label or the advice of your doctor or health care professional. Do not use your medicine more often than directed.  Talk to your pediatrician regarding the use of this medicine in children. While this drug may be used for children as young as 12 years for selected conditions, precautions do apply.  Overdosage: If you think you have taken too much of this medicine contact a poison control center or emergency room at once.  NOTE: This medicine is only for you. Do not share this medicine with others.  What if I miss a dose?  This does not apply; the pads or wipes may be used as needed, up to 6 times per day.  What may interact with this medicine?  Interactions are not expected. Do not use any other skin products on the same area of skin without asking your doctor or health care professional.  This list may not describe all possible interactions. Give your health care provider a list of all the medicines, herbs, non-prescription drugs, or  dietary supplements you use. Also tell them if you smoke, drink alcohol, or use illegal drugs. Some items may interact with your medicine.  What should I watch for while using this medicine?  Tell your doctor or healthcare professional if your symptoms do not start to get better or if they get worse.  Many pads and wipes are safe for septic and  system disposal. Check specific product label.  What side effects may I notice from receiving this medicine?  Side effects that you should report to your doctor or health care professional as soon as possible:  -allergic reactions like skin rash, itching or hives, swelling of the face, lips, or tongue  Side effects that usually do not require medical attention (report to your doctor or health care professional if they continue or are bothersome):  -mild skin dryness or irritation  This list may not describe all possible side effects. Call your doctor for medical advice about side effects. You may report side effects to FDA at 7-806-FDA-2733.  Where should I keep my medicine?  Keep out of the reach of children.  Store at room temperature between 15 and 30 degrees C (59 and 86 degrees F). Throw away any unused medicine after the expiration date.  NOTE: This sheet is a summary. It may not cover all possible information. If you have questions about this medicine, talk to your doctor, pharmacist, or health care provider.  © 2018 Elsevier/Gold Standard (2017-01-19 08:48:35)

## 2018-12-21 ENCOUNTER — TELEPHONE (OUTPATIENT)
Dept: INTERNAL MEDICINE | Facility: CLINIC | Age: 83
End: 2018-12-21

## 2018-12-21 RX ORDER — ONDANSETRON 4 MG/1
4 TABLET, ORALLY DISINTEGRATING ORAL EVERY 4 HOURS PRN
Qty: 30 TABLET | Refills: 3 | Status: ON HOLD | OUTPATIENT
Start: 2018-12-21 | End: 2020-01-01

## 2018-12-21 RX ORDER — BISACODYL 10 MG
10 SUPPOSITORY, RECTAL RECTAL DAILY
Qty: 8 EACH | Refills: 1 | Status: ON HOLD | OUTPATIENT
Start: 2018-12-21 | End: 2019-01-01

## 2018-12-21 NOTE — TELEPHONE ENCOUNTER
----- Message from FACUNDO Weiss sent at 12/21/2018  8:09 AM CST -----  Large amount of stool in the colon. I have tried to call the daughter's cell. Left a message to return call. Needs to have a large bowel movement. I have sent in zofran and suppositories to the pharmacy. If she does not have a BM will need to go to the ER. Labs look stable.

## 2018-12-21 NOTE — TELEPHONE ENCOUNTER
Patient's daughter Margarita informed of results and that zofran and suppositories have been prescribed.  Patient's daughter informed if the patient does not have a BM with use of the suppositories, she needs to go to the ER.

## 2018-12-22 NOTE — PROGRESS NOTES
Dual Chamber Pacemaker Evaluation Report  [facility]    December 22, 2018    Primary Cardiologist: Obi  : Guidant Model: [employer] MRI  Implant date: 2/3/17    Reason for evaluation: routine  Indication for pacemaker: sick sinus syndrome    Measurements  Atrial sensing - P wave: 3.1 mV  Atrial threshold: 0.7 V@ 0.4 ms  Atrial lead impedance: 775 ohms  Ventricular sensing - R wave: 13.0 mV  Ventricular threshold: 0.8 V @ 0.4 ms  Ventricular lead impedance:   963 ohms     Diagnostic Data  Atrial paced: 56 %  Ventricular paced: 70 %  Other: no new events noted  Battery status: satisfactory         Final Parameters  Mode:  DDDR  Lower rate: 60 bpm   Upper rate: 130 bpm  AV Delay: paced- 220-300 ms  [id number] ms  Atrial - Amplitude: 1.8 V   Pulse width: 0.4 ms   Sensitivity: 0.25 mV     Ventricular - Amplitude: 2.0 V  Pulse width: 0.4 ms  Sensitivity: 0.6 mV    Changes made: n/a  Conclusions: normal pacemaker function    Follow up: 3 months

## 2019-01-01 ENCOUNTER — APPOINTMENT (OUTPATIENT)
Dept: GENERAL RADIOLOGY | Facility: HOSPITAL | Age: 84
End: 2019-01-01

## 2019-01-01 ENCOUNTER — READMISSION MANAGEMENT (OUTPATIENT)
Dept: CALL CENTER | Facility: HOSPITAL | Age: 84
End: 2019-01-01

## 2019-01-01 ENCOUNTER — NURSE TRIAGE (OUTPATIENT)
Dept: CALL CENTER | Facility: HOSPITAL | Age: 84
End: 2019-01-01

## 2019-01-01 ENCOUNTER — OFFICE VISIT (OUTPATIENT)
Dept: INTERNAL MEDICINE | Facility: CLINIC | Age: 84
End: 2019-01-01

## 2019-01-01 ENCOUNTER — TELEPHONE (OUTPATIENT)
Dept: INTERNAL MEDICINE | Facility: CLINIC | Age: 84
End: 2019-01-01

## 2019-01-01 ENCOUNTER — LAB (OUTPATIENT)
Dept: LAB | Facility: HOSPITAL | Age: 84
End: 2019-01-01

## 2019-01-01 ENCOUNTER — HOSPITAL ENCOUNTER (EMERGENCY)
Facility: HOSPITAL | Age: 84
Discharge: HOME | End: 2019-09-30
Attending: EMERGENCY MEDICINE | Admitting: EMERGENCY MEDICINE

## 2019-01-01 ENCOUNTER — APPOINTMENT (OUTPATIENT)
Dept: CT IMAGING | Facility: HOSPITAL | Age: 84
End: 2019-01-01

## 2019-01-01 ENCOUNTER — APPOINTMENT (OUTPATIENT)
Dept: CARDIOLOGY | Facility: HOSPITAL | Age: 84
End: 2019-01-01

## 2019-01-01 ENCOUNTER — HOSPITAL ENCOUNTER (INPATIENT)
Facility: HOSPITAL | Age: 84
LOS: 3 days | Discharge: HOME HEALTH | End: 2019-06-10
Attending: INTERNAL MEDICINE | Admitting: INTERNAL MEDICINE

## 2019-01-01 ENCOUNTER — HOSPITAL ENCOUNTER (EMERGENCY)
Facility: HOSPITAL | Age: 84
Discharge: HOME | End: 2019-10-06
Attending: FAMILY MEDICINE | Admitting: FAMILY MEDICINE

## 2019-01-01 ENCOUNTER — EPISODE CHANGES (OUTPATIENT)
Dept: CASE MANAGEMENT | Facility: OTHER | Age: 84
End: 2019-01-01

## 2019-01-01 ENCOUNTER — TRANSITIONAL CARE MANAGEMENT TELEPHONE ENCOUNTER (OUTPATIENT)
Dept: INTERNAL MEDICINE | Facility: CLINIC | Age: 84
End: 2019-01-01

## 2019-01-01 ENCOUNTER — PATIENT OUTREACH (OUTPATIENT)
Dept: CASE MANAGEMENT | Facility: OTHER | Age: 84
End: 2019-01-01

## 2019-01-01 VITALS
OXYGEN SATURATION: 92 % | RESPIRATION RATE: 16 BRPM | BODY MASS INDEX: 17.01 KG/M2 | HEIGHT: 61 IN | DIASTOLIC BLOOD PRESSURE: 60 MMHG | SYSTOLIC BLOOD PRESSURE: 108 MMHG | HEART RATE: 70 BPM | WEIGHT: 90.1 LBS

## 2019-01-01 VITALS
TEMPERATURE: 98 F | HEIGHT: 61 IN | OXYGEN SATURATION: 99 % | SYSTOLIC BLOOD PRESSURE: 141 MMHG | RESPIRATION RATE: 16 BRPM | WEIGHT: 88 LBS | BODY MASS INDEX: 16.62 KG/M2 | HEART RATE: 70 BPM | DIASTOLIC BLOOD PRESSURE: 51 MMHG

## 2019-01-01 VITALS
DIASTOLIC BLOOD PRESSURE: 46 MMHG | RESPIRATION RATE: 16 BRPM | OXYGEN SATURATION: 98 % | WEIGHT: 124.8 LBS | BODY MASS INDEX: 23.56 KG/M2 | SYSTOLIC BLOOD PRESSURE: 131 MMHG | TEMPERATURE: 98.3 F | HEIGHT: 61 IN | HEART RATE: 69 BPM

## 2019-01-01 VITALS
WEIGHT: 121.9 LBS | HEART RATE: 66 BPM | HEIGHT: 61 IN | SYSTOLIC BLOOD PRESSURE: 128 MMHG | OXYGEN SATURATION: 96 % | DIASTOLIC BLOOD PRESSURE: 62 MMHG | TEMPERATURE: 97.6 F | BODY MASS INDEX: 23.01 KG/M2 | RESPIRATION RATE: 16 BRPM

## 2019-01-01 VITALS
TEMPERATURE: 97.9 F | HEIGHT: 61 IN | WEIGHT: 84 LBS | DIASTOLIC BLOOD PRESSURE: 50 MMHG | SYSTOLIC BLOOD PRESSURE: 108 MMHG | OXYGEN SATURATION: 98 % | RESPIRATION RATE: 16 BRPM | HEART RATE: 73 BPM | BODY MASS INDEX: 15.86 KG/M2

## 2019-01-01 VITALS
SYSTOLIC BLOOD PRESSURE: 122 MMHG | RESPIRATION RATE: 16 BRPM | DIASTOLIC BLOOD PRESSURE: 74 MMHG | WEIGHT: 123.5 LBS | HEIGHT: 61 IN | BODY MASS INDEX: 23.32 KG/M2 | OXYGEN SATURATION: 93 % | HEART RATE: 69 BPM

## 2019-01-01 VITALS
OXYGEN SATURATION: 96 % | HEART RATE: 60 BPM | RESPIRATION RATE: 16 BRPM | BODY MASS INDEX: 19.54 KG/M2 | SYSTOLIC BLOOD PRESSURE: 80 MMHG | HEIGHT: 61 IN | DIASTOLIC BLOOD PRESSURE: 62 MMHG | WEIGHT: 103.5 LBS

## 2019-01-01 VITALS
BODY MASS INDEX: 22.94 KG/M2 | WEIGHT: 121.5 LBS | DIASTOLIC BLOOD PRESSURE: 70 MMHG | SYSTOLIC BLOOD PRESSURE: 128 MMHG | HEIGHT: 61 IN | OXYGEN SATURATION: 98 % | HEART RATE: 70 BPM | RESPIRATION RATE: 16 BRPM

## 2019-01-01 DIAGNOSIS — D75.839 THROMBOCYTOSIS: ICD-10-CM

## 2019-01-01 DIAGNOSIS — E61.1 IRON DEFICIENCY: Primary | ICD-10-CM

## 2019-01-01 DIAGNOSIS — I50.32 CHRONIC DIASTOLIC CONGESTIVE HEART FAILURE (HCC): ICD-10-CM
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
## 2019-01-01 DIAGNOSIS — E78.2 MIXED HYPERLIPIDEMIA: ICD-10-CM

## 2019-01-01 DIAGNOSIS — I48.0 PAROXYSMAL ATRIAL FIBRILLATION (HCC): ICD-10-CM

## 2019-01-01 DIAGNOSIS — N18.30 CKD (CHRONIC KIDNEY DISEASE) STAGE 3, GFR 30-59 ML/MIN (HCC): ICD-10-CM

## 2019-01-01 DIAGNOSIS — R53.81 CHRONIC FATIGUE AND MALAISE: Primary | ICD-10-CM

## 2019-01-01 DIAGNOSIS — R19.00 ABDOMINAL SWELLING: Primary | ICD-10-CM

## 2019-01-01 DIAGNOSIS — E11.51 TYPE 2 DIABETES MELLITUS WITH DIABETIC PERIPHERAL ANGIOPATHY WITHOUT GANGRENE, WITH LONG-TERM CURRENT USE OF INSULIN (HCC): ICD-10-CM

## 2019-01-01 DIAGNOSIS — C92.10 CML (CHRONIC MYELOID LEUKEMIA) (HCC): ICD-10-CM

## 2019-01-01 DIAGNOSIS — E61.1 IRON DEFICIENCY: ICD-10-CM

## 2019-01-01 DIAGNOSIS — K29.70 GASTRITIS WITHOUT BLEEDING, UNSPECIFIED CHRONICITY, UNSPECIFIED GASTRITIS TYPE: ICD-10-CM

## 2019-01-01 DIAGNOSIS — I50.9 ACUTE ON CHRONIC CONGESTIVE HEART FAILURE, UNSPECIFIED HEART FAILURE TYPE (HCC): Primary | ICD-10-CM

## 2019-01-01 DIAGNOSIS — R06.02 SHORTNESS OF BREATH: ICD-10-CM

## 2019-01-01 DIAGNOSIS — I50.33 ACUTE ON CHRONIC DIASTOLIC CONGESTIVE HEART FAILURE (HCC): ICD-10-CM

## 2019-01-01 DIAGNOSIS — M25.50 POLYARTHRALGIA: Primary | ICD-10-CM

## 2019-01-01 DIAGNOSIS — R18.8 OTHER ASCITES: ICD-10-CM

## 2019-01-01 DIAGNOSIS — Z79.4 TYPE 2 DIABETES MELLITUS WITH DIABETIC PERIPHERAL ANGIOPATHY WITHOUT GANGRENE, WITH LONG-TERM CURRENT USE OF INSULIN (HCC): ICD-10-CM

## 2019-01-01 DIAGNOSIS — S00.03XA HEMATOMA OF SCALP, INITIAL ENCOUNTER: ICD-10-CM

## 2019-01-01 DIAGNOSIS — D64.9 CHRONIC ANEMIA: ICD-10-CM

## 2019-01-01 DIAGNOSIS — I48.92 ATRIAL FLUTTER, UNSPECIFIED TYPE (HCC): ICD-10-CM

## 2019-01-01 DIAGNOSIS — I25.10 CORONARY ARTERY DISEASE INVOLVING NATIVE CORONARY ARTERY OF NATIVE HEART WITHOUT ANGINA PECTORIS: ICD-10-CM

## 2019-01-01 DIAGNOSIS — L23.9 ALLERGIC DERMATITIS: Primary | ICD-10-CM

## 2019-01-01 DIAGNOSIS — F33.1 MODERATE EPISODE OF RECURRENT MAJOR DEPRESSIVE DISORDER (HCC): ICD-10-CM

## 2019-01-01 DIAGNOSIS — N18.9 CHRONIC RENAL IMPAIRMENT, UNSPECIFIED CKD STAGE: ICD-10-CM

## 2019-01-01 DIAGNOSIS — I10 ESSENTIAL HYPERTENSION: ICD-10-CM

## 2019-01-01 DIAGNOSIS — D47.1 MPN (MYELOPROLIFERATIVE NEOPLASM) (HCC): ICD-10-CM

## 2019-01-01 DIAGNOSIS — R53.82 CHRONIC FATIGUE AND MALAISE: Primary | ICD-10-CM

## 2019-01-01 DIAGNOSIS — I50.9 CONGESTIVE HEART FAILURE (HCC): ICD-10-CM

## 2019-01-01 DIAGNOSIS — I10 ESSENTIAL HYPERTENSION: Primary | ICD-10-CM

## 2019-01-01 DIAGNOSIS — R51.9 NONINTRACTABLE HEADACHE, UNSPECIFIED CHRONICITY PATTERN, UNSPECIFIED HEADACHE TYPE: Primary | ICD-10-CM

## 2019-01-01 DIAGNOSIS — I25.10 CORONARY ARTERY DISEASE INVOLVING NATIVE CORONARY ARTERY OF NATIVE HEART WITHOUT ANGINA PECTORIS: Primary | ICD-10-CM

## 2019-01-01 LAB
ALBUMIN SERPL-MCNC: 3.4 G/DL (ref 3.5–5)
ALBUMIN SERPL-MCNC: 3.8 G/DL (ref 3.5–5.2)
ALBUMIN SERPL-MCNC: 3.9 G/DL (ref 3.5–5)
ALBUMIN SERPL-MCNC: 3.9 G/DL (ref 3.5–5.2)
ALBUMIN SERPL-MCNC: 4.1 G/DL (ref 3.5–5.2)
ALBUMIN/GLOB SERPL: 1.1 G/DL
ALBUMIN/GLOB SERPL: 1.2 G/DL
ALBUMIN/GLOB SERPL: 1.3 G/DL (ref 1.1–2.5)
ALBUMIN/GLOB SERPL: 1.3 G/DL (ref 1.1–2.5)
ALP SERPL-CCNC: 100 U/L (ref 24–120)
ALP SERPL-CCNC: 101 U/L (ref 39–117)
ALP SERPL-CCNC: 101 U/L (ref 39–117)
ALP SERPL-CCNC: 108 U/L (ref 24–120)
ALT SERPL W P-5'-P-CCNC: 21 U/L (ref 1–33)
ALT SERPL W P-5'-P-CCNC: 35 U/L (ref 1–33)
ALT SERPL W P-5'-P-CCNC: <15 U/L (ref 0–54)
ALT SERPL W P-5'-P-CCNC: <15 U/L (ref 0–54)
ANION GAP SERPL CALCULATED.3IONS-SCNC: 10 MMOL/L (ref 4–13)
ANION GAP SERPL CALCULATED.3IONS-SCNC: 11 MMOL/L (ref 5–15)
ANION GAP SERPL CALCULATED.3IONS-SCNC: 13 MMOL/L (ref 5–15)
ANION GAP SERPL CALCULATED.3IONS-SCNC: 15.7 MMOL/L (ref 5–15)
ANION GAP SERPL CALCULATED.3IONS-SCNC: 9 MMOL/L (ref 4–13)
APTT PPP: 28.5 SECONDS (ref 24.1–35)
AST SERPL-CCNC: 26 U/L (ref 7–45)
AST SERPL-CCNC: 31 U/L (ref 1–32)
AST SERPL-CCNC: 35 U/L (ref 7–45)
AST SERPL-CCNC: 39 U/L (ref 1–32)
BACTERIA SPEC AEROBE CULT: NORMAL
BACTERIA SPEC AEROBE CULT: NORMAL
BACTERIA UR QL AUTO: NORMAL /HPF
BASOPHILS # BLD AUTO: 0.02 10*3/MM3 (ref 0–0.2)
BASOPHILS # BLD AUTO: 0.04 10*3/MM3 (ref 0–0.2)
BASOPHILS # BLD AUTO: 0.1 10*3/MM3 (ref 0–0.2)
BASOPHILS NFR BLD AUTO: 0.2 % (ref 0–1.5)
BASOPHILS NFR BLD AUTO: 0.4 % (ref 0–1.5)
BASOPHILS NFR BLD AUTO: 0.7 % (ref 0–2)
BH CV ECHO MEAS - AO MAX PG (FULL): 10.9 MMHG
BH CV ECHO MEAS - AO MAX PG: 12.3 MMHG
BH CV ECHO MEAS - AO MEAN PG (FULL): 4.5 MMHG
BH CV ECHO MEAS - AO MEAN PG: 5.5 MMHG
BH CV ECHO MEAS - AO ROOT AREA (BSA CORRECTED): 1.3
BH CV ECHO MEAS - AO ROOT AREA: 3.1 CM^2
BH CV ECHO MEAS - AO ROOT DIAM: 2 CM
BH CV ECHO MEAS - AO V2 MAX: 175 CM/SEC
BH CV ECHO MEAS - AO V2 MEAN: 102.1 CM/SEC
BH CV ECHO MEAS - AO V2 VTI: 31.5 CM
BH CV ECHO MEAS - AVA(I,A): 0.9 CM^2
BH CV ECHO MEAS - AVA(I,D): 0.9 CM^2
BH CV ECHO MEAS - AVA(V,A): 0.67 CM^2
BH CV ECHO MEAS - AVA(V,D): 0.67 CM^2
BH CV ECHO MEAS - BSA(HAYCOCK): 1.5 M^2
BH CV ECHO MEAS - BSA: 1.5 M^2
BH CV ECHO MEAS - BZI_BMI: 22.9 KILOGRAMS/M^2
BH CV ECHO MEAS - BZI_METRIC_HEIGHT: 154.9 CM
BH CV ECHO MEAS - BZI_METRIC_WEIGHT: 54.9 KG
BH CV ECHO MEAS - EDV(CUBED): 59.8 ML
BH CV ECHO MEAS - EDV(MOD-SP4): 59 ML
BH CV ECHO MEAS - EDV(TEICH): 66.3 ML
BH CV ECHO MEAS - EF(CUBED): 70.9 %
BH CV ECHO MEAS - EF(TEICH): 63.2 %
BH CV ECHO MEAS - ESV(CUBED): 17.4 ML
BH CV ECHO MEAS - ESV(MOD-SP4): 29.3 ML
BH CV ECHO MEAS - ESV(TEICH): 24.4 ML
BH CV ECHO MEAS - FS: 33.8 %
BH CV ECHO MEAS - IVS/LVPW: 1.1
BH CV ECHO MEAS - IVSD: 1.3 CM
BH CV ECHO MEAS - LA DIMENSION: 4.7 CM
BH CV ECHO MEAS - LA/AO: 2.4
BH CV ECHO MEAS - LAT PEAK E' VEL: 4.2 CM/SEC
BH CV ECHO MEAS - LV DIASTOLIC VOL/BSA (35-75): 38.7 ML/M^2
BH CV ECHO MEAS - LV MASS(C)D: 167.9 GRAMS
BH CV ECHO MEAS - LV MASS(C)DI: 110.1 GRAMS/M^2
BH CV ECHO MEAS - LV MAX PG: 1.4 MMHG
BH CV ECHO MEAS - LV MEAN PG: 1 MMHG
BH CV ECHO MEAS - LV SYSTOLIC VOL/BSA (12-30): 19.2 ML/M^2
BH CV ECHO MEAS - LV V1 MAX: 58.3 CM/SEC
BH CV ECHO MEAS - LV V1 MEAN: 39.8 CM/SEC
BH CV ECHO MEAS - LV V1 VTI: 14 CM
BH CV ECHO MEAS - LVIDD: 3.9 CM
BH CV ECHO MEAS - LVIDS: 2.6 CM
BH CV ECHO MEAS - LVLD AP4: 6.7 CM
BH CV ECHO MEAS - LVLS AP4: 6.1 CM
BH CV ECHO MEAS - LVOT AREA (M): 2 CM^2
BH CV ECHO MEAS - LVOT AREA: 2 CM^2
BH CV ECHO MEAS - LVOT DIAM: 1.6 CM
BH CV ECHO MEAS - LVPWD: 1.2 CM
BH CV ECHO MEAS - MED PEAK E' VEL: 4.11 CM/SEC
BH CV ECHO MEAS - MR MAX PG: 96.5 MMHG
BH CV ECHO MEAS - MR MAX VEL: 490 CM/SEC
BH CV ECHO MEAS - MR MEAN PG: 58 MMHG
BH CV ECHO MEAS - MR MEAN VEL: 355.3 CM/SEC
BH CV ECHO MEAS - MR VTI: 158 CM
BH CV ECHO MEAS - MV AREA (1 DIAM): 2.8 CM^2
BH CV ECHO MEAS - MV DEC TIME: 0.28 SEC
BH CV ECHO MEAS - MV DIAM: 1.9 CM
BH CV ECHO MEAS - MV E MAX VEL: 129 CM/SEC
BH CV ECHO MEAS - MV FLOW AREA(1DIAM): 2.8 CM^2
BH CV ECHO MEAS - PI END-D VEL: 130 CM/SEC
BH CV ECHO MEAS - RAP SYSTOLE: 10 MMHG
BH CV ECHO MEAS - RVSP: 43 MMHG
BH CV ECHO MEAS - SI(AO): 64.8 ML/M^2
BH CV ECHO MEAS - SI(CUBED): 27.8 ML/M^2
BH CV ECHO MEAS - SI(LVOT): 18.4 ML/M^2
BH CV ECHO MEAS - SI(MOD-SP4): 19.5 ML/M^2
BH CV ECHO MEAS - SI(TEICH): 27.5 ML/M^2
BH CV ECHO MEAS - SV(AO): 98.8 ML
BH CV ECHO MEAS - SV(CUBED): 42.4 ML
BH CV ECHO MEAS - SV(LVOT): 28.1 ML
BH CV ECHO MEAS - SV(MOD-SP4): 29.7 ML
BH CV ECHO MEAS - SV(TEICH): 41.9 ML
BH CV ECHO MEAS - TR MAX VEL: 288 CM/SEC
BH CV ECHO MEASUREMENTS AVERAGE E/E' RATIO: 31.05
BILIRUB SERPL-MCNC: 0.4 MG/DL (ref 0.2–1.2)
BILIRUB SERPL-MCNC: 0.6 MG/DL (ref 0.2–1.2)
BILIRUB SERPL-MCNC: 0.7 MG/DL (ref 0.1–1)
BILIRUB SERPL-MCNC: 1.6 MG/DL (ref 0.1–1)
BILIRUB UR QL STRIP: NEGATIVE
BILIRUB UR QL STRIP: NEGATIVE
BUN BLD-MCNC: 35 MG/DL (ref 5–21)
BUN BLD-MCNC: 37 MG/DL (ref 5–21)
BUN BLD-MCNC: 61 MG/DL (ref 8–23)
BUN BLD-MCNC: 63 MG/DL (ref 8–23)
BUN BLD-MCNC: 70 MG/DL (ref 8–23)
BUN/CREAT SERPL: 32.4 (ref 7–25)
BUN/CREAT SERPL: 35.2 (ref 7–25)
BUN/CREAT SERPL: 40.1 (ref 7–25)
BUN/CREAT SERPL: 43 (ref 7–25)
BUN/CREAT SERPL: 49 (ref 7–25)
CALCIUM SPEC-SCNC: 9.2 MG/DL (ref 8.4–10.4)
CALCIUM SPEC-SCNC: 9.3 MG/DL (ref 8.4–10.4)
CALCIUM SPEC-SCNC: 9.4 MG/DL (ref 8.6–10.5)
CALCIUM SPEC-SCNC: 9.9 MG/DL (ref 8.6–10.5)
CALCIUM SPEC-SCNC: 9.9 MG/DL (ref 8.6–10.5)
CHLORIDE SERPL-SCNC: 100 MMOL/L (ref 98–110)
CHLORIDE SERPL-SCNC: 90 MMOL/L (ref 98–107)
CHLORIDE SERPL-SCNC: 95 MMOL/L (ref 98–107)
CHLORIDE SERPL-SCNC: 97 MMOL/L (ref 98–107)
CHLORIDE SERPL-SCNC: 98 MMOL/L (ref 98–110)
CHOLEST SERPL-MCNC: 128 MG/DL (ref 0–200)
CLARITY UR: CLEAR
CLARITY UR: CLEAR
CLUMPED PLATELETS: PRESENT
CO2 SERPL-SCNC: 25.3 MMOL/L (ref 22–29)
CO2 SERPL-SCNC: 27 MMOL/L (ref 24–31)
CO2 SERPL-SCNC: 29 MMOL/L (ref 24–31)
CO2 SERPL-SCNC: 30 MMOL/L (ref 22–29)
CO2 SERPL-SCNC: 31 MMOL/L (ref 22–29)
COLOR UR: ABNORMAL
COLOR UR: YELLOW
CREAT BLD-MCNC: 1.05 MG/DL (ref 0.5–1.4)
CREAT BLD-MCNC: 1.08 MG/DL (ref 0.5–1.4)
CREAT BLD-MCNC: 1.42 MG/DL (ref 0.57–1)
CREAT BLD-MCNC: 1.43 MG/DL (ref 0.57–1)
CREAT BLD-MCNC: 1.57 MG/DL (ref 0.57–1)
CREAT UR-MCNC: 27.1 MG/DL
D-LACTATE SERPL-SCNC: 1.8 MMOL/L (ref 0.5–2)
D-LACTATE SERPL-SCNC: 2.5 MMOL/L (ref 0.5–2)
D-LACTATE SERPL-SCNC: 2.8 MMOL/L (ref 0.5–2)
DEPRECATED RDW RBC AUTO: 52.1 FL (ref 37–54)
DEPRECATED RDW RBC AUTO: 53 FL (ref 37–54)
DEPRECATED RDW RBC AUTO: 53.4 FL (ref 37–54)
DEPRECATED RDW RBC AUTO: 54.6 FL (ref 40–54)
DEPRECATED RDW RBC AUTO: 54.7 FL (ref 40–54)
DEPRECATED RDW RBC AUTO: 56 FL (ref 40–54)
EOSINOPHIL # BLD AUTO: 0.03 10*3/MM3 (ref 0–0.4)
EOSINOPHIL # BLD AUTO: 0.34 10*3/MM3 (ref 0–0.7)
EOSINOPHIL # BLD AUTO: 0.45 10*3/MM3 (ref 0–0.4)
EOSINOPHIL # BLD MANUAL: 0.17 10*3/MM3 (ref 0–0.7)
EOSINOPHIL NFR BLD AUTO: 0.3 % (ref 0.3–6.2)
EOSINOPHIL NFR BLD AUTO: 2.4 % (ref 0–4)
EOSINOPHIL NFR BLD AUTO: 4.3 % (ref 0.3–6.2)
EOSINOPHIL NFR BLD MANUAL: 1 % (ref 0–4)
ERYTHROCYTE [DISTWIDTH] IN BLOOD BY AUTOMATED COUNT: 14.6 % (ref 12.3–15.4)
ERYTHROCYTE [DISTWIDTH] IN BLOOD BY AUTOMATED COUNT: 15.4 % (ref 12–15)
ERYTHROCYTE [DISTWIDTH] IN BLOOD BY AUTOMATED COUNT: 15.5 % (ref 12–15)
ERYTHROCYTE [DISTWIDTH] IN BLOOD BY AUTOMATED COUNT: 15.6 % (ref 12–15)
FERRITIN SERPL-MCNC: 260 NG/ML (ref 11.1–264)
GFR SERPL CREATININE-BSD FRML MDRD: 32 ML/MIN/1.73
GFR SERPL CREATININE-BSD FRML MDRD: 35 ML/MIN/1.73
GFR SERPL CREATININE-BSD FRML MDRD: 35 ML/MIN/1.73
GFR SERPL CREATININE-BSD FRML MDRD: 49 ML/MIN/1.73
GFR SERPL CREATININE-BSD FRML MDRD: 50 ML/MIN/1.73
GIANT PLATELETS: ABNORMAL
GIANT PLATELETS: ABNORMAL
GLOBULIN UR ELPH-MCNC: 2.7 GM/DL
GLOBULIN UR ELPH-MCNC: 3 GM/DL
GLOBULIN UR ELPH-MCNC: 3.4 GM/DL
GLOBULIN UR ELPH-MCNC: 3.5 GM/DL
GLUCOSE BLD-MCNC: 169 MG/DL (ref 65–99)
GLUCOSE BLD-MCNC: 191 MG/DL (ref 70–100)
GLUCOSE BLD-MCNC: 283 MG/DL (ref 65–99)
GLUCOSE BLD-MCNC: 496 MG/DL (ref 65–99)
GLUCOSE BLD-MCNC: 90 MG/DL (ref 70–100)
GLUCOSE BLDC GLUCOMTR-MCNC: 132 MG/DL (ref 70–130)
GLUCOSE BLDC GLUCOMTR-MCNC: 144 MG/DL (ref 70–130)
GLUCOSE BLDC GLUCOMTR-MCNC: 158 MG/DL (ref 70–130)
GLUCOSE BLDC GLUCOMTR-MCNC: 161 MG/DL (ref 70–130)
GLUCOSE BLDC GLUCOMTR-MCNC: 188 MG/DL (ref 70–130)
GLUCOSE BLDC GLUCOMTR-MCNC: 203 MG/DL (ref 70–130)
GLUCOSE BLDC GLUCOMTR-MCNC: 208 MG/DL (ref 70–130)
GLUCOSE BLDC GLUCOMTR-MCNC: 222 MG/DL (ref 70–130)
GLUCOSE BLDC GLUCOMTR-MCNC: 229 MG/DL (ref 70–130)
GLUCOSE BLDC GLUCOMTR-MCNC: 248 MG/DL (ref 70–130)
GLUCOSE BLDC GLUCOMTR-MCNC: 338 MG/DL (ref 70–130)
GLUCOSE BLDC GLUCOMTR-MCNC: 49 MG/DL (ref 70–130)
GLUCOSE BLDC GLUCOMTR-MCNC: 49 MG/DL (ref 70–130)
GLUCOSE BLDC GLUCOMTR-MCNC: 58 MG/DL (ref 70–130)
GLUCOSE BLDC GLUCOMTR-MCNC: 87 MG/DL (ref 70–130)
GLUCOSE UR STRIP-MCNC: ABNORMAL MG/DL
GLUCOSE UR STRIP-MCNC: NEGATIVE MG/DL
HBA1C MFR BLD: 8.1 %
HCT VFR BLD AUTO: 24.1 % (ref 34–46.6)
HCT VFR BLD AUTO: 28.9 % (ref 34–46.6)
HCT VFR BLD AUTO: 31.9 % (ref 37–47)
HCT VFR BLD AUTO: 32 % (ref 37–47)
HCT VFR BLD AUTO: 32.1 % (ref 37–47)
HCT VFR BLD AUTO: 34.6 % (ref 34–46.6)
HDLC SERPL-MCNC: 40 MG/DL (ref 40–60)
HGB BLD-MCNC: 10.2 G/DL (ref 12–16)
HGB BLD-MCNC: 10.6 G/DL (ref 12–16)
HGB BLD-MCNC: 10.7 G/DL (ref 12–16)
HGB BLD-MCNC: 11.4 G/DL (ref 12–15.9)
HGB BLD-MCNC: 7.6 G/DL (ref 12–15.9)
HGB BLD-MCNC: 9.3 G/DL (ref 12–15.9)
HGB UR QL STRIP.AUTO: NEGATIVE
HGB UR QL STRIP.AUTO: NEGATIVE
HOLD SPECIMEN: NORMAL
HOLD SPECIMEN: NORMAL
HYALINE CASTS UR QL AUTO: NORMAL /LPF
IMM GRANULOCYTES # BLD AUTO: 0.06 10*3/MM3 (ref 0–0.05)
IMM GRANULOCYTES # BLD AUTO: 0.11 10*3/MM3 (ref 0–0.05)
IMM GRANULOCYTES NFR BLD AUTO: 0.6 % (ref 0–0.5)
IMM GRANULOCYTES NFR BLD AUTO: 1.1 % (ref 0–0.5)
INR PPP: 1.07 (ref 0.91–1.09)
IRON 24H UR-MRATE: 20 MCG/DL (ref 42–180)
IRON SATN MFR SERPL: 7 % (ref 20–45)
KETONES UR QL STRIP: NEGATIVE
KETONES UR QL STRIP: NEGATIVE
LDLC SERPL CALC-MCNC: 26 MG/DL (ref 0–100)
LDLC/HDLC SERPL: 0.66 {RATIO}
LEFT ATRIUM VOLUME INDEX: 34.1 ML/M2
LEFT ATRIUM VOLUME: 52.1 CM3
LEUKOCYTE ESTERASE UR QL STRIP.AUTO: NEGATIVE
LEUKOCYTE ESTERASE UR QL STRIP.AUTO: NEGATIVE
LIPASE SERPL-CCNC: 167 U/L (ref 23–203)
LYMPHOCYTES # BLD AUTO: 0.76 10*3/MM3 (ref 0.7–3.1)
LYMPHOCYTES # BLD AUTO: 1.56 10*3/MM3 (ref 0.72–4.86)
LYMPHOCYTES # BLD AUTO: 1.61 10*3/MM3 (ref 0.7–3.1)
LYMPHOCYTES # BLD MANUAL: 1.02 10*3/MM3 (ref 0.72–4.86)
LYMPHOCYTES # BLD MANUAL: 1.43 10*3/MM3 (ref 0.72–4.86)
LYMPHOCYTES NFR BLD AUTO: 11.2 % (ref 15–45)
LYMPHOCYTES NFR BLD AUTO: 15.6 % (ref 19.6–45.3)
LYMPHOCYTES NFR BLD AUTO: 7.1 % (ref 19.6–45.3)
LYMPHOCYTES NFR BLD MANUAL: 10 % (ref 15–45)
LYMPHOCYTES NFR BLD MANUAL: 6 % (ref 15–45)
LYMPHOCYTES NFR BLD MANUAL: 8 % (ref 4–12)
LYMPHOCYTES NFR BLD MANUAL: 9 % (ref 4–12)
MAGNESIUM SERPL-MCNC: 1.8 MG/DL (ref 1.4–2.2)
MAXIMAL PREDICTED HEART RATE: 138 BPM
MCH RBC QN AUTO: 31.3 PG (ref 26.6–33)
MCH RBC QN AUTO: 31.8 PG (ref 26.6–33)
MCH RBC QN AUTO: 31.8 PG (ref 26.6–33)
MCH RBC QN AUTO: 31.8 PG (ref 28–32)
MCH RBC QN AUTO: 31.9 PG (ref 28–32)
MCH RBC QN AUTO: 32 PG (ref 28–32)
MCHC RBC AUTO-ENTMCNC: 31.5 G/DL (ref 31.5–35.7)
MCHC RBC AUTO-ENTMCNC: 31.8 G/DL (ref 33–36)
MCHC RBC AUTO-ENTMCNC: 32.2 G/DL (ref 31.5–35.7)
MCHC RBC AUTO-ENTMCNC: 32.9 G/DL (ref 31.5–35.7)
MCHC RBC AUTO-ENTMCNC: 33.1 G/DL (ref 33–36)
MCHC RBC AUTO-ENTMCNC: 33.5 G/DL (ref 33–36)
MCV RBC AUTO: 100 FL (ref 82–98)
MCV RBC AUTO: 95.5 FL (ref 82–98)
MCV RBC AUTO: 96.4 FL (ref 82–98)
MCV RBC AUTO: 96.6 FL (ref 79–97)
MCV RBC AUTO: 99 FL (ref 79–97)
MCV RBC AUTO: 99.2 FL (ref 79–97)
MONOCYTES # BLD AUTO: 0.52 10*3/MM3 (ref 0.1–0.9)
MONOCYTES # BLD AUTO: 0.99 10*3/MM3 (ref 0.1–0.9)
MONOCYTES # BLD AUTO: 1.28 10*3/MM3 (ref 0.19–1.3)
MONOCYTES # BLD AUTO: 1.36 10*3/MM3 (ref 0.19–1.3)
MONOCYTES # BLD AUTO: 1.59 10*3/MM3 (ref 0.19–1.3)
MONOCYTES NFR BLD AUTO: 11.4 % (ref 4–12)
MONOCYTES NFR BLD AUTO: 4.9 % (ref 5–12)
MONOCYTES NFR BLD AUTO: 9.6 % (ref 5–12)
NEUTROPHILS # BLD AUTO: 10.14 10*3/MM3 (ref 1.87–8.4)
NEUTROPHILS # BLD AUTO: 11.56 10*3/MM3 (ref 1.87–8.4)
NEUTROPHILS # BLD AUTO: 14.07 10*3/MM3 (ref 1.87–8.4)
NEUTROPHILS # BLD AUTO: 7.15 10*3/MM3 (ref 1.7–7)
NEUTROPHILS # BLD AUTO: 9.33 10*3/MM3 (ref 1.7–7)
NEUTROPHILS NFR BLD AUTO: 69 % (ref 42.7–76)
NEUTROPHILS NFR BLD AUTO: 72.6 % (ref 39–78)
NEUTROPHILS NFR BLD AUTO: 86.9 % (ref 42.7–76)
NEUTROPHILS NFR BLD MANUAL: 74 % (ref 39–78)
NEUTROPHILS NFR BLD MANUAL: 81 % (ref 39–78)
NEUTS BAND NFR BLD MANUAL: 9 % (ref 0–10)
NITRITE UR QL STRIP: NEGATIVE
NITRITE UR QL STRIP: NEGATIVE
NRBC BLD AUTO-RTO: 0 /100 WBC (ref 0–0.2)
NRBC BLD AUTO-RTO: 0 /100 WBC (ref 0–0.2)
NT-PROBNP SERPL-MCNC: ABNORMAL PG/ML (ref 0–1800)
PH UR STRIP.AUTO: 6 [PH] (ref 5–8)
PH UR STRIP.AUTO: <=5 [PH] (ref 5–8)
PHOSPHATE SERPL-MCNC: 3.3 MG/DL (ref 2.5–4.5)
PLATELET # BLD AUTO: 1039 10*3/MM3 (ref 130–400)
PLATELET # BLD AUTO: 1051 10*3/MM3 (ref 130–400)
PLATELET # BLD AUTO: 202 10*3/MM3 (ref 140–450)
PLATELET # BLD AUTO: 207 10*3/MM3 (ref 140–450)
PLATELET # BLD AUTO: 219 10*3/MM3 (ref 140–450)
PLATELET # BLD AUTO: 970 10*3/MM3 (ref 130–400)
PMV BLD AUTO: 10.2 FL (ref 6–12)
PMV BLD AUTO: 10.5 FL (ref 6–12)
PMV BLD AUTO: 11.2 FL (ref 6–12)
PMV BLD AUTO: 11.4 FL (ref 6–12)
PMV BLD AUTO: 11.6 FL (ref 6–12)
PMV BLD AUTO: 11.7 FL (ref 6–12)
POIKILOCYTOSIS BLD QL SMEAR: ABNORMAL
POTASSIUM BLD-SCNC: 4.2 MMOL/L (ref 3.5–5.3)
POTASSIUM BLD-SCNC: 4.3 MMOL/L (ref 3.5–5.2)
POTASSIUM BLD-SCNC: 4.8 MMOL/L (ref 3.5–5.2)
POTASSIUM BLD-SCNC: 5 MMOL/L (ref 3.5–5.2)
POTASSIUM BLD-SCNC: 5 MMOL/L (ref 3.5–5.3)
PROT SERPL-MCNC: 6.1 G/DL (ref 6.3–8.7)
PROT SERPL-MCNC: 6.9 G/DL (ref 6.3–8.7)
PROT SERPL-MCNC: 7.3 G/DL (ref 6–8.5)
PROT SERPL-MCNC: 7.6 G/DL (ref 6–8.5)
PROT UR QL STRIP: ABNORMAL
PROT UR QL STRIP: ABNORMAL
PROT UR-MCNC: 21 MG/DL
PROT/CREAT UR: 774.9 MG/G CREA (ref 0–200)
PROTHROMBIN TIME: 14.2 SECONDS (ref 11.9–14.6)
RBC # BLD AUTO: 2.43 10*6/MM3 (ref 3.77–5.28)
RBC # BLD AUTO: 2.92 10*6/MM3 (ref 3.77–5.28)
RBC # BLD AUTO: 3.21 10*6/MM3 (ref 4.2–5.4)
RBC # BLD AUTO: 3.32 10*6/MM3 (ref 4.2–5.4)
RBC # BLD AUTO: 3.34 10*6/MM3 (ref 4.2–5.4)
RBC # BLD AUTO: 3.58 10*6/MM3 (ref 3.77–5.28)
RBC # UR: NORMAL /HPF
RBC MORPH BLD: NORMAL
REF LAB TEST METHOD: NORMAL
SMALL PLATELETS BLD QL SMEAR: ABNORMAL
SMALL PLATELETS BLD QL SMEAR: ABNORMAL
SODIUM BLD-SCNC: 131 MMOL/L (ref 136–145)
SODIUM BLD-SCNC: 135 MMOL/L (ref 135–145)
SODIUM BLD-SCNC: 138 MMOL/L (ref 135–145)
SODIUM BLD-SCNC: 138 MMOL/L (ref 136–145)
SODIUM BLD-SCNC: 139 MMOL/L (ref 136–145)
SP GR UR STRIP: 1.01 (ref 1–1.03)
SP GR UR STRIP: 1.02 (ref 1–1.03)
SQUAMOUS #/AREA URNS HPF: NORMAL /HPF
STRESS TARGET HR: 117 BPM
TIBC SERPL-MCNC: 280 MCG/DL (ref 225–420)
TRIGL SERPL-MCNC: 308 MG/DL (ref 0–150)
TROPONIN I SERPL-MCNC: 0.03 NG/ML (ref 0–0.03)
TROPONIN I SERPL-MCNC: 0.03 NG/ML (ref 0–0.03)
TROPONIN I SERPL-MCNC: 0.04 NG/ML (ref 0–0.03)
TSH SERPL DL<=0.05 MIU/L-ACNC: 1.2 MIU/ML (ref 0.47–4.68)
UROBILINOGEN UR QL STRIP: ABNORMAL
UROBILINOGEN UR QL STRIP: ABNORMAL
VARIANT LYMPHS NFR BLD MANUAL: 2 % (ref 0–5)
VLDLC SERPL-MCNC: 61.6 MG/DL (ref 5–40)
WBC MORPH BLD: NORMAL
WBC MORPH BLD: NORMAL
WBC NRBC COR # BLD: 10.35 10*3/MM3 (ref 3.4–10.8)
WBC NRBC COR # BLD: 10.72 10*3/MM3 (ref 3.4–10.8)
WBC NRBC COR # BLD: 13.97 10*3/MM3 (ref 4.8–10.8)
WBC NRBC COR # BLD: 14.27 10*3/MM3 (ref 4.8–10.8)
WBC NRBC COR # BLD: 16.95 10*3/MM3 (ref 4.8–10.8)
WBC NRBC COR # BLD: 8.25 10*3/MM3 (ref 3.4–10.8)
WBC UR QL AUTO: NORMAL /HPF
WHOLE BLOOD HOLD SPECIMEN: NORMAL
WHOLE BLOOD HOLD SPECIMEN: NORMAL

## 2019-01-01 PROCEDURE — 84484 ASSAY OF TROPONIN QUANT: CPT | Performed by: INTERNAL MEDICINE

## 2019-01-01 PROCEDURE — 63710000001 INSULIN LISPRO (HUMAN) PER 5 UNITS: Performed by: INTERNAL MEDICINE

## 2019-01-01 PROCEDURE — 80053 COMPREHEN METABOLIC PANEL: CPT | Performed by: EMERGENCY MEDICINE

## 2019-01-01 PROCEDURE — 83735 ASSAY OF MAGNESIUM: CPT | Performed by: PHYSICIAN ASSISTANT

## 2019-01-01 PROCEDURE — 83550 IRON BINDING TEST: CPT | Performed by: INTERNAL MEDICINE

## 2019-01-01 PROCEDURE — 85610 PROTHROMBIN TIME: CPT | Performed by: FAMILY MEDICINE

## 2019-01-01 PROCEDURE — 94799 UNLISTED PULMONARY SVC/PX: CPT

## 2019-01-01 PROCEDURE — 25010000002 FUROSEMIDE PER 20 MG: Performed by: INTERNAL MEDICINE

## 2019-01-01 PROCEDURE — 87040 BLOOD CULTURE FOR BACTERIA: CPT | Performed by: PHYSICIAN ASSISTANT

## 2019-01-01 PROCEDURE — 25010000002 FUROSEMIDE PER 20 MG: Performed by: NURSE PRACTITIONER

## 2019-01-01 PROCEDURE — 99214 OFFICE O/P EST MOD 30 MIN: CPT | Performed by: INTERNAL MEDICINE

## 2019-01-01 PROCEDURE — 82962 GLUCOSE BLOOD TEST: CPT

## 2019-01-01 PROCEDURE — 81001 URINALYSIS AUTO W/SCOPE: CPT | Performed by: PHYSICIAN ASSISTANT

## 2019-01-01 PROCEDURE — 85025 COMPLETE CBC W/AUTO DIFF WBC: CPT | Performed by: FAMILY MEDICINE

## 2019-01-01 PROCEDURE — 74018 RADEX ABDOMEN 1 VIEW: CPT

## 2019-01-01 PROCEDURE — 84443 ASSAY THYROID STIM HORMONE: CPT | Performed by: PHYSICIAN ASSISTANT

## 2019-01-01 PROCEDURE — 99213 OFFICE O/P EST LOW 20 MIN: CPT | Performed by: NURSE PRACTITIONER

## 2019-01-01 PROCEDURE — 99215 OFFICE O/P EST HI 40 MIN: CPT | Performed by: INTERNAL MEDICINE

## 2019-01-01 PROCEDURE — 99282 EMERGENCY DEPT VISIT SF MDM: CPT

## 2019-01-01 PROCEDURE — 80053 COMPREHEN METABOLIC PANEL: CPT | Performed by: FAMILY MEDICINE

## 2019-01-01 PROCEDURE — 80069 RENAL FUNCTION PANEL: CPT | Performed by: INTERNAL MEDICINE

## 2019-01-01 PROCEDURE — 25010000002 TDAP 5-2.5-18.5 LF-MCG/0.5 SUSPENSION: Performed by: EMERGENCY MEDICINE

## 2019-01-01 PROCEDURE — 85027 COMPLETE CBC AUTOMATED: CPT | Performed by: INTERNAL MEDICINE

## 2019-01-01 PROCEDURE — 84484 ASSAY OF TROPONIN QUANT: CPT | Performed by: PHYSICIAN ASSISTANT

## 2019-01-01 PROCEDURE — 83690 ASSAY OF LIPASE: CPT | Performed by: PHYSICIAN ASSISTANT

## 2019-01-01 PROCEDURE — 72128 CT CHEST SPINE W/O DYE: CPT

## 2019-01-01 PROCEDURE — 93010 ELECTROCARDIOGRAM REPORT: CPT | Performed by: INTERNAL MEDICINE

## 2019-01-01 PROCEDURE — 74176 CT ABD & PELVIS W/O CONTRAST: CPT

## 2019-01-01 PROCEDURE — 83540 ASSAY OF IRON: CPT | Performed by: INTERNAL MEDICINE

## 2019-01-01 PROCEDURE — 83036 HEMOGLOBIN GLYCOSYLATED A1C: CPT | Performed by: INTERNAL MEDICINE

## 2019-01-01 PROCEDURE — 25010000002 ONDANSETRON PER 1 MG: Performed by: FAMILY MEDICINE

## 2019-01-01 PROCEDURE — 83880 ASSAY OF NATRIURETIC PEPTIDE: CPT | Performed by: PHYSICIAN ASSISTANT

## 2019-01-01 PROCEDURE — 85025 COMPLETE CBC W/AUTO DIFF WBC: CPT | Performed by: PHYSICIAN ASSISTANT

## 2019-01-01 PROCEDURE — 94760 N-INVAS EAR/PLS OXIMETRY 1: CPT

## 2019-01-01 PROCEDURE — 36415 COLL VENOUS BLD VENIPUNCTURE: CPT

## 2019-01-01 PROCEDURE — 25010000002 FUROSEMIDE PER 20 MG: Performed by: PHYSICIAN ASSISTANT

## 2019-01-01 PROCEDURE — 80061 LIPID PANEL: CPT | Performed by: INTERNAL MEDICINE

## 2019-01-01 PROCEDURE — 94640 AIRWAY INHALATION TREATMENT: CPT

## 2019-01-01 PROCEDURE — 99283 EMERGENCY DEPT VISIT LOW MDM: CPT

## 2019-01-01 PROCEDURE — 70450 CT HEAD/BRAIN W/O DYE: CPT

## 2019-01-01 PROCEDURE — G0439 PPPS, SUBSEQ VISIT: HCPCS | Performed by: INTERNAL MEDICINE

## 2019-01-01 PROCEDURE — 72125 CT NECK SPINE W/O DYE: CPT

## 2019-01-01 PROCEDURE — 82728 ASSAY OF FERRITIN: CPT | Performed by: INTERNAL MEDICINE

## 2019-01-01 PROCEDURE — 25010000002 ERTAPENEM PER 500 MG: Performed by: INTERNAL MEDICINE

## 2019-01-01 PROCEDURE — 85025 COMPLETE CBC W/AUTO DIFF WBC: CPT | Performed by: EMERGENCY MEDICINE

## 2019-01-01 PROCEDURE — 93005 ELECTROCARDIOGRAM TRACING: CPT | Performed by: PHYSICIAN ASSISTANT

## 2019-01-01 PROCEDURE — 74019 RADEX ABDOMEN 2 VIEWS: CPT

## 2019-01-01 PROCEDURE — 80053 COMPREHEN METABOLIC PANEL: CPT | Performed by: PHYSICIAN ASSISTANT

## 2019-01-01 PROCEDURE — 96374 THER/PROPH/DIAG INJ IV PUSH: CPT

## 2019-01-01 PROCEDURE — 85730 THROMBOPLASTIN TIME PARTIAL: CPT | Performed by: FAMILY MEDICINE

## 2019-01-01 PROCEDURE — 80053 COMPREHEN METABOLIC PANEL: CPT | Performed by: NURSE PRACTITIONER

## 2019-01-01 PROCEDURE — 90471 IMMUNIZATION ADMIN: CPT | Performed by: EMERGENCY MEDICINE

## 2019-01-01 PROCEDURE — 85007 BL SMEAR W/DIFF WBC COUNT: CPT | Performed by: NURSE PRACTITIONER

## 2019-01-01 PROCEDURE — 85007 BL SMEAR W/DIFF WBC COUNT: CPT | Performed by: PHYSICIAN ASSISTANT

## 2019-01-01 PROCEDURE — 85025 COMPLETE CBC W/AUTO DIFF WBC: CPT | Performed by: NURSE PRACTITIONER

## 2019-01-01 PROCEDURE — 84156 ASSAY OF PROTEIN URINE: CPT | Performed by: INTERNAL MEDICINE

## 2019-01-01 PROCEDURE — 82570 ASSAY OF URINE CREATININE: CPT | Performed by: INTERNAL MEDICINE

## 2019-01-01 PROCEDURE — 90715 TDAP VACCINE 7 YRS/> IM: CPT | Performed by: EMERGENCY MEDICINE

## 2019-01-01 PROCEDURE — 83605 ASSAY OF LACTIC ACID: CPT | Performed by: INTERNAL MEDICINE

## 2019-01-01 PROCEDURE — 71250 CT THORAX DX C-: CPT

## 2019-01-01 PROCEDURE — 83605 ASSAY OF LACTIC ACID: CPT | Performed by: PHYSICIAN ASSISTANT

## 2019-01-01 PROCEDURE — 84484 ASSAY OF TROPONIN QUANT: CPT | Performed by: NURSE PRACTITIONER

## 2019-01-01 PROCEDURE — 63710000001 INSULIN DETEMIR PER 5 UNITS: Performed by: INTERNAL MEDICINE

## 2019-01-01 PROCEDURE — 93306 TTE W/DOPPLER COMPLETE: CPT

## 2019-01-01 PROCEDURE — 93306 TTE W/DOPPLER COMPLETE: CPT | Performed by: INTERNAL MEDICINE

## 2019-01-01 PROCEDURE — 96160 PT-FOCUSED HLTH RISK ASSMT: CPT | Performed by: INTERNAL MEDICINE

## 2019-01-01 PROCEDURE — 99285 EMERGENCY DEPT VISIT HI MDM: CPT

## 2019-01-01 PROCEDURE — 71046 X-RAY EXAM CHEST 2 VIEWS: CPT

## 2019-01-01 PROCEDURE — 81003 URINALYSIS AUTO W/O SCOPE: CPT | Performed by: INTERNAL MEDICINE

## 2019-01-01 PROCEDURE — 71045 X-RAY EXAM CHEST 1 VIEW: CPT

## 2019-01-01 RX ORDER — PREDNISONE 1 MG/1
5 TABLET ORAL DAILY
COMMUNITY
Start: 2019-01-01 | End: 2019-01-01

## 2019-01-01 RX ORDER — FUROSEMIDE 10 MG/ML
20 INJECTION INTRAMUSCULAR; INTRAVENOUS ONCE
Status: COMPLETED | OUTPATIENT
Start: 2019-01-01 | End: 2019-01-01

## 2019-01-01 RX ORDER — FERROUS SULFATE 325(65) MG
325 TABLET ORAL
Status: DISCONTINUED | OUTPATIENT
Start: 2019-01-01 | End: 2019-01-01 | Stop reason: HOSPADM

## 2019-01-01 RX ORDER — ATENOLOL 50 MG/1
TABLET ORAL
Qty: 135 TABLET | Refills: 3 | Status: ON HOLD | OUTPATIENT
Start: 2019-01-01 | End: 2019-01-01

## 2019-01-01 RX ORDER — BISACODYL 10 MG
10 SUPPOSITORY, RECTAL RECTAL DAILY
Status: DISCONTINUED | OUTPATIENT
Start: 2019-01-01 | End: 2019-01-01

## 2019-01-01 RX ORDER — ATENOLOL 25 MG/1
25 TABLET ORAL NIGHTLY
COMMUNITY
End: 2019-01-01 | Stop reason: SDUPTHER

## 2019-01-01 RX ORDER — ACETAMINOPHEN 325 MG/1
650 TABLET ORAL EVERY 4 HOURS PRN
Status: DISCONTINUED | OUTPATIENT
Start: 2019-01-01 | End: 2019-01-01 | Stop reason: HOSPADM

## 2019-01-01 RX ORDER — ATORVASTATIN CALCIUM 10 MG/1
10 TABLET, FILM COATED ORAL DAILY
Status: DISCONTINUED | OUTPATIENT
Start: 2019-01-01 | End: 2019-01-01

## 2019-01-01 RX ORDER — DEXTROMETHORPHAN POLISTIREX 30 MG/5ML
60 SUSPENSION ORAL EVERY 12 HOURS SCHEDULED
Qty: 280 ML | Refills: 0 | Status: SHIPPED | OUTPATIENT
Start: 2019-01-01 | End: 2019-01-01

## 2019-01-01 RX ORDER — TRIAMCINOLONE ACETONIDE 1 MG/G
1 CREAM TOPICAL 2 TIMES DAILY
Status: ON HOLD | COMMUNITY
Start: 2019-01-01 | End: 2020-01-01

## 2019-01-01 RX ORDER — VENLAFAXINE HYDROCHLORIDE 37.5 MG/1
37.5 CAPSULE, EXTENDED RELEASE ORAL DAILY
Qty: 30 CAPSULE | Refills: 0 | Status: SHIPPED | OUTPATIENT
Start: 2019-01-01 | End: 2019-01-01 | Stop reason: SDUPTHER

## 2019-01-01 RX ORDER — DEXTROMETHORPHAN POLISTIREX 30 MG/5ML
60 SUSPENSION ORAL EVERY 12 HOURS SCHEDULED
Status: DISCONTINUED | OUTPATIENT
Start: 2019-01-01 | End: 2019-01-01 | Stop reason: HOSPADM

## 2019-01-01 RX ORDER — HYDROCORTISONE ACETATE 25 MG/1
25 SUPPOSITORY RECTAL 2 TIMES DAILY PRN
Status: DISCONTINUED | OUTPATIENT
Start: 2019-01-01 | End: 2019-01-01 | Stop reason: HOSPADM

## 2019-01-01 RX ORDER — METOPROLOL SUCCINATE 25 MG/1
25 TABLET, EXTENDED RELEASE ORAL DAILY
COMMUNITY

## 2019-01-01 RX ORDER — ASPIRIN 81 MG/1
81 TABLET ORAL DAILY
Status: DISCONTINUED | OUTPATIENT
Start: 2019-01-01 | End: 2019-01-01 | Stop reason: HOSPADM

## 2019-01-01 RX ORDER — PANTOPRAZOLE SODIUM 40 MG/1
40 TABLET, DELAYED RELEASE ORAL DAILY
Status: DISCONTINUED | OUTPATIENT
Start: 2019-01-01 | End: 2019-01-01 | Stop reason: HOSPADM

## 2019-01-01 RX ORDER — PREDNISONE 5 MG/1
2 TABLET ORAL DAILY
COMMUNITY
Start: 2019-01-01 | End: 2019-01-01

## 2019-01-01 RX ORDER — VENLAFAXINE HYDROCHLORIDE 37.5 MG/1
37.5 CAPSULE, EXTENDED RELEASE ORAL DAILY
Status: DISCONTINUED | OUTPATIENT
Start: 2019-01-01 | End: 2019-01-01 | Stop reason: HOSPADM

## 2019-01-01 RX ORDER — ACETAMINOPHEN 500 MG
1000 TABLET ORAL ONCE
Status: COMPLETED | OUTPATIENT
Start: 2019-01-01 | End: 2019-01-01

## 2019-01-01 RX ORDER — DOXYCYCLINE HYCLATE 100 MG
100 TABLET ORAL 2 TIMES DAILY
Qty: 14 TABLET | Refills: 0 | Status: SHIPPED | OUTPATIENT
Start: 2019-01-01 | End: 2019-01-01

## 2019-01-01 RX ORDER — GLIPIZIDE 5 MG/1
5 TABLET ORAL
Qty: 180 TABLET | Refills: 3 | Status: SHIPPED | OUTPATIENT
Start: 2019-01-01 | End: 2019-01-01

## 2019-01-01 RX ORDER — ONDANSETRON 4 MG/1
4 TABLET, ORALLY DISINTEGRATING ORAL EVERY 4 HOURS PRN
Status: DISCONTINUED | OUTPATIENT
Start: 2019-01-01 | End: 2019-01-01 | Stop reason: HOSPADM

## 2019-01-01 RX ORDER — SUCRALFATE ORAL 1 G/10ML
1 SUSPENSION ORAL 3 TIMES DAILY PRN
Qty: 420 ML | Refills: 1 | Status: SHIPPED | OUTPATIENT
Start: 2019-01-01 | End: 2019-01-01

## 2019-01-01 RX ORDER — SPIRONOLACTONE 25 MG/1
25 TABLET ORAL DAILY
COMMUNITY

## 2019-01-01 RX ORDER — ONDANSETRON 2 MG/ML
4 INJECTION INTRAMUSCULAR; INTRAVENOUS EVERY 6 HOURS PRN
Status: DISCONTINUED | OUTPATIENT
Start: 2019-01-01 | End: 2019-01-01 | Stop reason: HOSPADM

## 2019-01-01 RX ORDER — POTASSIUM CHLORIDE 750 MG/1
10 CAPSULE, EXTENDED RELEASE ORAL DAILY
Status: DISCONTINUED | OUTPATIENT
Start: 2019-01-01 | End: 2019-01-01

## 2019-01-01 RX ORDER — ATORVASTATIN CALCIUM 10 MG/1
10 TABLET, FILM COATED ORAL NIGHTLY
Status: DISCONTINUED | OUTPATIENT
Start: 2019-01-01 | End: 2019-01-01 | Stop reason: HOSPADM

## 2019-01-01 RX ORDER — POTASSIUM CHLORIDE 750 MG/1
10 TABLET, FILM COATED, EXTENDED RELEASE ORAL DAILY
COMMUNITY
End: 2019-01-01 | Stop reason: SDUPTHER

## 2019-01-01 RX ORDER — POTASSIUM CHLORIDE 750 MG/1
10 TABLET, FILM COATED, EXTENDED RELEASE ORAL DAILY
Qty: 90 TABLET | Refills: 1 | Status: ON HOLD | OUTPATIENT
Start: 2019-01-01 | End: 2020-01-01

## 2019-01-01 RX ORDER — VENLAFAXINE HYDROCHLORIDE 75 MG/1
75 CAPSULE, EXTENDED RELEASE ORAL DAILY
Qty: 90 CAPSULE | Refills: 1 | Status: SHIPPED | OUTPATIENT
Start: 2019-01-01 | End: 2020-01-01 | Stop reason: SDUPTHER

## 2019-01-01 RX ORDER — SIMVASTATIN 20 MG
20 TABLET ORAL NIGHTLY
Qty: 90 TABLET | Refills: 3 | Status: SHIPPED | OUTPATIENT
Start: 2019-01-01 | End: 2019-01-01

## 2019-01-01 RX ORDER — FUROSEMIDE 40 MG/1
TABLET ORAL
Qty: 135 TABLET | Refills: 1 | Status: SHIPPED | OUTPATIENT
Start: 2019-01-01 | End: 2020-01-01 | Stop reason: SDUPTHER

## 2019-01-01 RX ORDER — SODIUM CHLORIDE 0.9 % (FLUSH) 0.9 %
3 SYRINGE (ML) INJECTION EVERY 12 HOURS SCHEDULED
Status: DISCONTINUED | OUTPATIENT
Start: 2019-01-01 | End: 2019-01-01 | Stop reason: HOSPADM

## 2019-01-01 RX ORDER — POLYETHYLENE GLYCOL 3350 17 G/17G
17 POWDER, FOR SOLUTION ORAL DAILY
Status: DISCONTINUED | OUTPATIENT
Start: 2019-01-01 | End: 2019-01-01 | Stop reason: HOSPADM

## 2019-01-01 RX ORDER — GLIPIZIDE 5 MG/1
5 TABLET ORAL
Status: DISCONTINUED | OUTPATIENT
Start: 2019-01-01 | End: 2019-01-01

## 2019-01-01 RX ORDER — CLOPIDOGREL BISULFATE 75 MG/1
75 TABLET ORAL DAILY
COMMUNITY
End: 2020-01-01 | Stop reason: HOSPADM

## 2019-01-01 RX ORDER — PANTOPRAZOLE SODIUM 40 MG/1
40 TABLET, DELAYED RELEASE ORAL DAILY
Qty: 90 TABLET | Refills: 3 | Status: SHIPPED | OUTPATIENT
Start: 2019-01-01

## 2019-01-01 RX ORDER — NICOTINE POLACRILEX 4 MG
15 LOZENGE BUCCAL
Status: DISCONTINUED | OUTPATIENT
Start: 2019-01-01 | End: 2019-01-01 | Stop reason: HOSPADM

## 2019-01-01 RX ORDER — BISACODYL 10 MG
10 SUPPOSITORY, RECTAL RECTAL DAILY PRN
COMMUNITY

## 2019-01-01 RX ORDER — ATENOLOL 25 MG/1
25 TABLET ORAL NIGHTLY
Status: DISCONTINUED | OUTPATIENT
Start: 2019-01-01 | End: 2019-01-01 | Stop reason: HOSPADM

## 2019-01-01 RX ORDER — SODIUM CHLORIDE 0.9 % (FLUSH) 0.9 %
3-10 SYRINGE (ML) INJECTION AS NEEDED
Status: DISCONTINUED | OUTPATIENT
Start: 2019-01-01 | End: 2019-01-01 | Stop reason: HOSPADM

## 2019-01-01 RX ORDER — CHOLECALCIFEROL (VITAMIN D3) 125 MCG
500 CAPSULE ORAL DAILY
Status: DISCONTINUED | OUTPATIENT
Start: 2019-01-01 | End: 2019-01-01 | Stop reason: HOSPADM

## 2019-01-01 RX ORDER — FUROSEMIDE 10 MG/ML
40 INJECTION INTRAMUSCULAR; INTRAVENOUS ONCE
Status: COMPLETED | OUTPATIENT
Start: 2019-01-01 | End: 2019-01-01

## 2019-01-01 RX ORDER — FUROSEMIDE 10 MG/ML
20 INJECTION INTRAMUSCULAR; INTRAVENOUS DAILY
Status: DISCONTINUED | OUTPATIENT
Start: 2019-01-01 | End: 2019-01-01

## 2019-01-01 RX ORDER — VITS A,C,E/LUTEIN/MINERALS 300MCG-200
1 TABLET ORAL DAILY
Status: DISCONTINUED | OUTPATIENT
Start: 2019-01-01 | End: 2019-01-01 | Stop reason: HOSPADM

## 2019-01-01 RX ORDER — ONDANSETRON 2 MG/ML
4 INJECTION INTRAMUSCULAR; INTRAVENOUS ONCE
Status: COMPLETED | OUTPATIENT
Start: 2019-01-01 | End: 2019-01-01

## 2019-01-01 RX ORDER — ATENOLOL 25 MG/1
12.5 TABLET ORAL NIGHTLY
Qty: 45 TABLET | Refills: 1 | Status: SHIPPED | OUTPATIENT
Start: 2019-01-01 | End: 2020-01-01

## 2019-01-01 RX ORDER — MIRTAZAPINE 15 MG/1
7.5 TABLET, FILM COATED ORAL NIGHTLY
COMMUNITY

## 2019-01-01 RX ORDER — ROSUVASTATIN CALCIUM 20 MG/1
20 TABLET, COATED ORAL NIGHTLY
COMMUNITY
End: 2020-01-01 | Stop reason: HOSPADM

## 2019-01-01 RX ORDER — PANTOPRAZOLE SODIUM 40 MG/1
40 TABLET, DELAYED RELEASE ORAL DAILY
Qty: 90 TABLET | Refills: 3 | Status: SHIPPED | OUTPATIENT
Start: 2019-01-01 | End: 2019-01-01 | Stop reason: SDUPTHER

## 2019-01-01 RX ORDER — NITROGLYCERIN 0.4 MG/1
TABLET SUBLINGUAL
Qty: 10 TABLET | Refills: 3 | Status: ON HOLD | OUTPATIENT
Start: 2019-01-01 | End: 2020-01-01

## 2019-01-01 RX ORDER — METHYLPREDNISOLONE 4 MG/1
TABLET ORAL
Qty: 21 TABLET | Refills: 0 | Status: SHIPPED | OUTPATIENT
Start: 2019-01-01 | End: 2019-01-01

## 2019-01-01 RX ORDER — FUROSEMIDE 40 MG/1
40 TABLET ORAL 2 TIMES DAILY
Qty: 15 TABLET | Refills: 0 | Status: SHIPPED | OUTPATIENT
Start: 2019-01-01 | End: 2019-01-01 | Stop reason: SDUPTHER

## 2019-01-01 RX ORDER — VENLAFAXINE HYDROCHLORIDE 37.5 MG/1
37.5 CAPSULE, EXTENDED RELEASE ORAL DAILY
Qty: 90 CAPSULE | Refills: 1 | Status: SHIPPED | OUTPATIENT
Start: 2019-01-01 | End: 2019-01-01 | Stop reason: SDUPTHER

## 2019-01-01 RX ADMIN — HYDROCORTISONE 2.5%: 25 CREAM TOPICAL at 20:56

## 2019-01-01 RX ADMIN — ATORVASTATIN CALCIUM 10 MG: 10 TABLET, FILM COATED ORAL at 20:55

## 2019-01-01 RX ADMIN — Medication 1 TABLET: at 09:19

## 2019-01-01 RX ADMIN — HYDROCORTISONE 2.5%: 25 CREAM TOPICAL at 08:24

## 2019-01-01 RX ADMIN — ATENOLOL 25 MG: 25 TABLET ORAL at 20:55

## 2019-01-01 RX ADMIN — FUROSEMIDE 20 MG: 10 INJECTION, SOLUTION INTRAVENOUS at 11:12

## 2019-01-01 RX ADMIN — SODIUM CHLORIDE, PRESERVATIVE FREE 3 ML: 5 INJECTION INTRAVENOUS at 08:05

## 2019-01-01 RX ADMIN — IPRATROPIUM BROMIDE 0.5 MG: 0.5 SOLUTION RESPIRATORY (INHALATION) at 06:57

## 2019-01-01 RX ADMIN — HYDROCORTISONE 2.5%: 25 CREAM TOPICAL at 08:05

## 2019-01-01 RX ADMIN — TETANUS TOXOID, REDUCED DIPHTHERIA TOXOID AND ACELLULAR PERTUSSIS VACCINE, ADSORBED 0.5 ML: 5; 2.5; 8; 8; 2.5 SUSPENSION INTRAMUSCULAR at 09:59

## 2019-01-01 RX ADMIN — IPRATROPIUM BROMIDE 0.5 MG: 0.5 SOLUTION RESPIRATORY (INHALATION) at 20:49

## 2019-01-01 RX ADMIN — Medication 500 MCG: at 08:04

## 2019-01-01 RX ADMIN — IPRATROPIUM BROMIDE 0.5 MG: 0.5 SOLUTION RESPIRATORY (INHALATION) at 14:18

## 2019-01-01 RX ADMIN — HYDROCORTISONE 2.5% 1 APPLICATION: 25 CREAM TOPICAL at 09:20

## 2019-01-01 RX ADMIN — HYDROCORTISONE 2.5%: 25 CREAM TOPICAL at 21:18

## 2019-01-01 RX ADMIN — SODIUM CHLORIDE 1 G: 900 INJECTION INTRAVENOUS at 22:21

## 2019-01-01 RX ADMIN — PANTOPRAZOLE SODIUM 40 MG: 40 TABLET, DELAYED RELEASE ORAL at 08:04

## 2019-01-01 RX ADMIN — DEXTROMETHORPHAN 60 MG: 30 SUSPENSION, EXTENDED RELEASE ORAL at 21:17

## 2019-01-01 RX ADMIN — GLIPIZIDE 5 MG: 5 TABLET ORAL at 17:34

## 2019-01-01 RX ADMIN — POLYETHYLENE GLYCOL (3350) 17 G: 17 POWDER, FOR SOLUTION ORAL at 08:25

## 2019-01-01 RX ADMIN — POLYETHYLENE GLYCOL (3350) 17 G: 17 POWDER, FOR SOLUTION ORAL at 08:04

## 2019-01-01 RX ADMIN — Medication 1 TABLET: at 09:46

## 2019-01-01 RX ADMIN — FERROUS SULFATE TAB 325 MG (65 MG ELEMENTAL FE) 325 MG: 325 (65 FE) TAB at 09:19

## 2019-01-01 RX ADMIN — Medication 500 MCG: at 09:19

## 2019-01-01 RX ADMIN — VENLAFAXINE HYDROCHLORIDE 37.5 MG: 37.5 CAPSULE, EXTENDED RELEASE ORAL at 09:19

## 2019-01-01 RX ADMIN — ATENOLOL 25 MG: 25 TABLET ORAL at 21:17

## 2019-01-01 RX ADMIN — FUROSEMIDE 40 MG: 10 INJECTION, SOLUTION INTRAVENOUS at 17:25

## 2019-01-01 RX ADMIN — SODIUM CHLORIDE, PRESERVATIVE FREE 3 ML: 5 INJECTION INTRAVENOUS at 08:26

## 2019-01-01 RX ADMIN — INSULIN LISPRO 2 UNITS: 100 INJECTION, SOLUTION INTRAVENOUS; SUBCUTANEOUS at 17:34

## 2019-01-01 RX ADMIN — PANTOPRAZOLE SODIUM 40 MG: 40 TABLET, DELAYED RELEASE ORAL at 09:19

## 2019-01-01 RX ADMIN — ATORVASTATIN CALCIUM 10 MG: 10 TABLET, FILM COATED ORAL at 21:17

## 2019-01-01 RX ADMIN — ASPIRIN 81 MG: 81 TABLET ORAL at 08:04

## 2019-01-01 RX ADMIN — IPRATROPIUM BROMIDE 0.5 MG: 0.5 SOLUTION RESPIRATORY (INHALATION) at 14:35

## 2019-01-01 RX ADMIN — SODIUM CHLORIDE, PRESERVATIVE FREE 3 ML: 5 INJECTION INTRAVENOUS at 09:20

## 2019-01-01 RX ADMIN — DEXTROSE 15 G: 15 GEL ORAL at 08:22

## 2019-01-01 RX ADMIN — PANTOPRAZOLE SODIUM 40 MG: 40 TABLET, DELAYED RELEASE ORAL at 08:25

## 2019-01-01 RX ADMIN — VENLAFAXINE HYDROCHLORIDE 37.5 MG: 37.5 CAPSULE, EXTENDED RELEASE ORAL at 08:25

## 2019-01-01 RX ADMIN — INSULIN LISPRO 5 UNITS: 100 INJECTION, SOLUTION INTRAVENOUS; SUBCUTANEOUS at 11:49

## 2019-01-01 RX ADMIN — HYDROCORTISONE 2.5%: 25 CREAM TOPICAL at 23:55

## 2019-01-01 RX ADMIN — Medication 500 MCG: at 08:25

## 2019-01-01 RX ADMIN — INSULIN DETEMIR 20 UNITS: 100 INJECTION, SOLUTION SUBCUTANEOUS at 23:55

## 2019-01-01 RX ADMIN — IPRATROPIUM BROMIDE 0.5 MG: 0.5 SOLUTION RESPIRATORY (INHALATION) at 10:45

## 2019-01-01 RX ADMIN — Medication 1 TABLET: at 08:04

## 2019-01-01 RX ADMIN — ASPIRIN 81 MG: 81 TABLET ORAL at 08:25

## 2019-01-01 RX ADMIN — VENLAFAXINE HYDROCHLORIDE 37.5 MG: 37.5 CAPSULE, EXTENDED RELEASE ORAL at 08:04

## 2019-01-01 RX ADMIN — SODIUM CHLORIDE, PRESERVATIVE FREE 3 ML: 5 INJECTION INTRAVENOUS at 21:22

## 2019-01-01 RX ADMIN — ATENOLOL 25 MG: 25 TABLET ORAL at 23:54

## 2019-01-01 RX ADMIN — INSULIN LISPRO 3 UNITS: 100 INJECTION, SOLUTION INTRAVENOUS; SUBCUTANEOUS at 21:17

## 2019-01-01 RX ADMIN — ACETAMINOPHEN 1000 MG: 500 TABLET, FILM COATED ORAL at 21:28

## 2019-01-01 RX ADMIN — POTASSIUM CHLORIDE 10 MEQ: 750 CAPSULE, EXTENDED RELEASE ORAL at 08:04

## 2019-01-01 RX ADMIN — FERROUS SULFATE TAB 325 MG (65 MG ELEMENTAL FE) 325 MG: 325 (65 FE) TAB at 08:25

## 2019-01-01 RX ADMIN — ATORVASTATIN CALCIUM 10 MG: 10 TABLET, FILM COATED ORAL at 23:54

## 2019-01-01 RX ADMIN — FUROSEMIDE 20 MG: 10 INJECTION, SOLUTION INTRAMUSCULAR; INTRAVENOUS at 08:04

## 2019-01-01 RX ADMIN — FERROUS SULFATE TAB 325 MG (65 MG ELEMENTAL FE) 325 MG: 325 (65 FE) TAB at 08:04

## 2019-01-01 RX ADMIN — INSULIN LISPRO 3 UNITS: 100 INJECTION, SOLUTION INTRAVENOUS; SUBCUTANEOUS at 12:04

## 2019-01-01 RX ADMIN — ASPIRIN 81 MG: 81 TABLET ORAL at 09:19

## 2019-01-01 RX ADMIN — ONDANSETRON HYDROCHLORIDE 4 MG: 2 SOLUTION INTRAMUSCULAR; INTRAVENOUS at 21:21

## 2019-01-01 RX ADMIN — INSULIN LISPRO 3 UNITS: 100 INJECTION, SOLUTION INTRAVENOUS; SUBCUTANEOUS at 17:22

## 2019-01-01 RX ADMIN — FUROSEMIDE 20 MG: 10 INJECTION, SOLUTION INTRAMUSCULAR; INTRAVENOUS at 08:24

## 2019-01-01 RX ADMIN — INSULIN LISPRO 2 UNITS: 100 INJECTION, SOLUTION INTRAVENOUS; SUBCUTANEOUS at 13:13

## 2019-01-01 RX ADMIN — FUROSEMIDE 20 MG: 10 INJECTION, SOLUTION INTRAMUSCULAR; INTRAVENOUS at 09:18

## 2019-01-01 RX ADMIN — DEXTROMETHORPHAN 60 MG: 30 SUSPENSION, EXTENDED RELEASE ORAL at 09:20

## 2019-01-01 RX ADMIN — DEXTROMETHORPHAN 60 MG: 30 SUSPENSION, EXTENDED RELEASE ORAL at 13:07

## 2019-01-01 RX ADMIN — INSULIN LISPRO 2 UNITS: 100 INJECTION, SOLUTION INTRAVENOUS; SUBCUTANEOUS at 20:55

## 2019-01-01 RX ADMIN — POTASSIUM CHLORIDE 10 MEQ: 750 CAPSULE, EXTENDED RELEASE ORAL at 08:25

## 2019-01-01 RX ADMIN — INSULIN LISPRO 3 UNITS: 100 INJECTION, SOLUTION INTRAVENOUS; SUBCUTANEOUS at 09:19

## 2019-01-25 ENCOUNTER — NURSE TRIAGE (OUTPATIENT)
Dept: CALL CENTER | Facility: HOSPITAL | Age: 84
End: 2019-01-25

## 2019-01-26 ENCOUNTER — NURSE TRIAGE (OUTPATIENT)
Dept: CALL CENTER | Facility: HOSPITAL | Age: 84
End: 2019-01-26

## 2019-01-26 NOTE — TELEPHONE ENCOUNTER
"Dr. Zepeda notified of critical lab value.  No further instructions given at this time.    Reason for Disposition  • Lab or radiology calling with CRITICAL test results    Additional Information  • Negative: [1] Caller is not with the adult (patient) AND [2] reporting urgent symptoms  • Negative: Medication questions  • Negative: Caller cannot be reached by phone  • Negative: Caller has already spoken to PCP or another triager  • Negative: RN needs further essential information from caller in order to complete triage  • Negative: Requesting regular office appointment  • Negative: [1] Caller requesting NON-URGENT health information AND [2] PCP's office is the best resource  • Negative: Health Information question, no triage required and triager able to answer question  • Negative: General information question, no triage required and triager able to answer question  • Negative: Question about upcoming scheduled test, no triage required and triager able to answer question  • Negative: [1] Caller is not with the adult (patient) AND [2] probable NON-URGENT symptoms  • Lab result questions  • Negative: Lab calling with strep throat test results and triager can call in prescription  • Negative: Lab calling with urinalysis test results and triager can call in prescription  • Negative: Medication questions  • Negative: ED call to PCP  • Negative: Physician call to PCP  • Negative: Call about patient who is currently hospitalized    Answer Assessment - Initial Assessment Questions  1. REASON FOR CALL or QUESTION: \"What is your reason for calling today?\" or \"How can I best help you?\" or \"What question do you have that I can help answer?\"      Critical lab value per Faisal Boss, nurse with [facility], critical lab value of platelet count 1,022    Answer Assessment - Initial Assessment Questions  1. REASON FOR CALL or QUESTION: \"What is your reason for calling today?\" or \"How can I best  help you?\" or \"What question do you " "have that I can help answer?\"      Critical lab value of platelet count 1022  2. CALLER: Document the source of call. (e.g., laboratory, patient).      Faisal Boss with [facility]    Protocols used: PCP CALL - NO TRIAGE-ADULT-, INFORMATION ONLY CALL-ADULT-      "

## 2019-01-27 NOTE — TELEPHONE ENCOUNTER
"Caller asking for mother is she should start her agrylin back as they were called about her lab work. Caller aware should speak with Dr. Barahona about restarting any medication. Caller provided with Dr. Barahona and aware if unable to get in touch call back and staff will contact Dr. Zepeda.     Reason for Disposition  • Caller has URGENT medication question about med that PCP prescribed and triager unable to answer question    Additional Information  • Negative: [1] Caller is not with the adult (patient) AND [2] reporting urgent symptoms  • Negative: Lab result questions  • Medication questions  • Negative: Drug overdose and nurse unable to answer question  • Negative: Caller requesting information not related to medicine  • Negative: Caller requesting a prescription for Strep throat and has a positive culture result  • Negative: Rash while taking a medication or within 3 days of stopping it  • Negative: Immunization reaction suspected  • Negative: [1] Asthma and [2] having symptoms of asthma (cough, wheezing, etc)  • Negative: MORE THAN A DOUBLE DOSE of a prescription or over-the-counter (OTC) drug  • Negative: [1] DOUBLE DOSE (an extra dose or lesser amount) of over-the-counter (OTC) drug AND [2] any symptoms (e.g., dizziness, nausea, pain, sleepiness)  • Negative: [1] DOUBLE DOSE (an extra dose or lesser amount) of prescription drug AND [2] any symptoms (e.g., dizziness, nausea, pain, sleepiness)  • Negative: Took another person's prescription drug  • Negative: [1] DOUBLE DOSE (an extra dose or lesser amount) of prescription drug AND [2] NO symptoms (Exception: a double dose of antibiotics)  • Negative: Diabetes drug error or overdose (e.g., insulin or extra dose)  • Negative: [1] Request for URGENT new prescription or refill of \"essential\" medication (i.e., likelihood of harm to patient if not taken) AND [2] triager unable to fill per unit policy  • Negative: [1] Prescription not at pharmacy AND [2] was prescribed " "today by PCP  • Negative: Pharmacy calling with prescription questions and triager unable to answer question    Answer Assessment - Initial Assessment Questions  1. REASON FOR CALL or QUESTION: \"What is your reason for calling today?\" or \"How can I best help you?\" or \"What question do you have that I can help answer?\"      Should we start Agrylin medication back?    Answer Assessment - Initial Assessment Questions  1. SYMPTOMS: \"Do you have any symptoms?\"      Denies   2. SEVERITY: If symptoms are present, ask \"Are they mild, moderate or severe?\"      Requesting is they should start medication back that was started. Advised speak with MD.    Protocols used: MEDICATION QUESTION CALL-ADULT-, INFORMATION ONLY CALL-ADULT-      "

## 2019-01-30 ENCOUNTER — TELEPHONE (OUTPATIENT)
Dept: ONCOLOGY | Facility: CLINIC | Age: 84
End: 2019-01-30

## 2019-01-30 NOTE — TELEPHONE ENCOUNTER
Called Margarita to see when home health is due to draw blood again.  Her daughter Margarita stated that she is scheduled to have a blood drawn in the morning.  Margarita stated her mom is scheduled to go back to [place] and see the hematologist this Friday.  Asked Margarita to call and give an update on what [place] wants to do.

## 2019-01-31 ENCOUNTER — OFFICE VISIT (OUTPATIENT)
Dept: INTERNAL MEDICINE | Facility: CLINIC | Age: 84
End: 2019-01-31

## 2019-01-31 VITALS
WEIGHT: 108.4 LBS | BODY MASS INDEX: 20.47 KG/M2 | HEART RATE: 72 BPM | HEIGHT: 61 IN | RESPIRATION RATE: 16 BRPM | OXYGEN SATURATION: 98 % | DIASTOLIC BLOOD PRESSURE: 60 MMHG | SYSTOLIC BLOOD PRESSURE: 122 MMHG

## 2019-01-31 DIAGNOSIS — N18.30 CKD (CHRONIC KIDNEY DISEASE) STAGE 3, GFR 30-59 ML/MIN (HCC): ICD-10-CM

## 2019-01-31 DIAGNOSIS — D75.839 THROMBOCYTOSIS: ICD-10-CM

## 2019-01-31 DIAGNOSIS — I10 ESSENTIAL HYPERTENSION: ICD-10-CM

## 2019-01-31 DIAGNOSIS — I25.10 CORONARY ARTERY DISEASE INVOLVING NATIVE CORONARY ARTERY OF NATIVE HEART WITHOUT ANGINA PECTORIS: ICD-10-CM

## 2019-01-31 DIAGNOSIS — I50.32 CHRONIC DIASTOLIC CONGESTIVE HEART FAILURE (HCC): Primary | ICD-10-CM

## 2019-01-31 DIAGNOSIS — I48.0 PAROXYSMAL ATRIAL FIBRILLATION (HCC): ICD-10-CM

## 2019-01-31 PROBLEM — N39.0 ACUTE UTI (URINARY TRACT INFECTION): Status: RESOLVED | Noted: 2018-09-18 | Resolved: 2019-01-31

## 2019-01-31 PROCEDURE — 99214 OFFICE O/P EST MOD 30 MIN: CPT | Performed by: INTERNAL MEDICINE

## 2019-01-31 RX ORDER — FUROSEMIDE 80 MG
80 TABLET ORAL DAILY
COMMUNITY
Start: 2019-01-18 | End: 2019-02-18

## 2019-01-31 RX ORDER — POTASSIUM CHLORIDE 750 MG/1
10 TABLET, EXTENDED RELEASE ORAL DAILY
COMMUNITY
Start: 2019-01-18 | End: 2019-02-18

## 2019-01-31 NOTE — PROGRESS NOTES
CC: Follow-up hospitalization for heart failure    History:  Lakesha Costello is a 83 y.o. female   She presents today and notes she is feeling much better after hospitalization at [place] for heart failure exacerbation.  Regarding atrial fibrillation, she is now off her amiodarone and has had successful placement of her watchman device and is without symptoms of palpitations.  She continues on Pradaxa, but they are hopeful to get her off this after her upcoming DEAN next month.  Regarding CHF, she is feeling much better and has no swelling, dyspnea, and has had marked improvement in her energy level.  She watches her weight, though it has continued drifting down after her hospitalization.  She did have approximately 15 pounds diuresis during her hospitalization per the history of her daughter.  She does continue to have thrombocytosis and is on anagrelide with scheduled consultation in etiology at [place] tomorrow.      ROS:  Review of Systems   Constitutional: Positive for unexpected weight change. Negative for chills, fatigue and fever.   Respiratory: Negative for cough and shortness of breath.    Cardiovascular: Negative for chest pain, palpitations and leg swelling.        reports that  has never smoked. she has never used smokeless tobacco. She reports that she does not drink alcohol or use drugs.      Current Outpatient Medications:   •  anagrelide (AGRYLIN) 1 MG capsule, Take 1 capsule by mouth Daily., Disp: 90 capsule, Rfl: 6  •  aspirin 81 MG EC tablet, Take 81 mg by mouth Daily., Disp: , Rfl:   •  atenolol (TENORMIN) 50 MG tablet, Take 1 tab PO qam & 1/2 tab PO qpm. (Patient taking differently: Take 25 mg by mouth Daily. Take 1 tab PO qam & 1/2 tab PO qpm.), Disp: 135 tablet, Rfl: 3  •  bisacodyl (DULCOLAX) 10 MG suppository, Insert 1 suppository into the rectum Daily., Disp: 8 each, Rfl: 1  •  calcium carbonate (OS-MICHAEL) 600 MG tablet, Take 600 mg by mouth 2 (Two) Times a Day With Meals., Disp: , Rfl:    •  dabigatran etexilate (PRADAXA) 75 MG capsule, Take 75 mg by mouth 2 (Two) Times a Day., Disp: , Rfl:   •  fenofibrate 160 MG tablet, Take 160 mg by mouth Daily., Disp: , Rfl:   •  Ferrous Sulfate  (45 Fe) MG tablet controlled-release, Take 142 mg by mouth Daily., Disp: 90 tablet, Rfl: 3  •  furosemide (LASIX) 80 MG tablet, Take 80 tablets by mouth Daily., Disp: , Rfl:   •  glipiZIDE (GLUCOTROL) 5 MG tablet, Take 1 tablet by mouth 2 (Two) Times a Day Before Meals. (Patient taking differently: Take 5 mg by mouth As Needed.), Disp: 180 tablet, Rfl: 3  •  hydrocortisone (ANUSOL-HC) 25 MG suppository, Insert 1 suppository into the rectum 2 (Two) Times a Day As Needed for Hemorrhoids., Disp: 10 suppository, Rfl: 0  •  insulin detemir (LEVEMIR) 100 UNIT/ML injection, Inject 30 Units under the skin into the appropriate area as directed Every Night. 30 units qam 30 units qpm, Disp: 30 mL, Rfl: 3  •  Lidocaine-Hydrocortisone Ace 2.8-0.55 % gel, Insert 1 application into the rectum 2 (Two) Times a Day As Needed (hemorrhoid pain)., Disp: 100 g, Rfl: 3  •  magnesium oxide (MAGOX) 400 (241.3 MG) MG tablet tablet, Take 400 mg by mouth Daily., Disp: , Rfl:   •  Multiple Vitamins-Minerals (PRESERVISION/LUTEIN) capsule, Take 1 capsule by mouth 2 (Two) Times a Day., Disp: , Rfl:   •  nitroglycerin (NITROSTAT) 0.4 MG SL tablet, 1 under the tongue as needed for angina, may repeat q5mins for up three doses, Disp: 30 tablet, Rfl: 3  •  ondansetron ODT (ZOFRAN ODT) 4 MG disintegrating tablet, Take 1 tablet by mouth Every 4 (Four) Hours As Needed for Nausea or Vomiting., Disp: 30 tablet, Rfl: 3  •  pantoprazole (PROTONIX) 40 MG EC tablet, Take 40 mg by mouth Daily., Disp: , Rfl:   •  polyethyl glycol-propyl glycol (SYSTANE) 0.4-0.3 % solution ophthalmic solution, Administer 1 drop to both eyes Daily., Disp: , Rfl:   •  potassium chloride (K-DUR,KLOR-CON) 10 MEQ CR tablet, Take 10 tablets by mouth Daily., Disp: , Rfl:   •   "sennosides-docusate sodium (SENOKOT-S) 8.6-50 MG tablet, Take 2 tablets by mouth Every Night., Disp: 60 tablet, Rfl: 0  •  simvastatin (ZOCOR) 20 MG tablet, Take 1 tablet by mouth Every Night., Disp: 90 tablet, Rfl: 3  •  venlafaxine XR (EFFEXOR-XR) 37.5 MG 24 hr capsule, Take 37.5 mg by mouth Daily., Disp: , Rfl:   •  vitamin B-12 (CYANOCOBALAMIN) 500 MCG tablet, Take 500 mcg by mouth Daily., Disp: , Rfl:   •  Witch Hazel (TUCKS) 50 % pads, Apply 1 pad topically 6 (Six) Times a Day., Disp: 40 each, Rfl: 3    OBJECTIVE:  /60 (BP Location: Left arm, Patient Position: Sitting, Cuff Size: Adult)   Pulse 72   Resp 16   Ht 154.9 cm (61\")   Wt 49.2 kg (108 lb 6.4 oz)   SpO2 98%   Breastfeeding? No   BMI 20.48 kg/m²    Physical Exam   Constitutional: She is oriented to person, place, and time. She appears well-nourished. No distress.   Cardiovascular: Normal rate, regular rhythm and normal heart sounds.   No murmur heard.  Pulmonary/Chest: Effort normal and breath sounds normal. She has no wheezes.   Neurological: She is alert and oriented to person, place, and time.   Psychiatric: She has a normal mood and affect.       Assessment/Plan    Diagnoses and all orders for this visit:    Chronic diastolic congestive heart failure (CMS/HCC)  Stable and euvolemic on today's exam on atenolol.  She is not requiring diuretic therapy at this time.    CKD (chronic kidney disease) stage 3, GFR 30-59 ml/min (CMS/HCC)  This has been steady on home health checks.  She is not on ACE inhibitor or ARB currently, likely related to recent [facility] eye with heart failure exacerbation.  This may be considered in the future if renal status stabilizes.    Coronary artery disease involving native coronary artery of native heart without angina pectoris  She is stable on aspirin, beta blocker, and simvastatin.  She has no anginal symptoms.    Essential hypertension  Well controlled, BP goal for age is <140/90 per JNC 8 guidelines and continue " current medications    Paroxysmal atrial fibrillation (CMS/HCC)  She is status post watchman procedure and is hopeful to discontinue her Pradaxa after upcoming DEAN.  She continues on rate control and is asymptomatic at this time.    Thrombocytosis (CMS/HCC)  Stable on labs per home health.  She did have from a cytopenia in the hospital, though this has improved.  She recently restarted her anagrelide.  Upcoming consultation at [facility] tomorrow.      An After Visit Summary was printed and given to the patient at discharge.  Return in about 2 months (around 3/31/2019) for Recheck.         Leonardo Zepeda D.O. 1/31/2019

## 2019-02-05 DIAGNOSIS — D47.1 MPN (MYELOPROLIFERATIVE NEOPLASM) (HCC): Primary | ICD-10-CM

## 2019-02-06 ENCOUNTER — APPOINTMENT (OUTPATIENT)
Dept: LAB | Facility: HOSPITAL | Age: 84
End: 2019-02-06

## 2019-02-06 RX ORDER — FENOFIBRATE 160 MG/1
160 TABLET ORAL DAILY
Qty: 90 TABLET | Refills: 1 | OUTPATIENT
Start: 2019-02-06

## 2019-02-06 NOTE — TELEPHONE ENCOUNTER
Actually, I think we can stop the fenofibrate. We will be due for labs here in the next few months, so we will keep an eye on cholesterol. I do want her to continue the simvastatin.

## 2019-02-06 NOTE — TELEPHONE ENCOUNTER
----- Message from Leonardo Zepeda DO sent at 2/5/2019  1:29 PM CST -----  Kidney function is worse than last week. Any changes to the urine? Please be sure that you are hydrated enough to keep urine light yellow. We will check with next labs, but she should contact us if she starts feeling bad. We may have to adjust water pills.

## 2019-02-06 NOTE — TELEPHONE ENCOUNTER
I don't think we need to do a urine sample at this time, but we just need to be aware of any changes to color, odor, or drastic changes in amount. We will watch with next labs.

## 2019-02-06 NOTE — TELEPHONE ENCOUNTER
I tried calling the patient's daughter Margarita, no answer.  I left a VM message for her to return my call in the morning.

## 2019-02-06 NOTE — TELEPHONE ENCOUNTER
Patient's daughter Margarita informed urine sample is not needed at this time, per Dr. Zepeda.  She said she had talked to the patient since we last spoke earlier and the patient stated she is feeling fine and has not noticed any changes in the appearance of her urine.      Patient's daughter stated the patient has been out of fenofibrate 160 mg for the past few weeks and asked if a refill can be sent to [facility] pharmacy today and a few days course of the medication sent to [facility] in [place] as well that she can take for the next few days until she receives the order from [facility].

## 2019-02-06 NOTE — TELEPHONE ENCOUNTER
Patient's daughter Margarita informed of results.  She stated the patient has not mentioned any changes in urine but said she is prone to UTI's and most times is not aware she has one. Margarita stated the home health nurse will be going out to the patient's home today or tomorrow and asked if Dr. Zepeda would like them to get a urine sample to check for UTI.  In regards to fluid intake, she said Bernard has the patient on a 2000 cc fluid restriction and she will encourage her to drink as much as possible, up to but not exceeding 2000 cc.

## 2019-02-07 ENCOUNTER — APPOINTMENT (OUTPATIENT)
Dept: LAB | Facility: HOSPITAL | Age: 84
End: 2019-02-07

## 2019-02-07 ENCOUNTER — TELEPHONE (OUTPATIENT)
Dept: INTERNAL MEDICINE | Facility: CLINIC | Age: 84
End: 2019-02-07

## 2019-02-07 NOTE — TELEPHONE ENCOUNTER
Dori from [facility]  @ [facility] ([phone]) called stating that the patient's family had told her she has been diagnosed with CHF but they do not have that in her record.  I faxed her las OV note which does include a DX of CHF to her at [phone].   MM

## 2019-02-08 NOTE — TELEPHONE ENCOUNTER
Patient's daughter Margarita informed to stop fenofibrate and continue with simvastatin.  She said home health came out yesterday but did not draw blood because the order for blood draws ended on 2/6.  If the blood draws are to continue through home health, a new order has to be placed.

## 2019-02-08 NOTE — TELEPHONE ENCOUNTER
Patient's daughter Margarita Harden spoke to the home health nurse on yesterday and gave her instructions from Dr. Zepeda regarding orders.

## 2019-02-08 NOTE — TELEPHONE ENCOUNTER
Per Dr Zepeda he only wants a BMP, CALLED AND CONFIRMED THIS WITH CARLOS AT [facility], BMP NEXT WEEK

## 2019-02-12 ENCOUNTER — TELEPHONE (OUTPATIENT)
Dept: INTERNAL MEDICINE | Facility: CLINIC | Age: 84
End: 2019-02-12

## 2019-02-12 NOTE — TELEPHONE ENCOUNTER
Patient daughter was informed         ----- Message from Leonardo Zepeda DO sent at 2/11/2019  1:17 PM CST -----  Lab looking pretty good. Kidneys have calmed down a bit. Keep with same plan on fluid intake and water pills. I am pleased with where things currently stand.

## 2019-02-13 ENCOUNTER — TELEPHONE (OUTPATIENT)
Dept: INTERNAL MEDICINE | Facility: CLINIC | Age: 84
End: 2019-02-13

## 2019-02-13 NOTE — TELEPHONE ENCOUNTER
Spoke with Zoila at [facility] she charted every 2 weeks      ----- Message from Leonardo Zepeda DO sent at 2/13/2019 11:25 AM [facility] -----  Call . Change BMP to [id number] for now.

## 2019-03-06 ENCOUNTER — HOSPITAL ENCOUNTER (EMERGENCY)
Facility: HOSPITAL | Age: 84
Discharge: HOME | End: 2019-03-07
Admitting: EMERGENCY MEDICINE

## 2019-03-06 ENCOUNTER — APPOINTMENT (OUTPATIENT)
Dept: CT IMAGING | Facility: HOSPITAL | Age: 84
End: 2019-03-06

## 2019-03-06 ENCOUNTER — APPOINTMENT (OUTPATIENT)
Dept: GENERAL RADIOLOGY | Facility: HOSPITAL | Age: 84
End: 2019-03-06

## 2019-03-06 DIAGNOSIS — N18.30 CKD (CHRONIC KIDNEY DISEASE) STAGE 3, GFR 30-59 ML/MIN (HCC): ICD-10-CM

## 2019-03-06 DIAGNOSIS — Z95.0 ARTIFICIAL PACEMAKER: ICD-10-CM

## 2019-03-06 DIAGNOSIS — D75.839 THROMBOCYTOSIS: ICD-10-CM

## 2019-03-06 DIAGNOSIS — R55 SYNCOPE, UNSPECIFIED SYNCOPE TYPE: Primary | ICD-10-CM

## 2019-03-06 LAB
ALBUMIN SERPL-MCNC: 4.3 G/DL (ref 3.5–5)
ALBUMIN/GLOB SERPL: 1.5 G/DL (ref 1.1–2.5)
ALP SERPL-CCNC: 124 U/L (ref 24–120)
ALT SERPL W P-5'-P-CCNC: 31 U/L (ref 0–54)
ANION GAP SERPL CALCULATED.3IONS-SCNC: 12 MMOL/L (ref 4–13)
AST SERPL-CCNC: 39 U/L (ref 7–45)
BASOPHILS # BLD AUTO: 0.04 10*3/MM3 (ref 0–0.2)
BASOPHILS NFR BLD AUTO: 0.6 % (ref 0–2)
BILIRUB SERPL-MCNC: 0.9 MG/DL (ref 0.1–1)
BUN BLD-MCNC: 68 MG/DL (ref 5–21)
BUN/CREAT SERPL: 41 (ref 7–25)
CALCIUM SPEC-SCNC: 9.5 MG/DL (ref 8.4–10.4)
CHLORIDE SERPL-SCNC: 99 MMOL/L (ref 98–110)
CO2 SERPL-SCNC: 27 MMOL/L (ref 24–31)
CREAT BLD-MCNC: 1.66 MG/DL (ref 0.5–1.4)
DEPRECATED RDW RBC AUTO: 52.2 FL (ref 40–54)
EOSINOPHIL # BLD AUTO: 0.17 10*3/MM3 (ref 0–0.7)
EOSINOPHIL NFR BLD AUTO: 2.4 % (ref 0–4)
ERYTHROCYTE [DISTWIDTH] IN BLOOD BY AUTOMATED COUNT: 15.5 % (ref 12–15)
GFR SERPL CREATININE-BSD FRML MDRD: 30 ML/MIN/1.73
GLOBULIN UR ELPH-MCNC: 2.9 GM/DL
GLUCOSE BLD-MCNC: 244 MG/DL (ref 70–100)
HCT VFR BLD AUTO: 31.1 % (ref 37–47)
HGB BLD-MCNC: 10 G/DL (ref 12–16)
INR PPP: 1.19 (ref 0.91–1.09)
LYMPHOCYTES # BLD AUTO: 0.65 10*3/MM3 (ref 0.72–4.86)
LYMPHOCYTES NFR BLD AUTO: 9.1 % (ref 15–45)
MCH RBC QN AUTO: 29.9 PG (ref 28–32)
MCHC RBC AUTO-ENTMCNC: 32.2 G/DL (ref 33–36)
MCV RBC AUTO: 93.1 FL (ref 82–98)
MONOCYTES # BLD AUTO: 0.38 10*3/MM3 (ref 0.19–1.3)
MONOCYTES NFR BLD AUTO: 5.3 % (ref 4–12)
NEUTROPHILS # BLD AUTO: 5.9 10*3/MM3 (ref 1.87–8.4)
NEUTROPHILS NFR BLD AUTO: 82.3 % (ref 39–78)
NT-PROBNP SERPL-MCNC: ABNORMAL PG/ML (ref 0–1800)
PLATELET # BLD AUTO: 507 10*3/MM3 (ref 130–400)
PMV BLD AUTO: 13.4 FL (ref 6–12)
POTASSIUM BLD-SCNC: 4.2 MMOL/L (ref 3.5–5.3)
PROT SERPL-MCNC: 7.2 G/DL (ref 6.3–8.7)
PROTHROMBIN TIME: 15.5 SECONDS (ref 11.9–14.6)
RBC # BLD AUTO: 3.34 10*6/MM3 (ref 4.2–5.4)
SODIUM BLD-SCNC: 138 MMOL/L (ref 135–145)
TROPONIN I SERPL-MCNC: 0.02 NG/ML (ref 0–0.03)
WBC NRBC COR # BLD: 7.16 10*3/MM3 (ref 4.8–10.8)

## 2019-03-06 PROCEDURE — 93005 ELECTROCARDIOGRAM TRACING: CPT | Performed by: PHYSICIAN ASSISTANT

## 2019-03-06 PROCEDURE — 80053 COMPREHEN METABOLIC PANEL: CPT | Performed by: PHYSICIAN ASSISTANT

## 2019-03-06 PROCEDURE — 84484 ASSAY OF TROPONIN QUANT: CPT | Performed by: PHYSICIAN ASSISTANT

## 2019-03-06 PROCEDURE — 70450 CT HEAD/BRAIN W/O DYE: CPT

## 2019-03-06 PROCEDURE — 85610 PROTHROMBIN TIME: CPT | Performed by: PHYSICIAN ASSISTANT

## 2019-03-06 PROCEDURE — 99283 EMERGENCY DEPT VISIT LOW MDM: CPT

## 2019-03-06 PROCEDURE — 83880 ASSAY OF NATRIURETIC PEPTIDE: CPT | Performed by: PHYSICIAN ASSISTANT

## 2019-03-06 PROCEDURE — 85025 COMPLETE CBC W/AUTO DIFF WBC: CPT | Performed by: PHYSICIAN ASSISTANT

## 2019-03-06 PROCEDURE — 93005 ELECTROCARDIOGRAM TRACING: CPT

## 2019-03-06 PROCEDURE — 93010 ELECTROCARDIOGRAM REPORT: CPT | Performed by: INTERNAL MEDICINE

## 2019-03-06 PROCEDURE — 71045 X-RAY EXAM CHEST 1 VIEW: CPT

## 2019-03-06 RX ORDER — FUROSEMIDE 40 MG/1
40 TABLET ORAL DAILY
Status: ON HOLD | COMMUNITY
End: 2019-01-01 | Stop reason: SDUPTHER

## 2019-03-06 RX ORDER — POTASSIUM CHLORIDE 600 MG/1
10 CAPSULE, EXTENDED RELEASE ORAL DAILY
Status: ON HOLD | COMMUNITY
End: 2019-01-01

## 2019-03-06 RX ORDER — MELATONIN
1000 DAILY
COMMUNITY
End: 2020-01-01 | Stop reason: HOSPADM

## 2019-03-07 VITALS
OXYGEN SATURATION: 98 % | WEIGHT: 110 LBS | TEMPERATURE: 97.2 F | SYSTOLIC BLOOD PRESSURE: 137 MMHG | HEIGHT: 61 IN | DIASTOLIC BLOOD PRESSURE: 44 MMHG | HEART RATE: 61 BPM | BODY MASS INDEX: 20.77 KG/M2 | RESPIRATION RATE: 20 BRPM

## 2019-03-07 LAB
BACTERIA UR QL AUTO: ABNORMAL /HPF
BILIRUB UR QL STRIP: NEGATIVE
CLARITY UR: CLEAR
COLOR UR: YELLOW
GLUCOSE UR STRIP-MCNC: NEGATIVE MG/DL
HGB UR QL STRIP.AUTO: NEGATIVE
HYALINE CASTS UR QL AUTO: ABNORMAL /LPF
KETONES UR QL STRIP: NEGATIVE
LEUKOCYTE ESTERASE UR QL STRIP.AUTO: ABNORMAL
NITRITE UR QL STRIP: NEGATIVE
PH UR STRIP.AUTO: <=5 [PH] (ref 5–8)
PROT UR QL STRIP: NEGATIVE
RBC # UR: ABNORMAL /HPF
REF LAB TEST METHOD: ABNORMAL
SP GR UR STRIP: 1.02 (ref 1–1.03)
SQUAMOUS #/AREA URNS HPF: ABNORMAL /HPF
UROBILINOGEN UR QL STRIP: ABNORMAL
WBC UR QL AUTO: ABNORMAL /HPF

## 2019-03-07 PROCEDURE — 81001 URINALYSIS AUTO W/SCOPE: CPT | Performed by: PHYSICIAN ASSISTANT

## 2019-03-07 NOTE — DISCHARGE INSTRUCTIONS
It is extremely important to maintain her close follow-up with Dr. Crocker and your primary care provider on Friday.  Please have your pacemaker evaluated.  Remember, you were offered admission and further workup here in emergency department but have decided to go home at this time.  However, if you develop any new, worsening or other concerning symptoms please return immediately to the emergency department.    Get help right away if:  You have a severe headache.  You have unusual pain in your chest, abdomen, or back.  You are bleeding from your mouth or rectum, or you have black or tarry stool.  You have a very fast or irregular heartbeat (palpitations).  You have pain with breathing.  You faint once or repeatedly.  You have a seizure.  You are confused.  You have trouble walking.  You have severe weakness.  You have vision problems.

## 2019-03-08 ENCOUNTER — OFFICE VISIT (OUTPATIENT)
Dept: CARDIOLOGY | Facility: CLINIC | Age: 84
End: 2019-03-08

## 2019-03-08 ENCOUNTER — CLINICAL SUPPORT NO REQUIREMENTS (OUTPATIENT)
Dept: CARDIOLOGY | Facility: CLINIC | Age: 84
End: 2019-03-08

## 2019-03-08 VITALS
DIASTOLIC BLOOD PRESSURE: 64 MMHG | HEART RATE: 62 BPM | BODY MASS INDEX: 20.77 KG/M2 | SYSTOLIC BLOOD PRESSURE: 121 MMHG | OXYGEN SATURATION: 97 % | HEIGHT: 61 IN | WEIGHT: 110 LBS

## 2019-03-08 DIAGNOSIS — Z95.0 ARTIFICIAL PACEMAKER: ICD-10-CM

## 2019-03-08 DIAGNOSIS — I25.10 CORONARY ARTERY DISEASE INVOLVING NATIVE CORONARY ARTERY OF NATIVE HEART WITHOUT ANGINA PECTORIS: ICD-10-CM

## 2019-03-08 DIAGNOSIS — Z95.2 S/P TAVR (TRANSCATHETER AORTIC VALVE REPLACEMENT): ICD-10-CM

## 2019-03-08 DIAGNOSIS — I10 ESSENTIAL HYPERTENSION: ICD-10-CM

## 2019-03-08 DIAGNOSIS — I48.0 PAROXYSMAL ATRIAL FIBRILLATION (HCC): Primary | ICD-10-CM

## 2019-03-08 DIAGNOSIS — Z95.0 ARTIFICIAL PACEMAKER: Primary | ICD-10-CM

## 2019-03-08 DIAGNOSIS — I49.5 SSS (SICK SINUS SYNDROME) (HCC): ICD-10-CM

## 2019-03-08 PROCEDURE — 99214 OFFICE O/P EST MOD 30 MIN: CPT | Performed by: INTERNAL MEDICINE

## 2019-03-08 PROCEDURE — 93280 PM DEVICE PROGR EVAL DUAL: CPT | Performed by: INTERNAL MEDICINE

## 2019-03-08 RX ORDER — POLYETHYLENE GLYCOL 3350 17 G/17G
17 POWDER, FOR SOLUTION ORAL DAILY PRN
COMMUNITY

## 2019-03-08 RX ORDER — IMATINIB MESYLATE 100 MG/1
100 TABLET, FILM COATED ORAL 2 TIMES DAILY
COMMUNITY
End: 2019-01-01

## 2019-03-08 NOTE — PROGRESS NOTES
Dual Chamber Pacemaker Evaluation Report  IN OFFICE INTERROGATION    March 8, 2019    Primary Cardiologist: Obi  : Guidant Model: ESSENTIO MRI EL L131  Implant date: 2/3/2017    Reason for evaluation: patient reported symptoms, syncopal episode earlier this week  Indication for pacemaker: sick sinus syndrome    Measurements  Atrial sensing - P wave: 2.5 mV  Atrial threshold: 0.8V@ 0.4ms  Atrial lead impedance: 713 ohms  Ventricular sensing - R wave: 15.5 mV  Ventricular threshold: 0.8 V @ 0.4 ms  Ventricular lead impedance:   923 ohms     Diagnostic Data  Atrial paced: 85 %  Ventricular paced: 100 %    Episodes recorded since 12/14/2018:  No episodes.    Battery status: satisfactory, estimated 12.5 years remaining      Final Parameters  Mode:  DDDR  Lower rate: 60 bpm   Upper rate: 130 bpm  AV Delay: paced- 220-300 ms  Eyspcq-554-450 ms  Atrial - Amplitude: 1.6 V   Pulse width: 0.4 ms   Sensitivity: 0.25 mV     Ventricular - Amplitude: 2 V  Pulse width: 0.4 ms  Sensitivity: 0.6 mV    Changes made: Normal jody atrial output changed to 1.6V  Conclusions: normal pacemaker function, stable pacing and sensing thresholds and adequate battery reserve    Follow up: Every 3 months via latitude, annually in office    Note:  Report faxed to Dr. Halie Moses at [place] per patient request

## 2019-03-11 LAB
CYTO UR: NORMAL
LAB AP CASE REPORT: NORMAL
LAB AP DIAGNOSIS COMMENT: NORMAL
LAB AP FLOW CYTOMETRY SUMMARY: NORMAL
PATH REPORT.FINAL DX SPEC: NORMAL
PATH REPORT.GROSS SPEC: NORMAL

## 2019-04-05 NOTE — TELEPHONE ENCOUNTER
Needs to be wearing compression hose at 20-30mmHg daily while awake. Elevate feet. Increase furosemide to 40mg BID for the next 3 days. Reassess early next week. Needs to be educated on a DASH diet.

## 2019-04-05 NOTE — TELEPHONE ENCOUNTER
Sarah from  called she states that the patient has had weight gain hold fluid she was 111 lb to 120 today she is 119, she stated that she is a 2 plus lower ext edema. Upon awaking theres no edema but through out day she gets up to a 2 plus. She is up doing things & just setting at all times. Sarah stated that she is weak as well and is worried about her. Was wondering what to do.   Phone # [phone]

## 2019-04-09 NOTE — TELEPHONE ENCOUNTER
Sarah called from  she states that Mrs Costello will not wear the compression socks, she also says that she has gained a few pounds 119 to 122 after the lasix was increased over the weekend. She complains of abdomen feeling full and bloated, she said she doesn't look good. Patient has an appointment on Friday of this week. Sarah said she would check on her Thursday.

## 2019-04-12 PROBLEM — C92.10 CML (CHRONIC MYELOID LEUKEMIA) (HCC): Status: ACTIVE | Noted: 2019-02-13

## 2019-04-12 PROBLEM — K29.70 GASTRITIS: Status: ACTIVE | Noted: 2019-01-01

## 2019-04-12 NOTE — PROGRESS NOTES
CC: f/u diabetes    History:  Lakesha Costello is a 84 y.o. female   She notes she has been doing well. She has been to [facility] since her last visit, diagnosed with CML and is now being treated with Gleevec with good tolerance. Her DM has done well and she is without symptoms of hyperglycemia nor hypoglycemia at this time. BP has been well controlled on current medications without side effects. She did have a recent EGD showing gastritis per her report at an OSH. She has been taking PPI and her blood counts have not suggested an acute anemia.  She did have a syncopal episode in the last month, but has had no further episodes and had a negative CT of the head. Review of [facility] records indicate this was felt to be secondary to volume depletion. There was thought toward seizure, but observation alone was favored per their records with discussions with family.    ROS:  Review of Systems   Constitutional: Negative for chills and fever.   HENT: Negative for congestion and sore throat.    Eyes: Negative for visual disturbance.   Respiratory: Negative for cough and shortness of breath.    Cardiovascular: Negative for chest pain and palpitations.   Gastrointestinal: Positive for blood in stool. Negative for abdominal pain, constipation and nausea.   Endocrine: Negative for cold intolerance and heat intolerance.   Genitourinary: Negative for difficulty urinating and frequency.   Musculoskeletal: Negative for arthralgias and back pain.   Skin: Negative for rash.   Neurological: Negative for dizziness, syncope, weakness and headaches.   Psychiatric/Behavioral: Negative for dysphoric mood. The patient is not nervous/anxious.      Past Medical History:   Diagnosis Date   • Aortic stenosis    • Breast cancer (CMS/HCC)    • CAD (coronary artery disease)    • Cataract    • CHF (congestive heart failure) (CMS/HCC)     recently diagnosed after an ER visit.    • Chronic anticoagulation     Coumadin    • CKD (chronic kidney  disease) stage 3, GFR 30-59 ml/min (CMS/[facility]) 4/16/2018   • Diabetes mellitus (CMS/[facility])     Type II   • Elevated cholesterol    • GERD (gastroesophageal reflux disease)    • Hyperlipidemia    • Hypertension    • Macular degeneration    • Pancreatitis    • Paroxysmal atrial fibrillation (CMS/[facility])    • Pulmonary hypertension (CMS/[facility])    • Rheumatic fever     during childhood   • Sacrum and coccyx fracture (CMS/[facility])    • Sick sinus syndrome (CMS/[facility])     with pacemaker   • UTI (urinary tract infection)      Past Surgical History:   Procedure Laterality Date   • ANOMALOUS PULMONARY VENOUS RETURN REPAIR, TOTAL     • BACK SURGERY     • BELPHAROPTOSIS REPAIR     • CARDIAC CATHETERIZATION  1999, 2010   • CARDIAC CATHETERIZATION N/A 2/15/2017    Procedure: Left Heart Cath;  Surgeon: Ehsan Crocker MD;  Location:  [facility] CATH [facility]AS[facility];  Service:    • CARDIAC CATHETERIZATION N/A 2/15/2017    Procedure: Right Heart Cath;  Surgeon: Ehsan Crocker MD;  Location:  [facility];  Service:    • CARDIAC CATHETERIZATION N/A 2/15/2017    Procedure: Coronary angiography;  Surgeon: Ehsan Crocker MD;  Location:  [facility];  Service:    • CARDIAC CATHETERIZATION N/A 2/15/2017    Procedure: Left ventriculography;  Surgeon: Ehsan Crocker MD;  Location:  [facility];  Service:    • CARDIAC CATHETERIZATION N/A 2/15/2017    Procedure: Intracardiac echocardiogram;  Surgeon: Ehsan Crocker MD;  Location:  [facility];  Service:    • CARDIAC ELECTROPHYSIOLOGY PROCEDURE N/A 2/3/2017    Procedure: Pacemaker DC new;  Surgeon: Ehsan Crocker MD;  Location:  [facility];  Service:    • CARDIOVERSION     • CATARACT EXTRACTION     • CHOLECYSTECTOMY     • CORONARY ARTERY BYPASS GRAFT  1999    4 vessel    • CORONARY ARTERY BYPASS GRAFT     • CORONARY STENT PLACEMENT     • HYSTERECTOMY  1962   • INSERT / REPLACE / REMOVE PACEMAKER     • MASTECTOMY  2000   • OTHER  SURGICAL HISTORY      TAVR 2017        reports that she has never smoked. She has never used smokeless tobacco. She reports that she does not drink alcohol or use drugs.      Current Outpatient Medications:   •  aspirin 81 MG EC tablet, Take 81 mg by mouth Daily., Disp: , Rfl:   •  atenolol (TENORMIN) 50 MG tablet, Take 1 tab PO qam & 1/2 tab PO qpm., Disp: 135 tablet, Rfl: 3  •  bisacodyl (DULCOLAX) 10 MG suppository, Insert 1 suppository into the rectum Daily., Disp: 8 each, Rfl: 1  •  calcium carbonate (OS-MICHAEL) 600 MG tablet, Take 600 mg by mouth 2 (Two) Times a Day With Meals., Disp: , Rfl:   •  cholecalciferol (VITAMIN D3) 1000 units tablet, Take 1,000 Units by mouth Daily., Disp: , Rfl:   •  Ferrous Sulfate  (45 Fe) MG tablet controlled-release, Take 142 mg by mouth Daily., Disp: 90 tablet, Rfl: 3  •  furosemide (LASIX) 40 MG tablet, Take 40 mg by mouth Daily., Disp: , Rfl:   •  glipiZIDE (GLUCOTROL) 5 MG tablet, Take 1 tablet by mouth 2 (Two) Times a Day Before Meals., Disp: 180 tablet, Rfl: 3  •  hydrocortisone (ANUSOL-HC) 25 MG suppository, Insert 1 suppository into the rectum 2 (Two) Times a Day As Needed for Hemorrhoids., Disp: 10 suppository, Rfl: 0  •  imatinib (GLEEVEC) 100 MG chemo tablet, Take 100 mg by mouth 2 (Two) Times a Day., Disp: , Rfl:   •  insulin detemir (LEVEMIR) 100 UNIT/ML injection, Inject 30 Units under the skin into the appropriate area as directed Every Night. 30 units qam 30 units qpm, Disp: 30 mL, Rfl: 3  •  Lidocaine-Hydrocortisone Ace 2.8-0.55 % gel, Insert 1 application into the rectum 2 (Two) Times a Day As Needed (hemorrhoid pain)., Disp: 100 g, Rfl: 3  •  magnesium oxide (MAGOX) 400 (241.3 MG) MG tablet tablet, Take 400 mg by mouth Daily., Disp: , Rfl:   •  Multiple Vitamins-Minerals (PRESERVISION/LUTEIN) capsule, Take 1 capsule by mouth 2 (Two) Times a Day., Disp: , Rfl:   •  nitroglycerin (NITROSTAT) 0.4 MG SL tablet, 1 under the tongue as needed for angina, may  "repeat q5mins for up three doses, Disp: 30 tablet, Rfl: 3  •  O2 (OXYGEN), Inhale 2 L/min Daily As Needed., Disp: , Rfl:   •  ondansetron ODT (ZOFRAN ODT) 4 MG disintegrating tablet, Take 1 tablet by mouth Every 4 (Four) Hours As Needed for Nausea or Vomiting., Disp: 30 tablet, Rfl: 3  •  pantoprazole (PROTONIX) 40 MG EC tablet, Take 1 tablet by mouth Daily., Disp: 90 tablet, Rfl: 3  •  polyethyl glycol-propyl glycol (SYSTANE) 0.4-0.3 % solution ophthalmic solution, Administer 1 drop to both eyes Daily., Disp: , Rfl:   •  polyethylene glycol (MIRALAX) packet, Take 17 g by mouth Daily., Disp: , Rfl:   •  potassium chloride (MICRO-K) 8 MEQ CR capsule, Take 10 mEq by mouth Daily., Disp: , Rfl:   •  simvastatin (ZOCOR) 20 MG tablet, Take 1 tablet by mouth Every Night., Disp: 90 tablet, Rfl: 3  •  venlafaxine XR (EFFEXOR-XR) 37.5 MG 24 hr capsule, Take 37.5 mg by mouth Daily., Disp: , Rfl:   •  vitamin B-12 (CYANOCOBALAMIN) 500 MCG tablet, Take 500 mcg by mouth Daily., Disp: , Rfl:   •  Witch Hazel (TUCKS) 50 % pads, Apply 1 pad topically 6 (Six) Times a Day., Disp: 40 each, Rfl: 3  •  sucralfate (CARAFATE) 1 GM/10ML suspension, Take 10 mL by mouth 3 (Three) Times a Day As Needed (upset stomach)., Disp: 420 mL, Rfl: 1    OBJECTIVE:  /70 (BP Location: Left arm, Patient Position: Sitting, Cuff Size: Adult)   Pulse 70   Resp 16   Ht 154.9 cm (61\")   Wt 55.1 kg (121 lb 8 oz)   SpO2 98%   BMI 22.96 kg/m²    Physical Exam   Constitutional: She is oriented to person, place, and time. She appears well-nourished. No distress.   Cardiovascular: Normal rate, regular rhythm and normal heart sounds.   No murmur heard.  Pulmonary/Chest: Effort normal and breath sounds normal. She has no wheezes.   Neurological: She is alert and oriented to person, place, and time.   Psychiatric: She has a normal mood and affect.       Assessment/Plan    Diagnoses and all orders for this visit:    Essential hypertension  Well controlled, BP " goal for age is <140/90 per JNC 8 guidelines and continue current medications     Mixed hyperlipidemia  -     simvastatin (ZOCOR) 20 MG tablet; Take 1 tablet by mouth Every Night.  Stable on moderate intensity statin therapy per ACC/AHA guidelines.    Type 2 diabetes mellitus with diabetic peripheral angiopathy without gangrene, with long-term current use of insulin (CMS/[facility])  -     insulin detemir (LEVEMIR) 100 UNIT/ML injection; Inject 30 Units under the skin into the appropriate area as directed Every Night. 30 units qam 30 units qpm  -     glipiZIDE (GLUCOTROL) 5 MG tablet; Take 1 tablet by mouth 2 (Two) Times a Day Before Meals.  -     POC Glycosylated Hemoglobin (Hb A1C)  A1c is 8.1% in the office. Continue current meds and we will aim for A1c ~8 to avoid complications of hypoglycemia.      Atrial flutter, unspecified type (CMS/[facility])   Paroxysmal atrial fibrillation (CMS/[facility])  -     atenolol (TENORMIN) 50 MG tablet; Take 1 tab PO qam & 1/2 tab PO qpm.  Rate control on atenolol. No anticoagulation s/p [facility].     Congestive heart failure (CMS/[facility])  Chronic diastolic congestive heart failure (CMS/[facility])  -     atenolol (TENORMIN) 50 MG tablet; Take 1 tab PO qam & 1/2 tab PO qpm.  Compensated with some BLE edema. COntinue diuretic therapy and will not increase loops given improvement of renal function and lack of symptoms of decompensation.     Coronary artery disease involving native coronary artery of native heart without angina pectoris  No angina on ASA, BB, and statin.     CML (chronic myeloid leukemia) (CMS/[facility])  Stable on Gleevec per [facility].  Reviewed records from [facility] with acknowledgement of multiple comorbidities, but no changes to therapy as a result.     Gastritis without bleeding, unspecified chronicity, unspecified gastritis type  -     pantoprazole (PROTONIX) 40 MG EC tablet; Take 1 tablet by mouth Daily.  -     sucralfate (CARAFATE) 1 GM/10ML suspension; Take 10 mL by mouth 3 (Three) Times  a Day As Needed (upset stomach).  Continue PPI and we will add Carafate for symptomatic relief.     CKD (chronic kidney disease) stage 3, GFR 30-59 ml/min (CMS/[facility])  Improved with decrease in loop diuretic recently.  Will monitor and creatinine remains stable, consider addition of RAAS blockade.     As above, I have reviewed and summarized past records, reviewed and ordered labs as noted, and reviewed CT head as noted.    An After Visit Summary was printed and given to the patient at discharge.  Return in about 4 months (around 8/12/2019) for Medicare Wellness.         Leonardo Zepeda D.O. 4/13/2019

## 2019-04-18 NOTE — TELEPHONE ENCOUNTER
Patient's daughter Margarita stated she is completely out of sucralfate and thought it was being sent to [facility] in [place].  She asked if a short-term script could be sent to [facility].  I spoke to Makayla at [facility] and she stated they could fill a week's supply of the medication (210 ml).  Margarita informed.

## 2019-04-18 NOTE — TELEPHONE ENCOUNTER
PLEASE CALL [facility] [phone]...REGARDING HER MEDICATION..REGARDING HER SUCRALFATE..PLEASE CALL INTO [facility] IN [place]..YOU DONT HAVE TO CALL HER IF IT CAN JUST BE CALLED IN

## 2019-04-24 NOTE — TELEPHONE ENCOUNTER
Sarah called from  she states that the patient has not had her carafate it was picked up at the pharmacy but she hasn't got it yet. Just wanted to let us know.

## 2019-05-07 NOTE — PROGRESS NOTES
CC: rash     History:  Lakesha Costello is a 84 y.o. female who presents today for evaluation of the above problems.      Has been itching from head to toe for about a week and a half. Most recent new medications are dicyclomine and Gleevec, though she has been on both for several months.   Tried stopping dicyclomine and this didn't help. When she tried stopping Gleevec symptoms did start to improve.  Her symptoms started shortly after the initiation of a new bottle of Gleevec.      Rash is on arms, abdomen and back, primarily, but she itches all over. One area is weeping constantly, though, it doesn't look any different from the surrounding area. Using aveeno anti-itch lotion has helped some for a short period of time.     States that she feels fine other than rash.         ROS:  Review of Systems   Constitutional: Negative for fever.   Skin: Positive for rash.       Allergies   Allergen Reactions   • Janumet [Sitagliptin-Metformin Hcl] Other (See Comments)     Chest pain   • Dilaudid [Hydromorphone] Nausea And Vomiting   • Enalapril Angioedema   • Lopid [Gemfibrozil] Nausea And Vomiting   • Sulfa Antibiotics Other (See Comments)     Syncope     • Ultram [Tramadol] Itching     Past Medical History:   Diagnosis Date   • Aortic stenosis    • Breast cancer (CMS/HCC)    • CAD (coronary artery disease)    • Cataract    • CHF (congestive heart failure) (CMS/HCC)     recently diagnosed after an ER visit.    • Chronic anticoagulation     Coumadin    • CKD (chronic kidney disease) stage 3, GFR 30-59 ml/min (CMS/HCC) 4/16/2018   • Diabetes mellitus (CMS/HCC)     Type II   • Elevated cholesterol    • GERD (gastroesophageal reflux disease)    • Hyperlipidemia    • Hypertension    • Macular degeneration    • Pancreatitis    • Paroxysmal atrial fibrillation (CMS/HCC)    • Pulmonary hypertension (CMS/HCC)    • Rheumatic fever     during childhood   • Sacrum and coccyx fracture (CMS/HCC)    • Sick sinus syndrome (CMS/HCC)     with  pacemaker   • UTI (urinary tract infection)      Past Surgical History:   Procedure Laterality Date   • ANOMALOUS PULMONARY VENOUS RETURN REPAIR, TOTAL     • BACK SURGERY     • BELPHAROPTOSIS REPAIR     • CARDIAC CATHETERIZATION  1999, 2010   • CARDIAC CATHETERIZATION N/A 2/15/2017    Procedure: Left Heart Cath;  Surgeon: Ehsan Crocker MD;  Location:  [facility];  Service:    • CARDIAC CATHETERIZATION N/A 2/15/2017    Procedure: Right Heart Cath;  Surgeon: Ehsan Crocker MD;  Location:  [facility];  Service:    • CARDIAC CATHETERIZATION N/A 2/15/2017    Procedure: Coronary angiography;  Surgeon: Ehsan Crocker MD;  Location:  [facility];  Service:    • CARDIAC CATHETERIZATION N/A 2/15/2017    Procedure: Left ventriculography;  Surgeon: Ehsan Crocker MD;  Location:  [facility];  Service:    • CARDIAC CATHETERIZATION N/A 2/15/2017    Procedure: Intracardiac echocardiogram;  Surgeon: Ehsan Crocker MD;  Location:  [facility];  Service:    • CARDIAC ELECTROPHYSIOLOGY PROCEDURE N/A 2/3/2017    Procedure: Pacemaker DC new;  Surgeon: Ehsan Crocker MD;  Location:  [facility];  Service:    • CARDIOVERSION     • CATARACT EXTRACTION     • CHOLECYSTECTOMY     • CORONARY ARTERY BYPASS GRAFT  1999    4 vessel    • CORONARY ARTERY BYPASS GRAFT     • CORONARY STENT PLACEMENT     • HYSTERECTOMY  1962   • INSERT / REPLACE / REMOVE PACEMAKER     • MASTECTOMY  2000   • OTHER SURGICAL HISTORY      TAVR 2017     Family History   Problem Relation Age of Onset   • Breast cancer Mother    • Coronary artery disease Father    • Heart attack Father    • Diabetes Father    • Cancer Brother    • No Known Problems Maternal Grandmother    • No Known Problems Maternal Grandfather    • No Known Problems Paternal Grandmother    • No Known Problems Paternal Grandfather       reports that she has never smoked. She has never used smokeless tobacco.  She reports that she does not drink alcohol or use drugs.      Current Outpatient Medications:   •  aspirin 81 MG EC tablet, Take 81 mg by mouth Daily., Disp: , Rfl:   •  atenolol (TENORMIN) 50 MG tablet, Take 1 tab PO qam & 1/2 tab PO qpm., Disp: 135 tablet, Rfl: 3  •  bisacodyl (DULCOLAX) 10 MG suppository, Insert 1 suppository into the rectum Daily., Disp: 8 each, Rfl: 1  •  calcium carbonate (OS-MICHAEL) 600 MG tablet, Take 600 mg by mouth 2 (Two) Times a Day With Meals., Disp: , Rfl:   •  cholecalciferol (VITAMIN D3) 1000 units tablet, Take 1,000 Units by mouth Daily., Disp: , Rfl:   •  Ferrous Sulfate  (45 Fe) MG tablet controlled-release, Take 142 mg by mouth Daily., Disp: 90 tablet, Rfl: 3  •  furosemide (LASIX) 40 MG tablet, Take 40 mg by mouth Daily., Disp: , Rfl:   •  glipiZIDE (GLUCOTROL) 5 MG tablet, Take 1 tablet by mouth 2 (Two) Times a Day Before Meals., Disp: 180 tablet, Rfl: 3  •  hydrocortisone (ANUSOL-HC) 25 MG suppository, Insert 1 suppository into the rectum 2 (Two) Times a Day As Needed for Hemorrhoids., Disp: 10 suppository, Rfl: 0  •  imatinib (GLEEVEC) 100 MG chemo tablet, Take 100 mg by mouth 2 (Two) Times a Day., Disp: , Rfl:   •  insulin detemir (LEVEMIR) 100 UNIT/ML injection, Inject 30 Units under the skin into the appropriate area as directed Every Night. 30 units qam 30 units qpm, Disp: 30 mL, Rfl: 3  •  Lidocaine-Hydrocortisone Ace 2.8-0.55 % gel, Insert 1 application into the rectum 2 (Two) Times a Day As Needed (hemorrhoid pain)., Disp: 100 g, Rfl: 3  •  Multiple Vitamins-Minerals (PRESERVISION/LUTEIN) capsule, Take 1 capsule by mouth 2 (Two) Times a Day., Disp: , Rfl:   •  nitroglycerin (NITROSTAT) 0.4 MG SL tablet, 1 under the tongue as needed for angina, may repeat q5mins for up three doses, Disp: 30 tablet, Rfl: 3  •  O2 (OXYGEN), Inhale 2 L/min Daily As Needed., Disp: , Rfl:   •  ondansetron ODT (ZOFRAN ODT) 4 MG disintegrating tablet, Take 1 tablet by mouth Every 4 (Four)  "Hours As Needed for Nausea or Vomiting., Disp: 30 tablet, Rfl: 3  •  pantoprazole (PROTONIX) 40 MG EC tablet, Take 1 tablet by mouth Daily., Disp: 90 tablet, Rfl: 3  •  polyethyl glycol-propyl glycol (SYSTANE) 0.4-0.3 % solution ophthalmic solution, Administer 1 drop to both eyes Daily., Disp: , Rfl:   •  polyethylene glycol (MIRALAX) packet, Take 17 g by mouth Daily., Disp: , Rfl:   •  potassium chloride (MICRO-K) 8 MEQ CR capsule, Take 10 mEq by mouth Daily., Disp: , Rfl:   •  simvastatin (ZOCOR) 20 MG tablet, Take 1 tablet by mouth Every Night., Disp: 90 tablet, Rfl: 3  •  venlafaxine XR (EFFEXOR-XR) 37.5 MG 24 hr capsule, Take 37.5 mg by mouth Daily., Disp: , Rfl:   •  vitamin B-12 (CYANOCOBALAMIN) 500 MCG tablet, Take 500 mcg by mouth Daily., Disp: , Rfl:   •  Witch Hazel (TUCKS) 50 % pads, Apply 1 pad topically 6 (Six) Times a Day., Disp: 40 each, Rfl: 3  •  methylPREDNISolone (MEDROL, JAY,) 4 MG tablet, Take as directed on package instructions., Disp: 21 tablet, Rfl: 0  •  sucralfate (CARAFATE) 1 GM/10ML suspension, Take 10 mL by mouth 3 (Three) Times a Day As Needed (upset stomach)., Disp: 420 mL, Rfl: 1    OBJECTIVE:  /62 (BP Location: Left arm, Patient Position: Sitting, Cuff Size: Adult)   Pulse 66   Temp 97.6 °F (36.4 °C) (Oral)   Resp 16   Ht 154.9 cm (61\")   Wt 55.3 kg (121 lb 14.4 oz)   SpO2 96%   BMI 23.03 kg/m²    Physical Exam   Abdominal:       Diffusely spread pinpoint erythematous lesions with linear marks as well, which appear to be from scratching.     Musculoskeletal:        Arms:  Rash is also present on anterior portion of lower legs bilaterally.   On left leg, rash has more concentrated macular areas, vs. Diffuse pinpoint distribution.        Assessment/Plan    Lakesha was seen today for rash.    Diagnoses and all orders for this visit:    Allergic dermatitis  -     methylPREDNISolone (MEDROL, JAY,) 4 MG tablet; Take as directed on package instructions.    Advised topical " hydrocortisone use as well steroid pack.  Contact oncology in [place] to discuss plan going forward as rash is likely tied to chemotherapy medication.    An After Visit Summary was printed and given to the patient at discharge.  No Follow-up on file.       Mariposa Santos, APRN  5/7/2019

## 2019-05-20 NOTE — TELEPHONE ENCOUNTER
Patient needs a script for venlafaxine HR 37.5 MG capsules sent to [facility], she will be out tomorrow.

## 2019-05-23 NOTE — PROGRESS NOTES
CC: swollen stomach    History:  Lakesha Costello is a 84 y.o. female   She notes she had been doing reasonably well, but had a recent prescription of a prednisone taper as prescribed by her hematologist in [place]. Her Gleevec has been changed to dasatinib. Since starting the taper, she has had 2-3 days of increased distention of her stomach. Sh notes some concern of increased dyspnea, especially when laying down. She has had no flare nor conversion of her CML, though she did have side effects of itching that prompted a switch to dasatinib and subsequent prednisone for itching. She notes no change in her dyspnea before the past few days that would suggest worsening of cardiac disease prior to med changes. BP has been stable without changes to meds.     ROS:  Review of Systems   Constitutional: Negative for chills and fever.   Respiratory: Positive for shortness of breath. Negative for cough.    Cardiovascular: Negative for chest pain and palpitations.   Gastrointestinal: Positive for abdominal distention. Negative for abdominal pain and constipation.        reports that she has never smoked. She has never used smokeless tobacco. She reports that she does not drink alcohol or use drugs.      Current Outpatient Medications:   •  aspirin 81 MG EC tablet, Take 81 mg by mouth Daily., Disp: , Rfl:   •  atenolol (TENORMIN) 50 MG tablet, Take 1 tab PO qam & 1/2 tab PO qpm., Disp: 135 tablet, Rfl: 3  •  bisacodyl (DULCOLAX) 10 MG suppository, Insert 1 suppository into the rectum Daily., Disp: 8 each, Rfl: 1  •  calcium carbonate (OS-MICHAEL) 600 MG tablet, Take 600 mg by mouth 2 (Two) Times a Day With Meals., Disp: , Rfl:   •  cholecalciferol (VITAMIN D3) 1000 units tablet, Take 1,000 Units by mouth Daily., Disp: , Rfl:   •  dasatinib (SPRYCEL) 50 MG chemo tablet, Take 50 mg by mouth Daily. 14 Days, Disp: , Rfl:   •  Ferrous Sulfate  (45 Fe) MG tablet controlled-release, Take 142 mg by mouth Daily., Disp: 90 tablet, Rfl:  3  •  furosemide (LASIX) 40 MG tablet, Take 40 mg by mouth Daily., Disp: , Rfl:   •  glipiZIDE (GLUCOTROL) 5 MG tablet, Take 1 tablet by mouth 2 (Two) Times a Day Before Meals., Disp: 180 tablet, Rfl: 3  •  hydrocortisone (ANUSOL-HC) 25 MG suppository, Insert 1 suppository into the rectum 2 (Two) Times a Day As Needed for Hemorrhoids., Disp: 10 suppository, Rfl: 0  •  imatinib (GLEEVEC) 100 MG chemo tablet, Take 100 mg by mouth 2 (Two) Times a Day., Disp: , Rfl:   •  insulin detemir (LEVEMIR) 100 UNIT/ML injection, Inject 30 Units under the skin into the appropriate area as directed Every Night. 30 units qam 30 units qpm, Disp: 30 mL, Rfl: 3  •  Lidocaine-Hydrocortisone Ace 2.8-0.55 % gel, Insert 1 application into the rectum 2 (Two) Times a Day As Needed (hemorrhoid pain)., Disp: 100 g, Rfl: 3  •  Multiple Vitamins-Minerals (PRESERVISION/LUTEIN) capsule, Take 1 capsule by mouth 2 (Two) Times a Day., Disp: , Rfl:   •  nitroglycerin (NITROSTAT) 0.4 MG SL tablet, 1 under the tongue as needed for angina, may repeat q5mins for up three doses, Disp: 30 tablet, Rfl: 3  •  O2 (OXYGEN), Inhale 2 L/min Daily As Needed., Disp: , Rfl:   •  ondansetron ODT (ZOFRAN ODT) 4 MG disintegrating tablet, Take 1 tablet by mouth Every 4 (Four) Hours As Needed for Nausea or Vomiting., Disp: 30 tablet, Rfl: 3  •  pantoprazole (PROTONIX) 40 MG EC tablet, Take 1 tablet by mouth Daily., Disp: 90 tablet, Rfl: 3  •  polyethyl glycol-propyl glycol (SYSTANE) 0.4-0.3 % solution ophthalmic solution, Administer 1 drop to both eyes Daily., Disp: , Rfl:   •  polyethylene glycol (MIRALAX) packet, Take 17 g by mouth Daily., Disp: , Rfl:   •  potassium chloride (MICRO-K) 8 MEQ CR capsule, Take 10 mEq by mouth Daily., Disp: , Rfl:   •  predniSONE 5 MG (21) tablet therapy pack dosepak, Take 2 tablets by mouth Daily. 4 tablets by mouth once a day for 2 days  3 tablets by mouth once a day for 2 days  2 tablets by mouth once a day for 2 days  1 tablet by  "mouth once a day for 2 days, Disp: , Rfl:   •  simvastatin (ZOCOR) 20 MG tablet, Take 1 tablet by mouth Every Night., Disp: 90 tablet, Rfl: 3  •  sucralfate (CARAFATE) 1 GM/10ML suspension, Take 10 mL by mouth 3 (Three) Times a Day As Needed (upset stomach)., Disp: 420 mL, Rfl: 1  •  triamcinolone (KENALOG) 0.1 % cream, Apply 1 application topically to the appropriate area as directed 2 (Two) Times a Day., Disp: , Rfl:   •  venlafaxine XR (EFFEXOR-XR) 37.5 MG 24 hr capsule, Take 1 capsule by mouth Daily., Disp: 30 capsule, Rfl: 0  •  vitamin B-12 (CYANOCOBALAMIN) 500 MCG tablet, Take 500 mcg by mouth Daily., Disp: , Rfl:   •  Witch Hazel (TUCKS) 50 % pads, Apply 1 pad topically 6 (Six) Times a Day., Disp: 40 each, Rfl: 3    OBJECTIVE:  /74 (BP Location: Left arm, Patient Position: Sitting, Cuff Size: Adult)   Pulse 69   Resp 16   Ht 154.9 cm (61\")   Wt 56 kg (123 lb 8 oz)   SpO2 93%   Breastfeeding? No   BMI 23.34 kg/m²    Physical Exam   Constitutional: She is oriented to person, place, and time. She appears well-nourished. No distress.   Cardiovascular: Normal rate, regular rhythm and normal heart sounds.   No murmur heard.  Pulmonary/Chest: Effort normal and breath sounds normal. She has no wheezes.   Abdominal: Soft. Bowel sounds are normal. She exhibits distension (with fluid wave). She exhibits no mass.   Neurological: She is alert and oriented to person, place, and time.   Psychiatric: She has a normal mood and affect.       Assessment/Plan    Diagnoses and all orders for this visit:    Abdominal swelling  Other ascites  Shortness of breath  Ascites and distention on exam, though abdomen is soft. Differential includes fluid retention from meds, such as dasatinib or prednisone, decompensation of heart failure, portal hypertension, among others. She will finish her prednisone on Monday. If fluid is not resolving by the finish of her taper, we will proceed with imaging to confirm ascites. She may " increase furosemide to 80mg daily on Friday, Saturday, and Sunday. Contact the office if unresolved to consider alternative causes such as dasatanib, which can cause fluid retention and ascites.     CKD (chronic kidney disease) stage 3, GFR 30-59 ml/min (CMS/HCC)  No change in urinary quality or quantity . Not on RAAS blockade at this time, but will defer for now given increase in loop as above.     Chronic diastolic congestive heart failure (CMS/HCC)  Stable on BB and diuretic therapy. She has hypervolemia, but differential is wide as above. Continue meds with loop increase as above. Readdress next week if not improved.     CML (chronic myeloid leukemia) (CMS/HCC)  Changed to dasatanib. Consider adverse reaction as above.       An After Visit Summary was printed and given to the patient at discharge.  Return for Next scheduled follow up.         Leonardo Zepeda D.O. 5/25/2019

## 2019-06-05 NOTE — TELEPHONE ENCOUNTER
Please call patient to schedule an appointment she is having upper resp and low grade fever, with a cough.

## 2019-06-07 PROBLEM — I50.9 ACUTE ON CHRONIC CONGESTIVE HEART FAILURE (HCC): Status: ACTIVE | Noted: 2019-01-01

## 2019-06-07 NOTE — ED NOTES
In with Jamari ZHANG to assist with disimpaction.  Pt did not have any hard stool in rectum.       Celia Heard, RN  06/07/19 0745

## 2019-06-08 PROBLEM — I50.33 ACUTE ON CHRONIC DIASTOLIC CONGESTIVE HEART FAILURE (HCC): Status: ACTIVE | Noted: 2019-01-01

## 2019-06-08 PROBLEM — K59.00 CONSTIPATION: Status: ACTIVE | Noted: 2019-01-01

## 2019-06-08 NOTE — H&P
[facility] Medicine Services  HISTORY AND PHYSICAL    Date of Admission: 6/7/2019  Primary Care Physician: Leonardo Zepeda DO    Subjective     Chief Complaint: Abdominal swelling    History of Present Illness  84-year-old  female who presents to the emergency department for abdominal swelling.  Patient has history of congestive heart failure.  She was recently diagnosed with CML and sees hematology in [place].  There was some concern as an outpatient that the chemotherapy was causing the patient's condition to worsen.  Patient has a BNP of 33,000.  She was given Lasix in the ED.  She had a slightly elevated troponin at 0.041.  I have note the patient had a fecal impaction seen on CT scan.  The patient has bad hemorrhoids.  She was given an enema in the emergency department had 2 large bowel movements.  The patient is coming from home.  She states she has been ill frequently, about every week.        Review of Systems   Abdominal swelling    Otherwise complete ROS reviewed and negative except as mentioned in the HPI.    Past Medical History:   Past Medical History:   Diagnosis Date   • Aortic stenosis    • Breast cancer (CMS/HCC)    • CAD (coronary artery disease)    • Cataract    • CHF (congestive heart failure) (CMS/HCC)     recently diagnosed after an ER visit.    • Chronic anticoagulation     Coumadin    • CKD (chronic kidney disease) stage 3, GFR 30-59 ml/min (CMS/HCC) 4/16/2018   • Diabetes mellitus (CMS/HCC)     Type II   • Elevated cholesterol    • GERD (gastroesophageal reflux disease)    • Hyperlipidemia    • Hypertension    • Macular degeneration    • Pancreatitis    • Paroxysmal atrial fibrillation (CMS/HCC)    • Pulmonary hypertension (CMS/HCC)    • Rheumatic fever     during childhood   • Sacrum and coccyx fracture (CMS/HCC)    • Sick sinus syndrome (CMS/HCC)     with pacemaker   • UTI (urinary tract infection)      Past Surgical History:  Past  Surgical History:   Procedure Laterality Date   • ANOMALOUS PULMONARY VENOUS RETURN REPAIR, TOTAL     • BACK SURGERY     • BELPHAROPTOSIS REPAIR     • CARDIAC CATHETERIZATION  1999, 2010   • CARDIAC CATHETERIZATION N/A 2/15/2017    Procedure: Left Heart Cath;  Surgeon: Ehsan Crocker MD;  Location:  [facility];  Service:    • CARDIAC CATHETERIZATION N/A 2/15/2017    Procedure: Right Heart Cath;  Surgeon: Ehsan Crocker MD;  Location:  [facility];  Service:    • CARDIAC CATHETERIZATION N/A 2/15/2017    Procedure: Coronary angiography;  Surgeon: Ehsan Crocker MD;  Location:  [facility];  Service:    • CARDIAC CATHETERIZATION N/A 2/15/2017    Procedure: Left ventriculography;  Surgeon: Ehsan Crocker MD;  Location:  [facility];  Service:    • CARDIAC CATHETERIZATION N/A 2/15/2017    Procedure: Intracardiac echocardiogram;  Surgeon: Ehsan Crocker MD;  Location:  [facility];  Service:    • CARDIAC ELECTROPHYSIOLOGY PROCEDURE N/A 2/3/2017    Procedure: Pacemaker DC new;  Surgeon: Ehsan Crocker MD;  Location:  [facility];  Service:    • CARDIOVERSION     • CATARACT EXTRACTION     • CHOLECYSTECTOMY     • CORONARY ARTERY BYPASS GRAFT  1999    4 vessel    • CORONARY ARTERY BYPASS GRAFT     • CORONARY STENT PLACEMENT     • HYSTERECTOMY  1962   • INSERT / REPLACE / REMOVE PACEMAKER     • MASTECTOMY  2000   • OTHER SURGICAL HISTORY      TAVR 2017     Social History:  reports that she has never smoked. She has never used smokeless tobacco. She reports that she does not drink alcohol or use drugs.    Family History: family history includes Breast cancer in her mother; Cancer in her brother; Coronary artery disease in her father; Diabetes in her father; Heart attack in her father; No Known Problems in her maternal grandfather, maternal grandmother, paternal grandfather, and paternal grandmother.       Allergies:  Allergies      Allergen Reactions   • Gleevec [Imatinib] Itching   • Bentyl [Dicyclomine] Itching   • Janumet [Sitagliptin-Metformin Hcl] Other (See Comments)     Chest pain   • Dilaudid [Hydromorphone] Nausea And Vomiting   • Enalapril Angioedema   • Lopid [Gemfibrozil] Nausea And Vomiting   • Sulfa Antibiotics Other (See Comments)     Syncope     • Ultram [Tramadol] Itching     Medications:  Prior to Admission medications    Medication Sig Start Date End Date Taking? Authorizing Provider   aspirin 81 MG EC tablet Take 81 mg by mouth Daily.   Yes Minda Salvador MD   atenolol (TENORMIN) 50 MG tablet Take 1 tab PO qam & 1/2 tab PO qpm.  Patient taking differently: 1/2 tab PO qpm. 4/12/19  Yes Leonardo Zepeda, DO   calcium carbonate (OS-MICHAEL) 600 MG tablet Take 600 mg by mouth 2 (Two) Times a Day With Meals.   Yes Minda Salvador MD   cholecalciferol (VITAMIN D3) 1000 units tablet Take 1,000 Units by mouth Daily.   Yes Minda Salvador MD   Ferrous Sulfate  (45 Fe) MG tablet controlled-release Take 142 mg by mouth Daily. 12/13/18  Yes Abisai Barahona MD   furosemide (LASIX) 40 MG tablet Take 40 mg by mouth Daily.   Yes Minda Salvador MD   glipiZIDE (GLUCOTROL) 5 MG tablet Take 1 tablet by mouth 2 (Two) Times a Day Before Meals. 4/12/19  Yes Leonardo Zepeda, DO   hydrocortisone (ANUSOL-HC) 25 MG suppository Insert 1 suppository into the rectum 2 (Two) Times a Day As Needed for Hemorrhoids. 11/30/18  Yes Sherif Early Jr., MD   insulin detemir (LEVEMIR) 100 UNIT/ML injection Inject 30 Units under the skin into the appropriate area as directed Every Night. 30 units qam 30 units qpm  Patient taking differently: Inject 20 Units under the skin into the appropriate area as directed Every Night. 4/12/19  Yes Leonardo Zepeda, DO   Lidocaine-Hydrocortisone Ace 2.8-0.55 % gel Insert 1 application into the rectum 2 (Two) Times a Day As Needed (hemorrhoid pain). 12/20/18  Yes Linda Johnson APRN    Multiple Vitamins-Minerals (PRESERVISION/LUTEIN) capsule Take 1 capsule by mouth 2 (Two) Times a Day.   Yes Minda Salvador MD   O2 (OXYGEN) Inhale 2 L/min Daily As Needed.   Yes Minda Salvador MD   pantoprazole (PROTONIX) 40 MG EC tablet Take 1 tablet by mouth Daily. 4/12/19  Yes Leonardo Zepeda, DO   polyethyl glycol-propyl glycol (SYSTANE) 0.4-0.3 % solution ophthalmic solution Administer 1 drop to both eyes Daily.   Yes Minda Salvador MD   polyethylene glycol (MIRALAX) packet Take 17 g by mouth Daily.   Yes Minda Salvador MD   potassium chloride (MICRO-K) 8 MEQ CR capsule Take 10 mEq by mouth Daily.   Yes Minda Salvador MD   simvastatin (ZOCOR) 20 MG tablet Take 1 tablet by mouth Every Night. 4/12/19  Yes Leonardo Zepeda DO   sucralfate (CARAFATE) 1 GM/10ML suspension Take 10 mL by mouth 3 (Three) Times a Day As Needed (upset stomach). 4/12/19  Yes Leonardo Zepeda DO   triamcinolone (KENALOG) 0.1 % cream Apply 1 application topically to the appropriate area as directed 2 (Two) Times a Day. 5/20/19 5/20/20 Yes Minda Salvador MD   venlafaxine XR (EFFEXOR-XR) 37.5 MG 24 hr capsule Take 1 capsule by mouth Daily. 5/20/19  Yes Mariposa Santos APRN   vitamin B-12 (CYANOCOBALAMIN) 500 MCG tablet Take 500 mcg by mouth Daily.   Yes Provider, Historical, MD   Witch Hazel (TUCKS) 50 % pads Apply 1 pad topically 6 (Six) Times a Day. 12/20/18  Yes Linda Johnson APRN   bisacodyl (DULCOLAX) 10 MG suppository Insert 1 suppository into the rectum Daily.  Patient taking differently: Insert 10 mg into the rectum Daily As Needed. 12/21/18   Linda Johnson APRN   nitroglycerin (NITROSTAT) 0.4 MG SL tablet 1 under the tongue as needed for angina, may repeat q5mins for up three doses 6/7/17   Ehsan Crocker MD   ondansetron ODT (ZOFRAN ODT) 4 MG disintegrating tablet Take 1 tablet by mouth Every 4 (Four) Hours As Needed for Nausea or Vomiting. 12/21/18   Linda Johnson APRN  "    Objective     Vital Signs: /54 (BP Location: Left arm, Patient Position: Lying)   Pulse 70   Temp 97.1 °F (36.2 °C) (Oral)   Resp 20   Ht 154.9 cm (61\")   Wt 55.1 kg (121 lb 7 oz)   SpO2 96%   BMI 22.95 kg/m²   Physical Exam   Constitutional:   Appears chronically ill   HENT:   Head: Normocephalic and atraumatic.   Nose: Nose normal.   Mouth/Throat: Oropharynx is clear and moist.   Eyes: Conjunctivae and EOM are normal.   Neck: Normal range of motion. Neck supple.   Cardiovascular: Normal rate, regular rhythm and normal heart sounds.   Pulmonary/Chest: Effort normal. She has rales.   Abdominal: Soft. Bowel sounds are normal. She exhibits distension.   Musculoskeletal: She exhibits no edema or tenderness.   Hyperkyphotic thoracic spine   Neurological: She is alert. No cranial nerve deficit.   Skin: Skin is warm and dry.   Psychiatric: She has a normal mood and affect.   Vitals reviewed.          Results Reviewed:  Lab Results (last 24 hours)     Procedure Component Value Units Date/Time    Lactic Acid, Reflex [[id number]]  (Abnormal) Collected:  06/07/19 2040    Specimen:  Blood Updated:  06/07/19 2101     Lactate 2.8 mmol/L     Lactic Acid, Reflex Timer (This will reflex a repeat order 3-3:15 hours after ordered.) [[id number]] Collected:  06/07/19 1649    Specimen:  Blood Updated:  06/07/19 2031     Extra Tube Hold for add-ons.     Comment: Auto resulted.       Urinalysis With Culture If Indicated - Urine, Catheter In/Out [[id number]]  (Abnormal) Collected:  06/07/19 1807    Specimen:  Urine, Catheter In/Out Updated:  06/07/19 1830     Color, UA Dark Yellow     Appearance, UA Clear     pH, UA <=5.0     Specific Gravity, UA 1.014     Glucose, UA Negative     Ketones, UA Negative     Bilirubin, UA Negative     Blood, UA Negative     Protein,  mg/dL (2+)     Leuk Esterase, UA Negative     Nitrite, UA Negative     Urobilinogen, UA 1.0 E.U./dL    Urinalysis, Microscopic Only - Urine, Catheter In/Out " [[id number]] Collected:  06/07/19 1807    Specimen:  Urine, Catheter In/Out Updated:  06/07/19 1830     RBC, UA None Seen /HPF      WBC, UA None Seen /HPF      Bacteria, UA None Seen /HPF      Squamous Epithelial Cells, UA 0-2 /HPF      Hyaline Casts, UA 0-2 /LPF      Methodology Automated Microscopy    CBC & Differential [[id number]] Collected:  06/07/19 1649    Specimen:  Blood Updated:  06/07/19 1803    Narrative:       The following orders were created for panel order CBC & Differential.  Procedure                               Abnormality         Status                     ---------                               -----------         ------                     CBC Auto Differential[[id number]]        Abnormal            Final result                 Please view results for these tests on the individual orders.    CBC Auto Differential [[id number]]  (Abnormal) Collected:  06/07/19 1649    Specimen:  Blood Updated:  06/07/19 1803     WBC 16.95 10*3/mm3      RBC 3.34 10*6/mm3      Hemoglobin 10.7 g/dL      Hematocrit 31.9 %      MCV 95.5 fL      MCH 32.0 pg      MCHC 33.5 g/dL      RDW 15.6 %      RDW-SD 54.6 fl      MPV 11.7 fL      Platelets 970 10*3/mm3     Manual Differential [[id number]]  (Abnormal) Collected:  06/07/19 1649    Specimen:  Blood Updated:  06/07/19 1803     Neutrophil % 74.0 %      Lymphocyte % 6.0 %      Monocyte % 8.0 %      Eosinophil % 1.0 %      Bands %  9.0 %      Atypical Lymphocyte % 2.0 %      Neutrophils Absolute 14.07 10*3/mm3      Lymphocytes Absolute 1.02 10*3/mm3      Monocytes Absolute 1.36 10*3/mm3      Eosinophils Absolute 0.17 10*3/mm3      Poikilocytes Slight/1+     WBC Morphology Normal     Platelet Estimate Increased     Clumped Platelets Present     Giant Platelets Large/3+    TSH [[id number]]  (Normal) Collected:  06/07/19 1649    Specimen:  Blood Updated:  06/07/19 1746     TSH 1.200 mIU/mL     BNP [[id number]]  (Abnormal) Collected:  06/07/19 1649    Specimen:  Blood Updated:   06/07/19 1746     proBNP 33,800.0 pg/mL     Troponin [[id number]]  (Abnormal) Collected:  06/07/19 1649    Specimen:  Blood Updated:  06/07/19 1726     Troponin I 0.041 ng/mL     Lactic Acid, Plasma [[id number]]  (Abnormal) Collected:  06/07/19 1649    Specimen:  Blood Updated:  06/07/19 1723     Lactate 2.5 mmol/L     Lipase [[id number]]  (Normal) Collected:  06/07/19 1649    Specimen:  Blood Updated:  06/07/19 1716     Lipase 167 U/L     Comprehensive Metabolic Panel [[id number]]  (Abnormal) Collected:  06/07/19 1649    Specimen:  Blood Updated:  06/07/19 1716     Glucose 191 mg/dL      BUN 35 mg/dL      Creatinine 1.08 mg/dL      Sodium 135 mmol/L      Potassium 4.2 mmol/L      Chloride 98 mmol/L      CO2 27.0 mmol/L      Calcium 9.2 mg/dL      Total Protein 6.9 g/dL      Albumin 3.90 g/dL      ALT (SGPT) <15 U/L      AST (SGOT) 35 U/L      Alkaline Phosphatase 108 U/L      Total Bilirubin 1.6 mg/dL      eGFR Non African Amer 49 mL/min/1.73      Globulin 3.0 gm/dL      A/G Ratio 1.3 g/dL      BUN/Creatinine Ratio 32.4     Anion Gap 10.0 mmol/L     Narrative:       GFR Normal >60  Chronic Kidney Disease <60  Kidney Failure <15    Magnesium [[id number]]  (Normal) Collected:  06/07/19 1649    Specimen:  Blood Updated:  06/07/19 1716     Magnesium 1.8 mg/dL     Blood Culture - Blood, Arm, Left [[id number]] Collected:  06/07/19 1645    Specimen:  Blood from Arm, Left Updated:  06/07/19 1704    Blood Culture - Blood, Hand, Left [[id number]] Collected:  06/07/19 1630    Specimen:  Blood from Hand, Left Updated:  06/07/19 1704        Imaging Results (last 24 hours)     Procedure Component Value Units Date/Time    XR Chest 1 View [[id number]] Collected:  06/07/19 1659     Updated:  06/07/19 1706    Narrative:       EXAMINATION: XR CHEST 1 VW-. 6/7/2019 4:59 PM CDT     CHEST, ONE VIEW:     HISTORY: Shortness of air, cough     COMPARISON: 03/06/2019, 10/11/2018 10/03/2018     A single frontal chest radiograph was obtained.        FINDINGS:           Changes median sternotomy and cardiac valve replacement observed.  Permanent cardiac pacemaker appreciated.     The lungs are clear without acute infiltrates. Mild chronic interstitial  prominence noted.     The heart is generous. The pulmonary circulation appropriate, without  evidence of heart failure.     The bony structures are intact without acute osseous abnormalities.                                     Impression:       1. Generous heart size.  2. No acute cardiopulmonary process.     This report was finalized on 06/07/2019 17:03 by Dr. Tarun Rios MD.    CT Abdomen Pelvis Without Contrast [[id number]] Collected:  06/07/19 1602     Updated:  06/07/19 1613    Narrative:       EXAM: CT ABDOMEN PELVIS WO CONTRAST- - 6/7/2019 3:34 PM CDT     HISTORY: abd pain, distention, vomiting       COMPARISON: 11/30/2018.      DOSE LENGTH PRODUCT: 228 mGy cm. Automated exposure control was also  utilized to decrease patient radiation dose.     TECHNIQUE: Unenhanced axial images of the abdomen and pelvis obtained  with coronal and sagittal reformats.     FINDINGS: Evaluation limited secondary to lack of intravenous contrast  agent.      VISUALIZED CHEST: Respiratory motion limits fine parenchymal evaluation.  There is a 4-5 cm opacity at the medial right lower lobe, incompletely  evaluated on this exam. Small bilateral pleural effusions. Cardiomegaly  with changes of aortic valve replacement and CABG.     LIVER: Normal hepatic contour.     BILIARY: Cholecystectomy clips.     PANCREAS: Limited in evaluation due to motion, grossly within normal  limits.     SPLEEN: Normal size and contour.      ADRENAL: Normal appearance of the bilateral adrenal glands.     GENITOURINARY:   Left renal calcification favored to be vascular. No hydronephrosis.   Urinary bladder collapsed, which limits evaluation of the bladder wall.   Uterus absent. No pelvic mass.     PERITONEUM: Ascites. No definite free air.     GI  TRACT: Small hiatal hernia. Normal C-sweep of the duodenum. All of  stool at the rectum measures 7 cm. Mild thickening of the rectum, which  can be seen with stercoral colitis. There appear to be a few colonic  diverticula. Nonvisualized appendix. No evidence of bowel obstruction.     VESSELS: Normal course and caliber of the calcified aorta.     RETROPERITONEUM: There are a few borderline retroperitoneal lymph nodes.     SOFT TISSUES: Anasarca.     BONES: Greater than 50 percent height loss of the L2 vertebral body with  changes of kyphoplasty and 4 mm retropulsion of the posterior vertebral  body margin and at least mild spinal canal stenosis.          Impression:       1. There is a large ball of stool at the rectum with thickening of the  rectal wall. Correlate for impaction and possible stercoral colitis.  2. Medial aspect of the right lower lobe with consolidation. Small  bilateral pleural effusions. This is incompletely evaluated on this  exam. It could represent atelectasis, infection or mass. Correlate with  clinical presentation and recommend follow-up after treatment to  evaluate for resolution.  3. Ascites and anasarca suggests fluid overload.  4. Cardiomegaly with changes of aortic valve replacement and CABG.  5. Incidental findings include prior cholecystectomy, colonic  diverticula, nonspecific borderline retroperitoneal lymph nodes and  chronic L2 compression fracture with kyphoplasty.  This report was finalized on 06/07/2019 16:10 by Dr Shae Sainz MD.        I have personally reviewed and interpreted the radiology studies and ECG obtained at time of admission.     Assessment / Plan     Assessment:   Active Hospital Problems    Diagnosis   • Acute on chronic congestive heart failure (CMS/HCC)     Impression:  1.  Fecal impaction with stercoral colitis  2.  Ascites and anasarca   3.  History of congestive heart failure with elevated BNP  4.  CML  5.  Consolidation in the medial aspect of the  right lower lobe  6.  Elevated lactic acid  7. Insulin-dependent diabetes mellitus  8.  Leukocytosis  9.  Nonspecific anemia  10.  Marginally elevated troponin    Plan:   1.  Continue with bowel program  2.  IV diuretic  3.  Invanz for possible colitis and increasing lactic acid  4.  Labs in the morning  5.  CT scan of the chest to rule out pneumonia  6.  Sliding scale insulin with Accu-Cheks   7.  Cardiac markers  8.  Telemetry  9.  KUB in the morning  10.  PT OT consult    Code Status: Full     I discussed the patient's findings and my recommendations with the patient, no family at bedside    Estimated length of stay 3 to 4 days    Miguelito Ball DO   06/07/19   10:24 PM

## 2019-06-08 NOTE — ED PROVIDER NOTES
Subjective   History of Present Illness  84-year-old female presents with a chief complaint of 2-week history of abdominal pain and shortness of breath.  The patient reports she had previously seen her family doctor and had tolerate this abdominal pain could be from fluid retention due to prednisone or even a change in her chemotherapy.  Patient has CML.  Denies chest pain nausea vomiting diarrhea she has had constipation.  Review of Systems   All other systems reviewed and are negative.      Past Medical History:   Diagnosis Date   • Aortic stenosis    • Breast cancer (CMS/HCC)    • CAD (coronary artery disease)    • Cataract    • CHF (congestive heart failure) (CMS/HCC)     recently diagnosed after an ER visit.    • Chronic anticoagulation     Coumadin    • CKD (chronic kidney disease) stage 3, GFR 30-59 ml/min (CMS/HCC) 4/16/2018   • Diabetes mellitus (CMS/HCC)     Type II   • Elevated cholesterol    • GERD (gastroesophageal reflux disease)    • Hyperlipidemia    • Hypertension    • Macular degeneration    • Pancreatitis    • Paroxysmal atrial fibrillation (CMS/HCC)    • Pulmonary hypertension (CMS/HCC)    • Rheumatic fever     during childhood   • Sacrum and coccyx fracture (CMS/HCC)    • Sick sinus syndrome (CMS/HCC)     with pacemaker   • UTI (urinary tract infection)        Allergies   Allergen Reactions   • Janumet [Sitagliptin-Metformin Hcl] Other (See Comments)     Chest pain   • Dilaudid [Hydromorphone] Nausea And Vomiting   • Enalapril Angioedema   • Lopid [Gemfibrozil] Nausea And Vomiting   • Sulfa Antibiotics Other (See Comments)     Syncope     • Ultram [Tramadol] Itching       Past Surgical History:   Procedure Laterality Date   • ANOMALOUS PULMONARY VENOUS RETURN REPAIR, TOTAL     • BACK SURGERY     • BELPHAROPTOSIS REPAIR     • CARDIAC CATHETERIZATION  1999, 2010   • CARDIAC CATHETERIZATION N/A 2/15/2017    Procedure: Left Heart Cath;  Surgeon: Ehsan Crocker MD;  Location:  [facility];  Service:    • CARDIAC CATHETERIZATION N/A 2/15/2017    Procedure: Right Heart Cath;  Surgeon: Ehsan Crocker MD;  Location:  [facility];  Service:    • CARDIAC CATHETERIZATION N/A 2/15/2017    Procedure: Coronary angiography;  Surgeon: Ehsan Crocker MD;  Location:  [facility];  Service:    • CARDIAC CATHETERIZATION N/A 2/15/2017    Procedure: Left ventriculography;  Surgeon: Ehsan Crocker MD;  Location:  [facility];  Service:    • CARDIAC CATHETERIZATION N/A 2/15/2017    Procedure: Intracardiac echocardiogram;  Surgeon: Ehsan Crocker MD;  Location:  [facility];  Service:    • CARDIAC ELECTROPHYSIOLOGY PROCEDURE N/A 2/3/2017    Procedure: Pacemaker DC new;  Surgeon: Ehsan Crocker MD;  Location:  [facility];  Service:    • CARDIOVERSION     • CATARACT EXTRACTION     • CHOLECYSTECTOMY     • CORONARY ARTERY BYPASS GRAFT  1999    4 vessel    • CORONARY ARTERY BYPASS GRAFT     • CORONARY STENT PLACEMENT     • HYSTERECTOMY  1962   • INSERT / REPLACE / REMOVE PACEMAKER     • MASTECTOMY  2000   • OTHER SURGICAL HISTORY      TAVR 2017       Family History   Problem Relation Age of Onset   • Breast cancer Mother    • Coronary artery disease Father    • Heart attack Father    • Diabetes Father    • Cancer Brother    • No Known Problems Maternal Grandmother    • No Known Problems Maternal Grandfather    • No Known Problems Paternal Grandmother    • No Known Problems Paternal Grandfather        Social History     Socioeconomic History   • Marital status:      Spouse name: Not on file   • Number of children: Not on file   • Years of education: Not on file   • Highest education level: Not on file   Tobacco Use   • Smoking status: Never Smoker   • Smokeless tobacco: Never Used   Substance and Sexual Activity   • Alcohol use: No   • Drug use: No   • Sexual activity: No           Objective   Physical Exam   Constitutional: She is  oriented to person, place, and time. She appears well-developed and well-nourished.   HENT:   Head: Normocephalic and atraumatic.   Eyes: EOM are normal. Pupils are equal, round, and reactive to light.   Cardiovascular: Normal rate, regular rhythm and normal heart sounds.   Pulmonary/Chest: Effort normal and breath sounds normal.   Abdominal: Normal appearance and normal aorta. She exhibits distension. There is generalized tenderness.   Genitourinary: Rectum normal.   Genitourinary Comments: No hard stool appreciated on rectal exam   Neurological: She is alert and oriented to person, place, and time.   Skin: Skin is warm and dry.   Psychiatric: She has a normal mood and affect. Her behavior is normal.   Nursing note and vitals reviewed.      Procedures           ED Course  ED Course as of Jun 07 1952 Fri Jun 07, 2019   1604 CT Abdomen Pelvis Without Contrast [LM]      ED Course User Index  [LM] Jamari Seaman PA-C                  MDM  Number of Diagnoses or Management Options  Diagnosis management comments: Chaperoned by registered nurse here in the emergency room, Celia ANDERSON     Will dc in stable condition        Amount and/or Complexity of Data Reviewed  Clinical lab tests: reviewed and ordered  Tests in the radiology section of CPT®: ordered and reviewed  Tests in the medicine section of CPT®: ordered and reviewed  Decide to obtain previous medical records or to obtain history from someone other than the patient: yes    Risk of Complications, Morbidity, and/or Mortality  Presenting problems: moderate  Diagnostic procedures: moderate  Management options: moderate    Patient Progress  Patient progress: stable        Final diagnoses:   Acute on chronic congestive heart failure, unspecified heart failure type (CMS/HCC)            Jamari Seaman PA-C  06/07/19 1952

## 2019-06-08 NOTE — PROGRESS NOTES
Discharge Planning Assessment  [facility]     Patient Name: Lakesha Costello  MRN: [id number]  Today's Date: 6/8/2019    Admit Date: 6/7/2019    Discharge Needs Assessment     Row Name 06/08/19 1426       Living Environment    Lives With  spouse    Current Living Arrangements  home/apartment/condo    Primary Care Provided by  self    Provides Primary Care For  no one    Family Caregiver if Needed  spouse;grandchild(obed), adult    Family Caregiver Names  Margarita     Quality of Family Relationships  helpful;involved;supportive    Able to Return to Prior Arrangements  yes       Resource/Environmental Concerns    Resource/Environmental Concerns  none    Transportation Concerns  car, none       Transition Planning    Patient/Family Anticipates Transition to  home with family;home with help/services    Patient/Family Anticipated Services at Transition  none    Transportation Anticipated  family or friend will provide       Discharge Needs Assessment    Readmission Within the Last 30 Days  no previous admission in last 30 days    Concerns to be Addressed  no discharge needs identified;denies needs/concerns at this time    Equipment Currently Used at Home  commode;oxygen;walker, rolling;wheelchair    Anticipated Changes Related to Illness  none    Equipment Needed After Discharge  none    Discharge Coordination/Progress  Pt has RX coverage and a PCP. SW spoke with pt's daughter, Margarita, in regards to assessment. Pt lives at home with her  and has [facility]. Pt plans on returning home with  and resuming HH. Pt's daughter denies any other needs or concerns at this time. Pt is currently inpatient status and will need resumption orders for HH. SW will follow and await for resumption orders.         Discharge Plan    No documentation.       Destination      No service coordination in this encounter.      Durable Medical Equipment      No service coordination in this encounter.       Dialysis/Infusion      No service coordination in this encounter.      Home Medical Care      No service coordination in this encounter.      Therapy      No service coordination in this encounter.      Community Resources      No service coordination in this encounter.          Demographic Summary    No documentation.       Functional Status    No documentation.       Psychosocial    No documentation.       Abuse/Neglect    No documentation.       Legal    No documentation.       Substance Abuse    No documentation.       Patient Forms    No documentation.           Kim Deutsch

## 2019-06-08 NOTE — PLAN OF CARE
Problem: Patient Care Overview  Goal: Plan of Care Review  Outcome: Ongoing (interventions implemented as appropriate)   06/08/19 1513   Coping/Psychosocial   Plan of Care Reviewed With patient   Plan of Care Review   Progress improving   OTHER   Outcome Summary Denies any pain during shift. KUB, CT, XR done today, see results. Pt had one BM today. Echo done today, results pending. Levemir was discontinued today. Blood sugar this AM was 49. Orange juice given. Repeat was 58. Glucose gel given and blood sugar was 87. IV Lasix cont. VSS. Cont to monitor.

## 2019-06-08 NOTE — PLAN OF CARE
Problem: Fall Risk (Adult)  Goal: Identify Related Risk Factors and Signs and Symptoms  Outcome: Ongoing (interventions implemented as appropriate)   06/08/19 0356   Fall Risk (Adult)   Related Risk Factors (Fall Risk) age-related changes;fatigue/slow reaction;gait/mobility problems;environment unfamiliar   Signs and Symptoms (Fall Risk) presence of risk factors       Problem: Patient Care Overview  Goal: Plan of Care Review  Outcome: Ongoing (interventions implemented as appropriate)      Problem: Fluid Volume Excess (Adult)  Goal: Identify Related Risk Factors and Signs and Symptoms  Outcome: Ongoing (interventions implemented as appropriate)

## 2019-06-08 NOTE — PROGRESS NOTES
[facility] Medicine Services  INPATIENT PROGRESS NOTE    Patient Name: Lakesha Costello  Date of Admission: 6/7/2019  Today's Date: 06/08/19  Length of Stay: 1  Primary Care Physician: Leonardo Zepeda DO    Subjective   Chief Complaint: follow up heart failure  HPI   Pt is sitting up in the chair. States she has had a bowel movement which makes 3 since admission. No chest pain. States she already feels much better than prior to admission. Her lower extremities are improved per her report. she had hypoglycemia this am.     Review of Systems   All pertinent negatives and positives are as above. All other systems have been reviewed and are negative unless otherwise stated.     Objective    Temp:  [97.1 °F (36.2 °C)-98 °F (36.7 °C)] 97.8 °F (36.6 °C)  Heart Rate:  [70-71] 70  Resp:  [18-22] 18  BP: ()/(46-98) 141/98  Physical Exam   Constitutional: She is oriented to person, place, and time. She appears well-developed.   Chronically ill appearing.    HENT:   Head: Normocephalic and atraumatic.   Eyes: Conjunctivae and EOM are normal. Pupils are equal, round, and reactive to light.   Neck: Neck supple. No JVD present. No thyromegaly present.   Cardiovascular: Normal rate, regular rhythm and intact distal pulses. Exam reveals no gallop and no friction rub.   Murmur heard.  vpacing 70   Pulmonary/Chest: Effort normal. No respiratory distress. She has no wheezes. She has no rales. She exhibits no tenderness.   Coarse bilaterally.    Abdominal: Soft. She exhibits distension. There is no tenderness. There is no rebound and no guarding.   Musculoskeletal: Normal range of motion. She exhibits edema (trace to 1+ edema). She exhibits no tenderness or deformity.   Lymphadenopathy:     She has no cervical adenopathy.   Neurological: She is alert and oriented to person, place, and time. She displays normal reflexes. No cranial nerve deficit. She exhibits normal muscle tone.   Skin:  Skin is warm and dry. No rash noted.   Psychiatric: She has a normal mood and affect. Her behavior is normal. Judgment and thought content normal.     Results Review:  I have reviewed the labs, radiology results, and diagnostic studies.    Laboratory Data:   Results from last 7 days   Lab Units 06/07/19  1649   WBC 10*3/mm3 16.95*   HEMOGLOBIN g/dL 10.7*   HEMATOCRIT % 31.9*   PLATELETS 10*3/mm3 970*        Results from last 7 days   Lab Units 06/07/19  1649   SODIUM mmol/L 135   POTASSIUM mmol/L 4.2   CHLORIDE mmol/L 98   CO2 mmol/L 27.0   BUN mg/dL 35*   CREATININE mg/dL 1.08   CALCIUM mg/dL 9.2   BILIRUBIN mg/dL 1.6*   ALK PHOS U/L 108   ALT (SGPT) U/L <15   AST (SGOT) U/L 35   GLUCOSE mg/dL 191*      Radiology Data:   Imaging Results (last 24 hours)     Procedure Component Value Units Date/Time    XR Abdomen Flat & Upright [[id number]] Collected:  06/08/19 1134     Updated:  06/08/19 1141    Narrative:       EXAM: XR ABDOMEN FLAT AND UPRIGHT- - 6/8/2019 10:43 AM CDT     HISTORY: possible pneumoperitoneum on KUB; I50.9-Heart failure,  unspecified       COMPARISON: 06/20/2019.      TECHNIQUE:  2 images.  Upright and supine views of the abdomen     FINDINGS:  See impression          Impression:       1. No evidence of pneumoperitoneum.  2. Short air-fluid levels in the bowel may indicate liquid stool.  3. Cholecystectomy clips.  4. Kyphoplasty at L2. Degenerative changes in the spine.  5. Changes of cardiac valve replacement. See separately dictated CT  chest report of the same day.  This report was finalized on 06/08/2019 11:38 by Dr Shae Sainz MD.    CT Chest Without Contrast [[id number]] Collected:  06/08/19 1030     Updated:  06/08/19 1048    Narrative:       EXAM: CT CHEST WO CONTRAST- - 6/8/2019 9:51 AM CDT     HISTORY: Toxic effect of soaps and detergents; I50.9-Heart failure,  unspecified       COMPARISON: 03/23/2016.      DOSE LENGTH PRODUCT: 157 mGy cm. Automated exposure control was also  utilized to  decrease patient radiation dose.     TECHNIQUE: Unenhanced CT images obtained with multiplanar reformats.     FINDINGS:   AIRWAYS/PULMONARY PARENCHYMA: The central airways are midline and  patent. Focal consolidation at the medial right lower lung. There is a  stable 2 mm pulmonary nodule along the right fissure, possibly a  intrapulmonary lymph node. Linear atelectasis at the lingula. Trace  bilateral pleural effusions.     VASCULATURE: Limited evaluation of the vasculature without intravenous  contrast. Thoracic aorta is normal in course and caliber.  Heavily  calcified aortic atherosclerosis.  Normal caliber pulmonary artery.      CARDIAC:  Cardiomegaly. No pericardial effusion.  Changes of CABG and  aortic valve replacement. Calcified aortic atherosclerosis of the native  vessels. Left atrial occlusion device.     MEDIASTINUM: There is a 1.5 cm round density at the inferior posterior  right hilum, axial series 2 image 68, could represent vascular  structures or an enlarged lymph node.. Esophagus has normal coarse,  caliber and wall thickness. There is extension of the nodular right  thyroid into the mediastinum, similar compared to 03/23/2016.     EXTRATHORACIC: Nodular thyroid extends into the upper mediastinum.. No  thoracic inlet or axillary adenopathy. Cardiac pulse generator in the  left chest. Changes of right mastectomy. Body wall edema.     INCLUDED UPPER ABDOMEN: Ascites. Cholecystectomy. Unenhanced liver,  spleen, adrenal glands, pancreatic tail within normal limits.     OSSEOUS: No acute or suspicious bony finding.           Impression:       1. Redemonstration of consolidation at the medial aspect of the right  lower lobe. Differential diagnosis includes atelectasis, infection or  mass. Correlate with clinical presentation and recommend follow-up after  treatment to evaluate for resolution.  2. There is a 1.2 cm round density at the inferior posterior right  hilum, which could represent a vascular  structure or enlarged lymph  node. If patient's renal function permits, recommend follow-up chest CT  be performed with contrast.  3. Stable nodular thyroid extending into the upper mediastinum.  4. Right mastectomy.  5. Cardiomegaly with postoperative changes of valve replacement, left  atrial occlusion and CABG.  5. Body wall edema, ascites, trace pleural effusions. Correlate for  fluid overload.  This report was finalized on 06/08/2019 10:45 by Dr Shae Sainz MD.    XR Abdomen KUB [[id number]] Collected:  06/08/19 0712     Updated:  06/08/19 0719    Narrative:       EXAM: XR ABDOMEN KUB- - 6/8/2019 3:40 AM CDT     HISTORY: constipation; I50.9-Heart failure, unspecified       COMPARISON: 06/07/2019.      TECHNIQUE:  1 images.  Supine view of the abdomen.      FINDINGS:  See impression          Impression:       1. Pneumoperitoneum versus gas in the right colon. Consider decubitus or  upright view to evaluate for free air.  2. Nonspecific bowel gas pattern.  3. Cholecystectomy clips.  4. L2 kyphoplasty.  This report was finalized on 06/08/2019 07:16 by Dr Shae Sainz MD.    XR Chest 1 View [[id number]] Collected:  06/07/19 1659     Updated:  06/07/19 1706    Narrative:       EXAMINATION: XR CHEST 1 VW-. 6/7/2019 4:59 PM CDT     CHEST, ONE VIEW:     HISTORY: Shortness of air, cough     COMPARISON: 03/06/2019, 10/11/2018 10/03/2018     A single frontal chest radiograph was obtained.     FINDINGS:           Changes median sternotomy and cardiac valve replacement observed.  Permanent cardiac pacemaker appreciated.     The lungs are clear without acute infiltrates. Mild chronic interstitial  prominence noted.     The heart is generous. The pulmonary circulation appropriate, without  evidence of heart failure.     The bony structures are intact without acute osseous abnormalities.                                     Impression:       1. Generous heart size.  2. No acute cardiopulmonary process.     This report was  finalized on 06/07/2019 17:03 by Dr. Tarun Rios MD.    CT Abdomen Pelvis Without Contrast [[id number]] Collected:  06/07/19 1602     Updated:  06/07/19 1613    Narrative:       EXAM: CT ABDOMEN PELVIS WO CONTRAST- - 6/7/2019 3:34 PM CDT     HISTORY: abd pain, distention, vomiting       COMPARISON: 11/30/2018.      DOSE LENGTH PRODUCT: 228 mGy cm. Automated exposure control was also  utilized to decrease patient radiation dose.     TECHNIQUE: Unenhanced axial images of the abdomen and pelvis obtained  with coronal and sagittal reformats.     FINDINGS: Evaluation limited secondary to lack of intravenous contrast  agent.      VISUALIZED CHEST: Respiratory motion limits fine parenchymal evaluation.  There is a 4-5 cm opacity at the medial right lower lobe, incompletely  evaluated on this exam. Small bilateral pleural effusions. Cardiomegaly  with changes of aortic valve replacement and CABG.     LIVER: Normal hepatic contour.     BILIARY: Cholecystectomy clips.     PANCREAS: Limited in evaluation due to motion, grossly within normal  limits.     SPLEEN: Normal size and contour.      ADRENAL: Normal appearance of the bilateral adrenal glands.     GENITOURINARY:   Left renal calcification favored to be vascular. No hydronephrosis.   Urinary bladder collapsed, which limits evaluation of the bladder wall.   Uterus absent. No pelvic mass.     PERITONEUM: Ascites. No definite free air.     GI TRACT: Small hiatal hernia. Normal C-sweep of the duodenum. All of  stool at the rectum measures 7 cm. Mild thickening of the rectum, which  can be seen with stercoral colitis. There appear to be a few colonic  diverticula. Nonvisualized appendix. No evidence of bowel obstruction.     VESSELS: Normal course and caliber of the calcified aorta.     RETROPERITONEUM: There are a few borderline retroperitoneal lymph nodes.     SOFT TISSUES: Anasarca.     BONES: Greater than 50 percent height loss of the L2 vertebral body with  changes of  kyphoplasty and 4 mm retropulsion of the posterior vertebral  body margin and at least mild spinal canal stenosis.          Impression:       1. There is a large ball of stool at the rectum with thickening of the  rectal wall. Correlate for impaction and possible stercoral colitis.  2. Medial aspect of the right lower lobe with consolidation. Small  bilateral pleural effusions. This is incompletely evaluated on this  exam. It could represent atelectasis, infection or mass. Correlate with  clinical presentation and recommend follow-up after treatment to  evaluate for resolution.  3. Ascites and anasarca suggests fluid overload.  4. Cardiomegaly with changes of aortic valve replacement and CABG.  5. Incidental findings include prior cholecystectomy, colonic  diverticula, nonspecific borderline retroperitoneal lymph nodes and  chronic L2 compression fracture with kyphoplasty.  This report was finalized on 06/07/2019 16:10 by Dr Shae Sainz MD.        I have reviewed the patient's current medications.     Assessment/Plan     Active Hospital Problems    Diagnosis   • **Acute on chronic diastolic congestive heart failure (CMS/HCC)   • Constipation   • CML (chronic myeloid leukemia) (CMS/HCC)   • Thrombocytosis (CMS/HCC)   • CKD (chronic kidney disease) stage 3, GFR 30-59 ml/min (CMS/HCC)     GFR 32 in 4/2018     • Age-related osteoporosis without current pathological fracture     Last Assessment & Plan:   She has clinical osteoporosis with a history of vertebral compression fractures.  Recommend DXA test followed by treatment.     • Artificial pacemaker   • Paroxysmal atrial fibrillation (CMS/HCC)     S/p Watchman       Plan:  1. Continue diuresis and await echocardiogram.   2. Will trend troponins.   3. Will hold levemir given hypoglycemia  4. Check flat and upright given the fact that she has possible pneumoperitoneum on the right side. Clinically, she does not have a surgical abdomen. She has been having bowel  movements and declines any abdominal pain.   5. I have reviewed multiple records from [place]. She follows with Dr. Fuller with hematology. Dr. Moses with cardiology. She actually has an appointmet with Dr. Moses on Monday.   6. ? Need for hematology consult here while she is inpatient  7. Repeat labs in am.     Discharge Planning: I expect the patient to be discharged to ? in ? days.    FACUNDO Quevedo   06/08/19   3:24 PM     I personally evaluated and examined the patient in conjunction with  Jolie LOREDO and agree with the assessment, treatment plan, and disposition of the patient as recorded by her. My history, exam, and further recommendations are:   She was admitted yesterday by Dr. Cervantes for evaluation of abdominal swelling.  She was recently diagnosed with CML and been followed by hematologist in [place].  She was noted to have BNP of 33,000 and slightly elevated troponin of 0.041.  CT scan of the abdomen reported to have fecal impaction.  She had effective bowel movement she claimed.  Her belly is improved.  She states that she has a very sick daughter.  Her  recently came home past Sunday with pneumonia.  Vitals:    06/08/19 1138   BP: 141/98   Pulse: 70   Resp: 18   Temp: 97.8 °F (36.6 °C)   SpO2: 99%   Sent woman, no distress  Alert and oriented x3, she is having meal.  Chest is clear to auscultation  Globular abdomen with voluntary guarding.  Normoactive bowel sound  Positive pitting edema of lower extremities    White count on admission is 16.9 with predominance of neutrophils.  Hemoglobin 10.7  Urinalysis showed no evidence of bacteriuria pyuria or hematuria  In addition to what had been mentioned regarding CT of the abdomen pelvis she also has ascites and anasarca suggestive fluid overload.  Lactic acid was 2.5.        aspirin 81 mg Oral Daily   atenolol 25 mg Oral Nightly   atorvastatin 10 mg Oral Nightly   ferrous sulfate 325 mg Oral Daily With Breakfast   furosemide  20 mg Intravenous Daily   glipiZIDE 5 mg Oral BID AC   hydrocortisone  Rectal BID   insulin lispro 2-7 Units Subcutaneous 4x Daily With Meals & Nightly   OCUVITE-LUTEIN 1 tablet Oral Daily   pantoprazole 40 mg Oral Daily   Polyethyl Glyc-Propyl Glyc PF 1 drop Both Eyes Daily   polyethylene glycol 17 g Oral Daily   potassium chloride 10 mEq Oral Daily   sodium chloride 3 mL Intravenous Q12H   venlafaxine XR 37.5 mg Oral Daily   vitamin B-12 500 mcg Oral Daily     Echocardiogram still pending  Patient had hypoglycemia between 49-58 around [id number] this morning.. Medication above reviewed  Between Levemir compared to glipizide I think glipizide has more predilection to give severe hypoglycemia.  We will continue sliding scale insulin instead and discontinued glipizide as well  Continue present management      Myron Huynh MD  06/08/19  5:41 PM

## 2019-06-09 PROBLEM — E44.0 MODERATE MALNUTRITION (HCC): Status: ACTIVE | Noted: 2019-01-01

## 2019-06-09 NOTE — PAYOR COMM NOTE
"ADMIT INPT 6-7-19  UR PHONE    [phone]    Lakesha Costello (84 y.o. Female)     Date of Birth Social Security Number Address Home Phone MRN    [id number]  [id number] [facility] [address] [phone] [id number]    Quaker Marital Status          [facility]        Admission Date Admission Type Admitting Provider Attending Provider Department, Room/Bed    6/7/19 Emergency Myron Huynh MD [place], Glenn Riego, MD [facility] 4B, 404/1    Discharge Date Discharge Disposition Discharge Destination                       Attending Provider:  Myron Huynh MD    Allergies:  Gleevec [Imatinib], Bentyl [Dicyclomine], Janumet [Sitagliptin-metformin Hcl], Dilaudid [Hydromorphone], Enalapril, Lopid [Gemfibrozil], Sulfa Antibiotics, Ultram [Tramadol]    Isolation:  None   Infection:  None   Code Status:  CPR    Ht:  154.9 cm (61\")   Wt:  55 kg (121 lb 3.2 oz)    Admission Cmt:  None   Principal Problem:  Acute on chronic diastolic congestive heart failure (CMS/HCC) [I50.33]                 Active Insurance as of 6/7/2019     Primary Coverage     Payor Plan Insurance Group Employer/Plan Group    HUMANA MEDICARE REPLACEMENT HUMANA MEDICARE REPL [id number]     Payor Plan Address Payor Plan Phone Number Payor Plan Fax Number Effective Dates    [address] [address] [phone]  1/1/2018 - None Entered    [facility] [id number]       Subscriber Name Subscriber Birth Date Member ID       LAKESHA COSTELLO 1935 [id number]                 Emergency Contacts      (Rel.) Home Phone Work Phone Mobile Phone    Margarita Ignacio (Rn) (Daughter) [phone]    Richard Pablo (Spouse) [phone] -- --               History & Physical      Miguelito Ball DO at 6/7/2019 10:24 PM              [facility]  HISTORY AND PHYSICAL    Date of Admission: 6/7/2019  Primary Care " Physician: Leonardo Zepeda DO    Subjective     Chief Complaint: Abdominal swelling    History of Present Illness  84-year-old  female who presents to the emergency department for abdominal swelling.  Patient has history of congestive heart failure.  She was recently diagnosed with CML and sees hematology in [place].  There was some concern as an outpatient that the chemotherapy was causing the patient's condition to worsen.  Patient has a BNP of 33,000.  She was given Lasix in the ED.  She had a slightly elevated troponin at 0.041.  I have note the patient had a fecal impaction seen on CT scan.  The patient has bad hemorrhoids.  She was given an enema in the emergency department had 2 large bowel movements.  The patient is coming from home.  She states she has been ill frequently, about every week.        Review of Systems   Abdominal swelling    Otherwise complete ROS reviewed and negative except as mentioned in the HPI.    Past Medical History:   Past Medical History:   Diagnosis Date   • Aortic stenosis    • Breast cancer (CMS/HCC)    • CAD (coronary artery disease)    • Cataract    • CHF (congestive heart failure) (CMS/HCC)     recently diagnosed after an ER visit.    • Chronic anticoagulation     Coumadin    • CKD (chronic kidney disease) stage 3, GFR 30-59 ml/min (CMS/HCC) 4/16/2018   • Diabetes mellitus (CMS/HCC)     Type II   • Elevated cholesterol    • GERD (gastroesophageal reflux disease)    • Hyperlipidemia    • Hypertension    • Macular degeneration    • Pancreatitis    • Paroxysmal atrial fibrillation (CMS/HCC)    • Pulmonary hypertension (CMS/HCC)    • Rheumatic fever     during childhood   • Sacrum and coccyx fracture (CMS/HCC)    • Sick sinus syndrome (CMS/HCC)     with pacemaker   • UTI (urinary tract infection)      Past Surgical History:  Past Surgical History:   Procedure Laterality Date   • ANOMALOUS PULMONARY VENOUS RETURN REPAIR, TOTAL     • BACK SURGERY     • BELPHAROPTOSIS  REPAIR     • CARDIAC CATHETERIZATION  1999, 2010   • CARDIAC CATHETERIZATION N/A 2/15/2017    Procedure: Left Heart Cath;  Surgeon: Ehsan Crocker MD;  Location:  [facility];  Service:    • CARDIAC CATHETERIZATION N/A 2/15/2017    Procedure: Right Heart Cath;  Surgeon: Ehsan Crocker MD;  Location:  [facility];  Service:    • CARDIAC CATHETERIZATION N/A 2/15/2017    Procedure: Coronary angiography;  Surgeon: Ehsan Crocker MD;  Location:  [facility];  Service:    • CARDIAC CATHETERIZATION N/A 2/15/2017    Procedure: Left ventriculography;  Surgeon: Ehsan Crocker MD;  Location:  [facility];  Service:    • CARDIAC CATHETERIZATION N/A 2/15/2017    Procedure: Intracardiac echocardiogram;  Surgeon: Ehsan Crocker MD;  Location:  [facility];  Service:    • CARDIAC ELECTROPHYSIOLOGY PROCEDURE N/A 2/3/2017    Procedure: Pacemaker DC new;  Surgeon: Ehsan Crocker MD;  Location:  [facility];  Service:    • CARDIOVERSION     • CATARACT EXTRACTION     • CHOLECYSTECTOMY     • CORONARY ARTERY BYPASS GRAFT  1999    4 vessel    • CORONARY ARTERY BYPASS GRAFT     • CORONARY STENT PLACEMENT     • HYSTERECTOMY  1962   • INSERT / REPLACE / REMOVE PACEMAKER     • MASTECTOMY  2000   • OTHER SURGICAL HISTORY      TAVR 2017     Social History:  reports that she has never smoked. She has never used smokeless tobacco. She reports that she does not drink alcohol or use drugs.    Family History: family history includes Breast cancer in her mother; Cancer in her brother; Coronary artery disease in her father; Diabetes in her father; Heart attack in her father; No Known Problems in her maternal grandfather, maternal grandmother, paternal grandfather, and paternal grandmother.       Allergies:  Allergies   Allergen Reactions   • Gleevec [Imatinib] Itching   • Bentyl [Dicyclomine] Itching   • Janumet [Sitagliptin-Metformin Hcl] Other (See  Comments)     Chest pain   • Dilaudid [Hydromorphone] Nausea And Vomiting   • Enalapril Angioedema   • Lopid [Gemfibrozil] Nausea And Vomiting   • Sulfa Antibiotics Other (See Comments)     Syncope     • Ultram [Tramadol] Itching     Medications:  Prior to Admission medications    Medication Sig Start Date End Date Taking? Authorizing Provider   aspirin 81 MG EC tablet Take 81 mg by mouth Daily.   Yes Minda Salvador MD   atenolol (TENORMIN) 50 MG tablet Take 1 tab PO qam & 1/2 tab PO qpm.  Patient taking differently: 1/2 tab PO qpm. 4/12/19  Yes Leonardo Zepeda, DO   calcium carbonate (OS-MICHAEL) 600 MG tablet Take 600 mg by mouth 2 (Two) Times a Day With Meals.   Yes Minda Salvador MD   cholecalciferol (VITAMIN D3) 1000 units tablet Take 1,000 Units by mouth Daily.   Yes Minda Salvador MD   Ferrous Sulfate  (45 Fe) MG tablet controlled-release Take 142 mg by mouth Daily. 12/13/18  Yes Abisai Barahona MD   furosemide (LASIX) 40 MG tablet Take 40 mg by mouth Daily.   Yes ProviderMinda MD   glipiZIDE (GLUCOTROL) 5 MG tablet Take 1 tablet by mouth 2 (Two) Times a Day Before Meals. 4/12/19  Yes Leonardo Zepeda, DO   hydrocortisone (ANUSOL-HC) 25 MG suppository Insert 1 suppository into the rectum 2 (Two) Times a Day As Needed for Hemorrhoids. 11/30/18  Yes Sherif Early Jr., MD   insulin detemir (LEVEMIR) 100 UNIT/ML injection Inject 30 Units under the skin into the appropriate area as directed Every Night. 30 units qam 30 units qpm  Patient taking differently: Inject 20 Units under the skin into the appropriate area as directed Every Night. 4/12/19  Yes Leonardo Zepeda DO   Lidocaine-Hydrocortisone Ace 2.8-0.55 % gel Insert 1 application into the rectum 2 (Two) Times a Day As Needed (hemorrhoid pain). 12/20/18  Yes Linda Johnson APRN   Multiple Vitamins-Minerals (PRESERVISION/LUTEIN) capsule Take 1 capsule by mouth 2 (Two) Times a Day.   Yes ProviderMinda MD      O2 (OXYGEN) Inhale 2 L/min Daily As Needed.   Yes Minda Salvador MD   pantoprazole (PROTONIX) 40 MG EC tablet Take 1 tablet by mouth Daily. 4/12/19  Yes Leonardo Zepeda, DO   polyethyl glycol-propyl glycol (SYSTANE) 0.4-0.3 % solution ophthalmic solution Administer 1 drop to both eyes Daily.   Yes Minda Salvador MD   polyethylene glycol (MIRALAX) packet Take 17 g by mouth Daily.   Yes Minda Salvador MD   potassium chloride (MICRO-K) 8 MEQ CR capsule Take 10 mEq by mouth Daily.   Yes Minda Salvador MD   simvastatin (ZOCOR) 20 MG tablet Take 1 tablet by mouth Every Night. 4/12/19  Yes Leonardo Zepeda DO   sucralfate (CARAFATE) 1 GM/10ML suspension Take 10 mL by mouth 3 (Three) Times a Day As Needed (upset stomach). 4/12/19  Yes Leonardo Zepeda, DO   triamcinolone (KENALOG) 0.1 % cream Apply 1 application topically to the appropriate area as directed 2 (Two) Times a Day. 5/20/19 5/20/20 Yes Minda Salvador MD   venlafaxine XR (EFFEXOR-XR) 37.5 MG 24 hr capsule Take 1 capsule by mouth Daily. 5/20/19  Yes Mariposa Santos APRN   vitamin B-12 (CYANOCOBALAMIN) 500 MCG tablet Take 500 mcg by mouth Daily.   Yes Provider, Historical, MD   Witch Hazel (TUCKS) 50 % pads Apply 1 pad topically 6 (Six) Times a Day. 12/20/18  Yes Linda Johnson APRN   bisacodyl (DULCOLAX) 10 MG suppository Insert 1 suppository into the rectum Daily.  Patient taking differently: Insert 10 mg into the rectum Daily As Needed. 12/21/18   Linda Johnson APRN   nitroglycerin (NITROSTAT) 0.4 MG SL tablet 1 under the tongue as needed for angina, may repeat q5mins for up three doses 6/7/17   Ehsan Crocker MD   ondansetron ODT (ZOFRAN ODT) 4 MG disintegrating tablet Take 1 tablet by mouth Every 4 (Four) Hours As Needed for Nausea or Vomiting. 12/21/18   Linda Johnson APRN     Objective     Vital Signs: /54 (BP Location: Left arm, Patient Position: Lying)   Pulse 70   Temp 97.1 °F (36.2 °C) (Oral)    "Resp 20   Ht 154.9 cm (61\")   Wt 55.1 kg (121 lb 7 oz)   SpO2 96%   BMI 22.95 kg/m²    Physical Exam   Constitutional:   Appears chronically ill   HENT:   Head: Normocephalic and atraumatic.   Nose: Nose normal.   Mouth/Throat: Oropharynx is clear and moist.   Eyes: Conjunctivae and EOM are normal.   Neck: Normal range of motion. Neck supple.   Cardiovascular: Normal rate, regular rhythm and normal heart sounds.   Pulmonary/Chest: Effort normal. She has rales.   Abdominal: Soft. Bowel sounds are normal. She exhibits distension.   Musculoskeletal: She exhibits no edema or tenderness.   Hyperkyphotic thoracic spine   Neurological: She is alert. No cranial nerve deficit.   Skin: Skin is warm and dry.   Psychiatric: She has a normal mood and affect.   Vitals reviewed.          Results Reviewed:  Lab Results (last 24 hours)     Procedure Component Value Units Date/Time    Lactic Acid, Reflex [[id number]]  (Abnormal) Collected:  06/07/19 2040    Specimen:  Blood Updated:  06/07/19 2101     Lactate 2.8 mmol/L     Lactic Acid, Reflex Timer (This will reflex a repeat order 3-3:15 hours after ordered.) [[id number]] Collected:  06/07/19 1649    Specimen:  Blood Updated:  06/07/19 2031     Extra Tube Hold for add-ons.     Comment: Auto resulted.       Urinalysis With Culture If Indicated - Urine, Catheter In/Out [[id number]]  (Abnormal) Collected:  06/07/19 1807    Specimen:  Urine, Catheter In/Out Updated:  06/07/19 1830     Color, UA Dark Yellow     Appearance, UA Clear     pH, UA <=5.0     Specific Gravity, UA 1.014     Glucose, UA Negative     Ketones, UA Negative     Bilirubin, UA Negative     Blood, UA Negative     Protein,  mg/dL (2+)     Leuk Esterase, UA Negative     Nitrite, UA Negative     Urobilinogen, UA 1.0 E.U./dL    Urinalysis, Microscopic Only - Urine, Catheter In/Out [[id number]] Collected:  06/07/19 1807    Specimen:  Urine, Catheter In/Out Updated:  06/07/19 1830     RBC, UA None Seen /HPF      WBC, UA " None Seen /HPF      Bacteria, UA None Seen /HPF      Squamous Epithelial Cells, UA 0-2 /HPF      Hyaline Casts, UA 0-2 /LPF      Methodology Automated Microscopy    CBC & Differential [[id number]] Collected:  06/07/19 1649    Specimen:  Blood Updated:  06/07/19 1803    Narrative:       The following orders were created for panel order CBC & Differential.  Procedure                               Abnormality         Status                     ---------                               -----------         ------                     CBC Auto Differential[[id number]]        Abnormal            Final result                 Please view results for these tests on the individual orders.    CBC Auto Differential [[id number]]  (Abnormal) Collected:  06/07/19 1649    Specimen:  Blood Updated:  06/07/19 1803     WBC 16.95 10*3/mm3      RBC 3.34 10*6/mm3      Hemoglobin 10.7 g/dL      Hematocrit 31.9 %      MCV 95.5 fL      MCH 32.0 pg      MCHC 33.5 g/dL      RDW 15.6 %      RDW-SD 54.6 fl      MPV 11.7 fL      Platelets 970 10*3/mm3     Manual Differential [[id number]]  (Abnormal) Collected:  06/07/19 1649    Specimen:  Blood Updated:  06/07/19 1803     Neutrophil % 74.0 %      Lymphocyte % 6.0 %      Monocyte % 8.0 %      Eosinophil % 1.0 %      Bands %  9.0 %      Atypical Lymphocyte % 2.0 %      Neutrophils Absolute 14.07 10*3/mm3      Lymphocytes Absolute 1.02 10*3/mm3      Monocytes Absolute 1.36 10*3/mm3      Eosinophils Absolute 0.17 10*3/mm3      Poikilocytes Slight/1+     WBC Morphology Normal     Platelet Estimate Increased     Clumped Platelets Present     Giant Platelets Large/3+    TSH [[id number]]  (Normal) Collected:  06/07/19 1649    Specimen:  Blood Updated:  06/07/19 1746     TSH 1.200 mIU/mL     BNP [[id number]]  (Abnormal) Collected:  06/07/19 1649    Specimen:  Blood Updated:  06/07/19 1746     proBNP 33,800.0 pg/mL     Troponin [[id number]]  (Abnormal) Collected:  06/07/19 1649    Specimen:  Blood Updated:  06/07/19  1726     Troponin I 0.041 ng/mL     Lactic Acid, Plasma [[id number]]  (Abnormal) Collected:  06/07/19 1649    Specimen:  Blood Updated:  06/07/19 1723     Lactate 2.5 mmol/L     Lipase [[id number]]  (Normal) Collected:  06/07/19 1649    Specimen:  Blood Updated:  06/07/19 1716     Lipase 167 U/L     Comprehensive Metabolic Panel [[id number]]  (Abnormal) Collected:  06/07/19 1649    Specimen:  Blood Updated:  06/07/19 1716     Glucose 191 mg/dL      BUN 35 mg/dL      Creatinine 1.08 mg/dL      Sodium 135 mmol/L      Potassium 4.2 mmol/L      Chloride 98 mmol/L      CO2 27.0 mmol/L      Calcium 9.2 mg/dL      Total Protein 6.9 g/dL      Albumin 3.90 g/dL      ALT (SGPT) <15 U/L      AST (SGOT) 35 U/L      Alkaline Phosphatase 108 U/L      Total Bilirubin 1.6 mg/dL      eGFR Non African Amer 49 mL/min/1.73      Globulin 3.0 gm/dL      A/G Ratio 1.3 g/dL      BUN/Creatinine Ratio 32.4     Anion Gap 10.0 mmol/L     Narrative:       GFR Normal >60  Chronic Kidney Disease <60  Kidney Failure <15    Magnesium [[id number]]  (Normal) Collected:  06/07/19 1649    Specimen:  Blood Updated:  06/07/19 1716     Magnesium 1.8 mg/dL     Blood Culture - Blood, Arm, Left [[id number]] Collected:  06/07/19 1645    Specimen:  Blood from Arm, Left Updated:  06/07/19 1704    Blood Culture - Blood, Hand, Left [[id number]] Collected:  06/07/19 1630    Specimen:  Blood from Hand, Left Updated:  06/07/19 1704        Imaging Results (last 24 hours)     Procedure Component Value Units Date/Time    XR Chest 1 View [[id number]] Collected:  06/07/19 1659     Updated:  06/07/19 1706    Narrative:       EXAMINATION: XR CHEST 1 VW-. 6/7/2019 4:59 PM CDT     CHEST, ONE VIEW:     HISTORY: Shortness of air, cough     COMPARISON: 03/06/2019, 10/11/2018 10/03/2018     A single frontal chest radiograph was obtained.     FINDINGS:           Changes median sternotomy and cardiac valve replacement observed.  Permanent cardiac pacemaker appreciated.     The lungs are  clear without acute infiltrates. Mild chronic interstitial  prominence noted.     The heart is generous. The pulmonary circulation appropriate, without  evidence of heart failure.     The bony structures are intact without acute osseous abnormalities.                                     Impression:       1. Generous heart size.  2. No acute cardiopulmonary process.     This report was finalized on 06/07/2019 17:03 by Dr. Tarun Rios MD.    CT Abdomen Pelvis Without Contrast [[id number]] Collected:  06/07/19 1602     Updated:  06/07/19 1613    Narrative:       EXAM: CT ABDOMEN PELVIS WO CONTRAST- - 6/7/2019 3:34 PM CDT     HISTORY: abd pain, distention, vomiting       COMPARISON: 11/30/2018.      DOSE LENGTH PRODUCT: 228 mGy cm. Automated exposure control was also  utilized to decrease patient radiation dose.     TECHNIQUE: Unenhanced axial images of the abdomen and pelvis obtained  with coronal and sagittal reformats.     FINDINGS: Evaluation limited secondary to lack of intravenous contrast  agent.      VISUALIZED CHEST: Respiratory motion limits fine parenchymal evaluation.  There is a 4-5 cm opacity at the medial right lower lobe, incompletely  evaluated on this exam. Small bilateral pleural effusions. Cardiomegaly  with changes of aortic valve replacement and CABG.     LIVER: Normal hepatic contour.     BILIARY: Cholecystectomy clips.     PANCREAS: Limited in evaluation due to motion, grossly within normal  limits.     SPLEEN: Normal size and contour.      ADRENAL: Normal appearance of the bilateral adrenal glands.     GENITOURINARY:   Left renal calcification favored to be vascular. No hydronephrosis.   Urinary bladder collapsed, which limits evaluation of the bladder wall.   Uterus absent. No pelvic mass.     PERITONEUM: Ascites. No definite free air.     GI TRACT: Small hiatal hernia. Normal C-sweep of the duodenum. All of  stool at the rectum measures 7 cm. Mild thickening of the rectum, which  can be seen  with stercoral colitis. There appear to be a few colonic  diverticula. Nonvisualized appendix. No evidence of bowel obstruction.     VESSELS: Normal course and caliber of the calcified aorta.     RETROPERITONEUM: There are a few borderline retroperitoneal lymph nodes.     SOFT TISSUES: Anasarca.     BONES: Greater than 50 percent height loss of the L2 vertebral body with  changes of kyphoplasty and 4 mm retropulsion of the posterior vertebral  body margin and at least mild spinal canal stenosis.          Impression:       1. There is a large ball of stool at the rectum with thickening of the  rectal wall. Correlate for impaction and possible stercoral colitis.  2. Medial aspect of the right lower lobe with consolidation. Small  bilateral pleural effusions. This is incompletely evaluated on this  exam. It could represent atelectasis, infection or mass. Correlate with  clinical presentation and recommend follow-up after treatment to  evaluate for resolution.  3. Ascites and anasarca suggests fluid overload.  4. Cardiomegaly with changes of aortic valve replacement and CABG.  5. Incidental findings include prior cholecystectomy, colonic  diverticula, nonspecific borderline retroperitoneal lymph nodes and  chronic L2 compression fracture with kyphoplasty.  This report was finalized on 06/07/2019 16:10 by Dr Shae Sainz MD.        I have personally reviewed and interpreted the radiology studies and ECG obtained at time of admission.     Assessment / Plan     Assessment:   Active Hospital Problems    Diagnosis   • Acute on chronic congestive heart failure (CMS/HCC)     Impression:  1.  Fecal impaction with stercoral colitis  2.  Ascites and anasarca   3.  History of congestive heart failure with elevated BNP  4.  CML  5.  Consolidation in the medial aspect of the right lower lobe  6.  Elevated lactic acid  7. Insulin-dependent diabetes mellitus  8.  Leukocytosis  9.  Nonspecific anemia  10.  Marginally elevated  troponin    Plan:   1.  Continue with bowel program  2.  IV diuretic  3.  Invanz for possible colitis and increasing lactic acid  4.  Labs in the morning  5.  CT scan of the chest to rule out pneumonia  6.  Sliding scale insulin with Accu-Cheks   7.  Cardiac markers  8.  Telemetry  9.  KUB in the morning  10.  PT OT consult    Code Status: Full     I discussed the patient's findings and my recommendations with the patient, no family at bedside    Estimated length of stay 3 to 4 days    Miguelito Ball DO   06/07/19   10:24 PM              Electronically signed by Miguelito Ball DO at 6/7/2019 10:31 PM          Emergency Department Notes      Celia Heard, RN at 6/7/2019  6:00 PM        In with Jamari ZHANG to assist with disimpaction.  Pt did not have any hard stool in rectum.       Celia Heard, RN  06/07/19 1812      Electronically signed by Celia Heard RN at 6/7/2019  6:12 PM     Celia Heard, RN at 6/7/2019  7:20 PM        Pt had a very large BM in the BSC.  Jamari ZHANG made aware.     Celia Heard, JUSTIN  06/07/19 2007      Electronically signed by Celia Heard, RN at 6/7/2019  8:07 PM     Jamari Seaman PA-C at 6/7/2019  7:48 PM    Subjective   History of Present Illness  84-year-old female presents with a chief complaint of 2-week history of abdominal pain and shortness of breath.  The patient reports she had previously seen her family doctor and had tolerate this abdominal pain could be from fluid retention due to prednisone or even a change in her chemotherapy.  Patient has CML.  Denies chest pain nausea vomiting diarrhea she has had constipation.  Review of Systems   All other systems reviewed and are negative.      Objective   Physical Exam   Constitutional: She is oriented to person, place, and time. She appears well-developed and well-nourished.   HENT:   Head: Normocephalic and atraumatic.   Eyes: EOM are normal. Pupils are equal, round, and reactive to light.      Cardiovascular: Normal rate, regular rhythm and normal heart sounds.   Pulmonary/Chest: Effort normal and breath sounds normal.   Abdominal: Normal appearance and normal aorta. She exhibits distension. There is generalized tenderness.   Genitourinary: Rectum normal.   Genitourinary Comments: No hard stool appreciated on rectal exam   Neurological: She is alert and oriented to person, place, and time.   Skin: Skin is warm and dry.   Psychiatric: She has a normal mood and affect. Her behavior is normal.   Nursing note and vitals reviewed.      Procedures          ED Course  ED Course as of Jun 07 1952 Fri Jun 07, 2019   1604 CT Abdomen Pelvis Without Contrast [LM]      ED Course User Index  [LM] Jamari Seaman PA-C                  MDM  Number of Diagnoses or Management Options  Diagnosis management comments: Chaperoned by registered nurse here in the emergency room, Celia ANDERSON     Will dc in stable condition        Amount and/or Complexity of Data Reviewed  Clinical lab tests: reviewed and ordered  Tests in the radiology section of CPT®:  ordered and reviewed  Tests in the medicine section of CPT®:  ordered and reviewed  Decide to obtain previous medical records or to obtain history from someone other than the patient: yes    Risk of Complications, Morbidity, and/or Mortality  Presenting problems: moderate  Diagnostic procedures: moderate  Management options: moderate    Patient Progress  Patient progress: stable        Final diagnoses:   Acute on chronic congestive heart failure, unspecified heart failure type (CMS/HCC)            Jamari Seaman PA-C  06/07/19 1952      Electronically signed by Tanvir Elkins MD at 6/8/2019  2:21 AM       ICU Vital Signs     Row Name 06/09/19 1545 06/09/19 1424 06/09/19 1418 06/09/19 1124 06/09/19 0947       Vitals    Temp  98.2 °F (36.8 °C)  —  —  98.1 °F (36.7 °C)  —    Temp src  Oral  —  —  Oral  —    Pulse  70  70  77  75  60    Heart Rate Source  Monitor   Monitor  Monitor  Monitor  Monitor    Resp  18  16  16  18  20    Resp Rate Source  Visual  Visual  Visual  Visual  Visual    BP  120/67  —  —  149/50  —    BP Location  Left arm  —  —  Left arm  —    BP Method  Automatic  —  —  Automatic  —    Patient Position  Sitting  —  —  Lying  —       Oxygen Therapy    SpO2  94 %  98 %  99 %  97 %  100 %    Pulse Oximetry Type  Intermittent  Intermittent  Intermittent  Intermittent  Intermittent    Device (Oxygen Therapy)  room air  nasal cannula  nasal cannula  room air  nasal cannula    Flow (L/min)  —  2  2  —  2    Row Name 06/09/19 0754 06/09/19 0400 06/08/19 2310 06/08/19 2100 06/08/19 2000       Vitals    Temp  98.3 °F (36.8 °C)  97.8 °F (36.6 °C)  98 °F (36.7 °C)  —  —    Temp src  Oral  Oral  Oral  —  —    Pulse  70  70  72  —  —    Heart Rate Source  Monitor  Monitor  Monitor  —  —    Resp  18  16  16  —  —    Resp Rate Source  Visual  Visual  Visual  —  —    BP  139/49  147/62  147/59  —  —    BP Location  Left arm  Left arm  Left arm  —  —    BP Method  Automatic  Automatic  Automatic  —  —    Patient Position  Sitting  Lying  Lying  —  —       Oxygen Therapy    SpO2  93 %  100 %  97 %  98 %  —    Pulse Oximetry Type  Intermittent  Intermittent  Intermittent  Intermittent  —    Device (Oxygen Therapy)  nasal cannula  nasal cannula  nasal cannula  room air  room air    Flow (L/min)  2  2  2  —  —        Oxygen Therapy    SpO2  90 %            Intake & Output (last 3 days)       06/06 0701 - 06/07 0700 06/07 0701 - 06/08 0700 06/08 0701 - 06/09 0700 06/09 0701 - 06/10 0700    P.O.  120 480 480    Total Intake(mL/kg)  120 (2.2) 480 (8.7) 480 (8.7)    Urine (mL/kg/hr)  300 700 (0.5) 400 (0.7)    Stool   0     Total Output  300 700 400    Net  -180 -220 +80            Urine Unmeasured Occurrence  2 x 1 x 1 x    Stool Unmeasured Occurrence   4 x          Lines, Drains & Airways    Active LDAs     Name:   Placement date:   Placement time:   Site:   Days:    Peripheral IV  06/07/19 1630 Left Wrist   06/07/19    1630    Wrist   2         Inactive LDAs     None                Lab Results (last 48 hours)     Procedure Component Value Units Date/Time    Blood Culture - Blood, Arm, Left [[id number]] Collected:  06/07/19 1645    Specimen:  Blood from Arm, Left Updated:  06/09/19 1716     Blood Culture No growth at 2 days    Blood Culture - Blood, Hand, Left [[id number]] Collected:  06/07/19 1630    Specimen:  Blood from Hand, Left Updated:  06/09/19 1716     Blood Culture No growth at 2 days    POC Glucose Once [[id number]]  (Abnormal) Collected:  06/09/19 1546    Specimen:  Blood Updated:  06/09/19 1620     Glucose 229 mg/dL      Comment: : [id number] [place] [employer]aisaMeter ID: [id number]       POC Glucose Once [[id number]]  (Abnormal) Collected:  06/09/19 1130    Specimen:  Blood Updated:  06/09/19 1142     Glucose 338 mg/dL      Comment: : [id number] Sterling EuraisaMeter ID: [id number]       POC Glucose Once [[id number]]  (Abnormal) Collected:  06/09/19 0755    Specimen:  Blood Updated:  06/09/19 0839     Glucose 144 mg/dL      Comment: : [id number] [place] EuraisaMeter ID: [id number]       Comprehensive Metabolic Panel [[id number]]  (Abnormal) Collected:  06/09/19 0346    Specimen:  Blood Updated:  06/09/19 0520     Glucose 90 mg/dL      BUN 37 mg/dL      Creatinine 1.05 mg/dL      Sodium 138 mmol/L      Potassium 5.0 mmol/L      Chloride 100 mmol/L      CO2 29.0 mmol/L      Calcium 9.3 mg/dL      Total Protein 6.1 g/dL      Albumin 3.40 g/dL      ALT (SGPT) <15 U/L      AST (SGOT) 26 U/L      Alkaline Phosphatase 100 U/L      Total Bilirubin 0.7 mg/dL      eGFR Non African Amer 50 mL/min/1.73      Globulin 2.7 gm/dL      A/G Ratio 1.3 g/dL      BUN/Creatinine Ratio 35.2     Anion Gap 9.0 mmol/L     Narrative:       GFR Normal >60  Chronic Kidney Disease <60  Kidney Failure <15    CBC & Differential [[id number]] Collected:  06/09/19 0346    Specimen:  Blood Updated:  06/09/19 0514     Narrative:       The following orders were created for panel order CBC & Differential.  Procedure                               Abnormality         Status                     ---------                               -----------         ------                     CBC Auto Differential[[id number]]        Abnormal            Final result                 Please view results for these tests on the individual orders.    CBC Auto Differential [[id number]]  (Abnormal) Collected:  06/09/19 0346    Specimen:  Blood Updated:  06/09/19 0514     WBC 13.97 10*3/mm3      RBC 3.21 10*6/mm3      Hemoglobin 10.2 g/dL      Hematocrit 32.1 %      .0 fL      MCH 31.8 pg      MCHC 31.8 g/dL      RDW 15.4 %      RDW-SD 56.0 fl      MPV 11.6 fL      Platelets 1,039 10*3/mm3      Neutrophil % 72.6 %      Lymphocyte % 11.2 %      Monocyte % 11.4 %      Eosinophil % 2.4 %      Basophil % 0.7 %      Neutrophils, Absolute 10.14 10*3/mm3      Lymphocytes, Absolute 1.56 10*3/mm3      Monocytes, Absolute 1.59 10*3/mm3      Eosinophils, Absolute 0.34 10*3/mm3      Basophils, Absolute 0.10 10*3/mm3     POC Glucose Once [[id number]]  (Abnormal) Collected:  06/08/19 2017    Specimen:  Blood Updated:  06/08/19 2044     Glucose 158 mg/dL      Comment: : [id number] Jazz Gomez GMeter ID: [id number]       POC Glucose Once [[id number]]  (Abnormal) Collected:  06/08/19 1658    Specimen:  Blood Updated:  06/08/19 1710     Glucose 188 mg/dL      Comment: : [id number] Sterling [employer]aisaMeter ID: [id number]       Troponin [[id number]]  (Normal) Collected:  06/08/19 1536    Specimen:  Blood Updated:  06/08/19 1622     Troponin I 0.026 ng/mL     CBC & Differential [[id number]] Collected:  06/08/19 1536    Specimen:  Blood Updated:  06/08/19 1620    Narrative:       The following orders were created for panel order CBC & Differential.  Procedure                               Abnormality         Status                     ---------                                -----------         ------                     CBC Auto Differential[[id number]]        Abnormal            Final result                 Please view results for these tests on the individual orders.    CBC Auto Differential [[id number]]  (Abnormal) Collected:  06/08/19 1536    Specimen:  Blood Updated:  06/08/19 1620     WBC 14.27 10*3/mm3      RBC 3.32 10*6/mm3      Hemoglobin 10.6 g/dL      Hematocrit 32.0 %      MCV 96.4 fL      MCH 31.9 pg      MCHC 33.1 g/dL      RDW 15.5 %      RDW-SD 54.7 fl      MPV 11.4 fL      Platelets 1,051 10*3/mm3     Manual Differential [[id number]]  (Abnormal) Collected:  06/08/19 1536    Specimen:  Blood Updated:  06/08/19 1620     Neutrophil % 81.0 %      Lymphocyte % 10.0 %      Monocyte % 9.0 %      Neutrophils Absolute 11.56 10*3/mm3      Lymphocytes Absolute 1.43 10*3/mm3      Monocytes Absolute 1.28 10*3/mm3      RBC Morphology Normal     WBC Morphology Normal     Platelet Estimate Increased     Giant Platelets Mod/2+    POC Glucose Once [[id number]]  (Abnormal) Collected:  06/08/19 1258    Specimen:  Blood Updated:  06/08/19 1309     Glucose 161 mg/dL      Comment: : [id number] [employer]eesaisaMeter ID: [id number]       Troponin [[id number]]  (Normal) Collected:  06/08/19 0929    Specimen:  Blood Updated:  06/08/19 1016     Troponin I 0.034 ng/mL     POC Glucose Once [[id number]]  (Normal) Collected:  06/08/19 0844    Specimen:  Blood Updated:  06/08/19 0859     Glucose 87 mg/dL      Comment: : [id number] Sterling EuraisaMeter ID: [id number]       POC Glucose Once [[id number]]  (Abnormal) Collected:  06/08/19 0818    Specimen:  Blood Updated:  06/08/19 0829     Glucose 58 mg/dL      Comment: : [id number] Sterling EuraisaMeter ID: [id number]       POC Glucose Once [[id number]]  (Abnormal) Collected:  06/08/19 0743    Specimen:  Blood Updated:  06/08/19 0754     Glucose 49 mg/dL      Comment: : [id number] Sterling EuraisaMeter ID: [id number]       POC Glucose Once [[id number]]   (Abnormal) Collected:  06/08/19 0732    Specimen:  Blood Updated:  06/08/19 0744     Glucose 49 mg/dL      Comment: : [id number] Sterling EuraisaMeter ID: [id number]       Troponin [[id number]]  (Abnormal) Collected:  06/08/19 0400    Specimen:  Blood Updated:  06/08/19 0442     Troponin I 0.044 ng/mL     Troponin [[id number]]  (Abnormal) Collected:  06/07/19 2302    Specimen:  Blood Updated:  06/07/19 2340     Troponin I 0.040 ng/mL     Lactic Acid, Plasma [[id number]]  (Normal) Collected:  06/07/19 2302    Specimen:  Blood Updated:  06/07/19 2322     Lactate 1.8 mmol/L     POC Glucose Once [[id number]]  (Abnormal) Collected:  06/07/19 2237    Specimen:  Blood Updated:  06/07/19 2258     Glucose 208 mg/dL      Comment: : [id number] Azul Fuchs AMeter ID: [id number]       Lactic Acid, Reflex [[id number]]  (Abnormal) Collected:  06/07/19 2040    Specimen:  Blood Updated:  06/07/19 2101     Lactate 2.8 mmol/L     Lactic Acid, Reflex Timer (This will reflex a repeat order 3-3:15 hours after ordered.) [[id number]] Collected:  06/07/19 1649    Specimen:  Blood Updated:  06/07/19 2031     Extra Tube Hold for add-ons.     Comment: Auto resulted.       Urinalysis With Culture If Indicated - Urine, Catheter In/Out [[id number]]  (Abnormal) Collected:  06/07/19 1807    Specimen:  Urine, Catheter In/Out Updated:  06/07/19 1830     Color, UA Dark Yellow     Appearance, UA Clear     pH, UA <=5.0     Specific Gravity, UA 1.014     Glucose, UA Negative     Ketones, UA Negative     Bilirubin, UA Negative     Blood, UA Negative     Protein,  mg/dL (2+)     Leuk Esterase, UA Negative     Nitrite, UA Negative     Urobilinogen, UA 1.0 E.U./dL    Urinalysis, Microscopic Only - Urine, Catheter In/Out [[id number]] Collected:  06/07/19 1807    Specimen:  Urine, Catheter In/Out Updated:  06/07/19 1830     RBC, UA None Seen /HPF      WBC, UA None Seen /HPF      Bacteria, UA None Seen /HPF      Squamous Epithelial Cells, UA 0-2 /HPF       Hyaline Casts, UA 0-2 /LPF      Methodology Automated Microscopy    CBC & Differential [[id number]] Collected:  06/07/19 1649    Specimen:  Blood Updated:  06/07/19 1803    Narrative:       The following orders were created for panel order CBC & Differential.  Procedure                               Abnormality         Status                     ---------                               -----------         ------                     CBC Auto Differential[[id number]]        Abnormal            Final result                 Please view results for these tests on the individual orders.    CBC Auto Differential [[id number]]  (Abnormal) Collected:  06/07/19 1649    Specimen:  Blood Updated:  06/07/19 1803     WBC 16.95 10*3/mm3      RBC 3.34 10*6/mm3      Hemoglobin 10.7 g/dL      Hematocrit 31.9 %      MCV 95.5 fL      MCH 32.0 pg      MCHC 33.5 g/dL      RDW 15.6 %      RDW-SD 54.6 fl      MPV 11.7 fL      Platelets 970 10*3/mm3     Manual Differential [[id number]]  (Abnormal) Collected:  06/07/19 1649    Specimen:  Blood Updated:  06/07/19 1803     Neutrophil % 74.0 %      Lymphocyte % 6.0 %      Monocyte % 8.0 %      Eosinophil % 1.0 %      Bands %  9.0 %      Atypical Lymphocyte % 2.0 %      Neutrophils Absolute 14.07 10*3/mm3      Lymphocytes Absolute 1.02 10*3/mm3      Monocytes Absolute 1.36 10*3/mm3      Eosinophils Absolute 0.17 10*3/mm3      Poikilocytes Slight/1+     WBC Morphology Normal     Platelet Estimate Increased     Clumped Platelets Present     Giant Platelets Large/3+    TSH [[id number]]  (Normal) Collected:  06/07/19 1649    Specimen:  Blood Updated:  06/07/19 1746     TSH 1.200 mIU/mL     BNP [[id number]]  (Abnormal) Collected:  06/07/19 1649    Specimen:  Blood Updated:  06/07/19 1746     proBNP 33,800.0 pg/mL           Lab Results (last 48 hours)     Procedure Component Value Units Date/Time    Blood Culture - Blood, Arm, Left [[id number]] Collected:  06/07/19 1645    Specimen:  Blood from Arm, Left  Updated:  06/09/19 1716     Blood Culture No growth at 2 days    Blood Culture - Blood, Hand, Left [[id number]] Collected:  06/07/19 1630    Specimen:  Blood from Hand, Left Updated:  06/09/19 1716     Blood Culture No growth at 2 days    POC Glucose Once [[id number]]  (Abnormal) Collected:  06/09/19 1546    Specimen:  Blood Updated:  06/09/19 1620     Glucose 229 mg/dL      Comment: : [id number] Sterling EuraisaMeter ID: [id number]       POC Glucose Once [[id number]]  (Abnormal) Collected:  06/09/19 1130    Specimen:  Blood Updated:  06/09/19 1142     Glucose 338 mg/dL      Comment: : [id number] [place] EuraisaMeter ID: [id number]       POC Glucose Once [[id number]]  (Abnormal) Collected:  06/09/19 0755    Specimen:  Blood Updated:  06/09/19 0839     Glucose 144 mg/dL      Comment: : [id number] Sterling EuraisaMeter ID: [id number]       Comprehensive Metabolic Panel [[id number]]  (Abnormal) Collected:  06/09/19 0346    Specimen:  Blood Updated:  06/09/19 0520     Glucose 90 mg/dL      BUN 37 mg/dL      Creatinine 1.05 mg/dL      Sodium 138 mmol/L      Potassium 5.0 mmol/L      Chloride 100 mmol/L      CO2 29.0 mmol/L      Calcium 9.3 mg/dL      Total Protein 6.1 g/dL      Albumin 3.40 g/dL      ALT (SGPT) <15 U/L      AST (SGOT) 26 U/L      Alkaline Phosphatase 100 U/L      Total Bilirubin 0.7 mg/dL      eGFR Non African Amer 50 mL/min/1.73      Globulin 2.7 gm/dL      A/G Ratio 1.3 g/dL      BUN/Creatinine Ratio 35.2     Anion Gap 9.0 mmol/L     Narrative:       GFR Normal >60  Chronic Kidney Disease <60  Kidney Failure <15    CBC & Differential [[id number]] Collected:  06/09/19 0346    Specimen:  Blood Updated:  06/09/19 0514    Narrative:       The following orders were created for panel order CBC & Differential.  Procedure                               Abnormality         Status                     ---------                               -----------         ------                     CBC Auto  Differential[[id number]]        Abnormal            Final result                 Please view results for these tests on the individual orders.    CBC Auto Differential [[id number]]  (Abnormal) Collected:  06/09/19 0346    Specimen:  Blood Updated:  06/09/19 0514     WBC 13.97 10*3/mm3      RBC 3.21 10*6/mm3      Hemoglobin 10.2 g/dL      Hematocrit 32.1 %      .0 fL      MCH 31.8 pg      MCHC 31.8 g/dL      RDW 15.4 %      RDW-SD 56.0 fl      MPV 11.6 fL      Platelets 1,039 10*3/mm3      Neutrophil % 72.6 %      Lymphocyte % 11.2 %      Monocyte % 11.4 %      Eosinophil % 2.4 %      Basophil % 0.7 %      Neutrophils, Absolute 10.14 10*3/mm3      Lymphocytes, Absolute 1.56 10*3/mm3      Monocytes, Absolute 1.59 10*3/mm3      Eosinophils, Absolute 0.34 10*3/mm3      Basophils, Absolute 0.10 10*3/mm3     POC Glucose Once [[id number]]  (Abnormal) Collected:  06/08/19 2017    Specimen:  Blood Updated:  06/08/19 2044     Glucose 158 mg/dL      Comment: : [id number] Jazz Gomez  GMeter ID: [id number]       POC Glucose Once [[id number]]  (Abnormal) Collected:  06/08/19 1658    Specimen:  Blood Updated:  06/08/19 1710     Glucose 188 mg/dL      Comment: : [id number] [place] EuraisaMeter ID: [id number]       Troponin [[id number]]  (Normal) Collected:  06/08/19 1536    Specimen:  Blood Updated:  06/08/19 1622     Troponin I 0.026 ng/mL     CBC & Differential [[id number]] Collected:  06/08/19 1536    Specimen:  Blood Updated:  06/08/19 1620    Narrative:       The following orders were created for panel order CBC & Differential.  Procedure                               Abnormality         Status                     ---------                               -----------         ------                     CBC Auto Differential[[id number]]        Abnormal            Final result                 Please view results for these tests on the individual orders.    CBC Auto Differential [[id number]]  (Abnormal) Collected:  06/08/19  1536    Specimen:  Blood Updated:  06/08/19 1620     WBC 14.27 10*3/mm3      RBC 3.32 10*6/mm3      Hemoglobin 10.6 g/dL      Hematocrit 32.0 %      MCV 96.4 fL      MCH 31.9 pg      MCHC 33.1 g/dL      RDW 15.5 %      RDW-SD 54.7 fl      MPV 11.4 fL      Platelets 1,051 10*3/mm3     Manual Differential [[id number]]  (Abnormal) Collected:  06/08/19 1536    Specimen:  Blood Updated:  06/08/19 1620     Neutrophil % 81.0 %      Lymphocyte % 10.0 %      Monocyte % 9.0 %      Neutrophils Absolute 11.56 10*3/mm3      Lymphocytes Absolute 1.43 10*3/mm3      Monocytes Absolute 1.28 10*3/mm3      RBC Morphology Normal     WBC Morphology Normal     Platelet Estimate Increased     Giant Platelets Mod/2+    POC Glucose Once [[id number]]  (Abnormal) Collected:  06/08/19 1258    Specimen:  Blood Updated:  06/08/19 1309     Glucose 161 mg/dL      Comment: : [id number] Sterling EuraisaMeter ID: [id number]       Troponin [[id number]]  (Normal) Collected:  06/08/19 0929    Specimen:  Blood Updated:  06/08/19 1016     Troponin I 0.034 ng/mL     POC Glucose Once [[id number]]  (Normal) Collected:  06/08/19 0844    Specimen:  Blood Updated:  06/08/19 0859     Glucose 87 mg/dL      Comment: : [id number] Sterling EuraisaMeter ID: [id number]       POC Glucose Once [[id number]]  (Abnormal) Collected:  06/08/19 0818    Specimen:  Blood Updated:  06/08/19 0829     Glucose 58 mg/dL      Comment: : [id number] Sterling EuraisaMeter ID: [id number]       POC Glucose Once [[id number]]  (Abnormal) Collected:  06/08/19 0743    Specimen:  Blood Updated:  06/08/19 0754     Glucose 49 mg/dL      Comment: : [id number] Sterling EuraisaMeter ID: [id number]       POC Glucose Once [[id number]]  (Abnormal) Collected:  06/08/19 0732    Specimen:  Blood Updated:  06/08/19 0744     Glucose 49 mg/dL      Comment: : [id number] Sterling EuraisaMeter ID: [id number]       Troponin [[id number]]  (Abnormal) Collected:  06/08/19 0400    Specimen:  Blood Updated:  06/08/19  0442     Troponin I 0.044 ng/mL     Troponin [[id number]]  (Abnormal) Collected:  06/07/19 2302    Specimen:  Blood Updated:  06/07/19 2340     Troponin I 0.040 ng/mL     Lactic Acid, Plasma [[id number]]  (Normal) Collected:  06/07/19 2302    Specimen:  Blood Updated:  06/07/19 2322     Lactate 1.8 mmol/L     POC Glucose Once [[id number]]  (Abnormal) Collected:  06/07/19 2237    Specimen:  Blood Updated:  06/07/19 2258     Glucose 208 mg/dL      Comment: : [id number] Azul Edwards ID: [id number]       Lactic Acid, Reflex [[id number]]  (Abnormal) Collected:  06/07/19 2040    Specimen:  Blood Updated:  06/07/19 2101     Lactate 2.8 mmol/L     Lactic Acid, Reflex Timer (This will reflex a repeat order 3-3:15 hours after ordered.) [[id number]] Collected:  06/07/19 1649    Specimen:  Blood Updated:  06/07/19 2031     Extra Tube Hold for add-ons.     Comment: Auto resulted.       Urinalysis With Culture If Indicated - Urine, Catheter In/Out [[id number]]  (Abnormal) Collected:  06/07/19 1807    Specimen:  Urine, Catheter In/Out Updated:  06/07/19 1830     Color, UA Dark Yellow     Appearance, UA Clear     pH, UA <=5.0     Specific Gravity, UA 1.014     Glucose, UA Negative     Ketones, UA Negative     Bilirubin, UA Negative     Blood, UA Negative     Protein,  mg/dL (2+)     Leuk Esterase, UA Negative     Nitrite, UA Negative     Urobilinogen, UA 1.0 E.U./dL    Urinalysis, Microscopic Only - Urine, Catheter In/Out [[id number]] Collected:  06/07/19 1807    Specimen:  Urine, Catheter In/Out Updated:  06/07/19 1830     RBC, UA None Seen /HPF      WBC, UA None Seen /HPF      Bacteria, UA None Seen /HPF      Squamous Epithelial Cells, UA 0-2 /HPF      Hyaline Casts, UA 0-2 /LPF      Methodology Automated Microscopy    CBC & Differential [[id number]] Collected:  06/07/19 1649    Specimen:  Blood Updated:  06/07/19 1803    Narrative:       The following orders were created for panel order CBC & Differential.  Procedure                                Abnormality         Status                     ---------                               -----------         ------                     CBC Auto Differential[[id number]]        Abnormal            Final result                 Please view results for these tests on the individual orders.    CBC Auto Differential [[id number]]  (Abnormal) Collected:  06/07/19 1649    Specimen:  Blood Updated:  06/07/19 1803     WBC 16.95 10*3/mm3      RBC 3.34 10*6/mm3      Hemoglobin 10.7 g/dL      Hematocrit 31.9 %      MCV 95.5 fL      MCH 32.0 pg      MCHC 33.5 g/dL      RDW 15.6 %      RDW-SD 54.6 fl      MPV 11.7 fL      Platelets 970 10*3/mm3     Manual Differential [[id number]]  (Abnormal) Collected:  06/07/19 1649    Specimen:  Blood Updated:  06/07/19 1803     Neutrophil % 74.0 %      Lymphocyte % 6.0 %      Monocyte % 8.0 %      Eosinophil % 1.0 %      Bands %  9.0 %      Atypical Lymphocyte % 2.0 %      Neutrophils Absolute 14.07 10*3/mm3      Lymphocytes Absolute 1.02 10*3/mm3      Monocytes Absolute 1.36 10*3/mm3      Eosinophils Absolute 0.17 10*3/mm3      Poikilocytes Slight/1+     WBC Morphology Normal     Platelet Estimate Increased     Clumped Platelets Present     Giant Platelets Large/3+    TSH [[id number]]  (Normal) Collected:  06/07/19 1649    Specimen:  Blood Updated:  06/07/19 1746     TSH 1.200 mIU/mL     BNP [[id number]]  (Abnormal) Collected:  06/07/19 1649    Specimen:  Blood Updated:  06/07/19 1746     proBNP 33,800.0 pg/mL           Orders (last 48 hrs)     Start     Ordered    06/10/19 0600  XR Chest PA & Lateral  1 Time Imaging      06/09/19 1224    06/10/19 0600  Iron Profile  Morning Draw      06/09/19 1533    06/10/19 0600  Ferritin  Morning Draw      06/09/19 1533    06/09/19 1800  Dietary Nutrition Supplements Boost Glucose Control (Glucerna Shake); chocolate  2 Times Daily      06/09/19 1134    06/09/19 1621  POC Glucose Once  Once      06/09/19 1546    06/09/19 1500   ipratropium (ATROVENT) nebulizer solution 0.5 mg  4 Times Daily - RT      06/09/19 1206    06/09/19 1400  Incentive Spirometry  Every 4 Hours While Awake      06/09/19 1206    06/09/19 1300  dextromethorphan polistirex ER (DELSYM) 30 MG/5ML oral suspension 60 mg  Every 12 Hours Scheduled      06/09/19 1206    06/09/19 1207  Oscillating Positive Expiratory Pressure (OPEP)  Once      06/09/19 1206    06/09/19 1142  POC Glucose Once  Once      06/09/19 1130    06/09/19 0840  POC Glucose Once  Once      06/09/19 0755    06/09/19 0800  Dietary Nutrition Supplements Boost Plus (Ensure Enlive, Ensure Plus); chocolate  2 Times Daily,   Status:  Canceled      06/08/19 1929    06/09/19 0600  Comprehensive Metabolic Panel  Morning Draw      06/08/19 1519    06/09/19 0600  CBC & Differential  Morning Draw      06/08/19 1519    06/09/19 0600  CBC Auto Differential  PROCEDURE ONCE      06/09/19 0001    06/09/19 0501  Manual Differential  Once,   Status:  Canceled      06/09/19 0500    06/08/19 2045  POC Glucose Once  Once      06/08/19 2017    06/08/19 1711  POC Glucose Once  Once      06/08/19 1658    06/08/19 1557  Manual Differential  Once      06/08/19 1556    06/08/19 1526  Troponin  STAT      06/08/19 1525    06/08/19 1520  CBC & Differential  STAT      06/08/19 1519    06/08/19 1520  CBC Auto Differential  PROCEDURE ONCE      06/08/19 1520    06/08/19 1310  POC Glucose Once  Once      06/08/19 1258    06/08/19 1036  Adult Transthoracic Echo Complete W/ Cont if Necessary Per Protocol  Once      06/08/19 1036    06/08/19 1036  XR Abdomen Flat & Upright  1 Time Imaging      06/08/19 1031    06/08/19 1032  XR Abdomen 3 View  1 Time Imaging,   Status:  Canceled      06/08/19 1031    06/08/19 0900  aspirin EC tablet 81 mg  Daily      06/07/19 2222    06/08/19 0900  bisacodyl (DULCOLAX) suppository 10 mg  Daily,   Status:  Discontinued      06/07/19 2222 06/08/19 0900  OCUVITE-LUTEIN (OCUVITE) tablet 1 tablet  Daily       06/07/19 2222 06/08/19 0900  pantoprazole (PROTONIX) EC tablet 40 mg  Daily      06/07/19 2222 06/08/19 0900  Polyethyl Glyc-Propyl Glyc PF 0.4-0.3 % 1 drop  Daily      06/07/19 2222 06/08/19 0900  polyethylene glycol (MIRALAX) powder 17 g  Daily      06/07/19 2222 06/08/19 0900  potassium chloride (MICRO-K) CR capsule 10 mEq  Daily,   Status:  Discontinued      06/07/19 2222 06/08/19 0900  atorvastatin (LIPITOR) tablet 10 mg  Daily,   Status:  Discontinued      06/07/19 2222 06/08/19 0900  venlafaxine XR (EFFEXOR-XR) 24 hr capsule 37.5 mg  Daily      06/07/19 2222 06/08/19 0900  vitamin B-12 (CYANOCOBALAMIN) tablet 500 mcg  Daily      06/07/19 2222 06/08/19 0900  furosemide (LASIX) injection 20 mg  Daily      06/07/19 2222 06/08/19 0900  POC Glucose Once  Once      06/08/19 0844    06/08/19 0830  POC Glucose Once  Once      06/08/19 0818    06/08/19 0800  ferrous sulfate tablet 325 mg  Daily With Breakfast      06/07/19 2222 06/08/19 0800  insulin lispro (humaLOG) injection 2-7 Units  4 Times Daily With Meals & Nightly      06/07/19 2222 06/08/19 0755  POC Glucose Once  Once      06/08/19 0743    06/08/19 0745  POC Glucose Once  Once      06/08/19 0732    06/08/19 0730  glipiZIDE (GLUCOTROL) tablet 5 mg  2 Times Daily Before Meals,   Status:  Discontinued      06/07/19 2222 06/08/19 0700  POC Glucose 4x Daily AC & at Bedtime  4 Times Daily Before Meals & at Bedtime      06/07/19 2222 06/08/19 0000  Vital Signs  Every 4 Hours      06/07/19 2222 06/08/19 0000  XR Abdomen KUB  1 Time Imaging      06/07/19 2222 06/07/19 2340  Lactic Acid, Plasma  Timed      06/07/19 2241    06/07/19 2330  atorvastatin (LIPITOR) tablet 10 mg  Nightly      06/07/19 2242 06/07/19 2315  insulin detemir (LEVEMIR) injection 20 Units  Nightly,   Status:  Discontinued      06/07/19 2222 06/07/19 2315  hydrocortisone (ANUSOL-HC) 2.5 % rectal cream  2 Times Daily      06/07/19 2222 06/07/19  2315  sodium chloride 0.9 % flush 3 mL  Every 12 Hours Scheduled      06/07/19 2222 06/07/19 2259  POC Glucose Once  Once      06/07/19 2237 06/07/19 2254  Inpatient Case Management  Consult  Once     Provider:  (Not yet assigned)    06/07/19 2255 06/07/19 2245  atenolol (TENORMIN) tablet 25 mg  Nightly      06/07/19 2222    06/07/19 2230  ertapenem (INVanz) 1 g/100 mL 0.9% NS VTB (mbp)  Every 24 Hours      06/07/19 2154 06/07/19 2229  Lactic Acid, Plasma  Once,   Status:  Canceled      06/07/19 2228    06/07/19 2225  CT Chest Without Contrast  1 Time Imaging      06/07/19 2224 06/07/19 2222  Troponin  Now Then Every 6 Hours      06/07/19 2222    06/07/19 2221  ondansetron (ZOFRAN) injection 4 mg  Every 6 Hours PRN      06/07/19 2222    06/07/19 2221  acetaminophen (TYLENOL) tablet 650 mg  Every 4 Hours PRN      06/07/19 2222    06/07/19 2221  Place Sequential Compression Device  Once      06/07/19 2222    06/07/19 2221  Maintain Sequential Compression Device  Continuous      06/07/19 2222 06/07/19 2221  Cardiac Monitoring  Continuous      06/07/19 2222 06/07/19 2221  Pulse Oximetry,  Spot  Once      06/07/19 2222 06/07/19 2221  Diet Regular  Diet Effective Now      06/07/19 2222 06/07/19 2221  OT Consult: Eval & Treat Debillity  Once      06/07/19 2222 06/07/19 2221  PT Consult: Eval & Treat debility  Once      06/07/19 2222    06/07/19 2220  Code Status and Medical Interventions:  Continuous      06/07/19 2222 06/07/19 2220  Intake & Output  Every Shift      06/07/19 2222 06/07/19 2220  Weigh Patient  Once      06/07/19 2222 06/07/19 2220  Oxygen Therapy- Nasal Cannula; Titrate for SPO2: 90% - 95%  Continuous      06/07/19 2222    06/07/19 2220  Insert Peripheral IV  Once      06/07/19 2222 06/07/19 2220  Saline Lock & Maintain IV Access  Continuous      06/07/19 2222 06/07/19 2220  Do NOT Hold Basal or Correction Scale Insulin When Patient is NPO, Hold  Scheduled Mealtime (Bolus) Insulin if NPO  Continuous      06/07/19 2222 06/07/19 2220  Follow [facility] Hypoglycemia Standing Orders For Blood Glucose Less Than 70 mg/dL  Until Discontinued      06/07/19 2222 06/07/19 2220  Hypoglycemia Treatment - Document Event & Patient Response to Interventions EMR, Document Medications on MAR  Continuous      06/07/19 2222 06/07/19 2220  Notify Provider - Hypoglycemia Treatment  Until Discontinued      06/07/19 2222 06/07/19 2219  dextrose (GLUTOSE) oral gel 15 g  Every 15 Minutes PRN      06/07/19 2222    06/07/19 2219  sodium chloride 0.9 % flush 3-10 mL  As Needed      06/07/19 2222    06/07/19 2219  ondansetron ODT (ZOFRAN-ODT) disintegrating tablet 4 mg  Every 4 Hours PRN      06/07/19 2222    06/07/19 2218  hydrocortisone-pramoxine (ANALPRAM-HC) 2.5-1 % rectal cream  2 Times Daily PRN      06/07/19 2222 06/07/19 2218  hydrocortisone (ANUSOL-HC) suppository 25 mg  2 Times Daily PRN      06/07/19 2222 06/07/19 2155  Lactic Acid, Plasma  Once,   Status:  Canceled      06/07/19 2200 06/07/19 2032  Lactic Acid, Reflex  STAT      06/07/19 2031 06/07/19 1952  Inpatient Admission  Once      06/07/19 1952 06/07/19 1829  Urinalysis, Microscopic Only - Urine, Clean Catch  Once      06/07/19 1828 06/07/19 1818  Fleet Enema  Once      06/07/19 1817    Unscheduled  Up With Assistance  As Needed      06/07/19 2222    --  SCANNED - TELEMETRY        06/07/19 0000    --  SCANNED EKG      06/07/19 0000        Ventilator/Non-Invasive Ventilation Settings (From admission, onward)    None           Physician Progress Notes (last 48 hours) (Notes from 6/7/2019  5:34 PM through 6/9/2019  5:34 PM)      Myron Huynh MD at 6/9/2019 11:57 AM              [facility] Medicine Services  INPATIENT PROGRESS NOTE    Patient Name: Lakesha B Trang  Date of Admission: 6/7/2019  Today's Date: 06/09/19  Length of Stay: 2  Primary Care  Physician: Leonardo Zepeda DO    Subjective   Chief Complaint: follow up heart failure  HPI   Pt is sitting up in the chair. States she is breathing better. She was complaining of some mild abdominal pain stating she doesn't usually eat that much breakfast. States she is voiding frequently with the Lasix. Still complains of a cough. No chest pain. Lower extremities remain edematous.      Review of Systems   All pertinent negatives and positives are as above. All other systems have been reviewed and are negative unless otherwise stated.     Objective    Temp:  [97.6 °F (36.4 °C)-98.8 °F (37.1 °C)] 98.1 °F (36.7 °C)  Heart Rate:  [60-75] 75  Resp:  [16-20] 18  BP: (135-149)/(41-62) 149/50  Physical Exam   Constitutional: She is oriented to person, place, and time. She appears well-developed.   Thin and chronically ill appearing.    HENT:   Head: Normocephalic and atraumatic.   Eyes: Conjunctivae and EOM are normal. Pupils are equal, round, and reactive to light.   Neck: Neck supple. No JVD present. No thyromegaly present.   Cardiovascular: Normal rate, regular rhythm, normal heart sounds and intact distal pulses. Exam reveals no gallop and no friction rub.   No murmur heard.  Pulmonary/Chest: Effort normal and breath sounds normal. No respiratory distress. She has no wheezes. She has no rales. She exhibits no tenderness.   Abdominal: Soft. Bowel sounds are normal. She exhibits no distension. There is no tenderness. There is no rebound and no guarding.   Musculoskeletal: Normal range of motion. She exhibits edema (1+ pitting edema. ). She exhibits no tenderness or deformity.   Lymphadenopathy:     She has no cervical adenopathy.   Neurological: She is alert and oriented to person, place, and time. She displays normal reflexes. No cranial nerve deficit. She exhibits normal muscle tone.   Skin: Skin is warm and dry. No rash noted.   Psychiatric: She has a normal mood and affect. Her behavior is normal. Judgment and  thought content normal.     Results Review:  I have reviewed the labs, radiology results, and diagnostic studies.    Laboratory Data:   Results from last 7 days   Lab Units 06/09/19  0346 06/08/19  1536 06/07/19  1649   WBC 10*3/mm3 13.97* 14.27* 16.95*   HEMOGLOBIN g/dL 10.2* 10.6* 10.7*   HEMATOCRIT % 32.1* 32.0* 31.9*   PLATELETS 10*3/mm3 1,039* 1,051* 970*        Results from last 7 days   Lab Units 06/09/19  0346 06/07/19  1649   SODIUM mmol/L 138 135   POTASSIUM mmol/L 5.0 4.2   CHLORIDE mmol/L 100 98   CO2 mmol/L 29.0 27.0   BUN mg/dL 37* 35*   CREATININE mg/dL 1.05 1.08   CALCIUM mg/dL 9.3 9.2   BILIRUBIN mg/dL 0.7 1.6*   ALK PHOS U/L 100 108   ALT (SGPT) U/L <15 <15   AST (SGOT) U/L 26 35   GLUCOSE mg/dL 90 191*       Culture Data:   Blood Culture   Date Value Ref Range Status   06/07/2019 No growth at 24 hours  Preliminary   06/07/2019 No growth at 24 hours  Preliminary       Radiology Data:   Imaging Results (last 24 hours)     ** No results found for the last 24 hours. **          I have reviewed the patient's current medications.     Assessment/Plan     Active Hospital Problems    Diagnosis   • **Acute on chronic diastolic congestive heart failure (CMS/HCC)   • Moderate malnutrition (CMS/[facility])   • Constipation   • CML (chronic myeloid leukemia) (CMS/[facility])   • Thrombocytosis (CMS/[facility])   • CKD (chronic kidney disease) stage 3, GFR 30-59 ml/min (CMS/[facility])     GFR 32 in 4/2018     • Age-related osteoporosis without current pathological fracture     Last Assessment & Plan:   She has clinical osteoporosis with a history of vertebral compression fractures.  Recommend DXA test followed by treatment.     • Artificial pacemaker   • Paroxysmal atrial fibrillation (CMS/[facility])     S/p Watchman       Plan:  1. Continue Iv lasix today. Will reassess CXR tomorrow.   2. Begin atrovent nebulizers today.  3. Repeat labs in am.   4. Add incentive spirometry and flutter valve.   5. Encouraged her to Increase her activity.   6.  She has an appointment with her PCP and Heart DrMarisel Tomorrow in [place]. Pt states she is not up to seeing her [place] MD tomorrow.   7. Repeat CXR in am.     Discharge Planning: I expect the patient to be discharged to home in 1-2 days.    FACUNDO Quevedo   06/09/19   12:03 PM   I personally evaluated and examined the patient in conjunction with FACUNDO Dave and agree with the assessment, treatment plan, and disposition of the patient as recorded by her. My history, exam, and further recommendations are:   Since admission her platelet count has been elevated from 970-1039.  I will check for ferritin and iron and replace iron if noted to have iron deficiency anemia.  Further testing may be warranted but will defer this to his hematologist oncologist in outpatient setting.  Patient has no episode of bleeding or reported thrombosis.    She has known history of CML.  She gets most of her care in [place].    She presented with abdominal swelling and currently being managed for heart failure  She has negative fluid balance of 220 ml.  I am not certain about the accuracy of reporting.  Her weight remains stable    Vitals:    06/09/19 1424   BP:  149/50   Pulse: 70   Resp: 16   Temp:  98.1   SpO2: 98%       Myron Huynh MD  06/09/19  3:29 PM      Electronically signed by Myron Huynh MD at 6/9/2019  3:35 PM     Myron Huynh MD at 6/8/2019 10:33 AM              [facility] Medicine Services  INPATIENT PROGRESS NOTE    Patient Name: Lakesha Costello  Date of Admission: 6/7/2019  Today's Date: 06/08/19  Length of Stay: 1  Primary Care Physician: Leonardo Zepeda DO    Subjective   Chief Complaint: follow up heart failure  HPI   Pt is sitting up in the chair. States she has had a bowel movement which makes 3 since admission. No chest pain. States she already feels much better than prior to admission. Her lower extremities are improved  per her report. she had hypoglycemia this am.     Review of Systems   All pertinent negatives and positives are as above. All other systems have been reviewed and are negative unless otherwise stated.     Objective    Temp:  [97.1 °F (36.2 °C)-98 °F (36.7 °C)] 97.8 °F (36.6 °C)  Heart Rate:  [70-71] 70  Resp:  [18-22] 18  BP: ()/(46-98) 141/98  Physical Exam   Constitutional: She is oriented to person, place, and time. She appears well-developed.   Chronically ill appearing.    HENT:   Head: Normocephalic and atraumatic.   Eyes: Conjunctivae and EOM are normal. Pupils are equal, round, and reactive to light.   Neck: Neck supple. No JVD present. No thyromegaly present.   Cardiovascular: Normal rate, regular rhythm and intact distal pulses. Exam reveals no gallop and no friction rub.   Murmur heard.  vpacing 70   Pulmonary/Chest: Effort normal. No respiratory distress. She has no wheezes. She has no rales. She exhibits no tenderness.   Coarse bilaterally.    Abdominal: Soft. She exhibits distension. There is no tenderness. There is no rebound and no guarding.   Musculoskeletal: Normal range of motion. She exhibits edema (trace to 1+ edema). She exhibits no tenderness or deformity.   Lymphadenopathy:     She has no cervical adenopathy.   Neurological: She is alert and oriented to person, place, and time. She displays normal reflexes. No cranial nerve deficit. She exhibits normal muscle tone.   Skin: Skin is warm and dry. No rash noted.   Psychiatric: She has a normal mood and affect. Her behavior is normal. Judgment and thought content normal.     Results Review:  I have reviewed the labs, radiology results, and diagnostic studies.    Laboratory Data:   Results from last 7 days   Lab Units 06/07/19  1649   WBC 10*3/mm3 16.95*   HEMOGLOBIN g/dL 10.7*   HEMATOCRIT % 31.9*   PLATELETS 10*3/mm3 970*        Results from last 7 days   Lab Units 06/07/19  1649   SODIUM mmol/L 135   POTASSIUM mmol/L 4.2   CHLORIDE mmol/L  98   CO2 mmol/L 27.0   BUN mg/dL 35*   CREATININE mg/dL 1.08   CALCIUM mg/dL 9.2   BILIRUBIN mg/dL 1.6*   ALK PHOS U/L 108   ALT (SGPT) U/L <15   AST (SGOT) U/L 35   GLUCOSE mg/dL 191*      Radiology Data:   Imaging Results (last 24 hours)     Procedure Component Value Units Date/Time    XR Abdomen Flat & Upright [[id number]] Collected:  06/08/19 1134     Updated:  06/08/19 1141    Narrative:       EXAM: XR ABDOMEN FLAT AND UPRIGHT- - 6/8/2019 10:43 AM CDT     HISTORY: possible pneumoperitoneum on KUB; I50.9-Heart failure,  unspecified       COMPARISON: 06/20/2019.      TECHNIQUE:  2 images.  Upright and supine views of the abdomen     FINDINGS:  See impression          Impression:       1. No evidence of pneumoperitoneum.  2. Short air-fluid levels in the bowel may indicate liquid stool.  3. Cholecystectomy clips.  4. Kyphoplasty at L2. Degenerative changes in the spine.  5. Changes of cardiac valve replacement. See separately dictated CT  chest report of the same day.  This report was finalized on 06/08/2019 11:38 by Dr Shae Sainz MD.    CT Chest Without Contrast [[id number]] Collected:  06/08/19 1030     Updated:  06/08/19 1048    Narrative:       EXAM: CT CHEST WO CONTRAST- - 6/8/2019 9:51 AM CDT     HISTORY: Toxic effect of soaps and detergents; I50.9-Heart failure,  unspecified       COMPARISON: 03/23/2016.      DOSE LENGTH PRODUCT: 157 mGy cm. Automated exposure control was also  utilized to decrease patient radiation dose.     TECHNIQUE: Unenhanced CT images obtained with multiplanar reformats.     FINDINGS:   AIRWAYS/PULMONARY PARENCHYMA: The central airways are midline and  patent. Focal consolidation at the medial right lower lung. There is a  stable 2 mm pulmonary nodule along the right fissure, possibly a  intrapulmonary lymph node. Linear atelectasis at the lingula. Trace  bilateral pleural effusions.     VASCULATURE: Limited evaluation of the vasculature without intravenous  contrast. Thoracic  aorta is normal in course and caliber.  Heavily  calcified aortic atherosclerosis.  Normal caliber pulmonary artery.      CARDIAC:  Cardiomegaly. No pericardial effusion.  Changes of CABG and  aortic valve replacement. Calcified aortic atherosclerosis of the native  vessels. Left atrial occlusion device.     MEDIASTINUM: There is a 1.5 cm round density at the inferior posterior  right hilum, axial series 2 image 68, could represent vascular  structures or an enlarged lymph node.. Esophagus has normal coarse,  caliber and wall thickness. There is extension of the nodular right  thyroid into the mediastinum, similar compared to 03/23/2016.     EXTRATHORACIC: Nodular thyroid extends into the upper mediastinum.. No  thoracic inlet or axillary adenopathy. Cardiac pulse generator in the  left chest. Changes of right mastectomy. Body wall edema.     INCLUDED UPPER ABDOMEN: Ascites. Cholecystectomy. Unenhanced liver,  spleen, adrenal glands, pancreatic tail within normal limits.     OSSEOUS: No acute or suspicious bony finding.           Impression:       1. Redemonstration of consolidation at the medial aspect of the right  lower lobe. Differential diagnosis includes atelectasis, infection or  mass. Correlate with clinical presentation and recommend follow-up after  treatment to evaluate for resolution.  2. There is a 1.2 cm round density at the inferior posterior right  hilum, which could represent a vascular structure or enlarged lymph  node. If patient's renal function permits, recommend follow-up chest CT  be performed with contrast.  3. Stable nodular thyroid extending into the upper mediastinum.  4. Right mastectomy.  5. Cardiomegaly with postoperative changes of valve replacement, left  atrial occlusion and CABG.  5. Body wall edema, ascites, trace pleural effusions. Correlate for  fluid overload.  This report was finalized on 06/08/2019 10:45 by Dr Shae Sainz MD.    XR Abdomen KUB [[id number]] Collected:   06/08/19 0712     Updated:  06/08/19 0719    Narrative:       EXAM: XR ABDOMEN KUB- - 6/8/2019 3:40 AM CDT     HISTORY: constipation; I50.9-Heart failure, unspecified       COMPARISON: 06/07/2019.      TECHNIQUE:  1 images.  Supine view of the abdomen.      FINDINGS:  See impression          Impression:       1. Pneumoperitoneum versus gas in the right colon. Consider decubitus or  upright view to evaluate for free air.  2. Nonspecific bowel gas pattern.  3. Cholecystectomy clips.  4. L2 kyphoplasty.  This report was finalized on 06/08/2019 07:16 by Dr Shae Sainz MD.    XR Chest 1 View [[id number]] Collected:  06/07/19 1659     Updated:  06/07/19 1706    Narrative:       EXAMINATION: XR CHEST 1 VW-. 6/7/2019 4:59 PM CDT     CHEST, ONE VIEW:     HISTORY: Shortness of air, cough     COMPARISON: 03/06/2019, 10/11/2018 10/03/2018     A single frontal chest radiograph was obtained.     FINDINGS:           Changes median sternotomy and cardiac valve replacement observed.  Permanent cardiac pacemaker appreciated.     The lungs are clear without acute infiltrates. Mild chronic interstitial  prominence noted.     The heart is generous. The pulmonary circulation appropriate, without  evidence of heart failure.     The bony structures are intact without acute osseous abnormalities.                                     Impression:       1. Generous heart size.  2. No acute cardiopulmonary process.     This report was finalized on 06/07/2019 17:03 by Dr. Tarun Rios MD.    CT Abdomen Pelvis Without Contrast [[id number]] Collected:  06/07/19 1602     Updated:  06/07/19 1613    Narrative:       EXAM: CT ABDOMEN PELVIS WO CONTRAST- - 6/7/2019 3:34 PM CDT     HISTORY: abd pain, distention, vomiting       COMPARISON: 11/30/2018.      DOSE LENGTH PRODUCT: 228 mGy cm. Automated exposure control was also  utilized to decrease patient radiation dose.     TECHNIQUE: Unenhanced axial images of the abdomen and pelvis obtained  with  coronal and sagittal reformats.     FINDINGS: Evaluation limited secondary to lack of intravenous contrast  agent.      VISUALIZED CHEST: Respiratory motion limits fine parenchymal evaluation.  There is a 4-5 cm opacity at the medial right lower lobe, incompletely  evaluated on this exam. Small bilateral pleural effusions. Cardiomegaly  with changes of aortic valve replacement and CABG.     LIVER: Normal hepatic contour.     BILIARY: Cholecystectomy clips.     PANCREAS: Limited in evaluation due to motion, grossly within normal  limits.     SPLEEN: Normal size and contour.      ADRENAL: Normal appearance of the bilateral adrenal glands.     GENITOURINARY:   Left renal calcification favored to be vascular. No hydronephrosis.   Urinary bladder collapsed, which limits evaluation of the bladder wall.   Uterus absent. No pelvic mass.     PERITONEUM: Ascites. No definite free air.     GI TRACT: Small hiatal hernia. Normal C-sweep of the duodenum. All of  stool at the rectum measures 7 cm. Mild thickening of the rectum, which  can be seen with stercoral colitis. There appear to be a few colonic  diverticula. Nonvisualized appendix. No evidence of bowel obstruction.     VESSELS: Normal course and caliber of the calcified aorta.     RETROPERITONEUM: There are a few borderline retroperitoneal lymph nodes.     SOFT TISSUES: Anasarca.     BONES: Greater than 50 percent height loss of the L2 vertebral body with  changes of kyphoplasty and 4 mm retropulsion of the posterior vertebral  body margin and at least mild spinal canal stenosis.          Impression:       1. There is a large ball of stool at the rectum with thickening of the  rectal wall. Correlate for impaction and possible stercoral colitis.  2. Medial aspect of the right lower lobe with consolidation. Small  bilateral pleural effusions. This is incompletely evaluated on this  exam. It could represent atelectasis, infection or mass. Correlate with  clinical presentation  and recommend follow-up after treatment to  evaluate for resolution.  3. Ascites and anasarca suggests fluid overload.  4. Cardiomegaly with changes of aortic valve replacement and CABG.  5. Incidental findings include prior cholecystectomy, colonic  diverticula, nonspecific borderline retroperitoneal lymph nodes and  chronic L2 compression fracture with kyphoplasty.  This report was finalized on 06/07/2019 16:10 by Dr Shae Sainz MD.        I have reviewed the patient's current medications.     Assessment/Plan     Active Hospital Problems    Diagnosis   • **Acute on chronic diastolic congestive heart failure (CMS/HCC)   • Constipation   • CML (chronic myeloid leukemia) (CMS/HCC)   • Thrombocytosis (CMS/HCC)   • CKD (chronic kidney disease) stage 3, GFR 30-59 ml/min (CMS/HCC)     GFR 32 in 4/2018     • Age-related osteoporosis without current pathological fracture     Last Assessment & Plan:   She has clinical osteoporosis with a history of vertebral compression fractures.  Recommend DXA test followed by treatment.     • Artificial pacemaker   • Paroxysmal atrial fibrillation (CMS/HCC)     S/p Watchman       Plan:  1. Continue diuresis and await echocardiogram.   2. Will trend troponins.   3. Will hold levemir given hypoglycemia  4. Check flat and upright given the fact that she has possible pneumoperitoneum on the right side. Clinically, she does not have a surgical abdomen. She has been having bowel movements and declines any abdominal pain.   5. I have reviewed multiple records from [place]. She follows with Dr. Fuller with hematology. Dr. Moses with cardiology. She actually has an appointmet with Dr. Moses on Monday.   6. ? Need for hematology consult here while she is inpatient  7. Repeat labs in am.     Discharge Planning: I expect the patient to be discharged to ? in ? days.    Lucy Art, APRN   06/08/19   3:24 PM     I personally evaluated and examined the patient in conjunction  with  Jolie LOREDO and agree with the assessment, treatment plan, and disposition of the patient as recorded by her. My history, exam, and further recommendations are:   She was admitted yesterday by Dr. Cervantes for evaluation of abdominal swelling.  She was recently diagnosed with CML and been followed by hematologist in [place].  She was noted to have BNP of 33,000 and slightly elevated troponin of 0.041.  CT scan of the abdomen reported to have fecal impaction.  She had effective bowel movement she claimed.  Her belly is improved.  She states that she has a very sick daughter.  Her  recently came home past Sunday with pneumonia.  Vitals:    06/08/19 1138   BP: 141/98   Pulse: 70   Resp: 18   Temp: 97.8 °F (36.6 °C)   SpO2: 99%   Sent woman, no distress  Alert and oriented x3, she is having meal.  Chest is clear to auscultation  Globular abdomen with voluntary guarding.  Normoactive bowel sound  Positive pitting edema of lower extremities    White count on admission is 16.9 with predominance of neutrophils.  Hemoglobin 10.7  Urinalysis showed no evidence of bacteriuria pyuria or hematuria  In addition to what had been mentioned regarding CT of the abdomen pelvis she also has ascites and anasarca suggestive fluid overload.  Lactic acid was 2.5.        aspirin 81 mg Oral Daily   atenolol 25 mg Oral Nightly   atorvastatin 10 mg Oral Nightly   ferrous sulfate 325 mg Oral Daily With Breakfast   furosemide 20 mg Intravenous Daily   glipiZIDE 5 mg Oral BID AC   hydrocortisone  Rectal BID   insulin lispro 2-7 Units Subcutaneous 4x Daily With Meals & Nightly   OCUVITE-LUTEIN 1 tablet Oral Daily   pantoprazole 40 mg Oral Daily   Polyethyl Glyc-Propyl Glyc PF 1 drop Both Eyes Daily   polyethylene glycol 17 g Oral Daily   potassium chloride 10 mEq Oral Daily   sodium chloride 3 mL Intravenous Q12H   venlafaxine XR 37.5 mg Oral Daily   vitamin B-12 500 mcg Oral Daily     Echocardiogram still pending  Patient had  hypoglycemia between 49-58 around [id number] this morning.. Medication above reviewed  Between Levemir compared to glipizide I think glipizide has more predilection to give severe hypoglycemia.  We will continue sliding scale insulin instead and discontinued glipizide as well  Continue present management      Myron Huynh MD  06/08/19  5:41 PM      Electronically signed by Myron Huynh MD at 6/8/2019  5:58 PM       Consult Notes (last 48 hours) (Notes from 6/7/2019  5:34 PM through 6/9/2019  5:34 PM)     No notes of this type exist for this encounter.

## 2019-06-09 NOTE — PROGRESS NOTES
[facility] [place] Medicine Services  INPATIENT PROGRESS NOTE    Patient Name: Lakesha Costello  Date of Admission: 6/7/2019  Today's Date: 06/09/19  Length of Stay: 2  Primary Care Physician: Leonardo Zepeda DO    Subjective   Chief Complaint: follow up heart failure  HPI   Pt is sitting up in the chair. States she is breathing better. She was complaining of some mild abdominal pain stating she doesn't usually eat that much breakfast. States she is voiding frequently with the Lasix. Still complains of a cough. No chest pain. Lower extremities remain edematous.      Review of Systems   All pertinent negatives and positives are as above. All other systems have been reviewed and are negative unless otherwise stated.     Objective    Temp:  [97.6 °F (36.4 °C)-98.8 °F (37.1 °C)] 98.1 °F (36.7 °C)  Heart Rate:  [60-75] 75  Resp:  [16-20] 18  BP: (135-149)/(41-62) 149/50  Physical Exam   Constitutional: She is oriented to person, place, and time. She appears well-developed.   Thin and chronically ill appearing.    HENT:   Head: Normocephalic and atraumatic.   Eyes: Conjunctivae and EOM are normal. Pupils are equal, round, and reactive to light.   Neck: Neck supple. No JVD present. No thyromegaly present.   Cardiovascular: Normal rate, regular rhythm, normal heart sounds and intact distal pulses. Exam reveals no gallop and no friction rub.   No murmur heard.  Pulmonary/Chest: Effort normal and breath sounds normal. No respiratory distress. She has no wheezes. She has no rales. She exhibits no tenderness.   Abdominal: Soft. Bowel sounds are normal. She exhibits no distension. There is no tenderness. There is no rebound and no guarding.   Musculoskeletal: Normal range of motion. She exhibits edema (1+ pitting edema. ). She exhibits no tenderness or deformity.   Lymphadenopathy:     She has no cervical adenopathy.   Neurological: She is alert and oriented to person, place, and time. She  displays normal reflexes. No cranial nerve deficit. She exhibits normal muscle tone.   Skin: Skin is warm and dry. No rash noted.   Psychiatric: She has a normal mood and affect. Her behavior is normal. Judgment and thought content normal.     Results Review:  I have reviewed the labs, radiology results, and diagnostic studies.    Laboratory Data:   Results from last 7 days   Lab Units 06/09/19  0346 06/08/19  1536 06/07/19  1649   WBC 10*3/mm3 13.97* 14.27* 16.95*   HEMOGLOBIN g/dL 10.2* 10.6* 10.7*   HEMATOCRIT % 32.1* 32.0* 31.9*   PLATELETS 10*3/mm3 1,039* 1,051* 970*        Results from last 7 days   Lab Units 06/09/19  0346 06/07/19  1649   SODIUM mmol/L 138 135   POTASSIUM mmol/L 5.0 4.2   CHLORIDE mmol/L 100 98   CO2 mmol/L 29.0 27.0   BUN mg/dL 37* 35*   CREATININE mg/dL 1.05 1.08   CALCIUM mg/dL 9.3 9.2   BILIRUBIN mg/dL 0.7 1.6*   ALK PHOS U/L 100 108   ALT (SGPT) U/L <15 <15   AST (SGOT) U/L 26 35   GLUCOSE mg/dL 90 191*       Culture Data:   Blood Culture   Date Value Ref Range Status   06/07/2019 No growth at 24 hours  Preliminary   06/07/2019 No growth at 24 hours  Preliminary       Radiology Data:   Imaging Results (last 24 hours)     ** No results found for the last 24 hours. **          I have reviewed the patient's current medications.     Assessment/Plan     Active Hospital Problems    Diagnosis   • **Acute on chronic diastolic congestive heart failure (CMS/HCC)   • Moderate malnutrition (CMS/HCC)   • Constipation   • CML (chronic myeloid leukemia) (CMS/HCC)   • Thrombocytosis (CMS/HCC)   • CKD (chronic kidney disease) stage 3, GFR 30-59 ml/min (CMS/HCC)     GFR 32 in 4/2018     • Age-related osteoporosis without current pathological fracture     Last Assessment & Plan:   She has clinical osteoporosis with a history of vertebral compression fractures.  Recommend DXA test followed by treatment.     • Artificial pacemaker   • Paroxysmal atrial fibrillation (CMS/[facility])     S/p Watchman       Plan:  1.  Continue Iv lasix today. Will reassess CXR tomorrow.   2. Begin atrovent nebulizers today.  3. Repeat labs in am.   4. Add incentive spirometry and flutter valve.   5. Encouraged her to Increase her activity.   6. She has an appointment with her PCP and Heart Dr. Tomorrow in [place]. Pt states she is not up to seeing her [place] MD tomorrow.   7. Repeat CXR in am.     Discharge Planning: I expect the patient to be discharged to home in 1-2 days.    FACUNDO Quevedo   06/09/19   12:03 PM   I personally evaluated and examined the patient in conjunction with FACUNDO Dave and agree with the assessment, treatment plan, and disposition of the patient as recorded by her. My history, exam, and further recommendations are:   Since admission her platelet count has been elevated from 970-1039.  I will check for ferritin and iron and replace iron if noted to have iron deficiency anemia.  Further testing may be warranted but will defer this to his hematologist oncologist in outpatient setting.  Patient has no episode of bleeding or reported thrombosis.    She has known history of CML.  She gets most of her care in [place].    She presented with abdominal swelling and currently being managed for heart failure  She has negative fluid balance of 220 ml.  I am not certain about the accuracy of reporting.  Her weight remains stable    Vitals:    06/09/19 1424   BP:  149/50   Pulse: 70   Resp: 16   Temp:  98.1   SpO2: 98%       Myron Huynh MD  06/09/19  3:29 PM

## 2019-06-09 NOTE — PLAN OF CARE
Problem: Patient Care Overview  Goal: Plan of Care Review  Outcome: Ongoing (interventions implemented as appropriate)   06/09/19 0352   Coping/Psychosocial   Plan of Care Reviewed With patient   Plan of Care Review   Progress improving   OTHER   Outcome Summary No c/o voiced sleeping between care. Up with assist x1 to BR small BM good UOP.  70 per tele       Problem: Fluid Volume Excess (Adult)  Goal: Identify Related Risk Factors and Signs and Symptoms  Outcome: Ongoing (interventions implemented as appropriate)   06/09/19 0352   Fluid Volume Excess (Adult)   Related Risk Factors (Fluid Volume Excess) other (see comments)  (CHF)   Signs and Symptoms (Fluid Volume Excess) pulmonary congestion/pleural effusion;lab value changes

## 2019-06-09 NOTE — PROGRESS NOTES
Malnutrition Severity Assessment    Patient Name:  Lakesha Costello  YOB: 1935  MRN: [id number]  Admit Date:  6/7/2019    Patient meets criteria for : Moderate (non-severe) Malnutrition    Comments:  If in agreement with malnutrition assessment, please attest documentation. Thanks.     Malnutrition Severity Assessment  Malnutrition Type: Chronic Disease - Related Malnutrition     Malnutrition Type (last 8 hours)      Malnutrition Severity Assessment     Row Name 06/09/19 1121       Malnutrition Severity Assessment    Malnutrition Type  Chronic Disease - Related Malnutrition    Row Name 06/09/19 1121       Muscle Loss    Loss of Muscle Mass Findings  Moderate    [place]  Moderate - slight depression    Clavicle Bone Region  Moderate - some protrusion in females, visible in males    Acromion Bone Region  Moderate - acromion may slightly protrude    Scapular Bone Region  Moderate - mild depression, bones may show slightly    Row Name 06/09/19 1121       Fat Loss    Subcutaneous Fat Loss Findings  Moderate    Orbital Region   Moderate -  somewhat hollowness, slightly dark circles    Upper Arm Region  Moderate - some fat tissue, not ample    Row Name 06/09/19 1121       Fluid Accumulation (Edema)    Fluid Acumulation Findings  Mild    Fluid Accumulation   Moderate equals 2+ pitting edema    Row Name 06/09/19 1121       Criteria Met (Must meet criteria for severity in at least 2 of these categories: M Wasting, Fat Loss, Fluid, Secondary Signs, Wt. Status, Intake)    Patient meets criteria for   Moderate (non-severe) Malnutrition          Electronically signed by:  Kristi Beckwith RDN, LD  06/09/19 11:24 AM

## 2019-06-10 NOTE — PROGRESS NOTES
[facility] Medicine Services  DISCHARGE SUMMARY       Date of Admission: 6/7/2019  Date of Discharge:  6/10/2019  Primary Care Physician: Leonardo Zepeda DO    Presenting Problem/History of Present Illness:  Acute on chronic congestive heart failure, unspecified heart failure type (CMS/HCC) [I50.9]     Final Discharge Diagnoses:  Active Hospital Problems    Diagnosis   • **Acute on chronic diastolic congestive heart failure (CMS/HCC)   • Moderate malnutrition (CMS/HCC)   • Constipation   • CML (chronic myeloid leukemia) (CMS/HCC)   • Thrombocytosis (CMS/HCC)   • CKD (chronic kidney disease) stage 3, GFR 30-59 ml/min (CMS/[facility])     GFR 32 in 4/2018     • Age-related osteoporosis without current pathological fracture     Last Assessment & Plan:   She has clinical osteoporosis with a history of vertebral compression fractures.  Recommend DXA test followed by treatment.     • Artificial pacemaker   • Paroxysmal atrial fibrillation (CMS/[facility])     S/p Watchman       Consults: None    Procedures Performed: none    Pertinent Test Results:   Imaging Results (last 7 days)     Procedure Component Value Units Date/Time    XR Chest PA & Lateral [[id number]] Collected:  06/10/19 0708     Updated:  06/10/19 0716    Narrative:       EXAM: XR CHEST PA AND LATERAL- - 6/10/2019 4:56 AM CDT     HISTORY: follow up volume overload; I50.9-Heart failure, unspecified       COMPARISON: 06/08/2019, 06/07/2019.      TECHNIQUE:  2 images.  Frontal and lateral views of the chest.     FINDINGS:    Cardiac pulse generator overlies the left chest with 2 grossly intact  leads. Sternotomy wires and changes of CABG. Changes of TAVR and left  atrial occlusion device.     No pneumothorax. Blunting of the left costophrenic angle, could  represent small atelectasis or effusion. Mild diffuse interstitial  opacities with perihilar vascular prominence. Borderline cardiac  silhouette. Normal upper mediastinal  silhouette. Calcified aortic  atherosclerosis. Kyphoplasty at L2. Old left clavicle fracture.  Cholecystectomy clips.          Impression:       1. Mild diffuse interstitial opacities with perihilar vascular  prominence. Consider edema or atypical infection.  2. New blunting of the left costophrenic angle may represent small  effusion or atelectasis.  3. Postoperative changes including CABG, TAVR, left atrial occlusion  device, cardiac pulse generator.  This report was finalized on 06/10/2019 07:13 by Dr Shae Sainz MD.    XR Abdomen Flat & Upright [[id number]] Collected:  06/08/19 1134     Updated:  06/08/19 1141    Narrative:       EXAM: XR ABDOMEN FLAT AND UPRIGHT- - 6/8/2019 10:43 AM CDT     HISTORY: possible pneumoperitoneum on KUB; I50.9-Heart failure,  unspecified       COMPARISON: 06/20/2019.      TECHNIQUE:  2 images.  Upright and supine views of the abdomen     FINDINGS:  See impression          Impression:       1. No evidence of pneumoperitoneum.  2. Short air-fluid levels in the bowel may indicate liquid stool.  3. Cholecystectomy clips.  4. Kyphoplasty at L2. Degenerative changes in the spine.  5. Changes of cardiac valve replacement. See separately dictated CT  chest report of the same day.  This report was finalized on 06/08/2019 11:38 by Dr Shae Sainz MD.    CT Chest Without Contrast [[id number]] Collected:  06/08/19 1030     Updated:  06/08/19 1048    Narrative:       EXAM: CT CHEST WO CONTRAST- - 6/8/2019 9:51 AM CDT     HISTORY: Toxic effect of soaps and detergents; I50.9-Heart failure,  unspecified       COMPARISON: 03/23/2016.      DOSE LENGTH PRODUCT: 157 mGy cm. Automated exposure control was also  utilized to decrease patient radiation dose.     TECHNIQUE: Unenhanced CT images obtained with multiplanar reformats.     FINDINGS:   AIRWAYS/PULMONARY PARENCHYMA: The central airways are midline and  patent. Focal consolidation at the medial right lower lung. There is a  stable 2 mm  pulmonary nodule along the right fissure, possibly a  intrapulmonary lymph node. Linear atelectasis at the lingula. Trace  bilateral pleural effusions.     VASCULATURE: Limited evaluation of the vasculature without intravenous  contrast. Thoracic aorta is normal in course and caliber.  Heavily  calcified aortic atherosclerosis.  Normal caliber pulmonary artery.      CARDIAC:  Cardiomegaly. No pericardial effusion.  Changes of CABG and  aortic valve replacement. Calcified aortic atherosclerosis of the native  vessels. Left atrial occlusion device.     MEDIASTINUM: There is a 1.5 cm round density at the inferior posterior  right hilum, axial series 2 image 68, could represent vascular  structures or an enlarged lymph node.. Esophagus has normal coarse,  caliber and wall thickness. There is extension of the nodular right  thyroid into the mediastinum, similar compared to 03/23/2016.     EXTRATHORACIC: Nodular thyroid extends into the upper mediastinum.. No  thoracic inlet or axillary adenopathy. Cardiac pulse generator in the  left chest. Changes of right mastectomy. Body wall edema.     INCLUDED UPPER ABDOMEN: Ascites. Cholecystectomy. Unenhanced liver,  spleen, adrenal glands, pancreatic tail within normal limits.     OSSEOUS: No acute or suspicious bony finding.           Impression:       1. Redemonstration of consolidation at the medial aspect of the right  lower lobe. Differential diagnosis includes atelectasis, infection or  mass. Correlate with clinical presentation and recommend follow-up after  treatment to evaluate for resolution.  2. There is a 1.2 cm round density at the inferior posterior right  hilum, which could represent a vascular structure or enlarged lymph  node. If patient's renal function permits, recommend follow-up chest CT  be performed with contrast.  3. Stable nodular thyroid extending into the upper mediastinum.  4. Right mastectomy.  5. Cardiomegaly with postoperative changes of valve  replacement, left  atrial occlusion and CABG.  5. Body wall edema, ascites, trace pleural effusions. Correlate for  fluid overload.  This report was finalized on 06/08/2019 10:45 by Dr Shae Sainz MD.    XR Abdomen KUB [[id number]] Collected:  06/08/19 0712     Updated:  06/08/19 0719    Narrative:       EXAM: XR ABDOMEN KUB- - 6/8/2019 3:40 AM CDT     HISTORY: constipation; I50.9-Heart failure, unspecified       COMPARISON: 06/07/2019.      TECHNIQUE:  1 images.  Supine view of the abdomen.      FINDINGS:  See impression          Impression:       1. Pneumoperitoneum versus gas in the right colon. Consider decubitus or  upright view to evaluate for free air.  2. Nonspecific bowel gas pattern.  3. Cholecystectomy clips.  4. L2 kyphoplasty.  This report was finalized on 06/08/2019 07:16 by Dr Shae Sainz MD.    XR Chest 1 View [[id number]] Collected:  06/07/19 1659     Updated:  06/07/19 1706    Narrative:       EXAMINATION: XR CHEST 1 VW-. 6/7/2019 4:59 PM CDT     CHEST, ONE VIEW:     HISTORY: Shortness of air, cough     COMPARISON: 03/06/2019, 10/11/2018 10/03/2018     A single frontal chest radiograph was obtained.     FINDINGS:           Changes median sternotomy and cardiac valve replacement observed.  Permanent cardiac pacemaker appreciated.     The lungs are clear without acute infiltrates. Mild chronic interstitial  prominence noted.     The heart is generous. The pulmonary circulation appropriate, without  evidence of heart failure.     The bony structures are intact without acute osseous abnormalities.                                     Impression:       1. Generous heart size.  2. No acute cardiopulmonary process.     This report was finalized on 06/07/2019 17:03 by Dr. Tarun Rios MD.    CT Abdomen Pelvis Without Contrast [[id number]] Collected:  06/07/19 1602     Updated:  06/07/19 1613    Narrative:       EXAM: CT ABDOMEN PELVIS WO CONTRAST- - 6/7/2019 3:34 PM CDT     HISTORY: abd pain,  distention, vomiting       COMPARISON: 11/30/2018.      DOSE LENGTH PRODUCT: 228 mGy cm. Automated exposure control was also  utilized to decrease patient radiation dose.     TECHNIQUE: Unenhanced axial images of the abdomen and pelvis obtained  with coronal and sagittal reformats.     FINDINGS: Evaluation limited secondary to lack of intravenous contrast  agent.      VISUALIZED CHEST: Respiratory motion limits fine parenchymal evaluation.  There is a 4-5 cm opacity at the medial right lower lobe, incompletely  evaluated on this exam. Small bilateral pleural effusions. Cardiomegaly  with changes of aortic valve replacement and CABG.     LIVER: Normal hepatic contour.     BILIARY: Cholecystectomy clips.     PANCREAS: Limited in evaluation due to motion, grossly within normal  limits.     SPLEEN: Normal size and contour.      ADRENAL: Normal appearance of the bilateral adrenal glands.     GENITOURINARY:   Left renal calcification favored to be vascular. No hydronephrosis.   Urinary bladder collapsed, which limits evaluation of the bladder wall.   Uterus absent. No pelvic mass.     PERITONEUM: Ascites. No definite free air.     GI TRACT: Small hiatal hernia. Normal C-sweep of the duodenum. All of  stool at the rectum measures 7 cm. Mild thickening of the rectum, which  can be seen with stercoral colitis. There appear to be a few colonic  diverticula. Nonvisualized appendix. No evidence of bowel obstruction.     VESSELS: Normal course and caliber of the calcified aorta.     RETROPERITONEUM: There are a few borderline retroperitoneal lymph nodes.     SOFT TISSUES: Anasarca.     BONES: Greater than 50 percent height loss of the L2 vertebral body with  changes of kyphoplasty and 4 mm retropulsion of the posterior vertebral  body margin and at least mild spinal canal stenosis.          Impression:       1. There is a large ball of stool at the rectum with thickening of the  rectal wall. Correlate for impaction and possible  stercoral colitis.  2. Medial aspect of the right lower lobe with consolidation. Small  bilateral pleural effusions. This is incompletely evaluated on this  exam. It could represent atelectasis, infection or mass. Correlate with  clinical presentation and recommend follow-up after treatment to  evaluate for resolution.  3. Ascites and anasarca suggests fluid overload.  4. Cardiomegaly with changes of aortic valve replacement and CABG.  5. Incidental findings include prior cholecystectomy, colonic  diverticula, nonspecific borderline retroperitoneal lymph nodes and  chronic L2 compression fracture with kyphoplasty.  This report was finalized on 06/07/2019 16:10 by Dr Shae Sainz MD.        Lab Results (last 72 hours)     Procedure Component Value Units Date/Time    POC Glucose Once [[id number]]  (Abnormal) Collected:  06/10/19 1508    Specimen:  Blood Updated:  06/10/19 1534     Glucose 132 mg/dL      Comment: : [id number] Calderón DaisyaMeter ID: [id number]       POC Glucose Once [[id number]]  (Abnormal) Collected:  06/10/19 1127    Specimen:  Blood Updated:  06/10/19 1208     Glucose 248 mg/dL      Comment: : [id number] Stephane MotleyonnaMeter ID: [id number]       POC Glucose Once [[id number]]  (Abnormal) Collected:  06/10/19 0904    Specimen:  Blood Updated:  06/10/19 0923     Glucose 222 mg/dL      Comment: : [id number] Calderón TieshaonnaMeter ID: [id number]       Ferritin [[id number]]  (Normal) Collected:  06/10/19 0520    Specimen:  Blood Updated:  06/10/19 0731     Ferritin 260.00 ng/mL     Iron Profile [[id number]] Collected:  06/10/19 0520    Specimen:  Blood Updated:  06/10/19 0628    POC Glucose Once [[id number]]  (Abnormal) Collected:  06/09/19 1941    Specimen:  Blood Updated:  06/09/19 1952     Glucose 203 mg/dL      Comment: : [id number] Sherif Silveira ID: [id number]       Blood Culture - Blood, Arm, Left [[id number]] Collected:  06/07/19 1645    Specimen:  Blood from Arm, Left Updated:   06/09/19 1716     Blood Culture No growth at 2 days    Blood Culture - Blood, Hand, Left [[id number]] Collected:  06/07/19 1630    Specimen:  Blood from Hand, Left Updated:  06/09/19 1716     Blood Culture No growth at 2 days    POC Glucose Once [[id number]]  (Abnormal) Collected:  06/09/19 1546    Specimen:  Blood Updated:  06/09/19 1620     Glucose 229 mg/dL      Comment: : [id number] Sterling EuraisaMeter ID: [id number]       POC Glucose Once [[id number]]  (Abnormal) Collected:  06/09/19 1130    Specimen:  Blood Updated:  06/09/19 1142     Glucose 338 mg/dL      Comment: : [id number] Sterling EuraisaMeter ID: [id number]       POC Glucose Once [[id number]]  (Abnormal) Collected:  06/09/19 0755    Specimen:  Blood Updated:  06/09/19 0839     Glucose 144 mg/dL      Comment: : [id number] Sterling EuraisaMeter ID: [id number]       Comprehensive Metabolic Panel [[id number]]  (Abnormal) Collected:  06/09/19 0346    Specimen:  Blood Updated:  06/09/19 0520     Glucose 90 mg/dL      BUN 37 mg/dL      Creatinine 1.05 mg/dL      Sodium 138 mmol/L      Potassium 5.0 mmol/L      Chloride 100 mmol/L      CO2 29.0 mmol/L      Calcium 9.3 mg/dL      Total Protein 6.1 g/dL      Albumin 3.40 g/dL      ALT (SGPT) <15 U/L      AST (SGOT) 26 U/L      Alkaline Phosphatase 100 U/L      Total Bilirubin 0.7 mg/dL      eGFR Non African Amer 50 mL/min/1.73      Globulin 2.7 gm/dL      A/G Ratio 1.3 g/dL      BUN/Creatinine Ratio 35.2     Anion Gap 9.0 mmol/L     Narrative:       GFR Normal >60  Chronic Kidney Disease <60  Kidney Failure <15    CBC & Differential [[id number]] Collected:  06/09/19 0346    Specimen:  Blood Updated:  06/09/19 0514    Narrative:       The following orders were created for panel order CBC & Differential.  Procedure                               Abnormality         Status                     ---------                               -----------         ------                     CBC Auto Differential[[id number]]         Abnormal            Final result                 Please view results for these tests on the individual orders.    CBC Auto Differential [[id number]]  (Abnormal) Collected:  06/09/19 0346    Specimen:  Blood Updated:  06/09/19 0514     WBC 13.97 10*3/mm3      RBC 3.21 10*6/mm3      Hemoglobin 10.2 g/dL      Hematocrit 32.1 %      .0 fL      MCH 31.8 pg      MCHC 31.8 g/dL      RDW 15.4 %      RDW-SD 56.0 fl      MPV 11.6 fL      Platelets 1,039 10*3/mm3      Neutrophil % 72.6 %      Lymphocyte % 11.2 %      Monocyte % 11.4 %      Eosinophil % 2.4 %      Basophil % 0.7 %      Neutrophils, Absolute 10.14 10*3/mm3      Lymphocytes, Absolute 1.56 10*3/mm3      Monocytes, Absolute 1.59 10*3/mm3      Eosinophils, Absolute 0.34 10*3/mm3      Basophils, Absolute 0.10 10*3/mm3     POC Glucose Once [[id number]]  (Abnormal) Collected:  06/08/19 2017    Specimen:  Blood Updated:  06/08/19 2044     Glucose 158 mg/dL      Comment: : [id number] Jazz Gomez  GMeter ID: [id number]       POC Glucose Once [[id number]]  (Abnormal) Collected:  06/08/19 1658    Specimen:  Blood Updated:  06/08/19 1710     Glucose 188 mg/dL      Comment: : [id number] Sterling EuraisaMeter ID: [id number]       Troponin [[id number]]  (Normal) Collected:  06/08/19 1536    Specimen:  Blood Updated:  06/08/19 1622     Troponin I 0.026 ng/mL     CBC & Differential [[id number]] Collected:  06/08/19 1536    Specimen:  Blood Updated:  06/08/19 1620    Narrative:       The following orders were created for panel order CBC & Differential.  Procedure                               Abnormality         Status                     ---------                               -----------         ------                     CBC Auto Differential[[id number]]        Abnormal            Final result                 Please view results for these tests on the individual orders.    CBC Auto Differential [[id number]]  (Abnormal) Collected:  06/08/19 1536    Specimen:  Blood  Updated:  06/08/19 1620     WBC 14.27 10*3/mm3      RBC 3.32 10*6/mm3      Hemoglobin 10.6 g/dL      Hematocrit 32.0 %      MCV 96.4 fL      MCH 31.9 pg      MCHC 33.1 g/dL      RDW 15.5 %      RDW-SD 54.7 fl      MPV 11.4 fL      Platelets 1,051 10*3/mm3     Manual Differential [[id number]]  (Abnormal) Collected:  06/08/19 1536    Specimen:  Blood Updated:  06/08/19 1620     Neutrophil % 81.0 %      Lymphocyte % 10.0 %      Monocyte % 9.0 %      Neutrophils Absolute 11.56 10*3/mm3      Lymphocytes Absolute 1.43 10*3/mm3      Monocytes Absolute 1.28 10*3/mm3      RBC Morphology Normal     WBC Morphology Normal     Platelet Estimate Increased     Giant Platelets Mod/2+    POC Glucose Once [[id number]]  (Abnormal) Collected:  06/08/19 1258    Specimen:  Blood Updated:  06/08/19 1309     Glucose 161 mg/dL      Comment: : [id number] Sterling EuraisaMeter ID: [id number]       Troponin [[id number]]  (Normal) Collected:  06/08/19 0929    Specimen:  Blood Updated:  06/08/19 1016     Troponin I 0.034 ng/mL     POC Glucose Once [[id number]]  (Normal) Collected:  06/08/19 0844    Specimen:  Blood Updated:  06/08/19 0859     Glucose 87 mg/dL      Comment: : [id number] Sterling EuraisaMeter ID: [id number]       POC Glucose Once [[id number]]  (Abnormal) Collected:  06/08/19 0818    Specimen:  Blood Updated:  06/08/19 0829     Glucose 58 mg/dL      Comment: : [id number] Sterling EuraisaMeter ID: [id number]       POC Glucose Once [[id number]]  (Abnormal) Collected:  06/08/19 0743    Specimen:  Blood Updated:  06/08/19 0754     Glucose 49 mg/dL      Comment: : [id number] Sterling EuraisaMeter ID: [id number]       POC Glucose Once [[id number]]  (Abnormal) Collected:  06/08/19 0732    Specimen:  Blood Updated:  06/08/19 0744     Glucose 49 mg/dL      Comment: : [id number] Sterling EuraisaMeter ID: [id number]       Troponin [[id number]]  (Abnormal) Collected:  06/08/19 0400    Specimen:  Blood Updated:  06/08/19 0442     Troponin I 0.044  ng/mL     Troponin [[id number]]  (Abnormal) Collected:  06/07/19 2302    Specimen:  Blood Updated:  06/07/19 2340     Troponin I 0.040 ng/mL     Lactic Acid, Plasma [[id number]]  (Normal) Collected:  06/07/19 2302    Specimen:  Blood Updated:  06/07/19 2322     Lactate 1.8 mmol/L     POC Glucose Once [[id number]]  (Abnormal) Collected:  06/07/19 2237    Specimen:  Blood Updated:  06/07/19 2258     Glucose 208 mg/dL      Comment: : [id number] Azul Edwards ID: [id number]       Lactic Acid, Reflex [[id number]]  (Abnormal) Collected:  06/07/19 2040    Specimen:  Blood Updated:  06/07/19 2101     Lactate 2.8 mmol/L     Lactic Acid, Reflex Timer (This will reflex a repeat order 3-3:15 hours after ordered.) [[id number]] Collected:  06/07/19 1649    Specimen:  Blood Updated:  06/07/19 2031     Extra Tube Hold for add-ons.     Comment: Auto resulted.       Urinalysis With Culture If Indicated - Urine, Catheter In/Out [[id number]]  (Abnormal) Collected:  06/07/19 1807    Specimen:  Urine, Catheter In/Out Updated:  06/07/19 1830     Color, UA Dark Yellow     Appearance, UA Clear     pH, UA <=5.0     Specific Gravity, UA 1.014     Glucose, UA Negative     Ketones, UA Negative     Bilirubin, UA Negative     Blood, UA Negative     Protein,  mg/dL (2+)     Leuk Esterase, UA Negative     Nitrite, UA Negative     Urobilinogen, UA 1.0 E.U./dL    Urinalysis, Microscopic Only - Urine, Catheter In/Out [[id number]] Collected:  06/07/19 1807    Specimen:  Urine, Catheter In/Out Updated:  06/07/19 1830     RBC, UA None Seen /HPF      WBC, UA None Seen /HPF      Bacteria, UA None Seen /HPF      Squamous Epithelial Cells, UA 0-2 /HPF      Hyaline Casts, UA 0-2 /LPF      Methodology Automated Microscopy    CBC & Differential [[id number]] Collected:  06/07/19 1649    Specimen:  Blood Updated:  06/07/19 1803    Narrative:       The following orders were created for panel order CBC & Differential.  Procedure                                Abnormality         Status                     ---------                               -----------         ------                     CBC Auto Differential[[id number]]        Abnormal            Final result                 Please view results for these tests on the individual orders.    CBC Auto Differential [[id number]]  (Abnormal) Collected:  06/07/19 1649    Specimen:  Blood Updated:  06/07/19 1803     WBC 16.95 10*3/mm3      RBC 3.34 10*6/mm3      Hemoglobin 10.7 g/dL      Hematocrit 31.9 %      MCV 95.5 fL      MCH 32.0 pg      MCHC 33.5 g/dL      RDW 15.6 %      RDW-SD 54.6 fl      MPV 11.7 fL      Platelets 970 10*3/mm3     Manual Differential [[id number]]  (Abnormal) Collected:  06/07/19 1649    Specimen:  Blood Updated:  06/07/19 1803     Neutrophil % 74.0 %      Lymphocyte % 6.0 %      Monocyte % 8.0 %      Eosinophil % 1.0 %      Bands %  9.0 %      Atypical Lymphocyte % 2.0 %      Neutrophils Absolute 14.07 10*3/mm3      Lymphocytes Absolute 1.02 10*3/mm3      Monocytes Absolute 1.36 10*3/mm3      Eosinophils Absolute 0.17 10*3/mm3      Poikilocytes Slight/1+     WBC Morphology Normal     Platelet Estimate Increased     Clumped Platelets Present     Giant Platelets Large/3+    TSH [[id number]]  (Normal) Collected:  06/07/19 1649    Specimen:  Blood Updated:  06/07/19 1746     TSH 1.200 mIU/mL     BNP [[id number]]  (Abnormal) Collected:  06/07/19 1649    Specimen:  Blood Updated:  06/07/19 1746     proBNP 33,800.0 pg/mL     Troponin [[id number]]  (Abnormal) Collected:  06/07/19 1649    Specimen:  Blood Updated:  06/07/19 1726     Troponin I 0.041 ng/mL     Lactic Acid, Plasma [[id number]]  (Abnormal) Collected:  06/07/19 1649    Specimen:  Blood Updated:  06/07/19 1723     Lactate 2.5 mmol/L     Lipase [[id number]]  (Normal) Collected:  06/07/19 1649    Specimen:  Blood Updated:  06/07/19 1716     Lipase 167 U/L     Comprehensive Metabolic Panel [[id number]]  (Abnormal) Collected:  06/07/19 1649    Specimen:   Blood Updated:  06/07/19 1716     Glucose 191 mg/dL      BUN 35 mg/dL      Creatinine 1.08 mg/dL      Sodium 135 mmol/L      Potassium 4.2 mmol/L      Chloride 98 mmol/L      CO2 27.0 mmol/L      Calcium 9.2 mg/dL      Total Protein 6.9 g/dL      Albumin 3.90 g/dL      ALT (SGPT) <15 U/L      AST (SGOT) 35 U/L      Alkaline Phosphatase 108 U/L      Total Bilirubin 1.6 mg/dL      eGFR Non African Amer 49 mL/min/1.73      Globulin 3.0 gm/dL      A/G Ratio 1.3 g/dL      BUN/Creatinine Ratio 32.4     Anion Gap 10.0 mmol/L     Narrative:       GFR Normal >60  Chronic Kidney Disease <60  Kidney Failure <15    Magnesium [[id number]]  (Normal) Collected:  06/07/19 1649    Specimen:  Blood Updated:  06/07/19 1716     Magnesium 1.8 mg/dL           Chief Complaint on Day of Discharge: none     Hospital Course:  The patient is a 84 y.o. female who follows with Dr. stewart for her primary care. She has a past medical history significant for CML, chronic diastolic heart failure, valvular heart disease status post TAVR, paroxysmal atrial fibrillation, pacemaker insertion, and insulin dependent diabetes mellitus. She presented to the emergency department on 6/7 with complaints of abdominal swelling and lower extremity swelling.  In the emergency department, CT of the abdomen reveals a large fecal impaction.  Given enema in the emerge department had 2 large bowel movements.  She had a BNP of elevated 33,000.  She was admitted to the hospitalist service for further evaluation management.    She was given low-dose IV Lasix to combat fluid overload.  Patient states she already felt much better.  She started having regular bowel movements.  Is actually about the same.  I repeated her chest x-ray today.  Does reveals evidence of volume overload.  Have given her extra IV Lasix and instructed her to take extra oral Lasix at home at least for the next few days.  She has agreed to have home health will have home health for nursing.   "Recommended to have BMP on Wednesday with results to Dr. Zepeda.    She sees several providers at [facility].  She actually had an appointment with her cardiologist today that only to be rescheduled.  Was also supposed to see her primary care here which is been rescheduled for 1 week.  She has an appointment next week with her oncologist.  She tells me that she was supposed to take a new chemotherapy medication but has not been taking it because she was so sick.  She has chronic venous stasis and does have some lower extremity edema however, patient states this is much better than her baseline.  Patient is very anxious to be discharged today.  She does wear oxygen at 2 L at night and has been instructed to continue this as well.  Not required any antimicrobial therapy.    Condition on Discharge:  Stable for discharge with high risk of readmission    Physical Exam on Discharge:  /46 (BP Location: Left arm, Patient Position: Lying)   Pulse 69   Temp 98.3 °F (36.8 °C) (Oral)   Resp 16   Ht 154.9 cm (61\")   Wt 56.6 kg (124 lb 12.8 oz)   SpO2 98%   BMI 23.58 kg/m²   Physical Exam   Constitutional: She is oriented to person, place, and time. She appears well-developed and well-nourished.   HENT:   Head: Normocephalic and atraumatic.   Eyes: Conjunctivae and EOM are normal. Pupils are equal, round, and reactive to light.   Neck: Neck supple. No JVD present. No thyromegaly present.   Cardiovascular: Normal rate, regular rhythm, normal heart sounds and intact distal pulses. Exam reveals no gallop and no friction rub.   No murmur heard.  vpacing 70-83   Pulmonary/Chest: Effort normal. No respiratory distress. She has no wheezes. She has no rales. She exhibits no tenderness.   Coarse bilaterally.    Abdominal: Soft. Bowel sounds are normal. She exhibits no distension. There is no tenderness. There is no rebound and no guarding.   Musculoskeletal: Normal range of motion. She exhibits edema. She exhibits no " tenderness or deformity.   Lymphadenopathy:     She has no cervical adenopathy.   Neurological: She is alert and oriented to person, place, and time. She displays normal reflexes. No cranial nerve deficit. She exhibits normal muscle tone.   Skin: Skin is warm and dry. No rash noted.   Chronic venous stasis changes.    Psychiatric: She has a normal mood and affect. Her behavior is normal. Judgment and thought content normal.     Discharge Disposition:  Home or Self Care    Discharge Medications:     Discharge Medications      New Medications      Instructions Start Date   dextromethorphan polistirex ER 30 MG/5ML Suspension Extended Release oral suspension  Commonly known as:  DELSYM   60 mg, Oral, Every 12 Hours Scheduled      ipratropium 0.02 % nebulizer solution  Commonly known as:  ATROVENT   500 mcg, Nebulization, 4 Times Daily - RT         Changes to Medications      Instructions Start Date   atenolol 50 MG tablet  Commonly known as:  TENORMIN  What changed:  additional instructions   Take 1 tab PO qam & 1/2 tab PO qpm.      bisacodyl 10 MG suppository  Commonly known as:  DULCOLAX  What changed:    · when to take this  · reasons to take this   10 mg, Rectal, Daily      furosemide 40 MG tablet  Commonly known as:  LASIX  What changed:  when to take this   40 mg, Oral, 2 Times Daily      insulin detemir 100 UNIT/ML injection  Commonly known as:  LEVEMIR  What changed:    · how much to take  · additional instructions   30 Units, Subcutaneous, Nightly, 30 units qam 30 units qpm         Continue These Medications      Instructions Start Date   aspirin 81 MG EC tablet   81 mg, Oral, Daily      calcium carbonate 600 MG tablet  Commonly known as:  OS-MICHAEL   600 mg, Oral, 2 Times Daily With Meals      cholecalciferol 1000 units tablet  Commonly known as:  VITAMIN D3   1,000 Units, Oral, Daily      Ferrous Sulfate  (45 Fe) MG tablet controlled-release   1 tablet, Oral, Daily      glipiZIDE 5 MG tablet  Commonly  known as:  GLUCOTROL   5 mg, Oral, 2 Times Daily Before Meals      nitroglycerin 0.4 MG SL tablet  Commonly known as:  NITROSTAT   1 under the tongue as needed for angina, may repeat q5mins for up three doses      O2  Commonly known as:  OXYGEN   2 L/min, Inhalation, Daily PRN      ondansetron ODT 4 MG disintegrating tablet  Commonly known as:  ZOFRAN ODT   4 mg, Oral, Every 4 Hours PRN      pantoprazole 40 MG EC tablet  Commonly known as:  PROTONIX   40 mg, Oral, Daily      polyethyl glycol-propyl glycol 0.4-0.3 % solution ophthalmic solution  Commonly known as:  SYSTANE   1 drop, Both Eyes, Daily      polyethylene glycol packet  Commonly known as:  MIRALAX   17 g, Oral, Daily      potassium chloride 8 MEQ CR capsule  Commonly known as:  MICRO-K   10 mEq, Oral, Daily      PRESERVISION/LUTEIN capsule   1 capsule, Oral, 2 Times Daily      simvastatin 20 MG tablet  Commonly known as:  ZOCOR   20 mg, Oral, Nightly      sucralfate 1 GM/10ML suspension  Commonly known as:  CARAFATE   1 g, Oral, 3 Times Daily PRN      triamcinolone 0.1 % cream  Commonly known as:  KENALOG   1 application, Topical, 2 Times Daily      TUCKS 50 % pads   1 pad, Apply externally, 6 Times Daily      venlafaxine XR 37.5 MG 24 hr capsule  Commonly known as:  EFFEXOR-XR   37.5 mg, Oral, Daily      vitamin B-12 500 MCG tablet  Commonly known as:  CYANOCOBALAMIN   500 mcg, Oral, Daily         Stop These Medications    hydrocortisone 25 MG suppository  Commonly known as:  ANUSOL-HC     Lidocaine-Hydrocortisone Ace 2.8-0.55 % gel          Discharge Diet:   Diet Instructions     Diet: Consistent Carbohydrate, Cardiac; Thin      Discharge Diet:   Consistent Carbohydrate  Cardiac       Fluid Consistency:  Thin        Activity at Discharge:   Activity Instructions     Activity as Tolerated          Follow-up Appointments:   Future Appointments   Date Time Provider Department Center   6/17/2019  2:00 PM Linda Johnson APRN [facility] None   7/29/2019  9:00  Ehsan Rosales MD [facility]   8/20/2019  1:00 PM Leonardo Zepeda,  [facility] None   3/10/2020  9:45 AM [facility]       Test Results Pending at Discharge: none    Lucy Art, APRN  06/10/19  4:29 PM    Time: 35 min.     This is based on encounter June 11, 2019 afternoon at [facility]  She is an 86-year-old woman admitted for abdominal swelling.  She has prior history of aortic valve replacement.  She also carries history of congestive heart failure.  In evaluation of abdominal pain she was found to have fecal impaction.  She had good bowel movement and been relieved of the abdominal pain.     From the CT scan of the abdomen and pelvis as well she was described to have ascites and anasarca suggestive of fluid overload.  She received doses of Lasix.  Echocardiogram showed normal LV systolic function, prosthetic aortic valve with acceptable transvalvular gradients, moderate mitral regurgitation, right ventricular cavity is severely dilated, mildly reduced RV systolic function, moderate tricuspid valve regurgitation with right ventricular systolic pressure of 43 mm.     Patient is doing well and has no complaints of shortness of breath.  She occasionally has some coughing spells.  She has swelling of her legs which could be part of the above findings on her echocardiogram as well as component of venous insufficiency.     She is in room air with oxygen saturation in the mid 90s.  Overall she is doing significantly better and felt medically appropriate for discharge with recommendation to follow-up with primary care provider, BMP, daily weight, low-salt diet, follow-up with hematologist regarding CML/thrombocytosis.  Follow-up with cardiology.

## 2019-06-10 NOTE — PLAN OF CARE
Problem: Fall Risk (Adult)  Goal: Identify Related Risk Factors and Signs and Symptoms  Outcome: Ongoing (interventions implemented as appropriate)   06/10/19 0409   Fall Risk (Adult)   Related Risk Factors (Fall Risk) age-related changes;fatigue/slow reaction;gait/mobility problems;environment unfamiliar   Signs and Symptoms (Fall Risk) presence of risk factors     Goal: Absence of Fall  Outcome: Ongoing (interventions implemented as appropriate)   06/10/19 0409   Fall Risk (Adult)   Absence of Fall making progress toward outcome       Problem: Patient Care Overview  Goal: Plan of Care Review  Outcome: Ongoing (interventions implemented as appropriate)   06/10/19 0409   Coping/Psychosocial   Plan of Care Reviewed With patient   Plan of Care Review   Progress improving   OTHER   Outcome Summary VSS. No c/o pain, SOA. Slept through shift. Continues cough, med given per MAR.  70-83 on tele. No sticks/BP in right arm. Continue to monitor.       Problem: Skin Injury Risk (Adult)  Goal: Identify Related Risk Factors and Signs and Symptoms  Outcome: Ongoing (interventions implemented as appropriate)    Goal: Skin Health and Integrity  Outcome: Ongoing (interventions implemented as appropriate)   06/10/19 0409   Skin Injury Risk (Adult)   Skin Health and Integrity making progress toward outcome       Problem: Fluid Volume Excess (Adult)  Goal: Identify Related Risk Factors and Signs and Symptoms  Outcome: Ongoing (interventions implemented as appropriate)   06/10/19 0409   Fluid Volume Excess (Adult)   Signs and Symptoms (Fluid Volume Excess) lab value changes     Goal: Optimal Fluid Balance  Outcome: Ongoing (interventions implemented as appropriate)   06/10/19 0409   Fluid Volume Excess (Adult)   Optimal Fluid Balance making progress toward outcome

## 2019-06-10 NOTE — PROGRESS NOTES
Continued Stay Note   [place]     Patient Name: Lakesha Costello  MRN: [id number]  Today's Date: 6/10/2019    Admit Date: 6/7/2019    Discharge Plan     Row Name 06/10/19 7496       Plan    Plan Comments  KRISTAL received phone call from Nelly from [employer] in regards to pt not having a qualifying diagnosis for Medicare to pay for nebulizer. SW spoke with pt and pt's daughter who was in room about insurance and pt states that she could use her husbands older one. KRISTAL contacted [employer] in regards to making sure an older version would work and she said it would. Pt is discharging with a printed out order for neb in case her husbands does not work for her. [employer] notified. KRISTAL will follow and assist with any other discharge needs that may arise.     Row Name 06/10/19 7149       Plan    Plan Comments  Pt is discharging home today with resumption orders for HH and orders for a nebulizer. Pt has chosen [email or web address] to deliver nebulizer to room from provider list. SW faxed appropriate information to [email or web address]. SW spoke with [employer] and they state that they will deliver neb to room as soon as they can. SW will follow and assist with any other discharge needs that may arise.     Final Discharge Disposition Code  06 - home with home health care        Discharge Codes    No documentation.       Expected Discharge Date and Time     Expected Discharge Date Expected Discharge Time    Jp 10, 2019             Kim Deutsch

## 2019-06-10 NOTE — DISCHARGE PLACEMENT REQUEST
"Lakesha Costello (84 y.o. Female)     Date of Birth Social Security Number Address Home Phone MRN    1935  [id number] [facility] [phone] [id number]    [place] Marital Status          [facility]        Admission Date Admission Type Admitting Provider Attending Provider Department, Room/Bed    6/7/19 Emergency Myron Huynh MD [place], Glenn Riego, MD [facility] 4B, 404/1    Discharge Date Discharge Disposition Discharge Destination         Home or Self Care              Attending Provider:  Myron Huynh MD    Allergies:  Gleevec [Imatinib], Bentyl [Dicyclomine], Janumet [Sitagliptin-metformin Hcl], Dilaudid [Hydromorphone], Enalapril, Lopid [Gemfibrozil], Sulfa Antibiotics, Ultram [Tramadol]    Isolation:  None   Infection:  None   Code Status:  CPR    Ht:  154.9 cm (61\")   Wt:  56.6 kg (124 lb 12.8 oz)    Admission Cmt:  None   Principal Problem:  Acute on chronic diastolic congestive heart failure (CMS/HCC) [I50.33]                 Active Insurance as of 6/7/2019     Primary Coverage     Payor Plan Insurance Group Employer/Plan Group    HUMANA MEDICARE REPLACEMENT HUMANA MEDICARE REPL [id number]     Payor Plan Address Payor Plan Phone Number Payor Plan Fax Number Effective Dates    [address] [address] [phone]  1/1/2018 - None Entered    [facility] [address]       Subscriber Name Subscriber Birth Date Member ID       LAKESHA COSTELLO 1935 [id number]                 Emergency Contacts      (Rel.) Home Phone Work Phone Mobile Phone    Margarita Ignacio (Rn) (Daughter) [phone]    Richard Pablo (Spouse) [phone] -- --               History & Physical      Miguelito Ball DO at 6/7/2019 10:24 PM              [facility]  HISTORY AND PHYSICAL    Date of Admission: 6/7/2019  Primary Care Physician: Leonardo Zepeda, " DO    Subjective     Chief Complaint: Abdominal swelling    History of Present Illness  84-year-old  female who presents to the emergency department for abdominal swelling.  Patient has history of congestive heart failure.  She was recently diagnosed with CML and sees hematology in [place].  There was some concern as an outpatient that the chemotherapy was causing the patient's condition to worsen.  Patient has a BNP of 33,000.  She was given Lasix in the ED.  She had a slightly elevated troponin at 0.041.  I have note the patient had a fecal impaction seen on CT scan.  The patient has bad hemorrhoids.  She was given an enema in the emergency department had 2 large bowel movements.  The patient is coming from home.  She states she has been ill frequently, about every week.        Review of Systems   Abdominal swelling    Otherwise complete ROS reviewed and negative except as mentioned in the HPI.    Past Medical History:   Past Medical History:   Diagnosis Date   • Aortic stenosis    • Breast cancer (CMS/HCC)    • CAD (coronary artery disease)    • Cataract    • CHF (congestive heart failure) (CMS/HCC)     recently diagnosed after an ER visit.    • Chronic anticoagulation     Coumadin    • CKD (chronic kidney disease) stage 3, GFR 30-59 ml/min (CMS/HCC) 4/16/2018   • Diabetes mellitus (CMS/HCC)     Type II   • Elevated cholesterol    • GERD (gastroesophageal reflux disease)    • Hyperlipidemia    • Hypertension    • Macular degeneration    • Pancreatitis    • Paroxysmal atrial fibrillation (CMS/HCC)    • Pulmonary hypertension (CMS/HCC)    • Rheumatic fever     during childhood   • Sacrum and coccyx fracture (CMS/HCC)    • Sick sinus syndrome (CMS/HCC)     with pacemaker   • UTI (urinary tract infection)      Past Surgical History:  Past Surgical History:   Procedure Laterality Date   • ANOMALOUS PULMONARY VENOUS RETURN REPAIR, TOTAL     • BACK SURGERY     • BELPHAROPTOSIS REPAIR     • CARDIAC  CATHETERIZATION  1999, 2010   • CARDIAC CATHETERIZATION N/A 2/15/2017    Procedure: Left Heart Cath;  Surgeon: Ehsan Crocker MD;  Location:  [facility] CATH [facility];  Service:    • CARDIAC CATHETERIZATION N/A 2/15/2017    Procedure: Right Heart Cath;  Surgeon: Ehsan Crocker MD;  Location:  [facility];  Service:    • CARDIAC CATHETERIZATION N/A 2/15/2017    Procedure: Coronary angiography;  Surgeon: Ehsan Crocker MD;  Location:  [facility] CATH [facility];  Service:    • CARDIAC CATHETERIZATION N/A 2/15/2017    Procedure: Left ventriculography;  Surgeon: Ehsan Crocker MD;  Location:  [facility] CATH [facility];  Service:    • CARDIAC CATHETERIZATION N/A 2/15/2017    Procedure: Intracardiac echocardiogram;  Surgeon: Ehsan Crocker MD;  Location:  [facility] CATH INV[facility];  Service:    • CARDIAC ELECTROPHYSIOLOGY PROCEDURE N/A 2/3/2017    Procedure: Pacemaker DC new;  Surgeon: Ehsan Crocker MD;  Location:  [facility] CATH INV[facility];  Service:    • CARDIOVERSION     • CATARACT EXTRACTION     • CHOLECYSTECTOMY     • CORONARY ARTERY BYPASS GRAFT  1999    4 vessel    • CORONARY ARTERY BYPASS GRAFT     • CORONARY STENT PLACEMENT     • HYSTERECTOMY  1962   • INSERT / REPLACE / REMOVE PACEMAKER     • MASTECTOMY  2000   • OTHER SURGICAL HISTORY      TAVR 2017     Social History:  reports that she has never smoked. She has never used smokeless tobacco. She reports that she does not drink alcohol or use drugs.    Family History: family history includes Breast cancer in her mother; Cancer in her brother; Coronary artery disease in her father; Diabetes in her father; Heart attack in her father; No Known Problems in her maternal grandfather, maternal grandmother, paternal grandfather, and paternal grandmother.       Allergies:  Allergies   Allergen Reactions   • Gleevec [Imatinib] Itching   • Bentyl [Dicyclomine] Itching   • Janumet [Sitagliptin-Metformin Hcl] Other (See Comments)     Chest pain   •  Dilaudid [Hydromorphone] Nausea And Vomiting   • Enalapril Angioedema   • Lopid [Gemfibrozil] Nausea And Vomiting   • Sulfa Antibiotics Other (See Comments)     Syncope     • Ultram [Tramadol] Itching     Medications:  Prior to Admission medications    Medication Sig Start Date End Date Taking? Authorizing Provider   aspirin 81 MG EC tablet Take 81 mg by mouth Daily.   Yes Minda Salvador MD   atenolol (TENORMIN) 50 MG tablet Take 1 tab PO qam & 1/2 tab PO qpm.  Patient taking differently: 1/2 tab PO qpm. 4/12/19  Yes Leonardo Zepeda, DO   calcium carbonate (OS-MICHAEL) 600 MG tablet Take 600 mg by mouth 2 (Two) Times a Day With Meals.   Yes Minda Salvador MD   cholecalciferol (VITAMIN D3) 1000 units tablet Take 1,000 Units by mouth Daily.   Yes Minda Salvador MD   Ferrous Sulfate  (45 Fe) MG tablet controlled-release Take 142 mg by mouth Daily. 12/13/18  Yes Abisai Barahona MD   furosemide (LASIX) 40 MG tablet Take 40 mg by mouth Daily.   Yes Minda Salvador MD   glipiZIDE (GLUCOTROL) 5 MG tablet Take 1 tablet by mouth 2 (Two) Times a Day Before Meals. 4/12/19  Yes Leonardo Zepeda, DO   hydrocortisone (ANUSOL-HC) 25 MG suppository Insert 1 suppository into the rectum 2 (Two) Times a Day As Needed for Hemorrhoids. 11/30/18  Yes Sherif Early Jr., MD   insulin detemir (LEVEMIR) 100 UNIT/ML injection Inject 30 Units under the skin into the appropriate area as directed Every Night. 30 units qam 30 units qpm  Patient taking differently: Inject 20 Units under the skin into the appropriate area as directed Every Night. 4/12/19  Yes Leonardo Zepeda, DO   Lidocaine-Hydrocortisone Ace 2.8-0.55 % gel Insert 1 application into the rectum 2 (Two) Times a Day As Needed (hemorrhoid pain). 12/20/18  Yes Linda Johnson APRN   Multiple Vitamins-Minerals (PRESERVISION/LUTEIN) capsule Take 1 capsule by mouth 2 (Two) Times a Day.   Yes ProviderMinda MD   O2 (OXYGEN) Inhale 2 L/min  "Daily As Needed.   Yes Minda Salvador MD   pantoprazole (PROTONIX) 40 MG EC tablet Take 1 tablet by mouth Daily. 4/12/19  Yes Leonardo Zepeda, DO   polyethyl glycol-propyl glycol (SYSTANE) 0.4-0.3 % solution ophthalmic solution Administer 1 drop to both eyes Daily.   Yes Minda Salvador MD   polyethylene glycol (MIRALAX) packet Take 17 g by mouth Daily.   Yes Minda Salvador MD   potassium chloride (MICRO-K) 8 MEQ CR capsule Take 10 mEq by mouth Daily.   Yes Minda Salvador MD   simvastatin (ZOCOR) 20 MG tablet Take 1 tablet by mouth Every Night. 4/12/19  Yes Leonardo Zepeda DO   sucralfate (CARAFATE) 1 GM/10ML suspension Take 10 mL by mouth 3 (Three) Times a Day As Needed (upset stomach). 4/12/19  Yes Leonardo Zepeda DO   triamcinolone (KENALOG) 0.1 % cream Apply 1 application topically to the appropriate area as directed 2 (Two) Times a Day. 5/20/19 5/20/20 Yes Minda Salvador MD   venlafaxine XR (EFFEXOR-XR) 37.5 MG 24 hr capsule Take 1 capsule by mouth Daily. 5/20/19  Yes Mariposa Santos APRN   vitamin B-12 (CYANOCOBALAMIN) 500 MCG tablet Take 500 mcg by mouth Daily.   Yes Provider, Historical, MD   Witch Hazel (TUCKS) 50 % pads Apply 1 pad topically 6 (Six) Times a Day. 12/20/18  Yes Linda Johnson APRN   bisacodyl (DULCOLAX) 10 MG suppository Insert 1 suppository into the rectum Daily.  Patient taking differently: Insert 10 mg into the rectum Daily As Needed. 12/21/18   Linda Johnson APRN   nitroglycerin (NITROSTAT) 0.4 MG SL tablet 1 under the tongue as needed for angina, may repeat q5mins for up three doses 6/7/17   Ehsan Crocker MD   ondansetron ODT (ZOFRAN ODT) 4 MG disintegrating tablet Take 1 tablet by mouth Every 4 (Four) Hours As Needed for Nausea or Vomiting. 12/21/18   Snow, Linda, APRN     Objective     Vital Signs: /54 (BP Location: Left arm, Patient Position: Lying)   Pulse 70   Temp 97.1 °F (36.2 °C) (Oral)   Resp 20   Ht 154.9 cm (61\") "   Wt 55.1 kg (121 lb 7 oz)   SpO2 96%   BMI 22.95 kg/m²    Physical Exam   Constitutional:   Appears chronically ill   HENT:   Head: Normocephalic and atraumatic.   Nose: Nose normal.   Mouth/Throat: Oropharynx is clear and moist.   Eyes: Conjunctivae and EOM are normal.   Neck: Normal range of motion. Neck supple.   Cardiovascular: Normal rate, regular rhythm and normal heart sounds.   Pulmonary/Chest: Effort normal. She has rales.   Abdominal: Soft. Bowel sounds are normal. She exhibits distension.   Musculoskeletal: She exhibits no edema or tenderness.   Hyperkyphotic thoracic spine   Neurological: She is alert. No cranial nerve deficit.   Skin: Skin is warm and dry.   Psychiatric: She has a normal mood and affect.   Vitals reviewed.          Results Reviewed:  Lab Results (last 24 hours)     Procedure Component Value Units Date/Time    Lactic Acid, Reflex [[id number]]  (Abnormal) Collected:  06/07/19 2040    Specimen:  Blood Updated:  06/07/19 2101     Lactate 2.8 mmol/L     Lactic Acid, Reflex Timer (This will reflex a repeat order 3-3:15 hours after ordered.) [[id number]] Collected:  06/07/19 1649    Specimen:  Blood Updated:  06/07/19 2031     Extra Tube Hold for add-ons.     Comment: Auto resulted.       Urinalysis With Culture If Indicated - Urine, Catheter In/Out [[id number]]  (Abnormal) Collected:  06/07/19 1807    Specimen:  Urine, Catheter In/Out Updated:  06/07/19 1830     Color, UA Dark Yellow     Appearance, UA Clear     pH, UA <=5.0     Specific Gravity, UA 1.014     Glucose, UA Negative     Ketones, UA Negative     Bilirubin, UA Negative     Blood, UA Negative     Protein,  mg/dL (2+)     Leuk Esterase, UA Negative     Nitrite, UA Negative     Urobilinogen, UA 1.0 E.U./dL    Urinalysis, Microscopic Only - Urine, Catheter In/Out [[id number]] Collected:  06/07/19 1807    Specimen:  Urine, Catheter In/Out Updated:  06/07/19 1830     RBC, UA None Seen /HPF      WBC, UA None Seen /HPF       Bacteria, UA None Seen /HPF      Squamous Epithelial Cells, UA 0-2 /HPF      Hyaline Casts, UA 0-2 /LPF      Methodology Automated Microscopy    CBC & Differential [[id number]] Collected:  06/07/19 1649    Specimen:  Blood Updated:  06/07/19 1803    Narrative:       The following orders were created for panel order CBC & Differential.  Procedure                               Abnormality         Status                     ---------                               -----------         ------                     CBC Auto Differential[[id number]]        Abnormal            Final result                 Please view results for these tests on the individual orders.    CBC Auto Differential [[id number]]  (Abnormal) Collected:  06/07/19 1649    Specimen:  Blood Updated:  06/07/19 1803     WBC 16.95 10*3/mm3      RBC 3.34 10*6/mm3      Hemoglobin 10.7 g/dL      Hematocrit 31.9 %      MCV 95.5 fL      MCH 32.0 pg      MCHC 33.5 g/dL      RDW 15.6 %      RDW-SD 54.6 fl      MPV 11.7 fL      Platelets 970 10*3/mm3     Manual Differential [[id number]]  (Abnormal) Collected:  06/07/19 1649    Specimen:  Blood Updated:  06/07/19 1803     Neutrophil % 74.0 %      Lymphocyte % 6.0 %      Monocyte % 8.0 %      Eosinophil % 1.0 %      Bands %  9.0 %      Atypical Lymphocyte % 2.0 %      Neutrophils Absolute 14.07 10*3/mm3      Lymphocytes Absolute 1.02 10*3/mm3      Monocytes Absolute 1.36 10*3/mm3      Eosinophils Absolute 0.17 10*3/mm3      Poikilocytes Slight/1+     WBC Morphology Normal     Platelet Estimate Increased     Clumped Platelets Present     Giant Platelets Large/3+    TSH [[id number]]  (Normal) Collected:  06/07/19 1649    Specimen:  Blood Updated:  06/07/19 1746     TSH 1.200 mIU/mL     BNP [[id number]]  (Abnormal) Collected:  06/07/19 1649    Specimen:  Blood Updated:  06/07/19 1746     proBNP 33,800.0 pg/mL     Troponin [[id number]]  (Abnormal) Collected:  06/07/19 1649    Specimen:  Blood Updated:  06/07/19 1726     Troponin I  0.041 ng/mL     Lactic Acid, Plasma [[id number]]  (Abnormal) Collected:  06/07/19 1649    Specimen:  Blood Updated:  06/07/19 1723     Lactate 2.5 mmol/L     Lipase [[id number]]  (Normal) Collected:  06/07/19 1649    Specimen:  Blood Updated:  06/07/19 1716     Lipase 167 U/L     Comprehensive Metabolic Panel [[id number]]  (Abnormal) Collected:  06/07/19 1649    Specimen:  Blood Updated:  06/07/19 1716     Glucose 191 mg/dL      BUN 35 mg/dL      Creatinine 1.08 mg/dL      Sodium 135 mmol/L      Potassium 4.2 mmol/L      Chloride 98 mmol/L      CO2 27.0 mmol/L      Calcium 9.2 mg/dL      Total Protein 6.9 g/dL      Albumin 3.90 g/dL      ALT (SGPT) <15 U/L      AST (SGOT) 35 U/L      Alkaline Phosphatase 108 U/L      Total Bilirubin 1.6 mg/dL      eGFR Non African Amer 49 mL/min/1.73      Globulin 3.0 gm/dL      A/G Ratio 1.3 g/dL      BUN/Creatinine Ratio 32.4     Anion Gap 10.0 mmol/L     Narrative:       GFR Normal >60  Chronic Kidney Disease <60  Kidney Failure <15    Magnesium [[id number]]  (Normal) Collected:  06/07/19 1649    Specimen:  Blood Updated:  06/07/19 1716     Magnesium 1.8 mg/dL     Blood Culture - Blood, Arm, Left [[id number]] Collected:  06/07/19 1645    Specimen:  Blood from Arm, Left Updated:  06/07/19 1704    Blood Culture - Blood, Hand, Left [[id number]] Collected:  06/07/19 1630    Specimen:  Blood from Hand, Left Updated:  06/07/19 1704        Imaging Results (last 24 hours)     Procedure Component Value Units Date/Time    XR Chest 1 View [[id number]] Collected:  06/07/19 1659     Updated:  06/07/19 1706    Narrative:       EXAMINATION: XR CHEST 1 VW-. 6/7/2019 4:59 PM CDT     CHEST, ONE VIEW:     HISTORY: Shortness of air, cough     COMPARISON: 03/06/2019, 10/11/2018 10/03/2018     A single frontal chest radiograph was obtained.     FINDINGS:           Changes median sternotomy and cardiac valve replacement observed.  Permanent cardiac pacemaker appreciated.     The lungs are clear without acute  infiltrates. Mild chronic interstitial  prominence noted.     The heart is generous. The pulmonary circulation appropriate, without  evidence of heart failure.     The bony structures are intact without acute osseous abnormalities.                                     Impression:       1. Generous heart size.  2. No acute cardiopulmonary process.     This report was finalized on 06/07/2019 17:03 by Dr. Tarun Rios MD.    CT Abdomen Pelvis Without Contrast [[id number]] Collected:  06/07/19 1602     Updated:  06/07/19 1613    Narrative:       EXAM: CT ABDOMEN PELVIS WO CONTRAST- - 6/7/2019 3:34 PM CDT     HISTORY: abd pain, distention, vomiting       COMPARISON: 11/30/2018.      DOSE LENGTH PRODUCT: 228 mGy cm. Automated exposure control was also  utilized to decrease patient radiation dose.     TECHNIQUE: Unenhanced axial images of the abdomen and pelvis obtained  with coronal and sagittal reformats.     FINDINGS: Evaluation limited secondary to lack of intravenous contrast  agent.      VISUALIZED CHEST: Respiratory motion limits fine parenchymal evaluation.  There is a 4-5 cm opacity at the medial right lower lobe, incompletely  evaluated on this exam. Small bilateral pleural effusions. Cardiomegaly  with changes of aortic valve replacement and CABG.     LIVER: Normal hepatic contour.     BILIARY: Cholecystectomy clips.     PANCREAS: Limited in evaluation due to motion, grossly within normal  limits.     SPLEEN: Normal size and contour.      ADRENAL: Normal appearance of the bilateral adrenal glands.     GENITOURINARY:   Left renal calcification favored to be vascular. No hydronephrosis.   Urinary bladder collapsed, which limits evaluation of the bladder wall.   Uterus absent. No pelvic mass.     PERITONEUM: Ascites. No definite free air.     GI TRACT: Small hiatal hernia. Normal C-sweep of the duodenum. All of  stool at the rectum measures 7 cm. Mild thickening of the rectum, which  can be seen with stercoral  colitis. There appear to be a few colonic  diverticula. Nonvisualized appendix. No evidence of bowel obstruction.     VESSELS: Normal course and caliber of the calcified aorta.     RETROPERITONEUM: There are a few borderline retroperitoneal lymph nodes.     SOFT TISSUES: Anasarca.     BONES: Greater than 50 percent height loss of the L2 vertebral body with  changes of kyphoplasty and 4 mm retropulsion of the posterior vertebral  body margin and at least mild spinal canal stenosis.          Impression:       1. There is a large ball of stool at the rectum with thickening of the  rectal wall. Correlate for impaction and possible stercoral colitis.  2. Medial aspect of the right lower lobe with consolidation. Small  bilateral pleural effusions. This is incompletely evaluated on this  exam. It could represent atelectasis, infection or mass. Correlate with  clinical presentation and recommend follow-up after treatment to  evaluate for resolution.  3. Ascites and anasarca suggests fluid overload.  4. Cardiomegaly with changes of aortic valve replacement and CABG.  5. Incidental findings include prior cholecystectomy, colonic  diverticula, nonspecific borderline retroperitoneal lymph nodes and  chronic L2 compression fracture with kyphoplasty.  This report was finalized on 06/07/2019 16:10 by Dr Shae Sainz MD.        I have personally reviewed and interpreted the radiology studies and ECG obtained at time of admission.     Assessment / Plan     Assessment:   Active Hospital Problems    Diagnosis   • Acute on chronic congestive heart failure (CMS/HCC)     Impression:  1.  Fecal impaction with stercoral colitis  2.  Ascites and anasarca   3.  History of congestive heart failure with elevated BNP  4.  CML  5.  Consolidation in the medial aspect of the right lower lobe  6.  Elevated lactic acid  7. Insulin-dependent diabetes mellitus  8.  Leukocytosis  9.  Nonspecific anemia  10.  Marginally elevated troponin    Plan:    1.  Continue with bowel program  2.  IV diuretic  3.  Invanz for possible colitis and increasing lactic acid  4.  Labs in the morning  5.  CT scan of the chest to rule out pneumonia  6.  Sliding scale insulin with Accu-Cheks   7.  Cardiac markers  8.  Telemetry  9.  KUB in the morning  10.  PT OT consult    Code Status: Full     I discussed the patient's findings and my recommendations with the patient, no family at bedside    Estimated length of stay 3 to 4 days    Miguelito Ball DO   06/07/19   10:24 PM              Electronically signed by Miguelito Ball DO at 6/7/2019 10:31 PM          Physician Progress Notes (most recent note)      Myron Huynh MD at 6/9/2019 11:57 AM              [facility] Medicine Services  INPATIENT PROGRESS NOTE    Patient Name: Lakesha Costello  Date of Admission: 6/7/2019  Today's Date: 06/09/19  Length of Stay: 2  Primary Care Physician: Leonardo Zepeda DO    Subjective   Chief Complaint: follow up heart failure  HPI   Pt is sitting up in the chair. States she is breathing better. She was complaining of some mild abdominal pain stating she doesn't usually eat that much breakfast. States she is voiding frequently with the Lasix. Still complains of a cough. No chest pain. Lower extremities remain edematous.      Review of Systems   All pertinent negatives and positives are as above. All other systems have been reviewed and are negative unless otherwise stated.     Objective    Temp:  [97.6 °F (36.4 °C)-98.8 °F (37.1 °C)] 98.1 °F (36.7 °C)  Heart Rate:  [60-75] 75  Resp:  [16-20] 18  BP: (135-149)/(41-62) 149/50  Physical Exam   Constitutional: She is oriented to person, place, and time. She appears well-developed.   Thin and chronically ill appearing.    HENT:   Head: Normocephalic and atraumatic.   Eyes: Conjunctivae and EOM are normal. Pupils are equal, round, and reactive to light.   Neck: Neck supple. No JVD present. No  thyromegaly present.   Cardiovascular: Normal rate, regular rhythm, normal heart sounds and intact distal pulses. Exam reveals no gallop and no friction rub.   No murmur heard.  Pulmonary/Chest: Effort normal and breath sounds normal. No respiratory distress. She has no wheezes. She has no rales. She exhibits no tenderness.   Abdominal: Soft. Bowel sounds are normal. She exhibits no distension. There is no tenderness. There is no rebound and no guarding.   Musculoskeletal: Normal range of motion. She exhibits edema (1+ pitting edema. ). She exhibits no tenderness or deformity.   Lymphadenopathy:     She has no cervical adenopathy.   Neurological: She is alert and oriented to person, place, and time. She displays normal reflexes. No cranial nerve deficit. She exhibits normal muscle tone.   Skin: Skin is warm and dry. No rash noted.   Psychiatric: She has a normal mood and affect. Her behavior is normal. Judgment and thought content normal.     Results Review:  I have reviewed the labs, radiology results, and diagnostic studies.    Laboratory Data:   Results from last 7 days   Lab Units 06/09/19  0346 06/08/19  1536 06/07/19  1649   WBC 10*3/mm3 13.97* 14.27* 16.95*   HEMOGLOBIN g/dL 10.2* 10.6* 10.7*   HEMATOCRIT % 32.1* 32.0* 31.9*   PLATELETS 10*3/mm3 1,039* 1,051* 970*        Results from last 7 days   Lab Units 06/09/19  0346 06/07/19  1649   SODIUM mmol/L 138 135   POTASSIUM mmol/L 5.0 4.2   CHLORIDE mmol/L 100 98   CO2 mmol/L 29.0 27.0   BUN mg/dL 37* 35*   CREATININE mg/dL 1.05 1.08   CALCIUM mg/dL 9.3 9.2   BILIRUBIN mg/dL 0.7 1.6*   ALK PHOS U/L 100 108   ALT (SGPT) U/L <15 <15   AST (SGOT) U/L 26 35   GLUCOSE mg/dL 90 191*       Culture Data:   Blood Culture   Date Value Ref Range Status   06/07/2019 No growth at 24 hours  Preliminary   06/07/2019 No growth at 24 hours  Preliminary       Radiology Data:   Imaging Results (last 24 hours)     ** No results found for the last 24 hours. **          I have  reviewed the patient's current medications.     Assessment/Plan     Active Hospital Problems    Diagnosis   • **Acute on chronic diastolic congestive heart failure (CMS/HCC)   • Moderate malnutrition (CMS/HCC)   • Constipation   • CML (chronic myeloid leukemia) (CMS/HCC)   • Thrombocytosis (CMS/HCC)   • CKD (chronic kidney disease) stage 3, GFR 30-59 ml/min (CMS/HCC)     GFR 32 in 4/2018     • Age-related osteoporosis without current pathological fracture     Last Assessment & Plan:   She has clinical osteoporosis with a history of vertebral compression fractures.  Recommend DXA test followed by treatment.     • Artificial pacemaker   • Paroxysmal atrial fibrillation (CMS/[facility])     S/p Watchman       Plan:  1. Continue Iv lasix today. Will reassess CXR tomorrow.   2. Begin atrovent nebulizers today.  3. Repeat labs in am.   4. Add incentive spirometry and flutter valve.   5. Encouraged her to Increase her activity.   6. She has an appointment with her PCP and Heart  Tomorrow in [place]. Pt states she is not up to seeing her [place] MD tomorrow.   7. Repeat CXR in am.     Discharge Planning: I expect the patient to be discharged to home in 1-2 days.    FACUNDO Quevedo   06/09/19   12:03 PM   I personally evaluated and examined the patient in conjunction with FACUNDO Dave and agree with the assessment, treatment plan, and disposition of the patient as recorded by her. My history, exam, and further recommendations are:   Since admission her platelet count has been elevated from 970-1039.  I will check for ferritin and iron and replace iron if noted to have iron deficiency anemia.  Further testing may be warranted but will defer this to his hematologist oncologist in outpatient setting.  Patient has no episode of bleeding or reported thrombosis.    She has known history of CML.  She gets most of her care in [place].    She presented with abdominal swelling and currently being managed for heart  failure  She has negative fluid balance of 220 ml.  I am not certain about the accuracy of reporting.  Her weight remains stable    Vitals:    19 1424   BP:  149/50   Pulse: 70   Resp: 16   Temp:  98.1   SpO2: 98%       Myron Huynh MD  19  3:29 PM      Electronically signed by Myorn Huynh MD at 2019  3:35 PM       Consult Notes (most recent note)     No notes of this type exist for this encounter.        Physical Therapy Notes (most recent note)     No notes of this type exist for this encounter.        Occupational Therapy Notes (most recent note)     No notes of this type exist for this encounter.          [address] [address]  Phone:  [phone]  Fax:   Date: Jp 10, 2019      Referral to Home Health     Patient:  Lakesha Costello MRN:  [id number]   [address] [facility] [address] :  1935  SSN:    Phone: [phone] Sex:  F      INSURANCE PAYOR PLAN GROUP # SUBSCRIBER ID   Primary:    [employer] MED[employer]ARE REPLACEMENT [id number]      Referring Provider Information:  WILLIAM POPE Phone: [phone] Fax:       Referral Information:   # Visits:  1 Referral Type: Home Health [42]   Urgency:  Routine Referral Reason: Specialty Services Required   Start Date: Jp 10, 2019 End Date:  To be determined by Insurer   Diagnosis: CML (chronic myeloid leukemia) (CMS/HCC) (C92.10 [ICD-10-CM] 205.10 [ICD-9-CM])  Acute on chronic diastolic congestive heart failure (CMS/HCC) (I50.33 [ICD-10-CM] 428.33,428.0 [ICD-9-CM])      Refer to Dept: [facility]  Refer to Provider:   Refer to Facility:       Face to Face Visit Date: 6/10/2019  Follow-up Provider for Plan of Care? I treated the patient in an acute care facility and will not continue treatment after discharge.  Follow-up Provider: KARLENE GARRIDO [[id number]]  Reason/Clinical Findings: CHF/valvular heart disease/CML  Describe  mobility limitations that make leaving home difficult: as above  Nursing/Therapeutic Services Requested: Skilled Nursing  Skilled nursing orders: Neurovascular assessments (bmp on Wednesday)  Skilled nursing orders: Cardiopulmonary assessments  Skilled nursing orders: CHF management  Skilled nursing orders: O2 instruction  Skilled nursing orders: Medication education  Frequency: 1 Week 1     This document serves as a request of services and does not constitute Insurance authorization or approval of services.  To determine eligibility, please contact the members Insurance carrier to verify and review coverage.     If you have medical questions regarding this request for services. Please contact [address] at [phone] between the hours of 8:00am - 5:00pm (Mon-Fri).       Authorizing Provider:Lucy Art APRN  Authorizing Provider's NPI: [id number]  Order Entered By: Lucy Art APRN 6/10/2019  2:38 PM     Electronically signed by: Lucy Art APRN 6/10/2019  2:38 PM

## 2019-06-10 NOTE — PROGRESS NOTES
Continued Stay Note   Sabrina     Patient Name: Lakesha Costello  MRN: [id number]  Today's Date: 6/10/2019    Admit Date: 6/7/2019    Discharge Plan     Row Name 06/10/19 1459       Plan    Plan Comments  Pt is discharging home today with resumption orders for HH and orders for a nebulizer. Pt has chosen [facility] to deliver nebulizer to room from provider list. SW faxed appropriate information to [facility]. SW spoke with [facility] and they state that they will deliver neb to room as soon as they can. SW will follow and assist with any other discharge needs that may arise.     Final Discharge Disposition Code  06 - home with home health care        Discharge Codes    No documentation.       Expected Discharge Date and Time     Expected Discharge Date Expected Discharge Time    Jp 10, 2019             Kim Deutsch

## 2019-06-10 NOTE — DISCHARGE PLACEMENT REQUEST
"Lakesha Costello (84 y.o. Female)     Date of Birth Social Security Number Address Home Phone MRN    1935  [id number] [facility] [phone] [id number]    Mormonism Marital Status          [facility]        Admission Date Admission Type Admitting Provider Attending Provider Department, Room/Bed    6/7/19 Emergency Myron Huynh MD [place], Glenn Riego, MD [facility] 4B, 404/1    Discharge Date Discharge Disposition Discharge Destination         Home or Self Care              Attending Provider:  Myron Huynh MD    Allergies:  Gleevec [Imatinib], Bentyl [Dicyclomine], Janumet [Sitagliptin-metformin Hcl], Dilaudid [Hydromorphone], Enalapril, Lopid [Gemfibrozil], Sulfa Antibiotics, Ultram [Tramadol]    Isolation:  None   Infection:  None   Code Status:  CPR    Ht:  154.9 cm (61\")   Wt:  56.6 kg (124 lb 12.8 oz)    Admission Cmt:  None   Principal Problem:  Acute on chronic diastolic congestive heart failure (CMS/HCC) [I50.33]                 Active Insurance as of 6/7/2019     Primary Coverage     Payor Plan Insurance Group Employer/Plan Group    HUMANA MEDICARE REPLACEMENT HUMANA MEDICARE REPL [id number]     Payor Plan Address Payor Plan Phone Number Payor Plan Fax Number Effective Dates    [address] [address] [phone]  1/1/2018 - None Entered    Formerly [facility] [id number]       Subscriber Name Subscriber Birth Date Member ID       LAKESHA COSTELLO 1935 [id number]                 Emergency Contacts      (Rel.) Home Phone Work Phone Mobile Phone    Margarita Ignacio (Rn) (Daughter) [phone]    Richard Pablo (Spouse) [phone] -- --               History & Physical      Miguelito Ball DO at 6/7/2019 10:24 PM              [facility] Medicine Services  HISTORY AND PHYSICAL    Date of Admission: 6/7/2019  Primary Care Physician: Leonardo Zepeda, " DO    Subjective     Chief Complaint: Abdominal swelling    History of Present Illness  84-year-old  female who presents to the emergency department for abdominal swelling.  Patient has history of congestive heart failure.  She was recently diagnosed with CML and sees hematology in [place].  There was some concern as an outpatient that the chemotherapy was causing the patient's condition to worsen.  Patient has a BNP of 33,000.  She was given Lasix in the ED.  She had a slightly elevated troponin at 0.041.  I have note the patient had a fecal impaction seen on CT scan.  The patient has bad hemorrhoids.  She was given an enema in the emergency department had 2 large bowel movements.  The patient is coming from home.  She states she has been ill frequently, about every week.        Review of Systems   Abdominal swelling    Otherwise complete ROS reviewed and negative except as mentioned in the HPI.    Past Medical History:   Past Medical History:   Diagnosis Date   • Aortic stenosis    • Breast cancer (CMS/HCC)    • CAD (coronary artery disease)    • Cataract    • CHF (congestive heart failure) (CMS/HCC)     recently diagnosed after an ER visit.    • Chronic anticoagulation     Coumadin    • CKD (chronic kidney disease) stage 3, GFR 30-59 ml/min (CMS/HCC) 4/16/2018   • Diabetes mellitus (CMS/HCC)     Type II   • Elevated cholesterol    • GERD (gastroesophageal reflux disease)    • Hyperlipidemia    • Hypertension    • Macular degeneration    • Pancreatitis    • Paroxysmal atrial fibrillation (CMS/HCC)    • Pulmonary hypertension (CMS/HCC)    • Rheumatic fever     during childhood   • Sacrum and coccyx fracture (CMS/HCC)    • Sick sinus syndrome (CMS/HCC)     with pacemaker   • UTI (urinary tract infection)      Past Surgical History:  Past Surgical History:   Procedure Laterality Date   • ANOMALOUS PULMONARY VENOUS RETURN REPAIR, TOTAL     • BACK SURGERY     • BELPHAROPTOSIS REPAIR     • CARDIAC  CATHETERIZATION  1999, 2010   • CARDIAC CATHETERIZATION N/A 2/15/2017    Procedure: Left Heart Cath;  Surgeon: Ehsan Crocker MD;  Location:  [facility] CATH [facility];  Service:    • CARDIAC CATHETERIZATION N/A 2/15/2017    Procedure: Right Heart Cath;  Surgeon: Ehsan Crocker MD;  Location:  [facility];  Service:    • CARDIAC CATHETERIZATION N/A 2/15/2017    Procedure: Coronary angiography;  Surgeon: Ehsan Crocker MD;  Location:  [facility];  Service:    • CARDIAC CATHETERIZATION N/A 2/15/2017    Procedure: Left ventriculography;  Surgeon: Ehsan Crocker MD;  Location:  [facility];  Service:    • CARDIAC CATHETERIZATION N/A 2/15/2017    Procedure: Intracardiac echocardiogram;  Surgeon: Ehsan Crocker MD;  Location:  [facility] CATH [facility];  Service:    • CARDIAC ELECTROPHYSIOLOGY PROCEDURE N/A 2/3/2017    Procedure: Pacemaker DC new;  Surgeon: Ehsan Crocker MD;  Location:  [facility]ATH [facility];  Service:    • CARDIOVERSION     • CATARACT EXTRACTION     • CHOLECYSTECTOMY     • CORONARY ARTERY BYPASS GRAFT  1999    4 vessel    • CORONARY ARTERY BYPASS GRAFT     • CORONARY STENT PLACEMENT     • HYSTERECTOMY  1962   • INSERT / REPLACE / REMOVE PACEMAKER     • MASTECTOMY  2000   • OTHER SURGICAL HISTORY      TAVR 2017     Social History:  reports that she has never smoked. She has never used smokeless tobacco. She reports that she does not drink alcohol or use drugs.    Family History: family history includes Breast cancer in her mother; Cancer in her brother; Coronary artery disease in her father; Diabetes in her father; Heart attack in her father; No Known Problems in her maternal grandfather, maternal grandmother, paternal grandfather, and paternal grandmother.       Allergies:  Allergies   Allergen Reactions   • Gleevec [Imatinib] Itching   • Bentyl [Dicyclomine] Itching   • Janumet [Sitagliptin-Metformin Hcl] Other (See Comments)     Chest pain   •  Dilaudid [Hydromorphone] Nausea And Vomiting   • Enalapril Angioedema   • Lopid [Gemfibrozil] Nausea And Vomiting   • Sulfa Antibiotics Other (See Comments)     Syncope     • Ultram [Tramadol] Itching     Medications:  Prior to Admission medications    Medication Sig Start Date End Date Taking? Authorizing Provider   aspirin 81 MG EC tablet Take 81 mg by mouth Daily.   Yes Minda Salvador MD   atenolol (TENORMIN) 50 MG tablet Take 1 tab PO qam & 1/2 tab PO qpm.  Patient taking differently: 1/2 tab PO qpm. 4/12/19  Yes Leonardo Zepeda, DO   calcium carbonate (OS-MICHAEL) 600 MG tablet Take 600 mg by mouth 2 (Two) Times a Day With Meals.   Yes Minda Salvador MD   cholecalciferol (VITAMIN D3) 1000 units tablet Take 1,000 Units by mouth Daily.   Yes Minda Salvador MD   Ferrous Sulfate  (45 Fe) MG tablet controlled-release Take 142 mg by mouth Daily. 12/13/18  Yes Abisai Barahona MD   furosemide (LASIX) 40 MG tablet Take 40 mg by mouth Daily.   Yes Minda Salvador MD   glipiZIDE (GLUCOTROL) 5 MG tablet Take 1 tablet by mouth 2 (Two) Times a Day Before Meals. 4/12/19  Yes Leonardo Zepeda, DO   hydrocortisone (ANUSOL-HC) 25 MG suppository Insert 1 suppository into the rectum 2 (Two) Times a Day As Needed for Hemorrhoids. 11/30/18  Yes Sherif Early Jr., MD   insulin detemir (LEVEMIR) 100 UNIT/ML injection Inject 30 Units under the skin into the appropriate area as directed Every Night. 30 units qam 30 units qpm  Patient taking differently: Inject 20 Units under the skin into the appropriate area as directed Every Night. 4/12/19  Yes Leonardo Zepeda, DO   Lidocaine-Hydrocortisone Ace 2.8-0.55 % gel Insert 1 application into the rectum 2 (Two) Times a Day As Needed (hemorrhoid pain). 12/20/18  Yes Linda Johnson APRN   Multiple Vitamins-Minerals (PRESERVISION/LUTEIN) capsule Take 1 capsule by mouth 2 (Two) Times a Day.   Yes ProviderMinda MD   O2 (OXYGEN) Inhale 2 L/min  "Daily As Needed.   Yes Minda Salvador MD   pantoprazole (PROTONIX) 40 MG EC tablet Take 1 tablet by mouth Daily. 4/12/19  Yes Leonardo Zepeda, DO   polyethyl glycol-propyl glycol (SYSTANE) 0.4-0.3 % solution ophthalmic solution Administer 1 drop to both eyes Daily.   Yes Minda Salvador MD   polyethylene glycol (MIRALAX) packet Take 17 g by mouth Daily.   Yes Minda Salvador MD   potassium chloride (MICRO-K) 8 MEQ CR capsule Take 10 mEq by mouth Daily.   Yes Minda Salvador MD   simvastatin (ZOCOR) 20 MG tablet Take 1 tablet by mouth Every Night. 4/12/19  Yes Leonardo Zepeda DO   sucralfate (CARAFATE) 1 GM/10ML suspension Take 10 mL by mouth 3 (Three) Times a Day As Needed (upset stomach). 4/12/19  Yes Loenardo Zepeda DO   triamcinolone (KENALOG) 0.1 % cream Apply 1 application topically to the appropriate area as directed 2 (Two) Times a Day. 5/20/19 5/20/20 Yes Minda Salvador MD   venlafaxine XR (EFFEXOR-XR) 37.5 MG 24 hr capsule Take 1 capsule by mouth Daily. 5/20/19  Yes Mariposa Santos APRN   vitamin B-12 (CYANOCOBALAMIN) 500 MCG tablet Take 500 mcg by mouth Daily.   Yes Provider, Historical, MD   Witch Hazel (TUCKS) 50 % pads Apply 1 pad topically 6 (Six) Times a Day. 12/20/18  Yes Linda Johnson APRN   bisacodyl (DULCOLAX) 10 MG suppository Insert 1 suppository into the rectum Daily.  Patient taking differently: Insert 10 mg into the rectum Daily As Needed. 12/21/18   Linda Johnson APRN   nitroglycerin (NITROSTAT) 0.4 MG SL tablet 1 under the tongue as needed for angina, may repeat q5mins for up three doses 6/7/17   Ehsan Crocker MD   ondansetron ODT (ZOFRAN ODT) 4 MG disintegrating tablet Take 1 tablet by mouth Every 4 (Four) Hours As Needed for Nausea or Vomiting. 12/21/18   Snow, Linda, APRN     Objective     Vital Signs: /54 (BP Location: Left arm, Patient Position: Lying)   Pulse 70   Temp 97.1 °F (36.2 °C) (Oral)   Resp 20   Ht 154.9 cm (61\") "   Wt 55.1 kg (121 lb 7 oz)   SpO2 96%   BMI 22.95 kg/m²    Physical Exam   Constitutional:   Appears chronically ill   HENT:   Head: Normocephalic and atraumatic.   Nose: Nose normal.   Mouth/Throat: Oropharynx is clear and moist.   Eyes: Conjunctivae and EOM are normal.   Neck: Normal range of motion. Neck supple.   Cardiovascular: Normal rate, regular rhythm and normal heart sounds.   Pulmonary/Chest: Effort normal. She has rales.   Abdominal: Soft. Bowel sounds are normal. She exhibits distension.   Musculoskeletal: She exhibits no edema or tenderness.   Hyperkyphotic thoracic spine   Neurological: She is alert. No cranial nerve deficit.   Skin: Skin is warm and dry.   Psychiatric: She has a normal mood and affect.   Vitals reviewed.          Results Reviewed:  Lab Results (last 24 hours)     Procedure Component Value Units Date/Time    Lactic Acid, Reflex [[id number]]  (Abnormal) Collected:  06/07/19 2040    Specimen:  Blood Updated:  06/07/19 2101     Lactate 2.8 mmol/L     Lactic Acid, Reflex Timer (This will reflex a repeat order 3-3:15 hours after ordered.) [[id number]] Collected:  06/07/19 1649    Specimen:  Blood Updated:  06/07/19 2031     Extra Tube Hold for add-ons.     Comment: Auto resulted.       Urinalysis With Culture If Indicated - Urine, Catheter In/Out [[id number]]  (Abnormal) Collected:  06/07/19 1807    Specimen:  Urine, Catheter In/Out Updated:  06/07/19 1830     Color, UA Dark Yellow     Appearance, UA Clear     pH, UA <=5.0     Specific Gravity, UA 1.014     Glucose, UA Negative     Ketones, UA Negative     Bilirubin, UA Negative     Blood, UA Negative     Protein,  mg/dL (2+)     Leuk Esterase, UA Negative     Nitrite, UA Negative     Urobilinogen, UA 1.0 E.U./dL    Urinalysis, Microscopic Only - Urine, Catheter In/Out [[id number]] Collected:  06/07/19 1807    Specimen:  Urine, Catheter In/Out Updated:  06/07/19 1830     RBC, UA None Seen /HPF      WBC, UA None Seen /HPF       Bacteria, UA None Seen /HPF      Squamous Epithelial Cells, UA 0-2 /HPF      Hyaline Casts, UA 0-2 /LPF      Methodology Automated Microscopy    CBC & Differential [[id number]] Collected:  06/07/19 1649    Specimen:  Blood Updated:  06/07/19 1803    Narrative:       The following orders were created for panel order CBC & Differential.  Procedure                               Abnormality         Status                     ---------                               -----------         ------                     CBC Auto Differential[[id number]]        Abnormal            Final result                 Please view results for these tests on the individual orders.    CBC Auto Differential [[id number]]  (Abnormal) Collected:  06/07/19 1649    Specimen:  Blood Updated:  06/07/19 1803     WBC 16.95 10*3/mm3      RBC 3.34 10*6/mm3      Hemoglobin 10.7 g/dL      Hematocrit 31.9 %      MCV 95.5 fL      MCH 32.0 pg      MCHC 33.5 g/dL      RDW 15.6 %      RDW-SD 54.6 fl      MPV 11.7 fL      Platelets 970 10*3/mm3     Manual Differential [[id number]]  (Abnormal) Collected:  06/07/19 1649    Specimen:  Blood Updated:  06/07/19 1803     Neutrophil % 74.0 %      Lymphocyte % 6.0 %      Monocyte % 8.0 %      Eosinophil % 1.0 %      Bands %  9.0 %      Atypical Lymphocyte % 2.0 %      Neutrophils Absolute 14.07 10*3/mm3      Lymphocytes Absolute 1.02 10*3/mm3      Monocytes Absolute 1.36 10*3/mm3      Eosinophils Absolute 0.17 10*3/mm3      Poikilocytes Slight/1+     WBC Morphology Normal     Platelet Estimate Increased     Clumped Platelets Present     Giant Platelets Large/3+    TSH [[id number]]  (Normal) Collected:  06/07/19 1649    Specimen:  Blood Updated:  06/07/19 1746     TSH 1.200 mIU/mL     BNP [[id number]]  (Abnormal) Collected:  06/07/19 1649    Specimen:  Blood Updated:  06/07/19 1746     proBNP 33,800.0 pg/mL     Troponin [[id number]]  (Abnormal) Collected:  06/07/19 1649    Specimen:  Blood Updated:  06/07/19 1726     Troponin I  0.041 ng/mL     Lactic Acid, Plasma [[id number]]  (Abnormal) Collected:  06/07/19 1649    Specimen:  Blood Updated:  06/07/19 1723     Lactate 2.5 mmol/L     Lipase [[id number]]  (Normal) Collected:  06/07/19 1649    Specimen:  Blood Updated:  06/07/19 1716     Lipase 167 U/L     Comprehensive Metabolic Panel [[id number]]  (Abnormal) Collected:  06/07/19 1649    Specimen:  Blood Updated:  06/07/19 1716     Glucose 191 mg/dL      BUN 35 mg/dL      Creatinine 1.08 mg/dL      Sodium 135 mmol/L      Potassium 4.2 mmol/L      Chloride 98 mmol/L      CO2 27.0 mmol/L      Calcium 9.2 mg/dL      Total Protein 6.9 g/dL      Albumin 3.90 g/dL      ALT (SGPT) <15 U/L      AST (SGOT) 35 U/L      Alkaline Phosphatase 108 U/L      Total Bilirubin 1.6 mg/dL      eGFR Non African Amer 49 mL/min/1.73      Globulin 3.0 gm/dL      A/G Ratio 1.3 g/dL      BUN/Creatinine Ratio 32.4     Anion Gap 10.0 mmol/L     Narrative:       GFR Normal >60  Chronic Kidney Disease <60  Kidney Failure <15    Magnesium [[id number]]  (Normal) Collected:  06/07/19 1649    Specimen:  Blood Updated:  06/07/19 1716     Magnesium 1.8 mg/dL     Blood Culture - Blood, Arm, Left [[id number]] Collected:  06/07/19 1645    Specimen:  Blood from Arm, Left Updated:  06/07/19 1704    Blood Culture - Blood, Hand, Left [[id number]] Collected:  06/07/19 1630    Specimen:  Blood from Hand, Left Updated:  06/07/19 1704        Imaging Results (last 24 hours)     Procedure Component Value Units Date/Time    XR Chest 1 View [[id number]] Collected:  06/07/19 1659     Updated:  06/07/19 1706    Narrative:       EXAMINATION: XR CHEST 1 VW-. 6/7/2019 4:59 PM CDT     CHEST, ONE VIEW:     HISTORY: Shortness of air, cough     COMPARISON: 03/06/2019, 10/11/2018 10/03/2018     A single frontal chest radiograph was obtained.     FINDINGS:           Changes median sternotomy and cardiac valve replacement observed.  Permanent cardiac pacemaker appreciated.     The lungs are clear without acute  infiltrates. Mild chronic interstitial  prominence noted.     The heart is generous. The pulmonary circulation appropriate, without  evidence of heart failure.     The bony structures are intact without acute osseous abnormalities.                                     Impression:       1. Generous heart size.  2. No acute cardiopulmonary process.     This report was finalized on 06/07/2019 17:03 by Dr. Tarun Rios MD.    CT Abdomen Pelvis Without Contrast [[id number]] Collected:  06/07/19 1602     Updated:  06/07/19 1613    Narrative:       EXAM: CT ABDOMEN PELVIS WO CONTRAST- - 6/7/2019 3:34 PM CDT     HISTORY: abd pain, distention, vomiting       COMPARISON: 11/30/2018.      DOSE LENGTH PRODUCT: 228 mGy cm. Automated exposure control was also  utilized to decrease patient radiation dose.     TECHNIQUE: Unenhanced axial images of the abdomen and pelvis obtained  with coronal and sagittal reformats.     FINDINGS: Evaluation limited secondary to lack of intravenous contrast  agent.      VISUALIZED CHEST: Respiratory motion limits fine parenchymal evaluation.  There is a 4-5 cm opacity at the medial right lower lobe, incompletely  evaluated on this exam. Small bilateral pleural effusions. Cardiomegaly  with changes of aortic valve replacement and CABG.     LIVER: Normal hepatic contour.     BILIARY: Cholecystectomy clips.     PANCREAS: Limited in evaluation due to motion, grossly within normal  limits.     SPLEEN: Normal size and contour.      ADRENAL: Normal appearance of the bilateral adrenal glands.     GENITOURINARY:   Left renal calcification favored to be vascular. No hydronephrosis.   Urinary bladder collapsed, which limits evaluation of the bladder wall.   Uterus absent. No pelvic mass.     PERITONEUM: Ascites. No definite free air.     GI TRACT: Small hiatal hernia. Normal C-sweep of the duodenum. All of  stool at the rectum measures 7 cm. Mild thickening of the rectum, which  can be seen with stercoral  colitis. There appear to be a few colonic  diverticula. Nonvisualized appendix. No evidence of bowel obstruction.     VESSELS: Normal course and caliber of the calcified aorta.     RETROPERITONEUM: There are a few borderline retroperitoneal lymph nodes.     SOFT TISSUES: Anasarca.     BONES: Greater than 50 percent height loss of the L2 vertebral body with  changes of kyphoplasty and 4 mm retropulsion of the posterior vertebral  body margin and at least mild spinal canal stenosis.          Impression:       1. There is a large ball of stool at the rectum with thickening of the  rectal wall. Correlate for impaction and possible stercoral colitis.  2. Medial aspect of the right lower lobe with consolidation. Small  bilateral pleural effusions. This is incompletely evaluated on this  exam. It could represent atelectasis, infection or mass. Correlate with  clinical presentation and recommend follow-up after treatment to  evaluate for resolution.  3. Ascites and anasarca suggests fluid overload.  4. Cardiomegaly with changes of aortic valve replacement and CABG.  5. Incidental findings include prior cholecystectomy, colonic  diverticula, nonspecific borderline retroperitoneal lymph nodes and  chronic L2 compression fracture with kyphoplasty.  This report was finalized on 06/07/2019 16:10 by Dr Shae Sainz MD.        I have personally reviewed and interpreted the radiology studies and ECG obtained at time of admission.     Assessment / Plan     Assessment:   Active Hospital Problems    Diagnosis   • Acute on chronic congestive heart failure (CMS/HCC)     Impression:  1.  Fecal impaction with stercoral colitis  2.  Ascites and anasarca   3.  History of congestive heart failure with elevated BNP  4.  CML  5.  Consolidation in the medial aspect of the right lower lobe  6.  Elevated lactic acid  7. Insulin-dependent diabetes mellitus  8.  Leukocytosis  9.  Nonspecific anemia  10.  Marginally elevated troponin    Plan:    1.  Continue with bowel program  2.  IV diuretic  3.  Invanz for possible colitis and increasing lactic acid  4.  Labs in the morning  5.  CT scan of the chest to rule out pneumonia  6.  Sliding scale insulin with Accu-Cheks   7.  Cardiac markers  8.  Telemetry  9.  KUB in the morning  10.  PT OT consult    Code Status: Full     I discussed the patient's findings and my recommendations with the patient, no family at bedside    Estimated length of stay 3 to 4 days    Miguelito Ball DO   06/07/19   10:24 PM              Electronically signed by Miguelito Ball DO at 6/7/2019 10:31 PM          Physician Progress Notes (most recent note)      Myron Huynh MD at 6/9/2019 11:57 AM              [facility] Medicine Services  INPATIENT PROGRESS NOTE    Patient Name: Lakesha Costello  Date of Admission: 6/7/2019  Today's Date: 06/09/19  Length of Stay: 2  Primary Care Physician: Leonardo Zepeda DO    Subjective   Chief Complaint: follow up heart failure  HPI   Pt is sitting up in the chair. States she is breathing better. She was complaining of some mild abdominal pain stating she doesn't usually eat that much breakfast. States she is voiding frequently with the Lasix. Still complains of a cough. No chest pain. Lower extremities remain edematous.      Review of Systems   All pertinent negatives and positives are as above. All other systems have been reviewed and are negative unless otherwise stated.     Objective    Temp:  [97.6 °F (36.4 °C)-98.8 °F (37.1 °C)] 98.1 °F (36.7 °C)  Heart Rate:  [60-75] 75  Resp:  [16-20] 18  BP: (135-149)/(41-62) 149/50  Physical Exam   Constitutional: She is oriented to person, place, and time. She appears well-developed.   Thin and chronically ill appearing.    HENT:   Head: Normocephalic and atraumatic.   Eyes: Conjunctivae and EOM are normal. Pupils are equal, round, and reactive to light.   Neck: Neck supple. No JVD present. No  thyromegaly present.   Cardiovascular: Normal rate, regular rhythm, normal heart sounds and intact distal pulses. Exam reveals no gallop and no friction rub.   No murmur heard.  Pulmonary/Chest: Effort normal and breath sounds normal. No respiratory distress. She has no wheezes. She has no rales. She exhibits no tenderness.   Abdominal: Soft. Bowel sounds are normal. She exhibits no distension. There is no tenderness. There is no rebound and no guarding.   Musculoskeletal: Normal range of motion. She exhibits edema (1+ pitting edema. ). She exhibits no tenderness or deformity.   Lymphadenopathy:     She has no cervical adenopathy.   Neurological: She is alert and oriented to person, place, and time. She displays normal reflexes. No cranial nerve deficit. She exhibits normal muscle tone.   Skin: Skin is warm and dry. No rash noted.   Psychiatric: She has a normal mood and affect. Her behavior is normal. Judgment and thought content normal.     Results Review:  I have reviewed the labs, radiology results, and diagnostic studies.    Laboratory Data:   Results from last 7 days   Lab Units 06/09/19  0346 06/08/19  1536 06/07/19  1649   WBC 10*3/mm3 13.97* 14.27* 16.95*   HEMOGLOBIN g/dL 10.2* 10.6* 10.7*   HEMATOCRIT % 32.1* 32.0* 31.9*   PLATELETS 10*3/mm3 1,039* 1,051* 970*        Results from last 7 days   Lab Units 06/09/19  0346 06/07/19  1649   SODIUM mmol/L 138 135   POTASSIUM mmol/L 5.0 4.2   CHLORIDE mmol/L 100 98   CO2 mmol/L 29.0 27.0   BUN mg/dL 37* 35*   CREATININE mg/dL 1.05 1.08   CALCIUM mg/dL 9.3 9.2   BILIRUBIN mg/dL 0.7 1.6*   ALK PHOS U/L 100 108   ALT (SGPT) U/L <15 <15   AST (SGOT) U/L 26 35   GLUCOSE mg/dL 90 191*       Culture Data:   Blood Culture   Date Value Ref Range Status   06/07/2019 No growth at 24 hours  Preliminary   06/07/2019 No growth at 24 hours  Preliminary       Radiology Data:   Imaging Results (last 24 hours)     ** No results found for the last 24 hours. **          I have  reviewed the patient's current medications.     Assessment/Plan     Active Hospital Problems    Diagnosis   • **Acute on chronic diastolic congestive heart failure (CMS/HCC)   • Moderate malnutrition (CMS/HCC)   • Constipation   • CML (chronic myeloid leukemia) (CMS/HCC)   • Thrombocytosis (CMS/HCC)   • CKD (chronic kidney disease) stage 3, GFR 30-59 ml/min (CMS/HCC)     GFR 32 in 4/2018     • Age-related osteoporosis without current pathological fracture     Last Assessment & Plan:   She has clinical osteoporosis with a history of vertebral compression fractures.  Recommend DXA test followed by treatment.     • Artificial pacemaker   • Paroxysmal atrial fibrillation (CMS/[facility])     S/p Watchman       Plan:  1. Continue Iv lasix today. Will reassess CXR tomorrow.   2. Begin atrovent nebulizers today.  3. Repeat labs in am.   4. Add incentive spirometry and flutter valve.   5. Encouraged her to Increase her activity.   6. She has an appointment with her PCP and Heart  Tomorrow in [place]. Pt states she is not up to seeing her [place] MD tomorrow.   7. Repeat CXR in am.     Discharge Planning: I expect the patient to be discharged to home in 1-2 days.    FACUNDO Quevedo   06/09/19   12:03 PM   I personally evaluated and examined the patient in conjunction with FACUNDO Dave and agree with the assessment, treatment plan, and disposition of the patient as recorded by her. My history, exam, and further recommendations are:   Since admission her platelet count has been elevated from 970-1039.  I will check for ferritin and iron and replace iron if noted to have iron deficiency anemia.  Further testing may be warranted but will defer this to his hematologist oncologist in outpatient setting.  Patient has no episode of bleeding or reported thrombosis.    She has known history of CML.  She gets most of her care in [place].    She presented with abdominal swelling and currently being managed for heart  "failure  She has negative fluid balance of 220 ml.  I am not certain about the accuracy of reporting.  Her weight remains stable    Vitals:    19 1424   BP:  149/50   Pulse: 70   Resp: 16   Temp:  98.1   SpO2: 98%       Myron Huynh MD  19  3:29 PM      Electronically signed by Myron Huynh MD at 2019  3:35 PM         [address] [address]  Dept. Phone:  [phone]  Dept. Fax:   Date Ordered: Jp 10, 2019         Patient:  Lakesha Costello MRN:  [id number]   [address] [place] [id number] :  1935  SSN:    Phone: [phone] Sex:  F     Weight: 56.6 kg (124 lb 12.8 oz)         Ht Readings from Last 1 Encounters:   19 154.9 cm (61\")         Home Nebulizer          (Order ID: [id number])    Diagnosis:  Acute on chronic congestive heart failure, unspecified heart failure type (CMS/HCC) (I50.9 [ICD-10-CM] 428.0 [ICD-9-CM])  Shortness of breath (R06.02 [ICD-10-CM] 786.05 [ICD-9-CM])   Quantity:  1     Nebulizer Equipment:  Nebulizer w/ Compressor  Nebulizer Accessories:  Nebulizer Kit - Administration Set, Non-Disposable  The face to face evaluation was performed on: 6/10/2019  Length of Need (99 Months = Lifetime): 99 Months = Lifetime        Authorizing Provider's Phone: [phone]   Authorizing Provider:Lucy Art APRN  Authorizing Provider's NPI: [id number]  Order Entered By: Lucy Art APRN 6/10/2019  2:45 PM     Electronically signed by: Lucy Art APRN 6/10/2019  2:45 PM          "

## 2019-06-11 NOTE — PAYOR COMM NOTE
"Lakesha Costello (84 y.o. Female)     Date of Birth Social Security Number Address Home Phone MRN    1935  [address] [facility] [address] [phone] [id number]    Episcopalian Marital Status          Denominational of Luis        Admission Date Admission Type Admitting Provider Attending Provider Department, Room/Bed    6/7/19 Emergency Myron Huynh MD  [facility] 4B, 404/1    Discharge Date Discharge Disposition Discharge Destination        6/10/2019 Home or Self Care              Attending Provider:  (none)   Allergies:  Gleevec [Imatinib], Bentyl [Dicyclomine], Janumet [Sitagliptin-metformin Hcl], Dilaudid [Hydromorphone], Enalapril, Lopid [Gemfibrozil], Sulfa Antibiotics, Ultram [Tramadol]    Isolation:  None   Infection:  None   Code Status:  Prior    Ht:  154.9 cm (61\")   Wt:  56.6 kg (124 lb 12.8 oz)    Admission Cmt:  None   Principal Problem:  Acute on chronic diastolic congestive heart failure (CMS/HCC) [I50.33]                 Active Insurance as of 6/7/2019     Primary Coverage     Payor Plan Insurance Group Employer/Plan Group    HUMANA MEDICARE REPLACEMENT HUMANA MEDICARE REPL [id number]     Payor Plan Address Payor Plan Phone Number Payor Plan Fax Number Effective Dates    [address] [address] [phone]  1/1/2018 - None Entered    [facility] [id number]       Subscriber Name Subscriber Birth Date Member ID       LAKESHA COSTELLO 1935 [id number]                 Emergency Contacts      (Rel.) Home Phone Work Phone Mobile Phone    Margarita Ignacio (Rn) (Daughter) [phone]    Richard Pablo (Spouse) [phone] -- --                                                 Lucy Art APRN   Nurse Practitioner   Medicine          Progress Notes   Cosign Needed        Date of Service:  6/10/2019  4:28 PM   Creation Time:  6/10/2019  4:28 PM                         Cosign Needed                 "                     Expand widget buttonCollapse widget button        Show:Clear all      ManualTemplateCopied    Added by:          Lucy Art APRN Hover for detailscustomization button                                                                                                                                                                                                                                                                                  untitled image         [facility] Medicine Services    DISCHARGE SUMMARY               Date of Admission: 6/7/2019    Date of Discharge:  6/10/2019    Primary Care Physician: Leonardo Zepeda DO         Presenting Problem/History of Present Illness:    Acute on chronic congestive heart failure, unspecified heart failure type (CMS/[facility]) [I50.9]          Final Discharge Diagnoses:           Active Hospital Problems             Diagnosis       •     **Acute on chronic diastolic congestive heart failure (CMS/[facility])       •     Moderate malnutrition (CMS/[facility])       •     Constipation       •     CML (chronic myeloid leukemia) (CMS/[facility])       •     Thrombocytosis (CMS/[facility])       •     CKD (chronic kidney disease) stage 3, GFR 30-59 ml/min (CMS/[facility])                   GFR 32 in 4/2018            •     Age-related osteoporosis without current pathological fracture                   Last Assessment & Plan:     She has clinical osteoporosis with a history of vertebral compression fractures.  Recommend DXA test followed by treatment.            •     Artificial pacemaker       •     Paroxysmal atrial fibrillation (CMS/[facility])                   S/p Watchman                 Consults: None         Procedures Performed: none         Pertinent Test Results:                Imaging Results (last 7 days)                    Procedure       Component       Value       Units       Date/Time               XR Chest PA & Lateral [[id number]]      Collected:  06/10/19 0708                   Updated:  06/10/19 0716             Narrative:                EXAM: XR CHEST PA AND LATERAL- - 6/10/2019 4:56 AM CDT         HISTORY: follow up volume overload; I50.9-Heart failure, unspecified           COMPARISON: 06/08/2019, 06/07/2019.          TECHNIQUE:  2 images.  Frontal and lateral views of the chest.         FINDINGS:      Cardiac pulse generator overlies the left chest with 2 grossly intact    leads. Sternotomy wires and changes of CABG. Changes of TAVR and left    atrial occlusion device.         No pneumothorax. Blunting of the left costophrenic angle, could    represent small atelectasis or effusion. Mild diffuse interstitial    opacities with perihilar vascular prominence. Borderline cardiac    silhouette. Normal upper mediastinal silhouette. Calcified aortic    atherosclerosis. Kyphoplasty at L2. Old left clavicle fracture.    Cholecystectomy clips.                       Impression:                1. Mild diffuse interstitial opacities with perihilar vascular    prominence. Consider edema or atypical infection.    2. New blunting of the left costophrenic angle may represent small    effusion or atelectasis.    3. Postoperative changes including CABG, TAVR, left atrial occlusion    device, cardiac pulse generator.    This report was finalized on 06/10/2019 07:13 by Dr Shae Sainz MD.             XR Abdomen Flat & Upright [[id number]]     Collected:  06/08/19 1134                   Updated:  06/08/19 1141             Narrative:                EXAM: XR ABDOMEN FLAT AND UPRIGHT- - 6/8/2019 10:43 AM CDT         HISTORY: possible pneumoperitoneum on KUB; I50.9-Heart failure,    unspecified           COMPARISON: 06/20/2019.          TECHNIQUE:  2 images.  Upright and supine views of the abdomen         FINDINGS:  See impression                       Impression:                1. No evidence of pneumoperitoneum.    2. Short air-fluid levels in the bowel may  indicate liquid stool.    3. Cholecystectomy clips.    4. Kyphoplasty at L2. Degenerative changes in the spine.    5. Changes of cardiac valve replacement. See separately dictated CT    chest report of the same day.    This report was finalized on 06/08/2019 11:38 by Dr Shae Sainz MD.             CT Chest Without Contrast [[id number]]     Collected:  06/08/19 1030                   Updated:  06/08/19 1048             Narrative:                EXAM: CT CHEST WO CONTRAST- - 6/8/2019 9:51 AM CDT         HISTORY: Toxic effect of soaps and detergents; I50.9-Heart failure,    unspecified           COMPARISON: 03/23/2016.          DOSE LENGTH PRODUCT: 157 mGy cm. Automated exposure control was also    utilized to decrease patient radiation dose.         TECHNIQUE: Unenhanced CT images obtained with multiplanar reformats.         FINDINGS:     AIRWAYS/PULMONARY PARENCHYMA: The central airways are midline and    patent. Focal consolidation at the medial right lower lung. There is a    stable 2 mm pulmonary nodule along the right fissure, possibly a    intrapulmonary lymph node. Linear atelectasis at the lingula. Trace    bilateral pleural effusions.         VASCULATURE: Limited evaluation of the vasculature without intravenous    contrast. Thoracic aorta is normal in course and caliber.  Heavily    calcified aortic atherosclerosis.  Normal caliber pulmonary artery.          CARDIAC:  Cardiomegaly. No pericardial effusion.  Changes of CABG and    aortic valve replacement. Calcified aortic atherosclerosis of the native    vessels. Left atrial occlusion device.         MEDIASTINUM: There is a 1.5 cm round density at the inferior posterior    right hilum, axial series 2 image 68, could represent vascular    structures or an enlarged lymph node.. Esophagus has normal coarse,    caliber and wall thickness. There is extension of the nodular right    thyroid into the mediastinum, similar compared to 03/23/2016.          EXTRATHORACIC: Nodular thyroid extends into the upper mediastinum.. No    thoracic inlet or axillary adenopathy. Cardiac pulse generator in the    left chest. Changes of right mastectomy. Body wall edema.         INCLUDED UPPER ABDOMEN: Ascites. Cholecystectomy. Unenhanced liver,    spleen, adrenal glands, pancreatic tail within normal limits.         OSSEOUS: No acute or suspicious bony finding.                        Impression:                1. Redemonstration of consolidation at the medial aspect of the right    lower lobe. Differential diagnosis includes atelectasis, infection or    mass. Correlate with clinical presentation and recommend follow-up after    treatment to evaluate for resolution.    2. There is a 1.2 cm round density at the inferior posterior right    hilum, which could represent a vascular structure or enlarged lymph    node. If patient's renal function permits, recommend follow-up chest CT    be performed with contrast.    3. Stable nodular thyroid extending into the upper mediastinum.    4. Right mastectomy.    5. Cardiomegaly with postoperative changes of valve replacement, left    atrial occlusion and CABG.    5. Body wall edema, ascites, trace pleural effusions. Correlate for    fluid overload.    This report was finalized on 06/08/2019 10:45 by Dr Shae Sainz MD.             XR Abdomen KUB [[id number]]     Collected:  06/08/19 0712                   Updated:  06/08/19 0719             Narrative:                EXAM: XR ABDOMEN KUB- - 6/8/2019 3:40 AM CDT         HISTORY: constipation; I50.9-Heart failure, unspecified           COMPARISON: 06/07/2019.          TECHNIQUE:  1 images.  Supine view of the abdomen.          FINDINGS:  See impression                       Impression:                1. Pneumoperitoneum versus gas in the right colon. Consider decubitus or    upright view to evaluate for free air.    2. Nonspecific bowel gas pattern.    3. Cholecystectomy clips.    4. L2  kyphoplasty.    This report was finalized on 06/08/2019 07:16 by Dr Shae Sainz MD.             XR Chest 1 View [[id number]]     Collected:  06/07/19 1659                   Updated:  06/07/19 1706             Narrative:                EXAMINATION: XR CHEST 1 VW-. 6/7/2019 4:59 PM CDT         CHEST, ONE VIEW:         HISTORY: Shortness of air, cough         COMPARISON: 03/06/2019, 10/11/2018 10/03/2018         A single frontal chest radiograph was obtained.         FINDINGS:                   Changes median sternotomy and cardiac valve replacement observed.    Permanent cardiac pacemaker appreciated.         The lungs are clear without acute infiltrates. Mild chronic interstitial    prominence noted.         The heart is generous. The pulmonary circulation appropriate, without    evidence of heart failure.         The bony structures are intact without acute osseous abnormalities.                                                  Impression:                1. Generous heart size.    2. No acute cardiopulmonary process.         This report was finalized on 06/07/2019 17:03 by Dr. Tarun Rios MD.             CT Abdomen Pelvis Without Contrast [[id number]]     Collected:  06/07/19 1602                   Updated:  06/07/19 1613             Narrative:                EXAM: CT ABDOMEN PELVIS WO CONTRAST- - 6/7/2019 3:34 PM CDT         HISTORY: abd pain, distention, vomiting           COMPARISON: 11/30/2018.          DOSE LENGTH PRODUCT: 228 mGy cm. Automated exposure control was also    utilized to decrease patient radiation dose.         TECHNIQUE: Unenhanced axial images of the abdomen and pelvis obtained    with coronal and sagittal reformats.         FINDINGS: Evaluation limited secondary to lack of intravenous contrast    agent.          VISUALIZED CHEST: Respiratory motion limits fine parenchymal evaluation.    There is a 4-5 cm opacity at the medial right lower lobe, incompletely    evaluated on this exam. Small  bilateral pleural effusions. Cardiomegaly    with changes of aortic valve replacement and CABG.         LIVER: Normal hepatic contour.         BILIARY: Cholecystectomy clips.         PANCREAS: Limited in evaluation due to motion, grossly within normal    limits.         SPLEEN: Normal size and contour.          ADRENAL: Normal appearance of the bilateral adrenal glands.         GENITOURINARY:     Left renal calcification favored to be vascular. No hydronephrosis.     Urinary bladder collapsed, which limits evaluation of the bladder wall.     Uterus absent. No pelvic mass.         PERITONEUM: Ascites. No definite free air.         GI TRACT: Small hiatal hernia. Normal C-sweep of the duodenum. All of    stool at the rectum measures 7 cm. Mild thickening of the rectum, which    can be seen with stercoral colitis. There appear to be a few colonic    diverticula. Nonvisualized appendix. No evidence of bowel obstruction.         VESSELS: Normal course and caliber of the calcified aorta.         RETROPERITONEUM: There are a few borderline retroperitoneal lymph nodes.         SOFT TISSUES: Anasarca.         BONES: Greater than 50 percent height loss of the L2 vertebral body with    changes of kyphoplasty and 4 mm retropulsion of the posterior vertebral    body margin and at least mild spinal canal stenosis.                       Impression:                1. There is a large ball of stool at the rectum with thickening of the    rectal wall. Correlate for impaction and possible stercoral colitis.    2. Medial aspect of the right lower lobe with consolidation. Small    bilateral pleural effusions. This is incompletely evaluated on this    exam. It could represent atelectasis, infection or mass. Correlate with    clinical presentation and recommend follow-up after treatment to    evaluate for resolution.    3. Ascites and anasarca suggests fluid overload.    4. Cardiomegaly with changes of aortic valve replacement and  CABG.    5. Incidental findings include prior cholecystectomy, colonic    diverticula, nonspecific borderline retroperitoneal lymph nodes and    chronic L2 compression fracture with kyphoplasty.    This report was finalized on 06/07/2019 16:10 by Dr Shae Sainz MD.                                   Lab Results (last 72 hours)                    Procedure       Component       Value       Units       Date/Time               POC Glucose Once [[id number]]  (Abnormal)     Collected:  06/10/19 1508             Specimen:  Blood     Updated:  06/10/19 1534                   Glucose     132(abnormally high)     mg/dL                             Comment:     : [id number] Stephane MotleyApex ConstructionaMeter ID: [id number]                  POC Glucose Once [[id number]]  (Abnormal)     Collected:  06/10/19 1127             Specimen:  Blood     Updated:  06/10/19 1208                   Glucose     248(abnormally high)     mg/dL                             Comment:     : [id number] Stephane VillaaMeter ID: [id number]                  POC Glucose Once [[id number]]  (Abnormal)     Collected:  06/10/19 0904             Specimen:  Blood     Updated:  06/10/19 0923                   Glucose     222(abnormally high)     mg/dL                             Comment:     : [id number] Stephane iVllaaMehaim ID: [id number]                  Ferritin [[id number]]  (Normal)     Collected:  06/10/19 0520             Specimen:  Blood     Updated:  06/10/19 0731                   Ferritin     260.00     ng/mL                   Iron Profile [[id number]]     Collected:  06/10/19 0520             Specimen:  Blood     Updated:  06/10/19 0628             POC Glucose Once [[id number]]  (Abnormal)     Collected:  06/09/19 1941             Specimen:  Blood     Updated:  06/09/19 1952                   Glucose     203(abnormally high)     mg/dL                             Comment:     : [id number] Sherif Silveira ID: [id number]                  Blood  Culture - Blood, Arm, Left [[id number]]     Collected:  06/07/19 1645             Specimen:  Blood from Arm, Left     Updated:  06/09/19 1716                   Blood Culture     No growth at 2 days             Blood Culture - Blood, Hand, Left [[id number]]     Collected:  06/07/19 1630             Specimen:  Blood from Hand, Left     Updated:  06/09/19 1716                   Blood Culture     No growth at 2 days             POC Glucose Once [[id number]]  (Abnormal)     Collected:  06/09/19 1546             Specimen:  Blood     Updated:  06/09/19 1620                   Glucose     229(abnormally high)     mg/dL                             Comment:     : [id number] Sterling EuraisaMeter ID: [id number]                  POC Glucose Once [[id number]]  (Abnormal)     Collected:  06/09/19 1130             Specimen:  Blood     Updated:  06/09/19 1142                   Glucose     338(abnormally high)     mg/dL                             Comment:     : [id number] Sterling EuraisaMeter ID: [id number]                  POC Glucose Once [[id number]]  (Abnormal)     Collected:  06/09/19 0755             Specimen:  Blood     Updated:  06/09/19 0839                   Glucose     144(abnormally high)     mg/dL                             Comment:     : [id number] Sterling EuraisaMeter ID: [id number]                  Comprehensive Metabolic Panel [[id number]]  (Abnormal)     Collected:  06/09/19 0346             Specimen:  Blood     Updated:  06/09/19 0520                   Glucose     90     mg/dL                         BUN     37(abnormally high)     mg/dL                         Creatinine     1.05     mg/dL                         Sodium     138     mmol/L                         Potassium     5.0     mmol/L                         Chloride     100     mmol/L                         CO2     29.0     mmol/L                         Calcium     9.3     mg/dL                         Total Protein     6.1(abnormally low)     g/dL                          Albumin     3.40(abnormally low)     g/dL                         ALT (SGPT)     <15     U/L                         AST (SGOT)     26     U/L                         Alkaline Phosphatase     100     U/L                         Total Bilirubin     0.7     mg/dL                         eGFR Non  Amer     50(abnormally low)     mL/min/1.73                         Globulin     2.7     gm/dL                         A/G Ratio     1.3     g/dL                         BUN/Creatinine Ratio     35.2(abnormally high)                   Anion Gap     9.0     mmol/L                   Narrative:                GFR Normal >60    Chronic Kidney Disease <60    Kidney Failure <15             CBC & Differential [[id number]]     Collected:  06/09/19 0346             Specimen:  Blood     Updated:  06/09/19 0514             Narrative:                The following orders were created for panel order CBC & Differential.    Procedure                               Abnormality         Status                       ---------                               -----------         ------                       CBC Auto Differential[[id number]]        Abnormal            Final result                      Please view results for these tests on the individual orders.             CBC Auto Differential [[id number]]  (Abnormal)     Collected:  06/09/19 0346             Specimen:  Blood     Updated:  06/09/19 0514                   WBC     13.97(abnormally high)     10*3/mm3                         RBC     3.21(abnormally low)     10*6/mm3                         Hemoglobin     10.2(abnormally low)     g/dL                         Hematocrit     32.1(abnormally low)     %                         MCV     100.0(abnormally high)     fL                         MCH     31.8     pg                         MCHC     31.8(abnormally low)     g/dL                         RDW     15.4(abnormally high)     %                         RDW-SD      56.0(abnormally high)     fl                         MPV     11.6     fL                         Platelets     1,039(abnormally high)     10*3/mm3                         Neutrophil %     72.6     %                         Lymphocyte %     11.2(abnormally low)     %                         Monocyte %     11.4     %                         Eosinophil %     2.4     %                         Basophil %     0.7     %                         Neutrophils, Absolute     10.14(abnormally high)     10*3/mm3                         Lymphocytes, Absolute     1.56     10*3/mm3                         Monocytes, Absolute     1.59(abnormally high)     10*3/mm3                         Eosinophils, Absolute     0.34     10*3/mm3                         Basophils, Absolute     0.10     10*3/mm3                   POC Glucose Once [[id number]]  (Abnormal)     Collected:  06/08/19 2017             Specimen:  Blood     Updated:  06/08/19 2044                   Glucose     158(abnormally high)     mg/dL                             Comment:     : [id number] Jazz Barnardserene Sanders ID: [id number]                  POC Glucose Once [[id number]]  (Abnormal)     Collected:  06/08/19 1658             Specimen:  Blood     Updated:  06/08/19 1710                   Glucose     188(abnormally high)     mg/dL                             Comment:     : [id number] Sterling EuraisaMeter ID: [id number]                  Troponin [[id number]]  (Normal)     Collected:  06/08/19 1536             Specimen:  Blood     Updated:  06/08/19 1622                   Troponin I     0.026     ng/mL                   CBC & Differential [[id number]]     Collected:  06/08/19 1536             Specimen:  Blood     Updated:  06/08/19 1620             Narrative:                The following orders were created for panel order CBC & Differential.    Procedure                               Abnormality         Status                       ---------                                -----------         ------                       CBC Auto Differential[[id number]]        Abnormal            Final result                      Please view results for these tests on the individual orders.             CBC Auto Differential [[id number]]  (Abnormal)     Collected:  06/08/19 1536             Specimen:  Blood     Updated:  06/08/19 1620                   WBC     14.27(abnormally high)     10*3/mm3                         RBC     3.32(abnormally low)     10*6/mm3                         Hemoglobin     10.6(abnormally low)     g/dL                         Hematocrit     32.0(abnormally low)     %                         MCV     96.4     fL                         MCH     31.9     pg                         MCHC     33.1     g/dL                         RDW     15.5(abnormally high)     %                         RDW-SD     54.7(abnormally high)     fl                         MPV     11.4     fL                         Platelets     1,051(abnormally high)     10*3/mm3                   Manual Differential [[id number]]  (Abnormal)     Collected:  06/08/19 1536             Specimen:  Blood     Updated:  06/08/19 1620                   Neutrophil %     81.0(abnormally high)     %                         Lymphocyte %     10.0(abnormally low)     %                         Monocyte %     9.0     %                         Neutrophils Absolute     11.56(abnormally high)     10*3/mm3                         Lymphocytes Absolute     1.43     10*3/mm3                         Monocytes Absolute     1.28     10*3/mm3                         RBC Morphology     Normal                   WBC Morphology     Normal                   Platelet Estimate     Increased                   Giant Platelets     Mod/2+             POC Glucose Once [[id number]]  (Abnormal)     Collected:  06/08/19 1258             Specimen:  Blood     Updated:  06/08/19 1309                   Glucose     161(abnormally high)     mg/dL                              Comment:     : [id number] Sterling EuraisaMeter ID: [id number]                  Troponin [[id number]]  (Normal)     Collected:  06/08/19 0929             Specimen:  Blood     Updated:  06/08/19 1016                   Troponin I     0.034     ng/mL                   POC Glucose Once [[id number]]  (Normal)     Collected:  06/08/19 0844             Specimen:  Blood     Updated:  06/08/19 0859                   Glucose     87     mg/dL                             Comment:     : [id number] Sterling EuraisaMeter ID: [id number]                  POC Glucose Once [[id number]]  (Abnormal)     Collected:  06/08/19 0818             Specimen:  Blood     Updated:  06/08/19 0829                   Glucose     58(abnormally low)     mg/dL                             Comment:     : [id number] Sterling EuraisaMeter ID: [id number]                  POC Glucose Once [[id number]]  (Abnormal)     Collected:  06/08/19 0743             Specimen:  Blood     Updated:  06/08/19 0754                   Glucose     49(critically low)     mg/dL                             Comment:     : [id number] Sterling EuraisaMeter ID: [id number]                  POC Glucose Once [[id number]]  (Abnormal)     Collected:  06/08/19 0732             Specimen:  Blood     Updated:  06/08/19 0744                   Glucose     49(critically low)     mg/dL                             Comment:     : [id number] Sterling EuraisaMeter ID: [id number]                  Troponin [[id number]]  (Abnormal)     Collected:  06/08/19 0400             Specimen:  Blood     Updated:  06/08/19 0442                   Troponin I     0.044(abnormally high)     ng/mL                   Troponin [[id number]]  (Abnormal)     Collected:  06/07/19 2302             Specimen:  Blood     Updated:  06/07/19 2340                   Troponin I     0.040(abnormally high)     ng/mL                   Lactic Acid, Plasma [[id number]]  (Normal)     Collected:  06/07/19 2302             Specimen:  Blood      Updated:  06/07/19 2322                   Lactate     1.8     mmol/L                   POC Glucose Once [[id number]]  (Abnormal)     Collected:  06/07/19 2237             Specimen:  Blood     Updated:  06/07/19 2258                   Glucose     208(abnormally high)     mg/dL                             Comment:     : [id number] Azul Edwards ID: [id number]                  Lactic Acid, Reflex [[id number]]  (Abnormal)     Collected:  06/07/19 2040             Specimen:  Blood     Updated:  06/07/19 2101                   Lactate     2.8(critically high)     mmol/L                   Lactic Acid, Reflex Timer (This will reflex a repeat order 3-3:15 hours after ordered.) [[id number]]     Collected:  06/07/19 1649             Specimen:  Blood     Updated:  06/07/19 2031                   Extra Tube     Hold for add-ons.                       Comment:     Auto resulted.                  Urinalysis With Culture If Indicated - Urine, Catheter In/Out [[id number]]  (Abnormal)     Collected:  06/07/19 1807             Specimen:  Urine, Catheter In/Out     Updated:  06/07/19 1830                   Color, UA     Dark Yellow(abnormal)                   Appearance, UA     Clear                   pH, UA     <=5.0                   Specific [place], UA     1.014                   Glucose, UA     Negative                   Ketones, UA     Negative                   Bilirubin, UA     Negative                   Blood, UA     Negative                   Protein, UA     100 mg/dL (2+)(abnormal)                   Leuk Esterase, UA     Negative                   Nitrite, UA     Negative                   Urobilinogen, UA     1.0 E.U./dL             Urinalysis, Microscopic Only - Urine, Catheter In/Out [[id number]]     Collected:  06/07/19 1807             Specimen:  Urine, Catheter In/Out     Updated:  06/07/19 1830                   RBC, UA     None Seen     /HPF                         WBC, UA     None Seen     /HPF                          Bacteria, UA     None Seen     /HPF                         Squamous Epithelial Cells, UA     0-2     /HPF                         Hyaline Casts, UA     0-2     /LPF                         Methodology     Automated Microscopy             CBC & Differential [[id number]]     Collected:  06/07/19 1649             Specimen:  Blood     Updated:  06/07/19 1803             Narrative:                The following orders were created for panel order CBC & Differential.    Procedure                               Abnormality         Status                       ---------                               -----------         ------                       CBC Auto Differential[[id number]]        Abnormal            Final result                      Please view results for these tests on the individual orders.             CBC Auto Differential [[id number]]  (Abnormal)     Collected:  06/07/19 1649             Specimen:  Blood     Updated:  06/07/19 1803                   WBC     16.95(abnormally high)     10*3/mm3                         RBC     3.34(abnormally low)     10*6/mm3                         Hemoglobin     10.7(abnormally low)     g/dL                         Hematocrit     31.9(abnormally low)     %                         MCV     95.5     fL                         MCH     32.0     pg                         MCHC     33.5     g/dL                         RDW     15.6(abnormally high)     %                         RDW-SD     54.6(abnormally high)     fl                         MPV     11.7     fL                         Platelets     970(abnormally high)     10*3/mm3                   Manual Differential [[id number]]  (Abnormal)     Collected:  06/07/19 1649             Specimen:  Blood     Updated:  06/07/19 1803                   Neutrophil %     74.0     %                         Lymphocyte %     6.0(abnormally low)     %                         Monocyte %     8.0     %                         Eosinophil  %     1.0     %                         Bands %      9.0     %                         Atypical Lymphocyte %     2.0     %                         Neutrophils Absolute     14.07(abnormally high)     10*3/mm3                         Lymphocytes Absolute     1.02     10*3/mm3                         Monocytes Absolute     1.36(abnormally high)     10*3/mm3                         Eosinophils Absolute     0.17     10*3/mm3                         Poikilocytes     Slight/1+                   WBC Morphology     Normal                   Platelet Estimate     Increased                   Clumped Platelets     Present                   Giant Platelets     Large/3+             TSH [[id number]]  (Normal)     Collected:  06/07/19 1649             Specimen:  Blood     Updated:  06/07/19 1746                   TSH     1.200     mIU/mL                   BNP [[id number]]  (Abnormal)     Collected:  06/07/19 1649             Specimen:  Blood     Updated:  06/07/19 1746                   proBNP     33,800.0(abnormally high)     pg/mL                   Troponin [[id number]]  (Abnormal)     Collected:  06/07/19 1649             Specimen:  Blood     Updated:  06/07/19 1726                   Troponin I     0.041(abnormally high)     ng/mL                   Lactic Acid, Plasma [[id number]]  (Abnormal)     Collected:  06/07/19 1649             Specimen:  Blood     Updated:  06/07/19 1723                   Lactate     2.5(critically high)     mmol/L                   Lipase [[id number]]  (Normal)     Collected:  06/07/19 1649             Specimen:  Blood     Updated:  06/07/19 1716                   Lipase     167     U/L                   Comprehensive Metabolic Panel [[id number]]  (Abnormal)     Collected:  06/07/19 1649             Specimen:  Blood     Updated:  06/07/19 1716                   Glucose     191(abnormally high)     mg/dL                         BUN     35(abnormally high)     mg/dL                         Creatinine     1.08      mg/dL                         Sodium     135     mmol/L                         Potassium     4.2     mmol/L                         Chloride     98     mmol/L                         CO2     27.0     mmol/L                         Calcium     9.2     mg/dL                         Total Protein     6.9     g/dL                         Albumin     3.90     g/dL                         ALT (SGPT)     <15     U/L                         AST (SGOT)     35     U/L                         Alkaline Phosphatase     108     U/L                         Total Bilirubin     1.6(abnormally high)     mg/dL                         eGFR Non  Amer     49(abnormally low)     mL/min/1.73                         Globulin     3.0     gm/dL                         A/G Ratio     1.3     g/dL                         BUN/Creatinine Ratio     32.4(abnormally high)                   Anion Gap     10.0     mmol/L                   Narrative:                GFR Normal >60    Chronic Kidney Disease <60    Kidney Failure <15             Magnesium [[id number]]  (Normal)     Collected:  06/07/19 1649             Specimen:  Blood     Updated:  06/07/19 1716                   Magnesium     1.8     mg/dL                                   Chief Complaint on Day of Discharge: none          Hospital Course:    The patient is a 84 y.o. female who follows with Dr. stewart for her primary care. She has a past medical history significant for CML, chronic diastolic heart failure, valvular heart disease status post TAVR, paroxysmal atrial fibrillation, pacemaker insertion, and insulin dependent diabetes mellitus. She presented to the emergency department on 6/7 with complaints of abdominal swelling and lower extremity swelling.  In the emergency department, CT of the abdomen reveals a large fecal impaction.  Given enema in the emerge department had 2 large bowel movements.  She had a BNP of elevated 33,000.  She was admitted to the hospitalist service  "for further evaluation management.         She was given low-dose IV Lasix to combat fluid overload.  Patient states she already felt much better.  She started having regular bowel movements.  Is actually about the same.  I repeated her chest x-ray today.  Does reveals evidence of volume overload.  Have given her extra IV Lasix and instructed her to take extra oral Lasix at home at least for the next few days.  She has agreed to have home health will have home health for nursing.  Recommended to have BMP on Wednesday with results to Dr. Zepeda.         She sees several providers at [facility].  She actually had an appointment with her cardiologist today that only to be rescheduled.  Was also supposed to see her primary care here which is been rescheduled for 1 week.  She has an appointment next week with her oncologist.  She tells me that she was supposed to take a new chemotherapy medication but has not been taking it because she was so sick.  She has chronic venous stasis and does have some lower extremity edema however, patient states this is much better than her baseline.  Patient is very anxious to be discharged today.  She does wear oxygen at 2 L at night and has been instructed to continue this as well.  Not required any antimicrobial therapy.         Condition on Discharge:  Stable for discharge with high risk of readmission         Physical Exam on Discharge:    /46 (BP Location: Left arm, Patient Position: Lying)   Pulse 69   Temp 98.3 °F (36.8 °C) (Oral)   Resp 16   Ht 154.9 cm (61\")   Wt 56.6 kg (124 lb 12.8 oz)   SpO2 98%   BMI 23.58 kg/m²     Physical Exam     Constitutional: She is oriented to person, place, and time. She appears well-developed and well-nourished.     HENT:     Head: Normocephalic and atraumatic.     Eyes: Conjunctivae and EOM are normal. Pupils are equal, round, and reactive to light.     Neck: Neck supple. No JVD present. No thyromegaly present.     Cardiovascular: " Normal rate, regular rhythm, normal heart sounds and intact distal pulses. Exam reveals no gallop and no friction rub.     No murmur heard.  vpacing 70-83     Pulmonary/Chest: Effort normal. No respiratory distress. She has no wheezes. She has no rales. She exhibits no tenderness.   Coarse bilaterally.      Abdominal: Soft. Bowel sounds are normal. She exhibits no distension. There is no tenderness. There is no rebound and no guarding.   Musculoskeletal: Normal range of motion. She exhibits edema. She exhibits no tenderness or deformity.   Lymphadenopathy:     She has no cervical adenopathy.   Neurological: She is alert and oriented to person, place, and time. She displays normal reflexes. No cranial nerve deficit. She exhibits normal muscle tone.     Skin: Skin is warm and dry. No rash noted.   Chronic venous stasis changes.    Psychiatric: She has a normal mood and affect. Her behavior is normal. Judgment and thought content normal.          Discharge Disposition:    Home or Self Care         Discharge Medications:                   Discharge Medications                      New Medications                    Instructions       Start Date         dextromethorphan polistirex ER 30 MG/5ML Suspension Extended Release oral suspension    Commonly known as:  DELSYM          60 mg, Oral, Every 12 Hours Scheduled                  ipratropium 0.02 % nebulizer solution    Commonly known as:  ATROVENT          500 mcg, Nebulization, 4 Times Daily - RT                                          Changes to Medications                    Instructions       Start Date         atenolol 50 MG tablet    Commonly known as:  TENORMIN    What changed:  additional instructions          Take 1 tab PO qam & 1/2 tab PO qpm.                  bisacodyl 10 MG suppository    Commonly known as:  DULCOLAX    What changed:      •when to take this      •reasons to take this            10 mg, Rectal, Daily                  furosemide 40 MG  tablet    Commonly known as:  LASIX    What changed:  when to take this          40 mg, Oral, 2 Times Daily                  insulin detemir 100 UNIT/ML injection    Commonly known as:  LEVEMIR    What changed:      •how much to take      •additional instructions            30 Units, Subcutaneous, Nightly, 30 units qam 30 units qpm                                          Continue These Medications                    Instructions       Start Date         aspirin 81 MG EC tablet          81 mg, Oral, Daily                  calcium carbonate 600 MG tablet    Commonly known as:  OS-MICHAEL          600 mg, Oral, 2 Times Daily With Meals                  cholecalciferol 1000 units tablet    Commonly known as:  VITAMIN D3          1,000 Units, Oral, Daily                  Ferrous Sulfate  (45 Fe) MG tablet controlled-release          1 tablet, Oral, Daily                  glipiZIDE 5 MG tablet    Commonly known as:  GLUCOTROL          5 mg, Oral, 2 Times Daily Before Meals                  nitroglycerin 0.4 MG SL tablet    Commonly known as:  NITROSTAT          1 under the tongue as needed for angina, may repeat q5mins for up three doses                  O2    Commonly known as:  OXYGEN          2 L/min, Inhalation, Daily PRN                  ondansetron ODT 4 MG disintegrating tablet    Commonly known as:  ZOFRAN ODT          4 mg, Oral, Every 4 Hours PRN                  pantoprazole 40 MG EC tablet    Commonly known as:  PROTONIX          40 mg, Oral, Daily                  polyethyl glycol-propyl glycol 0.4-0.3 % solution ophthalmic solution    Commonly known as:  SYSTANE          1 drop, Both Eyes, Daily                  polyethylene glycol packet    Commonly known as:  MIRALAX          17 g, Oral, Daily                  potassium chloride 8 MEQ CR capsule    Commonly known as:  MICRO-K          10 mEq, Oral, Daily                  PRESERVISION/LUTEIN capsule          1 capsule, Oral, 2 Times Daily                   simvastatin 20 MG tablet    Commonly known as:  ZOCOR          20 mg, Oral, Nightly                  sucralfate 1 GM/10ML suspension    Commonly known as:  CARAFATE          1 g, Oral, 3 Times Daily PRN                  triamcinolone 0.1 % cream    Commonly known as:  KENALOG          1 application, Topical, 2 Times Daily                  TUCKS 50 % pads          1 pad, Apply externally, 6 Times Daily                  venlafaxine XR 37.5 MG 24 hr capsule    Commonly known as:  EFFEXOR-XR          37.5 mg, Oral, Daily                  vitamin B-12 500 MCG tablet    Commonly known as:  CYANOCOBALAMIN          500 mcg, Oral, Daily                                Stop These Medications          hydrocortisone 25 MG suppository    Commonly known as:  ANUSOL-HC            Lidocaine-Hydrocortisone Ace 2.8-0.55 % gel                           Discharge Diet:           Diet Instructions                Diet: Consistent Carbohydrate, Cardiac; Thin                   Discharge Diet:       Consistent Carbohydrate    Cardiac                  Fluid Consistency:  Thin                      Activity at Discharge:           Activity Instructions                Activity as Tolerated                        Follow-up Appointments:              Future Appointments       Date     Time     Provider     Department     Center       6/17/2019      2:00 PM     Linda Johnson APRN     [facility]     None       7/29/2019      9:00 AM     Ehsan Crocker MD     [facility]     [facility]       8/20/2019      1:00 PM     Leonardo Zepeda DO     [facility]     None       3/10/2020      9:45 AM     [facility]     [facility]     [facility]                 Test Results Pending at Discharge: none         FACUNDO Quevedo    06/10/19    4:29 PM         Time: 35 min.                                               ED to Hosp-Admission (Discharged) on 6/7/2019                                Revision History                                 Detailed Report                                   Discharge Order (From admission, onward)    Start     Ordered    06/10/19 1430  Discharge patient  Once     Expected Discharge Date:  06/10/19    Discharge Disposition:  Home or Self Care    Physician of Record for Attribution - Please select from Treatment Team:  DARRYL KOROMA [[id number]]    Review needed by CMO to determine Physician of Record:  No       Question Answer Comment   Physician of Record for Attribution - Please select from Treatment Team DARRYL KOROMA    Review needed by CMO to determine Physician of Record No        06/10/19 1433

## 2019-06-11 NOTE — TELEPHONE ENCOUNTER
Doxycycline sent into the pharamcy. She is scheduled to see Linda on Monday so she can follow-up there as needed.

## 2019-06-11 NOTE — TELEPHONE ENCOUNTER
Patient daughter called she states that her sputum is greenish yellow & it has blood in it.  She's not throwing up blood, she called [facility] and they can get her in Monday for an appointment, she is asking would you please send in a antibiotic to hold her over until Monday?

## 2019-06-11 NOTE — OUTREACH NOTE
Spoke with family member and Patient doing better. Patient is coughing up yellow    Nurse was there and listen to chest stated it was clear

## 2019-06-11 NOTE — OUTREACH NOTE
Prep Survey      Responses   Facility patient discharged from?  [place]   Is patient eligible?  Yes   Discharge diagnosis  Acute on chronic diastolic congestive heart failure    Does the patient have one of the following disease processes/diagnoses(primary or secondary)?  CHF   Does the patient have Home health ordered?  Yes   What is the Home health agency?   [facility]   Is there a DME ordered?  Yes   What DME was ordered?  oxygen   Prep survey completed?  Yes          Shelli Wade RN

## 2019-06-12 NOTE — TELEPHONE ENCOUNTER
Daughter very unhappy regarding mother's care during hospitalization. Listened to complaints and apologized. Offered to transfer her to patient relations but she declined, states she doesn't have time because she is at work. Provided her with number for patient relations to call at her convenience.

## 2019-06-13 NOTE — OUTREACH NOTE
CHF Week 1 Survey      Responses   Facility patient discharged from?  [place]   Does the patient have one of the following disease processes/diagnoses(primary or secondary)?  CHF   Is there a successful TCM telephone encounter documented?  No   CHF Week 1 attempt successful?  No   Unsuccessful attempts  Attempt 1          Haylie Marino RN

## 2019-06-14 NOTE — OUTREACH NOTE
CHF Week 1 Survey      Responses   Facility patient discharged from?  [place]   Does the patient have one of the following disease processes/diagnoses(primary or secondary)?  CHF   Is there a successful TCM telephone encounter documented?  Yes          Alesia Arauz RN

## 2019-06-20 NOTE — OUTREACH NOTE
CHF Week 2 Survey      Responses   Facility patient discharged from?  [place]   Does the patient have one of the following disease processes/diagnoses(primary or secondary)?  CHF   Week 2 attempt successful?  Yes   Call start time  1057   Call end time  1100   Discharge diagnosis  Acute on chronic diastolic congestive heart failure    Is patient permission given to speak with other caregiver?  Yes   Person spoke with today (if not patient) and relationship  Margarita Ignacio (Rn)   Meds reviewed with patient/caregiver?  Yes   Is the patient having any side effects they believe may be caused by any medication additions or changes?  No   Does the patient have all medications ordered at discharge?  Yes   Is the patient taking all medications as directed (includes completed medication regime)?  Yes   Medication comments  PCP added ABX.   Does the patient have a primary care provider?   Yes   Does the patient have an appointment with their PCP within 7 days of discharge?  Yes   Has the patient kept scheduled appointments due by today?  Yes   What is the Home health agency?   [facility]   Has home health visited the patient within 72 hours of discharge?  Yes   What DME was ordered?  oxygen   Has all DME been delivered?  Yes   Psychosocial issues?  No   Did the patient receive a copy of their discharge instructions?  Yes   Nursing interventions  Reviewed instructions with patient   What is the patient's perception of their health status since discharge?  Improving   Nursing interventions  Nurse provided patient education   Is the patient weighing daily?  Yes   Does the patient have scales?  Yes   Is the patient able to teach back Heart Failure diet management?  Yes   Is the patient able to teach back Heart Failure Zones?  Yes   Is the patient able to teach back signs and symptoms of worsening condition? (i.e. weight gain, shortness of air, etc.)  Yes   Is the patient/caregiver able to teach back the  hierarchy of who to call/visit for symptoms/problems? PCP, Specialist, Home health nurse, Urgent Care, ED, 911  Yes   CHF Week 2 call completed?  Yes          Avni King RN

## 2019-06-28 NOTE — OUTREACH NOTE
CHF Week 3 Survey      Responses   Facility patient discharged from?  [place]   Does the patient have one of the following disease processes/diagnoses(primary or secondary)?  CHF   Week 3 attempt successful?  Yes   Call start time  1154   Call end time  1158   Discharge diagnosis  Acute on chronic diastolic congestive heart failure    Meds reviewed with patient/caregiver?  Yes   Is the patient taking all medications as directed (includes completed medication regime)?  Yes   Has the patient kept scheduled appointments due by today?  Yes   What is the Home health agency?   [facility]   What is the patient's perception of their health status since discharge?  Improving   Is the patient weighing daily?  Yes   Is the patient able to teach back Heart Failure Zones?  Yes   CHF Week 3 call completed?  Yes   Wrap up additional comments  Weight 112 lb and stable. Green zone today. O2 in use. Very pleasant lady, enjoyed speaking with her, she enjoys our calls. No complaints today.           Haylie Marino, RN

## 2019-08-01 NOTE — TELEPHONE ENCOUNTER
Patient's daughter Margarita MAYS stated the patient has prn oxygen at home and sometimes has to use it during the day when her oxygen gets low. She doesn't think an OV is needed at this time.

## 2019-08-20 PROBLEM — R06.02 SHORTNESS OF BREATH: Status: RESOLVED | Noted: 2018-10-19 | Resolved: 2019-01-01

## 2019-08-20 PROBLEM — F33.1 MODERATE EPISODE OF RECURRENT MAJOR DEPRESSIVE DISORDER (HCC): Status: ACTIVE | Noted: 2019-01-01

## 2019-08-20 NOTE — PROGRESS NOTES
The ABCs of the Annual Wellness Visit  Subsequent Medicare Wellness Visit    No chief complaint on file.  See  note    Subjective   History of Present Illness:  Lakesha Costello is a 84 y.o. female who presents for a Subsequent Medicare Wellness Visit.    HEALTH RISK ASSESSMENT    Recent Hospitalizations:  Recently hospitalized at [facility]    Current Medical Providers:  Patient Care Team:  Leonardo Zepeda DO as PCP - General (Internal Medicine)  Ehsan Crocker MD as Cardiologist (Cardiology)  Gordon Fernandez MD as Consulting Physician (Ophthalmology)  Leland Fullre MD (Hematology)    Smoking Status:  Social History     Tobacco Use   Smoking Status Never Smoker   Smokeless Tobacco Never Used       Alcohol Consumption:  Social History     Substance and Sexual Activity   Alcohol Use No       Depression Screen:   PHQ-2/PHQ-9 Depression Screening 4/12/2019   Little interest or pleasure in doing things 0   Feeling down, depressed, or hopeless 0   Total Score 0       Fall Risk Screen:  STEADI Fall Risk Assessment has not been completed.    Health Habits and Functional and Cognitive Screening:  Functional & Cognitive Status 8/20/2019   Do you have difficulty preparing food and eating? Yes   Do you have difficulty bathing yourself, getting dressed or grooming yourself? No   Do you have difficulty using the toilet? No   Do you have difficulty moving around from place to place? No   Do you have trouble with steps or getting out of a bed or a chair? No   Current Diet Low Fat Diet   Dental Exam Not up to date   Eye Exam Up to date   Exercise (times per week) 0 times per week   Current Exercise Activities Include No Regular Exercise   Do you need help using the phone?  No   Are you deaf or do you have serious difficulty hearing?  Yes   Do you need help with transportation? No   Do you need help shopping? Yes   Do you need help preparing meals?  Yes   Do you need help with housework?  Yes   Do you need help with  laundry? Yes   Do you need help taking your medications? Yes   Do you need help managing money? No   Do you ever drive or ride in a car without wearing a seat belt? No   Have you felt unusual stress, anger or loneliness in the last month? No   Who do you live with? Spouse   If you need help, do you have trouble finding someone available to you? No   Have you been bothered in the last four weeks by sexual problems? No   Do you have difficulty concentrating, remembering or making decisions? No         Does the patient have evidence of cognitive impairment? No    Asprin use counseling:Taking ASA appropriately as indicated    Age-appropriate Screening Schedule:  Refer to the list below for future screening recommendations based on patient's age, sex and/or medical conditions. Orders for these recommended tests are listed in the plan section. The patient has been provided with a written plan.    Health Maintenance   Topic Date Due   • URINE MICROALBUMIN  1935   • TDAP/TD VACCINES (1 - Tdap) 04/01/1956   • ZOSTER VACCINE (1 of 2) 04/01/1987   • LIPID PANEL  04/13/2019   • INFLUENZA VACCINE  08/01/2019   • HEMOGLOBIN A1C  10/12/2019   • DXA SCAN  07/06/2020   • PNEUMOCOCCAL VACCINES (65+ LOW/MEDIUM RISK)  Completed          The following portions of the patient's history were reviewed and updated as appropriate: allergies, current medications, past family history, past medical history, past social history, past surgical history and problem list.    Outpatient Medications Prior to Visit   Medication Sig Dispense Refill   • aspirin 81 MG EC tablet Take 81 mg by mouth Daily.     • bisacodyl (DULCOLAX) 10 MG suppository Insert 10 mg into the rectum Daily As Needed for Constipation.     • calcium carbonate (OS-MICHAEL) 600 MG tablet Take 600 mg by mouth 2 (Two) Times a Day With Meals.     • cholecalciferol (VITAMIN D3) 1000 units tablet Take 1,000 Units by mouth Daily.     • dasatinib (SPRYCEL) 50 MG chemo tablet Take 50 mg  by mouth Every Night.     • Ferrous Sulfate  (45 Fe) MG tablet controlled-release Take 142 mg by mouth Daily. 90 tablet 3   • glipiZIDE (GLUCOTROL) 5 MG tablet Take 1 tablet by mouth 2 (Two) Times a Day Before Meals. 180 tablet 3   • insulin detemir (LEVEMIR) 100 UNIT/ML injection Inject 30 Units under the skin into the appropriate area as directed Every Night.     • ipratropium (ATROVENT) 0.02 % nebulizer solution Take 2.5 mL by nebulization 4 (Four) Times a Day. 75 mL 1   • Multiple Vitamins-Minerals (PRESERVISION/LUTEIN) capsule Take 1 capsule by mouth 2 (Two) Times a Day.     • nitroglycerin (NITROSTAT) 0.4 MG SL tablet 1 under the tongue as needed for angina, may repeat q5mins for up three doses 30 tablet 3   • ondansetron ODT (ZOFRAN ODT) 4 MG disintegrating tablet Take 1 tablet by mouth Every 4 (Four) Hours As Needed for Nausea or Vomiting. 30 tablet 3   • pantoprazole (PROTONIX) 40 MG EC tablet Take 1 tablet by mouth Daily. 90 tablet 3   • polyethyl glycol-propyl glycol (SYSTANE) 0.4-0.3 % solution ophthalmic solution Administer 1 drop to both eyes Daily.     • polyethylene glycol (MIRALAX) packet Take 17 g by mouth Daily.     • simvastatin (ZOCOR) 20 MG tablet Take 1 tablet by mouth Every Night. 90 tablet 3   • triamcinolone (KENALOG) 0.1 % cream Apply 1 application topically to the appropriate area as directed 2 (Two) Times a Day.     • vitamin B-12 (CYANOCOBALAMIN) 500 MCG tablet Take 500 mcg by mouth Daily.     • atenolol (TENORMIN) 25 MG tablet Take 25 mg by mouth Every Night.     • dextromethorphan polistirex ER (DELSYM) 30 MG/5ML Suspension Extended Release oral suspension Take 60 mg by mouth Every 12 (Twelve) Hours. 280 mL 0   • doxycycline (VIBRAMYICN) 100 MG tablet Take 1 tablet by mouth 2 (Two) Times a Day. 14 tablet 0   • furosemide (LASIX) 40 MG tablet Take 1 tablet by mouth 2 (Two) Times a Day. (Patient taking differently: Take 40 mg by mouth 2 (Two) Times a Day. 40 mg qam  20mg qpm) 15  tablet 0   • potassium chloride (K-DUR) 10 MEQ CR tablet Take 10 mEq by mouth Daily.     • sucralfate (CARAFATE) 1 GM/10ML suspension Take 10 mL by mouth 3 (Three) Times a Day As Needed (upset stomach). 420 mL 1   • venlafaxine XR (EFFEXOR-XR) 37.5 MG 24 hr capsule Take 1 capsule by mouth Daily. 90 capsule 1   • Witch Hazel (TUCKS) 50 % pads Apply 1 pad topically 6 (Six) Times a Day. 40 each 3     No facility-administered medications prior to visit.        Patient Active Problem List   Diagnosis   • Paroxysmal atrial fibrillation (CMS/HCC)   • Essential hypertension   • Type 2 diabetes mellitus with diabetic peripheral angiopathy without gangrene, with long-term current use of insulin (CMS/[facility])   • Chronic anticoagulation   • Coronary artery disease involving native coronary artery of native heart without angina pectoris   • SSS (sick sinus syndrome) (CMS/HCC)   • Chest pain   • S/P CABG x 3   • Artificial pacemaker   • Tachycardia   • Palpitations   • Mixed hyperlipidemia   • S/P TAVR (transcatheter aortic valve replacement)   • Age-related osteoporosis without current pathological fracture   • Chronic diastolic congestive heart failure (CMS/HCC)   • Malignant neoplasm of breast (CMS/HCC)   • CKD (chronic kidney disease) stage 3, GFR 30-59 ml/min (CMS/HCC)   • Closed fracture of sacrum and coccyx (CMS/HCC)   • Thrombocytosis (CMS/HCC)   • Iron deficiency   • MPN (myeloproliferative neoplasm) (CMS/HCC)   • Very low iron stores in bone marrow   • CML (chronic myeloid leukemia) (CMS/HCC)   • Gastritis   • Acute on chronic diastolic congestive heart failure (CMS/HCC)   • Constipation   • Moderate malnutrition (CMS/HCC)   • Moderate episode of recurrent major depressive disorder (CMS/[facility])       Advanced Care Planning:  Patient has an advance directive - a copy has been provided and is visible in patient header    Review of Systems See  note    Compared to one year ago, the patient feels her physical health is  "worse.  Compared to one year ago, the patient feels her mental health is the same.    Reviewed chart for potential of high risk medication in the elderly: yes  Reviewed chart for potential of harmful drug interactions in the elderly:yes    Objective         Vitals:    08/20/19 1329   BP: (!) 80/62   BP Location: Left arm   Patient Position: Sitting   Cuff Size: Adult   Pulse: 60   Resp: 16   SpO2: 96%   Weight: 46.9 kg (103 lb 8 oz)   Height: 154.9 cm (61\")       Body mass index is 19.56 kg/m².  Discussed the patient's BMI with her. The BMI is in the acceptable range.    Physical Exam See  note          Assessment/Plan   Medicare Risks and Personalized Health Plan  CMS Preventative Services Quick Reference  Cardiovascular risk  Depression/Dysphoria  Fall Risk  Immunizations Discussed/Encouraged (specific immunizations; adacel Tdap and Shingrix )    The above risks/problems have been discussed with the patient.  Pertinent information has been shared with the patient in the After Visit Summary.  Follow up plans and orders are seen below in the Assessment/Plan Section.    Diagnoses and all orders for this visit:    1. Chronic fatigue and malaise (Primary)    2. Chronic diastolic congestive heart failure (CMS/[facility])    3. Coronary artery disease involving native coronary artery of native heart without angina pectoris  -     potassium chloride (K-DUR) 10 MEQ CR tablet; Take 1 tablet by mouth Daily.  Dispense: 90 tablet; Refill: 1  -     furosemide (LASIX) 40 MG tablet; Take 1 tablet by mouth Every Morning AND 0.5 tablets Every Evening.  Dispense: 135 tablet; Refill: 1  -     atenolol (TENORMIN) 25 MG tablet; Take 0.5 tablets by mouth Every Night.  Dispense: 45 tablet; Refill: 1  -     Lipid Panel; Future    4. Paroxysmal atrial fibrillation (CMS/[facility])    5. Type 2 diabetes mellitus with diabetic peripheral angiopathy without gangrene, with long-term current use of insulin (CMS/[facility])  -     Cancel: POC Glycosylated " Hemoglobin (Hb A1C)  -     Hemoglobin A1c; Future    6. CML (chronic myeloid leukemia) (CMS/[facility])    7. Essential hypertension  -     potassium chloride (K-DUR) 10 MEQ CR tablet; Take 1 tablet by mouth Daily.  Dispense: 90 tablet; Refill: 1  -     furosemide (LASIX) 40 MG tablet; Take 1 tablet by mouth Every Morning AND 0.5 tablets Every Evening.  Dispense: 135 tablet; Refill: 1  -     atenolol (TENORMIN) 25 MG tablet; Take 0.5 tablets by mouth Every Night.  Dispense: 45 tablet; Refill: 1    8. Iron deficiency  -     CBC (No Diff); Future  -     Ferritin; Future  -     Iron Profile; Future    9. CKD (chronic kidney disease) stage 3, GFR 30-59 ml/min (CMS/[facility])  -     Comprehensive Metabolic Panel; Future    10. Moderate episode of recurrent major depressive disorder (CMS/[facility])  -     venlafaxine XR (EFFEXOR-XR) 75 MG 24 hr capsule; Take 1 capsule by mouth Daily.  Dispense: 90 capsule; Refill: 1      Follow Up:  Return in about 4 months (around 12/20/2019) for Recheck.     An After Visit Summary and PPPS were given to the patient.

## 2019-08-20 NOTE — PROGRESS NOTES
CC: Fatigue and malaise    History:  Lakesha Costello is a 84 y.o. female   She presents today with her  and daughter and notes that she has not been doing well.  She has had ongoing fatigue and malaise that have been chronically worsening.  She continues on atenolol and has had improvement in her swelling with increase of furosemide per her cardiologist at [place].  She has no dyspnea, edema, or evidence of hypervolemia today.  She remains on rate control with atenolol, but we have discontinued anticoagulation for stroke prophylaxis given her multiple medical problems and fall risk.  She does continue on aspirin.  She remains on dasatinib for CML and they have concerned that her fatigue and malaise may be related to this, though her hematologist has concern for blast crisis and symptoms related to this if she were to stop.  They do note she has been increasingly depressed recently and wonder if her Effexor should be increased.    ROS:  Review of Systems   Constitutional: Positive for activity change, appetite change and fatigue. Negative for chills and fever.   Respiratory: Negative for cough, shortness of breath and wheezing.    Cardiovascular: Negative for chest pain, palpitations and leg swelling.   Gastrointestinal: Negative for abdominal pain and constipation.   Genitourinary: Negative for difficulty urinating and dysuria.   Psychiatric/Behavioral: Positive for dysphoric mood.        reports that she has never smoked. She has never used smokeless tobacco. She reports that she does not drink alcohol or use drugs.      Current Outpatient Medications:   •  aspirin 81 MG EC tablet, Take 81 mg by mouth Daily., Disp: , Rfl:   •  atenolol (TENORMIN) 25 MG tablet, Take 25 mg by mouth Every Night., Disp: , Rfl:   •  bisacodyl (DULCOLAX) 10 MG suppository, Insert 10 mg into the rectum Daily As Needed for Constipation., Disp: , Rfl:   •  calcium carbonate (OS-MICHAEL) 600 MG tablet, Take 600 mg by mouth 2 (Two)  Times a Day With Meals., Disp: , Rfl:   •  cholecalciferol (VITAMIN D3) 1000 units tablet, Take 1,000 Units by mouth Daily., Disp: , Rfl:   •  dasatinib (SPRYCEL) 50 MG chemo tablet, Take 50 mg by mouth Every Night., Disp: , Rfl:   •  dextromethorphan polistirex ER (DELSYM) 30 MG/5ML Suspension Extended Release oral suspension, Take 60 mg by mouth Every 12 (Twelve) Hours., Disp: 280 mL, Rfl: 0  •  Ferrous Sulfate  (45 Fe) MG tablet controlled-release, Take 142 mg by mouth Daily., Disp: 90 tablet, Rfl: 3  •  furosemide (LASIX) 40 MG tablet, Take 1 tablet by mouth 2 (Two) Times a Day. (Patient taking differently: Take 40 mg by mouth 2 (Two) Times a Day. 40 mg qam  20mg qpm), Disp: 15 tablet, Rfl: 0  •  glipiZIDE (GLUCOTROL) 5 MG tablet, Take 1 tablet by mouth 2 (Two) Times a Day Before Meals., Disp: 180 tablet, Rfl: 3  •  insulin detemir (LEVEMIR) 100 UNIT/ML injection, Inject 30 Units under the skin into the appropriate area as directed Every Night., Disp: , Rfl:   •  ipratropium (ATROVENT) 0.02 % nebulizer solution, Take 2.5 mL by nebulization 4 (Four) Times a Day., Disp: 75 mL, Rfl: 1  •  Multiple Vitamins-Minerals (PRESERVISION/LUTEIN) capsule, Take 1 capsule by mouth 2 (Two) Times a Day., Disp: , Rfl:   •  nitroglycerin (NITROSTAT) 0.4 MG SL tablet, 1 under the tongue as needed for angina, may repeat q5mins for up three doses, Disp: 30 tablet, Rfl: 3  •  ondansetron ODT (ZOFRAN ODT) 4 MG disintegrating tablet, Take 1 tablet by mouth Every 4 (Four) Hours As Needed for Nausea or Vomiting., Disp: 30 tablet, Rfl: 3  •  pantoprazole (PROTONIX) 40 MG EC tablet, Take 1 tablet by mouth Daily., Disp: 90 tablet, Rfl: 3  •  polyethyl glycol-propyl glycol (SYSTANE) 0.4-0.3 % solution ophthalmic solution, Administer 1 drop to both eyes Daily., Disp: , Rfl:   •  polyethylene glycol (MIRALAX) packet, Take 17 g by mouth Daily., Disp: , Rfl:   •  potassium chloride (K-DUR) 10 MEQ CR tablet, Take 10 mEq by mouth Daily.,  "Disp: , Rfl:   •  simvastatin (ZOCOR) 20 MG tablet, Take 1 tablet by mouth Every Night., Disp: 90 tablet, Rfl: 3  •  triamcinolone (KENALOG) 0.1 % cream, Apply 1 application topically to the appropriate area as directed 2 (Two) Times a Day., Disp: , Rfl:   •  venlafaxine XR (EFFEXOR-XR) 37.5 MG 24 hr capsule, Take 1 capsule by mouth Daily., Disp: 90 capsule, Rfl: 1  •  vitamin B-12 (CYANOCOBALAMIN) 500 MCG tablet, Take 500 mcg by mouth Daily., Disp: , Rfl:     OBJECTIVE:  BP (!) 80/62 (BP Location: Left arm, Patient Position: Sitting, Cuff Size: Adult)   Pulse 60   Resp 16   Ht 154.9 cm (61\")   Wt 46.9 kg (103 lb 8 oz)   SpO2 96%   Breastfeeding? No   BMI 19.56 kg/m²    Physical Exam   Constitutional: She is oriented to person, place, and time. She appears well-nourished. No distress.   Cardiovascular: Normal rate and regular rhythm.   Murmur heard.   Systolic (aortic) murmur is present with a grade of 3/6.  Pulmonary/Chest: Effort normal and breath sounds normal. She has no wheezes.   Musculoskeletal: She exhibits no edema.   Neurological: She is alert and oriented to person, place, and time.   Psychiatric: She has a normal mood and affect.       Assessment/Plan    Diagnoses and all orders for this visit:    Chronic fatigue and malaise  We discussed the likely multifactorial nature of this including cardiac, CML, anemia, and other etiologies.  She is status post CABG, TAVR, and does have chronic diastolic heart failure.  She has been through therapy, but has not had a marked improvement.  We discussed that this may represent a more gradual decline given her multiple medical problems and age.  Her daughter and she understand that ultimately she makes her own decisions and if her therapies begin to negatively affect her quality of life or if she decides that she would like to focus on comfort in lieu of aggressive therapies, there are options such as hospice available to her.  They note they have tried to " "bring caregivers in the home, but she has difficulty relinquishing duties to caregivers, but also becomes frustrated with sitters who \"are not doing any work for the money they are not.\"    Chronic diastolic congestive heart failure (CMS/[facility])  Continue atenolol, but we will decrease dose to 12.5 mg given relative hypotension and bradycardia.  She remains on furosemide with control of edema at this time.    Coronary artery disease involving native coronary artery of native heart without angina pectoris  -     potassium chloride (K-DUR) 10 MEQ CR tablet; Take 1 tablet by mouth Daily.  -     furosemide (LASIX) 40 MG tablet; Take 1 tablet by mouth Every Morning AND 0.5 tablets Every Evening.  -     atenolol (TENORMIN) 25 MG tablet; Take 0.5 tablets by mouth Every Night.  -     Lipid Panel; Future  Continue beta-blocker with decrease as above.  She is on aspirin and statin therapy.  She has no anginal symptoms at this time.    Paroxysmal atrial fibrillation (CMS/[facility])  Continue rate control, but she is not on anticoagulation outside of aspirin secondary to fall risk and multiple medical problems with polypharmacy.    Type 2 diabetes mellitus with diabetic peripheral angiopathy without gangrene, with long-term current use of insulin (CMS/[facility])  -     Hemoglobin A1c; Future  Check A1c with home health.  She is using about 15 units of basal insulin and also glipizide.  If A1c is around 8, we will discontinue sulfonylurea given risk for hypoglycemia.    CML (chronic myeloid leukemia) (CMS/[facility])  She remains on dasatinib.  We discussed that her medication could be causing some of her symptoms, but it is more likely a multifactorial process.  If she does wish to stop dasatinib, we would need to watch carefully for symptoms related to leukocytosis and/or blast crisis.    Essential hypertension  -     potassium chloride (K-DUR) 10 MEQ CR tablet; Take 1 tablet by mouth Daily.  -     furosemide (LASIX) 40 MG tablet; Take 1 tablet by " mouth Every Morning AND 0.5 tablets Every Evening.  -     atenolol (TENORMIN) 25 MG tablet; Take 0.5 tablets by mouth Every Night.  Well controlled, BP goal for age is <140/90 per JNC 8 guidelines and Decrease atenolol given relative hypotension.  We may need to adjust diuretics if blood pressure is not tolerating these.    Iron deficiency  -     CBC (No Diff); Future  -     Ferritin; Future  -     Iron Profile; Future  Check iron studies and CBC in the setting of previous iron deficiency.    CKD (chronic kidney disease) stage 3, GFR 30-59 ml/min (CMS/[facility])  -     Comprehensive Metabolic Panel; Future  Monitor with labs for stability in renal function.  She has had no change in urinary quality or quantity.    Moderate episode of recurrent major depressive disorder (CMS/[facility])  -     venlafaxine XR (EFFEXOR-XR) 75 MG 24 hr capsule; Take 1 capsule by mouth Daily.  Increase Effexor, which she has tolerated well given worsening depression.      An After Visit Summary was printed and given to the patient at discharge.  Return in about 4 months (around 12/20/2019) for Recheck.         Leonardo Zepeda D.O. 8/20/2019   Electronically signed.

## 2019-08-20 NOTE — PATIENT INSTRUCTIONS
Medicare Wellness  Personal Prevention Plan of Service     Date of Office Visit:  2019  Encounter Provider:  Leonardo Zepeda DO  Place of Service:  [facility]  Patient Name: Lakesha Costello  :  1935    As part of the Medicare Wellness portion of your visit today, we are providing you with this personalized preventive plan of services (PPPS). This plan is based upon recommendations of the [place] Preventive Services Task Force (USPSTF) and the Advisory Committee on Immunization Practices (ACIP).    This lists the preventive care services that should be considered, and provides dates of when you are due. Items listed as completed are up-to-date and do not require any further intervention.    Health Maintenance   Topic Date Due   • URINE MICROALBUMIN  1935   • TDAP/TD VACCINES (1 - Tdap) 1956   • ZOSTER VACCINE (1 of 2) 1987   • LIPID PANEL  2019   • MEDICARE ANNUAL WELLNESS  2019   • INFLUENZA VACCINE  2019   • HEMOGLOBIN A1C  10/12/2019   • DXA SCAN  2020   • PNEUMOCOCCAL VACCINES (65+ LOW/MEDIUM RISK)  Completed       Orders Placed This Encounter   Procedures   • CBC (No Diff)     Standing Status:   Future     Standing Expiration Date:   2020   • Comprehensive Metabolic Panel     Standing Status:   Future     Standing Expiration Date:   2020   • Hemoglobin A1c     Standing Status:   Future     Standing Expiration Date:   2020   • Lipid Panel     Standing Status:   Future     Standing Expiration Date:   2020   • Ferritin     Standing Status:   Future     Standing Expiration Date:   2020   • Iron Profile     Standing Status:   Future     Standing Expiration Date:   2020       Return in about 4 months (around 2019) for Recheck.

## 2019-08-22 NOTE — TELEPHONE ENCOUNTER
Pt's daughter called regarding pantoprazole (PROTONIX) 40 MG EC tablet.  I informed her that there was 3 refills left but she stated that they have been having trouble with [employer] not sending medications and they would like to get it filled at [facility] in [place].

## 2019-09-30 NOTE — TELEPHONE ENCOUNTER
"Reviewed guideline with caller, advises she be seen in ED due to being on Plavix and ASA. Caller agrees to follow care advice.     Reason for Disposition  • Taking Coumadin (warfarin) or other strong blood thinner, or known bleeding disorder (e.g., thrombocytopenia)    Additional Information  • Negative: [1] ACUTE NEURO SYMPTOM AND [2] present now  (DEFINITION: difficult to awaken OR confused thinking and talking OR slurred speech OR weakness of arms OR unsteady walking)  • Negative: Knocked out (unconscious) > 1 minute  • Negative: Seizure (convulsion) occurred  (Exception: prior history of seizures and now alert and without Acute Neuro Symptoms)  • Negative: Penetrating head injury (e.g., knife, gun shot wound, metal object)  • Negative: [1] Major bleeding (e.g., actively dripping or spurting) AND [2] can't be stopped  • Negative: [1] Dangerous mechanism of injury (e.g., MVA, diving, trampoline, contact sports, fall > 10 feet or 3 meters) AND [2] NECK pain AND [3] began < 1 hour after injury  • Negative: Sounds like a life-threatening emergency to the triager  • Negative: [1] Recently examined and diagnosed with a concussion by a healthcare provider AND [2] questions about concussion symptoms  • Negative: Can't remember what happened (amnesia)  • Negative: Vomiting once or more  • Negative: [1] Loss of vision or double vision AND [2] present now  • Negative: Watery or blood-tinged fluid dripping from the NOSE or EARS now  (Exception: tears from crying or nosebleed from nasal trauma)  • Negative: One or two \"black eyes\" (bruising, purple color of eyelids)  • Negative: [1] Large swelling AND [2] size > palm of person's hand  • Negative: Skin is split open or gaping  (or length > 1/2 inch or 12 mm)  • Negative: [1] Bleeding AND [2] won't stop after 10 minutes of direct pressure (using correct technique)  • Negative: Sounds like a serious injury to the triager  • Negative: [1] ACUTE NEURO SYMPTOM AND [2] now fine  " "(DEFINITION: difficult to awaken OR confused thinking and talking OR slurred speech OR weakness of arms OR unsteady walking)  • Negative: [1] Knocked out (unconscious) < 1 minute AND [2] now fine  • Negative: [1] SEVERE headache AND [2]  not improved 2 hours after pain medicine/ice packs  • Negative: Dangerous injury (e.g., MVA, diving, trampoline, contact sports, fall > 10 feet or 3 meters) or severe blow from hard object (e.g., golf club or baseball bat)    Answer Assessment - Initial Assessment Questions  1. MECHANISM: \"How did the injury happen?\" For falls, ask: \"What height did you fall from?\" and \"What surface did you fall against?\"       Fell and hit her head against the tub  2. ONSET: \"When did the injury happen?\" (Minutes or hours ago)       0130am  3. NEUROLOGIC SYMPTOMS: \"Was there any loss of consciousness?\" \"Are there any other neurological symptoms?\"       No LOC. No neurological symptoms   4. MENTAL STATUS: \"Does the person know who he is, who you are, and where he is?\"       oriented  5. LOCATION: \"What part of the head was hit?\"       Back of her head on right and nape of neck  6. SCALP APPEARANCE: \"What does the scalp look like? Is it bleeding now?\" If so, ask: \"Is it difficult to stop?\"       Hematoma on both areas, fresh blood on one.  7. SIZE: For cuts, bruises, or swelling, ask: \"How large is it?\" (e.g., inches or centimeters)       Too much dried blood   8. PAIN: \"Is there any pain?\" If so, ask: \"How bad is it?\"  (e.g., Scale 1-10; or mild, moderate, severe)      No pain at present  9. TETANUS: For any breaks in the skin, ask: \"When was the last tetanus booster?\"      Unknown   10. OTHER SYMPTOMS: \"Do you have any other symptoms?\" (e.g., neck pain, vomiting)        No other symptoms  11. PREGNANCY: \"Is there any chance you are pregnant?\" \"When was your last menstrual period?\"        na    Protocols used: HEAD INJURY-ADULT-AH      "

## 2019-10-01 NOTE — OUTREACH NOTE
Care Plan Note      Responses   Lifestyle Goals  Decrease falls risk   Self Management  Medication Adherence   Annual Wellness Visit:   Patient Has Completed [completed 8-20-19]   Care Gaps Addressed  Colon Cancer Screening, Diabetic A1C, Diabetic Eye Exam, Flu Shot, Mammogram, Pneumonia Vaccine   Colon Cancer Screening Type  Exempt   HbA1c Status  Up to Date-outside defined limits   HbA1c Completion at [facility] or Other  [facility]   HbA1c Comments  9-9-19 was 8.0   Diabetic Eye Exam Status  Up to Date   Diabetic Eye Exam Completion at [facility] or Other  Other   Other Location  completed in 2018 and 2019 at Dr. Fernandez's office   Flu Shot Status  Refused   Mammogram Status  Exempt   Pneumonia Vaccine Status  Up to Date   Pneumonia Vaccine Completion at [facility] or Other  [facility]   Pneumonia Vaccine Comments  completed PPSV in 2017 and PCV 13 in 2018   Other Patient Education/Resources   -- [my contact inforamtion]   Advanced Directives:  Patient Has   Ed Visits past 12 months:  3 or more   Hospitalizations past 12 months  3 or more   Medication Adherence  Medications understood [spoke with daughter who is a RN]        The main concerns and/or symptoms the patient would like to address are: Patient seen in [facility] ED [facility]-30-[facility] for a fall.    Education/instruction provided by Care Coordinator: RN-CC outreach with patient's daughter. Her daughter reports that she is very sore today and has two knots on her head. She is on Plavix. Patient's daughter is a RN and provides care to her mom as needed. She stated her mom has many health problems and does receive calls from the [facility] nurse. She has no other issues today. Care gaps discussed are listed above.  Provided my contact information for needs.     Follow Up Outreach Due: as needed    Maral Pablo RN  Community Care Coordinator    10/1/2019, 3:17 PM

## 2019-10-01 NOTE — TELEPHONE ENCOUNTER
Rosibel called from [facility], patient has fell and when she fell in the tub has 2 big bumps and one has a laceration they are going to leave it to air out, she also stated that patient weighed 88ld yesterday, she also states that the patient admits to drinking 2 ensure a day along with meals, was wondering if you would want to give her something to increase her weight. Does the patient need to be seen, she states she probably needs to be seen, but they patient is very weak as well.

## 2019-10-01 NOTE — OUTREACH NOTE
Care Coordination Assessment    Documented/Reviewed By:  Maral Pablo RN Date/time:  10/1/2019  3:09 PM   Assessment completed with:  children  Enrolled in care management program:  No  Living arrangement:  spouse  Support system:  children, spouse  Type of residence:  private residence  Home care services:  No  Equipment used at home:  bedside commode, walker, oxygen/respiratory treatment  Communication device:  Yes  Bed or wheelchair confined:  No  Inadequate nutrition:  Yes (Comment: down to 88 pounds)  Medication adherence problem:  No  History of fall(s) in last 6 months:  Yes  Difficulty keeping appointments:  No  Family aware of the patient's advance care planning wishes:  Yes (Comment: has advance directive)

## 2019-10-01 NOTE — TELEPHONE ENCOUNTER
Would probably recommend a visit. No medication that increases weight will give an increase in muscle mass and high protein diet is the preference. My main worry is about safety at home. It may be time to further consider getting back to assisted living/nursing facility.

## 2019-10-03 NOTE — TELEPHONE ENCOUNTER
Patient's daughter Margarita stated her mother just came back from [place] last week and they are on top of everything that is going on with her.  She thinks the change in Lasix dose to 80 mg (at [place]) is what contributed to patient's weight loss and [place] is aware of this. And the fall was partly due to patient walking to the bathroom furthest away from her room and losing her balance because she was weak.     Patient's daughter was informed Dr. Zepeda is concerned about patient's safety at home.  She did not want to schedule an appointment at this time and said Dr. Zepeda may call her if he would like to discuss concerns. [phone]

## 2019-10-06 NOTE — TELEPHONE ENCOUNTER
"Caller is unsure if the chemo pill caused the vomiting or if from the fall a week ago and being on plavix, however, the chemo never made her head hurt and it has bothered her since last night. Recommended return to the ED.     Reason for Disposition  • [1] Concussion symptoms SAME (not worse) AND [2]  taking Coumadin (warfarin) or other strong blood thinner, or known bleeding disorder (e.g., thrombocytopenia)    Additional Information  • Negative: Weakness (i.e., paralysis, loss of muscle strength) of the face, arm or leg on one side of the body  • Negative: Loss of speech or garbled speech  • Negative: Difficult to awaken or acting confused (e.g., disoriented, slurred speech)  • Negative: Sounds like a life-threatening emergency to the triager  • Negative: [1] Concussion suspected AND [2] has not been examined by a HCP  • Negative: [1] Mild traumatic brain injury (mTBI; concussion) AND [2] more than 14 days since head injury  • Negative: [1] Black eyes on both sides AND [2] onset within 24 hours of head injury  • Negative: Concussion symptoms are worsening  • Negative: [1] Knocked out (unconscious) > 1 minute AND [2] no head CT Scan or MRI has been performed  • Negative: [1] Vomiting once or more AND [2] no head CT Scan or MRI has been performed  • Negative: [1] Unsteady on feet (e.g., unable to stand or requires support to walk) AND [2] no head CT Scan or MRI has been performed  • Negative: [1] Neck pain after dangerous injury (e.g., MVA, diving, trampoline, contact sports, fall > 10 feet or 3 meters) AND [2] no neck xray has been performed (e.g., c-spine xray or CT)  • Negative: Patient sounds very sick or weak to the triager  • Negative: [1] SEVERE headache (e.g., excruciating, pain scale 8-10) AND [2] not improved after pain medications    Answer Assessment - Initial Assessment Questions  1. SYMPTOMS: \"What symptom are you most concerned about?\"      Headache and vomiting  2. OTHER SYMPTOMS: \"Do you have any " "other symptoms?\" (e.g., nausea, difficulty concentrating)      Vomiting  3. ONSET / PATTERN:  \"Are you the same, getting better, or getting worse?\"  \"What's changed?\"      Worse  4. HEADACHE: \"Do you have any headache pain?\" If so, ask: \"How bad is it?\"  (Scale 1-10; or mild, moderate, severe)    - MILD - Doesn't interfere with normal activities    - MODERATE - Interferes with normal activities (e.g., work or school) or awakens from sleep    - SEVERE - Excruciating pain, unable to do any normal activities      Headache began last night  5. mTBI: \"When did your head injury happen?\" \"How did it occur?\"       1 week ago  6. mTBI DIAGNOSIS:  \"Who diagnosed your concussion?\"      ER at [facility]  7. mTBI TESTING: \"Did you have a CT scan or MRI of your head?\"      Yes had CT scan  8. mTBI LAST VISIT:  \"When were you seen for your head injury?\"     ER  9. MEDICATIONS:  \"Did you receive any prescription meds?\"  If so, ask:  \"What are they?\" \" Were any OTC meds recommended?\"      Is on plavix. Also has cancer, last chemo pill 10/5/19 so is not sure if that is causing the vomiting or from the fall.   10. FOLLOW-UP APPT:  \"Do you have a follow-up appointment?\"      Can see Dr. Zepeda tomorrow if needed. Follow at [facility] as well.    Protocols used: CONCUSSION (MTBI) LESS THAN 14 DAYS AGO FOLLOW-UP CALL-ADULT-      "

## 2019-10-07 NOTE — TELEPHONE ENCOUNTER
Contacted Margarita at [phone]. Patient has been doing well except for her fall. Has had improvement in her functional status and itching s/p hospitalization and f/u at [facility]. No further falls at home, but has had headaches. Given improvement, we will f/u as scheduled unless other changes occur.

## 2019-10-07 NOTE — ED PROVIDER NOTES
Subjective   The patient is an 84-year-old female who was here approximately 1 week ago because of a fall that led to head trauma.  She comes in tonight because of a new headache today and because of pain in her cervical spine and thoracic spine area.  Her family is concerned because she is on Plavix and aspirin because of a heart attack some months ago.  They are worried that she has had a brain bleed.  She has no fever or other systemic symptoms.  She had nausea and one episode of vomiting which was nonbloody nonbilious.  She currently complains of cervical spine pain, headache and mild nausea.            Review of Systems   Gastrointestinal: Positive for nausea and vomiting.   Musculoskeletal: Positive for back pain and neck pain.   Neurological: Positive for headaches.   All other systems reviewed and are negative.      Past Medical History:   Diagnosis Date   • Aortic stenosis    • Arthritis    • Breast cancer (CMS/HCC)    • CAD (coronary artery disease)    • Cataract    • CHF (congestive heart failure) (CMS/HCC)     recently diagnosed after an ER visit.    • Chronic anticoagulation     Coumadin    • CKD (chronic kidney disease) stage 3, GFR 30-59 ml/min (CMS/HCC) 4/16/2018   • Diabetes mellitus (CMS/HCC)     Type II   • Elevated cholesterol    • GERD (gastroesophageal reflux disease)    • Hyperlipidemia    • Hypertension    • Macular degeneration    • Pancreatitis    • Paroxysmal atrial fibrillation (CMS/HCC)    • Presence of Watchman left atrial appendage closure device    • Presence of Watchman left atrial appendage closure device 11/26/2018   • Pulmonary hypertension (CMS/HCC)    • Rheumatic fever     during childhood   • Sacrum and coccyx fracture (CMS/HCC)    • Sick sinus syndrome (CMS/HCC)     with pacemaker   • UTI (urinary tract infection)        Allergies   Allergen Reactions   • Gleevec [Imatinib] Itching   • Bentyl [Dicyclomine] Itching   • Janumet [Sitagliptin-Metformin Hcl] Other (See Comments)      Chest pain   • Dilaudid [Hydromorphone] Nausea And Vomiting   • Enalapril Angioedema   • Lopid [Gemfibrozil] Nausea And Vomiting   • Sulfa Antibiotics Other (See Comments)     Syncope     • Ultram [Tramadol] Itching       Past Surgical History:   Procedure Laterality Date   • ANOMALOUS PULMONARY VENOUS RETURN REPAIR, TOTAL     • BACK SURGERY     • BELPHAROPTOSIS REPAIR     • BREAST SURGERY      right mastectomy   • CARDIAC CATHETERIZATION  1999, 2010   • CARDIAC CATHETERIZATION N/A 2/15/2017    Procedure: Left Heart Cath;  Surgeon: Ehsan Crocker MD;  Location:  [facility] CATH INVASIVE LOCATION;  Service:    • CARDIAC CATHETERIZATION N/A 2/15/2017    Procedure: Right Heart Cath;  Surgeon: Ehsan Crocker MD;  Location:  [facility]ATH [facility];  Service:    • CARDIAC CATHETERIZATION N/A 2/15/2017    Procedure: Coronary angiography;  Surgeon: Ehsan Crocker MD;  Location:  [facility]VAS[facility];  Service:    • CARDIAC CATHETERIZATION N/A 2/15/2017    Procedure: Left ventriculography;  Surgeon: Ehsan Crocker MD;  Location:  [facility]ATH [facility];  Service:    • CARDIAC CATHETERIZATION N/A 2/15/2017    Procedure: Intracardiac echocardiogram;  Surgeon: Ehsan Crocker MD;  Location:  [facility] CATH INVAS[facility];  Service:    • CARDIAC ELECTROPHYSIOLOGY PROCEDURE N/A 2/3/2017    Procedure: Pacemaker DC new;  Surgeon: Ehsan Crocker MD;  Location:  [facility] CATH INVASIVE [facility];  Service:    • CARDIAC SURGERY      TAVR   • CARDIOVERSION     • CATARACT EXTRACTION     • CHOLECYSTECTOMY     • CORONARY ARTERY BYPASS GRAFT  1999    4 vessel    • CORONARY ARTERY BYPASS GRAFT     • CORONARY STENT PLACEMENT     • HYSTERECTOMY  1962   • INSERT / REPLACE / REMOVE PACEMAKER     • MASTECTOMY  2000   • OTHER SURGICAL HISTORY      TAVR 2017       Family History   Problem Relation Age of Onset   • Breast cancer Mother    • Coronary artery disease Father    • Heart attack Father    • Diabetes Father    • Cancer Brother    • No  Known Problems Maternal Grandmother    • No Known Problems Maternal Grandfather    • No Known Problems Paternal Grandmother    • No Known Problems Paternal Grandfather        Social History     Socioeconomic History   • Marital status:      Spouse name: Not on file   • Number of children: Not on file   • Years of education: Not on file   • Highest education level: Not on file   Tobacco Use   • Smoking status: Never Smoker   • Smokeless tobacco: Never Used   Substance and Sexual Activity   • Alcohol use: No   • Drug use: No   • Sexual activity: No           Objective   Physical Exam   Constitutional: She is oriented to person, place, and time. She appears cachectic.   HENT:   Head: Normocephalic and atraumatic.   Mouth/Throat: Oropharynx is clear and moist.   Eyes: Conjunctivae and EOM are normal.   Neck: Normal range of motion. Neck supple.   Cardiovascular: Normal rate, regular rhythm, normal heart sounds and intact distal pulses.   Pulmonary/Chest: Effort normal and breath sounds normal.   Abdominal: Soft. Bowel sounds are normal.   Musculoskeletal: Normal range of motion.   Neurological: She is alert and oriented to person, place, and time.   Skin: Skin is warm and dry. Capillary refill takes less than 2 seconds.   Psychiatric: She has a normal mood and affect. Her behavior is normal. Judgment and thought content normal.   Nursing note and vitals reviewed.      Procedures           ED Course                  MDM  Number of Diagnoses or Management Options     Amount and/or Complexity of Data Reviewed  Clinical lab tests: ordered and reviewed  Tests in the radiology section of CPT®: reviewed and ordered    Critical Care  Total time providing critical care: < 30 minutes    Patient Progress  Patient progress: stable      Final diagnoses:   Nonintractable headache, unspecified chronicity pattern, unspecified headache type     Patient CTs were all negative for anything acute or dangerous and her lab work was  essentially unchanged and not worsened too.  Her headache and nausea were improved by the medications given in the ED and she is tolerating p.o. nicely at the time of discharge.  She knows to return for any worsening of symptoms.         Ketan Jo MD  10/06/19 [id number]

## 2019-11-18 PROBLEM — I21.4 NSTEMI (NON-ST ELEVATED MYOCARDIAL INFARCTION) (HCC): Status: ACTIVE | Noted: 2019-01-01

## 2019-11-18 NOTE — PROGRESS NOTES
CC: migrating arthralgia    History:  Lakesha Costello is a 84 y.o. female   She notes a 3 day history of migrating arthralgias, mostly centering in her hips and back/neck. She has taken tylenol that does help ease pain. Her pain then moves and affects other areas. She has had no fever, but has stable chills. She is not on treatment for her MPN at this time, but has no respiratory symptoms, bleeding, nor localized symptoms of infection. She follows up next month with Hematology at [place]. She does have follow-up with cardiology at [place] and has noted no dyspnea, swelling, nor other symptoms of exacerbation of heart failure. No changes to urinary quality or quantity given CKD. She is on RAAS blockade.     ROS:  Review of Systems   Constitutional: Positive for chills and fatigue. Negative for fever.   Respiratory: Negative for cough and shortness of breath.    Cardiovascular: Negative for chest pain and palpitations.   Genitourinary: Negative for difficulty urinating and dysuria.   Musculoskeletal: Positive for arthralgias, back pain, myalgias, neck pain and neck stiffness.   Neurological: Positive for weakness. Negative for syncope.        reports that she has never smoked. She has never used smokeless tobacco. She reports that she does not drink alcohol or use drugs.      Current Outpatient Medications:   •  aspirin 81 MG EC tablet, Take 81 mg by mouth Daily., Disp: , Rfl:   •  atenolol (TENORMIN) 25 MG tablet, Take 0.5 tablets by mouth Every Night., Disp: 45 tablet, Rfl: 1  •  bisacodyl (DULCOLAX) 10 MG suppository, Insert 10 mg into the rectum Daily As Needed for Constipation., Disp: , Rfl:   •  calcium carbonate (OS-MICHAEL) 600 MG tablet, Take 600 mg by mouth Daily., Disp: , Rfl:   •  cholecalciferol (VITAMIN D3) 1000 units tablet, Take 1,000 Units by mouth Daily., Disp: , Rfl:   •  clopidogrel (PLAVIX) 75 MG tablet, Take 75 mg by mouth Daily., Disp: , Rfl:   •  furosemide (LASIX) 40 MG tablet, Take 1 tablet  by mouth Every Morning AND 0.5 tablets Every Evening. (Patient taking differently: daily), Disp: 135 tablet, Rfl: 1  •  glipiZIDE (GLUCOTROL) 5 MG tablet, Take 1 tablet by mouth 2 (Two) Times a Day Before Meals., Disp: 180 tablet, Rfl: 3  •  insulin detemir (LEVEMIR) 100 UNIT/ML injection, Inject 5 Units under the skin into the appropriate area as directed Every Night., Disp: , Rfl:   •  ipratropium (ATROVENT) 0.02 % nebulizer solution, Take 2.5 mL by nebulization 4 (Four) Times a Day., Disp: 75 mL, Rfl: 1  •  metoprolol succinate XL (TOPROL-XL) 25 MG 24 hr tablet, Take 25 mg by mouth Daily., Disp: , Rfl:   •  mirtazapine (REMERON) 15 MG tablet, Take 7.5 mg by mouth Every Night., Disp: , Rfl:   •  Multiple Vitamins-Minerals (PRESERVISION/LUTEIN) capsule, Take 1 capsule by mouth 2 (Two) Times a Day., Disp: , Rfl:   •  nitroglycerin (NITROSTAT) 0.4 MG SL tablet, 1 under the tongue as needed for angina, may repeat q5mins for up three doses, Disp: 10 tablet, Rfl: 3  •  ondansetron ODT (ZOFRAN ODT) 4 MG disintegrating tablet, Take 1 tablet by mouth Every 4 (Four) Hours As Needed for Nausea or Vomiting., Disp: 30 tablet, Rfl: 3  •  pantoprazole (PROTONIX) 40 MG EC tablet, Take 1 tablet by mouth Daily., Disp: 90 tablet, Rfl: 3  •  polyethylene glycol (MIRALAX) packet, Take 17 g by mouth Daily., Disp: , Rfl:   •  potassium chloride (K-DUR) 10 MEQ CR tablet, Take 1 tablet by mouth Daily., Disp: 90 tablet, Rfl: 1  •  rosuvastatin (CRESTOR) 20 MG tablet, Take 20 mg by mouth Every Night., Disp: , Rfl:   •  simvastatin (ZOCOR) 20 MG tablet, Take 1 tablet by mouth Every Night., Disp: 90 tablet, Rfl: 3  •  spironolactone (ALDACTONE) 25 MG tablet, Take 25 mg by mouth Daily., Disp: , Rfl:   •  triamcinolone (KENALOG) 0.1 % cream, Apply 1 application topically to the appropriate area as directed 2 (Two) Times a Day., Disp: , Rfl:   •  venlafaxine XR (EFFEXOR-XR) 75 MG 24 hr capsule, Take 1 capsule by mouth Daily., Disp: 90 capsule,  "Rfl: 1  •  vitamin B-12 (CYANOCOBALAMIN) 500 MCG tablet, Take 1,000 mcg by mouth Daily., Disp: , Rfl:   •  polyethyl glycol-propyl glycol (SYSTANE) 0.4-0.3 % solution ophthalmic solution, Administer 1 drop to both eyes Daily., Disp: , Rfl:     OBJECTIVE:  /60 (BP Location: Left arm, Patient Position: Sitting, Cuff Size: Adult)   Pulse 70   Resp 16   Ht 154.9 cm (61\")   Wt 40.9 kg (90 lb 1.6 oz)   SpO2 92%   Breastfeeding? No   BMI 17.02 kg/m²    Physical Exam   Constitutional: She is oriented to person, place, and time. She appears well-nourished. No distress.   Cardiovascular: Normal rate, regular rhythm and normal heart sounds.   No murmur heard.  Pulmonary/Chest: Effort normal and breath sounds normal. She has no wheezes.   Neurological: She is alert and oriented to person, place, and time.   Psychiatric: She has a normal mood and affect.       Assessment/Plan    Diagnoses and all orders for this visit:    Polyarthralgia  She notes she feels significantly better today. Suspect a viral illness resulting in symptoms. If symptoms worsen or persist, consider further workup.     Chronic diastolic congestive heart failure (CMS/HCC)  -     CBC (No Diff); Future  -     Renal Function Panel; Future  -     Lipid Panel; Future  Stable on BB and loop diuretic  Without exacerbation. Euvolemic on exam.     CKD (chronic kidney disease) stage 3, GFR 30-59 ml/min (CMS/HCC)  -     CBC (No Diff); Future  -     Renal Function Panel; Future  -     Urinalysis With Microscopic If Indicated (No Culture) - Urine, Clean Catch; Future  -     Protein / Creatinine Ratio, Urine - Urine, Clean Catch; Future  Stable on RAAS blockade without symptoms of urinary changes. Labs as ordered.     Thrombocytosis (CMS/HCC)  MPN (myeloproliferative neoplasm) (CMS/HCC)  -     CBC (No Diff); Future  Following with Hematology at [place]. No symptoms of complication at this time.       An After Visit Summary was printed and given to the " patient at discharge.  Return in about 2 months (around 1/18/2020) for Recheck; cancel December.         Leonardo Zepeda D.O. 11/18/2019   Electronically signed.

## 2019-11-19 NOTE — TELEPHONE ENCOUNTER
"Notified Dr. Raya of glucose 496, stated Dr. Zepeda will take care of it in the morning.    Reason for Disposition  • [1] Prescription not at pharmacy AND [2] was prescribed today by PCP    Additional Information  • Negative: Nursing judgment  • Negative: Information only call; adult is not ill or injured  • Negative: Nursing judgment  • Negative: Nursing judgment  • Negative: Nursing judgment  • Negative: Nursing judgment  • Negative: Nursing judgment  • Negative: Nursing judgment  • Negative: Nursing judgment  • Negative: Nursing judgment  • Negative: Nursing judgment  • Negative: Nursing judgment  • Negative: Nursing judgment  • Negative: Nursing judgment  • Negative: Nursing judgment  • Negative: Nursing judgment  • Negative: Lab calling with strep throat test results and triager can call in prescription  • Negative: Lab calling with urinalysis test results and triager can call in prescription  • Negative: Medication questions  • Negative: ED call to PCP  • Negative: Physician call to PCP  • Negative: Call about patient who is currently hospitalized  • Negative: Lab or radiology calling with CRITICAL test results    Answer Assessment - Initial Assessment Questions  1. REASON FOR CALL or QUESTION: \"What is your reason for calling today?\" or \"How can I best  help you?\" or \"What question do you have that I can help answer?\"      Glucose 496  2. CALLER: Document the source of call. (e.g., laboratory, patient).      Per Fox in LAB    Protocols used: PCP CALL - NO TRIAGE-ADULT-AH, NO GUIDELINE OR REFERENCE AVAILABLE-ADULT-AH    "

## 2019-11-20 NOTE — TELEPHONE ENCOUNTER
"(1) Patient's daughter informed her BS was very high on labs yesterday. She was advised to keep track of patient's BS over the next couple of days.  Patient's daughter requested a script be sent to pharmacy for a new glucometer and supplies.  (2) Patient's daughter informed her kidney function is stable but labs indicated she could be a bit dehydrated.  (3)  Patient's daughter informed patient is much more anemic than she was last month.  When asked if patient has been bleeding from anywhere that she knows of she said, \"Well she has that blood disorder.\" She was informed Dr. Zepeda thinks the anemia could be related to the blood disorder and he is forwarding these results on to Dr. Fuller, but he would like her to have labs rechecked in 1 week.      Patient has an appointment at [place] on 11/26 and may be having labs drawn that day. Margarita said she will call if they draw labs so Dr. Zepeda can view the results.    "

## 2019-11-20 NOTE — TELEPHONE ENCOUNTER
----- Message from Leonardo Zepeda, DO sent at 11/19/2019  8:14 AM CST -----  A couple of things:  1. Sugar is very high on labs yesterday. I suspect this is from not feeling well, but please do keep track of these over the next couple of days.   2. Kidney function is stable, though it seems she could be a bit dehydrated based on labs.   3. She is much more anemic since last month. Has she been bleeding from anywhere that she knows of? This could be related to her blood disorder and I will forward these results on to Dr. Fuller as well. I would like her to have labs rechecked in 1 week.

## 2020-01-01 ENCOUNTER — OFFICE VISIT (OUTPATIENT)
Dept: INTERNAL MEDICINE | Facility: CLINIC | Age: 85
End: 2020-01-01

## 2020-01-01 ENCOUNTER — NURSE TRIAGE (OUTPATIENT)
Dept: CALL CENTER | Facility: HOSPITAL | Age: 85
End: 2020-01-01

## 2020-01-01 ENCOUNTER — READMISSION MANAGEMENT (OUTPATIENT)
Dept: CALL CENTER | Facility: HOSPITAL | Age: 85
End: 2020-01-01

## 2020-01-01 ENCOUNTER — HOSPITAL ENCOUNTER (INPATIENT)
Facility: HOSPITAL | Age: 85
LOS: 2 days | Discharge: HOME | End: 2020-02-15
Attending: FAMILY MEDICINE | Admitting: INTERNAL MEDICINE

## 2020-01-01 ENCOUNTER — APPOINTMENT (OUTPATIENT)
Dept: CT IMAGING | Facility: HOSPITAL | Age: 85
End: 2020-01-01

## 2020-01-01 ENCOUNTER — HOSPITAL ENCOUNTER (OUTPATIENT)
Facility: HOSPITAL | Age: 85
Setting detail: OBSERVATION
Discharge: HOME | End: 2020-03-19
Attending: EMERGENCY MEDICINE | Admitting: FAMILY MEDICINE

## 2020-01-01 VITALS
RESPIRATION RATE: 16 BRPM | DIASTOLIC BLOOD PRESSURE: 66 MMHG | OXYGEN SATURATION: 98 % | HEIGHT: 60 IN | BODY MASS INDEX: 16.49 KG/M2 | HEART RATE: 70 BPM | TEMPERATURE: 97.6 F | SYSTOLIC BLOOD PRESSURE: 156 MMHG | WEIGHT: 84 LBS

## 2020-01-01 VITALS
HEIGHT: 59 IN | OXYGEN SATURATION: 100 % | DIASTOLIC BLOOD PRESSURE: 55 MMHG | BODY MASS INDEX: 17.34 KG/M2 | WEIGHT: 86 LBS | HEART RATE: 70 BPM | SYSTOLIC BLOOD PRESSURE: 150 MMHG | RESPIRATION RATE: 16 BRPM | TEMPERATURE: 97.8 F

## 2020-01-01 VITALS
RESPIRATION RATE: 16 BRPM | DIASTOLIC BLOOD PRESSURE: 70 MMHG | OXYGEN SATURATION: 98 % | HEART RATE: 67 BPM | HEIGHT: 61 IN | SYSTOLIC BLOOD PRESSURE: 108 MMHG | WEIGHT: 95 LBS | BODY MASS INDEX: 17.94 KG/M2

## 2020-01-01 DIAGNOSIS — N18.30 CKD (CHRONIC KIDNEY DISEASE) STAGE 3, GFR 30-59 ML/MIN (HCC): ICD-10-CM

## 2020-01-01 DIAGNOSIS — Z78.9 DECREASED ACTIVITIES OF DAILY LIVING (ADL): ICD-10-CM

## 2020-01-01 DIAGNOSIS — C92.10 CML (CHRONIC MYELOID LEUKEMIA) (HCC): ICD-10-CM

## 2020-01-01 DIAGNOSIS — E61.1 IRON DEFICIENCY: ICD-10-CM

## 2020-01-01 DIAGNOSIS — Z74.09 IMPAIRED FUNCTIONAL MOBILITY, BALANCE, GAIT, AND ENDURANCE: ICD-10-CM

## 2020-01-01 DIAGNOSIS — I25.10 CORONARY ARTERY DISEASE INVOLVING NATIVE CORONARY ARTERY OF NATIVE HEART WITHOUT ANGINA PECTORIS: ICD-10-CM

## 2020-01-01 DIAGNOSIS — K92.2 GASTROINTESTINAL HEMORRHAGE, UNSPECIFIED GASTROINTESTINAL HEMORRHAGE TYPE: Primary | ICD-10-CM

## 2020-01-01 DIAGNOSIS — F33.1 MODERATE EPISODE OF RECURRENT MAJOR DEPRESSIVE DISORDER (HCC): Primary | ICD-10-CM

## 2020-01-01 DIAGNOSIS — J90 PLEURAL EFFUSION ON RIGHT: Primary | ICD-10-CM

## 2020-01-01 DIAGNOSIS — I50.32 CHRONIC DIASTOLIC CONGESTIVE HEART FAILURE (HCC): ICD-10-CM

## 2020-01-01 DIAGNOSIS — D64.9 SYMPTOMATIC ANEMIA: ICD-10-CM

## 2020-01-01 DIAGNOSIS — I48.0 PAROXYSMAL ATRIAL FIBRILLATION (HCC): ICD-10-CM

## 2020-01-01 DIAGNOSIS — I10 ESSENTIAL HYPERTENSION: ICD-10-CM

## 2020-01-01 DIAGNOSIS — D47.1 MPN (MYELOPROLIFERATIVE NEOPLASM) (HCC): ICD-10-CM

## 2020-01-01 LAB
ABO + RH BLD: NORMAL
ABO + RH BLD: NORMAL
ABO GROUP BLD: NORMAL
ALBUMIN SERPL-MCNC: 3.7 G/DL (ref 3.5–5.2)
ALBUMIN SERPL-MCNC: 3.7 G/DL (ref 3.5–5.2)
ALBUMIN SERPL-MCNC: 3.9 G/DL (ref 3.5–5.2)
ALBUMIN/GLOB SERPL: 1.2 G/DL
ALBUMIN/GLOB SERPL: 1.2 G/DL
ALBUMIN/GLOB SERPL: 1.3 G/DL
ALP SERPL-CCNC: 73 U/L (ref 39–117)
ALP SERPL-CCNC: 78 U/L (ref 39–117)
ALP SERPL-CCNC: 97 U/L (ref 39–117)
ALT SERPL W P-5'-P-CCNC: 16 U/L (ref 1–33)
ALT SERPL W P-5'-P-CCNC: 17 U/L (ref 1–33)
ALT SERPL W P-5'-P-CCNC: 19 U/L (ref 1–33)
AMMONIA BLD-SCNC: 36 UMOL/L (ref 11–51)
ANION GAP SERPL CALCULATED.3IONS-SCNC: 11 MMOL/L (ref 5–15)
ANION GAP SERPL CALCULATED.3IONS-SCNC: 14 MMOL/L (ref 5–15)
ANION GAP SERPL CALCULATED.3IONS-SCNC: 15 MMOL/L (ref 5–15)
ANION GAP SERPL CALCULATED.3IONS-SCNC: 16 MMOL/L (ref 5–15)
ANTI-C: NORMAL
ANTI-D: NORMAL
ANTI-E: NORMAL
APTT PPP: 29.4 SECONDS (ref 24.1–35)
APTT PPP: 31.1 SECONDS (ref 24.1–35)
APTT PPP: >200 SECONDS (ref 24.1–35)
AST SERPL-CCNC: 28 U/L (ref 1–32)
AST SERPL-CCNC: 34 U/L (ref 1–32)
AST SERPL-CCNC: 35 U/L (ref 1–32)
BACTERIA SPEC AEROBE CULT: ABNORMAL
BACTERIA UR QL AUTO: ABNORMAL /HPF
BACTERIA UR QL AUTO: ABNORMAL /HPF
BASOPHILS # BLD AUTO: 0.04 10*3/MM3 (ref 0–0.2)
BASOPHILS # BLD AUTO: 0.04 10*3/MM3 (ref 0–0.2)
BASOPHILS # BLD AUTO: 0.06 10*3/MM3 (ref 0–0.2)
BASOPHILS NFR BLD AUTO: 0.4 % (ref 0–1.5)
BASOPHILS NFR BLD AUTO: 0.6 % (ref 0–1.5)
BASOPHILS NFR BLD AUTO: 0.7 % (ref 0–1.5)
BH BB BLOOD EXPIRATION DATE: NORMAL
BH BB BLOOD EXPIRATION DATE: NORMAL
BH BB BLOOD TYPE BARCODE: 600
BH BB BLOOD TYPE BARCODE: 600
BH BB DISPENSE STATUS: NORMAL
BH BB DISPENSE STATUS: NORMAL
BH BB PRODUCT CODE: NORMAL
BH BB PRODUCT CODE: NORMAL
BH BB UNIT NUMBER: NORMAL
BH BB UNIT NUMBER: NORMAL
BILIRUB SERPL-MCNC: 0.3 MG/DL (ref 0.2–1.2)
BILIRUB SERPL-MCNC: 0.4 MG/DL (ref 0.2–1.2)
BILIRUB SERPL-MCNC: 0.4 MG/DL (ref 0.2–1.2)
BILIRUB UR QL STRIP: NEGATIVE
BILIRUB UR QL STRIP: NEGATIVE
BLD GP AB SCN SERPL QL: POSITIVE
BUN BLD-MCNC: 46 MG/DL (ref 8–23)
BUN BLD-MCNC: 47 MG/DL (ref 8–23)
BUN BLD-MCNC: 67 MG/DL (ref 8–23)
BUN BLD-MCNC: 77 MG/DL (ref 8–23)
BUN/CREAT SERPL: 34.6 (ref 7–25)
BUN/CREAT SERPL: 38.2 (ref 7–25)
BUN/CREAT SERPL: 46.9 (ref 7–25)
BUN/CREAT SERPL: 47.2 (ref 7–25)
CALCIUM SPEC-SCNC: 8.9 MG/DL (ref 8.6–10.5)
CALCIUM SPEC-SCNC: 9 MG/DL (ref 8.6–10.5)
CALCIUM SPEC-SCNC: 9.4 MG/DL (ref 8.6–10.5)
CALCIUM SPEC-SCNC: 9.5 MG/DL (ref 8.6–10.5)
CHLORIDE SERPL-SCNC: 100 MMOL/L (ref 98–107)
CHLORIDE SERPL-SCNC: 101 MMOL/L (ref 98–107)
CHLORIDE SERPL-SCNC: 103 MMOL/L (ref 98–107)
CHLORIDE SERPL-SCNC: 96 MMOL/L (ref 98–107)
CHOLEST SERPL-MCNC: 106 MG/DL (ref 0–200)
CLARITY UR: ABNORMAL
CLARITY UR: CLEAR
CO2 SERPL-SCNC: 20 MMOL/L (ref 22–29)
CO2 SERPL-SCNC: 21 MMOL/L (ref 22–29)
CO2 SERPL-SCNC: 22 MMOL/L (ref 22–29)
CO2 SERPL-SCNC: 23 MMOL/L (ref 22–29)
COLOR UR: YELLOW
COLOR UR: YELLOW
CREAT BLD-MCNC: 1.23 MG/DL (ref 0.57–1)
CREAT BLD-MCNC: 1.33 MG/DL (ref 0.57–1)
CREAT BLD-MCNC: 1.43 MG/DL (ref 0.57–1)
CREAT BLD-MCNC: 1.63 MG/DL (ref 0.57–1)
CROSSMATCH INTERPRETATION: NORMAL
CROSSMATCH INTERPRETATION: NORMAL
D-LACTATE SERPL-SCNC: 1.4 MMOL/L (ref 0.5–2)
DEPRECATED RDW RBC AUTO: 53.1 FL (ref 37–54)
DEPRECATED RDW RBC AUTO: 53.5 FL (ref 37–54)
DEPRECATED RDW RBC AUTO: 59.3 FL (ref 37–54)
DEPRECATED RDW RBC AUTO: 60.4 FL (ref 37–54)
DEPRECATED RDW RBC AUTO: 63.4 FL (ref 37–54)
DEVELOPER EXPIRATION DATE: ABNORMAL
DEVELOPER LOT NUMBER: 165
EOSINOPHIL # BLD AUTO: 0.18 10*3/MM3 (ref 0–0.4)
EOSINOPHIL # BLD AUTO: 0.21 10*3/MM3 (ref 0–0.4)
EOSINOPHIL # BLD AUTO: 0.21 10*3/MM3 (ref 0–0.4)
EOSINOPHIL NFR BLD AUTO: 2.3 % (ref 0.3–6.2)
EOSINOPHIL NFR BLD AUTO: 2.3 % (ref 0.3–6.2)
EOSINOPHIL NFR BLD AUTO: 2.5 % (ref 0.3–6.2)
ERYTHROCYTE [DISTWIDTH] IN BLOOD BY AUTOMATED COUNT: 16 % (ref 12.3–15.4)
ERYTHROCYTE [DISTWIDTH] IN BLOOD BY AUTOMATED COUNT: 16.1 % (ref 12.3–15.4)
ERYTHROCYTE [DISTWIDTH] IN BLOOD BY AUTOMATED COUNT: 17.6 % (ref 12.3–15.4)
ERYTHROCYTE [DISTWIDTH] IN BLOOD BY AUTOMATED COUNT: 17.9 % (ref 12.3–15.4)
ERYTHROCYTE [DISTWIDTH] IN BLOOD BY AUTOMATED COUNT: 18.6 % (ref 12.3–15.4)
EXPIRATION DATE: ABNORMAL
FECAL OCCULT BLOOD SCREEN, POC: POSITIVE
GFR SERPL CREATININE-BSD FRML MDRD: 30 ML/MIN/1.73
GFR SERPL CREATININE-BSD FRML MDRD: 35 ML/MIN/1.73
GFR SERPL CREATININE-BSD FRML MDRD: 38 ML/MIN/1.73
GFR SERPL CREATININE-BSD FRML MDRD: 42 ML/MIN/1.73
GLOBULIN UR ELPH-MCNC: 2.9 GM/DL
GLOBULIN UR ELPH-MCNC: 3 GM/DL
GLOBULIN UR ELPH-MCNC: 3.2 GM/DL
GLUCOSE BLD-MCNC: 119 MG/DL (ref 65–99)
GLUCOSE BLD-MCNC: 186 MG/DL (ref 65–99)
GLUCOSE BLD-MCNC: 211 MG/DL (ref 65–99)
GLUCOSE BLD-MCNC: 380 MG/DL (ref 65–99)
GLUCOSE BLDC GLUCOMTR-MCNC: 133 MG/DL (ref 70–130)
GLUCOSE BLDC GLUCOMTR-MCNC: 140 MG/DL (ref 70–130)
GLUCOSE BLDC GLUCOMTR-MCNC: 153 MG/DL (ref 70–130)
GLUCOSE BLDC GLUCOMTR-MCNC: 172 MG/DL (ref 70–130)
GLUCOSE BLDC GLUCOMTR-MCNC: 185 MG/DL (ref 70–130)
GLUCOSE BLDC GLUCOMTR-MCNC: 252 MG/DL (ref 70–130)
GLUCOSE BLDC GLUCOMTR-MCNC: 311 MG/DL (ref 70–130)
GLUCOSE BLDC GLUCOMTR-MCNC: 317 MG/DL (ref 70–130)
GLUCOSE BLDC GLUCOMTR-MCNC: 340 MG/DL (ref 70–130)
GLUCOSE BLDC GLUCOMTR-MCNC: 375 MG/DL (ref 70–130)
GLUCOSE UR STRIP-MCNC: NEGATIVE MG/DL
GLUCOSE UR STRIP-MCNC: NEGATIVE MG/DL
HBA1C MFR BLD: 8.2 % (ref 4.8–5.6)
HCT VFR BLD AUTO: 21.8 % (ref 34–46.6)
HCT VFR BLD AUTO: 25.9 % (ref 34–46.6)
HCT VFR BLD AUTO: 26.6 % (ref 34–46.6)
HCT VFR BLD AUTO: 27.8 % (ref 34–46.6)
HCT VFR BLD AUTO: 28.2 % (ref 34–46.6)
HDLC SERPL-MCNC: 50 MG/DL (ref 40–60)
HGB BLD-MCNC: 6.9 G/DL (ref 12–15.9)
HGB BLD-MCNC: 6.9 G/DL (ref 12–15.9)
HGB BLD-MCNC: 8.4 G/DL (ref 12–15.9)
HGB BLD-MCNC: 8.6 G/DL (ref 12–15.9)
HGB BLD-MCNC: 8.7 G/DL (ref 12–15.9)
HGB BLD-MCNC: 8.7 G/DL (ref 12–15.9)
HGB BLD-MCNC: 8.9 G/DL (ref 12–15.9)
HGB UR QL STRIP.AUTO: NEGATIVE
HGB UR QL STRIP.AUTO: NEGATIVE
HOLD SPECIMEN: NORMAL
HYALINE CASTS UR QL AUTO: ABNORMAL /LPF
HYALINE CASTS UR QL AUTO: ABNORMAL /LPF
IMM GRANULOCYTES # BLD AUTO: 0.02 10*3/MM3 (ref 0–0.05)
IMM GRANULOCYTES # BLD AUTO: 0.03 10*3/MM3 (ref 0–0.05)
IMM GRANULOCYTES # BLD AUTO: 0.04 10*3/MM3 (ref 0–0.05)
IMM GRANULOCYTES NFR BLD AUTO: 0.3 % (ref 0–0.5)
IMM GRANULOCYTES NFR BLD AUTO: 0.3 % (ref 0–0.5)
IMM GRANULOCYTES NFR BLD AUTO: 0.4 % (ref 0–0.5)
INR PPP: 1.18 (ref 0.91–1.09)
INR PPP: 1.35 (ref 0.91–1.09)
IRON 24H UR-MRATE: 34 MCG/DL (ref 37–145)
IRON SATN MFR SERPL: 7 % (ref 20–50)
KETONES UR QL STRIP: NEGATIVE
KETONES UR QL STRIP: NEGATIVE
LDLC SERPL CALC-MCNC: 34 MG/DL (ref 0–100)
LDLC/HDLC SERPL: 0.69 {RATIO}
LEUKOCYTE ESTERASE UR QL STRIP.AUTO: ABNORMAL
LEUKOCYTE ESTERASE UR QL STRIP.AUTO: NEGATIVE
LYMPHOCYTES # BLD AUTO: 0.76 10*3/MM3 (ref 0.7–3.1)
LYMPHOCYTES # BLD AUTO: 1.13 10*3/MM3 (ref 0.7–3.1)
LYMPHOCYTES # BLD AUTO: 1.14 10*3/MM3 (ref 0.7–3.1)
LYMPHOCYTES NFR BLD AUTO: 10.6 % (ref 19.6–45.3)
LYMPHOCYTES NFR BLD AUTO: 12.4 % (ref 19.6–45.3)
LYMPHOCYTES NFR BLD AUTO: 12.4 % (ref 19.6–45.3)
Lab: 165
MCH RBC QN AUTO: 28.4 PG (ref 26.6–33)
MCH RBC QN AUTO: 28.4 PG (ref 26.6–33)
MCH RBC QN AUTO: 29.7 PG (ref 26.6–33)
MCH RBC QN AUTO: 29.8 PG (ref 26.6–33)
MCH RBC QN AUTO: 29.8 PG (ref 26.6–33)
MCHC RBC AUTO-ENTMCNC: 31.3 G/DL (ref 31.5–35.7)
MCHC RBC AUTO-ENTMCNC: 31.6 G/DL (ref 31.5–35.7)
MCHC RBC AUTO-ENTMCNC: 31.7 G/DL (ref 31.5–35.7)
MCHC RBC AUTO-ENTMCNC: 32.3 G/DL (ref 31.5–35.7)
MCHC RBC AUTO-ENTMCNC: 32.4 G/DL (ref 31.5–35.7)
MCV RBC AUTO: 90.1 FL (ref 79–97)
MCV RBC AUTO: 90.8 FL (ref 79–97)
MCV RBC AUTO: 91.8 FL (ref 79–97)
MCV RBC AUTO: 92 FL (ref 79–97)
MCV RBC AUTO: 94 FL (ref 79–97)
MONOCYTES # BLD AUTO: 0.79 10*3/MM3 (ref 0.1–0.9)
MONOCYTES # BLD AUTO: 0.97 10*3/MM3 (ref 0.1–0.9)
MONOCYTES # BLD AUTO: 1.08 10*3/MM3 (ref 0.1–0.9)
MONOCYTES NFR BLD AUTO: 10.7 % (ref 5–12)
MONOCYTES NFR BLD AUTO: 11 % (ref 5–12)
MONOCYTES NFR BLD AUTO: 11.8 % (ref 5–12)
NEGATIVE CONTROL: NEGATIVE
NEUTROPHILS # BLD AUTO: 5.38 10*3/MM3 (ref 1.7–7)
NEUTROPHILS # BLD AUTO: 6.67 10*3/MM3 (ref 1.7–7)
NEUTROPHILS # BLD AUTO: 6.7 10*3/MM3 (ref 1.7–7)
NEUTROPHILS NFR BLD AUTO: 72.5 % (ref 42.7–76)
NEUTROPHILS NFR BLD AUTO: 73.8 % (ref 42.7–76)
NEUTROPHILS NFR BLD AUTO: 75 % (ref 42.7–76)
NITRITE UR QL STRIP: NEGATIVE
NITRITE UR QL STRIP: NEGATIVE
NRBC BLD AUTO-RTO: 0 /100 WBC (ref 0–0.2)
PH UR STRIP.AUTO: <=5 [PH] (ref 5–8)
PH UR STRIP.AUTO: <=5 [PH] (ref 5–8)
PLATELET # BLD AUTO: 145 10*3/MM3 (ref 140–450)
PLATELET # BLD AUTO: 153 10*3/MM3 (ref 140–450)
PLATELET # BLD AUTO: 164 10*3/MM3 (ref 140–450)
PLATELET # BLD AUTO: 174 10*3/MM3 (ref 140–450)
PLATELET # BLD AUTO: 187 10*3/MM3 (ref 140–450)
PMV BLD AUTO: 10.7 FL (ref 6–12)
PMV BLD AUTO: 10.8 FL (ref 6–12)
PMV BLD AUTO: 10.8 FL (ref 6–12)
PMV BLD AUTO: 11 FL (ref 6–12)
PMV BLD AUTO: 11.3 FL (ref 6–12)
POSITIVE CONTROL: POSITIVE
POTASSIUM BLD-SCNC: 3.7 MMOL/L (ref 3.5–5.2)
POTASSIUM BLD-SCNC: 4.1 MMOL/L (ref 3.5–5.2)
POTASSIUM BLD-SCNC: 4.5 MMOL/L (ref 3.5–5.2)
POTASSIUM BLD-SCNC: 4.5 MMOL/L (ref 3.5–5.2)
PROT SERPL-MCNC: 6.6 G/DL (ref 6–8.5)
PROT SERPL-MCNC: 6.7 G/DL (ref 6–8.5)
PROT SERPL-MCNC: 7.1 G/DL (ref 6–8.5)
PROT UR QL STRIP: ABNORMAL
PROT UR QL STRIP: ABNORMAL
PROTHROMBIN TIME: 14.9 SECONDS (ref 11.9–14.6)
PROTHROMBIN TIME: 17.1 SECONDS (ref 11.9–14.6)
RBC # BLD AUTO: 2.32 10*6/MM3 (ref 3.77–5.28)
RBC # BLD AUTO: 2.82 10*6/MM3 (ref 3.77–5.28)
RBC # BLD AUTO: 2.89 10*6/MM3 (ref 3.77–5.28)
RBC # BLD AUTO: 3.06 10*6/MM3 (ref 3.77–5.28)
RBC # BLD AUTO: 3.13 10*6/MM3 (ref 3.77–5.28)
RBC # UR: ABNORMAL /HPF
RBC # UR: ABNORMAL /HPF
REF LAB TEST METHOD: ABNORMAL
REF LAB TEST METHOD: ABNORMAL
RH BLD: NEGATIVE
SODIUM BLD-SCNC: 133 MMOL/L (ref 136–145)
SODIUM BLD-SCNC: 136 MMOL/L (ref 136–145)
SODIUM BLD-SCNC: 136 MMOL/L (ref 136–145)
SODIUM BLD-SCNC: 137 MMOL/L (ref 136–145)
SP GR UR STRIP: 1.01 (ref 1–1.03)
SP GR UR STRIP: 1.01 (ref 1–1.03)
SQUAMOUS #/AREA URNS HPF: ABNORMAL /HPF
SQUAMOUS #/AREA URNS HPF: ABNORMAL /HPF
T&S EXPIRATION DATE: NORMAL
TIBC SERPL-MCNC: 460 MCG/DL (ref 298–536)
TRANSFERRIN SERPL-MCNC: 309 MG/DL (ref 200–360)
TRIGL SERPL-MCNC: 108 MG/DL (ref 0–150)
TROPONIN T SERPL-MCNC: 0.05 NG/ML (ref 0–0.03)
TROPONIN T SERPL-MCNC: 0.05 NG/ML (ref 0–0.03)
TSH SERPL DL<=0.05 MIU/L-ACNC: 1.04 UIU/ML (ref 0.27–4.2)
UNIT  ABO: NORMAL
UNIT  ABO: NORMAL
UNIT  RH: NORMAL
UNIT  RH: NORMAL
UROBILINOGEN UR QL STRIP: ABNORMAL
UROBILINOGEN UR QL STRIP: ABNORMAL
VLDLC SERPL-MCNC: 21.6 MG/DL
WBC NRBC COR # BLD: 6.61 10*3/MM3 (ref 3.4–10.8)
WBC NRBC COR # BLD: 7.17 10*3/MM3 (ref 3.4–10.8)
WBC NRBC COR # BLD: 8.05 10*3/MM3 (ref 3.4–10.8)
WBC NRBC COR # BLD: 9.09 10*3/MM3 (ref 3.4–10.8)
WBC NRBC COR # BLD: 9.19 10*3/MM3 (ref 3.4–10.8)
WBC UR QL AUTO: ABNORMAL /HPF
WBC UR QL AUTO: ABNORMAL /HPF

## 2020-01-01 PROCEDURE — 86922 COMPATIBILITY TEST ANTIGLOB: CPT

## 2020-01-01 PROCEDURE — G0378 HOSPITAL OBSERVATION PER HR: HCPCS

## 2020-01-01 PROCEDURE — 97165 OT EVAL LOW COMPLEX 30 MIN: CPT

## 2020-01-01 PROCEDURE — 82962 GLUCOSE BLOOD TEST: CPT

## 2020-01-01 PROCEDURE — 71250 CT THORAX DX C-: CPT

## 2020-01-01 PROCEDURE — 85027 COMPLETE CBC AUTOMATED: CPT | Performed by: INTERNAL MEDICINE

## 2020-01-01 PROCEDURE — 81001 URINALYSIS AUTO W/SCOPE: CPT | Performed by: FAMILY MEDICINE

## 2020-01-01 PROCEDURE — 85730 THROMBOPLASTIN TIME PARTIAL: CPT | Performed by: INTERNAL MEDICINE

## 2020-01-01 PROCEDURE — 96374 THER/PROPH/DIAG INJ IV PUSH: CPT

## 2020-01-01 PROCEDURE — 99284 EMERGENCY DEPT VISIT MOD MDM: CPT

## 2020-01-01 PROCEDURE — 84443 ASSAY THYROID STIM HORMONE: CPT | Performed by: NURSE PRACTITIONER

## 2020-01-01 PROCEDURE — 63710000001 INSULIN REGULAR HUMAN PER 5 UNITS: Performed by: INTERNAL MEDICINE

## 2020-01-01 PROCEDURE — 86900 BLOOD TYPING SEROLOGIC ABO: CPT | Performed by: FAMILY MEDICINE

## 2020-01-01 PROCEDURE — 97162 PT EVAL MOD COMPLEX 30 MIN: CPT

## 2020-01-01 PROCEDURE — 86902 BLOOD TYPE ANTIGEN DONOR EA: CPT

## 2020-01-01 PROCEDURE — P9016 RBC LEUKOCYTES REDUCED: HCPCS

## 2020-01-01 PROCEDURE — 87186 SC STD MICRODIL/AGAR DIL: CPT | Performed by: FAMILY MEDICINE

## 2020-01-01 PROCEDURE — 85027 COMPLETE CBC AUTOMATED: CPT | Performed by: NURSE PRACTITIONER

## 2020-01-01 PROCEDURE — 81001 URINALYSIS AUTO W/SCOPE: CPT | Performed by: EMERGENCY MEDICINE

## 2020-01-01 PROCEDURE — 85025 COMPLETE CBC W/AUTO DIFF WBC: CPT | Performed by: INTERNAL MEDICINE

## 2020-01-01 PROCEDURE — 83605 ASSAY OF LACTIC ACID: CPT | Performed by: INTERNAL MEDICINE

## 2020-01-01 PROCEDURE — 25010000002 ONDANSETRON PER 1 MG: Performed by: NURSE PRACTITIONER

## 2020-01-01 PROCEDURE — 99222 1ST HOSP IP/OBS MODERATE 55: CPT | Performed by: INTERNAL MEDICINE

## 2020-01-01 PROCEDURE — 82270 OCCULT BLOOD FECES: CPT | Performed by: FAMILY MEDICINE

## 2020-01-01 PROCEDURE — 80053 COMPREHEN METABOLIC PANEL: CPT | Performed by: EMERGENCY MEDICINE

## 2020-01-01 PROCEDURE — 36430 TRANSFUSION BLD/BLD COMPNT: CPT

## 2020-01-01 PROCEDURE — 80048 BASIC METABOLIC PNL TOTAL CA: CPT | Performed by: NURSE PRACTITIONER

## 2020-01-01 PROCEDURE — 63710000001 INSULIN LISPRO (HUMAN) PER 5 UNITS: Performed by: NURSE PRACTITIONER

## 2020-01-01 PROCEDURE — 82140 ASSAY OF AMMONIA: CPT | Performed by: NURSE PRACTITIONER

## 2020-01-01 PROCEDURE — 99497 ADVNCD CARE PLAN 30 MIN: CPT | Performed by: CLINICAL NURSE SPECIALIST

## 2020-01-01 PROCEDURE — 87086 URINE CULTURE/COLONY COUNT: CPT | Performed by: FAMILY MEDICINE

## 2020-01-01 PROCEDURE — 99498 ADVNCD CARE PLAN ADDL 30 MIN: CPT | Performed by: CLINICAL NURSE SPECIALIST

## 2020-01-01 PROCEDURE — 85025 COMPLETE CBC W/AUTO DIFF WBC: CPT | Performed by: FAMILY MEDICINE

## 2020-01-01 PROCEDURE — 94799 UNLISTED PULMONARY SVC/PX: CPT

## 2020-01-01 PROCEDURE — 51702 INSERT TEMP BLADDER CATH: CPT

## 2020-01-01 PROCEDURE — 85610 PROTHROMBIN TIME: CPT | Performed by: INTERNAL MEDICINE

## 2020-01-01 PROCEDURE — 87077 CULTURE AEROBIC IDENTIFY: CPT | Performed by: FAMILY MEDICINE

## 2020-01-01 PROCEDURE — 84484 ASSAY OF TROPONIN QUANT: CPT | Performed by: INTERNAL MEDICINE

## 2020-01-01 PROCEDURE — 70450 CT HEAD/BRAIN W/O DYE: CPT

## 2020-01-01 PROCEDURE — 86900 BLOOD TYPING SEROLOGIC ABO: CPT

## 2020-01-01 PROCEDURE — 99214 OFFICE O/P EST MOD 30 MIN: CPT | Performed by: INTERNAL MEDICINE

## 2020-01-01 PROCEDURE — 85018 HEMOGLOBIN: CPT | Performed by: INTERNAL MEDICINE

## 2020-01-01 PROCEDURE — 84466 ASSAY OF TRANSFERRIN: CPT | Performed by: INTERNAL MEDICINE

## 2020-01-01 PROCEDURE — 80061 LIPID PANEL: CPT | Performed by: NURSE PRACTITIONER

## 2020-01-01 PROCEDURE — 86920 COMPATIBILITY TEST SPIN: CPT

## 2020-01-01 PROCEDURE — 86870 RBC ANTIBODY IDENTIFICATION: CPT | Performed by: FAMILY MEDICINE

## 2020-01-01 PROCEDURE — 86850 RBC ANTIBODY SCREEN: CPT | Performed by: FAMILY MEDICINE

## 2020-01-01 PROCEDURE — 96375 TX/PRO/DX INJ NEW DRUG ADDON: CPT

## 2020-01-01 PROCEDURE — 80053 COMPREHEN METABOLIC PANEL: CPT | Performed by: FAMILY MEDICINE

## 2020-01-01 PROCEDURE — P9612 CATHETERIZE FOR URINE SPEC: HCPCS

## 2020-01-01 PROCEDURE — 83540 ASSAY OF IRON: CPT | Performed by: INTERNAL MEDICINE

## 2020-01-01 PROCEDURE — 25010000002 IRON SUCROSE PER 1 MG: Performed by: INTERNAL MEDICINE

## 2020-01-01 PROCEDURE — 93005 ELECTROCARDIOGRAM TRACING: CPT | Performed by: INTERNAL MEDICINE

## 2020-01-01 PROCEDURE — 80053 COMPREHEN METABOLIC PANEL: CPT | Performed by: INTERNAL MEDICINE

## 2020-01-01 PROCEDURE — 93010 ELECTROCARDIOGRAM REPORT: CPT | Performed by: INTERNAL MEDICINE

## 2020-01-01 PROCEDURE — 97116 GAIT TRAINING THERAPY: CPT

## 2020-01-01 PROCEDURE — 99223 1ST HOSP IP/OBS HIGH 75: CPT | Performed by: CLINICAL NURSE SPECIALIST

## 2020-01-01 PROCEDURE — 85730 THROMBOPLASTIN TIME PARTIAL: CPT | Performed by: NURSE PRACTITIONER

## 2020-01-01 PROCEDURE — 85025 COMPLETE CBC W/AUTO DIFF WBC: CPT | Performed by: EMERGENCY MEDICINE

## 2020-01-01 PROCEDURE — 86901 BLOOD TYPING SEROLOGIC RH(D): CPT | Performed by: FAMILY MEDICINE

## 2020-01-01 PROCEDURE — 85610 PROTHROMBIN TIME: CPT | Performed by: NURSE PRACTITIONER

## 2020-01-01 PROCEDURE — 83036 HEMOGLOBIN GLYCOSYLATED A1C: CPT | Performed by: NURSE PRACTITIONER

## 2020-01-01 PROCEDURE — 25010000002 LORAZEPAM PER 2 MG: Performed by: NURSE PRACTITIONER

## 2020-01-01 PROCEDURE — 25010000002 CEFTRIAXONE PER 250 MG: Performed by: INTERNAL MEDICINE

## 2020-01-01 RX ORDER — LORAZEPAM 2 MG/ML
1 CONCENTRATE ORAL
Status: DISCONTINUED | OUTPATIENT
Start: 2020-01-01 | End: 2020-01-01 | Stop reason: HOSPADM

## 2020-01-01 RX ORDER — ACETAMINOPHEN 160 MG/5ML
650 SOLUTION ORAL EVERY 4 HOURS PRN
Status: DISCONTINUED | OUTPATIENT
Start: 2020-01-01 | End: 2020-01-01

## 2020-01-01 RX ORDER — DEXTROSE MONOHYDRATE 25 G/50ML
25 INJECTION, SOLUTION INTRAVENOUS
Status: DISCONTINUED | OUTPATIENT
Start: 2020-01-01 | End: 2020-01-01 | Stop reason: HOSPADM

## 2020-01-01 RX ORDER — FUROSEMIDE 40 MG/1
40 TABLET ORAL EVERY MORNING
COMMUNITY

## 2020-01-01 RX ORDER — ACETAMINOPHEN 650 MG/1
650 SUPPOSITORY RECTAL EVERY 4 HOURS PRN
Status: DISCONTINUED | OUTPATIENT
Start: 2020-01-01 | End: 2020-01-01 | Stop reason: HOSPADM

## 2020-01-01 RX ORDER — FUROSEMIDE 40 MG/1
TABLET ORAL
Qty: 135 TABLET | Refills: 1 | Status: ON HOLD | OUTPATIENT
Start: 2020-01-01 | End: 2020-01-01

## 2020-01-01 RX ORDER — CLOPIDOGREL BISULFATE 75 MG/1
75 TABLET ORAL DAILY
Status: DISCONTINUED | OUTPATIENT
Start: 2020-01-01 | End: 2020-01-01

## 2020-01-01 RX ORDER — ACETAMINOPHEN 160 MG/5ML
650 SOLUTION ORAL EVERY 4 HOURS PRN
Status: DISCONTINUED | OUTPATIENT
Start: 2020-01-01 | End: 2020-01-01 | Stop reason: HOSPADM

## 2020-01-01 RX ORDER — FERROUS SULFATE 325(65) MG
325 TABLET ORAL 2 TIMES DAILY WITH MEALS
Qty: 60 TABLET | Refills: 0 | Status: SHIPPED | OUTPATIENT
Start: 2020-01-01 | End: 2020-01-01 | Stop reason: SDUPTHER

## 2020-01-01 RX ORDER — MORPHINE SULFATE 20 MG/5ML
5 SOLUTION ORAL EVERY 4 HOURS PRN
Qty: 100 ML | Refills: 0 | Status: SHIPPED | OUTPATIENT
Start: 2020-01-01

## 2020-01-01 RX ORDER — LORAZEPAM 1 MG/1
1 TABLET ORAL
Status: DISCONTINUED | OUTPATIENT
Start: 2020-01-01 | End: 2020-01-01 | Stop reason: HOSPADM

## 2020-01-01 RX ORDER — FUROSEMIDE 20 MG/1
20 TABLET ORAL 2 TIMES DAILY
Status: ON HOLD | COMMUNITY
End: 2020-01-01 | Stop reason: SDUPTHER

## 2020-01-01 RX ORDER — LORAZEPAM 2 MG/ML
1 INJECTION INTRAMUSCULAR
Status: DISCONTINUED | OUTPATIENT
Start: 2020-01-01 | End: 2020-01-01 | Stop reason: HOSPADM

## 2020-01-01 RX ORDER — SIMVASTATIN 20 MG
20 TABLET ORAL NIGHTLY
Status: ON HOLD | COMMUNITY
End: 2020-01-01 | Stop reason: ALTCHOICE

## 2020-01-01 RX ORDER — BISACODYL 10 MG
10 SUPPOSITORY, RECTAL RECTAL DAILY PRN
Status: DISCONTINUED | OUTPATIENT
Start: 2020-01-01 | End: 2020-01-01 | Stop reason: HOSPADM

## 2020-01-01 RX ORDER — NICOTINE POLACRILEX 4 MG
15 LOZENGE BUCCAL
Status: DISCONTINUED | OUTPATIENT
Start: 2020-01-01 | End: 2020-01-01 | Stop reason: HOSPADM

## 2020-01-01 RX ORDER — ROSUVASTATIN CALCIUM 20 MG/1
20 TABLET, COATED ORAL NIGHTLY
Status: DISCONTINUED | OUTPATIENT
Start: 2020-01-01 | End: 2020-01-01 | Stop reason: HOSPADM

## 2020-01-01 RX ORDER — SODIUM CHLORIDE 0.9 % (FLUSH) 0.9 %
10 SYRINGE (ML) INJECTION EVERY 12 HOURS SCHEDULED
Status: DISCONTINUED | OUTPATIENT
Start: 2020-01-01 | End: 2020-01-01 | Stop reason: HOSPADM

## 2020-01-01 RX ORDER — ACETAMINOPHEN 650 MG/1
650 SUPPOSITORY RECTAL EVERY 4 HOURS PRN
Status: DISCONTINUED | OUTPATIENT
Start: 2020-01-01 | End: 2020-01-01

## 2020-01-01 RX ORDER — FERROUS SULFATE 325(65) MG
325 TABLET ORAL 2 TIMES DAILY WITH MEALS
Qty: 60 TABLET | Refills: 0 | Status: SHIPPED | OUTPATIENT
Start: 2020-01-01 | End: 2020-01-01 | Stop reason: HOSPADM

## 2020-01-01 RX ORDER — METOPROLOL SUCCINATE 25 MG/1
25 TABLET, EXTENDED RELEASE ORAL DAILY
Status: DISCONTINUED | OUTPATIENT
Start: 2020-01-01 | End: 2020-01-01 | Stop reason: HOSPADM

## 2020-01-01 RX ORDER — PANTOPRAZOLE SODIUM 40 MG/1
40 TABLET, DELAYED RELEASE ORAL
Status: DISCONTINUED | OUTPATIENT
Start: 2020-01-01 | End: 2020-01-01 | Stop reason: HOSPADM

## 2020-01-01 RX ORDER — LORAZEPAM 2 MG/ML
2 CONCENTRATE ORAL
Status: DISCONTINUED | OUTPATIENT
Start: 2020-01-01 | End: 2020-01-01 | Stop reason: HOSPADM

## 2020-01-01 RX ORDER — SODIUM CHLORIDE 0.9 % (FLUSH) 0.9 %
10 SYRINGE (ML) INJECTION AS NEEDED
Status: DISCONTINUED | OUTPATIENT
Start: 2020-01-01 | End: 2020-01-01 | Stop reason: HOSPADM

## 2020-01-01 RX ORDER — PANTOPRAZOLE SODIUM 40 MG/10ML
40 INJECTION, POWDER, LYOPHILIZED, FOR SOLUTION INTRAVENOUS EVERY 12 HOURS SCHEDULED
Status: DISCONTINUED | OUTPATIENT
Start: 2020-01-01 | End: 2020-01-01

## 2020-01-01 RX ORDER — MORPHINE SULFATE 20 MG/ML
5 SOLUTION ORAL
Status: DISCONTINUED | OUTPATIENT
Start: 2020-01-01 | End: 2020-01-01 | Stop reason: HOSPADM

## 2020-01-01 RX ORDER — CLOPIDOGREL BISULFATE 75 MG/1
75 TABLET ORAL DAILY
Status: DISCONTINUED | OUTPATIENT
Start: 2020-01-01 | End: 2020-01-01 | Stop reason: HOSPADM

## 2020-01-01 RX ORDER — FUROSEMIDE 20 MG/1
20 TABLET ORAL NIGHTLY
COMMUNITY

## 2020-01-01 RX ORDER — HYDROCODONE BITARTRATE AND ACETAMINOPHEN 5; 325 MG/1; MG/1
1 TABLET ORAL EVERY 4 HOURS PRN
Status: DISCONTINUED | OUTPATIENT
Start: 2020-01-01 | End: 2020-01-01

## 2020-01-01 RX ORDER — DEXTROSE MONOHYDRATE 25 G/50ML
25 INJECTION, SOLUTION INTRAVENOUS
Status: DISCONTINUED | OUTPATIENT
Start: 2020-01-01 | End: 2020-01-01

## 2020-01-01 RX ORDER — LORAZEPAM 2 MG/ML
0.5 INJECTION INTRAMUSCULAR
Status: DISCONTINUED | OUTPATIENT
Start: 2020-01-01 | End: 2020-01-01 | Stop reason: HOSPADM

## 2020-01-01 RX ORDER — SCOLOPAMINE TRANSDERMAL SYSTEM 1 MG/1
1 PATCH, EXTENDED RELEASE TRANSDERMAL
Qty: 10 EACH | Refills: 0 | Status: SHIPPED | OUTPATIENT
Start: 2020-01-01

## 2020-01-01 RX ORDER — LORAZEPAM 2 MG/ML
0.25 INJECTION INTRAMUSCULAR EVERY 4 HOURS PRN
Status: DISCONTINUED | OUTPATIENT
Start: 2020-01-01 | End: 2020-01-01

## 2020-01-01 RX ORDER — MIRTAZAPINE 15 MG/1
7.5 TABLET, FILM COATED ORAL NIGHTLY
Status: DISCONTINUED | OUTPATIENT
Start: 2020-01-01 | End: 2020-01-01 | Stop reason: HOSPADM

## 2020-01-01 RX ORDER — ONDANSETRON 4 MG/1
4 TABLET, FILM COATED ORAL EVERY 6 HOURS PRN
Status: DISCONTINUED | OUTPATIENT
Start: 2020-01-01 | End: 2020-01-01 | Stop reason: HOSPADM

## 2020-01-01 RX ORDER — LORAZEPAM 2 MG/ML
0.5 CONCENTRATE ORAL
Status: DISCONTINUED | OUTPATIENT
Start: 2020-01-01 | End: 2020-01-01 | Stop reason: HOSPADM

## 2020-01-01 RX ORDER — ASPIRIN 81 MG/1
81 TABLET ORAL DAILY
Status: DISCONTINUED | OUTPATIENT
Start: 2020-01-01 | End: 2020-01-01 | Stop reason: HOSPADM

## 2020-01-01 RX ORDER — LORAZEPAM 1 MG/1
2 TABLET ORAL
Status: DISCONTINUED | OUTPATIENT
Start: 2020-01-01 | End: 2020-01-01 | Stop reason: HOSPADM

## 2020-01-01 RX ORDER — SCOLOPAMINE TRANSDERMAL SYSTEM 1 MG/1
1 PATCH, EXTENDED RELEASE TRANSDERMAL
Status: DISCONTINUED | OUTPATIENT
Start: 2020-01-01 | End: 2020-01-01 | Stop reason: HOSPADM

## 2020-01-01 RX ORDER — NALOXONE HCL 0.4 MG/ML
0.4 VIAL (ML) INJECTION
Status: DISCONTINUED | OUTPATIENT
Start: 2020-01-01 | End: 2020-01-01 | Stop reason: HOSPADM

## 2020-01-01 RX ORDER — ONDANSETRON 2 MG/ML
4 INJECTION INTRAMUSCULAR; INTRAVENOUS EVERY 6 HOURS PRN
Status: DISCONTINUED | OUTPATIENT
Start: 2020-01-01 | End: 2020-01-01 | Stop reason: HOSPADM

## 2020-01-01 RX ORDER — VENLAFAXINE HYDROCHLORIDE 75 MG/1
75 CAPSULE, EXTENDED RELEASE ORAL DAILY
Qty: 90 CAPSULE | Refills: 1 | Status: SHIPPED | OUTPATIENT
Start: 2020-01-01 | End: 2020-01-01 | Stop reason: HOSPADM

## 2020-01-01 RX ORDER — SODIUM CHLORIDE 9 MG/ML
50 INJECTION, SOLUTION INTRAVENOUS CONTINUOUS
Status: DISCONTINUED | OUTPATIENT
Start: 2020-01-01 | End: 2020-01-01

## 2020-01-01 RX ORDER — LANCETS 33 GAUGE
EACH MISCELLANEOUS
Status: ON HOLD | COMMUNITY
Start: 2019-01-01 | End: 2020-01-01

## 2020-01-01 RX ORDER — VENLAFAXINE HYDROCHLORIDE 75 MG/1
75 CAPSULE, EXTENDED RELEASE ORAL DAILY
Status: DISCONTINUED | OUTPATIENT
Start: 2020-01-01 | End: 2020-01-01 | Stop reason: HOSPADM

## 2020-01-01 RX ORDER — GLIPIZIDE 5 MG/1
1 TABLET ORAL 2 TIMES DAILY
COMMUNITY
Start: 2019-01-01 | End: 2020-01-01 | Stop reason: HOSPADM

## 2020-01-01 RX ORDER — CEPHALEXIN 500 MG/1
500 CAPSULE ORAL 2 TIMES DAILY
Qty: 6 CAPSULE | Refills: 0 | Status: SHIPPED | OUTPATIENT
Start: 2020-01-01 | End: 2020-01-01 | Stop reason: SDUPTHER

## 2020-01-01 RX ORDER — NITROGLYCERIN 0.4 MG/1
0.4 TABLET SUBLINGUAL
COMMUNITY
End: 2020-01-01 | Stop reason: HOSPADM

## 2020-01-01 RX ORDER — LORAZEPAM 0.5 MG/1
0.5 TABLET ORAL
Status: DISCONTINUED | OUTPATIENT
Start: 2020-01-01 | End: 2020-01-01 | Stop reason: HOSPADM

## 2020-01-01 RX ORDER — POLYETHYLENE GLYCOL 3350 17 G/17G
17 POWDER, FOR SOLUTION ORAL DAILY PRN
Status: DISCONTINUED | OUTPATIENT
Start: 2020-01-01 | End: 2020-01-01 | Stop reason: HOSPADM

## 2020-01-01 RX ORDER — LORAZEPAM 2 MG/ML
2 INJECTION INTRAMUSCULAR
Status: DISCONTINUED | OUTPATIENT
Start: 2020-01-01 | End: 2020-01-01 | Stop reason: HOSPADM

## 2020-01-01 RX ORDER — LABETALOL HYDROCHLORIDE 5 MG/ML
20 INJECTION, SOLUTION INTRAVENOUS EVERY 4 HOURS PRN
Status: DISCONTINUED | OUTPATIENT
Start: 2020-01-01 | End: 2020-01-01 | Stop reason: HOSPADM

## 2020-01-01 RX ORDER — DIPHENHYDRAMINE HYDROCHLORIDE 50 MG/ML
25 INJECTION INTRAMUSCULAR; INTRAVENOUS EVERY 6 HOURS PRN
Status: DISCONTINUED | OUTPATIENT
Start: 2020-01-01 | End: 2020-01-01 | Stop reason: HOSPADM

## 2020-01-01 RX ORDER — DIPHENOXYLATE HYDROCHLORIDE AND ATROPINE SULFATE 2.5; .025 MG/1; MG/1
1 TABLET ORAL
Status: DISCONTINUED | OUTPATIENT
Start: 2020-01-01 | End: 2020-01-01 | Stop reason: HOSPADM

## 2020-01-01 RX ORDER — SPIRONOLACTONE 25 MG/1
25 TABLET ORAL DAILY
Status: DISCONTINUED | OUTPATIENT
Start: 2020-01-01 | End: 2020-01-01 | Stop reason: HOSPADM

## 2020-01-01 RX ORDER — ATROPINE SULFATE 10 MG/ML
2 SOLUTION/ DROPS OPHTHALMIC 2 TIMES DAILY PRN
Status: DISCONTINUED | OUTPATIENT
Start: 2020-01-01 | End: 2020-01-01 | Stop reason: HOSPADM

## 2020-01-01 RX ORDER — HALOPERIDOL 2 MG/ML
2 SOLUTION ORAL EVERY 6 HOURS PRN
Status: DISCONTINUED | OUTPATIENT
Start: 2020-01-01 | End: 2020-01-01 | Stop reason: HOSPADM

## 2020-01-01 RX ORDER — ACETAMINOPHEN 325 MG/1
650 TABLET ORAL EVERY 4 HOURS PRN
Status: DISCONTINUED | OUTPATIENT
Start: 2020-01-01 | End: 2020-01-01 | Stop reason: HOSPADM

## 2020-01-01 RX ORDER — ASPIRIN 81 MG/1
81 TABLET ORAL DAILY
Status: DISCONTINUED | OUTPATIENT
Start: 2020-01-01 | End: 2020-01-01

## 2020-01-01 RX ORDER — FUROSEMIDE 40 MG/1
60 TABLET ORAL DAILY
Status: ON HOLD | COMMUNITY
End: 2020-01-01

## 2020-01-01 RX ORDER — HALOPERIDOL 2 MG/ML
2 SOLUTION ORAL EVERY 6 HOURS PRN
Qty: 120 ML | Refills: 2 | Status: SHIPPED | OUTPATIENT
Start: 2020-01-01

## 2020-01-01 RX ORDER — ACETAMINOPHEN 325 MG/1
650 TABLET ORAL EVERY 4 HOURS PRN
Status: DISCONTINUED | OUTPATIENT
Start: 2020-01-01 | End: 2020-01-01

## 2020-01-01 RX ORDER — NITROGLYCERIN 0.4 MG/1
0.4 TABLET SUBLINGUAL
Status: DISCONTINUED | OUTPATIENT
Start: 2020-01-01 | End: 2020-01-01 | Stop reason: HOSPADM

## 2020-01-01 RX ORDER — IPRATROPIUM BROMIDE AND ALBUTEROL SULFATE 2.5; .5 MG/3ML; MG/3ML
3 SOLUTION RESPIRATORY (INHALATION) EVERY 4 HOURS PRN
Status: DISCONTINUED | OUTPATIENT
Start: 2020-01-01 | End: 2020-01-01 | Stop reason: HOSPADM

## 2020-01-01 RX ORDER — NICOTINE POLACRILEX 4 MG
15 LOZENGE BUCCAL
Status: DISCONTINUED | OUTPATIENT
Start: 2020-01-01 | End: 2020-01-01

## 2020-01-01 RX ORDER — PANTOPRAZOLE SODIUM 40 MG/1
40 TABLET, DELAYED RELEASE ORAL DAILY
Status: DISCONTINUED | OUTPATIENT
Start: 2020-01-01 | End: 2020-01-01 | Stop reason: HOSPADM

## 2020-01-01 RX ORDER — MORPHINE SULFATE 100 MG/5ML
5 SOLUTION ORAL
Qty: 30 ML | Refills: 0 | Status: SHIPPED | OUTPATIENT
Start: 2020-01-01

## 2020-01-01 RX ORDER — LORAZEPAM 2 MG/ML
1 CONCENTRATE ORAL EVERY 8 HOURS PRN
Qty: 30 ML | Refills: 0 | Status: SHIPPED | OUTPATIENT
Start: 2020-01-01

## 2020-01-01 RX ORDER — PANTOPRAZOLE SODIUM 40 MG/1
40 TABLET, DELAYED RELEASE ORAL DAILY
Status: DISCONTINUED | OUTPATIENT
Start: 2020-01-01 | End: 2020-01-01

## 2020-01-01 RX ORDER — HYDROMORPHONE HYDROCHLORIDE 1 MG/ML
0.5 INJECTION, SOLUTION INTRAMUSCULAR; INTRAVENOUS; SUBCUTANEOUS
Status: DISCONTINUED | OUTPATIENT
Start: 2020-01-01 | End: 2020-01-01

## 2020-01-01 RX ORDER — CEPHALEXIN 500 MG/1
500 CAPSULE ORAL 2 TIMES DAILY
Qty: 6 CAPSULE | Refills: 0 | Status: SHIPPED | OUTPATIENT
Start: 2020-01-01 | End: 2020-01-01

## 2020-01-01 RX ADMIN — ASPIRIN 81 MG: 81 TABLET ORAL at 08:33

## 2020-01-01 RX ADMIN — ROSUVASTATIN CALCIUM 20 MG: 20 TABLET, FILM COATED ORAL at 20:02

## 2020-01-01 RX ADMIN — CLOPIDOGREL 75 MG: 75 TABLET, FILM COATED ORAL at 17:11

## 2020-01-01 RX ADMIN — SODIUM CHLORIDE, PRESERVATIVE FREE 10 ML: 5 INJECTION INTRAVENOUS at 20:03

## 2020-01-01 RX ADMIN — HYDROCODONE BITARTRATE AND ACETAMINOPHEN 1 TABLET: 5; 325 TABLET ORAL at 23:56

## 2020-01-01 RX ADMIN — INSULIN HUMAN 6 UNITS: 100 INJECTION, SOLUTION PARENTERAL at 17:11

## 2020-01-01 RX ADMIN — MORPHINE SULFATE 5 MG: 20 SOLUTION ORAL at 15:58

## 2020-01-01 RX ADMIN — IRON SUCROSE 200 MG: 20 INJECTION, SOLUTION INTRAVENOUS at 11:45

## 2020-01-01 RX ADMIN — ROSUVASTATIN CALCIUM 20 MG: 20 TABLET, FILM COATED ORAL at 23:31

## 2020-01-01 RX ADMIN — ONDANSETRON HYDROCHLORIDE 4 MG: 2 SOLUTION INTRAMUSCULAR; INTRAVENOUS at 23:31

## 2020-01-01 RX ADMIN — SODIUM CHLORIDE 500 ML: 9 INJECTION, SOLUTION INTRAVENOUS at 16:07

## 2020-01-01 RX ADMIN — INSULIN HUMAN 7 UNITS: 100 INJECTION, SOLUTION PARENTERAL at 21:22

## 2020-01-01 RX ADMIN — CEFTRIAXONE 1 G: 1 INJECTION, POWDER, FOR SOLUTION INTRAMUSCULAR; INTRAVENOUS at 20:02

## 2020-01-01 RX ADMIN — SCOPALAMINE 1 PATCH: 1 PATCH, EXTENDED RELEASE TRANSDERMAL at 15:58

## 2020-01-01 RX ADMIN — SODIUM CHLORIDE 50 ML/HR: 9 INJECTION, SOLUTION INTRAVENOUS at 00:52

## 2020-01-01 RX ADMIN — METOPROLOL SUCCINATE 25 MG: 25 TABLET, EXTENDED RELEASE ORAL at 16:15

## 2020-01-01 RX ADMIN — MIRTAZAPINE 7.5 MG: 15 TABLET, FILM COATED ORAL at 23:31

## 2020-01-01 RX ADMIN — CLOPIDOGREL 75 MG: 75 TABLET, FILM COATED ORAL at 16:15

## 2020-01-01 RX ADMIN — INSULIN LISPRO 2 UNITS: 100 INJECTION, SOLUTION INTRAVENOUS; SUBCUTANEOUS at 08:59

## 2020-01-01 RX ADMIN — METOPROLOL SUCCINATE 25 MG: 25 TABLET, EXTENDED RELEASE ORAL at 08:33

## 2020-01-01 RX ADMIN — SODIUM CHLORIDE, PRESERVATIVE FREE 10 ML: 5 INJECTION INTRAVENOUS at 21:22

## 2020-01-01 RX ADMIN — ASPIRIN 81 MG: 81 TABLET ORAL at 16:15

## 2020-01-01 RX ADMIN — SODIUM CHLORIDE, PRESERVATIVE FREE 10 ML: 5 INJECTION INTRAVENOUS at 23:31

## 2020-01-01 RX ADMIN — INSULIN HUMAN 7 UNITS: 100 INJECTION, SOLUTION PARENTERAL at 18:17

## 2020-01-01 RX ADMIN — PANTOPRAZOLE SODIUM 40 MG: 40 TABLET, DELAYED RELEASE ORAL at 05:46

## 2020-01-01 RX ADMIN — INSULIN HUMAN 7 UNITS: 100 INJECTION, SOLUTION PARENTERAL at 11:45

## 2020-01-01 RX ADMIN — SODIUM CHLORIDE, PRESERVATIVE FREE 10 ML: 5 INJECTION INTRAVENOUS at 08:33

## 2020-01-01 RX ADMIN — PANTOPRAZOLE SODIUM 40 MG: 40 INJECTION, POWDER, FOR SOLUTION INTRAVENOUS at 00:53

## 2020-01-01 RX ADMIN — PANTOPRAZOLE SODIUM 40 MG: 40 INJECTION, POWDER, FOR SOLUTION INTRAVENOUS at 08:40

## 2020-01-01 RX ADMIN — INSULIN HUMAN 2 UNITS: 100 INJECTION, SOLUTION PARENTERAL at 08:33

## 2020-01-01 RX ADMIN — LORAZEPAM 0.25 MG: 2 INJECTION INTRAMUSCULAR; INTRAVENOUS at 01:01

## 2020-01-01 RX ADMIN — SODIUM CHLORIDE, PRESERVATIVE FREE 10 ML: 5 INJECTION INTRAVENOUS at 08:40

## 2020-01-01 RX ADMIN — CEFTRIAXONE 1 G: 1 INJECTION, POWDER, FOR SOLUTION INTRAMUSCULAR; INTRAVENOUS at 00:52

## 2020-01-13 PROBLEM — S32.10XA CLOSED FRACTURE OF SACRUM AND COCCYX (HCC): Status: RESOLVED | Noted: 2018-09-20 | Resolved: 2020-01-01

## 2020-01-13 PROBLEM — I21.4 NSTEMI (NON-ST ELEVATED MYOCARDIAL INFARCTION) (HCC): Status: RESOLVED | Noted: 2019-01-01 | Resolved: 2020-01-01

## 2020-01-13 PROBLEM — I50.33 ACUTE ON CHRONIC DIASTOLIC CONGESTIVE HEART FAILURE (HCC): Status: RESOLVED | Noted: 2019-01-01 | Resolved: 2020-01-01

## 2020-01-13 PROBLEM — S32.2XXA CLOSED FRACTURE OF SACRUM AND COCCYX (HCC): Status: RESOLVED | Noted: 2018-09-20 | Resolved: 2020-01-01

## 2020-01-14 NOTE — PROGRESS NOTES
CC: f/u depression    Time In: 1356  Time Out: 1501    History:  Lakesha Costello is a 84 y.o. female   She notes she has been doing reasonably well, though her daughter, who presents with her indicates that she has not been doing well. Margarita notes that she has had less activity at home, loss of interest in her previous hobbies and reports she watches much more TV than she ever did and often sleeps through the day if she is allowed. They now have a caregiver 7 days a week to sit, but they note the sitter often allows Mr. Costello to sit for long hours. When the sitter is not present, he commonly follows his wife around the house, which is very stressful and bothersome to her. Fortunately, Ms. Costello has had no falls, but has been less active. Margarita feels that increasing Effexor did help some and starting her iron supplement back has been increasingly helpful to assist with energy, but she feels that her mom has been declining. However, she feels that if her dad was not present, Ms. Costello would be very different in her motivations. She notes some symptoms of fullness in her throat after bending over, but has no increase in swelling, shortness of breath, tachycardia nor other symptoms.     ROS:  Review of Systems   Constitutional: Positive for activity change and fatigue. Negative for chills, fever and unexpected weight change.   Respiratory: Negative for cough and shortness of breath.    Cardiovascular: Negative for chest pain and palpitations.   Psychiatric/Behavioral: Negative for dysphoric mood. The patient is not nervous/anxious.         reports that she has never smoked. She has never used smokeless tobacco. She reports that she does not drink alcohol or use drugs.      Current Outpatient Medications:   •  aspirin 81 MG EC tablet, Take 81 mg by mouth Daily., Disp: , Rfl:   •  bisacodyl (DULCOLAX) 10 MG suppository, Insert 10 mg into the rectum Daily As Needed for Constipation., Disp: , Rfl:   •  calcium  carbonate (OS-MICHAEL) 600 MG tablet, Take 600 mg by mouth Daily., Disp: , Rfl:   •  cholecalciferol (VITAMIN D3) 1000 units tablet, Take 1,000 Units by mouth Daily., Disp: , Rfl:   •  clopidogrel (PLAVIX) 75 MG tablet, Take 75 mg by mouth Daily., Disp: , Rfl:   •  COMFORT EZ PEN NEEDLES 32G X 4 MM misc, , Disp: , Rfl:   •  dasatinib (SPRYCEL) 50 MG chemo tablet, Take 1 tablet by mouth Daily., Disp: , Rfl:   •  furosemide (LASIX) 20 MG tablet, Take 20 mg by mouth 2 (Two) Times a Day., Disp: , Rfl:   •  furosemide (LASIX) 40 MG tablet, Take 1 tablet by mouth Every Morning AND 0.5 tablets Every Evening., Disp: 135 tablet, Rfl: 1  •  glipizide (GLUCOTROL) 5 MG tablet, Take 1 tablet by mouth 2 (Two) Times a Day., Disp: , Rfl:   •  insulin detemir (LEVEMIR FLEXTOUCH) 100 UNIT/ML injection, Inject 15 Units under the skin into the appropriate area as directed Every Night., Disp: 30 mL, Rfl: 0  •  ipratropium (ATROVENT) 0.02 % nebulizer solution, Take 2.5 mL by nebulization 4 (Four) Times a Day., Disp: 75 mL, Rfl: 1  •  metoprolol succinate XL (TOPROL-XL) 25 MG 24 hr tablet, Take 25 mg by mouth Daily., Disp: , Rfl:   •  mirtazapine (REMERON) 15 MG tablet, Take 7.5 mg by mouth Every Night., Disp: , Rfl:   •  Multiple Vitamins-Minerals (PRESERVISION/LUTEIN) capsule, Take 1 capsule by mouth 2 (Two) Times a Day., Disp: , Rfl:   •  nitroglycerin (NITROSTAT) 0.4 MG SL tablet, 1 under the tongue as needed for angina, may repeat q5mins for up three doses, Disp: 10 tablet, Rfl: 3  •  ondansetron ODT (ZOFRAN ODT) 4 MG disintegrating tablet, Take 1 tablet by mouth Every 4 (Four) Hours As Needed for Nausea or Vomiting., Disp: 30 tablet, Rfl: 3  •  pantoprazole (PROTONIX) 40 MG EC tablet, Take 1 tablet by mouth Daily., Disp: 90 tablet, Rfl: 3  •  polyethyl glycol-propyl glycol (SYSTANE) 0.4-0.3 % solution ophthalmic solution, Administer 1 drop to both eyes Daily., Disp: , Rfl:   •  polyethylene glycol (MIRALAX) packet, Take 17 g by mouth  "Daily., Disp: , Rfl:   •  potassium chloride (K-DUR) 10 MEQ CR tablet, Take 1 tablet by mouth Daily., Disp: 90 tablet, Rfl: 1  •  rosuvastatin (CRESTOR) 20 MG tablet, Take 20 mg by mouth Every Night., Disp: , Rfl:   •  spironolactone (ALDACTONE) 25 MG tablet, Take 25 mg by mouth Daily., Disp: , Rfl:   •  triamcinolone (KENALOG) 0.1 % cream, Apply 1 application topically to the appropriate area as directed 2 (Two) Times a Day., Disp: , Rfl:   •  venlafaxine XR (EFFEXOR-XR) 75 MG 24 hr capsule, Take 1 capsule by mouth Daily., Disp: 90 capsule, Rfl: 1  •  vitamin B-12 (CYANOCOBALAMIN) 500 MCG tablet, Take 1,000 mcg by mouth Daily., Disp: , Rfl:     OBJECTIVE:  /70 (BP Location: Left arm, Patient Position: Sitting, Cuff Size: Adult)   Pulse 67   Resp 16   Ht 154.9 cm (61\")   Wt 43.1 kg (95 lb)   SpO2 98%   Breastfeeding No   BMI 17.95 kg/m²    Physical Exam   Constitutional: She is oriented to person, place, and time. She appears well-nourished. No distress.   Cardiovascular: Normal rate. An irregularly irregular rhythm present.   Pulmonary/Chest: Effort normal and breath sounds normal. No respiratory distress. She has no wheezes.   Neurological: She is alert and oriented to person, place, and time.   Psychiatric: She has a normal mood and affect. Her behavior is normal. Judgment and thought content normal.       Assessment/Plan    Diagnoses and all orders for this visit:    Moderate episode of recurrent major depressive disorder (CMS/HCC)  -     venlafaxine XR (EFFEXOR-XR) 75 MG 24 hr capsule; Take 1 capsule by mouth Daily.  Stable with some improvement on 75mg. Would avoid increasing dose too much. Ms. Costello indicates she is not depressed and, in fact, enjoys watching television with her . We will continue monitor.     Coronary artery disease involving native coronary artery of native heart without angina pectoris  -     furosemide (LASIX) 40 MG tablet; Take 1 tablet by mouth Every Morning AND " "0.5 tablets Every Evening.  Stable without angina.     Chronic diastolic congestive heart failure (CMS/HCC)  Stable on BB & diuretic without exacerbation.     Paroxysmal atrial fibrillation (CMS/HCC)  Rate controlled and anticoagulated on ASA alone as well as Plavix for CAD. No bleeding and no DOAC therapy given concern for bleeding.     Essential hypertension  -     furosemide (LASIX) 40 MG tablet; Take 1 tablet by mouth Every Morning AND 0.5 tablets Every Evening.  Well controlled, BP goal for age is <140/90 per JNC 8 guidelines and continue current medications    Iron deficiency  Restarted iron with improvmeent in fatigue.     CML (chronic myeloid leukemia) (CMS/HCC)  On Sprycel per [place] without symptoms at this time.     As above, 65 minutes were spent in the room, face to face with Ms. Costello and her daughter. There are some concerns with the stress on her daughter as a caregiver, but they have hired sitters and there is generally someone present 24 hours a day. Her  is unable to fully care for himself and she is unable to provide care for him. However, she understands her situation, the stress her daughter is under, has insight into not only hers, but her 's condition. Despite this, she remains adamant that staying at home is of the utmost importance and she is unwilling to consider [facility] or SNF at this time. Her  had previously improved greatly while there, but she indicates \"I got worse.\" Given her alertness, orientation, and insight into her situation, I believe she has full capacity to make a decision to stay home. As long as she is safe to do so, which I believe she is with 24 hour assist, we must respect her autonomy, even if the decision may seem poor. Her daughter expresses a great deal of frustration and consternation to which Ms. Costello recognizes, but she remains resigned to her convictions. If situation changes, we could consider potential venue change from home, but I do " not believe this is required at this time.       An After Visit Summary was printed and given to the patient at discharge.  Return in about 4 months (around 5/13/2020) for Recheck.         Leonardo Zepeda D.O. 1/13/2020   Electronically signed.

## 2020-02-13 PROBLEM — K92.1 GASTROINTESTINAL HEMORRHAGE WITH MELENA: Status: ACTIVE | Noted: 2020-01-01

## 2020-02-13 PROBLEM — R53.1 GENERALIZED WEAKNESS: Status: ACTIVE | Noted: 2020-01-01

## 2020-02-13 PROBLEM — K92.2 GASTROINTESTINAL HEMORRHAGE: Status: ACTIVE | Noted: 2020-01-01

## 2020-02-13 NOTE — H&P
[facility] Medicine Services  HISTORY AND PHYSICAL    Date of Admission: 2/13/2020  Primary Care Physician: Leonardo Zepeda DO    Subjective     Chief Complaint: Generalized weakness    History of Present Illness  This is an 84-year-old female with significant multiple comorbidities including CAD post CABG x3, recent MI requiring cardiac stents at [place] in 9/19, diastolic CHF, A. fib status post watchman device, sick sinus syndrome status post pacemaker, aortic stenosis status post prosthetic aortic valve replacement, history of breast cancer, history of CML, CKD 3, diabetes type 2, chronic anemia with history of angiodysplasia of her colon.  She now presents in from home with her daughter who is POA.  Her  is currently on hospice and the hospice nurse had called daughter for the last 2 days stating that she did not look well and she has been very weak.  She looked very pale.  She had not been getting up or doing much of anything.  Patient reportedly had been constipated for several days and started taking MiraLAX and then for the last 2 days has been having bowel movements nonstop.  States she cannot control them.  Does relate abdominal pain mostly epigastric.  Some nausea without vomiting.  Poor p.o. intake.  No chest pain or shortness of breath.  No dizziness.  No fevers or chills.  No focal weakness.  In the ER she was found to have a hemoglobin of 6.9 and was heme positive from below.  She subsequently had a melanotic stool that was very dark and sticky per nurse and again was occult positive.  She is not hypotensive or tachycardic.  We have been asked to admit for further work-up and care.        Review of Systems   Otherwise complete ROS reviewed and negative except as mentioned in the HPI.    Past Medical History:   Past Medical History:   Diagnosis Date   • Aortic stenosis    • Arthritis    • Breast cancer (CMS/HCC)    • CAD (coronary artery disease)     • Cataract    • CHF (congestive heart failure) (CMS/formerly [facility])     recently diagnosed after an ER visit.    • Chronic anticoagulation     Coumadin    • CKD (chronic kidney disease) stage 3, GFR 30-59 ml/min (CMS/formerly [facility]) 4/16/2018   • Diabetes mellitus (CMS/formerly [facility])     Type II   • Elevated cholesterol    • GERD (gastroesophageal reflux disease)    • Hyperlipidemia    • Hypertension    • Macular degeneration    • NSTEMI (non-ST elevated myocardial infarction) (CMS/formerly [facility]) 9/9/2019   • Pancreatitis    • Paroxysmal atrial fibrillation (CMS/formerly [facility])    • Presence of Watchman left atrial appendage closure device    • Presence of Watchman left atrial appendage closure device 11/26/2018   • Pulmonary hypertension (CMS/formerly [facility])    • Rheumatic fever     during childhood   • Sacrum and coccyx fracture (CMS/formerly [facility])    • Sick sinus syndrome (CMS/HCC)     with pacemaker   • UTI (urinary tract infection)      Past Surgical History:  Past Surgical History:   Procedure Laterality Date   • ANOMALOUS PULMONARY VENOUS RETURN REPAIR, TOTAL     • BACK SURGERY     • BELPHAROPTOSIS REPAIR     • BREAST SURGERY      right mastectomy   • CARDIAC CATHETERIZATION  1999, 2010   • CARDIAC CATHETERIZATION N/A 2/15/2017    Procedure: Left Heart Cath;  Surgeon: Ehsan Crocker MD;  Location:  [facility] CATH [facility];  Service:    • CARDIAC CATHETERIZATION N/A 2/15/2017    Procedure: Right Heart Cath;  Surgeon: Ehsan Crocker MD;  Location:  [facility]ATH [facility];  Service:    • CARDIAC CATHETERIZATION N/A 2/15/2017    Procedure: Coronary angiography;  Surgeon: Ehsan Crocker MD;  Location:  [facility];  Service:    • CARDIAC CATHETERIZATION N/A 2/15/2017    Procedure: Left ventriculography;  Surgeon: Ehsan Crocker MD;  Location:  [facility] CATH INV[facility];  Service:    • CARDIAC CATHETERIZATION N/A 2/15/2017    Procedure: Intracardiac echocardiogram;  Surgeon: Ehsan Crocker MD;  Location:  [facility]ATH [facility];  Service:    • CARDIAC  ELECTROPHYSIOLOGY PROCEDURE N/A 2/3/2017    Procedure: Pacemaker DC new;  Surgeon: Ehsan Crocker MD;  Location:  [facility]ATH INV[facility];  Service:    • CARDIAC SURGERY      TAVR   • CARDIOVERSION     • CATARACT EXTRACTION     • CHOLECYSTECTOMY     • CORONARY ARTERY BYPASS GRAFT  1999    4 vessel    • CORONARY ARTERY BYPASS GRAFT     • CORONARY STENT PLACEMENT     • HYSTERECTOMY  1962   • INSERT / REPLACE / REMOVE PACEMAKER     • MASTECTOMY  2000   • OTHER SURGICAL HISTORY      TAVR 2017     Social History:  reports that she has never smoked. She has never used smokeless tobacco. She reports that she does not drink alcohol or use drugs.    Family History: family history includes Breast cancer in her mother; Cancer in her brother; Coronary artery disease in her father; Diabetes in her father; Heart attack in her father; No Known Problems in her maternal grandfather, maternal grandmother, paternal grandfather, and paternal grandmother.      Allergies:  Allergies   Allergen Reactions   • Gleevec [Imatinib] Itching   • Bentyl [Dicyclomine] Itching   • Janumet [Sitagliptin-Metformin Hcl] Other (See Comments)     Chest pain   • Sacubitril-Valsartan Itching     Per daughter, Margarita, pt had previous reaction.   • Dilaudid [Hydromorphone] Nausea And Vomiting   • Enalapril Angioedema   • Lopid [Gemfibrozil] Nausea And Vomiting   • Sulfa Antibiotics Other (See Comments)     Syncope     • Ultram [Tramadol] Itching     Medications:  Prior to Admission medications    Medication Sig Start Date End Date Taking? Authorizing Provider   aspirin 81 MG EC tablet Take 81 mg by mouth Daily.    ProviderMinda MD   bisacodyl (DULCOLAX) 10 MG suppository Insert 10 mg into the rectum Daily As Needed for Constipation.    ProviderMinda MD   calcium carbonate (OS-MICHAEL) 600 MG tablet Take 600 mg by mouth Daily.    Minda Salvador MD   cholecalciferol (VITAMIN D3) 1000 units tablet Take 1,000 Units by mouth Daily.     Minda Salvador MD   clopidogrel (PLAVIX) 75 MG tablet Take 75 mg by mouth Daily.    Minda Salvador MD   COMFORT EZ PEN NEEDLES 32G X 4 MM misc  12/20/19   Minda Salvador MD   dasatinib (SPRYCEL) 50 MG chemo tablet Take 1 tablet by mouth Daily. 1/6/20   Minda Salvador MD   furosemide (LASIX) 20 MG tablet Take 20 mg by mouth 2 (Two) Times a Day.    Minda Salvador MD   furosemide (LASIX) 40 MG tablet Take 1 tablet by mouth Every Morning AND 0.5 tablets Every Evening. 1/13/20   Leonardo Zepeda DO   glipizide (GLUCOTROL) 5 MG tablet Take 1 tablet by mouth 2 (Two) Times a Day. 12/4/19   Minda Salvador MD   insulin detemir (LEVEMIR FLEXTOUCH) 100 UNIT/ML injection Inject 15 Units under the skin into the appropriate area as directed Every Night. 12/4/19   Leonardo Zepeda DO   ipratropium (ATROVENT) 0.02 % nebulizer solution Take 2.5 mL by nebulization 4 (Four) Times a Day. 6/10/19   Lucy Art APRN   metoprolol succinate XL (TOPROL-XL) 25 MG 24 hr tablet Take 25 mg by mouth Daily.    Minda Salvador MD   mirtazapine (REMERON) 15 MG tablet Take 7.5 mg by mouth Every Night.    Midna Salvador MD   Multiple Vitamins-Minerals (PRESERVISION/LUTEIN) capsule Take 1 capsule by mouth 2 (Two) Times a Day.    Minda Salvador MD   nitroglycerin (NITROSTAT) 0.4 MG SL tablet 1 under the tongue as needed for angina, may repeat q5mins for up three doses 10/8/19   Leonardo Zepeda DO   ondansetron ODT (ZOFRAN ODT) 4 MG disintegrating tablet Take 1 tablet by mouth Every 4 (Four) Hours As Needed for Nausea or Vomiting. 12/21/18   Linda Johnson APRN   pantoprazole (PROTONIX) 40 MG EC tablet Take 1 tablet by mouth Daily. 8/22/19   Leonardo Zepeda DO   polyethyl glycol-propyl glycol (SYSTANE) 0.4-0.3 % solution ophthalmic solution Administer 1 drop to both eyes Daily.    Minda Salvador MD   polyethylene glycol (MIRALAX) packet Take 17 g by  "mouth Daily.    Minda Salvador MD   potassium chloride (K-DUR) 10 MEQ CR tablet Take 1 tablet by mouth Daily. 8/20/19   Leonardo Zepeda DO   rosuvastatin (CRESTOR) 20 MG tablet Take 20 mg by mouth Every Night.    Minda Salvador MD   spironolactone (ALDACTONE) 25 MG tablet Take 25 mg by mouth Daily.    Minda Salvador MD   triamcinolone (KENALOG) 0.1 % cream Apply 1 application topically to the appropriate area as directed 2 (Two) Times a Day. 5/20/19 5/20/20  Minda Salvador MD   venlafaxine XR (EFFEXOR-XR) 75 MG 24 hr capsule Take 1 capsule by mouth Daily. 1/13/20   Leonardo Zepeda DO   vitamin B-12 (CYANOCOBALAMIN) 500 MCG tablet Take 1,000 mcg by mouth Daily.    Minda Salvador MD     Objective     Vital Signs: /51   Pulse 69   Temp 97.6 °F (36.4 °C)   Resp 14   Ht 149.9 cm (59\")   Wt 39 kg (86 lb)   SpO2 100%   BMI 17.37 kg/m²   Physical Exam  GEN: Awake, alert, interactive, thin/frail, appears chronically ill  HEENT: PERRLA, EOMI, Anicteric, Trachea midline  Lungs: CTAB, no wheezing/rales/rhonchi  Heart: RRR, +S1/s2, no rub, II-III/VI RUSLAN  ABD: soft, epigastric tenderness, +BS, no guarding/rebound  Extremities: atraumatic, no cyanosis, no edema  Skin: no rashes or petechiae  Neuro: AAOx3, no focal deficits      Results Reviewed:  Lab Results (last 24 hours)     Procedure Component Value Units Date/Time    POC Occult Blood Stool [[id number]]  (Abnormal) Collected:  02/13/20 1702    Specimen:  Stool Updated:  02/13/20 1703     Fecal Occult Blood Positive     Lot Number \[id number]\     Expiration Date \9/30/2020\     DEVELOPER LOT NUMBER \165\     DEVELOPER EXPIRATION DATE \9/30/2020\     Positive Control Positive     Negative Control Negative    Comprehensive Metabolic Panel [[id number]]  (Abnormal) Collected:  02/13/20 1609    Specimen:  Blood Updated:  02/13/20 1643     Glucose 380 mg/dL      BUN 77 mg/dL      Creatinine 1.63 mg/dL      Sodium 133 mmol/L      " Potassium 4.5 mmol/L      Chloride 96 mmol/L      CO2 21.0 mmol/L      Calcium 9.4 mg/dL      Total Protein 6.7 g/dL      Albumin 3.70 g/dL      ALT (SGPT) 19 U/L      AST (SGOT) 34 U/L      Alkaline Phosphatase 78 U/L      Total Bilirubin 0.3 mg/dL      eGFR Non African Amer 30 mL/min/1.73      Globulin 3.0 gm/dL      A/G Ratio 1.2 g/dL      BUN/Creatinine Ratio 47.2     Anion Gap 16.0 mmol/L     Narrative:       GFR Normal >60  Chronic Kidney Disease <60  Kidney Failure <15      Urinalysis With Culture If Indicated - Urine, Clean Catch [[id number]]  (Abnormal) Collected:  02/13/20 1609    Specimen:  Urine, Clean Catch Updated:  02/13/20 1642     Color, UA Yellow     Appearance, UA Cloudy     pH, UA <=5.0     Specific Gravity, UA 1.015     Glucose, UA Negative     Ketones, UA Negative     Bilirubin, UA Negative     Blood, UA Negative     Protein,  mg/dL (2+)     Leuk Esterase, UA Small (1+)     Nitrite, UA Negative     Urobilinogen, UA 0.2 E.U./dL    Urinalysis, Microscopic Only - Urine, Clean Catch [[id number]]  (Abnormal) Collected:  02/13/20 1609    Specimen:  Urine, Clean Catch Updated:  02/13/20 1642     RBC, UA None Seen /HPF      WBC, UA 6-12 /HPF      Bacteria, UA 3+ /HPF      Squamous Epithelial Cells, UA 0-2 /HPF      Hyaline Casts, UA None Seen /LPF      Methodology Manual Light Microscopy    Urine Culture - Urine, Urine, Clean Catch [[id number]] Collected:  02/13/20 1609    Specimen:  Urine, Clean Catch Updated:  02/13/20 1642    CBC & Differential [[id number]] Collected:  02/13/20 1609    Specimen:  Blood Updated:  02/13/20 1631    Narrative:       The following orders were created for panel order CBC & Differential.  Procedure                               Abnormality         Status                     ---------                               -----------         ------                     CBC Auto Differential[[id number]]        Abnormal            Final result                 Please view results for  these tests on the individual orders.    CBC Auto Differential [[id number]]  (Abnormal) Collected:  02/13/20 1609    Specimen:  Blood Updated:  02/13/20 1631     WBC 9.09 10*3/mm3      RBC 2.32 10*6/mm3      Hemoglobin 6.9 g/dL      Hematocrit 21.8 %      MCV 94.0 fL      MCH 29.7 pg      MCHC 31.7 g/dL      RDW 18.6 %      RDW-SD 63.4 fl      MPV 10.7 fL      Platelets 187 10*3/mm3      Neutrophil % 73.8 %      Lymphocyte % 12.4 %      Monocyte % 10.7 %      Eosinophil % 2.3 %      Basophil % 0.4 %      Immature Grans % 0.4 %      Neutrophils, Absolute 6.70 10*3/mm3      Lymphocytes, Absolute 1.13 10*3/mm3      Monocytes, Absolute 0.97 10*3/mm3      Eosinophils, Absolute 0.21 10*3/mm3      Basophils, Absolute 0.04 10*3/mm3      Immature Grans, Absolute 0.04 10*3/mm3      nRBC 0.0 /100 WBC         Imaging Results (Last 24 Hours)     ** No results found for the last 24 hours. **        I have personally reviewed and interpreted the radiology studies and ECG obtained at time of admission.     Assessment / Plan     Assessment:   Active Hospital Problems    Diagnosis   • **Gastrointestinal hemorrhage with melena   • Generalized weakness   • CML (chronic myeloid leukemia) (CMS/[facility])   • CKD (chronic kidney disease) stage 3, GFR 30-59 ml/min (CMS/[facility])     GFR 32 in 4/2018     • Chronic diastolic congestive heart failure (CMS/[facility])   • S/P TAVR (transcatheter aortic valve replacement)   • Mixed hyperlipidemia   • Essential hypertension   • Type 2 diabetes mellitus with diabetic peripheral angiopathy without gangrene, with long-term current use of insulin (CMS/[facility])         Plan:   #1 GI bleed/melena -patient has a history of angiodysplasia of her colon but is presenting in with melanotic/tarry stools.  Anemic at 6.9.  Will make n.p.o. with gentle fluids at 50/hour as she has a history of heart failure and is not hypotensive.  She is going to get 1 unit PRBC transfused.  Monitor H&H every 6 hours.  IV PPI twice daily.  GI  consult.    #2 UTI -patient has abnormal UA with WBC and bacteria.  Hard to assess symptoms currently but she has been weak so will cover with ceftriaxone and follow-up culture.    #3 acute on chronic anemia with acute blood loss -as above we will transfuse a unit PRBC now.  Will monitor H&H every 6 hours.  We will add on iron panel to her initial ER work as she has a history of iron deficiency.    #4 generalized weakness -secondary to #1 through 3 above.  Will treat underlying conditions and get PT/OT eval's    #5  Epigastric pain/abdominal pain -consider gastritis/PUD given melanotic stools.  No abdominal imaging to this point.  Will check a lactic acid and if elevated will CT scan her abdomen.  We will also check an EKG and troponin given her cardiac history.    #6 CAD -with history of CABG and recent PCI in September at [place].  She is on aspirin and Plavix.  Will have to hold on arrival would like to atleast resume aspirin as soon as possible.  We will get cardiology consult for input given her multiple cardiac coronaries.    #7 prosthetic aortic valve replacement -Per daughter she was only on aspirin until she had her PCI and then they added Plavix.  She was not in need full anticoagulation for valve.  We will get cardiology input.  Either way cannot anticoagulate currently    #8 sick sinus syndrome/P A. fib-status post pacemaker and watchman device.  Hold beta-blocker for now.    #9 CKD 3 -appears around baseline.  Gentle IVF and monitor.  Repeat labs in the morning.    #10 chronic diastolic heart failure -does not appear overloaded at this time.  Monitor closely with IV fluids.  Hold Lasix.    #11 DM 2 -will be n.p.o.  Hold Levemir.  Sliding scale coverage.  Hypoglycemia protocol.    #12 CML -noted, currently in remission per daughter.  Treated at [place].    DVT prophylaxis with SCDs  IV PPI twice daily for GI prophylaxis      Code Status: Full code -had long discussion with patient and daughter.   "Patient somewhat undecided and would not give me an answer on whether or not she wanted to be full code or DNR.  Eventually said, \"do what you can to restart heart I guess.\"  We will get a palliative care consult.     I discussed the patient's findings and my recommendations with the Patient and Daughter/POA directly at bedside    Case management consult for discharge planning    Estimated length of stay 3-4 days    Patient seen and examined by me on 2/13/2020.    Daryl Pritchard,    02/13/20   5:52 PM            "

## 2020-02-13 NOTE — ED PROVIDER NOTES
Subjective   Ms. Pablo is an 84-year-old female who comes in today because of worsening weakness.  Hospice workers who were visiting her  noticed that she was getting more and more weak and pale over the last week or so.  They called her daughter who brought her in today.  The patient only complains of some fatigue but otherwise she says she is feeling fine.  She denies fever, shortness of breath, chest pain, abdominal pain.  She does have a history of constipation so takes MiraLAX and consequently of last few days has had very frequent bowel movements.  She also has a history of some kind of basal dysplasia in her colon which leads to chronic bleeding.  She states that the bowel movements are dark and sticky and because of the frequency keep her up at night which makes her want to sleep during the day.    Looking back through the chart her last encounter with Dr. Zepeda demonstrated an extended conversation about the general direction of this lady's trajectory in life.  She is having increasing difficulty managing at home but is steadfast in her refusal to consider assisted living or nursing home care.          Review of Systems   Gastrointestinal: Positive for diarrhea.   Neurological: Positive for weakness.   All other systems reviewed and are negative.      Past Medical History:   Diagnosis Date   • Aortic stenosis    • Arthritis    • Breast cancer (CMS/[facility])    • CAD (coronary artery disease)    • Cataract    • CHF (congestive heart failure) (CMS/[facility])     recently diagnosed after an ER visit.    • Chronic anticoagulation     Coumadin    • CKD (chronic kidney disease) stage 3, GFR 30-59 ml/min (CMS/[facility]) 4/16/2018   • Diabetes mellitus (CMS/[facility])     Type II   • Elevated cholesterol    • GERD (gastroesophageal reflux disease)    • Hyperlipidemia    • Hypertension    • Macular degeneration    • NSTEMI (non-ST elevated myocardial infarction) (CMS/[facility]) 9/9/2019   • Pancreatitis    • Paroxysmal atrial  fibrillation (CMS/HCC)    • Presence of Watchman left atrial appendage closure device    • Presence of Watchman left atrial appendage closure device 11/26/2018   • Pulmonary hypertension (CMS/HCC)    • Rheumatic fever     during childhood   • Sacrum and coccyx fracture (CMS/HCC)    • Sick sinus syndrome (CMS/HCC)     with pacemaker   • UTI (urinary tract infection)        Allergies   Allergen Reactions   • Gleevec [Imatinib] Itching   • Bentyl [Dicyclomine] Itching   • Janumet [Sitagliptin-Metformin Hcl] Other (See Comments)     Chest pain   • Sacubitril-Valsartan Itching     Per daughter, Margarita, pt had previous reaction.   • Dilaudid [Hydromorphone] Nausea And Vomiting   • Enalapril Angioedema   • Lopid [Gemfibrozil] Nausea And Vomiting   • Sulfa Antibiotics Other (See Comments)     Syncope     • Ultram [Tramadol] Itching       Past Surgical History:   Procedure Laterality Date   • ANOMALOUS PULMONARY VENOUS RETURN REPAIR, TOTAL     • BACK SURGERY     • BELPHAROPTOSIS REPAIR     • BREAST SURGERY      right mastectomy   • CARDIAC CATHETERIZATION  1999, 2010   • CARDIAC CATHETERIZATION N/A 2/15/2017    Procedure: Left Heart Cath;  Surgeon: Ehsan Crocker MD;  Location:  [facility];  Service:    • CARDIAC CATHETERIZATION N/A 2/15/2017    Procedure: Right Heart Cath;  Surgeon: Ehsan Crocker MD;  Location:  [facility];  Service:    • CARDIAC CATHETERIZATION N/A 2/15/2017    Procedure: Coronary angiography;  Surgeon: Ehsan Crocker MD;  Location:  [facility];  Service:    • CARDIAC CATHETERIZATION N/A 2/15/2017    Procedure: Left ventriculography;  Surgeon: Ehsan Crocker MD;  Location:  [facility];  Service:    • CARDIAC CATHETERIZATION N/A 2/15/2017    Procedure: Intracardiac echocardiogram;  Surgeon: Ehsan Crocker MD;  Location:  [facility];  Service:    • CARDIAC ELECTROPHYSIOLOGY PROCEDURE N/A 2/3/2017    Procedure: Pacemaker DC  new;  Surgeon: Ehsan Crocker MD;  Location:  [facility];  Service:    • CARDIAC SURGERY      TAVR   • CARDIOVERSION     • CATARACT EXTRACTION     • CHOLECYSTECTOMY     • CORONARY ARTERY BYPASS GRAFT  1999    4 vessel    • CORONARY ARTERY BYPASS GRAFT     • CORONARY STENT PLACEMENT     • HYSTERECTOMY  1962   • INSERT / REPLACE / REMOVE PACEMAKER     • MASTECTOMY  2000   • OTHER SURGICAL HISTORY      TAVR 2017       Family History   Problem Relation Age of Onset   • Breast cancer Mother    • Coronary artery disease Father    • Heart attack Father    • Diabetes Father    • Cancer Brother    • No Known Problems Maternal Grandmother    • No Known Problems Maternal Grandfather    • No Known Problems Paternal Grandmother    • No Known Problems Paternal Grandfather        Social History     Socioeconomic History   • Marital status:      Spouse name: Not on file   • Number of children: Not on file   • Years of education: Not on file   • Highest education level: Not on file   Tobacco Use   • Smoking status: Never Smoker   • Smokeless tobacco: Never Used   Substance and Sexual Activity   • Alcohol use: No   • Drug use: No   • Sexual activity: Defer           Objective   Physical Exam   Constitutional: She is oriented to person, place, and time. She appears well-developed. She appears cachectic.   HENT:   Head: Normocephalic and atraumatic.   Mouth/Throat: Oropharynx is clear and moist.   Eyes: EOM are normal.   Pale conjunctivae   Neck: Normal range of motion. Neck supple.   Cardiovascular: Normal rate, regular rhythm, normal heart sounds and intact distal pulses.   Pulmonary/Chest: Effort normal and breath sounds normal.   Abdominal: Soft. Bowel sounds are normal.   Genitourinary: Rectal exam shows guaiac positive stool.   Musculoskeletal: Normal range of motion.   Neurological: She is alert and oriented to person, place, and time.   Skin: Skin is warm and dry. Capillary refill takes less than 2  seconds.   Extremely pale   Psychiatric: She has a normal mood and affect. Her behavior is normal. Judgment and thought content normal.   Nursing note and vitals reviewed.      Procedures           ED Course                                           MDM  Number of Diagnoses or Management Options     Amount and/or Complexity of Data Reviewed  Clinical lab tests: reviewed and ordered  Decide to obtain previous medical records or to obtain history from someone other than the patient: yes    Critical Care  Total time providing critical care: < 30 minutes    Patient Progress  Patient progress: stable      Final diagnoses:   Gastrointestinal hemorrhage, unspecified gastrointestinal hemorrhage type   Symptomatic anemia     The patient has an exacerbation of her chronic anemia.  Her hemoglobin is 6.9 I think she deserves transfusion at that level.  I discussed the case with Dr. Negron who kindly agreed to the admission.  Patient is currently stable in the ED       Ketan Jo MD  02/13/20 5572

## 2020-02-14 NOTE — PLAN OF CARE
Problem: Patient Care Overview  Goal: Plan of Care Review  Outcome: Ongoing (interventions implemented as appropriate)  Flowsheets (Taken 2/14/2020 1421)  Progress: no change  Plan of Care Reviewed With: patient  Outcome Summary: Pt has been resting well today. She denies any pain at this time. Pt advanced to clear liquid diet. Bed check on, up with assist to the bathroom. Q6 accucheck. IVF, VSS, cont to monitor.

## 2020-02-14 NOTE — THERAPY EVALUATION
Patient Name: Lakesha Costello  : 1935    MRN: [id number]                              Today's Date: 2020       Admit Date: 2020    Visit Dx:     ICD-10-CM ICD-9-CM   1. Gastrointestinal hemorrhage, unspecified gastrointestinal hemorrhage type K92.2 578.9   2. Symptomatic anemia D64.9 285.9   3. Decreased activities of daily living (ADL) Z78.9 V49.89   4. Impaired functional mobility, balance, gait, and endurance Z74.09 V49.89     Patient Active Problem List   Diagnosis   • Paroxysmal atrial fibrillation (CMS/HCC)   • Essential hypertension   • Type 2 diabetes mellitus with diabetic peripheral angiopathy without gangrene, with long-term current use of insulin (CMS/HCC)   • Chronic anticoagulation   • Coronary artery disease involving native coronary artery of native heart without angina pectoris   • SSS (sick sinus syndrome) (CMS/HCC)   • Chest pain   • S/P CABG x 3   • Artificial pacemaker   • Tachycardia   • Palpitations   • Mixed hyperlipidemia   • S/P TAVR (transcatheter aortic valve replacement)   • Age-related osteoporosis without current pathological fracture   • Chronic diastolic congestive heart failure (CMS/HCC)   • Malignant neoplasm of breast (CMS/HCC)   • CKD (chronic kidney disease) stage 3, GFR 30-59 ml/min (CMS/HCC)   • Thrombocytosis (CMS/HCC)   • Iron deficiency   • MPN (myeloproliferative neoplasm) (CMS/HCC)   • Very low iron stores in bone marrow   • CML (chronic myeloid leukemia) (CMS/HCC)   • Gastritis   • Constipation   • Moderate malnutrition (CMS/HCC)   • Moderate episode of recurrent major depressive disorder (CMS/HCC)   • Gastrointestinal hemorrhage with melena   • Generalized weakness     Past Medical History:   Diagnosis Date   • Aortic stenosis    • Arthritis    • Breast cancer (CMS/HCC)    • CAD (coronary artery disease)    • Cataract    • CHF (congestive heart failure) (CMS/HCC)     recently diagnosed after an ER visit.    • Chronic anticoagulation     Coumadin    •  CKD (chronic kidney disease) stage 3, GFR 30-59 ml/min (CMS/HCC) 4/16/2018   • Diabetes mellitus (CMS/HCC)     Type II   • Elevated cholesterol    • GERD (gastroesophageal reflux disease)    • Hyperlipidemia    • Hypertension    • Macular degeneration    • NSTEMI (non-ST elevated myocardial infarction) (CMS/[facility]) 9/9/2019   • Pancreatitis    • Paroxysmal atrial fibrillation (CMS/[facility])    • Presence of Watchman left atrial appendage closure device    • Presence of Watchman left atrial appendage closure device 11/26/2018   • Pulmonary hypertension (CMS/[facility])    • Rheumatic fever     during childhood   • Sacrum and coccyx fracture (CMS/[facility])    • Sick sinus syndrome (CMS/HCC)     with pacemaker   • UTI (urinary tract infection)      Past Surgical History:   Procedure Laterality Date   • ANOMALOUS PULMONARY VENOUS RETURN REPAIR, TOTAL     • BACK SURGERY     • BELPHAROPTOSIS REPAIR     • BREAST SURGERY      right mastectomy   • CARDIAC CATHETERIZATION  1999, 2010   • CARDIAC CATHETERIZATION N/A 2/15/2017    Procedure: Left Heart Cath;  Surgeon: Ehsan Crocker MD;  Location:  [facility];  Service:    • CARDIAC CATHETERIZATION N/A 2/15/2017    Procedure: Right Heart Cath;  Surgeon: Ehsan Crocker MD;  Location:  [facility];  Service:    • CARDIAC CATHETERIZATION N/A 2/15/2017    Procedure: Coronary angiography;  Surgeon: Ehsan Crocker MD;  Location:  [facility];  Service:    • CARDIAC CATHETERIZATION N/A 2/15/2017    Procedure: Left ventriculography;  Surgeon: Ehsan Crocker MD;  Location:  [facility];  Service:    • CARDIAC CATHETERIZATION N/A 2/15/2017    Procedure: Intracardiac echocardiogram;  Surgeon: Ehsan Crocker MD;  Location:  [facility];  Service:    • CARDIAC ELECTROPHYSIOLOGY PROCEDURE N/A 2/3/2017    Procedure: Pacemaker DC new;  Surgeon: Ehsan Crocker MD;  Location:  [facility];  Service:    • CARDIAC SURGERY       TAVR   • CARDIOVERSION     • CATARACT EXTRACTION     • CHOLECYSTECTOMY     • CORONARY ARTERY BYPASS GRAFT  1999    4 vessel    • CORONARY ARTERY BYPASS GRAFT     • CORONARY STENT PLACEMENT     • HYSTERECTOMY  1962   • INSERT / REPLACE / REMOVE PACEMAKER     • MASTECTOMY  2000   • OTHER SURGICAL HISTORY      TAVR 2017     General Information     Row Name 02/14/20 1000          PT Evaluation Time/Intention    Document Type  (S) evaluation pt is an 85 y/o F who c/o gen weakness. PMH: CAD, MI, diastolic CHF, A.Fib, aortic stenosis, breast CA, CKD3, DM(2). Dx: GI hem w/ melena, gen weakness. See MAR.   -JE (r) TK (t) JE (c)     Mode of Treatment  physical therapy  -JE (r) TK (t) JE (c)     Row Name 02/14/20 1000          General Information    Patient Profile Reviewed?  yes  -JE (r) TK (t) JE (c)     Prior Level of Function  max assist:;home management;cooking;cleaning;driving;shopping;independent:;bed mobility;ADL's;dressing;bathing;grooming;feeding;min assist:;gait pt states she does not use AD but that she does sometimes use wc for long distances (to go to  office)Pt was max A from home assistant d/t 's care   -JE (r) TK (t) JE (c)     Existing Precautions/Restrictions  fall  -JE (r) TK (t) JE (c)     Barriers to Rehab  medically complex  -JE (r) TK (t) JE (c)     Row Name 02/14/20 1000          Relationship/Environment    Lives With  spouse  -JE (r) TK (t) JE (c)     Name(s) of Who Lives With Patient    -JE (r) TK (t) JE (c)     Row Name 02/14/20 1000          Resource/Environmental Concerns    Current Living Arrangements  home/apartment/condo  -JE (r) TK (t) JE (c)     Row Name 02/14/20 1000          Home Main Entrance    Number of Stairs, Main Entrance  two  -JE (r) TK (t) JE (c)     Stair Railings, Main Entrance  railing on left side (ascending)  -JE (r) TK (t) JE (c)     Row Name 02/14/20 1000          Stairs Within Home, Primary    Stairs, Within Home, Primary  14  -JE (r) TK (t) JE (c)      Stair Railings, Within Home, Primary  railing on right side (ascending)  -DARIO (r) TODD (t) DARIO (c)     Row Name 02/14/20 1000          Cognitive Assessment/Intervention- PT/OT    Orientation Status (Cognition)  oriented x 4  -DARIO (r) TODD (t) DARIO (c)     Personal Safety Interventions  fall prevention program maintained;nonskid shoes/slippers when out of bed;muscle strengthening facilitated;gait belt;supervised activity  -DARIO (r) TODD (t) DARIO (c)     Row Name 02/14/20 1000          Safety Issues, Functional Mobility    Safety Issues Affecting Function (Mobility)  at risk behavior observed;awareness of need for assistance;friction/shear risk;insight into deficits/self awareness;safety precaution awareness pt demonstrated high ability to lose balance during amb and reports that she has many falls at home  -DARIO (r) TODD (t) DARIO (c)     Impairments Affecting Function (Mobility)  balance;endurance/activity tolerance;motor control;strength;postural/trunk control  -DARIO (r) TODD (t) DARIO (c)       User Key  (r) = Recorded By, (t) = Taken By, (c) = Cosigned By    Initials Name Provider Type    Keyla Recio, PT Physical Therapist    TK Owen Jean-Baptiste, PT Student PT Student        Mobility     Row Name 02/14/20 1000          Bed Mobility Assessment/Treatment    Bed Mobility Assessment/Treatment  rolling left;supine-sit-supine  -DARIO (r) TODD (t) DARIO (c)     Rolling Left [place] (Bed Mobility)  supervision  -DARIO (r) TODD (t) DARIO (c)     Supine-Sit-Supine [place] (Bed Mobility)  supervision  -DARIO (r) TODD (t) DARIO (c)     Assistive Device (Bed Mobility)  head of bed elevated  -DARIO (r) TODD (t) DARIO (c)     Row Name 02/14/20 1000          Sit-Stand Transfer    Sit-Stand [place] (Transfers)  stand by assist  -DARIO (r) TODD (t) DARIO (c)     Row Name 02/14/20 1000          Gait/Stairs Assessment/Training    Gait/Stairs Assessment/Training  gait/ambulation independence;gait/ambulation assistive device;distance ambulated;gait pattern;gait deviations  -DARIO (r)  TK (t) JE (c)     [place] Level (Gait)  minimum assist (75% patient effort);1 person assist req hand over hand A and VC to correct posture/improve balance  -JE (r) TK (t) JE (c)     Distance in Feet (Gait)  150 ft  -JE (r) TK (t) JE (c)     Pattern (Gait)  swing-through  -JE (r) TK (t) JE (c)     Deviations/Abnormal Patterns (Gait)  base of support, wide;stride length decreased;gait speed decreased  -JE (r) TK (t) JE (c)     Comment (Gait/Stairs)  pt demonstrated LOB 3x during amb but was able to self correct w/ min A.   -JE (r) TK (t) JE (c)       User Key  (r) = Recorded By, (t) = Taken By, (c) = Cosigned By    Initials Name Provider Type    Keyla Recio, PT Physical Therapist    Owen Malik, KATIA Student PT Student        Obj/Interventions     Row Name 02/14/20 1000          General ROM    GENERAL ROM COMMENTS  BLE AROM WFL  -JE (r) TK (t) JE (c)     Row Name 02/14/20 1000          MMT (Manual Muscle Testing)    General MMT Comments  B HF: 4-/5, B DF: 4-/5. All other MMT 4+/5  -JE (r) TK (t) JE (c)     Row Name 02/14/20 1000          Static Sitting Balance    Level of [place] (Unsupported Sitting, Static Balance)  standby assist  -JE (r) TK (t) JE (c)     Sitting Position (Unsupported Sitting, Static Balance)  sitting on edge of bed  -JE (r) TK (t) JE (c)     Time Able to Maintain Position (Unsupported Sitting, Static Balance)  30 to 45 seconds  -JE (r) TK (t) JE (c)     Row Name 02/14/20 1000          Dynamic Sitting Balance    Level of [place], Reaches Outside Midline (Sitting, Dynamic Balance)  standby assist  -JE (r) TK (t) JE (c)     Sitting Position, Reaches Outside Midline (Sitting, Dynamic Balance)  sitting on edge of bed  -JE (r) TK (t) JE (c)     Row Name 02/14/20 1000          Static Standing Balance    Level of [place] (Supported Standing, Static Balance)  contact guard assist  -JE (r) TK (t) JE (c)     Time Able to Maintain Position (Supported Standing, Static Balance)   15 to 30 seconds  -JE (r) TK (t) JE (c)     Row Name 02/14/20 1000          Sensory Assessment/Intervention    Sensory General Assessment  no sensation deficits identified pt reports painful R lat malleolus. PPT and redness present. RN notified  -JE (r) TK (t) JE (c)       User Key  (r) = Recorded By, (t) = Taken By, (c) = Cosigned By    Initials Name Provider Type    Keyla Recio, PT Physical Therapist    Owen Malik, KATIA Student PT Student        Goals/Plan     Row Name 02/14/20 1000          Bed Mobility Goal 1 (PT)    Activity/Assistive Device (Bed Mobility Goal 1, PT)  rolling to left;sit to supine/supine to sit  -JE (r) TK (t) JE (c)     [place] Level/Cues Needed (Bed Mobility Goal 1, PT)  standby assist  -JE (r) TK (t) JE (c)     Time Frame (Bed Mobility Goal 1, PT)  long term goal (LTG);10 days  -JE (r) TK (t) JE (c)     Progress/Outcomes (Bed Mobility Goal 1, PT)  goal ongoing  -JE (r) TK (t) JE (c)     Row Name 02/14/20 1000          Transfer Goal 1 (PT)    Activity/Assistive Device (Transfer Goal 1, PT)  sit-to-stand/stand-to-sit;toilet  -JE (r) TK (t) JE (c)     [place] Level/Cues Needed (Transfer Goal 1, PT)  contact guard assist  -JE (r) TK (t) JE (c)     Time Frame (Transfer Goal 1, PT)  long term goal (LTG);10 days  -JE (r) TK (t) JE (c)     Progress/Outcome (Transfer Goal 1, PT)  goal ongoing  -JE (r) TK (t) JE (c)     Row Name 02/14/20 1000          Gait Training Goal 1 (PT)    Activity/Assistive Device (Gait Training Goal 1, PT)  gait (walking locomotion);assistive device use;decrease fall risk;increase endurance/gait distance;improve balance and speed;diminish gait deviation pt will be able to amb 250 ft w/out LOB and appropriate use of walker  -JE (r) TK (t) JE (c)     [place] Level (Gait Training Goal 1, PT)  standby assist  -JE (r) TK (t) JE (c)     Distance (Gait Goal 1, PT)  250 ft  -JE (r) TK (t) JE (c)     Time Frame (Gait Training Goal 1, PT)  long term goal  (LTG);10 days  -JE (r) TK (t) JE (c)     Progress/Outcome (Gait Training Goal 1, PT)  goal ongoing  -JE (r) TK (t) JE (c)     Row Name 02/14/20 1000          ROM Goal 1 (PT)    ROM Goal 1 (PT)  Dynamic Balance: pt gilberto improve dynamic balance by performing activities outside [facility] w/ SBA to improve safe function in home  -JE (r) TK (t) JE (c)     Time Frame (ROM Goal 1, PT)  long term goal (LTG);1 week;by discharge  -JE (r) TK (t) JE (c)     Progress/Outcome (ROM Goal 1, PT)  goal ongoing  -JE (r) TK (t) JE (c)     Row Name 02/14/20 1000          Patient Education Goal (PT)    Activity (Patient Education Goal, PT)  pt will be able to demonstrate safety awareness during activites during therapy sessions.   -JE (r) TK (t) JE (c)     [place]/Cues/Accuracy (Memory Goal 2, PT)  demonstrates adequately  -JE (r) TK (t) JE (c)     Time Frame (Patient Education Goal, PT)  long term goal (LTG);10 days  -JE (r) TK (t) JE (c)     Progress/Outcome (Patient Education Goal, PT)  goal ongoing  -JE (r) TK (t) JE (c)       User Key  (r) = Recorded By, (t) = Taken By, (c) = Cosigned By    Initials Name Provider Type    Keyla Recio, PT Physical Therapist    TK Owen Jean-Baptiste, PT Student PT Student        Clinical Impression     Row Name 02/14/20 1000          Pain Assessment    Additional Documentation  Pain Scale: FACES Pre/Post-Treatment (Group)  -JE (r) TK (t) JE (c)     Row Name 02/14/20 1000          Pain Scale: FACES Pre/Post-Treatment    Pain: FACES Scale, Pretreatment  0-->no hurt  -JE (r) TK (t) JE (c)     Pain: FACES Scale, Post-Treatment  0-->no hurt  -JE (r) TK (t) JE (c)     Row Name 02/14/20 1000          Plan of Care Review    Plan of Care Reviewed With  patient  -JE (r) TK (t) JE (c)     Progress  no change  -JE (r) TK (t) JE (c)     Outcome Summary  PT eval complete. Pt presents with good gross strength but a decrease in functional stability during gait. Pt was able to ambulate 150 ft w/o SOA/need for rest  break but demonstrated LOB 3x. She req min A/hand over hand A to correct posture/regain balance. Pt reports that she does not want to use AD's such as bath chairs/walker, even though it is suggeted for her safety. PT suggests d/c w/ home health d/t dec compliance and strong wish not to go to SNF  -JE (r) TK (t) JE (c)     Row Name 02/14/20 1000          Physical Therapy Clinical Impression    Patient/Family Goals Statement (PT Clinical Impression)  return home  -JE (r) TK (t) JE (c)     Criteria for Skilled Interventions Met (PT Clinical Impression)  treatment indicated;yes  -JE (r) TK (t) JE (c)     Rehab Potential (PT Clinical Summary)  fair, will monitor progress closely  -JE (r) TK (t) JE (c)     Predicted Duration of Therapy (PT)  10 days or until d/c  -JE (r) TK (t) JE (c)     Row Name 02/14/20 1000          Positioning and Restraints    Pre-Treatment Position  in bed  -JE (r) TK (t) JE (c)     Post Treatment Position  bed  -JE (r) TK (t) JE (c)     In Bed  fowlers;encouraged to call for assist;call light within reach;side rails up x2  -JE (r) TK (t) JE (c)       User Key  (r) = Recorded By, (t) = Taken By, (c) = Cosigned By    Initials Name Provider Type    Keyla Recio, PT Physical Therapist    TK Owen Jean-Baptiste, PT Student PT Student        Outcome Measures     Row Name 02/14/20 1000          How much help from another person do you currently need...    Turning from your back to your side while in flat bed without using bedrails?  4  -JE (r) TK (t) JE (c)     Moving from lying on back to sitting on the side of a flat bed without bedrails?  4  -JE (r) TK (t) JE (c)     Moving to and from a bed to a chair (including a wheelchair)?  3  -JE (r) TK (t) JE (c)     Standing up from a chair using your arms (e.g., wheelchair, bedside chair)?  3  -JE (r) TK (t) JE (c)     Climbing 3-5 steps with a railing?  2  -JE (r) TK (t) DARIO (c)     To walk in hospital room?  3  -DARIO (r) TODD (t) DARIO (c)     AM-PAC 6 Clicks  Score (PT)  19  -JE (r) TK (t)     Row Name 02/14/20 1000          Functional Assessment    Outcome Measure Options  AM-PAC 6 Clicks Basic Mobility (PT)  -JE (r) TK (t) JE (c)       User Key  (r) = Recorded By, (t) = Taken By, (c) = Cosigned By    Initials Name Provider Type    Keyla Recio, PT Physical Therapist    Owen Malik, PT Student PT Student          PT Recommendation and Plan  Planned Therapy Interventions (PT Eval): balance training, gait training, home exercise program, patient/family education, neuromuscular re-education, strengthening, transfer training, motor coordination training  Outcome Summary/Treatment Plan (PT)  Anticipated Equipment Needs at Discharge (PT): bathing equipment, walker  Anticipated Discharge Disposition (PT): home with home health(has private assistance to help w/ ADL/cooking/etc. )  Plan of Care Reviewed With: (P) patient  Progress: (P) no change  Outcome Summary: (P) PT eval complete. Pt presents with good gross strength but a decrease in functional stability during gait. Pt was able to ambulate 150 ft w/o SOA/need for rest break but demonstrated LOB 3x. She req min A/hand over hand A to correct posture/regain balance. Pt reports that she does not want to use AD's such as bath chairs/walker, even though it is suggeted for her safety. PT suggests d/c w/ home health d/t dec compliance and strong wish not to go to SNF     Time Calculation:   PT Charges     Row Name 02/14/20 1000             Time Calculation    Start Time  1000  -JE (r) TK (t) JE (c)      Stop Time  1055  -JE (r) TK (t) JE (c)      Time Calculation (min)  55 min  -JE (r) TK (t)      PT Received On  02/14/20  -JE (r) TK (t) JE (c)      PT Goal Re-Cert Due Date  02/24/20  -JE (r) TK (t) JE (c)        User Key  (r) = Recorded By, (t) = Taken By, (c) = Cosigned By    Initials Name Provider Type    Keyla Recio, PT Physical Therapist    Owen Malik, PT Student PT Student        Therapy Charges  for Today     Code Description Service Date Service Provider Modifiers Qty    28256207401 HC PT EVAL MOD COMPLEXITY 4 2/14/2020 Owen Jean-Baptiste, PT Student GP 1          PT G-Codes  Outcome Measure Options: AM-PAC 6 Clicks Basic Mobility (PT)  AM-PAC 6 Clicks Score (PT): 19  AM-PAC 6 Clicks Score (OT): 21    Owen Jean-Baptiste PT Student  2/14/2020

## 2020-02-14 NOTE — PROGRESS NOTES
Malnutrition Severity Assessment    Patient Name:  Lakesha Costello  YOB: 1935  MRN: [id number]  Admit Date:  2/13/2020    Patient meets criteria for : Severe Malnutrition(secondary signs of malnutrition: pale skin tone, generalized weakness)    Comments:  If in agreement with malnutrition assessment, please attest documentation. Thanks.     Malnutrition Severity Assessment  Malnutrition Type: Chronic Disease - Related Malnutrition     Malnutrition Type (last 8 hours)      Malnutrition Severity Assessment     Row Name 02/14/20 1704       Malnutrition Severity Assessment    Malnutrition Type  Chronic Disease - Related Malnutrition    Row Name 02/14/20 1704       Insufficient Energy Intake     Insufficient Energy Intake   <50% of est. energy requirement for >or equal to 1 month    Row Name 02/14/20 1704       Unintentional Weight Loss     Unintentional Weight Loss   Weight loss greater than 20% in one year 35 lbs (29%) in 8 months    Row Name 02/14/20 1704       Muscle Loss    Loss of Muscle Mass Findings  Severe    [place] Region  Moderate - slight depression    Clavicle Bone Region  Moderate - some protrusion in females, visible in males    Acromion Bone Region  Severe - squared shoulders, bones, and acromion process protrusion prominent    Scapular Bone Region  Severe - prominent bones, depressions easily visible between ribs, scapula, spine, shoulders    Dorsal Hand Region  Moderate - slight depression    Patellar Region  Severe - prominent bone, square looking, very little muscle definition    Anterior Thigh Region  Severe - line/depression along thigh, obviously thin    Row Name 02/14/20 1704       Fat Loss    Subcutaneous Fat Loss Findings  Severe    Orbital Region   Moderate -  somewhat hollowness, slightly dark circles    Upper Arm Region  Severe - mostly skin, very little space between folds, fingers touch    Thoracic & Lumbar Region  Severe - ribs visible with prominent depressions, iliac crest  very prominent    Row Name 02/14/20 1704       Criteria Met (Must meet criteria for severity in at least 2 of these categories: M Wasting, Fat Loss, Fluid, Secondary Signs, Wt. Status, Intake)    Patient meets criteria for   Severe Malnutrition secondary signs of malnutrition: pale skin tone, generalized weakness          Electronically signed by:  Kristi Beckwith RDN, LD  02/14/20 5:22 PM

## 2020-02-14 NOTE — PROGRESS NOTES
Discharge Planning Assessment  [facility]     Patient Name: Lakesha Costello  MRN: [id number]  Today's Date: 2/14/2020    Admit Date: 2/13/2020    Discharge Needs Assessment     Row Name 02/14/20 1418       Living Environment    Lives With  spouse    Current Living Arrangements  home/apartment/condo    Primary Care Provided by  self    Provides Primary Care For  no one    Quality of Family Relationships  helpful;involved;supportive    Able to Return to Prior Arrangements  yes       Resource/Environmental Concerns    Resource/Environmental Concerns  none       Transition Planning    Patient/Family Anticipates Transition to  home with family    Patient/Family Anticipated Services at Transition  none    Transportation Anticipated  family or friend will provide       Discharge Needs Assessment    Readmission Within the Last 30 Days  no previous admission in last 30 days    Concerns to be Addressed  denies needs/concerns at this time    Equipment Currently Used at Home  commode;walker, rolling;cane, straight    Anticipated Changes Related to Illness  none    Discharge Coordination/Progress  Pt lives at home with her spouse and plans to return home at d/c. She denies needs and does not want home health. Pt states she has rx coverage and has been able to get her meds.         Discharge Plan    No documentation.       Destination      Coordination has not been started for this encounter.      Durable Medical Equipment      Coordination has not been started for this encounter.      Dialysis/Infusion      Coordination has not been started for this encounter.      Home Medical Care      Coordination has not been started for this encounter.      Therapy      Coordination has not been started for this encounter.      Community Resources      Coordination has not been started for this encounter.          Demographic Summary    No documentation.       Functional Status    No documentation.       Psychosocial    No documentation.        Abuse/Neglect    No documentation.       Legal    No documentation.       Substance Abuse    No documentation.       Patient Forms    No documentation.           NORA Banks

## 2020-02-14 NOTE — PAYOR COMM NOTE
"Lakesha Costello (84 y.o. Female) [id number]   [facility]  stefan phone    Fax         Date of Birth Social Security Number Address Home Phone MRN    1935  [address] [place] [id number] [phone] [id number]    Buddhism Marital Status          [employer] [facility] [place]        Admission Date Admission Type Admitting Provider Attending Provider Department, Room/Bed    2/13/20 Emergency Daryl Pritchard DO Demeo, Michael F, DO [facility] 3C, 365/1    Discharge Date Discharge Disposition Discharge Destination                       Attending Provider:  Daryl Pritchard DO    Allergies:  Gleevec [Imatinib], Bentyl [Dicyclomine], Janumet [Sitagliptin-metformin Hcl], Sacubitril-valsartan, Dilaudid [Hydromorphone], Enalapril, Lopid [Gemfibrozil], Sulfa Antibiotics, Ultram [Tramadol]    Isolation:  None   Infection:  None   Code Status:  No CPR    Ht:  149.9 cm (59.02\")   Wt:  39 kg (86 lb)    Admission Cmt:  None   Principal Problem:  Gastrointestinal hemorrhage with melena [K92.1]                 Active Insurance as of 2/13/2020     Primary Coverage     Payor Plan Insurance Group Employer/Plan Group    HUMANA MEDICARE REPLACEMENT HUMANA MEDICARE REPLACEMENT [id number]     Payor Plan Address Payor Plan Phone Number Payor Plan Fax Number Effective Dates    [address] [address] [phone]  1/1/2018 - None Entered    [facility] [id number]       Subscriber Name Subscriber Birth Date Member ID       LAKESHA COSTELLO 1935 [id number]                 Emergency Contacts      (Rel.) Home Phone Work Phone Mobile Phone    Margarita gInacio (Rn) (Daughter) [phone]               History & Physical      Daryl Pritchard DO at 02/13/20 1752              [facility]  HISTORY AND PHYSICAL    Date of Admission: 2/13/2020  Primary Care Physician: Leonardo Zepeda, " DO    Subjective     Chief Complaint: Generalized weakness    History of Present Illness  This is an 84-year-old female with significant multiple comorbidities including CAD post CABG x3, recent MI requiring cardiac stents at [facility] in 9/19, diastolic CHF, A. fib status post watchman device, sick sinus syndrome status post pacemaker, aortic stenosis status post prosthetic aortic valve replacement, history of breast cancer, history of CML, CKD 3, diabetes type 2, chronic anemia with history of angiodysplasia of her colon.  She now presents in from home with her daughter who is POA.  Her  is currently on hospice and the hospice nurse had called daughter for the last 2 days stating that she did not look well and she has been very weak.  She looked very pale.  She had not been getting up or doing much of anything.  Patient reportedly had been constipated for several days and started taking MiraLAX and then for the last 2 days has been having bowel movements nonstop.  States she cannot control them.  Does relate abdominal pain mostly epigastric.  Some nausea without vomiting.  Poor p.o. intake.  No chest pain or shortness of breath.  No dizziness.  No fevers or chills.  No focal weakness.  In the ER she was found to have a hemoglobin of 6.9 and was heme positive from below.  She subsequently had a melanotic stool that was very dark and sticky per nurse and again was occult positive.  She is not hypotensive or tachycardic.  We have been asked to admit for further work-up and care.        Review of Systems   Otherwise complete ROS reviewed and negative except as mentioned in the HPI.    Past Medical History:   Past Medical History:   Diagnosis Date   • Aortic stenosis    • Arthritis    • Breast cancer (CMS/HCC)    • CAD (coronary artery disease)    • Cataract    • CHF (congestive heart failure) (CMS/HCC)     recently diagnosed after an ER visit.    • Chronic anticoagulation     Coumadin    • CKD (chronic kidney  disease) stage 3, GFR 30-59 ml/min (CMS/HCC) 4/16/2018   • Diabetes mellitus (CMS/HCC)     Type II   • Elevated cholesterol    • GERD (gastroesophageal reflux disease)    • Hyperlipidemia    • Hypertension    • Macular degeneration    • NSTEMI (non-ST elevated myocardial infarction) (CMS/HCC) 9/9/2019   • Pancreatitis    • Paroxysmal atrial fibrillation (CMS/[facility])    • Presence of Watchman left atrial appendage closure device    • Presence of Watchman left atrial appendage closure device 11/26/2018   • Pulmonary hypertension (CMS/[facility])    • Rheumatic fever     during childhood   • Sacrum and coccyx fracture (CMS/[facility])    • Sick sinus syndrome (CMS/HCC)     with pacemaker   • UTI (urinary tract infection)      Past Surgical History:  Past Surgical History:   Procedure Laterality Date   • ANOMALOUS PULMONARY VENOUS RETURN REPAIR, TOTAL     • BACK SURGERY     • BELPHAROPTOSIS REPAIR     • BREAST SURGERY      right mastectomy   • CARDIAC CATHETERIZATION  1999, 2010   • CARDIAC CATHETERIZATION N/A 2/15/2017    Procedure: Left Heart Cath;  Surgeon: Ehsan Crocker MD;  Location:  [facility] CATH INV[facility] LOC[facility];  Service:    • CARDIAC CATHETERIZATION N/A 2/15/2017    Procedure: Right Heart Cath;  Surgeon: Ehsan Crocker MD;  Location:  [facility] CATH INV[facility];  Service:    • CARDIAC CATHETERIZATION N/A 2/15/2017    Procedure: Coronary angiography;  Surgeon: Ehsan Crocker MD;  Location:  [facility] CATH INV[facility];  Service:    • CARDIAC CATHETERIZATION N/A 2/15/2017    Procedure: Left ventriculography;  Surgeon: Ehsan Crocker MD;  Location:  [facility] CATH INVAS[facility];  Service:    • CARDIAC CATHETERIZATION N/A 2/15/2017    Procedure: Intracardiac echocardiogram;  Surgeon: Ehsan Crocker MD;  Location:  [facility] CATH INVAS[facility] LOC[facility];  Service:    • CARDIAC ELECTROPHYSIOLOGY PROCEDURE N/A 2/3/2017    Procedure: Pacemaker DC new;  Surgeon: Ehsan Crocker MD;  Location:  [facility] CATH INV[facility];  Service:    • CARDIAC  SURGERY      TAVR   • CARDIOVERSION     • CATARACT EXTRACTION     • CHOLECYSTECTOMY     • CORONARY ARTERY BYPASS GRAFT  1999    4 vessel    • CORONARY ARTERY BYPASS GRAFT     • CORONARY STENT PLACEMENT     • HYSTERECTOMY  1962   • INSERT / REPLACE / REMOVE PACEMAKER     • MASTECTOMY  2000   • OTHER SURGICAL HISTORY      TAVR 2017     Social History:  reports that she has never smoked. She has never used smokeless tobacco. She reports that she does not drink alcohol or use drugs.    Family History: family history includes Breast cancer in her mother; Cancer in her brother; Coronary artery disease in her father; Diabetes in her father; Heart attack in her father; No Known Problems in her maternal grandfather, maternal grandmother, paternal grandfather, and paternal grandmother.      Allergies:  Allergies   Allergen Reactions   • Gleevec [Imatinib] Itching   • Bentyl [Dicyclomine] Itching   • Janumet [Sitagliptin-Metformin Hcl] Other (See Comments)     Chest pain   • Sacubitril-Valsartan Itching     Per daughter, Margarita, pt had previous reaction.   • Dilaudid [Hydromorphone] Nausea And Vomiting   • Enalapril Angioedema   • Lopid [Gemfibrozil] Nausea And Vomiting   • Sulfa Antibiotics Other (See Comments)     Syncope     • Ultram [Tramadol] Itching     Medications:  Prior to Admission medications    Medication Sig Start Date End Date Taking? Authorizing Provider   aspirin 81 MG EC tablet Take 81 mg by mouth Daily.    ProviderMinda MD   bisacodyl (DULCOLAX) 10 MG suppository Insert 10 mg into the rectum Daily As Needed for Constipation.    ProviderMinda MD   calcium carbonate (OS-MICHAEL) 600 MG tablet Take 600 mg by mouth Daily.    Minda Salvador MD   cholecalciferol (VITAMIN D3) 1000 units tablet Take 1,000 Units by mouth Daily.    Minda Salvador MD   clopidogrel (PLAVIX) 75 MG tablet Take 75 mg by mouth Daily.    ProviderMinda MD   COMFORT EZ PEN NEEDLES 32G X 4 MM misc  12/20/19    Minda Salvador MD   dasatinib (SPRYCEL) 50 MG chemo tablet Take 1 tablet by mouth Daily. 1/6/20   Minda Salvador MD   furosemide (LASIX) 20 MG tablet Take 20 mg by mouth 2 (Two) Times a Day.    Minda Salvador MD   furosemide (LASIX) 40 MG tablet Take 1 tablet by mouth Every Morning AND 0.5 tablets Every Evening. 1/13/20   Leonardo Zepeda DO   glipizide (GLUCOTROL) 5 MG tablet Take 1 tablet by mouth 2 (Two) Times a Day. 12/4/19   Minda Salvador MD   insulin detemir (LEVEMIR FLEXTOUCH) 100 UNIT/ML injection Inject 15 Units under the skin into the appropriate area as directed Every Night. 12/4/19   Leonardo Zepeda DO   ipratropium (ATROVENT) 0.02 % nebulizer solution Take 2.5 mL by nebulization 4 (Four) Times a Day. 6/10/19   Lucy Art APRN   metoprolol succinate XL (TOPROL-XL) 25 MG 24 hr tablet Take 25 mg by mouth Daily.    Minda Salvador MD   mirtazapine (REMERON) 15 MG tablet Take 7.5 mg by mouth Every Night.    Minda Salvador MD   Multiple Vitamins-Minerals (PRESERVISION/LUTEIN) capsule Take 1 capsule by mouth 2 (Two) Times a Day.    Minda Salvador MD   nitroglycerin (NITROSTAT) 0.4 MG SL tablet 1 under the tongue as needed for angina, may repeat q5mins for up three doses 10/8/19   Leonardo Zepeda DO   ondansetron ODT (ZOFRAN ODT) 4 MG disintegrating tablet Take 1 tablet by mouth Every 4 (Four) Hours As Needed for Nausea or Vomiting. 12/21/18   Linda Johnson APRN   pantoprazole (PROTONIX) 40 MG EC tablet Take 1 tablet by mouth Daily. 8/22/19   Leonardo Zepeda DO   polyethyl glycol-propyl glycol (SYSTANE) 0.4-0.3 % solution ophthalmic solution Administer 1 drop to both eyes Daily.    Minda Salvador MD   polyethylene glycol (MIRALAX) packet Take 17 g by mouth Daily.    Minda Salvador MD   potassium chloride (K-DUR) 10 MEQ CR tablet Take 1 tablet by mouth Daily. 8/20/19   Leonardo Zepeda, DO   rosuvastatin  "(CRESTOR) 20 MG tablet Take 20 mg by mouth Every Night.    ProviderMinda MD   spironolactone (ALDACTONE) 25 MG tablet Take 25 mg by mouth Daily.    ProviderMinda MD   triamcinolone (KENALOG) 0.1 % cream Apply 1 application topically to the appropriate area as directed 2 (Two) Times a Day. 5/20/19 5/20/20  Minda Salvador MD   venlafaxine XR (EFFEXOR-XR) 75 MG 24 hr capsule Take 1 capsule by mouth Daily. 1/13/20   Leonardo Zepeda,    vitamin B-12 (CYANOCOBALAMIN) 500 MCG tablet Take 1,000 mcg by mouth Daily.    ProviderMinda MD     Objective     Vital Signs: /51   Pulse 69   Temp 97.6 °F (36.4 °C)   Resp 14   Ht 149.9 cm (59\")   Wt 39 kg (86 lb)   SpO2 100%   BMI 17.37 kg/m²    Physical Exam  GEN: Awake, alert, interactive, thin/frail, appears chronically ill  HEENT: PERRLA, EOMI, Anicteric, Trachea midline  Lungs: CTAB, no wheezing/rales/rhonchi  Heart: RRR, +S1/s2, no rub, II-III/VI RUSLAN  ABD: soft, epigastric tenderness, +BS, no guarding/rebound  Extremities: atraumatic, no cyanosis, no edema  Skin: no rashes or petechiae  Neuro: AAOx3, no focal deficits      Results Reviewed:  Lab Results (last 24 hours)     Procedure Component Value Units Date/Time    POC Occult Blood Stool [[id number]]  (Abnormal) Collected:  02/13/20 1702    Specimen:  Stool Updated:  02/13/20 1703     Fecal Occult Blood Positive     Lot Number \[id number]\     Expiration Date \9/30/2020\     DEVELOPER LOT NUMBER \165\     DEVELOPER EXPIRATION DATE \9/30/2020\     Positive Control Positive     Negative Control Negative    Comprehensive Metabolic Panel [[id number]]  (Abnormal) Collected:  02/13/20 1609    Specimen:  Blood Updated:  02/13/20 1643     Glucose 380 mg/dL      BUN 77 mg/dL      Creatinine 1.63 mg/dL      Sodium 133 mmol/L      Potassium 4.5 mmol/L      Chloride 96 mmol/L      CO2 21.0 mmol/L      Calcium 9.4 mg/dL      Total Protein 6.7 g/dL      Albumin 3.70 g/dL      ALT (SGPT) 19 U/L      " AST (SGOT) 34 U/L      Alkaline Phosphatase 78 U/L      Total Bilirubin 0.3 mg/dL      eGFR Non African Amer 30 mL/min/1.73      Globulin 3.0 gm/dL      A/G Ratio 1.2 g/dL      BUN/Creatinine Ratio 47.2     Anion Gap 16.0 mmol/L     Narrative:       GFR Normal >60  Chronic Kidney Disease <60  Kidney Failure <15      Urinalysis With Culture If Indicated - Urine, Clean Catch [[id number]]  (Abnormal) Collected:  02/13/20 1609    Specimen:  Urine, Clean Catch Updated:  02/13/20 1642     Color, UA Yellow     Appearance, UA Cloudy     pH, UA <=5.0     Specific Gravity, UA 1.015     Glucose, UA Negative     Ketones, UA Negative     Bilirubin, UA Negative     Blood, UA Negative     Protein,  mg/dL (2+)     Leuk Esterase, UA Small (1+)     Nitrite, UA Negative     Urobilinogen, UA 0.2 E.U./dL    Urinalysis, Microscopic Only - Urine, Clean Catch [[id number]]  (Abnormal) Collected:  02/13/20 1609    Specimen:  Urine, Clean Catch Updated:  02/13/20 1642     RBC, UA None Seen /HPF      WBC, UA 6-12 /HPF      Bacteria, UA 3+ /HPF      Squamous Epithelial Cells, UA 0-2 /HPF      Hyaline Casts, UA None Seen /LPF      Methodology Manual Light Microscopy    Urine Culture - Urine, Urine, Clean Catch [[id number]] Collected:  02/13/20 1609    Specimen:  Urine, Clean Catch Updated:  02/13/20 1642    CBC & Differential [[id number]] Collected:  02/13/20 1609    Specimen:  Blood Updated:  02/13/20 1631    Narrative:       The following orders were created for panel order CBC & Differential.  Procedure                               Abnormality         Status                     ---------                               -----------         ------                     CBC Auto Differential[[id number]]        Abnormal            Final result                 Please view results for these tests on the individual orders.    CBC Auto Differential [[id number]]  (Abnormal) Collected:  02/13/20 1609    Specimen:  Blood Updated:  02/13/20 1631     WBC  9.09 10*3/mm3      RBC 2.32 10*6/mm3      Hemoglobin 6.9 g/dL      Hematocrit 21.8 %      MCV 94.0 fL      MCH 29.7 pg      MCHC 31.7 g/dL      RDW 18.6 %      RDW-SD 63.4 fl      MPV 10.7 fL      Platelets 187 10*3/mm3      Neutrophil % 73.8 %      Lymphocyte % 12.4 %      Monocyte % 10.7 %      Eosinophil % 2.3 %      Basophil % 0.4 %      Immature Grans % 0.4 %      Neutrophils, Absolute 6.70 10*3/mm3      Lymphocytes, Absolute 1.13 10*3/mm3      Monocytes, Absolute 0.97 10*3/mm3      Eosinophils, Absolute 0.21 10*3/mm3      Basophils, Absolute 0.04 10*3/mm3      Immature Grans, Absolute 0.04 10*3/mm3      nRBC 0.0 /100 WBC         Imaging Results (Last 24 Hours)     ** No results found for the last 24 hours. **        I have personally reviewed and interpreted the radiology studies and ECG obtained at time of admission.     Assessment / Plan     Assessment:   Active Hospital Problems    Diagnosis   • **Gastrointestinal hemorrhage with melena   • Generalized weakness   • CML (chronic myeloid leukemia) (CMS/[facility])   • CKD (chronic kidney disease) stage 3, GFR 30-59 ml/min (CMS/[facility])     GFR 32 in 4/2018     • Chronic diastolic congestive heart failure (CMS/[facility])   • S/P TAVR (transcatheter aortic valve replacement)   • Mixed hyperlipidemia   • Essential hypertension   • Type 2 diabetes mellitus with diabetic peripheral angiopathy without gangrene, with long-term current use of insulin (CMS/[facility])         Plan:   #1 GI bleed/melena -patient has a history of angiodysplasia of her colon but is presenting in with melanotic/tarry stools.  Anemic at 6.9.  Will make n.p.o. with gentle fluids at 50/hour as she has a history of heart failure and is not hypotensive.  She is going to get 1 unit PRBC transfused.  Monitor H&H every 6 hours.  IV PPI twice daily.  GI consult.    #2 UTI -patient has abnormal UA with WBC and bacteria.  Hard to assess symptoms currently but she has been weak so will cover with ceftriaxone and follow-up  "culture.    #3 acute on chronic anemia with acute blood loss -as above we will transfuse a unit PRBC now.  Will monitor H&H every 6 hours.  We will add on iron panel to her initial ER work as she has a history of iron deficiency.    #4 generalized weakness -secondary to #1 through 3 above.  Will treat underlying conditions and get PT/OT eval's    #5  Epigastric pain/abdominal pain -consider gastritis/PUD given melanotic stools.  No abdominal imaging to this point.  Will check a lactic acid and if elevated will CT scan her abdomen.  We will also check an EKG and troponin given her cardiac history.    #6 CAD -with history of CABG and recent PCI in September at [facility].  She is on aspirin and Plavix.  Will have to hold on arrival would like to atleast resume aspirin as soon as possible.  We will get cardiology consult for input given her multiple cardiac coronaries.    #7 prosthetic aortic valve replacement -Per daughter she was only on aspirin until she had her PCI and then they added Plavix.  She was not in need full anticoagulation for valve.  We will get cardiology input.  Either way cannot anticoagulate currently    #8 sick sinus syndrome/P A. fib-status post pacemaker and watchman device.  Hold beta-blocker for now.    #9 CKD 3 -appears around baseline.  Gentle IVF and monitor.  Repeat labs in the morning.    #10 chronic diastolic heart failure -does not appear overloaded at this time.  Monitor closely with IV fluids.  Hold Lasix.    #11 DM 2 -will be n.p.o.  Hold Levemir.  Sliding scale coverage.  Hypoglycemia protocol.    #12 CML -noted, currently in remission per daughter.  Treated at [facility].    DVT prophylaxis with SCDs  IV PPI twice daily for GI prophylaxis      Code Status: Full code -had long discussion with patient and daughter.  Patient somewhat undecided and would not give me an answer on whether or not she wanted to be full code or DNR.  Eventually said, \"do what you can to restart heart I " "guess.\"  We will get a palliative care consult.     I discussed the patient's findings and my recommendations with the Patient and Daughter/POA directly at bedside    Case management consult for discharge planning    Estimated length of stay 3-4 days    Patient seen and examined by me on 2/13/2020.    Daryl Pritchard DO   02/13/20   5:52 PM              Electronically signed by Daryl Pritchard DO at 02/13/20 1815          Emergency Department Notes      Ketan Jo MD at 02/13/20 1707          Subjective   Ms. Pablo is an 84-year-old female who comes in today because of worsening weakness.  Hospice workers who were visiting her  noticed that she was getting more and more weak and pale over the last week or so.  They called her daughter who brought her in today.  The patient only complains of some fatigue but otherwise she says she is feeling fine.  She denies fever, shortness of breath, chest pain, abdominal pain.  She does have a history of constipation so takes MiraLAX and consequently of last few days has had very frequent bowel movements.  She also has a history of some kind of basal dysplasia in her colon which leads to chronic bleeding.  She states that the bowel movements are dark and sticky and because of the frequency keep her up at night which makes her want to sleep during the day.    Looking back through the chart her last encounter with Dr. Zepeda demonstrated an extended conversation about the general direction of this lady's trajectory in life.  She is having increasing difficulty managing at home but is steadfast in her refusal to consider assisted living or nursing home care.          Review of Systems   Gastrointestinal: Positive for diarrhea.   Neurological: Positive for weakness.   All other systems reviewed and are negative.      Past Medical History:   Diagnosis Date   • Aortic stenosis    • Arthritis    • Breast cancer (CMS/HCC)    • CAD (coronary artery disease)    • " Cataract    • CHF (congestive heart failure) (CMS/[facility])     recently diagnosed after an ER visit.    • Chronic anticoagulation     Coumadin    • CKD (chronic kidney disease) stage 3, GFR 30-59 ml/min (CMS/[facility]) 4/16/2018   • Diabetes mellitus (CMS/[facility])     Type II   • Elevated cholesterol    • GERD (gastroesophageal reflux disease)    • Hyperlipidemia    • Hypertension    • Macular degeneration    • NSTEMI (non-ST elevated myocardial infarction) (CMS/[facility]) 9/9/2019   • Pancreatitis    • Paroxysmal atrial fibrillation (CMS/[facility])    • Presence of Watchman left atrial appendage closure device    • Presence of Watchman left atrial appendage closure device 11/26/2018   • Pulmonary hypertension (CMS/[facility])    • Rheumatic fever     during childhood   • Sacrum and coccyx fracture (CMS/[facility])    • Sick sinus syndrome (CMS/[facility])     with pacemaker   • UTI (urinary tract infection)        Allergies   Allergen Reactions   • Gleevec [Imatinib] Itching   • Bentyl [Dicyclomine] Itching   • Janumet [Sitagliptin-Metformin Hcl] Other (See Comments)     Chest pain   • Sacubitril-Valsartan Itching     Per daughterMargarita, pt had previous reaction.   • Dilaudid [Hydromorphone] Nausea And Vomiting   • Enalapril Angioedema   • Lopid [Gemfibrozil] Nausea And Vomiting   • Sulfa Antibiotics Other (See Comments)     Syncope     • Ultram [Tramadol] Itching       Past Surgical History:   Procedure Laterality Date   • ANOMALOUS PULMONARY VENOUS RETURN REPAIR, TOTAL     • BACK SURGERY     • BELPHAROPTOSIS REPAIR     • BREAST SURGERY      right mastectomy   • CARDIAC CATHETERIZATION  1999, 2010   • CARDIAC CATHETERIZATION N/A 2/15/2017    Procedure: Left Heart Cath;  Surgeon: Eshan Crocker MD;  Location:  [facility] CATH [facility];  Service:    • CARDIAC CATHETERIZATION N/A 2/15/2017    Procedure: Right Heart Cath;  Surgeon: Ehsan Crocker MD;  Location:  [facility] CATH [facility];  Service:    • CARDIAC CATHETERIZATION N/A 2/15/2017    Procedure:  Coronary angiography;  Surgeon: Ehsan Crocker MD;  Location:  [facility] CATH INVASIVE [facility];  Service:    • CARDIAC CATHETERIZATION N/A 2/15/2017    Procedure: Left ventriculography;  Surgeon: Ehsan Crocker MD;  Location:  [facility] CATH INVASIVE [facility];  Service:    • CARDIAC CATHETERIZATION N/A 2/15/2017    Procedure: Intracardiac echocardiogram;  Surgeon: Ehsan Crocker MD;  Location: [facility] CATH [facility];  Service:    • CARDIAC ELECTROPHYSIOLOGY PROCEDURE N/A 2/3/2017    Procedure: Pacemaker DC new;  Surgeon: Ehsan Crocker MD;  Location:  [facility] CATH INVASIVE [facility];  Service:    • CARDIAC SURGERY      TAVR   • CARDIOVERSION     • CATARACT EXTRACTION     • CHOLECYSTECTOMY     • CORONARY ARTERY BYPASS GRAFT  1999    4 vessel    • CORONARY ARTERY BYPASS GRAFT     • CORONARY STENT PLACEMENT     • HYSTERECTOMY  1962   • INSERT / REPLACE / REMOVE PACEMAKER     • MASTECTOMY  2000   • OTHER SURGICAL HISTORY      TAVR 2017       Family History   Problem Relation Age of Onset   • Breast cancer Mother    • Coronary artery disease Father    • Heart attack Father    • Diabetes Father    • Cancer Brother    • No Known Problems Maternal Grandmother    • No Known Problems Maternal Grandfather    • No Known Problems Paternal Grandmother    • No Known Problems Paternal Grandfather        Social History     Socioeconomic History   • Marital status:      Spouse name: Not on file   • Number of children: Not on file   • Years of education: Not on file   • Highest education level: Not on file   Tobacco Use   • Smoking status: Never Smoker   • Smokeless tobacco: Never Used   Substance and Sexual Activity   • Alcohol use: No   • Drug use: No   • Sexual activity: Defer           Objective   Physical Exam   Constitutional: She is oriented to person, place, and time. She appears well-developed. She appears cachectic.   HENT:   Head: Normocephalic and atraumatic.   Mouth/Throat: Oropharynx is clear and moist.   Eyes: EOM are  normal.   Pale conjunctivae   Neck: Normal range of motion. Neck supple.   Cardiovascular: Normal rate, regular rhythm, normal heart sounds and intact distal pulses.   Pulmonary/Chest: Effort normal and breath sounds normal.   Abdominal: Soft. Bowel sounds are normal.   Genitourinary: Rectal exam shows guaiac positive stool.   Musculoskeletal: Normal range of motion.   Neurological: She is alert and oriented to person, place, and time.   Skin: Skin is warm and dry. Capillary refill takes less than 2 seconds.   Extremely pale   Psychiatric: She has a normal mood and affect. Her behavior is normal. Judgment and thought content normal.   Nursing note and vitals reviewed.      Procedures          ED Course                                           MDM  Number of Diagnoses or Management Options     Amount and/or Complexity of Data Reviewed  Clinical lab tests: reviewed and ordered  Decide to obtain previous medical records or to obtain history from someone other than the patient: yes    Critical Care  Total time providing critical care: < 30 minutes    Patient Progress  Patient progress: stable      Final diagnoses:   Gastrointestinal hemorrhage, unspecified gastrointestinal hemorrhage type   Symptomatic anemia     The patient has an exacerbation of her chronic anemia.  Her hemoglobin is 6.9 I think she deserves transfusion at that level.  I discussed the case with Dr. Negron who kindly agreed to the admission.  Patient is currently stable in the ED       Ketan Jo MD  02/13/20 1721      Electronically signed by Ketan Jo MD at 02/13/20 1721         Current Facility-Administered Medications   Medication Dose Route Frequency Provider Last Rate Last Dose   • [START ON 2/15/2020] aspirin EC tablet 81 mg  81 mg Oral Daily Daryl Pritchard DO       • cefTRIAXone (ROCEPHIN) 1 g/100 mL 0.9% NS (MBP)  1 g Intravenous Q24H Daryl Pritchard DO   1 g at 02/14/20 0052   • [START ON 2/15/2020] clopidogrel  (PLAVIX) tablet 75 mg  75 mg Oral Daily Daryl Pritchard,        • dextrose (D50W) 25 g/ 50mL Intravenous Solution 25 g  25 g Intravenous Q15 Min PRN Daryl Pritchard DO       • dextrose (GLUTOSE) oral gel 15 g  15 g Oral Q15 Min PRN Daryl Pritchard DO       • glucagon (human recombinant) (GLUCAGEN DIAGNOSTIC) injection 1 mg  1 mg Subcutaneous Q15 Min PRN Daryl Pritchard DO       • insulin regular (humuLIN R,novoLIN R) injection 2-9 Units  2-9 Units Subcutaneous Q6H Daryl Pritchard DO   7 Units at 02/13/20 2122   • labetalol (NORMODYNE,TRANDATE) injection 20 mg  20 mg Intravenous Q4H PRN Daryl Pritchard DO       • ondansetron (ZOFRAN) injection 4 mg  4 mg Intravenous Q6H PRN Daryl Pritchard DO       • pantoprazole (PROTONIX) injection 40 mg  40 mg Intravenous Q12H Daryl Pritchard DO   40 mg at 02/14/20 0840   • sodium chloride 0.9 % flush 10 mL  10 mL Intravenous Q12H Daryl Pritchard DO   10 mL at 02/14/20 0840   • sodium chloride 0.9 % flush 10 mL  10 mL Intravenous PRN Daryl Pritchard DO           Lab Results (last 24 hours)     Procedure Component Value Units Date/Time    Hemoglobin [[id number]]  (Abnormal) Collected:  02/14/20 1145    Specimen:  Blood Updated:  02/14/20 1201     Hemoglobin 8.7 g/dL     Urine Culture - Urine, Urine, Clean Catch [[id number]]  (Abnormal) Collected:  02/13/20 1609    Specimen:  Urine, Clean Catch Updated:  02/14/20 1151     Urine Culture >100,000 CFU/mL Gram Negative Bacilli    POC Glucose Once [[id number]]  (Abnormal) Collected:  02/14/20 1129    Specimen:  Blood Updated:  02/14/20 1146     Glucose 153 mg/dL      Comment: : [id number] Jefferson Gutierrez ID: [id number]       aPTT [[id number]]  (Normal) Collected:  02/14/20 0935    Specimen:  Blood Updated:  02/14/20 1005     PTT 29.4 seconds     Protime-INR [[id number]]  (Abnormal) Collected:  02/14/20 0617    Specimen:  Blood Updated:  02/14/20 0759     Protime 17.1 Seconds      INR 1.35    aPTT [[id number]]   (Abnormal) Collected:  02/14/20 0617    Specimen:  Blood Updated:  02/14/20 0759     PTT >200.0 seconds     Comprehensive Metabolic Panel [[id number]]  (Abnormal) Collected:  02/14/20 0617    Specimen:  Blood Updated:  02/14/20 0717     Glucose 119 mg/dL      BUN 67 mg/dL      Creatinine 1.43 mg/dL      Sodium 136 mmol/L      Potassium 3.7 mmol/L      Chloride 101 mmol/L      CO2 20.0 mmol/L      Calcium 8.9 mg/dL      Total Protein 6.6 g/dL      Albumin 3.70 g/dL      ALT (SGPT) 17 U/L      AST (SGOT) 28 U/L      Alkaline Phosphatase 73 U/L      Total Bilirubin 0.4 mg/dL      eGFR Non African Amer 35 mL/min/1.73      Globulin 2.9 gm/dL      A/G Ratio 1.3 g/dL      BUN/Creatinine Ratio 46.9     Anion Gap 15.0 mmol/L     Narrative:       GFR Normal >60  Chronic Kidney Disease <60  Kidney Failure <15      CBC & Differential [[id number]] Collected:  02/14/20 0617    Specimen:  Blood Updated:  02/14/20 0711    Narrative:       The following orders were created for panel order CBC & Differential.  Procedure                               Abnormality         Status                     ---------                               -----------         ------                     CBC Auto Differential[[id number]]        Abnormal            Final result                 Please view results for these tests on the individual orders.    CBC Auto Differential [[id number]]  (Abnormal) Collected:  02/14/20 0617    Specimen:  Blood Updated:  02/14/20 0711     WBC 9.19 10*3/mm3      RBC 2.89 10*6/mm3      Hemoglobin 8.6 g/dL      Hematocrit 26.6 %      MCV 92.0 fL      MCH 29.8 pg      MCHC 32.3 g/dL      RDW 17.6 %      RDW-SD 59.3 fl      MPV 10.8 fL      Platelets 174 10*3/mm3      Neutrophil % 72.5 %      Lymphocyte % 12.4 %      Monocyte % 11.8 %      Eosinophil % 2.3 %      Basophil % 0.7 %      Immature Grans % 0.3 %      Neutrophils, Absolute 6.67 10*3/mm3      Lymphocytes, Absolute 1.14 10*3/mm3      Monocytes, Absolute 1.08 10*3/mm3       Eosinophils, Absolute 0.21 10*3/mm3      Basophils, Absolute 0.06 10*3/mm3      Immature Grans, Absolute 0.03 10*3/mm3      nRBC 0.0 /100 WBC     POC Glucose Once [[id number]]  (Abnormal) Collected:  02/14/20 0601    Specimen:  Blood Updated:  02/14/20 0611     Glucose 133 mg/dL      Comment: : [id number] [employer] ID: [id number]       POC Glucose Once [[id number]]  (Abnormal) Collected:  02/13/20 2357    Specimen:  Blood Updated:  02/14/20 0007     Glucose 140 mg/dL      Comment: : [id number] [employer] ID: [id number]       POC Glucose Once [[id number]]  (Abnormal) Collected:  02/13/20 2030    Specimen:  Blood Updated:  02/13/20 2042     Glucose 311 mg/dL      Comment: : [id number] [employer] ID: [id number]       Troponin [[id number]]  (Abnormal) Collected:  02/13/20 2002    Specimen:  Blood Updated:  02/13/20 2029     Troponin T 0.049 ng/mL     Narrative:       Troponin T Reference Range:  <= 0.03 ng/mL-   Negative for AMI  >0.03 ng/mL-     Abnormal for myocardial necrosis.  Clinicians would have to utilize clinical acumen, EKG, Troponin and serial changes to determine if it is an Acute Myocardial Infarction or myocardial injury due to an underlying chronic condition.       Results may be falsely decreased if patient taking Biotin.      Lactic Acid, Plasma [[id number]]  (Normal) Collected:  02/13/20 2002    Specimen:  Blood Updated:  02/13/20 2023     Lactate 1.4 mmol/L     Hemoglobin [[id number]]  (Abnormal) Collected:  02/13/20 2002    Specimen:  Blood Updated:  02/13/20 2019     Hemoglobin 6.9 g/dL     Troponin [[id number]]  (Abnormal) Collected:  02/13/20 1609    Specimen:  Blood Updated:  02/13/20 1849     Troponin T 0.049 ng/mL     Narrative:       Troponin T Reference Range:  <= 0.03 ng/mL-   Negative for AMI  >0.03 ng/mL-     Abnormal for myocardial necrosis.  Clinicians would have to utilize clinical acumen, EKG, Troponin and serial changes to determine if it is an Acute  Myocardial Infarction or myocardial injury due to an underlying chronic condition.       Results may be falsely decreased if patient taking Biotin.      Iron Profile [[id number]]  (Abnormal) Collected:  02/13/20 1609    Specimen:  Blood Updated:  02/13/20 1845     Iron 34 mcg/dL      Iron Saturation 7 %      Transferrin 309 mg/dL      TIBC 460 mcg/dL     POC Occult Blood Stool [[id number]]  (Abnormal) Collected:  02/13/20 1702    Specimen:  Stool Updated:  02/13/20 1703     Fecal Occult Blood Positive     Lot Number \[id number]\     Expiration Date \9/30/2020\     DEVELOPER LOT NUMBER \165\     DEVELOPER EXPIRATION DATE \9/30/2020\     Positive Control Positive     Negative Control Negative    Comprehensive Metabolic Panel [[id number]]  (Abnormal) Collected:  02/13/20 1609    Specimen:  Blood Updated:  02/13/20 1643     Glucose 380 mg/dL      BUN 77 mg/dL      Creatinine 1.63 mg/dL      Sodium 133 mmol/L      Potassium 4.5 mmol/L      Chloride 96 mmol/L      CO2 21.0 mmol/L      Calcium 9.4 mg/dL      Total Protein 6.7 g/dL      Albumin 3.70 g/dL      ALT (SGPT) 19 U/L      AST (SGOT) 34 U/L      Alkaline Phosphatase 78 U/L      Total Bilirubin 0.3 mg/dL      eGFR Non African Amer 30 mL/min/1.73      Globulin 3.0 gm/dL      A/G Ratio 1.2 g/dL      BUN/Creatinine Ratio 47.2     Anion Gap 16.0 mmol/L     Narrative:       GFR Normal >60  Chronic Kidney Disease <60  Kidney Failure <15      Urinalysis With Culture If Indicated - Urine, Clean Catch [[id number]]  (Abnormal) Collected:  02/13/20 1609    Specimen:  Urine, Clean Catch Updated:  02/13/20 1642     Color, UA Yellow     Appearance, UA Cloudy     pH, UA <=5.0     Specific Gravity, UA 1.015     Glucose, UA Negative     Ketones, UA Negative     Bilirubin, UA Negative     Blood, UA Negative     Protein,  mg/dL (2+)     Leuk Esterase, UA Small (1+)     Nitrite, UA Negative     Urobilinogen, UA 0.2 E.U./dL    Urinalysis, Microscopic Only - Urine, Clean Catch [[id number]]   (Abnormal) Collected:  02/13/20 1609    Specimen:  Urine, Clean Catch Updated:  02/13/20 1642     RBC, UA None Seen /HPF      WBC, UA 6-12 /HPF      Bacteria, UA 3+ /HPF      Squamous Epithelial Cells, UA 0-2 /HPF      Hyaline Casts, UA None Seen /LPF      Methodology Manual Light Microscopy    CBC & Differential [[id number]] Collected:  02/13/20 1609    Specimen:  Blood Updated:  02/13/20 1631    Narrative:       The following orders were created for panel order CBC & Differential.  Procedure                               Abnormality         Status                     ---------                               -----------         ------                     CBC Auto Differential[[id number]]        Abnormal            Final result                 Please view results for these tests on the individual orders.    CBC Auto Differential [[id number]]  (Abnormal) Collected:  02/13/20 1609    Specimen:  Blood Updated:  02/13/20 1631     WBC 9.09 10*3/mm3      RBC 2.32 10*6/mm3      Hemoglobin 6.9 g/dL      Hematocrit 21.8 %      MCV 94.0 fL      MCH 29.7 pg      MCHC 31.7 g/dL      RDW 18.6 %      RDW-SD 63.4 fl      MPV 10.7 fL      Platelets 187 10*3/mm3      Neutrophil % 73.8 %      Lymphocyte % 12.4 %      Monocyte % 10.7 %      Eosinophil % 2.3 %      Basophil % 0.4 %      Immature Grans % 0.4 %      Neutrophils, Absolute 6.70 10*3/mm3      Lymphocytes, Absolute 1.13 10*3/mm3      Monocytes, Absolute 0.97 10*3/mm3      Eosinophils, Absolute 0.21 10*3/mm3      Basophils, Absolute 0.04 10*3/mm3      Immature Grans, Absolute 0.04 10*3/mm3      nRBC 0.0 /100 WBC         Physician Progress Notes (last 24 hours) (Notes from 02/13/20 1243 through 02/14/20 1243)    No notes of this type exist for this encounter.            Consult Notes (last 24 hours) (Notes from 02/13/20 1243 through 02/14/20 1243)      Paola Kenny, APRN at 02/14/20 0951      Consult Orders    1. Inpatient Cardiology Consult [[id number]] ordered by Macho  Daryl CAI DO at 02/13/20 0664                [facility] CONSULT NOTE    Referring Provider: Daryl Pritchard DO    Reason for Consultation: GI bleed and on Plavix    Chief complaint:   Chief Complaint   Patient presents with   • Weakness - Generalized       Subjective     History of present illness:  Lakesha Costello is a 84 y.o. female with history of aortic stenosis s/p TAVR with a 20mm Mensah Sunita valve in '17, persistent atrial fibrillation s/p ablation with Watchman device placed by Dr. 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 She currently follows with Dr. Moses at [place] for her cardiac issues.  2D echo completed 9/19 notes her bioprosthetic valve is well seated with a trace of perivalvular regurgitation with stable gradients. 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 She presented to the ER with complaints of diarrhea, weakness and epigastric pain. She was noted to have diarrhea after taking Miralax for her constipation. Her hgb was 6.9 on arrival and she was heme occult positive. She received 1u PRBC. She states she has had bleeding issues off and on for quite some time. She notes a colonoscopy that was done about a year ago and states they found blood in her colon at that time. She states this episode started a few days ago. She notes progressive weakness. She denies chest pain. She notes a progressive decline since her  was placed on hospice. Cardiology has been consulted due to the patient's history of PCI in Sept 19 and a current GI  bleed.      History  Past Medical History:   Diagnosis Date   • Aortic stenosis    • Arthritis    • Breast cancer (CMS/[facility])    • CAD (coronary artery disease)    • Cataract    • CHF (congestive heart failure) (CMS/[facility])     recently diagnosed after an ER visit.    • Chronic anticoagulation     Coumadin    • CKD (chronic kidney disease) stage 3, GFR 30-59 ml/min (CMS/HCC) 4/16/2018   • Diabetes mellitus (CMS/HCC)     Type II   • Elevated cholesterol    • GERD (gastroesophageal reflux disease)    • Hyperlipidemia    • Hypertension    • Macular degeneration    • NSTEMI (non-ST elevated myocardial infarction) (CMS/[facility]) 9/9/2019   • Pancreatitis    • Paroxysmal atrial fibrillation (CMS/[facility])    • Presence of Watchman left atrial appendage closure device    • Presence of Watchman left atrial appendage closure device 11/26/2018   • Pulmonary hypertension (CMS/[facility])    • Rheumatic fever     during childhood   • Sacrum and coccyx fracture (CMS/[facility])    • Sick sinus syndrome (CMS/HCC)     with pacemaker   • UTI (urinary tract infection)    ,   Past Surgical History:   Procedure Laterality Date   • ANOMALOUS PULMONARY VENOUS RETURN REPAIR, TOTAL     • BACK SURGERY     • BELPHAROPTOSIS REPAIR     • BREAST SURGERY      right mastectomy   • CARDIAC CATHETERIZATION  1999, 2010   • CARDIAC CATHETERIZATION N/A 2/15/2017    Procedure: Left Heart Cath;  Surgeon: Ehsan Crocker MD;  Location:  [facility];  Service:    • CARDIAC CATHETERIZATION N/A 2/15/2017    Procedure: Right Heart Cath;  Surgeon: Ehsan Crocker MD;  Location:  [facility];  Service:    • CARDIAC CATHETERIZATION N/A 2/15/2017    Procedure: Coronary angiography;  Surgeon: Ehsan Crocker MD;  Location:  [facility];  Service:    • CARDIAC CATHETERIZATION N/A 2/15/2017    Procedure: Left ventriculography;  Surgeon: Ehsan Crocker MD;  Location:  [facility];  Service:    • CARDIAC CATHETERIZATION N/A 2/15/2017     Procedure: Intracardiac echocardiogram;  Surgeon: Ehsan Crocker MD;  Location:  [facility];  Service:    • CARDIAC ELECTROPHYSIOLOGY PROCEDURE N/A 2/3/2017    Procedure: Pacemaker DC new;  Surgeon: Ehsan Crocker MD;  Location:  [facility];  Service:    • CARDIAC SURGERY      TAVR   • CARDIOVERSION     • CATARACT EXTRACTION     • CHOLECYSTECTOMY     • CORONARY ARTERY BYPASS GRAFT  1999    4 vessel    • CORONARY ARTERY BYPASS GRAFT     • CORONARY STENT PLACEMENT     • HYSTERECTOMY  1962   • INSERT / REPLACE / REMOVE PACEMAKER     • MASTECTOMY  2000   • OTHER SURGICAL HISTORY      TAVR 2017   ,   Family History   Problem Relation Age of Onset   • Breast cancer Mother    • Coronary artery disease Father    • Heart attack Father    • Diabetes Father    • Cancer Brother    • No Known Problems Maternal Grandmother    • No Known Problems Maternal Grandfather    • No Known Problems Paternal Grandmother    • No Known Problems Paternal Grandfather    ,   Social History     Tobacco Use   • Smoking status: Never Smoker   • Smokeless tobacco: Never Used   Substance Use Topics   • Alcohol use: No   • Drug use: No   ,     Medications    Prior to Admission medications    Medication Sig Start Date End Date Taking? Authorizing Provider   aspirin 81 MG EC tablet Take 81 mg by mouth Daily.    Minda Salvador MD   bisacodyl (DULCOLAX) 10 MG suppository Insert 10 mg into the rectum Daily As Needed for Constipation.    Minda Salvador MD   calcium carbonate (OS-MICHAEL) 600 MG tablet Take 600 mg by mouth Daily.    Minda Salvador MD   cholecalciferol (VITAMIN D3) 1000 units tablet Take 1,000 Units by mouth Daily.    Minda Salvador MD   clopidogrel (PLAVIX) 75 MG tablet Take 75 mg by mouth Daily.    Minda Salvador MD   COMFORT EZ PEN NEEDLES 32G X 4 MM misc  12/20/19   Minda Salvador MD   dasatinib (SPRYCEL) 50 MG chemo tablet Take 1 tablet by mouth Daily. 1/6/20    Minda Salvador MD   furosemide (LASIX) 20 MG tablet Take 20 mg by mouth 2 (Two) Times a Day.    Minda Salvador MD   furosemide (LASIX) 40 MG tablet Take 1 tablet by mouth Every Morning AND 0.5 tablets Every Evening. 1/13/20   Leonardo Zepeda, DO   glipizide (GLUCOTROL) 5 MG tablet Take 1 tablet by mouth 2 (Two) Times a Day. 12/4/19   Minda Salvador MD   insulin detemir (LEVEMIR FLEXTOUCH) 100 UNIT/ML injection Inject 15 Units under the skin into the appropriate area as directed Every Night.  Patient taking differently: Inject 12 Units under the skin into the appropriate area as directed Every Night. 12/4/19   Leonardo Zepeda DO   metoprolol succinate XL (TOPROL-XL) 25 MG 24 hr tablet Take 25 mg by mouth Daily.    Minda Salvador MD   mirtazapine (REMERON) 15 MG tablet Take 7.5 mg by mouth Every Night.    Minda Salvador MD   Multiple Vitamins-Minerals (PRESERVISION/LUTEIN) capsule Take 1 capsule by mouth 2 (Two) Times a Day.    Minda Salvador MD   nitroglycerin (NITROSTAT) 0.4 MG SL tablet 1 under the tongue as needed for angina, may repeat q5mins for up three doses 10/8/19   Leonardo Zepeda DO   pantoprazole (PROTONIX) 40 MG EC tablet Take 1 tablet by mouth Daily. 8/22/19   Leonardo Zepeda DO   polyethyl glycol-propyl glycol (SYSTANE) 0.4-0.3 % solution ophthalmic solution Administer 1 drop to both eyes Daily.    Minda Salvador MD   polyethylene glycol (MIRALAX) packet Take 17 g by mouth Daily.    Minda Salvador MD   rosuvastatin (CRESTOR) 20 MG tablet Take 20 mg by mouth Every Night.    Minda Salvador MD   spironolactone (ALDACTONE) 25 MG tablet Take 25 mg by mouth Daily.    Minda Salvador MD   triamcinolone (KENALOG) 0.1 % cream Apply 1 application topically to the appropriate area as directed 2 (Two) Times a Day. 5/20/19 5/20/20  Minda Salvador MD   venlafaxine XR (EFFEXOR-XR) 75 MG 24 hr capsule Take 1 capsule by  mouth Daily. 1/13/20   Leonardo Zepeda,    vitamin B-12 (CYANOCOBALAMIN) 500 MCG tablet Take 1,000 mcg by mouth Daily.    Provider, MD Minda       Current Facility-Administered Medications   Medication Dose Route Frequency Provider Last Rate Last Dose   • cefTRIAXone (ROCEPHIN) 1 g/100 mL 0.9% NS (MBP)  1 g Intravenous Q24H Daryl Pritchard DO   1 g at 02/14/20 0052   • dextrose (D50W) 25 g/ 50mL Intravenous Solution 25 g  25 g Intravenous Q15 Min PRN Daryl Pritchard, DO       • dextrose (GLUTOSE) oral gel 15 g  15 g Oral Q15 Min PRN Daryl Pritchard, DO       • glucagon (human recombinant) (GLUCAGEN DIAGNOSTIC) injection 1 mg  1 mg Subcutaneous Q15 Min PRN Daryl Pritchard, DO       • insulin regular (humuLIN R,novoLIN R) injection 2-9 Units  2-9 Units Subcutaneous Q6H Daryl Pritchard DO   7 Units at 02/13/20 2122   • labetalol (NORMODYNE,TRANDATE) injection 20 mg  20 mg Intravenous Q4H PRN Daryl Pritchard, DO       • ondansetron (ZOFRAN) injection 4 mg  4 mg Intravenous Q6H PRN Daryl Pritchard DO       • pantoprazole (PROTONIX) injection 40 mg  40 mg Intravenous Q12H Daryl Pritchard DO   40 mg at 02/14/20 0840   • sodium chloride 0.9 % flush 10 mL  10 mL Intravenous Q12H Daryl Pritchard DO   10 mL at 02/14/20 0840   • sodium chloride 0.9 % flush 10 mL  10 mL Intravenous PRN Daryl Pritchard,        • sodium chloride 0.9 % infusion  50 mL/hr Intravenous Continuous Daryl Pritchard DO 50 mL/hr at 02/14/20 0052 50 mL/hr at 02/14/20 0052       Allergies:  Gleevec [imatinib]; Bentyl [dicyclomine]; Janumet [sitagliptin-metformin hcl]; Sacubitril-valsartan; Dilaudid [hydromorphone]; Enalapril; Lopid [gemfibrozil]; Sulfa antibiotics; and Ultram [tramadol]    Review of Systems  Review of Systems   Constitutional: Positive for fatigue. Negative for diaphoresis and unexpected weight change.   Respiratory: Negative for chest tightness, shortness of breath and wheezing.    Cardiovascular: Negative  "for chest pain, palpitations and leg swelling.   Gastrointestinal: Positive for blood in stool and diarrhea. Negative for nausea and vomiting.   Neurological: Negative for dizziness, syncope and light-headedness.   All other systems reviewed and are negative.      Objective     Physical Exam:  Patient Vitals for the past 24 hrs:   BP Temp Temp src Pulse Resp SpO2 Height Weight   02/14/20 0736 168/54 97.7 °F (36.5 °C) -- 69 16 95 % -- --   02/14/20 0050 166/48 97.2 °F (36.2 °C) -- 70 16 100 % -- --   02/13/20 2131 142/50 97.6 °F (36.4 °C) Oral 68 16 99 % -- --   02/13/20 2112 154/46 97.2 °F (36.2 °C) Oral 70 16 98 % -- --   02/13/20 2040 152/97 97.8 °F (36.6 °C) Oral 70 16 98 % -- --   02/13/20 1755 109/77 98.2 °F (36.8 °C) Oral 70 16 100 % 149.9 cm (59.02\") 39 kg (86 lb)   02/13/20 1716 152/51 -- -- 69 -- 100 % -- --   02/13/20 1439 157/48 97.6 °F (36.4 °C) -- 68 14 100 % -- --   02/13/20 1437 -- -- -- -- -- -- 149.9 cm (59\") 39 kg (86 lb)     Physical Exam   Constitutional: She is oriented to person, place, and time. She appears well-developed and well-nourished. No distress.   HENT:   Head: Normocephalic.   Mouth/Throat: No oropharyngeal exudate.   Eyes: Pupils are equal, round, and reactive to light. Conjunctivae and EOM are normal. No scleral icterus.   Neck: Normal range of motion. Neck supple.   Cardiovascular: Normal rate, regular rhythm and intact distal pulses. Exam reveals a midsystolic click.   Murmur heard.  Pulmonary/Chest: Effort normal and breath sounds normal. No respiratory distress. She has no wheezes. She has no rales. She exhibits no tenderness.   Abdominal: Soft. Bowel sounds are normal. She exhibits no distension. There is no tenderness.   Musculoskeletal: Normal range of motion. She exhibits no edema.   Neurological: She is alert and oriented to person, place, and time. She has normal reflexes.   Skin: Skin is warm and dry. No rash noted. She is not diaphoretic. No erythema. No pallor. "   Psychiatric: She has a normal mood and affect. Her behavior is normal.   Vitals reviewed.      Results Review:   I reviewed the patient's new clinical results.    Lab Results (last 72 hours)     Procedure Component Value Units Date/Time    aPTT [[id number]] Collected:  02/14/20 0935    Specimen:  Blood Updated:  02/14/20 0949    Protime-INR [[id number]]  (Abnormal) Collected:  02/14/20 0617    Specimen:  Blood Updated:  02/14/20 0759     Protime 17.1 Seconds      INR 1.35    aPTT [[id number]]  (Abnormal) Collected:  02/14/20 0617    Specimen:  Blood Updated:  02/14/20 0759     PTT >200.0 seconds     Comprehensive Metabolic Panel [[id number]]  (Abnormal) Collected:  02/14/20 0617    Specimen:  Blood Updated:  02/14/20 0717     Glucose 119 mg/dL      BUN 67 mg/dL      Creatinine 1.43 mg/dL      Sodium 136 mmol/L      Potassium 3.7 mmol/L      Chloride 101 mmol/L      CO2 20.0 mmol/L      Calcium 8.9 mg/dL      Total Protein 6.6 g/dL      Albumin 3.70 g/dL      ALT (SGPT) 17 U/L      AST (SGOT) 28 U/L      Alkaline Phosphatase 73 U/L      Total Bilirubin 0.4 mg/dL      eGFR Non African Amer 35 mL/min/1.73      Globulin 2.9 gm/dL      A/G Ratio 1.3 g/dL      BUN/Creatinine Ratio 46.9     Anion Gap 15.0 mmol/L     Narrative:       GFR Normal >60  Chronic Kidney Disease <60  Kidney Failure <15      CBC & Differential [[id number]] Collected:  02/14/20 0617    Specimen:  Blood Updated:  02/14/20 0711    Narrative:       The following orders were created for panel order CBC & Differential.  Procedure                               Abnormality         Status                     ---------                               -----------         ------                     CBC Auto Differential[[id number]]        Abnormal            Final result                 Please view results for these tests on the individual orders.    CBC Auto Differential [[id number]]  (Abnormal) Collected:  02/14/20 0617    Specimen:  Blood Updated:  02/14/20 0711      WBC 9.19 10*3/mm3      RBC 2.89 10*6/mm3      Hemoglobin 8.6 g/dL      Hematocrit 26.6 %      MCV 92.0 fL      MCH 29.8 pg      MCHC 32.3 g/dL      RDW 17.6 %      RDW-SD 59.3 fl      MPV 10.8 fL      Platelets 174 10*3/mm3      Neutrophil % 72.5 %      Lymphocyte % 12.4 %      Monocyte % 11.8 %      Eosinophil % 2.3 %      Basophil % 0.7 %      Immature Grans % 0.3 %      Neutrophils, Absolute 6.67 10*3/mm3      Lymphocytes, Absolute 1.14 10*3/mm3      Monocytes, Absolute 1.08 10*3/mm3      Eosinophils, Absolute 0.21 10*3/mm3      Basophils, Absolute 0.06 10*3/mm3      Immature Grans, Absolute 0.03 10*3/mm3      nRBC 0.0 /100 WBC     POC Glucose Once [[id number]]  (Abnormal) Collected:  02/14/20 0601    Specimen:  Blood Updated:  02/14/20 0611     Glucose 133 mg/dL      Comment: : [id number] [employer] ID: [id number]       POC Glucose Once [[id number]]  (Abnormal) Collected:  02/13/20 2357    Specimen:  Blood Updated:  02/14/20 0007     Glucose 140 mg/dL      Comment: : [id number] [employer] ID: [id number]       POC Glucose Once [[id number]]  (Abnormal) Collected:  02/13/20 2030    Specimen:  Blood Updated:  02/13/20 2042     Glucose 311 mg/dL      Comment: : [id number] [employer] ID: [id number]       Troponin [[id number]]  (Abnormal) Collected:  02/13/20 2002    Specimen:  Blood Updated:  02/13/20 2029     Troponin T 0.049 ng/mL     Narrative:       Troponin T Reference Range:  <= 0.03 ng/mL-   Negative for AMI  >0.03 ng/mL-     Abnormal for myocardial necrosis.  Clinicians would have to utilize clinical acumen, EKG, Troponin and serial changes to determine if it is an Acute Myocardial Infarction or myocardial injury due to an underlying chronic condition.       Results may be falsely decreased if patient taking Biotin.      Lactic Acid, Plasma [[id number]]  (Normal) Collected:  02/13/20 2002    Specimen:  Blood Updated:  02/13/20 2023     Lactate 1.4 mmol/L     Hemoglobin  [[id number]]  (Abnormal) Collected:  02/13/20 2002    Specimen:  Blood Updated:  02/13/20 2019     Hemoglobin 6.9 g/dL     Troponin [[id number]]  (Abnormal) Collected:  02/13/20 1609    Specimen:  Blood Updated:  02/13/20 1849     Troponin T 0.049 ng/mL     Narrative:       Troponin T Reference Range:  <= 0.03 ng/mL-   Negative for AMI  >0.03 ng/mL-     Abnormal for myocardial necrosis.  Clinicians would have to utilize clinical acumen, EKG, Troponin and serial changes to determine if it is an Acute Myocardial Infarction or myocardial injury due to an underlying chronic condition.       Results may be falsely decreased if patient taking Biotin.      Iron Profile [[id number]]  (Abnormal) Collected:  02/13/20 1609    Specimen:  Blood Updated:  02/13/20 1845     Iron 34 mcg/dL      Iron Saturation 7 %      Transferrin 309 mg/dL      TIBC 460 mcg/dL     POC Occult Blood Stool [[id number]]  (Abnormal) Collected:  02/13/20 1702    Specimen:  Stool Updated:  02/13/20 1703     Fecal Occult Blood Positive     Lot Number \[id number]\     Expiration Date \9/30/2020\     DEVELOPER LOT NUMBER \165\     DEVELOPER EXPIRATION DATE \9/30/2020\     Positive Control Positive     Negative Control Negative    Comprehensive Metabolic Panel [[id number]]  (Abnormal) Collected:  02/13/20 1609    Specimen:  Blood Updated:  02/13/20 1643     Glucose 380 mg/dL      BUN 77 mg/dL      Creatinine 1.63 mg/dL      Sodium 133 mmol/L      Potassium 4.5 mmol/L      Chloride 96 mmol/L      CO2 21.0 mmol/L      Calcium 9.4 mg/dL      Total Protein 6.7 g/dL      Albumin 3.70 g/dL      ALT (SGPT) 19 U/L      AST (SGOT) 34 U/L      Alkaline Phosphatase 78 U/L      Total Bilirubin 0.3 mg/dL      eGFR Non African Amer 30 mL/min/1.73      Globulin 3.0 gm/dL      A/G Ratio 1.2 g/dL      BUN/Creatinine Ratio 47.2     Anion Gap 16.0 mmol/L     Narrative:       GFR Normal >60  Chronic Kidney Disease <60  Kidney Failure <15      Urinalysis With Culture If Indicated - Urine,  Clean Catch [[id number]]  (Abnormal) Collected:  02/13/20 1609    Specimen:  Urine, Clean Catch Updated:  02/13/20 1642     Color, UA Yellow     Appearance, UA Cloudy     pH, UA <=5.0     Specific Gravity, UA 1.015     Glucose, UA Negative     Ketones, UA Negative     Bilirubin, UA Negative     Blood, UA Negative     Protein,  mg/dL (2+)     Leuk Esterase, UA Small (1+)     Nitrite, UA Negative     Urobilinogen, UA 0.2 E.U./dL    Urinalysis, Microscopic Only - Urine, Clean Catch [[id number]]  (Abnormal) Collected:  02/13/20 1609    Specimen:  Urine, Clean Catch Updated:  02/13/20 1642     RBC, UA None Seen /HPF      WBC, UA 6-12 /HPF      Bacteria, UA 3+ /HPF      Squamous Epithelial Cells, UA 0-2 /HPF      Hyaline Casts, UA None Seen /LPF      Methodology Manual Light Microscopy    Urine Culture - Urine, Urine, Clean Catch [[id number]] Collected:  02/13/20 1609    Specimen:  Urine, Clean Catch Updated:  02/13/20 1642    CBC & Differential [[id number]] Collected:  02/13/20 1609    Specimen:  Blood Updated:  02/13/20 1631    Narrative:       The following orders were created for panel order CBC & Differential.  Procedure                               Abnormality         Status                     ---------                               -----------         ------                     CBC Auto Differential[[id number]]        Abnormal            Final result                 Please view results for these tests on the individual orders.    CBC Auto Differential [[id number]]  (Abnormal) Collected:  02/13/20 1609    Specimen:  Blood Updated:  02/13/20 1631     WBC 9.09 10*3/mm3      RBC 2.32 10*6/mm3      Hemoglobin 6.9 g/dL      Hematocrit 21.8 %      MCV 94.0 fL      MCH 29.7 pg      MCHC 31.7 g/dL      RDW 18.6 %      RDW-SD 63.4 fl      MPV 10.7 fL      Platelets 187 10*3/mm3      Neutrophil % 73.8 %      Lymphocyte % 12.4 %      Monocyte % 10.7 %      Eosinophil % 2.3 %      Basophil % 0.4 %      Immature Grans % 0.4 %       Neutrophils, Absolute 6.70 10*3/mm3      Lymphocytes, Absolute 1.13 10*3/mm3      Monocytes, Absolute 0.97 10*3/mm3      Eosinophils, Absolute 0.21 10*3/mm3      Basophils, Absolute 0.04 10*3/mm3      Immature Grans, Absolute 0.04 10*3/mm3      nRBC 0.0 /100 WBC           Lab Results   Component Value Date    ECHOEFEST 60 09/07/2017       Imaging Results (Last 72 Hours)     ** No results found for the last 72 hours. **                Assessment/Plan       Gastrointestinal hemorrhage with melena    Essential hypertension    Type 2 diabetes mellitus with diabetic peripheral angiopathy without gangrene, with long-term current use of insulin (CMS/[facility])    Mixed hyperlipidemia    S/P TAVR (transcatheter aortic valve replacement)    Chronic diastolic congestive heart failure (CMS/[facility])    CKD (chronic kidney disease) stage 3, GFR 30-59 ml/min (CMS/[facility])    CML (chronic myeloid leukemia) (CMS/[facility])    Generalized weakness      Plan:   -Due to her recent PCI in 9/19 would recommend continuing ASA and plavix at this time due to it being <12mos.  -recommend transfusing as needed for anemia.  -GI to see today. Known AVMs.   -monitor tele     Further orders per Dr. Perez    Thank you for asking us to follow this patient with you.     FACUNDO Maher  02/14/20  9:51 AM    Please note this cardiology consultation note is the result of a face to face consultation with the patient, in addition to reviewing medical records at length by myself, FACUNDO Wharton      Time: approximately  60 minutes        Electronically signed by Paola Kenny APRN at 02/14/20 1030     Celia Vega APRN at 02/14/20 0827               [facility] GASTROENTEROLOGY              Initial Inpatient Consult Note  Lakesha Costello  1935    Referring Provider: Daryl Pritchard DO    Admission: 2/13/2020  Consult date: 2/14/2020  Chief complaint: Anemia    Subjective     History of present illness: Patient is a pleasant 84-year-old  female.  She is in a weak debilitated state.  Apparently she has a  who is on hospice very ill and the hospice nurse has noticed that the patient has been looking weaker and more pale every time they came to visit her .  She has a significant long history including coronary artery disease status post CABG as well as a recent massive heart attack back in September 2019 at which time she had stents placed in 1 vessel that was unable to be cleared.  She has congestive heart failure atrial fibrillation sick sinus syndrome status post pacemaker and a watchman procedure.  She has been taking Plavix since her September heart attack in addition to the aspirin therapy.  She also has a history of known colonic angiodysplasia followed by Dr. García in [facility].  Patient's daughter reports that it has been about 1 year since her upper endoscopy and lower colonoscopy.  Upper endoscopy showed gastritis with gastroesophageal reflux disease.  Lower colonoscopy showed colonic angiodysplasia contributing to blood loss.  She also has a significant history of CML and sees hematology for that.  She denies any significant abdominal pain complaints of minimal midepigastric discomfort.  Just has complaints of generalized weakness.  Appetite is been for poor.  Her bowels are black however she does take oral iron therapy as well.  Denies any chest pain and denies any shortness of breath.  Her stools were positive for blood and she was found to be anemic.  Admission hemoglobin 6.9.  Is received packed red blood cell transfusion and her hemoglobin was 8.6.  Patient verbalizes the fact that she is just tired and tired of procedures.  Unsure that she wants to pursue endoscopy examination.  Daughter is on the phone once again with all of this and at this time states she does not really want to pursue endoscopy examination as well.  She would rather check with her hematologist which she has an appointment with in March.   She wants to talk with him to see if he thinks any of this is related to her CML.  She is actually also calling Dr. Gacría's patient to get us a copy of recent endoscopy and colonoscopy examination for our review as well.    Once again she has a  who is in hospice care.  And patient verbalizes the fact that she is just tired and almost ready to give up regarding her own self health care.  Past Medical History:  Past Medical History:   Diagnosis Date   • Aortic stenosis    • Arthritis    • Breast cancer (CMS/[facility])    • CAD (coronary artery disease)    • Cataract    • CHF (congestive heart failure) (CMS/[facility])     recently diagnosed after an ER visit.    • Chronic anticoagulation     Coumadin    • CKD (chronic kidney disease) stage 3, GFR 30-59 ml/min (CMS/[facility]) 4/16/2018   • Diabetes mellitus (CMS/[facility])     Type II   • Elevated cholesterol    • GERD (gastroesophageal reflux disease)    • Hyperlipidemia    • Hypertension    • Macular degeneration    • NSTEMI (non-ST elevated myocardial infarction) (CMS/[facility]) 9/9/2019   • Pancreatitis    • Paroxysmal atrial fibrillation (CMS/[facility])    • Presence of Watchman left atrial appendage closure device    • Presence of Watchman left atrial appendage closure device 11/26/2018   • Pulmonary hypertension (CMS/[facility])    • Rheumatic fever     during childhood   • Sacrum and coccyx fracture (CMS/[facility])    • Sick sinus syndrome (CMS/[facility])     with pacemaker   • UTI (urinary tract infection)        Past Surgical History:  Past Surgical History:   Procedure Laterality Date   • ANOMALOUS PULMONARY VENOUS RETURN REPAIR, TOTAL     • BACK SURGERY     • BELPHAROPTOSIS REPAIR     • BREAST SURGERY      right mastectomy   • CARDIAC CATHETERIZATION  1999, 2010   • CARDIAC CATHETERIZATION N/A 2/15/2017    Procedure: Left Heart Cath;  Surgeon: Ehsan Crocker MD;  Location: [facility] CATH INVASIVE LOCATION;  Service:    • CARDIAC CATHETERIZATION N/A 2/15/2017    Procedure: Right Heart Cath;  Surgeon: Ehsan  TAMAR Crocker MD;  Location:  [facility] CATH INVASIVE LOCATION;  Service:    • CARDIAC CATHETERIZATION N/A 2/15/2017    Procedure: Coronary angiography;  Surgeon: Ehsan Crocker MD;  Location:  [facility] CATH INVASIVE LOCATION;  Service:    • CARDIAC CATHETERIZATION N/A 2/15/2017    Procedure: Left ventriculography;  Surgeon: Ehsan Crocker MD;  Location:  [facility] CATH INVASIVE LOCATION;  Service:    • CARDIAC CATHETERIZATION N/A 2/15/2017    Procedure: Intracardiac echocardiogram;  Surgeon: Ehsan Crocker MD;  Location:  [facility] CATH INVASIVE LOCATION;  Service:    • CARDIAC ELECTROPHYSIOLOGY PROCEDURE N/A 2/3/2017    Procedure: Pacemaker DC new;  Surgeon: Ehsan Crocker MD;  Location:  [facility] CATH INVASIVE LOCATION;  Service:    • CARDIAC SURGERY      TAVR   • CARDIOVERSION     • CATARACT EXTRACTION     • CHOLECYSTECTOMY     • CORONARY ARTERY BYPASS GRAFT  1999    4 vessel    • CORONARY ARTERY BYPASS GRAFT     • CORONARY STENT PLACEMENT     • HYSTERECTOMY  1962   • INSERT / REPLACE / REMOVE PACEMAKER     • MASTECTOMY  2000   • OTHER SURGICAL HISTORY      TAVR 2017       Social History:   Social History     Tobacco Use   • Smoking status: Never Smoker   • Smokeless tobacco: Never Used   Substance Use Topics   • Alcohol use: No        Family History:  Family History   Problem Relation Age of Onset   • Breast cancer Mother    • Coronary artery disease Father    • Heart attack Father    • Diabetes Father    • Cancer Brother    • No Known Problems Maternal Grandmother    • No Known Problems Maternal Grandfather    • No Known Problems Paternal Grandmother    • No Known Problems Paternal Grandfather        Home Meds:  Medications Prior to Admission   Medication Sig Dispense Refill Last Dose   • aspirin 81 MG EC tablet Take 81 mg by mouth Daily.   Taking   • bisacodyl (DULCOLAX) 10 MG suppository Insert 10 mg into the rectum Daily As Needed for Constipation.   Taking   • calcium carbonate (OS-MICHAEL) 600 MG tablet Take 600 mg by mouth Daily.    Taking   • cholecalciferol (VITAMIN D3) 1000 units tablet Take 1,000 Units by mouth Daily.   Taking   • clopidogrel (PLAVIX) 75 MG tablet Take 75 mg by mouth Daily.   Taking   • COMFORT EZ PEN NEEDLES 32G X 4 MM misc    Taking   • dasatinib (SPRYCEL) 50 MG chemo tablet Take 1 tablet by mouth Daily.   Taking   • furosemide (LASIX) 20 MG tablet Take 20 mg by mouth 2 (Two) Times a Day.   Taking   • furosemide (LASIX) 40 MG tablet Take 1 tablet by mouth Every Morning AND 0.5 tablets Every Evening. 135 tablet 1    • glipizide (GLUCOTROL) 5 MG tablet Take 1 tablet by mouth 2 (Two) Times a Day.   Taking   • insulin detemir (LEVEMIR FLEXTOUCH) 100 UNIT/ML injection Inject 15 Units under the skin into the appropriate area as directed Every Night. (Patient taking differently: Inject 12 Units under the skin into the appropriate area as directed Every Night.) 30 mL 0 Taking   • metoprolol succinate XL (TOPROL-XL) 25 MG 24 hr tablet Take 25 mg by mouth Daily.   Taking   • mirtazapine (REMERON) 15 MG tablet Take 7.5 mg by mouth Every Night.   Taking   • Multiple Vitamins-Minerals (PRESERVISION/LUTEIN) capsule Take 1 capsule by mouth 2 (Two) Times a Day.   Taking   • nitroglycerin (NITROSTAT) 0.4 MG SL tablet 1 under the tongue as needed for angina, may repeat q5mins for up three doses 10 tablet 3 Taking   • pantoprazole (PROTONIX) 40 MG EC tablet Take 1 tablet by mouth Daily. 90 tablet 3 Taking   • polyethyl glycol-propyl glycol (SYSTANE) 0.4-0.3 % solution ophthalmic solution Administer 1 drop to both eyes Daily.   Taking   • polyethylene glycol (MIRALAX) packet Take 17 g by mouth Daily.   Taking   • rosuvastatin (CRESTOR) 20 MG tablet Take 20 mg by mouth Every Night.   Taking   • spironolactone (ALDACTONE) 25 MG tablet Take 25 mg by mouth Daily.   Taking   • triamcinolone (KENALOG) 0.1 % cream Apply 1 application topically to the appropriate area as directed 2 (Two) Times a Day.   Taking   • venlafaxine XR (EFFEXOR-XR) 75 MG 24  hr capsule Take 1 capsule by mouth Daily. 90 capsule 1    • vitamin B-12 (CYANOCOBALAMIN) 500 MCG tablet Take 1,000 mcg by mouth Daily.   Taking       Current Meds:   Hospital Medications (active)       Dose Frequency Start End    cefTRIAXone (ROCEPHIN) 1 g/100 mL 0.9% NS (MBP) 1 g Every 24 Hours 2/13/2020 2/18/2020    Admin Instructions: Caution: Look alike/sound alike drug alert. Break seal and mix to activiate vial before use.    Route: Intravenous    dextrose (D50W) 25 g/ 50mL Intravenous Solution 25 g 25 g Every 15 Minutes PRN 2/13/2020     Admin Instructions: Blood sugar less than 70; patient has IV access - Unresponsive, NPO or Unable To Safely Swallow    Route: Intravenous    dextrose (GLUTOSE) oral gel 15 g 15 g Every 15 Minutes PRN 2/13/2020     Admin Instructions: BS<70, Patient Alert, Is not NPO, Can safely swallow.    Route: Oral    glucagon (human recombinant) (GLUCAGEN DIAGNOSTIC) injection 1 mg 1 mg Every 15 Minutes PRN 2/13/2020     Admin Instructions: Blood Glucose Less Than 70 - Patient Without IV Access - Unresponsive, NPO or Unable To Safely Swallow    Route: Subcutaneous    insulin regular (humuLIN R,novoLIN R) injection 2-9 Units 2-9 Units Every 6 Hours Scheduled 2/13/2020     Admin Instructions: Correction - Moderate Dose.  40-60 units/day total insulin dose or average weight, on oral agents<BR><BR>Blood glucose 150-199 mg/dL - 2 units<BR>Blood glucose 200-249 mg/dL - 4 units<BR>Blood glucose 250-299 mg/dL - 6 units<BR>Blood glucose 300-349 mg/dL - 7 units<BR>Blood glucose 350-400 mg/dL - 8 units<BR>Blood glucose greater than 400 mg/dL - 9 units and call provider<BR> Caution: Look alike/sound alike drug alert    Route: Subcutaneous    labetalol (NORMODYNE,TRANDATE) injection 20 mg 20 mg Every 4 Hours PRN 2/13/2020     Admin Instructions: As needed for SBP greater than 160<BR>Give by slow IV Push each 20mg (or less) over 2 minutes    Route: Intravenous    ondansetron (ZOFRAN) injection 4 mg 4  mg Every 6 Hours PRN 2/13/2020     Admin Instructions: If BOTH ondansetron (ZOFRAN) and promethazine (PHENERGAN) are ordered use ondansetron first and THEN promethazine IF ondansetron is ineffective.    Route: Intravenous    pantoprazole (PROTONIX) injection 40 mg 40 mg Every 12 Hours Scheduled 2/13/2020     Admin Instructions: Dilute with 10 mL of 0.9% NaCl and give IV push over 2 minutes.    Route: Intravenous    sodium chloride 0.9 % flush 10 mL 10 mL Every 12 Hours Scheduled 2/13/2020     Route: Intravenous    sodium chloride 0.9 % flush 10 mL 10 mL As Needed 2/13/2020     Route: Intravenous    sodium chloride 0.9 % infusion 50 mL/hr Continuous 2/13/2020     Route: Intravenous          Allergies:  Allergies   Allergen Reactions   • Gleevec [Imatinib] Itching   • Bentyl [Dicyclomine] Itching   • Janumet [Sitagliptin-Metformin Hcl] Other (See Comments)     Chest pain   • Sacubitril-Valsartan Itching     Per daughter, Margarita, pt had previous reaction.   • Dilaudid [Hydromorphone] Nausea And Vomiting   • Enalapril Angioedema   • Lopid [Gemfibrozil] Nausea And Vomiting   • Sulfa Antibiotics Other (See Comments)     Syncope     • Ultram [Tramadol] Itching       Review of Systems  Review of Systems   Constitutional: Positive for fatigue. Negative for fever.   HENT: Negative for trouble swallowing.    Respiratory: Negative for shortness of breath.    Cardiovascular: Negative for chest pain.   Gastrointestinal: Positive for abdominal pain and blood in stool. Negative for constipation, diarrhea, nausea and vomiting.   Genitourinary: Negative.    Musculoskeletal: Positive for arthralgias and back pain.   Skin: Positive for pallor.   Neurological: Positive for weakness.        Objective     Vital Signs  Temp:  [97.2 °F (36.2 °C)-98.2 °F (36.8 °C)] 97.7 °F (36.5 °C)  Heart Rate:  [68-70] 69  Resp:  [14-16] 16  BP: (109-168)/(46-97) 168/54  Body mass index is 17.36 kg/m².    Physical Exam:  General Appearance:    Alert,  cooperative, but restless   Head:    Normocephalic, without obvious abnormality, atraumatic   Eyes:            Lids and lashes normal, conjunctivae and sclerae normal, no   Icterus, conjunctival pallor   Throat:   No oral lesions, no thrush, oral mucosa moist, posterior oropharynx clear   Neck:   No adenopathy, supple, trachea midline, no thyromegaly, no   carotid bruit, no JVD   Lungs:     Clear to auscultation,respirations regular, even and                   unlabored    Heart:    Regular rhythm and normal rate, normal S1 and S2, no            murmur   Chest Wall:    No abnormalities observed   Abdomen:     Normal bowel sounds, no masses, no organomegaly, soft        Mild MIKE tenderness, non-distended, no guarding, no rebound tenderness   Rectal:     Deferred   Extremities:   no edema, no cyanosis   Skin:   No open lesions, bruising or rash, PALE   Lymph nodes:   No palpable cervical adenopathy   Psychiatric:  Judgement and insight: normal   Orientation to person place and time: normal   Mood and affect: normal     Results Review:  Results from last 7 days   Lab Units 02/14/20 0617 02/13/20 2002 02/13/20  1609   WBC 10*3/mm3 9.19  --  9.09   HEMOGLOBIN g/dL 8.6* 6.9* 6.9*   HEMATOCRIT % 26.6*  --  21.8*   PLATELETS 10*3/mm3 174  --  187       Results from last 7 days   Lab Units 02/14/20 0617 02/13/20  1609   SODIUM mmol/L 136 133*   POTASSIUM mmol/L 3.7 4.5   CHLORIDE mmol/L 101 96*   CO2 mmol/L 20.0* 21.0*   BUN mg/dL 67* 77*   CREATININE mg/dL 1.43* 1.63*   CALCIUM mg/dL 8.9 9.4   BILIRUBIN mg/dL 0.4 0.3   ALK PHOS U/L 73 78   ALT (SGPT) U/L 17 19   AST (SGOT) U/L 28 34*   GLUCOSE mg/dL 119* 380*       Results from last 7 days   Lab Units 02/14/20 0617   INR  1.35*        Radiology Review:  Imaging Results (Last 72 Hours)     ** No results found for the last 72 hours. **          Assessment/Plan         Gastrointestinal hemorrhage with melena    Essential hypertension    Type 2 diabetes mellitus with  diabetic peripheral angiopathy without gangrene, with long-term current use of insulin (CMS/[facility])    Mixed hyperlipidemia    S/P TAVR (transcatheter aortic valve replacement)    Chronic diastolic congestive heart failure (CMS/[facility])    CKD (chronic kidney disease) stage 3, GFR 30-59 ml/min (CMS/[facility])    CML (chronic myeloid leukemia) (CMS/[facility])    Generalized weakness      1.  Anemia- H&H 8.6/26.6 s/p PRBC transfusion 1 unit  Suspect GI Blood loss, with melena.  Admit Hgb 6.9    2.  OB+ stools    3.  Hx of CAD/CHF/A-fib/sick sinus syndrome s/p pacemaker placement    4.  Chronic Anticoagulation PLAVIX therapy     5.  Last endoscopy 3/20/2019: chronic gastritis per Dr García  Last colonoscopy 3/26/2019 per Dr García with colonic telangectasia    6.  CML    Long discussion with patient and family.  At this point they do not wish to pursue endoscopy examination today.  Patient's daughter wants to reach out to her regular hematologist.  She reports that her baseline Hgb ranges in the 7 range all the time.  She wants to take conservative approach and just watch for overt bleeding.  I will let her have a clear liquid diet as long as the hospitalists do not care as she is begging for something to eat or drink.    Part of this note may be an electronic transcription/translation of spoken language to printed text using the Dragon Dictation System.    FACUNDO Llanos  [facility] Gastroenterology  02/14/20  8:27 AM            Electronically signed by Celia Vega APRN at 02/14/20 0945         Nurse note: Critical hgb called. Hgb 6.9. 1 unit blood already ordered  Critical Lab: 0.049 troponin. Primary NurseAby notified.               1 unit PRBC infused per order, hgb 6.9 before infusion. Troponin 0.49. Occult blood positive in ED. Accucheck q 6h, SS coverage available, however coverage has not been required. Daily weight. H&H q6h. IVF and abx per order. VSS, will cont to monitor.

## 2020-02-14 NOTE — THERAPY EVALUATION
Acute Care - Occupational Therapy Initial Evaluation  [facility]     Patient Name: Lakesha Costello  : 1935  MRN: [id number]  Today's Date: 2020  Onset of Illness/Injury or Date of Surgery: 20  Date of Referral to OT: 20  Referring Physician: Dr. Pritchard    Admit Date: 2020       ICD-10-CM ICD-9-CM   1. Gastrointestinal hemorrhage, unspecified gastrointestinal hemorrhage type K92.2 578.9   2. Symptomatic anemia D64.9 285.9   3. Decreased activities of daily living (ADL) Z78.9 V49.89   4. Impaired functional mobility, balance, gait, and endurance Z74.09 V49.89     Patient Active Problem List   Diagnosis   • Paroxysmal atrial fibrillation (CMS/HCC)   • Essential hypertension   • Type 2 diabetes mellitus with diabetic peripheral angiopathy without gangrene, with long-term current use of insulin (CMS/HCC)   • Chronic anticoagulation   • Coronary artery disease involving native coronary artery of native heart without angina pectoris   • SSS (sick sinus syndrome) (CMS/HCC)   • Chest pain   • S/P CABG x 3   • Artificial pacemaker   • Tachycardia   • Palpitations   • Mixed hyperlipidemia   • S/P TAVR (transcatheter aortic valve replacement)   • Age-related osteoporosis without current pathological fracture   • Chronic diastolic congestive heart failure (CMS/HCC)   • Malignant neoplasm of breast (CMS/HCC)   • CKD (chronic kidney disease) stage 3, GFR 30-59 ml/min (CMS/HCC)   • Thrombocytosis (CMS/HCC)   • Iron deficiency   • MPN (myeloproliferative neoplasm) (CMS/HCC)   • Very low iron stores in bone marrow   • CML (chronic myeloid leukemia) (CMS/HCC)   • Gastritis   • Constipation   • Moderate malnutrition (CMS/HCC)   • Moderate episode of recurrent major depressive disorder (CMS/HCC)   • Gastrointestinal hemorrhage with melena   • Generalized weakness     Past Medical History:   Diagnosis Date   • Aortic stenosis    • Arthritis    • Breast cancer (CMS/HCC)    • CAD (coronary artery disease)    •  Cataract    • CHF (congestive heart failure) (CMS/[facility])     recently diagnosed after an ER visit.    • Chronic anticoagulation     Coumadin    • CKD (chronic kidney disease) stage 3, GFR 30-59 ml/min (CMS/[facility]) 4/16/2018   • Diabetes mellitus (CMS/[facility])     Type II   • Elevated cholesterol    • GERD (gastroesophageal reflux disease)    • Hyperlipidemia    • Hypertension    • Macular degeneration    • NSTEMI (non-ST elevated myocardial infarction) (CMS/[facility]) 9/9/2019   • Pancreatitis    • Paroxysmal atrial fibrillation (CMS/[facility])    • Presence of Watchman left atrial appendage closure device    • Presence of Watchman left atrial appendage closure device 11/26/2018   • Pulmonary hypertension (CMS/[facility])    • Rheumatic fever     during childhood   • Sacrum and coccyx fracture (CMS/[facility])    • Sick sinus syndrome (CMS/[facility])     with pacemaker   • UTI (urinary tract infection)      Past Surgical History:   Procedure Laterality Date   • ANOMALOUS PULMONARY VENOUS RETURN REPAIR, TOTAL     • BACK SURGERY     • BELPHAROPTOSIS REPAIR     • BREAST SURGERY      right mastectomy   • CARDIAC CATHETERIZATION  1999, 2010   • CARDIAC CATHETERIZATION N/A 2/15/2017    Procedure: Left Heart Cath;  Surgeon: Ehsan Crocker MD;  Location:  [facility];  Service:    • CARDIAC CATHETERIZATION N/A 2/15/2017    Procedure: Right Heart Cath;  Surgeon: Ehsan Crocker MD;  Location:  [facility];  Service:    • CARDIAC CATHETERIZATION N/A 2/15/2017    Procedure: Coronary angiography;  Surgeon: Ehsan Crocker MD;  Location:  [facility];  Service:    • CARDIAC CATHETERIZATION N/A 2/15/2017    Procedure: Left ventriculography;  Surgeon: Ehsan Crocker MD;  Location:  [facility];  Service:    • CARDIAC CATHETERIZATION N/A 2/15/2017    Procedure: Intracardiac echocardiogram;  Surgeon: Ehsan Crocker MD;  Location:  [facility];  Service:    • CARDIAC ELECTROPHYSIOLOGY PROCEDURE  N/A 2/3/2017    Procedure: Pacemaker DC new;  Surgeon: Ehsan Crocker MD;  Location:  [facility];  Service:    • CARDIAC SURGERY      TAVR   • CARDIOVERSION     • CATARACT EXTRACTION     • CHOLECYSTECTOMY     • CORONARY ARTERY BYPASS GRAFT  1999    4 vessel    • CORONARY ARTERY BYPASS GRAFT     • CORONARY STENT PLACEMENT     • HYSTERECTOMY  1962   • INSERT / REPLACE / REMOVE PACEMAKER     • MASTECTOMY  2000   • OTHER SURGICAL HISTORY      TAVR 2017          OT ASSESSMENT FLOWSHEET (last 12 hours)      Occupational Therapy Evaluation     Row Name 02/14/20 0943                   OT Evaluation Time/Intention    Subjective Information  complains of;weakness;fatigue  -        Document Type  evaluation  -AC        Comment  eval at 1015  -           General Information    Patient Profile Reviewed?  yes  -AC        Onset of Illness/Injury or Date of Surgery  02/13/20  -        Referring Physician  Dr. Pritchard  -        Patient Observations  alert;cooperative;agree to therapy  -        Patient/Family Observations  lying in fowlers in bed, no apparent distress  -AC        Prior Level of Function  max assist:;cooking;cleaning;shopping;home management;independent:;gait;transfer;bed mobility;ADL's;all household mobility  -AC        Equipment Currently Used at Home  grab bar;bath bench;walker, rolling;wheelchair Life Alert, chair lift for stairs  -AC        Pertinent History of Current Functional Problem  fatigue, pallor, weakness, dark colored BMs, epigastric abdominal pain; Dx: GI bleed/melena, UTI, generalized weakness, acute on chronic anemia  -AC        Existing Precautions/Restrictions  fall  -AC        Risks Reviewed  patient:;LOB;nausea/vomiting;dizziness;increased discomfort;change in vital signs;increased drainage;lines disloged  -AC        Benefits Reviewed  patient:;improve function;increase independence;increase strength;increase balance;decrease pain;decrease risk of DVT;improve skin  integrity;increase knowledge  -        Barriers to Rehab  medically complex  -           Relationship/Environment    Lives With  spouse  -        Family Caregiver if Needed  -- paid caregiver  -           Resource/Environmental Concerns    Current Living Arrangements  home/apartment/condo  -           Home Main Entrance    Number of Stairs, Main Entrance  two  -AC        Stair Railings, Main Entrance  railing on left side (ascending)  -AC           Stairs Within Home, Primary    Stairs, Within Home, Primary  14  -AC        Stair Railings, Within Home, Primary  railing on right side (ascending)  -AC        Stairs Comment, Within Home, Primary  14 steps down to basement  -           Cognitive Assessment/Interventions    Additional Documentation  Cognitive Assessment/Intervention (Group)  -           Cognitive Assessment/Intervention- PT/OT    Orientation Status (Cognition)  oriented x 4  -AC        Follows Commands (Cognition)  WNL  -        Personal Safety Interventions  fall prevention program maintained;gait belt;muscle strengthening facilitated;nonskid shoes/slippers when out of bed;supervised activity  -           Safety Issues, Functional Mobility    Impairments Affecting Function (Mobility)  balance;endurance/activity tolerance;strength  -           Bed Mobility Assessment/Treatment    Bed Mobility Assessment/Treatment  supine-sit;sit-supine  -        Supine-Sit [place] (Bed Mobility)  conditional independence  -        Sit-Supine [place] (Bed Mobility)  independent  -        Assistive Device (Bed Mobility)  head of bed elevated  -           Functional Mobility    Functional Mobility- Ind. Level  minimum assist (75% patient effort)  -        Functional Mobility- Comment  in hallway, Gino with LUE support increasing as pt walked further  -           Transfer Assessment/Treatment    Transfer Assessment/Treatment  sit-stand transfer;stand-sit transfer  -            Sit-Stand Transfer    Sit-Stand [place] (Transfers)  contact guard  -AC           Stand-Sit Transfer    Stand-Sit [place] (Transfers)  contact guard;verbal cues  -           ADL Assessment/Intervention    BADL Assessment/Intervention  lower body dressing  -AC           Lower Body Dressing Assessment/Training    Lower Body Dressing [place] Level  don;doff;socks;independent  -AC        Lower Body Dressing Position  edge of bed sitting  -AC           BADL Safety/Performance    Impairments, BADL Safety/Performance  balance;endurance/activity tolerance;strength  -AC           General ROM    GENERAL ROM COMMENTS  WFL AROM BUE  -AC           MMT (Manual Muscle Testing)    General MMT Comments  4/5 to 4-/5 BUE grossly  -AC           Motor Assessment/Interventions    Additional Documentation  Balance (Group)  -           Balance    Balance  static sitting balance;static standing balance;dynamic sitting balance;dynamic standing balance  -           Static Sitting Balance    Level of [place] (Unsupported Sitting, Static Balance)  independent  -        Sitting Position (Unsupported Sitting, Static Balance)  sitting on edge of bed  -           Dynamic Sitting Balance    Level of [place], Reaches Outside Midline (Sitting, Dynamic Balance)  supervision  -        Sitting Position, Reaches Outside Midline (Sitting, Dynamic Balance)  sitting on edge of bed  -           Static Standing Balance    Level of [place] (Supported Standing, Static Balance)  contact guard assist  -           Sensory Assessment/Intervention    Sensory General Assessment  no sensation deficits identified  -           Positioning and Restraints    Pre-Treatment Position  in bed  -AC        Post Treatment Position  bed  -AC        In Bed  fowlers;call light within reach;encouraged to call for assist;side rails up x2  -AC           Pain Assessment    Additional Documentation  Pain Scale: Numbers Pre/Post-Treatment  (Group)  -AC           Pain Scale: Numbers Pre/Post-Treatment    Pain Scale: Numbers, Pretreatment  0/10 - no pain  -AC        Pain Scale: Numbers, Post-Treatment  0/10 - no pain  -AC           Clinical Impression (OT)    Date of Referral to OT  02/13/20  -AC        OT Diagnosis  decreased adl  -AC        Prognosis (OT Eval)  good  -AC        Criteria for Skilled Therapeutic Interventions Met (OT Eval)  yes;treatment indicated  -AC        Rehab Potential (OT Eval)  good, to achieve stated therapy goals  -AC        Therapy Frequency (OT Eval)  5 times/wk  -AC        Predicted Duration of Therapy Intervention (Therapy Eval)  10 days  -AC        Care Plan Review (OT)  evaluation/treatment results reviewed;care plan/treatment goals reviewed;risks/benefits reviewed;current/potential barriers reviewed;patient/other agree to care plan  -AC        Anticipated Discharge Disposition (OT)  home with home health  -AC        Patient/Family Concerns, Anticipated Discharge Disposition (OT)  adamantly refuses SNF placement  -AC           Planned OT Interventions    Planned Therapy Interventions (OT Eval)  activity tolerance training;BADL retraining;functional balance retraining;occupation/activity based interventions;patient/caregiver education/training;strengthening exercise;transfer/mobility retraining  -AC           OT Goals    Transfer Goal Selection (OT)  transfer, OT goal 1  -AC        Bathing Goal Selection (OT)  bathing, OT goal 1  -AC        Dressing Goal Selection (OT)  dressing, OT goal 1  -AC        Strength Goal Selection (OT)  strength, OT goal 1  -AC        Additional Documentation  Strength Goal Selection (OT) (Row)  -AC           Transfer Goal 1 (OT)    Activity/Assistive Device (Transfer Goal 1, OT)  tub  -AC        [place] Level/Cues Needed (Transfer Goal 1, OT)  supervision required  -AC        Time Frame (Transfer Goal 1, OT)  long term goal (LTG);10 days  -AC        Progress/Outcome (Transfer Goal 1, OT)   goal ongoing  -AC           Bathing Goal 1 (OT)    Activity/Assistive Device (Bathing Goal 1, OT)  bathing skills, all  -AC        [place] Level/Cues Needed (Bathing Goal 1, OT)  supervision required  -AC        Time Frame (Bathing Goal 1, OT)  long term goal (LTG);10 days  -AC        Progress/Outcomes (Bathing Goal 1, OT)  goal ongoing  -AC           Dressing Goal 1 (OT)    Activity/Assistive Device (Dressing Goal 1, OT)  dressing skills, all  -AC        [place]/Cues Needed (Dressing Goal 1, OT)  supervision required  -AC        Time Frame (Dressing Goal 1, OT)  long term goal (LTG);10 days  -AC        Progress/Outcome (Dressing Goal 1, OT)  goal ongoing  -AC           Strength Goal 1 (OT)    Strength Goal 1 (OT)  BUE strength to 4+/5 to increase independence for ADL  -AC        Time Frame (Strength Goal 1, OT)  long term goal (LTG);10 days  -AC        Progress/Outcome (Strength Goal 1, OT)  goal ongoing  -AC           Living Environment    Home Accessibility  tub/shower is not walk in;stairs to enter home;stairs within home  -AC          User Key  (r) = Recorded By, (t) = Taken By, (c) = Cosigned By    Initials Name Effective Dates    AC Eulogio Blackmon, OTR/L, CNT 04/09/19 -                OT Recommendation and Plan  Outcome Summary/Treatment Plan (OT)  Anticipated Discharge Disposition (OT): home with home health  Patient/Family Concerns, Anticipated Discharge Disposition (OT): adamantly refuses SNF placement  Planned Therapy Interventions (OT Eval): activity tolerance training, BADL retraining, functional balance retraining, occupation/activity based interventions, patient/caregiver education/training, strengthening exercise, transfer/mobility retraining  Therapy Frequency (OT Eval): 5 times/wk  Plan of Care Review  Plan of Care Reviewed With: patient  Plan of Care Reviewed With: patient  Outcome Summary: OT eval completed.  Pt alert and oriented x4.  Independent with bed mobility, CGA for transfers,  Gino to ambulate in hallway with LUE support.  As pt ambulated further, she was more dependent on LUE support from OT.  Pt was unsteady with ambulation and had 1 LOB shortly after beginning to walk requiring Gino to regain balance.  Pt able to don/doff socks independently while seated EOB.  No LOB from seated position.  Pt c/o weakness and fatigue during and after ambulation.  She will likely have increased difficulty with standing level ADL d/t fatigue, weakness, and imbalance.   PT and OT are recommended at discharge.  Pt adamantly refuses SNF placement.  OT will continue working with pt in hopsital setting to increase her balance, endurance, strength and independence for ADL.    Outcome Measures     Row Name 02/14/20 0943             How much help from another is currently needed...    Putting on and taking off regular lower body clothing?  3  -AC      Bathing (including washing, rinsing, and drying)  3  -AC      Toileting (which includes using toilet bed pan or urinal)  3  -AC      Putting on and taking off regular upper body clothing  4  -AC      Taking care of personal grooming (such as brushing teeth)  4  -AC      Eating meals  4  -AC      AM-PAC 6 Clicks Score (OT)  21  -AC         Functional Assessment    Outcome Measure Options  AM-PAC 6 Clicks Daily Activity (OT)  -        User Key  (r) = Recorded By, (t) = Taken By, (c) = Cosigned By    Initials Name Provider Type    Eulogio De Paz, OTR/L, KIRILL Occupational Therapist          Time Calculation:   Time Calculation- OT     Row Name 02/14/20 1112             Time Calculation- OT    OT Start Time  0943  -      OT Stop Time  1043  -      OT Time Calculation (min)  60 min  -      Total Timed Code Minutes- OT  60 minute(s)  -      OT Received On  02/14/20  -      OT Goal Re-Cert Due Date  02/24/20  -        User Key  (r) = Recorded By, (t) = Taken By, (c) = Cosigned By    Initials Name Provider Type    Eulogio De Paz, OTR/L, KIRILL  Occupational Therapist        Therapy Charges for Today     Code Description Service Date Service Provider Modifiers Qty    [id number] HC OT EVAL LOW COMPLEXITY 4 2/14/2020 Eulogio Blackmon, OTR/L, CNT GO 1               YUSRA Martel/L, KIRILL  2/14/2020

## 2020-02-14 NOTE — CONSULTS
[facility] GASTROENTEROLOGY              Initial Inpatient Consult Note  Lakesha Costello  1935    Referring Provider: Daryl Pritchard DO    Admission: 2/13/2020  Consult date: 2/14/2020  Chief complaint: Anemia    Subjective     History of present illness: Patient is a pleasant 84-year-old female who is in a weak debilitated state.  Apparently she has a  who is on hospice and the hospice nurse has noticed that the patient has been looking weaker and more pale every time they came to visit her .  She has a significant long history including coronary artery disease status post CABG as well as a recent massive heart attack back in September 2019 at which time she had stents placed in 1 vessel that was unable to be cleared.  She has congestive heart failure atrial fibrillation sick sinus syndrome status post pacemaker and a watchman procedure.  She has been taking Plavix since her September heart attack in addition to the aspirin therapy.  She also has a history of known colonic angiodysplasia followed by Dr. García in [facility].  Patient's daughter reports that it has been about 1 year since her upper endoscopy and lower colonoscopy.  Upper endoscopy showed gastritis with gastroesophageal reflux disease.  Lower colonoscopy showed colonic angiodysplasia contributing to blood loss.  She also has a significant history of CML and sees hematology for that.    She denies any significant abdominal pain complaints of minimal midepigastric discomfort.  Just has complaints of generalized weakness.  Appetite is been for poor.  Her bowels are black however she does take oral iron therapy as well.  Denies any chest pain and denies any shortness of breath.  Her stools were positive for blood and she was found to be anemic.  Admission hemoglobin 6.9.  Has received packed red blood cell transfusion and her hemoglobin was 8.6.    Patient verbalizes the fact that she is just tired of procedures.   Unsure that she wants to pursue endoscopy examination.  Daughter is on the phone once again with all of this and at this time states she does not really want to pursue endoscopy examination as well.  She would rather check with her hematologist which she has an appointment with in March.  She wants to talk with him to see if he thinks any of this is related to her CML.  She is actually also calling Dr. García's patient to get us a copy of recent endoscopy and colonoscopy examination for our review as well.      She verbalizes that she is just tired and almost ready to give up regarding her own self health care.    Past Medical History:  Past Medical History:   Diagnosis Date   • Aortic stenosis    • Arthritis    • Breast cancer (CMS/[facility])    • CAD (coronary artery disease)    • Cataract    • CHF (congestive heart failure) (CMS/[facility])     recently diagnosed after an ER visit.    • Chronic anticoagulation     Coumadin    • CKD (chronic kidney disease) stage 3, GFR 30-59 ml/min (CMS/[facility]) 4/16/2018   • Diabetes mellitus (CMS/[facility])     Type II   • Elevated cholesterol    • GERD (gastroesophageal reflux disease)    • Hyperlipidemia    • Hypertension    • Macular degeneration    • NSTEMI (non-ST elevated myocardial infarction) (CMS/[facility]) 9/9/2019   • Pancreatitis    • Paroxysmal atrial fibrillation (CMS/[facility])    • Presence of Watchman left atrial appendage closure device    • Presence of Watchman left atrial appendage closure device 11/26/2018   • Pulmonary hypertension (CMS/[facility])    • Rheumatic fever     during childhood   • Sacrum and coccyx fracture (CMS/[facility])    • Sick sinus syndrome (CMS/[facility])     with pacemaker   • UTI (urinary tract infection)        Past Surgical History:  Past Surgical History:   Procedure Laterality Date   • ANOMALOUS PULMONARY VENOUS RETURN REPAIR, TOTAL     • BACK SURGERY     • BELPHAROPTOSIS REPAIR     • BREAST SURGERY      right mastectomy   • CARDIAC CATHETERIZATION  1999, 2010   • CARDIAC  CATHETERIZATION N/A 2/15/2017    Procedure: Left Heart Cath;  Surgeon: Ehsan Crocker MD;  Location:  [facility] CATH INVASIVE LOCATION;  Service:    • CARDIAC CATHETERIZATION N/A 2/15/2017    Procedure: Right Heart Cath;  Surgeon: Ehsan Crocker MD;  Location:  [facility] CATH [facility];  Service:    • CARDIAC CATHETERIZATION N/A 2/15/2017    Procedure: Coronary angiography;  Surgeon: Ehsan Crocker MD;  Location:  [facility] CATH INV[facility];  Service:    • CARDIAC CATHETERIZATION N/A 2/15/2017    Procedure: Left ventriculography;  Surgeon: Ehsan Crocker MD;  Location:  [facility] CATH [facility];  Service:    • CARDIAC CATHETERIZATION N/A 2/15/2017    Procedure: Intracardiac echocardiogram;  Surgeon: Ehsan Crocker MD;  Location:  [facility] CATH INV[facility]IVE [facility];  Service:    • CARDIAC ELECTROPHYSIOLOGY PROCEDURE N/A 2/3/2017    Procedure: Pacemaker DC new;  Surgeon: Ehsan Crocker MD;  Location:  [facility] CATH INVASIVE LOC[facility];  Service:    • CARDIAC SURGERY      TAVR   • CARDIOVERSION     • CATARACT EXTRACTION     • CHOLECYSTECTOMY     • CORONARY ARTERY BYPASS GRAFT  1999    4 vessel    • CORONARY ARTERY BYPASS GRAFT     • CORONARY STENT PLACEMENT     • HYSTERECTOMY  1962   • INSERT / REPLACE / REMOVE PACEMAKER     • MASTECTOMY  2000   • OTHER SURGICAL HISTORY      TAVR 2017       Social History:   Social History     Tobacco Use   • Smoking status: Never Smoker   • Smokeless tobacco: Never Used   Substance Use Topics   • Alcohol use: No        Family History:  Family History   Problem Relation Age of Onset   • Breast cancer Mother    • Coronary artery disease Father    • Heart attack Father    • Diabetes Father    • Cancer Brother    • No Known Problems Maternal Grandmother    • No Known Problems Maternal Grandfather    • No Known Problems Paternal Grandmother    • No Known Problems Paternal Grandfather        Home Meds:  Medications Prior to Admission   Medication Sig Dispense Refill Last Dose   • aspirin 81 MG EC  tablet Take 81 mg by mouth Daily.   Taking   • bisacodyl (DULCOLAX) 10 MG suppository Insert 10 mg into the rectum Daily As Needed for Constipation.   Taking   • calcium carbonate (OS-MICHAEL) 600 MG tablet Take 600 mg by mouth Daily.   Taking   • cholecalciferol (VITAMIN D3) 1000 units tablet Take 1,000 Units by mouth Daily.   Taking   • clopidogrel (PLAVIX) 75 MG tablet Take 75 mg by mouth Daily.   Taking   • COMFORT EZ PEN NEEDLES 32G X 4 MM misc    Taking   • dasatinib (SPRYCEL) 50 MG chemo tablet Take 1 tablet by mouth Daily.   Taking   • furosemide (LASIX) 20 MG tablet Take 20 mg by mouth 2 (Two) Times a Day.   Taking   • furosemide (LASIX) 40 MG tablet Take 1 tablet by mouth Every Morning AND 0.5 tablets Every Evening. 135 tablet 1    • glipizide (GLUCOTROL) 5 MG tablet Take 1 tablet by mouth 2 (Two) Times a Day.   Taking   • insulin detemir (LEVEMIR FLEXTOUCH) 100 UNIT/ML injection Inject 15 Units under the skin into the appropriate area as directed Every Night. (Patient taking differently: Inject 12 Units under the skin into the appropriate area as directed Every Night.) 30 mL 0 Taking   • metoprolol succinate XL (TOPROL-XL) 25 MG 24 hr tablet Take 25 mg by mouth Daily.   Taking   • mirtazapine (REMERON) 15 MG tablet Take 7.5 mg by mouth Every Night.   Taking   • Multiple Vitamins-Minerals (PRESERVISION/LUTEIN) capsule Take 1 capsule by mouth 2 (Two) Times a Day.   Taking   • nitroglycerin (NITROSTAT) 0.4 MG SL tablet 1 under the tongue as needed for angina, may repeat q5mins for up three doses 10 tablet 3 Taking   • pantoprazole (PROTONIX) 40 MG EC tablet Take 1 tablet by mouth Daily. 90 tablet 3 Taking   • polyethyl glycol-propyl glycol (SYSTANE) 0.4-0.3 % solution ophthalmic solution Administer 1 drop to both eyes Daily.   Taking   • polyethylene glycol (MIRALAX) packet Take 17 g by mouth Daily.   Taking   • rosuvastatin (CRESTOR) 20 MG tablet Take 20 mg by mouth Every Night.   Taking   • spironolactone  (ALDACTONE) 25 MG tablet Take 25 mg by mouth Daily.   Taking   • triamcinolone (KENALOG) 0.1 % cream Apply 1 application topically to the appropriate area as directed 2 (Two) Times a Day.   Taking   • venlafaxine XR (EFFEXOR-XR) 75 MG 24 hr capsule Take 1 capsule by mouth Daily. 90 capsule 1    • vitamin B-12 (CYANOCOBALAMIN) 500 MCG tablet Take 1,000 mcg by mouth Daily.   Taking       Current Meds:   Hospital Medications (active)       Dose Frequency Start End    cefTRIAXone (ROCEPHIN) 1 g/100 mL 0.9% NS (MBP) 1 g Every 24 Hours 2/13/2020 2/18/2020    Admin Instructions: Caution: Look alike/sound alike drug alert. Break seal and mix to activiate vial before use.    Route: Intravenous    dextrose (D50W) 25 g/ 50mL Intravenous Solution 25 g 25 g Every 15 Minutes PRN 2/13/2020     Admin Instructions: Blood sugar less than 70; patient has IV access - Unresponsive, NPO or Unable To Safely Swallow    Route: Intravenous    dextrose (GLUTOSE) oral gel 15 g 15 g Every 15 Minutes PRN 2/13/2020     Admin Instructions: BS<70, Patient Alert, Is not NPO, Can safely swallow.    Route: Oral    glucagon (human recombinant) (GLUCAGEN DIAGNOSTIC) injection 1 mg 1 mg Every 15 Minutes PRN 2/13/2020     Admin Instructions: Blood Glucose Less Than 70 - Patient Without IV Access - Unresponsive, NPO or Unable To Safely Swallow    Route: Subcutaneous    insulin regular (humuLIN R,novoLIN R) injection 2-9 Units 2-9 Units Every 6 Hours Scheduled 2/13/2020     Admin Instructions: Correction - Moderate Dose.  40-60 units/day total insulin dose or average weight, on oral agents<BR><BR>Blood glucose 150-199 mg/dL - 2 units<BR>Blood glucose 200-249 mg/dL - 4 units<BR>Blood glucose 250-299 mg/dL - 6 units<BR>Blood glucose 300-349 mg/dL - 7 units<BR>Blood glucose 350-400 mg/dL - 8 units<BR>Blood glucose greater than 400 mg/dL - 9 units and call provider<BR> Caution: Look alike/sound alike drug alert    Route: Subcutaneous    labetalol  (NORMODYNE,TRANDATE) injection 20 mg 20 mg Every 4 Hours PRN 2/13/2020     Admin Instructions: As needed for SBP greater than 160<BR>Give by slow IV Push each 20mg (or less) over 2 minutes    Route: Intravenous    ondansetron (ZOFRAN) injection 4 mg 4 mg Every 6 Hours PRN 2/13/2020     Admin Instructions: If BOTH ondansetron (ZOFRAN) and promethazine (PHENERGAN) are ordered use ondansetron first and THEN promethazine IF ondansetron is ineffective.    Route: Intravenous    pantoprazole (PROTONIX) injection 40 mg 40 mg Every 12 Hours Scheduled 2/13/2020     Admin Instructions: Dilute with 10 mL of 0.9% NaCl and give IV push over 2 minutes.    Route: Intravenous    sodium chloride 0.9 % flush 10 mL 10 mL Every 12 Hours Scheduled 2/13/2020     Route: Intravenous    sodium chloride 0.9 % flush 10 mL 10 mL As Needed 2/13/2020     Route: Intravenous    sodium chloride 0.9 % infusion 50 mL/hr Continuous 2/13/2020     Route: Intravenous          Allergies:  Allergies   Allergen Reactions   • Gleevec [Imatinib] Itching   • Bentyl [Dicyclomine] Itching   • Janumet [Sitagliptin-Metformin Hcl] Other (See Comments)     Chest pain   • Sacubitril-Valsartan Itching     Per daughter, Margarita, pt had previous reaction.   • Dilaudid [Hydromorphone] Nausea And Vomiting   • Enalapril Angioedema   • Lopid [Gemfibrozil] Nausea And Vomiting   • Sulfa Antibiotics Other (See Comments)     Syncope     • Ultram [Tramadol] Itching       Review of Systems  Review of Systems   Constitutional: Positive for fatigue. Negative for fever.   HENT: Negative for trouble swallowing.    Respiratory: Negative for shortness of breath.    Cardiovascular: Negative for chest pain.   Gastrointestinal: Positive for abdominal pain and blood in stool. Negative for constipation, diarrhea, nausea and vomiting.   Genitourinary: Negative.    Musculoskeletal: Positive for arthralgias and back pain.   Skin: Positive for pallor.   Neurological: Positive for weakness.         Objective     Vital Signs  Temp:  [97.2 °F (36.2 °C)-98.2 °F (36.8 °C)] 97.7 °F (36.5 °C)  Heart Rate:  [68-70] 69  Resp:  [14-16] 16  BP: (109-168)/(46-97) 168/54  Body mass index is 17.36 kg/m².    Physical Exam:  General Appearance:    Alert, cooperative, but restless   Head:    Normocephalic, without obvious abnormality, atraumatic   Eyes:            Lids and lashes normal, conjunctivae and sclerae normal, no   Icterus, conjunctival pallor   Throat:   No oral lesions, no thrush, oral mucosa moist, posterior oropharynx clear   Neck:   No adenopathy, supple, trachea midline, no thyromegaly, no   carotid bruit, no JVD   Lungs:     Clear to auscultation,respirations regular, even and                   unlabored    Heart:    Regular rhythm and normal rate, normal S1 and S2, no            murmur   Chest Wall:    No abnormalities observed   Abdomen:     Normal bowel sounds, no masses, no organomegaly, soft        Mild MIKE tenderness, non-distended, no guarding, no rebound tenderness   Rectal:     Deferred   Extremities:   no edema, no cyanosis   Skin:   No open lesions, bruising or rash, PALE   Lymph nodes:   No palpable cervical adenopathy   Psychiatric:  Judgement and insight: normal   Orientation to person place and time: normal   Mood and affect: normal     Results Review:  Results from last 7 days   Lab Units 02/14/20  0617 02/13/20 2002 02/13/20  1609   WBC 10*3/mm3 9.19  --  9.09   HEMOGLOBIN g/dL 8.6* 6.9* 6.9*   HEMATOCRIT % 26.6*  --  21.8*   PLATELETS 10*3/mm3 174  --  187       Results from last 7 days   Lab Units 02/14/20  0617 02/13/20  1609   SODIUM mmol/L 136 133*   POTASSIUM mmol/L 3.7 4.5   CHLORIDE mmol/L 101 96*   CO2 mmol/L 20.0* 21.0*   BUN mg/dL 67* 77*   CREATININE mg/dL 1.43* 1.63*   CALCIUM mg/dL 8.9 9.4   BILIRUBIN mg/dL 0.4 0.3   ALK PHOS U/L 73 78   ALT (SGPT) U/L 17 19   AST (SGOT) U/L 28 34*   GLUCOSE mg/dL 119* 380*       Results from last 7 days   Lab Units 02/14/20  0617   INR   1.35*        Radiology Review:  Imaging Results (Last 72 Hours)     ** No results found for the last 72 hours. **          Assessment/Plan         Gastrointestinal hemorrhage with melena    Essential hypertension    Type 2 diabetes mellitus with diabetic peripheral angiopathy without gangrene, with long-term current use of insulin (CMS/HCC)    Mixed hyperlipidemia    S/P TAVR (transcatheter aortic valve replacement)    Chronic diastolic congestive heart failure (CMS/HCC)    CKD (chronic kidney disease) stage 3, GFR 30-59 ml/min (CMS/HCC)    CML (chronic myeloid leukemia) (CMS/HCC)    Generalized weakness      1.  Anemia- H&H 8.6/26.6 s/p PRBC transfusion 1 unit  Suspect GI Blood loss, with melena.  Admit Hgb 6.9    2.  OB+ stools    3.  Hx of CAD/CHF/A-fib/sick sinus syndrome s/p pacemaker placement    4.  Chronic Anticoagulation PLAVIX therapy.  Ongoing due to recent cor stent.    5.  Last endoscopy 3/20/2019: chronic gastritis per Dr García  Last colonoscopy 3/26/2019 per Dr García with colonic telangectasia    6.  CML    Long discussion with patient and family.  At this point they do not wish to pursue endoscopy examination today.  Patient's daughter wants to reach out to her regular hematologist.  She reports that her baseline Hgb ranges in the 7 range all the time.  She wants to take conservative approach and just watch for overt bleeding.        Addendum per Dr. Paulino:  Patient has known colonic AVMs .  These will bleed even without antiplatelet therapy.  She is now on antiplatelet therapy so this will only exacerbate the underlying problem.  There is little to offer in this regard.  Repeating endoscopy and/or colonoscopy is likely to be of little help as the diagnosis is already been established.  Better for PCP to check hemoglobins on a regular basis such as monthly.  If decreasing hemoglobin, outpatient transfusion.  Discontinue antiplatelet medication as soon as able from a cardiac point of view.  Okay  to discharge from GI point of view.  Will advance diet and sign off.    Part of this note may be an electronic transcription/translation of spoken language to printed text using the Dragon Dictation System.    FACUNDO Llanos  [facility] Gastroenterology  02/14/20  8:27 AM     Patient Seen, Chart, Consults, Notes, Labs, Radiology studies reviewed    I have seen and examined patient personally, performing a face-to-face diagnostic evaluation with plan of care reviewed and developed with APRN and nursing staff. I have addended and/or modified the above history of present illness, physical examination, and assessment and plan to reflect my findings and impressions. Essential elements of the care plan were discussed with APRN above.  Agree with findings and assessment/plan as documented above    China Paulino MD  [facility] Gastroenterology  02/14/20  1:40 PM    Much of this encounter note is an electronic transcription/translation of spoken language to printed text. The electronic translation of spoken language may permit erroneous, or at times, nonsensical words or phrases to be inadvertently transcribed; although I have reviewed the note for such errors, some may still exist.

## 2020-02-14 NOTE — CONSULTS
[facility] CONSULT NOTE    Referring Provider: Daryl Pritchard DO    Reason for Consultation: GI bleed and on Plavix    Chief complaint:   Chief Complaint   Patient presents with   • Weakness - Generalized       Subjective     History of present illness:  Lakesha Costello is a 84 y.o. female with history of aortic stenosis s/p TAVR with a 20mm Mensah Sunita valve in '17, persistent atrial fibrillation s/p ablation with Watchman device placed by Dr. 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 She currently follows with Dr. Moses at [place] for her cardiac issues.  2D echo completed 9/19 notes her bioprosthetic valve is well seated with a trace of perivalvular regurgitation with stable gradients. She follows with hematology in [place] for her CML.Their note states she has known AVMs but had not had any bleeding noted at her visit in 12/9/19. She presented to the ER with complaints of diarrhea, weakness and epigastric pain. She was noted to have diarrhea after taking Miralax for her constipation. Her hgb was 6.9 on arrival and she was heme occult positive. She received 1u PRBC. She states she has had bleeding issues off and on for quite some time. She notes a colonoscopy that was done about a year ago and states they found blood in her colon at that time. She states this episode started a few days ago. She notes progressive weakness. She denies chest pain. She notes a progressive decline since her  was placed on hospice. Cardiology has been consulted due to the patient's history of PCI in Sept 19 and a current GI bleed.      History  Past Medical History:    Diagnosis Date   • Aortic stenosis    • Arthritis    • Breast cancer (CMS/[facility])    • CAD (coronary artery disease)    • Cataract    • CHF (congestive heart failure) (CMS/[facility])     recently diagnosed after an ER visit.    • Chronic anticoagulation     Coumadin    • CKD (chronic kidney disease) stage 3, GFR 30-59 ml/min (CMS/[facility]) 4/16/2018   • Diabetes mellitus (CMS/HCC)     Type II   • Elevated cholesterol    • GERD (gastroesophageal reflux disease)    • Hyperlipidemia    • Hypertension    • Macular degeneration    • NSTEMI (non-ST elevated myocardial infarction) (CMS/HCC) 9/9/2019   • Pancreatitis    • Paroxysmal atrial fibrillation (CMS/[facility])    • Presence of Watchman left atrial appendage closure device    • Presence of Watchman left atrial appendage closure device 11/26/2018   • Pulmonary hypertension (CMS/[facility])    • Rheumatic fever     during childhood   • Sacrum and coccyx fracture (CMS/[facility])    • Sick sinus syndrome (CMS/HCC)     with pacemaker   • UTI (urinary tract infection)    ,   Past Surgical History:   Procedure Laterality Date   • ANOMALOUS PULMONARY VENOUS RETURN REPAIR, TOTAL     • BACK SURGERY     • BELPHAROPTOSIS REPAIR     • BREAST SURGERY      right mastectomy   • CARDIAC CATHETERIZATION  1999, 2010   • CARDIAC CATHETERIZATION N/A 2/15/2017    Procedure: Left Heart Cath;  Surgeon: Ehsan Crocker MD;  Location:  [facility];  Service:    • CARDIAC CATHETERIZATION N/A 2/15/2017    Procedure: Right Heart Cath;  Surgeon: Ehsan Crocker MD;  Location:  [facility];  Service:    • CARDIAC CATHETERIZATION N/A 2/15/2017    Procedure: Coronary angiography;  Surgeon: Ehsan Crocker MD;  Location:  [facility];  Service:    • CARDIAC CATHETERIZATION N/A 2/15/2017    Procedure: Left ventriculography;  Surgeon: Ehsan Crocker MD;  Location:  [facility];  Service:    • CARDIAC CATHETERIZATION N/A 2/15/2017    Procedure: Intracardiac echocardiogram;   Surgeon: Ehsan Crocker MD;  Location:  [facility];  Service:    • CARDIAC ELECTROPHYSIOLOGY PROCEDURE N/A 2/3/2017    Procedure: Pacemaker DC new;  Surgeon: Ehsan Crocker MD;  Location:  [facility];  Service:    • CARDIAC SURGERY      TAVR   • CARDIOVERSION     • CATARACT EXTRACTION     • CHOLECYSTECTOMY     • CORONARY ARTERY BYPASS GRAFT  1999    4 vessel    • CORONARY ARTERY BYPASS GRAFT     • CORONARY STENT PLACEMENT     • HYSTERECTOMY  1962   • INSERT / REPLACE / REMOVE PACEMAKER     • MASTECTOMY  2000   • OTHER SURGICAL HISTORY      TAVR 2017   ,   Family History   Problem Relation Age of Onset   • Breast cancer Mother    • Coronary artery disease Father    • Heart attack Father    • Diabetes Father    • Cancer Brother    • No Known Problems Maternal Grandmother    • No Known Problems Maternal Grandfather    • No Known Problems Paternal Grandmother    • No Known Problems Paternal Grandfather    ,   Social History     Tobacco Use   • Smoking status: Never Smoker   • Smokeless tobacco: Never Used   Substance Use Topics   • Alcohol use: No   • Drug use: No   ,     Medications    Prior to Admission medications    Medication Sig Start Date End Date Taking? Authorizing Provider   aspirin 81 MG EC tablet Take 81 mg by mouth Daily.    Minda Salvador MD   bisacodyl (DULCOLAX) 10 MG suppository Insert 10 mg into the rectum Daily As Needed for Constipation.    Minda Salvador MD   calcium carbonate (OS-MICHAEL) 600 MG tablet Take 600 mg by mouth Daily.    Minda Salvador MD   cholecalciferol (VITAMIN D3) 1000 units tablet Take 1,000 Units by mouth Daily.    Minda Salvador MD   clopidogrel (PLAVIX) 75 MG tablet Take 75 mg by mouth Daily.    Minda Salvador MD   COMFORT EZ PEN NEEDLES 32G X 4 MM misc  12/20/19   Minda Salvador MD   dasatinib (SPRYCEL) 50 MG chemo tablet Take 1 tablet by mouth Daily. 1/6/20   Minda Salvador MD   furosemide (LASIX)  20 MG tablet Take 20 mg by mouth 2 (Two) Times a Day.    Minda Salvador MD   furosemide (LASIX) 40 MG tablet Take 1 tablet by mouth Every Morning AND 0.5 tablets Every Evening. 1/13/20   Leonardo Zepeda DO   glipizide (GLUCOTROL) 5 MG tablet Take 1 tablet by mouth 2 (Two) Times a Day. 12/4/19   Minda Salvador MD   insulin detemir (LEVEMIR FLEXTOUCH) 100 UNIT/ML injection Inject 15 Units under the skin into the appropriate area as directed Every Night.  Patient taking differently: Inject 12 Units under the skin into the appropriate area as directed Every Night. 12/4/19   Leonardo Zepeda DO   metoprolol succinate XL (TOPROL-XL) 25 MG 24 hr tablet Take 25 mg by mouth Daily.    Minda Salvador MD   mirtazapine (REMERON) 15 MG tablet Take 7.5 mg by mouth Every Night.    Minda Salvador MD   Multiple Vitamins-Minerals (PRESERVISION/LUTEIN) capsule Take 1 capsule by mouth 2 (Two) Times a Day.    Minda Salvador MD   nitroglycerin (NITROSTAT) 0.4 MG SL tablet 1 under the tongue as needed for angina, may repeat q5mins for up three doses 10/8/19   Leonardo Zepeda DO   pantoprazole (PROTONIX) 40 MG EC tablet Take 1 tablet by mouth Daily. 8/22/19   Leonardo Zepeda DO   polyethyl glycol-propyl glycol (SYSTANE) 0.4-0.3 % solution ophthalmic solution Administer 1 drop to both eyes Daily.    Minda Salvador MD   polyethylene glycol (MIRALAX) packet Take 17 g by mouth Daily.    Minda Salvador MD   rosuvastatin (CRESTOR) 20 MG tablet Take 20 mg by mouth Every Night.    Minda Salvador MD   spironolactone (ALDACTONE) 25 MG tablet Take 25 mg by mouth Daily.    Minda Salvador MD   triamcinolone (KENALOG) 0.1 % cream Apply 1 application topically to the appropriate area as directed 2 (Two) Times a Day. 5/20/19 5/20/20  Minda Salvador MD   venlafaxine XR (EFFEXOR-XR) 75 MG 24 hr capsule Take 1 capsule by mouth Daily. 1/13/20   Leonardo Zepeda DO    vitamin B-12 (CYANOCOBALAMIN) 500 MCG tablet Take 1,000 mcg by mouth Daily.    Provider, MD Minda       Current Facility-Administered Medications   Medication Dose Route Frequency Provider Last Rate Last Dose   • cefTRIAXone (ROCEPHIN) 1 g/100 mL 0.9% NS (MBP)  1 g Intravenous Q24H Daryl Pritchard, DO   1 g at 02/14/20 0052   • dextrose (D50W) 25 g/ 50mL Intravenous Solution 25 g  25 g Intravenous Q15 Min PRN Daryl Pritchard, DO       • dextrose (GLUTOSE) oral gel 15 g  15 g Oral Q15 Min PRN Daryl Pritchard, DO       • glucagon (human recombinant) (GLUCAGEN DIAGNOSTIC) injection 1 mg  1 mg Subcutaneous Q15 Min PRN Daryl Pritchard, DO       • insulin regular (humuLIN R,novoLIN R) injection 2-9 Units  2-9 Units Subcutaneous Q6H Daryl Pritchard DO   7 Units at 02/13/20 2122   • labetalol (NORMODYNE,TRANDATE) injection 20 mg  20 mg Intravenous Q4H PRN Daryl Pritchard, DO       • ondansetron (ZOFRAN) injection 4 mg  4 mg Intravenous Q6H PRN Daryl Pritchard, DO       • pantoprazole (PROTONIX) injection 40 mg  40 mg Intravenous Q12H Daryl Pritchard DO   40 mg at 02/14/20 0840   • sodium chloride 0.9 % flush 10 mL  10 mL Intravenous Q12H Daryl Pritchard DO   10 mL at 02/14/20 0840   • sodium chloride 0.9 % flush 10 mL  10 mL Intravenous PRN Daryl Pritchard DO       • sodium chloride 0.9 % infusion  50 mL/hr Intravenous Continuous Daryl Pritchard DO 50 mL/hr at 02/14/20 0052 50 mL/hr at 02/14/20 0052       Allergies:  Gleevec [imatinib]; Bentyl [dicyclomine]; Janumet [sitagliptin-metformin hcl]; Sacubitril-valsartan; Dilaudid [hydromorphone]; Enalapril; Lopid [gemfibrozil]; Sulfa antibiotics; and Ultram [tramadol]    Review of Systems  Review of Systems   Constitutional: Positive for fatigue. Negative for diaphoresis and unexpected weight change.   Respiratory: Negative for chest tightness, shortness of breath and wheezing.    Cardiovascular: Negative for chest pain, palpitations and leg swelling.  "  Gastrointestinal: Positive for blood in stool and diarrhea. Negative for nausea and vomiting.   Neurological: Negative for dizziness, syncope and light-headedness.   All other systems reviewed and are negative.      Objective     Physical Exam:  Patient Vitals for the past 24 hrs:   BP Temp Temp src Pulse Resp SpO2 Height Weight   02/14/20 0736 168/54 97.7 °F (36.5 °C) -- 69 16 95 % -- --   02/14/20 0050 166/48 97.2 °F (36.2 °C) -- 70 16 100 % -- --   02/13/20 2131 142/50 97.6 °F (36.4 °C) Oral 68 16 99 % -- --   02/13/20 2112 154/46 97.2 °F (36.2 °C) Oral 70 16 98 % -- --   02/13/20 2040 152/97 97.8 °F (36.6 °C) Oral 70 16 98 % -- --   02/13/20 1755 109/77 98.2 °F (36.8 °C) Oral 70 16 100 % 149.9 cm (59.02\") 39 kg (86 lb)   02/13/20 1716 152/51 -- -- 69 -- 100 % -- --   02/13/20 1439 157/48 97.6 °F (36.4 °C) -- 68 14 100 % -- --   02/13/20 1437 -- -- -- -- -- -- 149.9 cm (59\") 39 kg (86 lb)     Physical Exam   Constitutional: She is oriented to person, place, and time. She appears well-developed and well-nourished. No distress.   HENT:   Head: Normocephalic.   Mouth/Throat: No oropharyngeal exudate.   Eyes: Pupils are equal, round, and reactive to light. Conjunctivae and EOM are normal. No scleral icterus.   Neck: Normal range of motion. Neck supple.   Cardiovascular: Normal rate, regular rhythm and intact distal pulses. Exam reveals a midsystolic click.   Murmur heard.  Pulmonary/Chest: Effort normal and breath sounds normal. No respiratory distress. She has no wheezes. She has no rales. She exhibits no tenderness.   Abdominal: Soft. Bowel sounds are normal. She exhibits no distension. There is no tenderness.   Musculoskeletal: Normal range of motion. She exhibits no edema.   Neurological: She is alert and oriented to person, place, and time. She has normal reflexes.   Skin: Skin is warm and dry. No rash noted. She is not diaphoretic. No erythema. No pallor.   Psychiatric: She has a normal mood and affect. Her " behavior is normal.   Vitals reviewed.      Results Review:   I reviewed the patient's new clinical results.    Lab Results (last 72 hours)     Procedure Component Value Units Date/Time    aPTT [[id number]] Collected:  02/14/20 0935    Specimen:  Blood Updated:  02/14/20 0949    Protime-INR [[id number]]  (Abnormal) Collected:  02/14/20 0617    Specimen:  Blood Updated:  02/14/20 0759     Protime 17.1 Seconds      INR 1.35    aPTT [[id number]]  (Abnormal) Collected:  02/14/20 0617    Specimen:  Blood Updated:  02/14/20 0759     PTT >200.0 seconds     Comprehensive Metabolic Panel [[id number]]  (Abnormal) Collected:  02/14/20 0617    Specimen:  Blood Updated:  02/14/20 0717     Glucose 119 mg/dL      BUN 67 mg/dL      Creatinine 1.43 mg/dL      Sodium 136 mmol/L      Potassium 3.7 mmol/L      Chloride 101 mmol/L      CO2 20.0 mmol/L      Calcium 8.9 mg/dL      Total Protein 6.6 g/dL      Albumin 3.70 g/dL      ALT (SGPT) 17 U/L      AST (SGOT) 28 U/L      Alkaline Phosphatase 73 U/L      Total Bilirubin 0.4 mg/dL      eGFR Non African Amer 35 mL/min/1.73      Globulin 2.9 gm/dL      A/G Ratio 1.3 g/dL      BUN/Creatinine Ratio 46.9     Anion Gap 15.0 mmol/L     Narrative:       GFR Normal >60  Chronic Kidney Disease <60  Kidney Failure <15      CBC & Differential [[id number]] Collected:  02/14/20 0617    Specimen:  Blood Updated:  02/14/20 0711    Narrative:       The following orders were created for panel order CBC & Differential.  Procedure                               Abnormality         Status                     ---------                               -----------         ------                     CBC Auto Differential[[id number]]        Abnormal            Final result                 Please view results for these tests on the individual orders.    CBC Auto Differential [[id number]]  (Abnormal) Collected:  02/14/20 0617    Specimen:  Blood Updated:  02/14/20 0711     WBC 9.19 10*3/mm3      RBC 2.89 10*6/mm3       Hemoglobin 8.6 g/dL      Hematocrit 26.6 %      MCV 92.0 fL      MCH 29.8 pg      MCHC 32.3 g/dL      RDW 17.6 %      RDW-SD 59.3 fl      MPV 10.8 fL      Platelets 174 10*3/mm3      Neutrophil % 72.5 %      Lymphocyte % 12.4 %      Monocyte % 11.8 %      Eosinophil % 2.3 %      Basophil % 0.7 %      Immature Grans % 0.3 %      Neutrophils, Absolute 6.67 10*3/mm3      Lymphocytes, Absolute 1.14 10*3/mm3      Monocytes, Absolute 1.08 10*3/mm3      Eosinophils, Absolute 0.21 10*3/mm3      Basophils, Absolute 0.06 10*3/mm3      Immature Grans, Absolute 0.03 10*3/mm3      nRBC 0.0 /100 WBC     POC Glucose Once [[id number]]  (Abnormal) Collected:  02/14/20 0601    Specimen:  Blood Updated:  02/14/20 0611     Glucose 133 mg/dL      Comment: : [id number] [employer] ID: [id number]       POC Glucose Once [[id number]]  (Abnormal) Collected:  02/13/20 2357    Specimen:  Blood Updated:  02/14/20 0007     Glucose 140 mg/dL      Comment: : [id number] [employer] ID: [id number]       POC Glucose Once [[id number]]  (Abnormal) Collected:  02/13/20 2030    Specimen:  Blood Updated:  02/13/20 2042     Glucose 311 mg/dL      Comment: : [id number] [employer] ID: [id number]       Troponin [[id number]]  (Abnormal) Collected:  02/13/20 2002    Specimen:  Blood Updated:  02/13/20 2029     Troponin T 0.049 ng/mL     Narrative:       Troponin T Reference Range:  <= 0.03 ng/mL-   Negative for AMI  >0.03 ng/mL-     Abnormal for myocardial necrosis.  Clinicians would have to utilize clinical acumen, EKG, Troponin and serial changes to determine if it is an Acute Myocardial Infarction or myocardial injury due to an underlying chronic condition.       Results may be falsely decreased if patient taking Biotin.      Lactic Acid, Plasma [[id number]]  (Normal) Collected:  02/13/20 2002    Specimen:  Blood Updated:  02/13/20 2023     Lactate 1.4 mmol/L     Hemoglobin [[id number]]  (Abnormal) Collected:  02/13/20 2002     Specimen:  Blood Updated:  02/13/20 2019     Hemoglobin 6.9 g/dL     Troponin [[id number]]  (Abnormal) Collected:  02/13/20 1609    Specimen:  Blood Updated:  02/13/20 1849     Troponin T 0.049 ng/mL     Narrative:       Troponin T Reference Range:  <= 0.03 ng/mL-   Negative for AMI  >0.03 ng/mL-     Abnormal for myocardial necrosis.  Clinicians would have to utilize clinical acumen, EKG, Troponin and serial changes to determine if it is an Acute Myocardial Infarction or myocardial injury due to an underlying chronic condition.       Results may be falsely decreased if patient taking Biotin.      Iron Profile [[id number]]  (Abnormal) Collected:  02/13/20 1609    Specimen:  Blood Updated:  02/13/20 1845     Iron 34 mcg/dL      Iron Saturation 7 %      Transferrin 309 mg/dL      TIBC 460 mcg/dL     POC Occult Blood Stool [[id number]]  (Abnormal) Collected:  02/13/20 1702    Specimen:  Stool Updated:  02/13/20 1703     Fecal Occult Blood Positive     Lot Number \[id number]\     Expiration Date \9/30/2020\     DEVELOPER LOT NUMBER \165\     DEVELOPER EXPIRATION DATE \9/30/2020\     Positive Control Positive     Negative Control Negative    Comprehensive Metabolic Panel [[id number]]  (Abnormal) Collected:  02/13/20 1609    Specimen:  Blood Updated:  02/13/20 1643     Glucose 380 mg/dL      BUN 77 mg/dL      Creatinine 1.63 mg/dL      Sodium 133 mmol/L      Potassium 4.5 mmol/L      Chloride 96 mmol/L      CO2 21.0 mmol/L      Calcium 9.4 mg/dL      Total Protein 6.7 g/dL      Albumin 3.70 g/dL      ALT (SGPT) 19 U/L      AST (SGOT) 34 U/L      Alkaline Phosphatase 78 U/L      Total Bilirubin 0.3 mg/dL      eGFR Non African Amer 30 mL/min/1.73      Globulin 3.0 gm/dL      A/G Ratio 1.2 g/dL      BUN/Creatinine Ratio 47.2     Anion Gap 16.0 mmol/L     Narrative:       GFR Normal >60  Chronic Kidney Disease <60  Kidney Failure <15      Urinalysis With Culture If Indicated - Urine, Clean Catch [[id number]]  (Abnormal) Collected:   02/13/20 1609    Specimen:  Urine, Clean Catch Updated:  02/13/20 1642     Color, UA Yellow     Appearance, UA Cloudy     pH, UA <=5.0     Specific Gravity, UA 1.015     Glucose, UA Negative     Ketones, UA Negative     Bilirubin, UA Negative     Blood, UA Negative     Protein,  mg/dL (2+)     Leuk Esterase, UA Small (1+)     Nitrite, UA Negative     Urobilinogen, UA 0.2 E.U./dL    Urinalysis, Microscopic Only - Urine, Clean Catch [[id number]]  (Abnormal) Collected:  02/13/20 1609    Specimen:  Urine, Clean Catch Updated:  02/13/20 1642     RBC, UA None Seen /HPF      WBC, UA 6-12 /HPF      Bacteria, UA 3+ /HPF      Squamous Epithelial Cells, UA 0-2 /HPF      Hyaline Casts, UA None Seen /LPF      Methodology Manual Light Microscopy    Urine Culture - Urine, Urine, Clean Catch [[id number]] Collected:  02/13/20 1609    Specimen:  Urine, Clean Catch Updated:  02/13/20 1642    CBC & Differential [[id number]] Collected:  02/13/20 1609    Specimen:  Blood Updated:  02/13/20 1631    Narrative:       The following orders were created for panel order CBC & Differential.  Procedure                               Abnormality         Status                     ---------                               -----------         ------                     CBC Auto Differential[[id number]]        Abnormal            Final result                 Please view results for these tests on the individual orders.    CBC Auto Differential [[id number]]  (Abnormal) Collected:  02/13/20 1609    Specimen:  Blood Updated:  02/13/20 1631     WBC 9.09 10*3/mm3      RBC 2.32 10*6/mm3      Hemoglobin 6.9 g/dL      Hematocrit 21.8 %      MCV 94.0 fL      MCH 29.7 pg      MCHC 31.7 g/dL      RDW 18.6 %      RDW-SD 63.4 fl      MPV 10.7 fL      Platelets 187 10*3/mm3      Neutrophil % 73.8 %      Lymphocyte % 12.4 %      Monocyte % 10.7 %      Eosinophil % 2.3 %      Basophil % 0.4 %      Immature Grans % 0.4 %      Neutrophils, Absolute 6.70 10*3/mm3       Lymphocytes, Absolute 1.13 10*3/mm3      Monocytes, Absolute 0.97 10*3/mm3      Eosinophils, Absolute 0.21 10*3/mm3      Basophils, Absolute 0.04 10*3/mm3      Immature Grans, Absolute 0.04 10*3/mm3      nRBC 0.0 /100 WBC           Lab Results   Component Value Date    ECHOEFEST 60 09/07/2017       Imaging Results (Last 72 Hours)     ** No results found for the last 72 hours. **                Assessment/Plan       Gastrointestinal hemorrhage with melena    Essential hypertension    Type 2 diabetes mellitus with diabetic peripheral angiopathy without gangrene, with long-term current use of insulin (CMS/[facility])    Mixed hyperlipidemia    S/P TAVR (transcatheter aortic valve replacement)    Chronic diastolic congestive heart failure (CMS/[facility])    CKD (chronic kidney disease) stage 3, GFR 30-59 ml/min (CMS/[facility])    CML (chronic myeloid leukemia) (CMS/[facility])    Generalized weakness      Plan:   -Due to her recent PCI in 9/19 would recommend continuing ASA and plavix at this time due to it being <12mos.  -recommend transfusing as needed for anemia.  -GI to see today. Known AVMs.   -monitor tele     Further orders per Dr. Perez    Thank you for asking us to follow this patient with you.     FACUNDO Maher  02/14/20  9:51 AM    Please note this cardiology consultation note is the result of a face to face consultation with the patient, in addition to reviewing medical records at length by myself, FACUNDO Wharton      Time: approximately 60 minutes

## 2020-02-14 NOTE — PLAN OF CARE
Problem: Patient Care Overview  Goal: Plan of Care Review  Flowsheets (Taken 2/14/2020 1683)  Progress: no change (Pended)  Plan of Care Reviewed With: patient (Pended)  Outcome Summary: PT eval complete. Pt presents with good gross strength but a decrease in functional stability during gait. Pt was able to ambulate 150 ft w/o SOA/need for rest break but demonstrated LOB 3x. She req min A/hand over hand A to correct posture/regain balance. Pt reports that she does not want to use AD's such as bath chairs/walker, even though it is suggeted for her safety. PT suggests d/c w/ home health d/t dec compliance and strong wish not to go to SNF (Pended)

## 2020-02-14 NOTE — CONSULTS
Palliative Care Initial Consult   Attending Physician: Daryl Pritchard DO  Referring Provider: Daryl Pritchard DO    Reason for Referral: assistance with clarification of goals of care and hospice referral or discussion  Family/Support: no family at bedside  Goals of Care: TBD.  Code Status and Medical Interventions:   Ordered at: 02/13/20 1743     Level Of Support Discussed With:    Patient     Code Status:    CPR     Medical Interventions (Level of Support Prior to Arrest):    Full         HPI:   84 y.o. female with past medical history significant for coronary artery disease post CABG x3, recent MI with stents at [facility] in September 2019, chronic diastolic congestive heart failure, atrial fibrillation status post watchman device, sick sinus syndrome status post pacemaker, aortic stenosis status post TAVR, history of breast cancer, history of CML-treated at [facility] per family currently in remission, chronic kidney disease stage III, diabetes mellitus type 2, chronic anemia, hypertension, hyperlipidemia, chronic small vessel  white matter ischemic disease, and angiodysplasia of the colon.  Patient presented to [facility] on 2/13/2020 related to generalized weakness and found to have hemoglobin of 6.9, fecal occult positive, elevated troponin, and possible UTI. Palliative Care Spoke With: patient reports at baseline functional status up in 2 the point of couple weeks ago.  Patient resides at home with her  who has advanced dementia and on hospice.  She reports she has daily caregiver support to assist with meals.  Patient relays she has had progressive weakness and has not been out of the bed over the last couple weeks. Due to the Palliative Care Topics Discussed: palliative care, goals of care, care options, resuscitation status and [facility] we will establish an advance care plan.   Advance Care Planning   Advance Care Planning Discussion: Explored patient's understanding of her overall  health status and decline.  She is able to verbalize a fairly good understanding and recalls her multiple hospitalizations over the last 2 years.  She reports she is currently in remission for her leukemia and taking a daily chemotherapy pill.  She does admit poor appetite, but denies recent significant weight loss.  She reports she and daughter met with GI group this morning and plans are not to pursue endoscopic at this time, but instead take a conservative approach as patient's hemoglobin is low at baseline.  We discussed goals of care to include CODE STATUS, care interventions, and medical orders for scope of treatment (MOST) form.  We discussed the associated risk of CPR at length.  Patient has elected no CPR, no intubation, no dialysis, and no PEG.  I have provided the most decision aid for her and her daughter to further review and recommended completion prior to discharge to further delineate care goals.  Patient admits she is tired of multiple hospitalizations, and as such we discussed hospice.  Spoke with daughterMargarita via telephone.  Apprised of conversation as delineated above.  Margarita reports patient has had previous conversations with palliative service in September of last year at [place], however, no formal documentation was completed at that time.  Family agrees to CODE STATUS changes above.  We will plan to complete the MOST form prior to discharge.    Review of Systems   Constitution: Positive for malaise/fatigue.   Cardiovascular: Negative for leg swelling.   Respiratory: Negative for shortness of breath.    Hematologic/Lymphatic: Does not bruise/bleed easily.   Skin: Negative for rash.   Musculoskeletal: Positive for muscle weakness.   Gastrointestinal: Negative for nausea and vomiting.   Genitourinary: Negative for dysuria.   Neurological: Positive for weakness.   Psychiatric/Behavioral: Negative for memory loss.         Past Medical History:   Diagnosis Date   • Aortic stenosis    •  Arthritis    • Breast cancer (CMS/[facility])    • CAD (coronary artery disease)    • Cataract    • CHF (congestive heart failure) (CMS/[facility])     recently diagnosed after an ER visit.    • Chronic anticoagulation     Coumadin    • CKD (chronic kidney disease) stage 3, GFR 30-59 ml/min (CMS/[facility]) 4/16/2018   • Diabetes mellitus (CMS/[facility])     Type II   • Elevated cholesterol    • GERD (gastroesophageal reflux disease)    • Hyperlipidemia    • Hypertension    • Macular degeneration    • NSTEMI (non-ST elevated myocardial infarction) (CMS/[facility]) 9/9/2019   • Pancreatitis    • Paroxysmal atrial fibrillation (CMS/[facility])    • Presence of Watchman left atrial appendage closure device    • Presence of Watchman left atrial appendage closure device 11/26/2018   • Pulmonary hypertension (CMS/[facility])    • Rheumatic fever     during childhood   • Sacrum and coccyx fracture (CMS/[facility])    • Sick sinus syndrome (CMS/HCC)     with pacemaker   • UTI (urinary tract infection)      Past Surgical History:   Procedure Laterality Date   • ANOMALOUS PULMONARY VENOUS RETURN REPAIR, TOTAL     • BACK SURGERY     • BELPHAROPTOSIS REPAIR     • BREAST SURGERY      right mastectomy   • CARDIAC CATHETERIZATION  1999, 2010   • CARDIAC CATHETERIZATION N/A 2/15/2017    Procedure: Left Heart Cath;  Surgeon: Ehsan Crocker MD;  Location:  [facility]ATH [facility];  Service:    • CARDIAC CATHETERIZATION N/A 2/15/2017    Procedure: Right Heart Cath;  Surgeon: Ehsan Crocker MD;  Location:  [facility];  Service:    • CARDIAC CATHETERIZATION N/A 2/15/2017    Procedure: Coronary angiography;  Surgeon: Ehsan Crocker MD;  Location:  [facility];  Service:    • CARDIAC CATHETERIZATION N/A 2/15/2017    Procedure: Left ventriculography;  Surgeon: Ehsan Crocker MD;  Location:  [facility];  Service:    • CARDIAC CATHETERIZATION N/A 2/15/2017    Procedure: Intracardiac echocardiogram;  Surgeon: Ehsan Crocker MD;  Location:   [facility];  Service:    • CARDIAC ELECTROPHYSIOLOGY PROCEDURE N/A 2/3/2017    Procedure: Pacemaker DC new;  Surgeon: Ehsan Crocker MD;  Location:  [facility];  Service:    • CARDIAC SURGERY      TAVR   • CARDIOVERSION     • CATARACT EXTRACTION     • CHOLECYSTECTOMY     • CORONARY ARTERY BYPASS GRAFT  1999    4 vessel    • CORONARY ARTERY BYPASS GRAFT     • CORONARY STENT PLACEMENT     • HYSTERECTOMY  1962   • INSERT / REPLACE / REMOVE PACEMAKER     • MASTECTOMY  2000   • OTHER SURGICAL HISTORY      TAVR 2017     Social History     Socioeconomic History   • Marital status:      Spouse name: Not on file   • Number of children: Not on file   • Years of education: Not on file   • Highest education level: Not on file   Tobacco Use   • Smoking status: Never Smoker   • Smokeless tobacco: Never Used   Substance and Sexual Activity   • Alcohol use: No   • Drug use: No   • Sexual activity: Defer       Allergies   Allergen Reactions   • Gleevec [Imatinib] Itching   • Bentyl [Dicyclomine] Itching   • Janumet [Sitagliptin-Metformin Hcl] Other (See Comments)     Chest pain   • Sacubitril-Valsartan Itching     Per daughter, Margarita, pt had previous reaction.   • Dilaudid [Hydromorphone] Nausea And Vomiting   • Enalapril Angioedema   • Lopid [Gemfibrozil] Nausea And Vomiting   • Sulfa Antibiotics Other (See Comments)     Syncope     • Ultram [Tramadol] Itching       Current medication reviewed for route, type, dose and frequency and are current per MAR at time of dictation.      Diagnostics: Reviewed    Patient Active Problem List   Diagnosis   • Paroxysmal atrial fibrillation (CMS/HCC)   • Essential hypertension   • Type 2 diabetes mellitus with diabetic peripheral angiopathy without gangrene, with long-term current use of insulin (CMS/HCC)   • Chronic anticoagulation   • Coronary artery disease involving native coronary artery of native heart without angina pectoris   • SSS (sick sinus  "syndrome) (CMS/HCC)   • Chest pain   • S/P CABG x 3   • Artificial pacemaker   • Tachycardia   • Palpitations   • Mixed hyperlipidemia   • S/P TAVR (transcatheter aortic valve replacement)   • Age-related osteoporosis without current pathological fracture   • Chronic diastolic congestive heart failure (CMS/HCC)   • Malignant neoplasm of breast (CMS/HCC)   • CKD (chronic kidney disease) stage 3, GFR 30-59 ml/min (CMS/HCC)   • Thrombocytosis (CMS/HCC)   • Iron deficiency   • MPN (myeloproliferative neoplasm) (CMS/HCC)   • Very low iron stores in bone marrow   • CML (chronic myeloid leukemia) (CMS/HCC)   • Gastritis   • Constipation   • Moderate malnutrition (CMS/HCC)   • Moderate episode of recurrent major depressive disorder (CMS/HCC)   • Gastrointestinal hemorrhage with melena   • Generalized weakness       Physical Exam:    /54 (BP Location: Left arm, Patient Position: Lying)   Pulse 69   Temp 97.7 °F (36.5 °C)   Resp 16   Ht 149.9 cm (59.02\")   Wt 39 kg (86 lb)   SpO2 95%   BMI 17.36 kg/m²     Physical Exam   Constitutional: She appears well-developed and well-nourished. She has a sickly appearance. She appears ill. No distress.   Frail and cachectic   HENT:   Head: Normocephalic and atraumatic.   Eyes: Pupils are equal, round, and reactive to light.   Neck: Normal range of motion.   Cardiovascular: Normal rate and intact distal pulses.   Pulmonary/Chest: Effort normal and breath sounds normal.   Abdominal: Soft. Bowel sounds are normal.   Musculoskeletal: Normal range of motion.   Neurological: She is alert.   Skin: Skin is warm and dry.   Psychiatric: She has a normal mood and affect. Cognition and memory are normal.       Patient status: Disease state: Controlled with current treatments.  Functional status: Palliative Performance Scale Score: Performance 60% based on the following measures: Ambulation: Reduced, Activity and Evidence of Disease: Unable to do hobby or some work, significant evidence " of disease, Self-Care: Occasional assist necessary,  Intake: Normal or reduced, LOC: Full or confusion   ECOG Status(3) Capable of limited self-care, confined to bed or chair > 50% of waking hours.  Nutritional status: Albumin 3.70. Body mass index is 17.36 kg/m².         Family support: The patient receives support from her daughter..  POA/Healthcare surrogate-advance directive on file    Impression/Problem List:    1.  Chronic myeloid leukemia, per family in remission  2.  Gastrointestinal hemorrhage with melena  3.  Acute blood loss on chronic anemia  4.  Chronic kidney disease stage III  5.  Coronary artery disease, status post CABG and stents  6.  Chronic diastolic congestive heart failure  7.  Aortic stenosis status post TAVR  8.  Diabetes mellitus, type II  9.  Hypertension  10.  Hyperlipidemia  11.  Generalized weakness  12.  Advanced age  13.  Sick sinus syndrome status post pacemaker  14.  Atrial fibrillation status post watchman  15.  Angiodysplasia of colon    Recommendations/Plan:  1. plan: Goals of care include CODE STATUS no CPR/limited interventions.     2.  Palliative care encounter  -CODE STATUS changes as delineated per conversation above  -Reviewed and initiated the MOST form to further delineate care goals after discharge to include no CPR, limited interventions, no intubation, no dialysis, no PEG tube. Attending to complete.  -Discussed overall health and recurrent hospitalizations and options to include continue aggressive care versus transition to hospice.  -Daughter agrees to restart dual anti-platelet therapy in patient with known telangiectasia, CML, and known low HGB.      Thank you for this consult and allowing us to participate in patient's plan of care. Palliative Care Team will continue to follow patient.     Time spent: 85 minutes spent reviewing medical and medication records, assessing and examining patient, discussing with family, answering questions, providing some guidance about  a plan and documentation of care, and coordinating care with other healthcare members, with > 50% time spent face to face.   48 minutes spent on advance care planning.    Linda Sage, APRN  2/14/2020

## 2020-02-14 NOTE — PLAN OF CARE
Problem: Patient Care Overview  Goal: Plan of Care Review  Outcome: Ongoing (interventions implemented as appropriate)  Flowsheets (Taken 2/14/2020 1108)  Progress: no change  Plan of Care Reviewed With: patient  Outcome Summary: OT radha completed.  Pt alert and oriented x4.  Independent with bed mobility, CGA for transfers, Gino to ambulate in hallway with LUE support.  As pt ambulated further, she was more dependent on LUE support from OT.  Pt was unsteady with ambulation and had 1 LOB shortly after beginning to walk requiring Gino to regain balance.  Pt able to don/doff socks independently while seated EOB.  No LOB from seated position.  Pt c/o weakness and fatigue during and after ambulation.  She will likely have increased difficulty with standing level ADL d/t fatigue, weakness, and imbalance.  HH PT and OT are recommended at discharge.  Pt adamantly refuses SNF placement.  OT will continue working with pt in hopsital setting to increase her balance, endurance, strength and independence for ADL.

## 2020-02-14 NOTE — PLAN OF CARE
Problem: Patient Care Overview  Goal: Plan of Care Review  Outcome: Ongoing (interventions implemented as appropriate)  Flowsheets (Taken 2/14/2020 0622)  Progress: no change  Plan of Care Reviewed With: patient; daughter  Outcome Summary: Pt denies pain and nausea. No bloody BM thus far. 1 unit PRBC infused per order, hgb 6.9 before infusion. Troponin 0.49. Occult blood positive in ED. Accucheck q 6h, SS coverage available, however coverage has not been required. Daily weight. H&H q6h. IVF and abx per order. VSS, will cont to monitor.

## 2020-02-14 NOTE — PLAN OF CARE
Problem: Patient Care Overview  Goal: Plan of Care Review  Outcome: Ongoing (interventions implemented as appropriate)  Flowsheets (Taken 2/14/2020 8586)  Progress: no change  Plan of Care Reviewed With: patient  Outcome Summary: Initial RDN eval. Pt with decreased appetite and significant weight loss. Malnutrition assessment completed. Added Boost BID and encouraged intake, advised of alt selections. Will continue to follow.

## 2020-02-14 NOTE — PROGRESS NOTES
[facility]  INPATIENT PROGRESS NOTE    Patient Name: Lakesha Costello  Date of Admission: 2/13/2020  Today's Date: 02/14/20  Length of Stay: 1  Primary Care Physician: Leonardo Zepeda DO    Subjective   Chief Complaint: Follow-up weakness    HPI   Patient seen and examined.  She is feeling better today.  Denies chest pain or shortness of breath.  Denies any abdominal pain or nausea.  Saw her walking from the bathroom to the bed and did fairly well.  No noted bleeding overnight.        Review of Systems   All pertinent negatives and positives are as above. All other systems have been reviewed and are negative unless otherwise stated.     Objective    Temp:  [97.2 °F (36.2 °C)-98.2 °F (36.8 °C)] 97.7 °F (36.5 °C)  Heart Rate:  [68-70] 70  Resp:  [14-16] 16  BP: (109-168)/(46-97) 155/58  Physical Exam  GEN: Awake, alert, interactive, thin/frail, appears chronically ill  HEENT: PERRLA, EOMI, Anicteric, Trachea midline  Lungs: CTAB, no wheezing/rales/rhonchi  Heart: RRR, +S1/s2, no rub, II-III/VI RUSLAN  ABD: soft,  nontender/nondistended, +BS, no guarding/rebound  Extremities: atraumatic, no cyanosis, no edema  Skin: no rashes or petechiae  Neuro: AAOx3, no focal deficits      Results Review:  I have reviewed the labs, radiology results, and diagnostic studies.    Laboratory Data:   Results from last 7 days   Lab Units 02/14/20  0617 02/13/20 2002 02/13/20  1609   WBC 10*3/mm3 9.19  --  9.09   HEMOGLOBIN g/dL 8.6* 6.9* 6.9*   HEMATOCRIT % 26.6*  --  21.8*   PLATELETS 10*3/mm3 174  --  187        Results from last 7 days   Lab Units 02/14/20  0617 02/13/20  1609   SODIUM mmol/L 136 133*   POTASSIUM mmol/L 3.7 4.5   CHLORIDE mmol/L 101 96*   CO2 mmol/L 20.0* 21.0*   BUN mg/dL 67* 77*   CREATININE mg/dL 1.43* 1.63*   CALCIUM mg/dL 8.9 9.4   BILIRUBIN mg/dL 0.4 0.3   ALK PHOS U/L 73 78   ALT (SGPT) U/L 17 19   AST (SGOT) U/L 28 34*   GLUCOSE mg/dL 119* 380*        Culture Data:   Urine Culture   Date Value Ref Range Status   02/13/2020 >100,000 CFU/mL Gram Negative Bacilli (A)  Preliminary       Radiology Data:   Imaging Results (Last 24 Hours)     ** No results found for the last 24 hours. **          I have reviewed the patient's current medications.     Assessment/Plan     Active Hospital Problems    Diagnosis   • **Gastrointestinal hemorrhage with melena   • Generalized weakness   • CML (chronic myeloid leukemia) (CMS/HCC)   • CKD (chronic kidney disease) stage 3, GFR 30-59 ml/min (CMS/HCC)     GFR 32 in 4/2018     • Chronic diastolic congestive heart failure (CMS/HCC)   • S/P TAVR (transcatheter aortic valve replacement)   • Mixed hyperlipidemia   • Essential hypertension   • Type 2 diabetes mellitus with diabetic peripheral angiopathy without gangrene, with long-term current use of insulin (CMS/HCC)       #1 GI bleed/melena -with known history of AVMs.  Blood counts responded to transfusion.  No active signs of bleeding today.  Resuming aspirin and Plavix.  Will monitor patient overnight.  Appreciate GI input and assistance.    #2 UTI -growing gram-negative bacillus.  Continue ceftriaxone.  Awaiting speciation.    #3 acute on chronic anemia with acute blood loss -in the setting of her GI bleed.  Responded to PRBC.    #4 generalized weakness -secondary to 1 through 3 above.  Continue therapies.  Improving.    #5 epigastric pain -resolved    #6 CAD -with history of recent PCI in September.  Seen by cardiology recommend continuing aspirin and Plavix.  Will restart now.  Monitor for bleeding.  Start back beta-blocker as well.    #7 prosthetic aortic valve -noted, on aspirin and Plavix per cardiologist    #8 sick sinus syndrome -status post pacemaker    #9 chronic diastolic heart failure -does not appear overloaded.  DC IV fluids.    #10 DM 2 -continue sliding scale and hypoglycemia protocol    #11 CKD 3 -appears around baseline    Discharge Planning: Appreciate input  and care from all services.  CODE STATUS changed to DNR.  Possibly home tomorrow if no signs of bleeding and stable.    aDryl Pritchard DO   02/14/20   11:57 AM

## 2020-02-15 NOTE — PLAN OF CARE
Problem: Patient Care Overview  Goal: Plan of Care Review  Outcome: Ongoing (interventions implemented as appropriate)  Flowsheets (Taken 2/15/2020 0120)  Progress: improving  Plan of Care Reviewed With: patient  Outcome Summary: Denies pain. H&H q6h, most recent 8.7. Tolerating regular diet. Accucheck ACHS, SS coverage. Anticoagulants restarted. IV abx per order. BM x1 so far, no blood noted. VSS, will cont to monitor.

## 2020-02-15 NOTE — DISCHARGE SUMMARY
[facility] Medicine Services  DISCHARGE SUMMARY       Date of Admission: 2/13/2020  Date of Discharge:  2/15/2020  Primary Care Physician: Leonardo Zepeda DO    Presenting Problem/History of Present Illness:  Gastrointestinal hemorrhage, unspecified gastrointestinal hemorrhage type [K92.2]     Final Discharge Diagnoses:  Active Hospital Problems    Diagnosis   • **Gastrointestinal hemorrhage with melena   • Generalized weakness   • CML (chronic myeloid leukemia) (CMS/formerly [facility])   • CKD (chronic kidney disease) stage 3, GFR 30-59 ml/min (CMS/formerly [facility])     GFR 32 in 4/2018     • Chronic diastolic congestive heart failure (CMS/formerly [facility])   • S/P TAVR (transcatheter aortic valve replacement)   • Mixed hyperlipidemia   • Essential hypertension   • Type 2 diabetes mellitus with diabetic peripheral angiopathy without gangrene, with long-term current use of insulin (CMS/formerly [facility])       Consults:   #1 Dr. Paulino, GI  #2 Dr. Perez, cardiology  #3 FACUNDO Mooney palliative care    Procedures Performed: None    Pertinent Test Results:       Chief Complaint on Day of Discharge:   Follow-up generalized weakness    History of Present Illness on Day of Discharge:   Patient seen and examined.  On my arrival to the room she is ready dressed.  She says she feels well and wants to go home.  Talked her about potential home health with nursing and therapy but she declined.  Denies dizziness, chest pain, shortness of breath, Jovany pain.  Denies any melena, bright red blood, hematochezia.    Hospital Course:  The patient is an 84 y.o. female with significant comorbidities including CAD with prior CABG, MI with recent stent, A. fib with watchman, sick sinus syndrome post pacemaker, prosthetic aortic valve replacement, CML, CKD, diabetes, chronic anemia, chronic angiodysplasia of her colon with frequent GI bleed.  She initially presented in from home due to recommendations from palliative care nursing.  Her  " is actively on hospice and his nurse called the patients daughter letting her know that the patient herself had not been looking good for couple days.  On arrival she was having abdominal pain with some nausea.  This later improved after a bowel movement but she was heme positive in the ER with melanotic stool when she had that bowel movement.  Her hemoglobin was 6.9.  She was ordered for PRBC transfusion and admitted for ongoing care.  Iron panels were checked and she was found to be iron deficient.  She was seen by GI and cardiology who recommended no aggressive interventions or treatments.  Cardiology recommended ongoing use of aspirin and Plavix due to her recent stent.  Patient and her daughter also agreed with this and did not want aggressive interventions.  Palliative care saw the patient and she decided to be DNR at this time but is not currently interested in hospice care.  Patient received some IV iron in addition to her initial PRBC transfusion.  Her blood counts have been stable otherwise since arrival without any further evidence for bleeding. She is HD stable and ambulating without issue.  Tolerating PO intake. We will discharge the patient home today with p.o. Iron.  Also of note on arrival patient with evidence for UTI and later grew Klebsiella.  She has been on ceftriaxone for 2 days which it is sensitive to.  We will put her on Keflex for 3 more days at discharge to finish treatment.  Offered home health services at time of discharge but patient declined.    Condition on Discharge: Stable    Physical Exam on Discharge:  /55 (BP Location: Left arm, Patient Position: Sitting)   Pulse 70   Temp 97.8 °F (36.6 °C) (Oral)   Resp 16   Ht 149.9 cm (59.02\")   Wt 39 kg (86 lb)   SpO2 100%   BMI 17.36 kg/m²   Physical Exam  GEN: Awake, alert, interactive, thin/frail, appears chronically ill  HEENT: PERRLA, EOMI, Anicteric, Trachea midline  Lungs: CTAB, no wheezing/rales/rhonchi  Heart: " RRR, +S1/s2, no rub, II-III/VI RUSLAN  ABD: soft,  nontender/nondistended, +BS, no guarding/rebound  Extremities: atraumatic, no cyanosis, no edema  Skin: no rashes or petechiae  Neuro: AAOx3, no focal deficits    Discharge Disposition:  Home or Self Care.  Patient was offered home health therapy but declined.    Discharge Medications:     Discharge Medications      New Medications      Instructions Start Date   cephalexin 500 MG capsule  Commonly known as:  KEFLEX   500 mg, Oral, 2 Times Daily      ferrous sulfate 325 (65 FE) MG tablet   325 mg, Oral, 2 Times Daily With Meals         Continue These Medications      Instructions Start Date   aspirin 81 MG EC tablet   81 mg, Oral, Daily      bisacodyl 10 MG suppository  Commonly known as:  DULCOLAX   10 mg, Rectal, Daily PRN      calcium carbonate 600 MG tablet  Commonly known as:  OS-MICHAEL   600 mg, Oral, Daily      cholecalciferol 25 MCG (1000 UT) tablet  Commonly known as:  VITAMIN D3   1,000 Units, Oral, Daily      clopidogrel 75 MG tablet  Commonly known as:  PLAVIX   75 mg, Oral, Daily      dasatinib 50 MG chemo tablet  Commonly known as:  SPRYCEL   1 tablet, Oral, Daily      furosemide 40 MG tablet  Commonly known as:  LASIX   60 mg, Oral, Daily, Take 1 tablet (40 mg) in the morning and 1/2 tablet (20 mg) in the evening.      glipizide 5 MG tablet  Commonly known as:  GLUCOTROL   1 tablet, Oral, 2 Times Daily      insulin detemir 100 UNIT/ML injection  Commonly known as:  LEVEMIR FLEXTOUCH   15 Units, Subcutaneous, Nightly      metoprolol succinate XL 25 MG 24 hr tablet  Commonly known as:  TOPROL-XL   25 mg, Oral, Daily      mirtazapine 15 MG tablet  Commonly known as:  REMERON   7.5 mg, Oral, Nightly      nitroglycerin 0.4 MG SL tablet  Commonly known as:  NITROSTAT   0.4 mg, Sublingual, Every 5 Minutes PRN, Take no more than 3 doses in 15 minutes.       pantoprazole 40 MG EC tablet  Commonly known as:  PROTONIX   40 mg, Oral, Daily      polyethyl glycol-propyl  glycol 0.4-0.3 % solution ophthalmic solution  Commonly known as:  SYSTANE   1 drop, Both Eyes, Daily      polyethylene glycol packet  Commonly known as:  MIRALAX   17 g, Oral, Daily PRN      PRESERVISION/LUTEIN capsule   1 capsule, Oral, 2 Times Daily      rosuvastatin 20 MG tablet  Commonly known as:  CRESTOR   20 mg, Oral, Nightly      spironolactone 25 MG tablet  Commonly known as:  ALDACTONE   25 mg, Oral, Daily      triamcinolone 0.1 % cream  Commonly known as:  KENALOG   1 application, Topical, 2 Times Daily      venlafaxine XR 75 MG 24 hr capsule  Commonly known as:  EFFEXOR-XR   75 mg, Oral, Daily      vitamin B-12 500 MCG tablet  Commonly known as:  CYANOCOBALAMIN   1,000 mcg, Oral, Daily             Discharge Diet: Regular/cardiac    Activity at Discharge: Increase activity as tolerated to baseline    Discharge Care Plan/Instructions: Follow-up with your PCP in 1 week    Follow-up Appointments:   Future Appointments   Date Time Provider Department Center   3/10/2020  9:45 AM [facility]   5/13/2020 11:30 AM Leonardo Zepeda DO [facility] None       Test Results Pending at Discharge: none    Daryl Pritchard DO  02/15/20  2:06 PM    Time: 33 minutes

## 2020-02-16 NOTE — OUTREACH NOTE
Prep Survey      Responses   Facility patient discharged from?  [place]   Is patient eligible?  Yes   Discharge diagnosis  Gastrointestinal hemorrhage with melena   Does the patient have one of the following disease processes/diagnoses(primary or secondary)?  Other   Does the patient have Home health ordered?  No   Is there a DME ordered?  No   Prep survey completed?  Yes          Krystle Pena RN

## 2020-02-16 NOTE — THERAPY DISCHARGE NOTE
Acute Care - Physical Therapy Discharge Summary  [facility]       Patient Name: Lakesha Costello  : 1935  MRN: [id number]    Today's Date: 2020  Onset of Illness/Injury or Date of Surgery: 20       Referring Physician: Dr. Pritchard      Admit Date: 2020      PT Recommendation and Plan    Visit Dx:    ICD-10-CM ICD-9-CM   1. Gastrointestinal hemorrhage, unspecified gastrointestinal hemorrhage type K92.2 578.9   2. Symptomatic anemia D64.9 285.9   3. Decreased activities of daily living (ADL) Z78.9 V49.89   4. Impaired functional mobility, balance, gait, and endurance Z74.09 V49.89       Outcome Measures     Row Name 20 0943             How much help from another is currently needed...    Putting on and taking off regular lower body clothing?  3  -AC      Bathing (including washing, rinsing, and drying)  3  -AC      Toileting (which includes using toilet bed pan or urinal)  3  -AC      Putting on and taking off regular upper body clothing  4  -AC      Taking care of personal grooming (such as brushing teeth)  4  -AC      Eating meals  4  -AC      AM-PAC 6 Clicks Score (OT)  21  -AC         Functional Assessment    Outcome Measure Options  AM-PAC 6 Clicks Daily Activity (OT)  -AC        User Key  (r) = Recorded By, (t) = Taken By, (c) = Cosigned By    Initials Name Provider Type    Eulogio De Paz, OTR/L, CNT Occupational Therapist              Rehab Goal Summary     Row Name 20 0719 20 0718          Bed Mobility Goal 1 (PT)    Activity/Assistive Device (Bed Mobility Goal 1, PT)  rolling to left;sit to supine/supine to sit  -AB  rolling to left;sit to supine/supine to sit  -MF     [place] Level/Cues Needed (Bed Mobility Goal 1, PT)  standby assist  -AB  standby assist  -MF     Time Frame (Bed Mobility Goal 1, PT)  long term goal (LTG);10 days  -AB  long term goal (LTG);10 days  -MF     Progress/Outcomes (Bed Mobility Goal 1, PT)  goal not met  -AB  goal not met  -MF         Transfer Goal 1 (PT)    Activity/Assistive Device (Transfer Goal 1, PT)  sit-to-stand/stand-to-sit;toilet  -AB  sit-to-stand/stand-to-sit;toilet  -MF     [place] Level/Cues Needed (Transfer Goal 1, PT)  contact guard assist  -AB  contact guard assist  -MF     Time Frame (Transfer Goal 1, PT)  long term goal (LTG);10 days  -AB  long term goal (LTG);10 days  -MF     Progress/Outcome (Transfer Goal 1, PT)  goal ongoing  -AB  goal not met  -MF        Gait Training Goal 1 (PT)    Activity/Assistive Device (Gait Training Goal 1, PT)  gait (walking locomotion);assistive device use;decrease fall risk;increase endurance/gait distance;improve balance and speed;diminish gait deviation pt will be able to amb 250 ft w/out LOB and appropriate use of walker  -AB  gait (walking locomotion);assistive device use;decrease fall risk;increase endurance/gait distance;improve balance and speed;diminish gait deviation pt will be able to amb 250 ft w/out LOB and appropriate use of walker  -MF     [place] Level (Gait Training Goal 1, PT)  standby assist  -AB  standby assist  -MF     Distance (Gait Goal 1, PT)  250 ft  -AB  250 ft  -MF     Time Frame (Gait Training Goal 1, PT)  long term goal (LTG);10 days  -AB  long term goal (LTG);10 days  -MF     Progress/Outcome (Gait Training Goal 1, PT)  goal ongoing  -AB  goal not met  -MF        ROM Goal 1 (PT)    ROM Goal 1 (PT)  Dynamic Balance: pt gilberto improve dynamic balance by performing activities outside FLAVIA w/ SBA to improve safe function in home  -AB  Dynamic Balance: pt gilberto improve dynamic balance by performing activities outside FLAVIA w/ SBA to improve safe function in home  -MF     Time Frame (ROM Goal 1, PT)  long term goal (LTG);1 week;by discharge  -AB  long term goal (LTG);1 week;by discharge  -MF     Progress/Outcome (ROM Goal 1, PT)  goal ongoing  -AB  goal not met  -MF        Patient Education Goal (PT)    Activity (Patient Education Goal, PT)  pt will be able to demonstrate  safety awareness during activites during therapy sessions.   -AB  pt will be able to demonstrate safety awareness during activites during therapy sessions.   -MF     [place]/Cues/Accuracy (Memory Goal 2, PT)  demonstrates adequately  -AB  demonstrates adequately  -MF     Time Frame (Patient Education Goal, PT)  long term goal (LTG);10 days  -AB  long term goal (LTG);10 days  -MF     Progress/Outcome (Patient Education Goal, PT)  goal ongoing  -AB  goal not met  -MF       User Key  (r) = Recorded By, (t) = Taken By, (c) = Cosigned By    Initials Name Provider Type Discipline    AB Lor Cowart, PTA Physical Therapy Assistant PT    MF Janay Hughes PTA Physical Therapy Assistant PT              PT Discharge Summary  Anticipated Discharge Disposition (PT): home  Reason for Discharge: Discharge from facility  Outcomes Achieved: Unable to make functional progress toward goals at this time  Discharge Destination: Home      Janay Hughes PTA   2/16/2020

## 2020-02-17 NOTE — THERAPY DISCHARGE NOTE
Acute Care - Occupational Therapy Discharge Summary  [facility]     Patient Name: Lakesha Costello  : 1935  MRN: [id number]    Today's Date: 2020  Onset of Illness/Injury or Date of Surgery: 20    Date of Referral to OT: 20  Referring Physician: Dr. Pritchard      Admit Date: 2020        OT Recommendation and Plan    Visit Dx:    ICD-10-CM ICD-9-CM   1. Gastrointestinal hemorrhage, unspecified gastrointestinal hemorrhage type K92.2 578.9   2. Symptomatic anemia D64.9 285.9   3. Decreased activities of daily living (ADL) Z78.9 V49.89   4. Impaired functional mobility, balance, gait, and endurance Z74.09 V49.89               Rehab Goal Summary     Row Name 20 0800             Transfer Goal 1 (OT)    Activity/Assistive Device (Transfer Goal 1, OT)  tub  -TS      [place] Level/Cues Needed (Transfer Goal 1, OT)  supervision required  -TS      Time Frame (Transfer Goal 1, OT)  long term goal (LTG);10 days  -TS      Progress/Outcome (Transfer Goal 1, OT)  goal not met  -TS         Bathing Goal 1 (OT)    Activity/Assistive Device (Bathing Goal 1, OT)  bathing skills, all  -TS      [place] Level/Cues Needed (Bathing Goal 1, OT)  supervision required  -TS      Time Frame (Bathing Goal 1, OT)  long term goal (LTG);10 days  -TS      Progress/Outcomes (Bathing Goal 1, OT)  goal not met  -TS         Dressing Goal 1 (OT)    Activity/Assistive Device (Dressing Goal 1, OT)  dressing skills, all  -TS      [place]/Cues Needed (Dressing Goal 1, OT)  supervision required  -TS      Time Frame (Dressing Goal 1, OT)  long term goal (LTG);10 days  -TS      Progress/Outcome (Dressing Goal 1, OT)  goal not met  -TS         Strength Goal 1 (OT)    Strength Goal 1 (OT)  BUE strength to 4+/5 to increase independence for ADL  -TS      Time Frame (Strength Goal 1, OT)  long term goal (LTG);10 days  -TS      Progress/Outcome (Strength Goal 1, OT)  goal not met  -TS        User Key  (r) = Recorded By, (t)  = Taken By, (c) = Cosigned By    Initials Name Provider Type Discipline    TS Ame Carrero COTA/L Occupational Therapy Assistant OT          Outcome Measures     Row Name 02/14/20 0943             How much help from another is currently needed...    Putting on and taking off regular lower body clothing?  3  -AC      Bathing (including washing, rinsing, and drying)  3  -AC      Toileting (which includes using toilet bed pan or urinal)  3  -AC      Putting on and taking off regular upper body clothing  4  -AC      Taking care of personal grooming (such as brushing teeth)  4  -AC      Eating meals  4  -AC      AM-PAC 6 Clicks Score (OT)  21  -AC         Functional Assessment    Outcome Measure Options  AM-PAC 6 Clicks Daily Activity (OT)  -AC        User Key  (r) = Recorded By, (t) = Taken By, (c) = Cosigned By    Initials Name Provider Type    AC Eulogio Blackmon, OTR/L, CNT Occupational Therapist          Therapy Suggested Charges     Code   Minutes Charges    None                 OT Discharge Summary  Reason for Discharge: Discharge from facility  Outcomes Achieved: Refer to plan of care for updates on goals achieved  Discharge Destination: Home with assist      JACKSON Perez  2/17/2020

## 2020-02-17 NOTE — PAYOR COMM NOTE
"[place] 2-15-20    [id number]   [id number]  Lakesha Costello (84 y.o. Female)     Date of Birth Social Security Number Address Home Phone MRN    [id number]  [id number] [facility] [phone] [id number]    Nondenominational Marital Status          [employer] [facility] [place]        Admission Date Admission Type Admitting Provider Attending Provider Department, Room/Bed    2/13/20 Emergency Daryl Pritchard DO  [facility] 3C, 365/1    Discharge Date Discharge Disposition Discharge Destination        2/15/2020 Home or Self Care              Attending Provider:  (none)   Allergies:  Gleevec [Imatinib], Bentyl [Dicyclomine], Janumet [Sitagliptin-metformin Hcl], Sacubitril-valsartan, Dilaudid [Hydromorphone], Enalapril, Lopid [Gemfibrozil], Sulfa Antibiotics, Ultram [Tramadol]    Isolation:  None   Infection:  None   Code Status:  Prior    Ht:  149.9 cm (59.02\")   Wt:  39 kg (86 lb)    Admission Cmt:  None   Principal Problem:  Gastrointestinal hemorrhage with melena [K92.1]                 Active Insurance as of 2/13/2020     Primary Coverage     Payor Plan Insurance Group Employer/Plan Group    HUMANA MEDICARE REPLACEMENT HUMANA MEDICARE REPLACEMENT [id number]     Payor Plan Address Payor Plan Phone Number Payor Plan Fax Number Effective Dates    [address] [address] [phone]  1/1/2018 - None Entered    [facility] [id number]       Subscriber Name Subscriber Birth Date Member ID       LAKESHA COSTELLO 1935 [id number]                 Emergency Contacts      (Rel.) Home Phone Work Phone Mobile Phone    Margarita Ignacio (Rn) (Daughter) [phone]               Discharge Summary      Daryl Pritchard DO at 02/15/20 1406              [facility] Medicine Services  DISCHARGE SUMMARY       Date of Admission: 2/13/2020  Date of Discharge:  2/15/2020  Primary Care Physician: Leonarod Zepeda DO    Presenting " Problem/History of Present Illness:  Gastrointestinal hemorrhage, unspecified gastrointestinal hemorrhage type [K92.2]     Final Discharge Diagnoses:  Active Hospital Problems    Diagnosis   • **Gastrointestinal hemorrhage with melena   • Generalized weakness   • CML (chronic myeloid leukemia) (CMS/[facility])   • CKD (chronic kidney disease) stage 3, GFR 30-59 ml/min (CMS/HCC)     GFR 32 in 4/2018     • Chronic diastolic congestive heart failure (CMS/HCC)   • S/P TAVR (transcatheter aortic valve replacement)   • Mixed hyperlipidemia   • Essential hypertension   • Type 2 diabetes mellitus with diabetic peripheral angiopathy without gangrene, with long-term current use of insulin (CMS/HCC)       Consults:   #1 Dr. Paulino, GI  #2 Dr. Perez, cardiology  #3 FACUNDO Mooney palliative care    Procedures Performed: None    Pertinent Test Results:       Chief Complaint on Day of Discharge:   Follow-up generalized weakness    History of Present Illness on Day of Discharge:   Patient seen and examined.  On my arrival to the room she is ready dressed.  She says she feels well and wants to go home.  Talked her about potential home health with nursing and therapy but she declined.  Denies dizziness, chest pain, shortness of breath, Jovany pain.  Denies any melena, bright red blood, hematochezia.    Hospital Course:  The patient is an 84 y.o. female with significant comorbidities including CAD with prior CABG, MI with recent stent, A. fib with watchman, sick sinus syndrome post pacemaker, prosthetic aortic valve replacement, CML, CKD, diabetes, chronic anemia, chronic angiodysplasia of her colon with frequent GI bleed.  She initially presented in from home due to recommendations from palliative care nursing.  Her  is actively on hospice and his nurse called the patients daughter letting her know that the patient herself had not been looking good for couple days.  On arrival she was having abdominal pain with some  "nausea.  This later improved after a bowel movement but she was heme positive in the ER with melanotic stool when she had that bowel movement.  Her hemoglobin was 6.9.  She was ordered for PRBC transfusion and admitted for ongoing care.  Iron panels were checked and she was found to be iron deficient.  She was seen by GI and cardiology who recommended no aggressive interventions or treatments.  Cardiology recommended ongoing use of aspirin and Plavix due to her recent stent.  Patient and her daughter also agreed with this and did not want aggressive interventions.  Palliative care saw the patient and she decided to be DNR at this time but is not currently interested in hospice care.  Patient received some IV iron in addition to her initial PRBC transfusion.  Her blood counts have been stable otherwise since arrival without any further evidence for bleeding. She is HD stable and ambulating without issue.  Tolerating PO intake. We will discharge the patient home today with p.o. Iron.  Also of note on arrival patient with evidence for UTI and later grew Klebsiella.  She has been on ceftriaxone for 2 days which it is sensitive to.  We will put her on Keflex for 3 more days at discharge to finish treatment.  Offered home health services at time of discharge but patient declined.    Condition on Discharge: Stable    Physical Exam on Discharge:  /55 (BP Location: Left arm, Patient Position: Sitting)   Pulse 70   Temp 97.8 °F (36.6 °C) (Oral)   Resp 16   Ht 149.9 cm (59.02\")   Wt 39 kg (86 lb)   SpO2 100%   BMI 17.36 kg/m²    Physical Exam  GEN: Awake, alert, interactive, thin/frail, appears chronically ill  HEENT: PERRLA, EOMI, Anicteric, Trachea midline  Lungs: CTAB, no wheezing/rales/rhonchi  Heart: RRR, +S1/s2, no rub, II-III/VI RUSLAN  ABD: soft,  nontender/nondistended, +BS, no guarding/rebound  Extremities: atraumatic, no cyanosis, no edema  Skin: no rashes or petechiae  Neuro: AAOx3, no focal " deficits    Discharge Disposition:  Home or Self Care.  Patient was offered home health therapy but declined.    Discharge Medications:     Discharge Medications      New Medications      Instructions Start Date   cephalexin 500 MG capsule  Commonly known as:  KEFLEX   500 mg, Oral, 2 Times Daily      ferrous sulfate 325 (65 FE) MG tablet   325 mg, Oral, 2 Times Daily With Meals         Continue These Medications      Instructions Start Date   aspirin 81 MG EC tablet   81 mg, Oral, Daily      bisacodyl 10 MG suppository  Commonly known as:  DULCOLAX   10 mg, Rectal, Daily PRN      calcium carbonate 600 MG tablet  Commonly known as:  OS-MICHAEL   600 mg, Oral, Daily      cholecalciferol 25 MCG (1000 UT) tablet  Commonly known as:  VITAMIN D3   1,000 Units, Oral, Daily      clopidogrel 75 MG tablet  Commonly known as:  PLAVIX   75 mg, Oral, Daily      dasatinib 50 MG chemo tablet  Commonly known as:  SPRYCEL   1 tablet, Oral, Daily      furosemide 40 MG tablet  Commonly known as:  LASIX   60 mg, Oral, Daily, Take 1 tablet (40 mg) in the morning and 1/2 tablet (20 mg) in the evening.      glipizide 5 MG tablet  Commonly known as:  GLUCOTROL   1 tablet, Oral, 2 Times Daily      insulin detemir 100 UNIT/ML injection  Commonly known as:  LEVEMIR FLEXTOUCH   15 Units, Subcutaneous, Nightly      metoprolol succinate XL 25 MG 24 hr tablet  Commonly known as:  TOPROL-XL   25 mg, Oral, Daily      mirtazapine 15 MG tablet  Commonly known as:  REMERON   7.5 mg, Oral, Nightly      nitroglycerin 0.4 MG SL tablet  Commonly known as:  NITROSTAT   0.4 mg, Sublingual, Every 5 Minutes PRN, Take no more than 3 doses in 15 minutes.       pantoprazole 40 MG EC tablet  Commonly known as:  PROTONIX   40 mg, Oral, Daily      polyethyl glycol-propyl glycol 0.4-0.3 % solution ophthalmic solution  Commonly known as:  SYSTANE   1 drop, Both Eyes, Daily      polyethylene glycol packet  Commonly known as:  MIRALAX   17 g, Oral, Daily PRN       PRESERVISION/LUTEIN capsule   1 capsule, Oral, 2 Times Daily      rosuvastatin 20 MG tablet  Commonly known as:  CRESTOR   20 mg, Oral, Nightly      spironolactone 25 MG tablet  Commonly known as:  ALDACTONE   25 mg, Oral, Daily      triamcinolone 0.1 % cream  Commonly known as:  KENALOG   1 application, Topical, 2 Times Daily      venlafaxine XR 75 MG 24 hr capsule  Commonly known as:  EFFEXOR-XR   75 mg, Oral, Daily      vitamin B-12 500 MCG tablet  Commonly known as:  CYANOCOBALAMIN   1,000 mcg, Oral, Daily             Discharge Diet: Regular/cardiac    Activity at Discharge: Increase activity as tolerated to baseline    Discharge Care Plan/Instructions: Follow-up with your PCP in 1 week    Follow-up Appointments:   Future Appointments   Date Time Provider Department Center   3/10/2020  9:45 AM [facility]   5/13/2020 11:30 AM Leonardo Zepeda DO [facility] None       Test Results Pending at Discharge: none    Daryl Pritchard DO  02/15/20  2:06 PM    Time: 33 minutes          Electronically signed by Daryl Pritchard DO at 02/15/20 6932

## 2020-02-19 NOTE — OUTREACH NOTE
Medical Week 1 Survey      Responses   Facility patient discharged from?  [place]   Does the patient have one of the following disease processes/diagnoses(primary or secondary)?  Other   Is there a successful TCM telephone encounter documented?  No   Week 1 attempt successful?  Yes   Call start time  0941   Call end time  0948   Discharge diagnosis  Gastrointestinal hemorrhage with melena   Is patient permission given to speak with other caregiver?  Yes   List who call center can speak with  Sandra, caregiver  Margarita, daughter   Meds reviewed with patient/caregiver?  Yes   Is the patient having any side effects they believe may be caused by any medication additions or changes?  No   Does the patient have all medications ordered at discharge?  Yes   Is the patient taking all medications as directed (includes completed medication regime)?  Yes   Does the patient have a primary care provider?   Yes   Has the patient kept scheduled appointments due by today?  N/A   Comments  Patient doesn't remember. Her daughter takes care of appts.   What is the Home health agency?   No home health   Psychosocial issues?  No   Did the patient receive a copy of their discharge instructions?  Yes   What is the patient's perception of their health status since discharge?  Improving   Is the patient/caregiver able to teach back the hierarchy of who to call/visit for symptoms/problems? PCP, Specialist, Home health nurse, Urgent Care, ED, 911  Yes   Week 1 call completed?  Yes          Alesia Arauz RN

## 2020-02-26 NOTE — OUTREACH NOTE
"Medical Week 2 Survey      Responses   Facility patient discharged from?  [place]   Does the patient have one of the following disease processes/diagnoses(primary or secondary)?  Other   Week 2 attempt successful?  Yes   Call start time  1430   Discharge diagnosis  Gastrointestinal hemorrhage with melena   Call end time  1432   Meds reviewed with patient/caregiver?  Yes   Is the patient having any side effects they believe may be caused by any medication additions or changes?  No   Does the patient have all medications ordered at discharge?  Yes   Is the patient taking all medications as directed (includes completed medication regime)?  Yes   Does the patient have a primary care provider?   Yes   Does the patient have an appointment with their PCP within 7 days of discharge?  Yes   Has the patient kept scheduled appointments due by today?  Yes   Psychosocial issues?  No   Did the patient receive a copy of their discharge instructions?  Yes   Nursing interventions  Reviewed instructions with patient   What is the patient's perception of their health status since discharge?  Same   Is the patient/caregiver able to teach back signs and symptoms related to disease process for when to call PCP?  Yes   Is the patient/caregiver able to teach back signs and symptoms related to disease process for when to call 911?  Yes   Is the patient/caregiver able to teach back the hierarchy of who to call/visit for symptoms/problems? PCP, Specialist, Home health nurse, Urgent Care, ED, 911  Yes   Week 2 Call Completed?  Yes   Wrap up additional comments  pt very confused, daughter has all her \"papers\" . Going to [facility] on monday          Linda Marroquin RN  "

## 2020-03-17 NOTE — TELEPHONE ENCOUNTER
"    Reason for Disposition  • General information question, no triage required and triager able to answer question    Additional Information  • Negative: [1] Caller is not with the adult (patient) AND [2] reporting urgent symptoms  • Negative: Lab result questions  • Negative: Medication questions  • Negative: Caller can't be reached by phone  • Negative: Caller has already spoken to PCP or another triager  • Negative: RN needs further essential information from caller in order to complete triage  • Negative: Requesting regular office appointment  • Negative: [1] Caller requesting NON-URGENT health information AND [2] PCP's office is the best resource  • Negative: Health Information question, no triage required and triager able to answer question  • Negative: Question about upcoming scheduled test, no triage required and triager able to answer question  • Negative: [1] Caller is not with the adult (patient) AND [2] probable NON-URGENT symptoms    Answer Assessment - Initial Assessment Questions  1. REASON FOR CALL or QUESTION: \"What is your reason for calling today?\" or \"How can I best help you?\" or \"What question do you have that I can help answer?\"      Caller initially stating her mother had suffered a fall a few days ago, fine at first, now screaming and crying with pain.  Then the caller stated she knew there was nothing that could be done for her, because she cannot tolerate pain medication and is afraid to go to the ED.  Caller states she feels her mother needs to be on Hospice due to multiple health issues.  Caller was very upset at first, crying herself, states she is at her wits end.  I advised to bring her mother to ED, hopefully she could be admitted for pain control, and then Dr. Zepeda could help make arrangements for Hospice care.  Call transpired into calming Mrs. Ignacio and letting her vent her frustrations regarding the care of her elderly parents.    Protocols used: INFORMATION ONLY " CALL-ADULT-AH

## 2020-03-18 PROBLEM — J90 PLEURAL EFFUSION ON RIGHT: Status: ACTIVE | Noted: 2020-01-01

## 2020-03-18 PROBLEM — R41.0 DELIRIUM: Status: ACTIVE | Noted: 2020-01-01

## 2020-03-19 NOTE — PROGRESS NOTES
Discharge Planning Assessment   [place]     Patient Name: Lakesha Costello  MRN: [id number]  Today's Date: 3/19/2020    Admit Date: 3/18/2020    Discharge Needs Assessment     Row Name 03/19/20 1147       Living Environment    Lives With  spouse    Current Living Arrangements  home/apartment/condo    Primary Care Provided by  self    Provides Primary Care For  no one    Family Caregiver if Needed  child(obed), adult    Quality of Family Relationships  involved;helpful;supportive    Able to Return to Prior Arrangements  yes       Resource/Environmental Concerns    Resource/Environmental Concerns  none       Transition Planning    Patient/Family Anticipates Transition to  home with family;home with help/services    Patient/Family Anticipated Services at Transition  hospice care    Transportation Anticipated  health plan transportation       Discharge Needs Assessment    Concerns to be Addressed  adjustment to diagnosis/illness;care coordination/care conferences;discharge planning    Equipment Currently Used at Home  oxygen    Anticipated Changes Related to Illness  inability to care for self    Equipment Needed After Discharge  hospital bed    Outpatient/Agency/Support Group Needs  home hospice    Discharge Facility/Level of Care Needs  hospice facility    Provided Post Acute Provider List?  Yes    Patient's Choice of Community Agency(s)  [facility]    Current Discharge Risk  terminally ill    Discharge Coordination/Progress  Pt lives at home and will return home at d/c. Her family has decided to go with hospice and will go with [facility]. Referral sent to [facility] [phone] and called to Charline at [phone].        Discharge Plan    No documentation.       Destination      Coordination has not been started for this encounter.      Durable Medical Equipment      Coordination has not been started for this encounter.      Dialysis/Infusion      Coordination has not been started for this encounter.      Home  Medical Care      Service Provider Request Status Selected Services Address Phone Number Fax Number    [facility] Pending - Request Sent N/A [address], [facility] [address] [phone]      Therapy      Coordination has not been started for this encounter.      Community Resources      Coordination has not been started for this encounter.        Expected Discharge Date and Time     Expected Discharge Date Expected Discharge Time    Mar 19, 2020         Demographic Summary    No documentation.       Functional Status    No documentation.       Psychosocial    No documentation.       Abuse/Neglect    No documentation.       Legal    No documentation.       Substance Abuse    No documentation.       Patient Forms    No documentation.           NORA Banks

## 2020-03-19 NOTE — DISCHARGE SUMMARY
[facility] Medicine Services  DISCHARGE SUMMARY       Date of Admission: 3/18/2020  Date of Discharge:  3/19/2020  Primary Care Physician: Leonardo Zepeda, DO    Discharge Diagnoses:  Active Hospital Problems    Diagnosis   • **Pleural effusion on right   • Delirium   • Generalized weakness   • CML (chronic myeloid leukemia) (CMS/[facility])   • CKD (chronic kidney disease) stage 3, GFR 30-59 ml/min (CMS/[facility])     GFR 32 in 4/2018     • S/P TAVR (transcatheter aortic valve replacement)   • Artificial pacemaker   • Type 2 diabetes mellitus with diabetic peripheral angiopathy without gangrene, with long-term current use of insulin (CMS/[facility])   • SSS (sick sinus syndrome) (CMS/[facility])   • Coronary artery disease involving native coronary artery of native heart without angina pectoris     S/p CABG     • Essential hypertension         Presenting Problem/History of Present Illness:  Pleural effusion on right [J90]  Pleural effusion on right [J90]     Chief Complaint on Day of Discharge:   No complaint    History of Present Illness on Day of Discharge:   The patient remains very somnolent today after having received Ativan last evening because of agitation.  Ativan has subsequently been discontinued.  I had a long conversation with the patient's daughter who was present in the room.  She notes that she and her mother have had long conversations with her primary care physician regarding transitioning her mother to hospice care given her very poor quality of life.  CT scan of the patient's head shows no acute process.  CT scan of the chest shows a large right pleural effusion with a right middle lobe inflammatory process.  The patient has no signs or symptoms of pneumonia.  She is saturating 98 to 100% on room air.  Her only complaint is of chest discomfort which is well controlled with pain medication.  The patient's daughter and I had a long and very practical conversation regarding her  mother's treatment from this point on.  She is heavily leaning toward hospice.  I explained that a thoracentesis could not be performed for 5 days and that, given her lack of symptoms, risk would likely exceed benefit.  An MRI scan has been ordered but will be canceled as the patient has no evidence of focal neurologic deficit.  The patient also has a pacemaker which is unlikely to be MRI compatible.  There is also the suspicion of a right middle lobe consolidation with an inflammatory component.  The daughter's primary concern is keeping her mother comfortable and alleviating the patient's intermittent confusion.  I have suggested that we start her mother on Seroquel for intermittent confusion.  She is going to speak with her sister today about transitioning her mother to hospice care.  If the decision is to move forward with hospice care than the patient can likely be discharged home today.    Hospital Course  Lakesha Costello is an 84-year-old female with a vast medical history to include breast cancer, CML currently being treated with [facility]- oncologist in [place], coronary artery disease, congestive heart failure, TAVR, sick sinus syndrome with pacemaker, presence of watchman device, paroxysmal atrial fibrillation-followed by cardiology in [place], diabetes mellitus type 2, chronic kidney disease stage III, please see below for complete list.  Patient's daughter at bedside provides history of present illness.  Patient has been having increasing confusion and memory problems for approximately 1 month.  However starting yesterday she developed increasing confusion and change in mentation and today she has been agitated, hollering out in pain apparently worse with breathing and she is at times grabbing her head and stating she is hurting.  Patient's  has created cancer and home health nurse was in attendance when patient was having outbursts therefore she did call daughter who is power of  and she  did bring her mother to the emergency department.  ER work-up revealed stable creatinine 1.23, chronic anemia that is stable, large right pleural effusion that is new.  Vital signs are stable.  There is no other acute findings.  Daughter states primary care provider has been trying to get patient to agree to hospice care however patient currently wants to continue with treatment.  Daughter does wish for mother to be limited DNR however.  Patient is admitted for further evaluation treatment.  Plan:   1.  Admit as inpatient  2.  Pain management  3.  Home medications reviewed and restarted as appropriate  4.  DVT prophylaxis with SCDs  5.  Labs in a.m. to include PT and PTT  6.  Thoracentesis in a.m.  7.  MRI of the brain in a.m.  8.  Incentive spirometry, supplemental O2 as needed, RT to initiate breathing treatment protocol    After discussion with palliative care, the patient's daughter has decided to return the patient to her home with hospice care for comfort measures treatment.  I am wholly in agreement with this approach.  The patient is appropriate for discharge after hospice arrangements have been completed.    Consults:   Palliative care    Pertinent Test Results:   Lab Results (last 7 days)     Procedure Component Value Units Date/Time    POC Glucose Once [[id number]]  (Abnormal) Collected:  03/19/20 0755    Specimen:  Blood Updated:  03/19/20 0806     Glucose 185 mg/dL      Comment: : [id number] Pemberton JessicaMeter ID: [id number]       Basic Metabolic Panel [[id number]]  (Abnormal) Collected:  03/19/20 0551    Specimen:  Blood Updated:  03/19/20 0632     Glucose 211 mg/dL      BUN 46 mg/dL      Creatinine 1.33 mg/dL      Sodium 137 mmol/L      Potassium 4.1 mmol/L      Chloride 103 mmol/L      CO2 23.0 mmol/L      Calcium 9.0 mg/dL      eGFR Non African Amer 38 mL/min/1.73      BUN/Creatinine Ratio 34.6     Anion Gap 11.0 mmol/L     Narrative:       GFR Normal >60  Chronic Kidney Disease <60  Kidney Failure  <15      Lipid Panel [[id number]] Collected:  03/19/20 0551    Specimen:  Blood Updated:  03/19/20 0632     Total Cholesterol 106 mg/dL      Triglycerides 108 mg/dL      HDL Cholesterol 50 mg/dL      LDL Cholesterol  34 mg/dL      VLDL Cholesterol 21.6 mg/dL      LDL/HDL Ratio 0.69    Narrative:       Cholesterol Reference Ranges  (U.S. Department of Health and Human Services ATP III Classifications)    Desirable          <200 mg/dL  Borderline High    200-239 mg/dL  High Risk          >240 mg/dL      Triglyceride Reference Ranges  (U.S. Department of Health and Human Services ATP III Classifications)    Normal           <150 mg/dL  Borderline High  150-199 mg/dL  High             200-499 mg/dL  Very High        >500 mg/dL    HDL Reference Ranges  (U.S. Department of Health and Human Services ATP III Classifcations)    Low     <40 mg/dl (major risk factor for CHD)  High    >60 mg/dl ('negative' risk factor for CHD)        LDL Reference Ranges  (U.S. Department of Health and Human Services ATP III Classifcations)    Optimal          <100 mg/dL  Near Optimal     100-129 mg/dL  Borderline High  130-159 mg/dL  High             160-189 mg/dL  Very High        >189 mg/dL    TSH [[id number]]  (Normal) Collected:  03/19/20 0551    Specimen:  Blood Updated:  03/19/20 0632     TSH 1.040 uIU/mL     Protime-INR [[id number]]  (Abnormal) Collected:  03/19/20 0551    Specimen:  Blood Updated:  03/19/20 0617     Protime 14.9 Seconds      INR 1.18    aPTT [[id number]]  (Normal) Collected:  03/19/20 0551    Specimen:  Blood Updated:  03/19/20 0617     PTT 31.1 seconds     Ammonia [[id number]]  (Normal) Collected:  03/19/20 0551    Specimen:  Blood Updated:  03/19/20 0617     Ammonia 36 umol/L     Hemoglobin A1c [[id number]]  (Abnormal) Collected:  03/19/20 0551    Specimen:  Blood Updated:  03/19/20 0611     Hemoglobin A1C 8.20 %     Narrative:       Hemoglobin A1C Ranges:    Increased Risk for Diabetes  5.7% to 6.4%  Diabetes                      >= 6.5%  Diabetic Goal                < 7.0%    CBC (No Diff) [[id number]]  (Abnormal) Collected:  03/19/20 0551    Specimen:  Blood Updated:  03/19/20 0600     WBC 6.61 10*3/mm3      RBC 3.06 10*6/mm3      Hemoglobin 8.7 g/dL      Hematocrit 27.8 %      MCV 90.8 fL      MCH 28.4 pg      MCHC 31.3 g/dL      RDW 16.0 %      RDW-SD 53.1 fl      MPV 11.0 fL      Platelets 145 10*3/mm3     Comprehensive Metabolic Panel [[id number]]  (Abnormal) Collected:  03/18/20 1919    Specimen:  Blood Updated:  03/18/20 1945     Glucose 186 mg/dL      BUN 47 mg/dL      Creatinine 1.23 mg/dL      Sodium 136 mmol/L      Potassium 4.5 mmol/L      Chloride 100 mmol/L      CO2 22.0 mmol/L      Calcium 9.5 mg/dL      Total Protein 7.1 g/dL      Albumin 3.90 g/dL      ALT (SGPT) 16 U/L      AST (SGOT) 35 U/L      Alkaline Phosphatase 97 U/L      Total Bilirubin 0.4 mg/dL      eGFR Non African Amer 42 mL/min/1.73      Globulin 3.2 gm/dL      A/G Ratio 1.2 g/dL      BUN/Creatinine Ratio 38.2     Anion Gap 14.0 mmol/L     Narrative:       GFR Normal >60  Chronic Kidney Disease <60  Kidney Failure <15      Urinalysis With Culture If Indicated - Urine, Catheter In/Out [[id number]]  (Abnormal) Collected:  03/18/20 1929    Specimen:  Urine, Catheter In/Out Updated:  03/18/20 1939     Color, UA Yellow     Appearance, UA Clear     pH, UA <=5.0     Specific Gravity, UA 1.010     Glucose, UA Negative     Ketones, UA Negative     Bilirubin, UA Negative     Blood, UA Negative     Protein,  mg/dL (2+)     Leuk Esterase, UA Negative     Nitrite, UA Negative     Urobilinogen, UA 0.2 E.U./dL    Urinalysis, Microscopic Only - Urine, Catheter In/Out [[id number]]  (Abnormal) Collected:  03/18/20 1929    Specimen:  Urine, Catheter In/Out Updated:  03/18/20 1939     RBC, UA 0-2 /HPF      WBC, UA 0-2 /HPF      Bacteria, UA None Seen /HPF      Squamous Epithelial Cells, UA 0-2 /HPF      Hyaline Casts, UA None Seen /LPF      Methodology Automated  Microscopy    CBC & Differential [[id number]] Collected:  03/18/20 1919    Specimen:  Blood Updated:  03/18/20 1928    Narrative:       The following orders were created for panel order CBC & Differential.  Procedure                               Abnormality         Status                     ---------                               -----------         ------                     CBC Auto Differential[[id number]]        Abnormal            Final result                 Please view results for these tests on the individual orders.    CBC Auto Differential [[id number]]  (Abnormal) Collected:  03/18/20 1919    Specimen:  Blood Updated:  03/18/20 1928     WBC 7.17 10*3/mm3      RBC 3.13 10*6/mm3      Hemoglobin 8.9 g/dL      Hematocrit 28.2 %      MCV 90.1 fL      MCH 28.4 pg      MCHC 31.6 g/dL      RDW 16.1 %      RDW-SD 53.5 fl      MPV 11.3 fL      Platelets 153 10*3/mm3      Neutrophil % 75.0 %      Lymphocyte % 10.6 %      Monocyte % 11.0 %      Eosinophil % 2.5 %      Basophil % 0.6 %      Immature Grans % 0.3 %      Neutrophils, Absolute 5.38 10*3/mm3      Lymphocytes, Absolute 0.76 10*3/mm3      Monocytes, Absolute 0.79 10*3/mm3      Eosinophils, Absolute 0.18 10*3/mm3      Basophils, Absolute 0.04 10*3/mm3      Immature Grans, Absolute 0.02 10*3/mm3      nRBC 0.0 /100 WBC         Imaging Results (Last 7 Days)     Procedure Component Value Units Date/Time    CT Chest Without Contrast [[id number]] Collected:  03/18/20 2011     Updated:  03/18/20 2021    Narrative:       EXAMINATION:   CT CHEST WO CONTRAST-  3/18/2020 8:11 PM CDT     HISTORY: CT CHEST WO CONTRAST- 3/18/2020 7:35 PM CDT     HISTORY: Chest trauma, blunt     COMPARISON: 06/08/2019     DOSE LENGTH PRODUCT: 32311 mGy cm. Automated exposure control was also  utilized to decrease patient radiation dose.     TECHNIQUE: Serial helical tomographic images of the chest were acquired.  Multiplanar reformatted images were provided for review.      FINDINGS:  Substernal thyroid is present on the right. This was present since  03/23/2016 associated calcification is present there is a moderate  substernal extension of the thyroid into the mediastinum..      Centrilobular emphysema changes present. Right lower lobe is obscured by  large right pleural effusion. Smaller left pleural effusion..      There is focal consolidation in the right middle lobe.. The trachea and  bronchial tree are patent.     Cardiac silhouettes enlarged.. Prosthetic cardiac valve is present in  the aortic and mitral positions.. No obvious hilar adenopathy..  Extensive vascular calcifications present in the aorta     Kyphoplasty at L2 is noted. Wires are present from previous median  sternotomy..     No acute findings are seen in the visualized portion of the upper  abdomen.       Impression:       1. Large right pleural effusion. Most likely this is congestive failure.  2. Consolidation right middle lobe most likely an inflammatory process.  3. Centrilobular emphysema        This report was finalized on 03/18/2020 20:18 by Dr. Philip Styles MD.    CT Head Without Contrast [[id number]] Collected:  03/18/20 2007     Updated:  03/18/20 2012    Narrative:       EXAMINATION:   CT HEAD WO CONTRAST-  3/18/2020 8:07 PM CDT     HISTORY: CT BRAIN without contrast 3/18/2020 7:35 PM CDT     HISTORY: Confusion     COMPARISON: 10/06/2019      DLP: 2427 mGy cm     TECHNIQUE: Serial axial tomographic images of the brain were obtained  without the use of intravenous contrast.      FINDINGS:   The midline structures are nondisplaced. There is moderate cerebral and  cerebellar volume loss, with an associated increase in the prominence of  the ventricles and sulci. The basilar cisterns are normal in size and  configuration. There is no evidence of intracranial hemorrhage or  mass-effect. There is low attenuation in the periventricular white  matter, consistent with chronic ischemic change. Chronic  "calcification  in the basal ganglia region is again noted. There are no abnormal  extra-axial fluid collections. There is no evidence of tonsillar  herniation.      The included orbits and their contents are unremarkable. The visualized  paranasal sinuses, mastoid air cells and middle ear cavities are clear.  The visualized osseous structures and overlying soft tissues of the  skull and face are intact.        Impression:       Moderate cerebral and cerebellar volume loss with chronic microvascular  disease but no evidence of acute intracranial process.        This report was finalized on 03/18/2020 20:09 by Dr. Philip Styles MD.              Condition on Discharge:    Stable    Physical Exam on Discharge:  /66 (BP Location: Left arm, Patient Position: Lying)   Pulse 70   Temp 97.6 °F (36.4 °C)   Resp 16   Ht 152.4 cm (60\")   Wt 38.1 kg (84 lb)   SpO2 98%   BMI 16.41 kg/m²   Physical Exam  Constitutional: She appears well-developed and well-nourished. She appears lethargic.  Non-toxic appearance. She has a sickly appearance. No distress.   HENT:   Head: Normocephalic and atraumatic.   Nose: Nose normal.   Mouth/Throat: Oropharynx is clear and moist.   Eyes: Conjunctivae are normal. No scleral icterus.   Neck: Neck supple. No JVD present. No tracheal deviation present.   Cardiovascular: Normal rate, regular rhythm and normal heart sounds.   No murmur heard.  Pulmonary/Chest: Effort normal. She has decreased breath sounds in the right middle field and the right lower field.   Abdominal: Soft. Bowel sounds are normal. She exhibits no mass. There is no tenderness.   Musculoskeletal: Normal range of motion. She exhibits no edema or tenderness.   Neurological: She appears lethargic. Coordination normal.   Skin: Skin is warm and dry. No pallor.   Psychiatric: Cognition and memory are impaired.    Discharge Disposition:  Home or Self Care    Discharge Medications:     Discharge Medications      New Medications "      Instructions Start Date   haloperidol 2 MG/ML solution  Commonly known as:  HALDOL   2 mg, Oral, Every 6 Hours PRN      LORazepam 2 MG/ML concentrated solution  Commonly known as:  ATIVAN   1 mg, Oral, Every 8 Hours PRN      morphine 20 MG/5ML solution   5.2 mg, Oral, Every 4 Hours PRN         Continue These Medications      Instructions Start Date   bisacodyl 10 MG suppository  Commonly known as:  DULCOLAX   10 mg, Rectal, Daily PRN      furosemide 40 MG tablet  Commonly known as:  LASIX   60 mg, Oral, Daily, Take 1 tablet (40 mg) in the morning and 1/2 tablet (20 mg) in the evening.      insulin detemir 100 UNIT/ML injection  Commonly known as:  Levemir FlexTouch   15 Units, Subcutaneous, Nightly      metoprolol succinate XL 25 MG 24 hr tablet  Commonly known as:  TOPROL-XL   25 mg, Oral, Daily      mirtazapine 15 MG tablet  Commonly known as:  REMERON   7.5 mg, Oral, Nightly      pantoprazole 40 MG EC tablet  Commonly known as:  PROTONIX   40 mg, Oral, Daily      polyethyl glycol-propyl glycol 0.4-0.3 % solution ophthalmic solution  Commonly known as:  SYSTANE   1 drop, Both Eyes, Daily      polyethylene glycol packet  Commonly known as:  MIRALAX   17 g, Oral, Daily PRN      spironolactone 25 MG tablet  Commonly known as:  ALDACTONE   25 mg, Oral, Daily      triamcinolone 0.1 % cream  Commonly known as:  KENALOG   1 application, Topical, 2 Times Daily      vitamin B-12 500 MCG tablet  Commonly known as:  CYANOCOBALAMIN   1,000 mcg, Oral, Daily         Stop These Medications    calcium carbonate 600 MG tablet  Commonly known as:  OS-MICHAEL     cholecalciferol 25 MCG (1000 UT) tablet  Commonly known as:  VITAMIN D3     clopidogrel 75 MG tablet  Commonly known as:  PLAVIX     dasatinib 50 MG chemo tablet  Commonly known as:  SPRYCEL     ferrous sulfate 325 (65 FE) MG tablet     glipizide 5 MG tablet  Commonly known as:  GLUCOTROL     nitroglycerin 0.4 MG SL tablet  Commonly known as:  NITROSTAT      PreserVision/Lutein capsule     rosuvastatin 20 MG tablet  Commonly known as:  CRESTOR     venlafaxine XR 75 MG 24 hr capsule  Commonly known as:  EFFEXOR-XR            Discharge Diet:   Diet Instructions     Diet: Regular; Thin      Discharge Diet:  Regular    Fluid Consistency:  Thin          Discharge Care Plan / Instructions:   Discharge home on hospice    Activity at Discharge:   Activity Instructions     Activity as Tolerated             Ori Guzman DO  03/19/20  12:01    Time: Discharge less than 30 min    Please note that part of this note may be an electronic transcription/translation of spoken language to printed text using the Dragon Dictation System. Efforts were made to edit the dictations, but occasionally words are mistranscribed.

## 2020-03-19 NOTE — ED PROVIDER NOTES
.  Subjective   Patient is brought to emergency room by her daughter with a complaint of a change in the patient's mental status over the past week or so.  Daughter says the patient fell a week ago hitting her right side on the edge of her tub.  Initially did not seem to be very painful but in recent days she is complaining of severe pain there whenever she moves or breathes.  She is also been intermittently grabbing her head and saying her head hurts terribly and shaking and rocking back and forth.  There is no report of head trauma however.  Daughter says she is also become angry easily and shouting at people.  She do not know of any fever or chills or cough or congestion but is worried about a urinary tract infection because she is had one in the past and the patient says she still feels like there is one there now.  Daughter says she spoke with Dr. Zepeda's office and tried to get her seen but they could not see her and felt she would be better evaluated emergency room where x-rays can be done and checked out.  She also said they have been trying to get her on hospice release the doctor's office is been trying to the patient on hospice.  Hospice personnel came by to check the patient's  today and then told the daughter the patient was uncontrolled at home.  Daughter also says the patient has not been taking her medication at home correctly and she had to force her to take her heart medicine and her diabetic pill and fluid pill this morning.  She also reports peers with the patient seems very lethargic and will talk about things that do not make sense and then drift off to sleep in the mid sentence and then when aroused wakes up and does not know where she is had for a brief period of time.  Patient's only complaint to me is of pain in her right ribs and then says she just hurts all over.      History provided by:  Patient   used: No    Altered Mental Status   Presenting symptoms:  behavior changes    Severity:  Severe  Most recent episode:  More than 2 days ago  Episode history:  Multiple  Duration:  1 hour  Timing:  Intermittent  Progression:  Worsening  Chronicity:  New  Context: dementia and not taking medications as prescribed    Context: not alcohol use, not drug use, not head injury, not homeless, not nursing home resident, not recent change in medication, not recent illness and not recent infection    Associated symptoms: agitation and headaches    Associated symptoms: no abdominal pain, normal movement, no bladder incontinence, no decreased appetite, no depression, no difficulty breathing, no eye deviation, no fever, no hallucinations, no light-headedness, no nausea, no palpitations, no rash, no seizures, no slurred speech, no suicidal behavior, no visual change, no vomiting and no weakness        Review of Systems   Constitutional: Negative.  Negative for decreased appetite and fever.   Respiratory: Negative.    Cardiovascular: Positive for chest pain. Negative for palpitations.   Gastrointestinal: Negative.  Negative for abdominal pain, nausea and vomiting.   Genitourinary: Negative.  Negative for bladder incontinence.   Musculoskeletal: Negative.    Skin: Negative.  Negative for rash.   Neurological: Positive for headaches. Negative for seizures, weakness and light-headedness.   Psychiatric/Behavioral: Positive for agitation. Negative for hallucinations.   All other systems reviewed and are negative.      Past Medical History:   Diagnosis Date   • Aortic stenosis    • Arthritis    • Breast cancer (CMS/[facility])    • CAD (coronary artery disease)    • Cataract    • CHF (congestive heart failure) (CMS/[facility])     recently diagnosed after an ER visit.    • Chronic anticoagulation     Coumadin    • CKD (chronic kidney disease) stage 3, GFR 30-59 ml/min (CMS/[facility]) 4/16/2018   • Diabetes mellitus (CMS/[facility])     Type II   • Elevated cholesterol    • GERD (gastroesophageal reflux disease)    •  Hyperlipidemia    • Hypertension    • Macular degeneration    • NSTEMI (non-ST elevated myocardial infarction) (CMS/[facility]) 9/9/2019   • Pancreatitis    • Paroxysmal atrial fibrillation (CMS/[facility])    • Presence of Watchman left atrial appendage closure device    • Presence of Watchman left atrial appendage closure device 11/26/2018   • Pulmonary hypertension (CMS/[facility])    • Rheumatic fever     during childhood   • Sacrum and coccyx fracture (CMS/[facility])    • Sick sinus syndrome (CMS/[facility])     with pacemaker   • UTI (urinary tract infection)        Allergies   Allergen Reactions   • Gleevec [Imatinib] Itching   • Bentyl [Dicyclomine] Itching   • Janumet [Sitagliptin-Metformin Hcl] Other (See Comments)     Chest pain   • Sacubitril-Valsartan Itching     Per daughter, Margarita, pt had previous reaction.   • Dilaudid [Hydromorphone] Nausea And Vomiting   • Enalapril Angioedema   • Lopid [Gemfibrozil] Nausea And Vomiting   • Sulfa Antibiotics Other (See Comments)     Syncope     • Ultram [Tramadol] Itching       Past Surgical History:   Procedure Laterality Date   • ANOMALOUS PULMONARY VENOUS RETURN REPAIR, TOTAL     • BACK SURGERY     • BELPHAROPTOSIS REPAIR     • BREAST SURGERY      right mastectomy   • CARDIAC CATHETERIZATION  1999, 2010   • CARDIAC CATHETERIZATION N/A 2/15/2017    Procedure: Left Heart Cath;  Surgeon: Ehsan Crocker MD;  Location:  [facility];  Service:    • CARDIAC CATHETERIZATION N/A 2/15/2017    Procedure: Right Heart Cath;  Surgeon: Ehsan Crocker MD;  Location:  [facility];  Service:    • CARDIAC CATHETERIZATION N/A 2/15/2017    Procedure: Coronary angiography;  Surgeon: Ehsan Crocker MD;  Location:  [facility];  Service:    • CARDIAC CATHETERIZATION N/A 2/15/2017    Procedure: Left ventriculography;  Surgeon: Ehsan Crocker MD;  Location:  [facility];  Service:    • CARDIAC CATHETERIZATION N/A 2/15/2017    Procedure: Intracardiac  echocardiogram;  Surgeon: Ehsan Crocker MD;  Location:  [facility];  Service:    • CARDIAC ELECTROPHYSIOLOGY PROCEDURE N/A 2/3/2017    Procedure: Pacemaker DC new;  Surgeon: Ehsan Crocker MD;  Location:  [facility];  Service:    • CARDIAC SURGERY      TAVR   • CARDIOVERSION     • CATARACT EXTRACTION     • CHOLECYSTECTOMY     • CORONARY ARTERY BYPASS GRAFT  1999    4 vessel    • CORONARY ARTERY BYPASS GRAFT     • CORONARY STENT PLACEMENT     • HYSTERECTOMY  1962   • INSERT / REPLACE / REMOVE PACEMAKER     • MASTECTOMY  2000   • OTHER SURGICAL HISTORY      TAVR 2017       Family History   Problem Relation Age of Onset   • Breast cancer Mother    • Coronary artery disease Father    • Heart attack Father    • Diabetes Father    • Cancer Brother    • No Known Problems Maternal Grandmother    • No Known Problems Maternal Grandfather    • No Known Problems Paternal Grandmother    • No Known Problems Paternal Grandfather        Social History     Socioeconomic History   • Marital status:      Spouse name: Not on file   • Number of children: Not on file   • Years of education: Not on file   • Highest education level: Not on file   Tobacco Use   • Smoking status: Never Smoker   • Smokeless tobacco: Never Used   Substance and Sexual Activity   • Alcohol use: No   • Drug use: No   • Sexual activity: Defer       Prior to Admission medications    Medication Sig Start Date End Date Taking? Authorizing Provider   aspirin 81 MG EC tablet Take 81 mg by mouth Daily.    ProviderMinda MD   bisacodyl (DULCOLAX) 10 MG suppository Insert 10 mg into the rectum Daily As Needed for Constipation.    ProviderMinda MD   calcium carbonate (OS-MICHAEL) 600 MG tablet Take 600 mg by mouth Daily.    Minda Salvador MD   cholecalciferol (VITAMIN D3) 1000 units tablet Take 1,000 Units by mouth Daily.    Minda Salvador MD   clopidogrel (PLAVIX) 75 MG tablet Take 75 mg by mouth Daily.     Minda Salvador MD   dasatinib (SPRYCEL) 50 MG chemo tablet Take 1 tablet by mouth Daily. 1/6/20   Minda Salvador MD   ferrous sulfate 325 (65 FE) MG tablet Take 1 tablet by mouth 2 (Two) Times a Day With Meals. 2/15/20   Daryl Pritchard DO   furosemide (LASIX) 40 MG tablet Take 60 mg by mouth Daily. Take 1 tablet (40 mg) in the morning and 1/2 tablet (20 mg) in the evening.    Minda Salvador MD   glipizide (GLUCOTROL) 5 MG tablet Take 1 tablet by mouth 2 (Two) Times a Day. 12/4/19   Minda Salvador MD   insulin detemir (LEVEMIR FLEXTOUCH) 100 UNIT/ML injection Inject 15 Units under the skin into the appropriate area as directed Every Night. 12/4/19   Leonardo Zepeda, DO   metoprolol succinate XL (TOPROL-XL) 25 MG 24 hr tablet Take 25 mg by mouth Daily.    Minda Salvador MD   mirtazapine (REMERON) 15 MG tablet Take 7.5 mg by mouth Every Night.    Minda Salvador MD   Multiple Vitamins-Minerals (PRESERVISION/LUTEIN) capsule Take 1 capsule by mouth 2 (Two) Times a Day.    Minda Salvador MD   nitroglycerin (NITROSTAT) 0.4 MG SL tablet Place 0.4 mg under the tongue Every 5 (Five) Minutes As Needed for Chest Pain. Take no more than 3 doses in 15 minutes.    Minda Salvador MD   pantoprazole (PROTONIX) 40 MG EC tablet Take 1 tablet by mouth Daily. 8/22/19   Leonardo Zepeda, DO   polyethyl glycol-propyl glycol (SYSTANE) 0.4-0.3 % solution ophthalmic solution Administer 1 drop to both eyes Daily.    Minda Salvador MD   polyethylene glycol (MIRALAX) packet Take 17 g by mouth Daily As Needed (constipation).    Minda Salvador MD   rosuvastatin (CRESTOR) 20 MG tablet Take 20 mg by mouth Every Night.    Minda Salvador MD   spironolactone (ALDACTONE) 25 MG tablet Take 25 mg by mouth Daily.    Minda Salvador MD   triamcinolone (KENALOG) 0.1 % cream Apply 1 application topically to the appropriate area as directed 2 (Two) Times a Day.  5/20/19 5/20/20  Provider, MD Minda   venlafaxine XR (EFFEXOR-XR) 75 MG 24 hr capsule Take 1 capsule by mouth Daily. 1/13/20   Leonardo Zepeda,    vitamin B-12 (CYANOCOBALAMIN) 500 MCG tablet Take 1,000 mcg by mouth Daily.    ProviderMinda MD       Medications   sodium chloride 0.9 % flush 10 mL (has no administration in time range)       Vitals:    03/18/20 1830   BP: 163/68   Pulse: 70   Resp: 20   Temp: 97.4 °F (36.3 °C)   SpO2: 100%         Objective   Physical Exam   Constitutional: She is oriented to person, place, and time. She appears well-developed and well-nourished.   HENT:   Head: Normocephalic and atraumatic.   Eyes: Pupils are equal, round, and reactive to light. EOM are normal.   Neck: Normal range of motion. Neck supple.   Cardiovascular: Normal rate and regular rhythm.   Murmur heard.  Pulmonary/Chest: Effort normal and breath sounds normal. She exhibits tenderness.   Patient is tender along the right lateral ribs but no crepitus or deformities noted.   Abdominal: Soft. Bowel sounds are normal.   Musculoskeletal: Normal range of motion.   Neurological: She is alert and oriented to person, place, and time. She has normal strength.   Skin: Skin is warm and dry. There is pallor.   Psychiatric: She has a normal mood and affect. Her behavior is normal.   Nursing note and vitals reviewed.      Procedures         Lab Results (last 24 hours)     Procedure Component Value Units Date/Time    CBC & Differential [[id number]] Collected:  03/18/20 1919    Specimen:  Blood Updated:  03/18/20 1928    Narrative:       The following orders were created for panel order CBC & Differential.  Procedure                               Abnormality         Status                     ---------                               -----------         ------                     CBC Auto Differential[[id number]]        Abnormal            Final result                 Please view results for these tests on the  individual orders.    Comprehensive Metabolic Panel [[id number]]  (Abnormal) Collected:  03/18/20 1919    Specimen:  Blood Updated:  03/18/20 1945     Glucose 186 mg/dL      BUN 47 mg/dL      Creatinine 1.23 mg/dL      Sodium 136 mmol/L      Potassium 4.5 mmol/L      Chloride 100 mmol/L      CO2 22.0 mmol/L      Calcium 9.5 mg/dL      Total Protein 7.1 g/dL      Albumin 3.90 g/dL      ALT (SGPT) 16 U/L      AST (SGOT) 35 U/L      Alkaline Phosphatase 97 U/L      Total Bilirubin 0.4 mg/dL      eGFR Non African Amer 42 mL/min/1.73      Globulin 3.2 gm/dL      A/G Ratio 1.2 g/dL      BUN/Creatinine Ratio 38.2     Anion Gap 14.0 mmol/L     Narrative:       GFR Normal >60  Chronic Kidney Disease <60  Kidney Failure <15      CBC Auto Differential [[id number]]  (Abnormal) Collected:  03/18/20 1919    Specimen:  Blood Updated:  03/18/20 1928     WBC 7.17 10*3/mm3      RBC 3.13 10*6/mm3      Hemoglobin 8.9 g/dL      Hematocrit 28.2 %      MCV 90.1 fL      MCH 28.4 pg      MCHC 31.6 g/dL      RDW 16.1 %      RDW-SD 53.5 fl      MPV 11.3 fL      Platelets 153 10*3/mm3      Neutrophil % 75.0 %      Lymphocyte % 10.6 %      Monocyte % 11.0 %      Eosinophil % 2.5 %      Basophil % 0.6 %      Immature Grans % 0.3 %      Neutrophils, Absolute 5.38 10*3/mm3      Lymphocytes, Absolute 0.76 10*3/mm3      Monocytes, Absolute 0.79 10*3/mm3      Eosinophils, Absolute 0.18 10*3/mm3      Basophils, Absolute 0.04 10*3/mm3      Immature Grans, Absolute 0.02 10*3/mm3      nRBC 0.0 /100 WBC     Urinalysis With Culture If Indicated - Urine, Catheter In/Out [[id number]]  (Abnormal) Collected:  03/18/20 1929    Specimen:  Urine, Catheter In/Out Updated:  03/18/20 1939     Color, UA Yellow     Appearance, UA Clear     pH, UA <=5.0     Specific Gravity, UA 1.010     Glucose, UA Negative     Ketones, UA Negative     Bilirubin, UA Negative     Blood, UA Negative     Protein,  mg/dL (2+)     Leuk Esterase, UA Negative     Nitrite, UA Negative      Urobilinogen, UA 0.2 E.U./dL    Urinalysis, Microscopic Only - Urine, Catheter In/Out [[id number]]  (Abnormal) Collected:  03/18/20 1929    Specimen:  Urine, Catheter In/Out Updated:  03/18/20 1939     RBC, UA 0-2 /HPF      WBC, UA 0-2 /HPF      Bacteria, UA None Seen /HPF      Squamous Epithelial Cells, UA 0-2 /HPF      Hyaline Casts, UA None Seen /LPF      Methodology Automated Microscopy          CT Head Without Contrast   Final Result   Moderate cerebral and cerebellar volume loss with chronic microvascular   disease but no evidence of acute intracranial process.           This report was finalized on 03/18/2020 20:09 by Dr. Philip Styles MD.      CT Chest Without Contrast   Final Result   1. Large right pleural effusion. Most likely this is congestive failure.   2. Consolidation right middle lobe most likely an inflammatory process.   3. Centrilobular emphysema           This report was finalized on 03/18/2020 20:18 by Dr. Philip Styles MD.          ED Course  ED Course as of Mar 18 2128   Wed Mar 18, 2020   2127 I told patient and her daughter the findings specifically on the CT chest as a most significant problem showing a large right pleural effusion.  I am not sure if this is traumatic or malignant.  Patient notes that she has complained of some shortness of breath and feeling of fluid along the right side even prior to her fall last week so this is probably malignant.  Either way we will need testing and evaluation.  I have spoke with Dr. Bernal and we will admit.    [TR]      ED Course User Index  [TR] Sherif Early Jr., MD          MDM  Number of Diagnoses or Management Options  Pleural effusion on right: new and requires workup     Amount and/or Complexity of Data Reviewed  Clinical lab tests: ordered and reviewed  Tests in the radiology section of CPT®: ordered and reviewed  Decide to obtain previous medical records or to obtain history from someone other than the patient: yes  Discuss the patient  with other providers: yes    Risk of Complications, Morbidity, and/or Mortality  Presenting problems: moderate  Diagnostic procedures: moderate  Management options: moderate    Patient Progress  Patient progress: stable      Final diagnoses:   Pleural effusion on right          Sherif Early Jr., MD  03/18/20 2128

## 2020-03-19 NOTE — ED NOTES
"Pt daughter states that hospice stopped by to see her father (pt ) today and hospice nurse stated that pt was \"wild\" and \"confused.\" She states that pt was \"holding her head\" and being very rude which is unlikely for pt. Daughter states she had multiple episodes like this today. Daughter states pt has had \"delusional moments\" where she \"talks out of her head\" recently. Daughter states this has been going on since last night. Daughter also states that pt fell approximately 1 week ago and landed on her right side in her tub. Daughter states she thinks pt may have broken a rib at this time. Daughter states pt did not hit her head during fall. Pt daughter states that Dr Katelyn has been attempting to be putting her on hospice since September when she had \"massive heart attack\" in which one artery was stented but the other was \"non operable.\" Pt is on oral chemo for leukemia. Daughter states pt has not taken any medications in \"several days.\" Daughter also thinks she has UTI due to pt actions of confusion and previous UTI diagnosis.     Pt c/o pain \"everywhere.\" She c/o right side pain specifically where mastectomy was performed. PT also c/o head pain.      Carmen Dejesus RN  03/18/20 1911    "

## 2020-03-19 NOTE — PROGRESS NOTES
Continued Stay Note  BRITTANY Bennett     Patient Name: Lakesha Costello  MRN: [id number]  Today's Date: 3/19/2020    Admit Date: 3/18/2020    Discharge Plan     Row Name 03/19/20 1459       Plan    Plan  Home with [facility]    Provided Post Acute Provider List?  Yes    Patient/Family in Agreement with Plan  yes    Final Discharge Disposition Code  50 - home with hospice    Final Note  Pt is going home with [facility] [phone] and will transport by [facility] [phone].        Discharge Codes    No documentation.       Expected Discharge Date and Time     Expected Discharge Date Expected Discharge Time    Mar 19, 2020             NORA Banks

## 2020-03-19 NOTE — PROGRESS NOTES
[facility] Medicine Services  INPATIENT PROGRESS NOTE    Length of Stay: 1  Date of Admission: 3/18/2020  Primary Care Physician: Leonardo Zepeda DO    Subjective     Chief Complaint:     Right chest discomfort    HPI     The patient remains very somnolent today after having received Ativan last evening because of agitation.  Ativan has subsequently been discontinued.  I had a long conversation with the patient's daughter who was present in the room.  She notes that she and her mother have had long conversations with her primary care physician regarding transitioning her mother to hospice care given her very poor quality of life.  CT scan of the patient's head shows no acute process.  CT scan of the chest shows a large right pleural effusion with a right middle lobe inflammatory process.  The patient has no signs or symptoms of pneumonia.  She is saturating 98 to 100% on room air.  Her only complaint is of chest discomfort which is well controlled with pain medication.  The patient's daughter and I had a long and very practical conversation regarding her mother's treatment from this point on.  She is heavily leaning toward hospice.  I explained that a thoracentesis could not be performed for 5 days and that, given her lack of symptoms, risk would likely exceed benefit.  An MRI scan has been ordered but will be canceled as the patient has no evidence of focal neurologic deficit.  The patient also has a pacemaker which is unlikely to be MRI compatible.  There is also the suspicion of a right middle lobe consolidation with an inflammatory component.  The daughter's primary concern is keeping her mother comfortable and alleviating the patient's intermittent confusion.  I have suggested that we start her mother on Seroquel for intermittent confusion.  She is going to speak with her sister today about transitioning her mother to hospice care.  If the decision is to move forward  with hospice care than the patient can likely be discharged home today.    Review of Systems     All pertinent negatives and positives are as above. All other systems have been reviewed and are negative unless otherwise stated.     Objective    Temp:  [97.4 °F (36.3 °C)-98 °F (36.7 °C)] 97.6 °F (36.4 °C)  Heart Rate:  [70-72] 70  Resp:  [16-20] 16  BP: (141-168)/(64-98) 156/66    Lab Results (last 24 hours)     Procedure Component Value Units Date/Time    POC Glucose Once [[id number]]  (Abnormal) Collected:  03/19/20 0755    Specimen:  Blood Updated:  03/19/20 0806     Glucose 185 mg/dL      Comment: : [id number] Dee Dee JessicaMeter ID: [id number]       Basic Metabolic Panel [[id number]]  (Abnormal) Collected:  03/19/20 0551    Specimen:  Blood Updated:  03/19/20 0632     Glucose 211 mg/dL      BUN 46 mg/dL      Creatinine 1.33 mg/dL      Sodium 137 mmol/L      Potassium 4.1 mmol/L      Chloride 103 mmol/L      CO2 23.0 mmol/L      Calcium 9.0 mg/dL      eGFR Non African Amer 38 mL/min/1.73      BUN/Creatinine Ratio 34.6     Anion Gap 11.0 mmol/L     Narrative:       GFR Normal >60  Chronic Kidney Disease <60  Kidney Failure <15      Lipid Panel [[id number]] Collected:  03/19/20 0551    Specimen:  Blood Updated:  03/19/20 0632     Total Cholesterol 106 mg/dL      Triglycerides 108 mg/dL      HDL Cholesterol 50 mg/dL      LDL Cholesterol  34 mg/dL      VLDL Cholesterol 21.6 mg/dL      LDL/HDL Ratio 0.69    Narrative:       Cholesterol Reference Ranges  (U.S. Department of Health and Human Services ATP III Classifications)    Desirable          <200 mg/dL  Borderline High    200-239 mg/dL  High Risk          >240 mg/dL      Triglyceride Reference Ranges  (U.S. Department of Health and Human Services ATP III Classifications)    Normal           <150 mg/dL  Borderline High  150-199 mg/dL  High             200-499 mg/dL  Very High        >500 mg/dL    HDL Reference Ranges  (U.S. Department of Health and Human Services ATP  III Classifcations)    Low     <40 mg/dl (major risk factor for CHD)  High    >60 mg/dl ('negative' risk factor for CHD)        LDL Reference Ranges  (U.S. Department of Health and Human Services ATP III Classifcations)    Optimal          <100 mg/dL  Near Optimal     100-129 mg/dL  Borderline High  130-159 mg/dL  High             160-189 mg/dL  Very High        >189 mg/dL    TSH [[id number]]  (Normal) Collected:  03/19/20 0551    Specimen:  Blood Updated:  03/19/20 0632     TSH 1.040 uIU/mL     Protime-INR [[id number]]  (Abnormal) Collected:  03/19/20 0551    Specimen:  Blood Updated:  03/19/20 0617     Protime 14.9 Seconds      INR 1.18    aPTT [[id number]]  (Normal) Collected:  03/19/20 0551    Specimen:  Blood Updated:  03/19/20 0617     PTT 31.1 seconds     Ammonia [[id number]]  (Normal) Collected:  03/19/20 0551    Specimen:  Blood Updated:  03/19/20 0617     Ammonia 36 umol/L     Hemoglobin A1c [[id number]]  (Abnormal) Collected:  03/19/20 0551    Specimen:  Blood Updated:  03/19/20 0611     Hemoglobin A1C 8.20 %     Narrative:       Hemoglobin A1C Ranges:    Increased Risk for Diabetes  5.7% to 6.4%  Diabetes                     >= 6.5%  Diabetic Goal                < 7.0%    CBC (No Diff) [[id number]]  (Abnormal) Collected:  03/19/20 0551    Specimen:  Blood Updated:  03/19/20 0600     WBC 6.61 10*3/mm3      RBC 3.06 10*6/mm3      Hemoglobin 8.7 g/dL      Hematocrit 27.8 %      MCV 90.8 fL      MCH 28.4 pg      MCHC 31.3 g/dL      RDW 16.0 %      RDW-SD 53.1 fl      MPV 11.0 fL      Platelets 145 10*3/mm3     Comprehensive Metabolic Panel [[id number]]  (Abnormal) Collected:  03/18/20 1919    Specimen:  Blood Updated:  03/18/20 1945     Glucose 186 mg/dL      BUN 47 mg/dL      Creatinine 1.23 mg/dL      Sodium 136 mmol/L      Potassium 4.5 mmol/L      Chloride 100 mmol/L      CO2 22.0 mmol/L      Calcium 9.5 mg/dL      Total Protein 7.1 g/dL      Albumin 3.90 g/dL      ALT (SGPT) 16 U/L      AST (SGOT) 35 U/L       Alkaline Phosphatase 97 U/L      Total Bilirubin 0.4 mg/dL      eGFR Non African Amer 42 mL/min/1.73      Globulin 3.2 gm/dL      A/G Ratio 1.2 g/dL      BUN/Creatinine Ratio 38.2     Anion Gap 14.0 mmol/L     Narrative:       GFR Normal >60  Chronic Kidney Disease <60  Kidney Failure <15      Urinalysis With Culture If Indicated - Urine, Catheter In/Out [[id number]]  (Abnormal) Collected:  03/18/20 1929    Specimen:  Urine, Catheter In/Out Updated:  03/18/20 1939     Color, UA Yellow     Appearance, UA Clear     pH, UA <=5.0     Specific Gravity, UA 1.010     Glucose, UA Negative     Ketones, UA Negative     Bilirubin, UA Negative     Blood, UA Negative     Protein,  mg/dL (2+)     Leuk Esterase, UA Negative     Nitrite, UA Negative     Urobilinogen, UA 0.2 E.U./dL    Urinalysis, Microscopic Only - Urine, Catheter In/Out [[id number]]  (Abnormal) Collected:  03/18/20 1929    Specimen:  Urine, Catheter In/Out Updated:  03/18/20 1939     RBC, UA 0-2 /HPF      WBC, UA 0-2 /HPF      Bacteria, UA None Seen /HPF      Squamous Epithelial Cells, UA 0-2 /HPF      Hyaline Casts, UA None Seen /LPF      Methodology Automated Microscopy    CBC & Differential [[id number]] Collected:  03/18/20 1919    Specimen:  Blood Updated:  03/18/20 1928    Narrative:       The following orders were created for panel order CBC & Differential.  Procedure                               Abnormality         Status                     ---------                               -----------         ------                     CBC Auto Differential[[id number]]        Abnormal            Final result                 Please view results for these tests on the individual orders.    CBC Auto Differential [[id number]]  (Abnormal) Collected:  03/18/20 1919    Specimen:  Blood Updated:  03/18/20 1928     WBC 7.17 10*3/mm3      RBC 3.13 10*6/mm3      Hemoglobin 8.9 g/dL      Hematocrit 28.2 %      MCV 90.1 fL      MCH 28.4 pg      MCHC 31.6 g/dL      RDW 16.1 %       RDW-SD 53.5 fl      MPV 11.3 fL      Platelets 153 10*3/mm3      Neutrophil % 75.0 %      Lymphocyte % 10.6 %      Monocyte % 11.0 %      Eosinophil % 2.5 %      Basophil % 0.6 %      Immature Grans % 0.3 %      Neutrophils, Absolute 5.38 10*3/mm3      Lymphocytes, Absolute 0.76 10*3/mm3      Monocytes, Absolute 0.79 10*3/mm3      Eosinophils, Absolute 0.18 10*3/mm3      Basophils, Absolute 0.04 10*3/mm3      Immature Grans, Absolute 0.02 10*3/mm3      nRBC 0.0 /100 WBC           Imaging Results (Last 24 Hours)     Procedure Component Value Units Date/Time    CT Chest Without Contrast [[id number]] Collected:  03/18/20 2011     Updated:  03/18/20 2021    Narrative:       EXAMINATION:   CT CHEST WO CONTRAST-  3/18/2020 8:11 PM CDT     HISTORY: CT CHEST WO CONTRAST- 3/18/2020 7:35 PM CDT     HISTORY: Chest trauma, blunt     COMPARISON: 06/08/2019     DOSE LENGTH PRODUCT: 90209 mGy cm. Automated exposure control was also  utilized to decrease patient radiation dose.     TECHNIQUE: Serial helical tomographic images of the chest were acquired.  Multiplanar reformatted images were provided for review.     FINDINGS:  Substernal thyroid is present on the right. This was present since  03/23/2016 associated calcification is present there is a moderate  substernal extension of the thyroid into the mediastinum..      Centrilobular emphysema changes present. Right lower lobe is obscured by  large right pleural effusion. Smaller left pleural effusion..      There is focal consolidation in the right middle lobe.. The trachea and  bronchial tree are patent.     Cardiac silhouettes enlarged.. Prosthetic cardiac valve is present in  the aortic and mitral positions.. No obvious hilar adenopathy..  Extensive vascular calcifications present in the aorta     Kyphoplasty at L2 is noted. Wires are present from previous median  sternotomy..     No acute findings are seen in the visualized portion of the upper  abdomen.       Impression:        1. Large right pleural effusion. Most likely this is congestive failure.  2. Consolidation right middle lobe most likely an inflammatory process.  3. Centrilobular emphysema        This report was finalized on 03/18/2020 20:18 by Dr. Philip Styles MD.    CT Head Without Contrast [[id number]] Collected:  03/18/20 2007     Updated:  03/18/20 2012    Narrative:       EXAMINATION:   CT HEAD WO CONTRAST-  3/18/2020 8:07 PM CDT     HISTORY: CT BRAIN without contrast 3/18/2020 7:35 PM CDT     HISTORY: Confusion     COMPARISON: 10/06/2019      DLP: 2427 mGy cm     TECHNIQUE: Serial axial tomographic images of the brain were obtained  without the use of intravenous contrast.      FINDINGS:   The midline structures are nondisplaced. There is moderate cerebral and  cerebellar volume loss, with an associated increase in the prominence of  the ventricles and sulci. The basilar cisterns are normal in size and  configuration. There is no evidence of intracranial hemorrhage or  mass-effect. There is low attenuation in the periventricular white  matter, consistent with chronic ischemic change. Chronic calcification  in the basal ganglia region is again noted. There are no abnormal  extra-axial fluid collections. There is no evidence of tonsillar  herniation.      The included orbits and their contents are unremarkable. The visualized  paranasal sinuses, mastoid air cells and middle ear cavities are clear.  The visualized osseous structures and overlying soft tissues of the  skull and face are intact.        Impression:       Moderate cerebral and cerebellar volume loss with chronic microvascular  disease but no evidence of acute intracranial process.        This report was finalized on 03/18/2020 20:09 by Dr. Philip Styles MD.           No intake or output data in the 24 hours ending 03/19/20 1108    Physical Exam   Constitutional: She appears well-developed and well-nourished. She appears lethargic.  Non-toxic appearance. She  has a sickly appearance. No distress.   HENT:   Head: Normocephalic and atraumatic.   Nose: Nose normal.   Mouth/Throat: Oropharynx is clear and moist.   Eyes: Conjunctivae are normal. No scleral icterus.   Neck: Neck supple. No JVD present. No tracheal deviation present.   Cardiovascular: Normal rate, regular rhythm and normal heart sounds.   No murmur heard.  Pulmonary/Chest: Effort normal. She has decreased breath sounds in the right middle field and the right lower field.   Abdominal: Soft. Bowel sounds are normal. She exhibits no mass. There is no tenderness.   Musculoskeletal: Normal range of motion. She exhibits no edema or tenderness.   Neurological: She appears lethargic. Coordination normal.   Skin: Skin is warm and dry. No pallor.   Psychiatric: Cognition and memory are impaired.       Results Review:  I have reviewed the labs, radiology results, and diagnostic studies since my last progress note and made treatment changes reflective of the results.   I have reviewed the current medications.    Assessment/Plan     Active Hospital Problems    Diagnosis   • **Pleural effusion on right   • Delirium   • Generalized weakness   • CML (chronic myeloid leukemia) (CMS/[facility])   • CKD (chronic kidney disease) stage 3, GFR 30-59 ml/min (CMS/[facility])     GFR 32 in 4/2018     • S/P TAVR (transcatheter aortic valve replacement)   • Artificial pacemaker   • Type 2 diabetes mellitus with diabetic peripheral angiopathy without gangrene, with long-term current use of insulin (CMS/[facility])   • SSS (sick sinus syndrome) (CMS/[facility])   • Coronary artery disease involving native coronary artery of native heart without angina pectoris     S/p CABG     • Essential hypertension       PLAN:  Cancel MRI scan  Discontinue pacemaker inquiry  Advance to regular diet  Discontinue cardiac monitoring  Palliative care consultation for hospice discussion    Ori Guzman DO   03/19/20   11:08

## 2020-03-19 NOTE — PLAN OF CARE
Problem: Patient Care Overview  Goal: Plan of Care Review  Outcome: Ongoing (interventions implemented as appropriate)  Flowsheets (Taken 3/19/2020 5062)  Outcome Summary: Pt was moved from hospice and is being transferred from the facility. PT approached pts daughter about education of transfers and bed mobility, pts daughter has been a nurse for 30+ years and said the educated was not necessary.

## 2020-03-19 NOTE — PROGRESS NOTES
RT Nebulizer Protocol    Assessment tool to be used for patients with existing breathing treatments ordered by hospitalists                                                                  0  1  2  3  4      Respiratory History   No Smoking   x       Pulmonary Disease      Exacerbation        Respiratory Rate   Normal   x   20-25    Dyspneic    Accessory Muscles    Severe Dyspnea      Breath Sounds   Clear x   Crackles    Crackles/ Rhonchi    Wheezing    Absent/ Severe Wheezing      Chest   X-ray   Clear/Unavailable   x   1 Lobe Infiltration/ Consolidation/ PE    2 Lobe Same Lung Infiltration/ Consolidation/ PE     2 Lobe Infiltration/ Both Lungs/ Consolidation/ PE    Both Lungs/ More Than 1 Lobe/ Atelectasis/ Consolidation/  PE      Cough   Strong Non- Productive x   Excessive Secretions/ Strong Cough    Excessive Secretions/ Weak Cough    Thick Bronchial Secretions/ Weak Cough    Thick Bronchial Secretions/ No Cough      Total Patient Score =  0  0-4=Q4 PRN  5-9=TID and Q4 PRN  10-14=QID and Q3 PRN  15-19=Q4 and Q2 PRN  20=Q3 and Q2 PRN    Bronchopulmonary Hygiene (CPT)   Q4 ATC Copious secretions, dyspnea, unable to sleep, mucus plug    QID & Q4 PRN Moderate secretions    TID Small amounts of secretions w/ poor cough and history of secretions    BID Unable to deep breathe and cough spontaneously       Lung Expansion Therapy (PEP)   Q4 & PRN at night Severe atelectasis, poor oxygenation    QID  High risk for persistent atelectasis, existence of same    TID At risk for developing atelectasis    BID Unable to deep breathe and cough spontaneously     Instruct, 1 follow up Patients able to perform well on their own    Patient has been assessed and treatments will be ordered Q4 PRN

## 2020-03-19 NOTE — DISCHARGE PLACEMENT REQUEST
"[facility] [phone]    Lakesha Costello (84 y.o. Female)     Date of Birth Social Security Number Address Home Phone MRN    1935  [address] [facility] [id number] [phone] [id number]    Holiness Marital Status          [facility]        Admission Date Admission Type Admitting Provider Attending Provider Department, Room/Bed    3/18/20 Emergency Faisal Bernal MD Robinson, Maurice S, DO [facility] 3C, 378/    Discharge Date Discharge Disposition Discharge Destination                       Attending Provider:  Ori Guzman DO    Allergies:  Gleevec [Imatinib], Bentyl [Dicyclomine], Janumet [Sitagliptin-metformin Hcl], Sacubitril-valsartan, Dilaudid [Hydromorphone], Enalapril, Lopid [Gemfibrozil], Sulfa Antibiotics, Ultram [Tramadol]    Isolation:  None   Infection:  None   Code Status:  No CPR    Ht:  152.4 cm (60\")   Wt:  38.1 kg (84 lb)    Admission Cmt:  None   Principal Problem:  Pleural effusion on right [J90]                 Active Insurance as of 3/18/2020     Primary Coverage     Payor Plan Insurance Group Employer/Plan Group    HUMANA MEDICARE REPLACEMENT HUMANA MEDICARE REPLACEMENT [id number]     Payor Plan Address Payor Plan Phone Number Payor Plan Fax Number Effective Dates    [address] [address] [phone]  2018 - None Entered    [facility] [id number]       Subscriber Name Subscriber Birth Date Member ID       LAKESHA COSTELLO 1935 [id number]                 Emergency Contacts      (Rel.) Home Phone Work Phone Mobile Phone    Margarita Ignacio (Randall) (Daughter) [phone]        [address] [address]  Phone:  [phone]  Fax:          Patient:     Lakesha Costello MRN:  [id number]   [address] [facility] [address] :  1935  SSN:    Phone:  Sex:  F      INSURANCE PAYOR PLAN GROUP # SUBSCRIBER ID   Primary:    HUMANA " MEDICARE REPLACEMENT [id number]   Admitting Diagnosis: Pleural effusion on right [J90]  Order Date:  Mar 19, 2020         Inpatient Hospice / [facility]       (Order ID: [id number])     Diagnosis:         Priority:  Routine Expected Date:   Expiration Date:        Interval:   Count:    Reason for Consult? home with hospice today     Specimen Type:   Specimen Source:   Specimen Taken Date:   Specimen Taken Time:                   Authorizing Provider:Linda Sage APRN  Authorizing Provider's NPI: [id number]  Order Entered By: Linda Sage APRN 3/19/2020 11:39 AM     Electronically signed by: Linda Sage APRN 3/19/2020 11:39 AM        [address] [address]  Phone:  [phone]  Fax:          Patient:     Lakesha Costello MRN:  [id number]   [address] [facility] [address] :  1935  SSN:    Phone:  Sex:  F      INSURANCE PAYOR PLAN GROUP # SUBSCRIBER ID   Primary:    HUMANA MEDICARE REPLACEMENT [id number]   Admitting Diagnosis: Pleural effusion on right [J90]  Order Date:  Mar 19, 2020         Inpatient Hospice / [facility]       (Order ID: [id number])     Diagnosis:         Priority:  Routine Expected Date:   Expiration Date:        Interval:   Count:    Reason for Consult? home with hospice today     Specimen Type:   Specimen Source:   Specimen Taken Date:   Specimen Taken Time:                   Authorizing Provider:Linda Sage APRN  Authorizing Provider's NPI: [id number]  Order Entered By: Linda Sage APRN 3/19/2020 11:39 AM     Electronically signed by: Linda Sage APRN 3/19/2020 11:39 AM               History & Physical      Faisal Bernal MD at [address] [place] [address] Medicine Services  HISTORY AND PHYSICAL    Date of Admission: 3/18/2020  Primary Care Physician: Leonardo Zepeda,  DO    Subjective     Chief Complaint: Altered mental status, acute pain    History of Present Illness  Lakesha Costello is an 84-year-old female with a vast medical history to include breast cancer, CML currently being treated with [facility]- oncologist in [place], coronary artery disease, congestive heart failure, TAVR, sick sinus syndrome with pacemaker, presence of watchman device, paroxysmal atrial fibrillation-followed by cardiology in [place], diabetes mellitus type 2, chronic kidney disease stage III, please see below for complete list.  Patient's daughter at bedside provides history of present illness.  Patient has been having increasing confusion and memory problems for approximately 1 month.  However starting yesterday she developed increasing confusion and change in mentation and today she has been agitated, hollering out in pain apparently worse with breathing and she is at times grabbing her head and stating she is hurting.  Patient's  has created cancer and home health nurse was in attendance when patient was having outbursts therefore she did call daughter who is power of  and she did bring her mother to the emergency department.  ER work-up revealed stable creatinine 1.23, chronic anemia that is stable, large right pleural effusion that is new.  Vital signs are stable.  There is no other acute findings.  Daughter states primary care provider has been trying to get patient to agree to hospice care however patient currently wants to continue with treatment.  Daughter does wish for mother to be limited DNR however.  Patient is admitted for further evaluation treatment.    Review of Systems   Patient unable to provide meaningful review of systems due to altered mental status.      Past Medical History:   Past Medical History:   Diagnosis Date   • Aortic stenosis    • Arthritis    • Breast cancer (CMS/HCC)    • CAD (coronary artery disease)    • Cataract    • CHF (congestive heart failure)  (CMS/[facility])     recently diagnosed after an ER visit.    • CKD (chronic kidney disease) stage 3, GFR 30-59 ml/min (CMS/[facility]) 4/16/2018   • CML (chronic myelocytic leukemia) (CMS/[facility])    • Diabetes mellitus (CMS/[facility])     Type II   • Elevated cholesterol    • GERD (gastroesophageal reflux disease)    • Hyperlipidemia    • Hypertension    • Macular degeneration    • NSTEMI (non-ST elevated myocardial infarction) (CMS/[facility]) 9/9/2019   • Pancreatitis    • Paroxysmal atrial fibrillation (CMS/[facility])    • Presence of Watchman left atrial appendage closure device 11/26/2018   • Pulmonary hypertension (CMS/[facility])    • Rheumatic fever     during childhood   • Sacrum and coccyx fracture (CMS/[facility])    • Sick sinus syndrome (CMS/[facility])     with pacemaker   • UTI (urinary tract infection)        Past Surgical History:   Past Surgical History:   Procedure Laterality Date   • ANOMALOUS PULMONARY VENOUS RETURN REPAIR, TOTAL     • BACK SURGERY     • BELPHAROPTOSIS REPAIR     • BREAST SURGERY      right mastectomy   • CARDIAC CATHETERIZATION  1999, 2010   • CARDIAC CATHETERIZATION N/A 2/15/2017    Procedure: Left Heart Cath;  Surgeon: Ehsan Crocker MD;  Location:  [facility] CATH INVASIVE LOCATION;  Service:    • CARDIAC CATHETERIZATION N/A 2/15/2017    Procedure: Right Heart Cath;  Surgeon: Ehsan Crocker MD;  Location:  [facility] CATH INVASIVE LOCATION;  Service:    • CARDIAC CATHETERIZATION N/A 2/15/2017    Procedure: Coronary angiography;  Surgeon: Ehsan Crocker MD;  Location:  [facility] CATH INVASIVE LOC[facility];  Service:    • CARDIAC CATHETERIZATION N/A 2/15/2017    Procedure: Left ventriculography;  Surgeon: Ehsan Crocker MD;  Location:  [facility] CATH INVASIVE LOCATION;  Service:    • CARDIAC CATHETERIZATION N/A 2/15/2017    Procedure: Intracardiac echocardiogram;  Surgeon: Ehsan Crocker MD;  Location:  [facility] CATH INVASIVE LOCATION;  Service:    • CARDIAC ELECTROPHYSIOLOGY PROCEDURE N/A 2/3/2017    Procedure: Pacemaker DC new;  Surgeon: Ehsan Crocker MD;   Location: [facility] CATH INVASIVE LOCATION;  Service:    • CARDIAC SURGERY      TAVR   • CARDIOVERSION     • CATARACT EXTRACTION     • CHOLECYSTECTOMY     • CORONARY ARTERY BYPASS GRAFT  1999    4 vessel    • CORONARY ARTERY BYPASS GRAFT     • CORONARY STENT PLACEMENT     • HYSTERECTOMY  1962   • INSERT / REPLACE / REMOVE PACEMAKER     • MASTECTOMY  2000   • OTHER SURGICAL HISTORY      TAVR 2017       Family History: family history includes Breast cancer in her mother; Cancer in her brother; Coronary artery disease in her father; Diabetes in her father; Heart attack in her father; No Known Problems in her maternal grandfather, maternal grandmother, paternal grandfather, and paternal grandmother.    Social History:  reports that she has never smoked. She has never used smokeless tobacco. She reports that she does not drink alcohol or use drugs.    Code Status: Limited, no intubation, defibrillation or CPR, patient's daughter Margarita Ignacio is power of .      Allergies:  Allergies   Allergen Reactions   • Gleevec [Imatinib] Itching   • Bentyl [Dicyclomine] Itching   • Janumet [Sitagliptin-Metformin Hcl] Other (See Comments)     Chest pain   • Sacubitril-Valsartan Itching     Per daughterMargarita, pt had previous reaction.   • Dilaudid [Hydromorphone] Nausea And Vomiting   • Enalapril Angioedema   • Lopid [Gemfibrozil] Nausea And Vomiting   • Sulfa Antibiotics Other (See Comments)     Syncope     • Ultram [Tramadol] Itching       Medications:  Prior to Admission medications    Medication Sig Start Date End Date Taking? Authorizing Provider   aspirin 81 MG EC tablet Take 81 mg by mouth Daily.    ProviderMinda MD   bisacodyl (DULCOLAX) 10 MG suppository Insert 10 mg into the rectum Daily As Needed for Constipation.    ProviderMinda MD   calcium carbonate (OS-MICHAEL) 600 MG tablet Take 600 mg by mouth Daily.    Minda Salvador MD   cholecalciferol (VITAMIN D3) 1000 units tablet Take 1,000 Units by  mouth Daily.    Minda Salvador MD   clopidogrel (PLAVIX) 75 MG tablet Take 75 mg by mouth Daily.    Minda Salvador MD   dasatinib (SPRYCEL) 50 MG chemo tablet Take 1 tablet by mouth Daily. 1/6/20   Minda Salvador MD   ferrous sulfate 325 (65 FE) MG tablet Take 1 tablet by mouth 2 (Two) Times a Day With Meals. 2/15/20   Daryl Pritchard DO   furosemide (LASIX) 40 MG tablet Take 60 mg by mouth Daily. Take 1 tablet (40 mg) in the morning and 1/2 tablet (20 mg) in the evening.    Minda Salvador MD   glipizide (GLUCOTROL) 5 MG tablet Take 1 tablet by mouth 2 (Two) Times a Day. 12/4/19   Minda Salvador MD   insulin detemir (LEVEMIR FLEXTOUCH) 100 UNIT/ML injection Inject 15 Units under the skin into the appropriate area as directed Every Night. 12/4/19   Leonardo Zepeda, DO   metoprolol succinate XL (TOPROL-XL) 25 MG 24 hr tablet Take 25 mg by mouth Daily.    Minda Salvador MD   mirtazapine (REMERON) 15 MG tablet Take 7.5 mg by mouth Every Night.    Minda Salvador MD   Multiple Vitamins-Minerals (PRESERVISION/LUTEIN) capsule Take 1 capsule by mouth 2 (Two) Times a Day.    Minda Salvador MD   nitroglycerin (NITROSTAT) 0.4 MG SL tablet Place 0.4 mg under the tongue Every 5 (Five) Minutes As Needed for Chest Pain. Take no more than 3 doses in 15 minutes.    Minda Salvador MD   pantoprazole (PROTONIX) 40 MG EC tablet Take 1 tablet by mouth Daily. 8/22/19   Leonardo Zepeda, DO   polyethyl glycol-propyl glycol (SYSTANE) 0.4-0.3 % solution ophthalmic solution Administer 1 drop to both eyes Daily.    Minda Salvador MD   polyethylene glycol (MIRALAX) packet Take 17 g by mouth Daily As Needed (constipation).    Minda Salvador MD   rosuvastatin (CRESTOR) 20 MG tablet Take 20 mg by mouth Every Night.    Minda Salvador MD   spironolactone (ALDACTONE) 25 MG tablet Take 25 mg by mouth Daily.    Minda Salvador MD   triamcinolone  "(KENALOG) 0.1 % cream Apply 1 application topically to the appropriate area as directed 2 (Two) Times a Day. 5/20/19 5/20/20  Provider, MD Minda   venlafaxine XR (EFFEXOR-XR) 75 MG 24 hr capsule Take 1 capsule by mouth Daily. 1/13/20   Leonardo Zepeda,    vitamin B-12 (CYANOCOBALAMIN) 500 MCG tablet Take 1,000 mcg by mouth Daily.    Provider, MD Minda       Objective     /64 (BP Location: Left arm, Patient Position: Sitting)   Pulse 70   Temp 98 °F (36.7 °C) (Oral)   Resp 16   Ht 152.4 cm (60\")   Wt 38.1 kg (84 lb)   SpO2 100%   BMI 16.41 kg/m²    Physical Exam   Constitutional: She appears well-developed.   Frail, advanced age   HENT:   Head: Normocephalic and atraumatic.   Eyes: Pupils are equal, round, and reactive to light.   Pale conjunctivae   Neck: Normal range of motion. No JVD present.   Cardiovascular: Normal rate.   Murmur heard.  Paced rhythm at 70   Pulmonary/Chest: Effort normal and breath sounds normal. No respiratory distress.   Abdominal: Soft. Bowel sounds are normal. She exhibits no distension.   Musculoskeletal: She exhibits no edema.   Generalized weakness and debility   Neurological:   Intermittent episodes of crying out in pain, she does states she is cold and hungry   Skin: Skin is warm and dry.   Psychiatric:   Flat affect       Pertinent Data:   Lab Results (last 72 hours)     Procedure Component Value Units Date/Time    Comprehensive Metabolic Panel [[id number]]  (Abnormal) Collected:  03/18/20 1919    Specimen:  Blood Updated:  03/18/20 1945     Glucose 186 mg/dL      BUN 47 mg/dL      Creatinine 1.23 mg/dL      Sodium 136 mmol/L      Potassium 4.5 mmol/L      Chloride 100 mmol/L      CO2 22.0 mmol/L      Calcium 9.5 mg/dL      Total Protein 7.1 g/dL      Albumin 3.90 g/dL      ALT (SGPT) 16 U/L      AST (SGOT) 35 U/L      Alkaline Phosphatase 97 U/L      Total Bilirubin 0.4 mg/dL      eGFR Non African Amer 42 mL/min/1.73      Globulin 3.2 gm/dL      A/G " Ratio 1.2 g/dL      BUN/Creatinine Ratio 38.2     Anion Gap 14.0 mmol/L     Urinalysis With Culture If Indicated - Urine, Catheter In/Out [[id number]]  (Abnormal) Collected:  03/18/20 1929    Specimen:  Urine, Catheter In/Out Updated:  03/18/20 1939     Color, UA Yellow     Appearance, UA Clear     pH, UA <=5.0     Specific Gravity, UA 1.010     Glucose, UA Negative     Ketones, UA Negative     Bilirubin, UA Negative     Blood, UA Negative     Protein,  mg/dL (2+)     Leuk Esterase, UA Negative     Nitrite, UA Negative     Urobilinogen, UA 0.2 E.U./dL    Urinalysis, Microscopic Only - Urine, Catheter In/Out [[id number]]  (Abnormal) Collected:  03/18/20 1929    Specimen:  Urine, Catheter In/Out Updated:  03/18/20 1939     RBC, UA 0-2 /HPF      WBC, UA 0-2 /HPF      Bacteria, UA None Seen /HPF      Squamous Epithelial Cells, UA 0-2 /HPF      Hyaline Casts, UA None Seen /LPF      Methodology Automated Microscopy    CBC Auto Differential [[id number]]  (Abnormal) Collected:  03/18/20 1919    Specimen:  Blood Updated:  03/18/20 1928     WBC 7.17 10*3/mm3      RBC 3.13 10*6/mm3      Hemoglobin 8.9 g/dL      Hematocrit 28.2 %      MCV 90.1 fL      MCH 28.4 pg      MCHC 31.6 g/dL      RDW 16.1 %      RDW-SD 53.5 fl      MPV 11.3 fL      Platelets 153 10*3/mm3      Neutrophil % 75.0 %      Lymphocyte % 10.6 %      Monocyte % 11.0 %      Eosinophil % 2.5 %      Basophil % 0.6 %      Immature Grans % 0.3 %      Neutrophils, Absolute 5.38 10*3/mm3      Lymphocytes, Absolute 0.76 10*3/mm3      Monocytes, Absolute 0.79 10*3/mm3      Eosinophils, Absolute 0.18 10*3/mm3      Basophils, Absolute 0.04 10*3/mm3      Immature Grans, Absolute 0.02 10*3/mm3      nRBC 0.0 /100 WBC         Imaging Results (Last 24 Hours)     Procedure Component Value Units Date/Time    CT Chest Without Contrast [[id number]] Collected:  03/18/20 2011     Updated:  03/18/20 2021    Narrative:       EXAMINATION:   CT CHEST WO CONTRAST-  3/18/2020 8:11 PM  CDT     HISTORY: CT CHEST WO CONTRAST- 3/18/2020 7:35 PM CDT     HISTORY: Chest trauma, blunt     COMPARISON: 06/08/2019     DOSE LENGTH PRODUCT: 28528 mGy cm. Automated exposure control was also  utilized to decrease patient radiation dose.     TECHNIQUE: Serial helical tomographic images of the chest were acquired.  Multiplanar reformatted images were provided for review.     FINDINGS:  Substernal thyroid is present on the right. This was present since  03/23/2016 associated calcification is present there is a moderate  substernal extension of the thyroid into the mediastinum..      Centrilobular emphysema changes present. Right lower lobe is obscured by  large right pleural effusion. Smaller left pleural effusion..      There is focal consolidation in the right middle lobe.. The trachea and  bronchial tree are patent.     Cardiac silhouettes enlarged.. Prosthetic cardiac valve is present in  the aortic and mitral positions.. No obvious hilar adenopathy..  Extensive vascular calcifications present in the aorta     Kyphoplasty at L2 is noted. Wires are present from previous median  sternotomy..     No acute findings are seen in the visualized portion of the upper  abdomen.       Impression:       1. Large right pleural effusion. Most likely this is congestive failure.  2. Consolidation right middle lobe most likely an inflammatory process.  3. Centrilobular emphysema        This report was finalized on 03/18/2020 20:18 by Dr. Philip Styles MD.    CT Head Without Contrast [[id number]] Collected:  03/18/20 2007     Updated:  03/18/20 2012    Narrative:       EXAMINATION:   CT HEAD WO CONTRAST-  3/18/2020 8:07 PM CDT     HISTORY: CT BRAIN without contrast 3/18/2020 7:35 PM CDT     HISTORY: Confusion     COMPARISON: 10/06/2019      DLP: 2427 mGy cm     TECHNIQUE: Serial axial tomographic images of the brain were obtained  without the use of intravenous contrast.      FINDINGS:   The midline structures are nondisplaced.  There is moderate cerebral and  cerebellar volume loss, with an associated increase in the prominence of  the ventricles and sulci. The basilar cisterns are normal in size and  configuration. There is no evidence of intracranial hemorrhage or  mass-effect. There is low attenuation in the periventricular white  matter, consistent with chronic ischemic change. Chronic calcification  in the basal ganglia region is again noted. There are no abnormal  extra-axial fluid collections. There is no evidence of tonsillar  herniation.      The included orbits and their contents are unremarkable. The visualized  paranasal sinuses, mastoid air cells and middle ear cavities are clear.  The visualized osseous structures and overlying soft tissues of the  skull and face are intact.        Impression:       Moderate cerebral and cerebellar volume loss with chronic microvascular  disease but no evidence of acute intracranial process.        This report was finalized on 03/18/2020 20:09 by Dr. Philip Styles MD.          6/8/2019 ECHO   Interpretation Summary     · Left ventricular systolic function is normal.  · Left ventricular wall thickness is consistent with mild concentric hypertrophy.  · Prosthetic aortic valve (20mm Sunita TAVR) with acceptable transvalvular gradients. There is perivalvular regurgitation on the posterior side, ~4mm.  · Moderate mitral regurgitaiton.  · Left atrial cavity size is moderately dilated.  · Right ventricular cavity is severely dilated.  · Mildly reduced right ventricular systolic function noted.  · Moderate tricuspid valve regurgitation is present.  · Calculated right ventricular systolic pressure from tricuspid regurgitation is 43 mmHg.       I have personally reviewed and interpreted the radiology studies and ECG obtained at time of admission.     Assessment / Plan     Assessment:   Active Hospital Problems    Diagnosis   • **Pleural effusion on right   • Delirium   • Generalized weakness   • CML  "(chronic myeloid leukemia) (CMS/HCC)   • CKD (chronic kidney disease) stage 3, GFR 30-59 ml/min (CMS/HCC)     GFR 32 in 4/2018     • S/P TAVR (transcatheter aortic valve replacement)   • Artificial pacemaker   • Type 2 diabetes mellitus with diabetic peripheral angiopathy without gangrene, with long-term current use of insulin (CMS/HCC)   • SSS (sick sinus syndrome) (CMS/HCC)   • Coronary artery disease involving native coronary artery of native heart without angina pectoris     S/p CABG     • Essential hypertension   Acute pain    Plan:   1.  Admit as inpatient  2.  Pain management  3.  Home medications reviewed and restarted as appropriate  4.  DVT prophylaxis with SCDs  5.  Labs in a.m. to include PT and PTT  6.  Thoracentesis in a.m.  7.  MRI of the brain in a.m.  8.  Incentive spirometry, supplemental O2 as needed, RT to initiate breathing treatment protocol    I discussed the patient's findings and my recommendations with: Faisal Bernal MD  Time spent: 45 minutes    Patient seen and examined by me on 3/18/2020 at 9:30 PM.    FACUNDO Li  03/18/20   23:53     I personally evaluated and examined the patient in conjunction with FACUNDO Li and agree with the assessment, treatment plan, and disposition of the patient as recorded by her. My history, exam, and further recommendations are:     Patient eval at time of admission agree with all assessment plans and examination as above.    Faisal Bernal MD  03/19/20  08:30      Electronically signed by Faisal Bernal MD at 03/19/20 0832          Emergency Department Notes      Carmen Dejesus, RN at 03/18/20 1904        Pt daughter states that hospice stopped by to see her father (pt ) today and hospice nurse stated that pt was \"wild\" and \"confused.\" She states that pt was \"holding her head\" and being very rude which is unlikely for pt. Daughter states she had multiple episodes like this today. Daughter states pt has had " "\"delusional moments\" where she \"talks out of her head\" recently. Daughter states this has been going on since last night. Daughter also states that pt fell approximately 1 week ago and landed on her right side in her tub. Daughter states she thinks pt may have broken a rib at this time. Daughter states pt did not hit her head during fall. Pt daughter states that Dr Zepeda has been attempting to be putting her on hospice since September when she had \"massive heart attack\" in which one artery was stented but the other was \"non operable.\" Pt is on oral chemo for leukemia. Daughter states pt has not taken any medications in \"several days.\" Daughter also thinks she has UTI due to pt actions of confusion and previous UTI diagnosis.     Pt c/o pain \"everywhere.\" She c/o right side pain specifically where mastectomy was performed. PT also c/o head pain.      Carmen Dejesus RN  03/18/20 1911      Electronically signed by Carmen Dejesus RN at 03/18/20 1911     Sherif Early Jr., MD at 03/18/20 1927          .  Subjective   Patient is brought to emergency room by her daughter with a complaint of a change in the patient's mental status over the past week or so.  Daughter says the patient fell a week ago hitting her right side on the edge of her tub.  Initially did not seem to be very painful but in recent days she is complaining of severe pain there whenever she moves or breathes.  She is also been intermittently grabbing her head and saying her head hurts terribly and shaking and rocking back and forth.  There is no report of head trauma however.  Daughter says she is also become angry easily and shouting at people.  She do not know of any fever or chills or cough or congestion but is worried about a urinary tract infection because she is had one in the past and the patient says she still feels like there is one there now.  Daughter says she spoke with Dr. Zepeda's office and tried to get her seen but they could not see " her and felt she would be better evaluated emergency room where x-rays can be done and checked out.  She also said they have been trying to get her on hospice release the doctor's office is been trying to the patient on hospice.  Hospice personnel came by to check the patient's  today and then told the daughter the patient was uncontrolled at home.  Daughter also says the patient has not been taking her medication at home correctly and she had to force her to take her heart medicine and her diabetic pill and fluid pill this morning.  She also reports peers with the patient seems very lethargic and will talk about things that do not make sense and then drift off to sleep in the mid sentence and then when aroused wakes up and does not know where she is had for a brief period of time.  Patient's only complaint to me is of pain in her right ribs and then says she just hurts all over.      History provided by:  Patient   used: No    Altered Mental Status   Presenting symptoms: behavior changes    Severity:  Severe  Most recent episode:  More than 2 days ago  Episode history:  Multiple  Duration:  1 hour  Timing:  Intermittent  Progression:  Worsening  Chronicity:  New  Context: dementia and not taking medications as prescribed    Context: not alcohol use, not drug use, not head injury, not homeless, not nursing home resident, not recent change in medication, not recent illness and not recent infection    Associated symptoms: agitation and headaches    Associated symptoms: no abdominal pain, normal movement, no bladder incontinence, no decreased appetite, no depression, no difficulty breathing, no eye deviation, no fever, no hallucinations, no light-headedness, no nausea, no palpitations, no rash, no seizures, no slurred speech, no suicidal behavior, no visual change, no vomiting and no weakness        Review of Systems   Constitutional: Negative.  Negative for decreased appetite and fever.    Respiratory: Negative.    Cardiovascular: Positive for chest pain. Negative for palpitations.   Gastrointestinal: Negative.  Negative for abdominal pain, nausea and vomiting.   Genitourinary: Negative.  Negative for bladder incontinence.   Musculoskeletal: Negative.    Skin: Negative.  Negative for rash.   Neurological: Positive for headaches. Negative for seizures, weakness and light-headedness.   Psychiatric/Behavioral: Positive for agitation. Negative for hallucinations.   All other systems reviewed and are negative.      Past Medical History:   Diagnosis Date   • Aortic stenosis    • Arthritis    • Breast cancer (CMS/[facility])    • CAD (coronary artery disease)    • Cataract    • CHF (congestive heart failure) (CMS/[facility])     recently diagnosed after an ER visit.    • Chronic anticoagulation     Coumadin    • CKD (chronic kidney disease) stage 3, GFR 30-59 ml/min (CMS/[facility]) 4/16/2018   • Diabetes mellitus (CMS/[facility])     Type II   • Elevated cholesterol    • GERD (gastroesophageal reflux disease)    • Hyperlipidemia    • Hypertension    • Macular degeneration    • NSTEMI (non-ST elevated myocardial infarction) (CMS/[facility]) 9/9/2019   • Pancreatitis    • Paroxysmal atrial fibrillation (CMS/[facility])    • Presence of Watchman left atrial appendage closure device    • Presence of Watchman left atrial appendage closure device 11/26/2018   • Pulmonary hypertension (CMS/[facility])    • Rheumatic fever     during childhood   • Sacrum and coccyx fracture (CMS/[facility])    • Sick sinus syndrome (CMS/[facility])     with pacemaker   • UTI (urinary tract infection)        Allergies   Allergen Reactions   • Gleevec [Imatinib] Itching   • Bentyl [Dicyclomine] Itching   • Janumet [Sitagliptin-Metformin Hcl] Other (See Comments)     Chest pain   • Sacubitril-Valsartan Itching     Per daughter, Margarita, pt had previous reaction.   • Dilaudid [Hydromorphone] Nausea And Vomiting   • Enalapril Angioedema   • Lopid [Gemfibrozil] Nausea And Vomiting   • Sulfa  Antibiotics Other (See Comments)     Syncope     • Ultram [Tramadol] Itching       Past Surgical History:   Procedure Laterality Date   • ANOMALOUS PULMONARY VENOUS RETURN REPAIR, TOTAL     • BACK SURGERY     • BELPHAROPTOSIS REPAIR     • BREAST SURGERY      right mastectomy   • CARDIAC CATHETERIZATION  1999, 2010   • CARDIAC CATHETERIZATION N/A 2/15/2017    Procedure: Left Heart Cath;  Surgeon: Ehsan Crocker MD;  Location:  [facility];  Service:    • CARDIAC CATHETERIZATION N/A 2/15/2017    Procedure: Right Heart Cath;  Surgeon: Ehsan Crocker MD;  Location:  [facility];  Service:    • CARDIAC CATHETERIZATION N/A 2/15/2017    Procedure: Coronary angiography;  Surgeon: Ehsan Crocker MD;  Location:  [facility];  Service:    • CARDIAC CATHETERIZATION N/A 2/15/2017    Procedure: Left ventriculography;  Surgeon: Ehsan Crocker MD;  Location:  [facility];  Service:    • CARDIAC CATHETERIZATION N/A 2/15/2017    Procedure: Intracardiac echocardiogram;  Surgeon: Ehsan Crocker MD;  Location:  [facility];  Service:    • CARDIAC ELECTROPHYSIOLOGY PROCEDURE N/A 2/3/2017    Procedure: Pacemaker DC new;  Surgeon: Ehsan Crocker MD;  Location:  [facility];  Service:    • CARDIAC SURGERY      TAVR   • CARDIOVERSION     • CATARACT EXTRACTION     • CHOLECYSTECTOMY     • CORONARY ARTERY BYPASS GRAFT  1999    4 vessel    • CORONARY ARTERY BYPASS GRAFT     • CORONARY STENT PLACEMENT     • HYSTERECTOMY  1962   • INSERT / REPLACE / REMOVE PACEMAKER     • MASTECTOMY  2000   • OTHER SURGICAL HISTORY      TAVR 2017       Family History   Problem Relation Age of Onset   • Breast cancer Mother    • Coronary artery disease Father    • Heart attack Father    • Diabetes Father    • Cancer Brother    • No Known Problems Maternal Grandmother    • No Known Problems Maternal Grandfather    • No Known Problems Paternal Grandmother    • No Known  Problems Paternal Grandfather        Social History     Socioeconomic History   • Marital status:      Spouse name: Not on file   • Number of children: Not on file   • Years of education: Not on file   • Highest education level: Not on file   Tobacco Use   • Smoking status: Never Smoker   • Smokeless tobacco: Never Used   Substance and Sexual Activity   • Alcohol use: No   • Drug use: No   • Sexual activity: Defer       Prior to Admission medications    Medication Sig Start Date End Date Taking? Authorizing Provider   aspirin 81 MG EC tablet Take 81 mg by mouth Daily.    Minda Salvador MD   bisacodyl (DULCOLAX) 10 MG suppository Insert 10 mg into the rectum Daily As Needed for Constipation.    Minda Salvador MD   calcium carbonate (OS-MICHAEL) 600 MG tablet Take 600 mg by mouth Daily.    Minda Salvador MD   cholecalciferol (VITAMIN D3) 1000 units tablet Take 1,000 Units by mouth Daily.    Minda Salvador MD   clopidogrel (PLAVIX) 75 MG tablet Take 75 mg by mouth Daily.    Minda Salvador MD   dasatinib (SPRYCEL) 50 MG chemo tablet Take 1 tablet by mouth Daily. 1/6/20   Minda Salvador MD   ferrous sulfate 325 (65 FE) MG tablet Take 1 tablet by mouth 2 (Two) Times a Day With Meals. 2/15/20   Daryl Pritchard DO   furosemide (LASIX) 40 MG tablet Take 60 mg by mouth Daily. Take 1 tablet (40 mg) in the morning and 1/2 tablet (20 mg) in the evening.    Minda Salvador MD   glipizide (GLUCOTROL) 5 MG tablet Take 1 tablet by mouth 2 (Two) Times a Day. 12/4/19   Minda Salvador MD   insulin detemir (LEVEMIR FLEXTOUCH) 100 UNIT/ML injection Inject 15 Units under the skin into the appropriate area as directed Every Night. 12/4/19   Leonardo Zepeda,    metoprolol succinate XL (TOPROL-XL) 25 MG 24 hr tablet Take 25 mg by mouth Daily.    Minda Salvador MD   mirtazapine (REMERON) 15 MG tablet Take 7.5 mg by mouth Every Night.    Minda Salvador MD    Multiple Vitamins-Minerals (PRESERVISION/LUTEIN) capsule Take 1 capsule by mouth 2 (Two) Times a Day.    Minda Salvador MD   nitroglycerin (NITROSTAT) 0.4 MG SL tablet Place 0.4 mg under the tongue Every 5 (Five) Minutes As Needed for Chest Pain. Take no more than 3 doses in 15 minutes.    Minda Salvador MD   pantoprazole (PROTONIX) 40 MG EC tablet Take 1 tablet by mouth Daily. 8/22/19   Leonardo Zepeda, DO   polyethyl glycol-propyl glycol (SYSTANE) 0.4-0.3 % solution ophthalmic solution Administer 1 drop to both eyes Daily.    Minda Salvador MD   polyethylene glycol (MIRALAX) packet Take 17 g by mouth Daily As Needed (constipation).    Minda Salvador MD   rosuvastatin (CRESTOR) 20 MG tablet Take 20 mg by mouth Every Night.    Minda Salvador MD   spironolactone (ALDACTONE) 25 MG tablet Take 25 mg by mouth Daily.    Minda Salvador MD   triamcinolone (KENALOG) 0.1 % cream Apply 1 application topically to the appropriate area as directed 2 (Two) Times a Day. 5/20/19 5/20/20  Minda Salvador MD   venlafaxine XR (EFFEXOR-XR) 75 MG 24 hr capsule Take 1 capsule by mouth Daily. 1/13/20   Leonardo Zepeda, DO   vitamin B-12 (CYANOCOBALAMIN) 500 MCG tablet Take 1,000 mcg by mouth Daily.    Minda Salvador MD       Medications   sodium chloride 0.9 % flush 10 mL (has no administration in time range)       Vitals:    03/18/20 1830   BP: 163/68   Pulse: 70   Resp: 20   Temp: 97.4 °F (36.3 °C)   SpO2: 100%         Objective   Physical Exam   Constitutional: She is oriented to person, place, and time. She appears well-developed and well-nourished.   HENT:   Head: Normocephalic and atraumatic.   Eyes: Pupils are equal, round, and reactive to light. EOM are normal.   Neck: Normal range of motion. Neck supple.   Cardiovascular: Normal rate and regular rhythm.   Murmur heard.  Pulmonary/Chest: Effort normal and breath sounds normal. She exhibits tenderness.   Patient is  tender along the right lateral ribs but no crepitus or deformities noted.   Abdominal: Soft. Bowel sounds are normal.   Musculoskeletal: Normal range of motion.   Neurological: She is alert and oriented to person, place, and time. She has normal strength.   Skin: Skin is warm and dry. There is pallor.   Psychiatric: She has a normal mood and affect. Her behavior is normal.   Nursing note and vitals reviewed.      Procedures        Lab Results (last 24 hours)     Procedure Component Value Units Date/Time    CBC & Differential [[id number]] Collected:  03/18/20 1919    Specimen:  Blood Updated:  03/18/20 1928    Narrative:       The following orders were created for panel order CBC & Differential.  Procedure                               Abnormality         Status                     ---------                               -----------         ------                     CBC Auto Differential[[id number]]        Abnormal            Final result                 Please view results for these tests on the individual orders.    Comprehensive Metabolic Panel [[id number]]  (Abnormal) Collected:  03/18/20 1919    Specimen:  Blood Updated:  03/18/20 1945     Glucose 186 mg/dL      BUN 47 mg/dL      Creatinine 1.23 mg/dL      Sodium 136 mmol/L      Potassium 4.5 mmol/L      Chloride 100 mmol/L      CO2 22.0 mmol/L      Calcium 9.5 mg/dL      Total Protein 7.1 g/dL      Albumin 3.90 g/dL      ALT (SGPT) 16 U/L      AST (SGOT) 35 U/L      Alkaline Phosphatase 97 U/L      Total Bilirubin 0.4 mg/dL      eGFR Non African Amer 42 mL/min/1.73      Globulin 3.2 gm/dL      A/G Ratio 1.2 g/dL      BUN/Creatinine Ratio 38.2     Anion Gap 14.0 mmol/L     Narrative:       GFR Normal >60  Chronic Kidney Disease <60  Kidney Failure <15      CBC Auto Differential [[id number]]  (Abnormal) Collected:  03/18/20 1919    Specimen:  Blood Updated:  03/18/20 1928     WBC 7.17 10*3/mm3      RBC 3.13 10*6/mm3      Hemoglobin 8.9 g/dL      Hematocrit 28.2 %       MCV 90.1 fL      MCH 28.4 pg      MCHC 31.6 g/dL      RDW 16.1 %      RDW-SD 53.5 fl      MPV 11.3 fL      Platelets 153 10*3/mm3      Neutrophil % 75.0 %      Lymphocyte % 10.6 %      Monocyte % 11.0 %      Eosinophil % 2.5 %      Basophil % 0.6 %      Immature Grans % 0.3 %      Neutrophils, Absolute 5.38 10*3/mm3      Lymphocytes, Absolute 0.76 10*3/mm3      Monocytes, Absolute 0.79 10*3/mm3      Eosinophils, Absolute 0.18 10*3/mm3      Basophils, Absolute 0.04 10*3/mm3      Immature Grans, Absolute 0.02 10*3/mm3      nRBC 0.0 /100 WBC     Urinalysis With Culture If Indicated - Urine, Catheter In/Out [[id number]]  (Abnormal) Collected:  03/18/20 1929    Specimen:  Urine, Catheter In/Out Updated:  03/18/20 1939     Color, UA Yellow     Appearance, UA Clear     pH, UA <=5.0     Specific Gravity, UA 1.010     Glucose, UA Negative     Ketones, UA Negative     Bilirubin, UA Negative     Blood, UA Negative     Protein,  mg/dL (2+)     Leuk Esterase, UA Negative     Nitrite, UA Negative     Urobilinogen, UA 0.2 E.U./dL    Urinalysis, Microscopic Only - Urine, Catheter In/Out [[id number]]  (Abnormal) Collected:  03/18/20 1929    Specimen:  Urine, Catheter In/Out Updated:  03/18/20 1939     RBC, UA 0-2 /HPF      WBC, UA 0-2 /HPF      Bacteria, UA None Seen /HPF      Squamous Epithelial Cells, UA 0-2 /HPF      Hyaline Casts, UA None Seen /LPF      Methodology Automated Microscopy          CT Head Without Contrast   Final Result   Moderate cerebral and cerebellar volume loss with chronic microvascular   disease but no evidence of acute intracranial process.           This report was finalized on 03/18/2020 20:09 by Dr. Philip Styles MD.      CT Chest Without Contrast   Final Result   1. Large right pleural effusion. Most likely this is congestive failure.   2. Consolidation right middle lobe most likely an inflammatory process.   3. Centrilobular emphysema           This report was finalized on 03/18/2020 20:18 by  Dr. Philip Styles MD.          ED Course  ED Course as of Mar 18 2128   Wed Mar 18, 2020   2127 I told patient and her daughter the findings specifically on the CT chest as a most significant problem showing a large right pleural effusion.  I am not sure if this is traumatic or malignant.  Patient notes that she has complained of some shortness of breath and feeling of fluid along the right side even prior to her fall last week so this is probably malignant.  Either way we will need testing and evaluation.  I have spoke with Dr. Bernal and we will admit.    [TR]      ED Course User Index  [TR] Sherif Early Jr., MD          MDM  Number of Diagnoses or Management Options  Pleural effusion on right: new and requires workup     Amount and/or Complexity of Data Reviewed  Clinical lab tests: ordered and reviewed  Tests in the radiology section of CPT®:  ordered and reviewed  Decide to obtain previous medical records or to obtain history from someone other than the patient: yes  Discuss the patient with other providers: yes    Risk of Complications, Morbidity, and/or Mortality  Presenting problems: moderate  Diagnostic procedures: moderate  Management options: moderate    Patient Progress  Patient progress: stable      Final diagnoses:   Pleural effusion on right          Sherif Early Jr., MD  03/18/20 2128      Electronically signed by Sherif Early Jr., MD at 03/18/20 2128             Ori Guzman DO   Physician   Medicine          Progress Notes   Signed        Date of Service:  03/19/20 1040   Creation Time:  03/19/20 1040                             Signed                                     Expand widget buttonCollapse widget button        Show:Clear all      ManualTemplateCopied    Added by:          Ori Guzman DO      [facility] for detailscustomization  button                                                                                                                                                                                                    untitled image         [facility] Medicine Services    INPATIENT PROGRESS NOTE         Length of Stay: 1    Date of Admission: 3/18/2020    Primary Care Physician: Leonardo Zepeda DO           Subjective            Chief Complaint:          Right chest discomfort         HPI          The patient remains very somnolent today after having received Ativan last evening because of agitation.  Ativan has subsequently been discontinued.  I had a long conversation with the patient's daughter who was present in the room.  She notes that she and her mother have had long conversations with her primary care physician regarding transitioning her mother to hospice care given her very poor quality of life.  CT scan of the patient's head shows no acute process.  CT scan of the chest shows a large right pleural effusion with a right middle lobe inflammatory process.  The patient has no signs or symptoms of pneumonia.  She is saturating 98 to 100% on room air.  Her only complaint is of chest discomfort which is well controlled with pain medication.  The patient's daughter and I had a long and very practical conversation regarding her mother's treatment from this point on.  She is heavily leaning toward hospice.  I explained that a thoracentesis could not be performed for 5 days and that, given her lack of symptoms, risk would likely exceed benefit.  An MRI scan has been ordered but will be canceled as the patient has no evidence of focal neurologic deficit.  The patient also has a pacemaker which is unlikely to be MRI compatible.  There is also the suspicion of a right middle lobe consolidation with an inflammatory component.  The daughter's primary concern is keeping her mother comfortable and  alleviating the patient's intermittent confusion.  I have suggested that we start her mother on Seroquel for intermittent confusion.  She is going to speak with her sister today about transitioning her mother to hospice care.  If the decision is to move forward with hospice care than the patient can likely be discharged home today.         Review of Systems          All pertinent negatives and positives are as above. All other systems have been reviewed and are negative unless otherwise stated.            Objective        Temp:  [97.4 °F (36.3 °C)-98 °F (36.7 °C)] 97.6 °F (36.4 °C)    Heart Rate:  [70-72] 70    Resp:  [16-20] 16    BP: (141-168)/(64-98) 156/66                    Lab Results (last 24 hours)                    Procedure       Component       Value       Units       Date/Time               POC Glucose Once [[id number]]  (Abnormal)     Collected:  03/19/20 0755             Specimen:  Blood     Updated:  03/19/20 0806                   Glucose     185High     mg/dL                             Comment:     : [id number] [place] JessicaMeter ID: [id number]                  Basic Metabolic Panel [[id number]]  (Abnormal)     Collected:  03/19/20 0551             Specimen:  Blood     Updated:  03/19/20 0632                   Glucose     211High     mg/dL                         BUN     46High     mg/dL                         Creatinine     1.33High     mg/dL                         Sodium     137     mmol/L                         Potassium     4.1     mmol/L                         Chloride     103     mmol/L                         CO2     23.0     mmol/L                         Calcium     9.0     mg/dL                         eGFR Non  Amer     38Low     mL/min/1.73                         BUN/Creatinine Ratio     34.6High                   Anion Gap     11.0     mmol/L                   Narrative:                GFR Normal >60    Chronic Kidney Disease <60    Kidney Failure <15                   Lipid Panel [[id number]]     Collected:  03/19/20 0551             Specimen:  Blood     Updated:  03/19/20 0632                   Total Cholesterol     106     mg/dL                         Triglycerides     108     mg/dL                         HDL Cholesterol     50     mg/dL                         LDL Cholesterol      34     mg/dL                         VLDL Cholesterol     21.6     mg/dL                         LDL/HDL Ratio     0.69             Narrative:                Cholesterol Reference Ranges    (U.S. Department of Health and Human Services ATP III Classifications)         Desirable          <200 mg/dL    Borderline High    200-239 mg/dL    High Risk          >240 mg/dL              Triglyceride Reference Ranges    (U.S. Department of Health and Human Services ATP III Classifications)         Normal           <150 mg/dL    Borderline High  150-199 mg/dL    High             200-499 mg/dL    Very High        >500 mg/dL         HDL Reference Ranges    (U.S. Department of Health and Human Services ATP III Classifcations)         Low     <40 mg/dl (major risk factor for CHD)    High    >60 mg/dl ('negative' risk factor for CHD)                   LDL Reference Ranges    (U.S. Department of Health and Human Services ATP III Classifcations)         Optimal          <100 mg/dL    Near Optimal     100-129 mg/dL    Borderline High  130-159 mg/dL    High             160-189 mg/dL    Very High        >189 mg/dL             TSH [[id number]]  (Normal)     Collected:  03/19/20 0551             Specimen:  Blood     Updated:  03/19/20 0632                   TSH     1.040     uIU/mL                   Protime-INR [[id number]]  (Abnormal)     Collected:  03/19/20 0551             Specimen:  Blood     Updated:  03/19/20 0617                   Protime     14.9High     Seconds                         INR     1.18High             aPTT [[id number]]  (Normal)     Collected:  03/19/20 0551             Specimen:  Blood     Updated:   03/19/20 0617                   PTT     31.1     seconds                   Ammonia [[id number]]  (Normal)     Collected:  03/19/20 0551             Specimen:  Blood     Updated:  03/19/20 0617                   Ammonia     36     umol/L                   Hemoglobin A1c [[id number]]  (Abnormal)     Collected:  03/19/20 0551             Specimen:  Blood     Updated:  03/19/20 0611                   Hemoglobin A1C     8.20High     %                   Narrative:                Hemoglobin A1C Ranges:         Increased Risk for Diabetes  5.7% to 6.4%    Diabetes                     >= 6.5%    Diabetic Goal                < 7.0%             CBC (No Diff) [[id number]]  (Abnormal)     Collected:  03/19/20 0551             Specimen:  Blood     Updated:  03/19/20 0600                   WBC     6.61     10*3/mm3                         RBC     3.06Low     10*6/mm3                         Hemoglobin     8.7Low     g/dL                         Hematocrit     27.8Low     %                         MCV     90.8     fL                         MCH     28.4     pg                         MCHC     31.3Low     g/dL                         RDW     16.0High     %                         RDW-SD     53.1     fl                         MPV     11.0     fL                         Platelets     145     10*3/mm3                   Comprehensive Metabolic Panel [[id number]]  (Abnormal)     Collected:  03/18/20 1919             Specimen:  Blood     Updated:  03/18/20 1945                   Glucose     186High     mg/dL                         BUN     47High     mg/dL                         Creatinine     1.23High     mg/dL                         Sodium     136     mmol/L                         Potassium     4.5     mmol/L                         Chloride     100     mmol/L                         CO2     22.0     mmol/L                         Calcium     9.5     mg/dL                         Total Protein     7.1     g/dL                          Albumin     3.90     g/dL                         ALT (SGPT)     16     U/L                         AST (SGOT)     35High     U/L                         Alkaline Phosphatase     97     U/L                         Total Bilirubin     0.4     mg/dL                         eGFR Non  Amer     42Low     mL/min/1.73                         Globulin     3.2     gm/dL                         A/G Ratio     1.2     g/dL                         BUN/Creatinine Ratio     38.2High                   Anion Gap     14.0     mmol/L                   Narrative:                GFR Normal >60    Chronic Kidney Disease <60    Kidney Failure <15                  Urinalysis With Culture If Indicated - Urine, Catheter In/Out [[id number]]  (Abnormal)     Collected:  03/18/20 1929             Specimen:  Urine, Catheter In/Out     Updated:  03/18/20 1939                   Color, UA     Yellow                   Appearance, UA     Clear                   pH, UA     <=5.0                   Specific [place], UA     1.010                   Glucose, UA     Negative                   Ketones, UA     Negative                   Bilirubin, UA     Negative                   Blood, UA     Negative                   Protein, UA     100 mg/dL (2+)Abnormal                   Leuk Esterase, UA     Negative                   Nitrite, UA     Negative                   Urobilinogen, UA     0.2 E.U./dL             Urinalysis, Microscopic Only - Urine, Catheter In/Out [[id number]]  (Abnormal)     Collected:  03/18/20 1929             Specimen:  Urine, Catheter In/Out     Updated:  03/18/20 1939                   RBC, UA     0-2Abnormal     /HPF                         WBC, UA     0-2Abnormal     /HPF                         Bacteria, UA     None Seen     /HPF                         Squamous Epithelial Cells, UA     0-2     /HPF                         Hyaline Casts, UA     None Seen     /LPF                         Methodology     Automated Microscopy              CBC & Differential [[id number]]     Collected:  03/18/20 1919             Specimen:  Blood     Updated:  03/18/20 1928             Narrative:                The following orders were created for panel order CBC & Differential.    Procedure                               Abnormality         Status                       ---------                               -----------         ------                       CBC Auto Differential[[id number]]        Abnormal            Final result                      Please view results for these tests on the individual orders.             CBC Auto Differential [[id number]]  (Abnormal)     Collected:  03/18/20 1919             Specimen:  Blood     Updated:  03/18/20 1928                   WBC     7.17     10*3/mm3                         RBC     3.13Low     10*6/mm3                         Hemoglobin     8.9Low     g/dL                         Hematocrit     28.2Low     %                         MCV     90.1     fL                         MCH     28.4     pg                         MCHC     31.6     g/dL                         RDW     16.1High     %                         RDW-SD     53.5     fl                         MPV     11.3     fL                         Platelets     153     10*3/mm3                         Neutrophil %     75.0     %                         Lymphocyte %     10.6Low     %                         Monocyte %     11.0     %                         Eosinophil %     2.5     %                         Basophil %     0.6     %                         Immature Grans %     0.3     %                         Neutrophils, Absolute     5.38     10*3/mm3                         Lymphocytes, Absolute     0.76     10*3/mm3                         Monocytes, Absolute     0.79     10*3/mm3                         Eosinophils, Absolute     0.18     10*3/mm3                         Basophils, Absolute     0.04     10*3/mm3                         Immature Grans, Absolute      0.02     10*3/mm3                         nRBC     0.0     /100 WBC                                              Imaging Results (Last 24 Hours)                    Procedure       Component       Value       Units       Date/Time               CT Chest Without Contrast [[id number]]     Collected:  03/18/20 2011                   Updated:  03/18/20 2021             Narrative:                EXAMINATION:   CT CHEST WO CONTRAST-  3/18/2020 8:11 PM CDT         HISTORY: CT CHEST WO CONTRAST- 3/18/2020 7:35 PM CDT         HISTORY: Chest trauma, blunt         COMPARISON: 06/08/2019         DOSE LENGTH PRODUCT: 91920 mGy cm. Automated exposure control was also    utilized to decrease patient radiation dose.         TECHNIQUE: Serial helical tomographic images of the chest were acquired.    Multiplanar reformatted images were provided for review.         FINDINGS:    Substernal thyroid is present on the right. This was present since    03/23/2016 associated calcification is present there is a moderate    substernal extension of the thyroid into the mediastinum..          Centrilobular emphysema changes present. Right lower lobe is obscured by    large right pleural effusion. Smaller left pleural effusion..          There is focal consolidation in the right middle lobe.. The trachea and    bronchial tree are patent.         Cardiac silhouettes enlarged.. Prosthetic cardiac valve is present in    the aortic and mitral positions.. No obvious hilar adenopathy..    Extensive vascular calcifications present in the aorta         Kyphoplasty at L2 is noted. Wires are present from previous median    sternotomy..         No acute findings are seen in the visualized portion of the upper    abdomen.                  Impression:                1. Large right pleural effusion. Most likely this is congestive failure.    2. Consolidation right middle lobe most likely an inflammatory process.    3. Centrilobular emphysema               This report was finalized on 03/18/2020 20:18 by Dr. Philip Styles MD.             CT Head Without Contrast [[id number]]     Collected:  03/18/20 2007                   Updated:  03/18/20 2012             Narrative:                EXAMINATION:   CT HEAD WO CONTRAST-  3/18/2020 8:07 PM CDT         HISTORY: CT BRAIN without contrast 3/18/2020 7:35 PM CDT         HISTORY: Confusion         COMPARISON: 10/06/2019          DLP: 2427 mGy cm         TECHNIQUE: Serial axial tomographic images of the brain were obtained    without the use of intravenous contrast.          FINDINGS:     The midline structures are nondisplaced. There is moderate cerebral and    cerebellar volume loss, with an associated increase in the prominence of    the ventricles and sulci. The basilar cisterns are normal in size and    configuration. There is no evidence of intracranial hemorrhage or    mass-effect. There is low attenuation in the periventricular white    matter, consistent with chronic ischemic change. Chronic calcification    in the basal ganglia region is again noted. There are no abnormal    extra-axial fluid collections. There is no evidence of tonsillar    herniation.          The included orbits and their contents are unremarkable. The visualized    paranasal sinuses, mastoid air cells and middle ear cavities are clear.    The visualized osseous structures and overlying soft tissues of the    skull and face are intact.                   Impression:                Moderate cerebral and cerebellar volume loss with chronic microvascular    disease but no evidence of acute intracranial process.              This report was finalized on 03/18/2020 20:09 by Dr. Philip Styles MD.                             No intake or output data in the 24 hours ending 03/19/20 1108         Physical Exam     Constitutional: She appears well-developed and well-nourished. She appears lethargic.  Non-toxic appearance. She has a sickly appearance. No  distress.     HENT:     Head: Normocephalic and atraumatic.     Nose: Nose normal.     Mouth/Throat: Oropharynx is clear and moist.     Eyes: Conjunctivae are normal. No scleral icterus.     Neck: Neck supple. No JVD present. No tracheal deviation present.     Cardiovascular: Normal rate, regular rhythm and normal heart sounds.     No murmur heard.    Pulmonary/Chest: Effort normal. She has decreased breath sounds in the right middle field and the right lower field.     Abdominal: Soft. Bowel sounds are normal. She exhibits no mass. There is no tenderness.   Musculoskeletal: Normal range of motion. She exhibits no edema or tenderness.   Neurological: She appears lethargic. Coordination normal.     Skin: Skin is warm and dry. No pallor.   Psychiatric: Cognition and memory are impaired.               Results Review:    I have reviewed the labs, radiology results, and diagnostic studies since my last progress note and made treatment changes reflective of the results.     I have reviewed the current medications.           Assessment/Plan                   Active Hospital Problems             Diagnosis       •     **Pleural effusion on right       •     Delirium       •     Generalized weakness       •     CML (chronic myeloid leukemia) (CMS/[facility])       •     CKD (chronic kidney disease) stage 3, GFR 30-59 ml/min (CMS/[facility])                   GFR 32 in 4/2018            •     S/P TAVR (transcatheter aortic valve replacement)       •     Artificial pacemaker       •     Type 2 diabetes mellitus with diabetic peripheral angiopathy without gangrene, with long-term current use of insulin (CMS/[facility])       •     SSS (sick sinus syndrome) (CMS/[facility])       •     Coronary artery disease involving native coronary artery of native heart without angina pectoris                   S/p CABG            •     Essential hypertension                 PLAN:    Cancel MRI scan    Discontinue pacemaker inquiry    Advance to regular  diet    Discontinue cardiac monitoring    Palliative care consultation for hospice discussion         Ori Guzman DO     03/19/20     11:08

## 2020-03-19 NOTE — CONSULTS
Palliative Care Initial Consult   Attending Physician: Ori Guzman DO  Referring Provider: FACUNDO Mendosa    Reason for Referral: assistance with clarification of goals of care  Family/Support: daughter at bedside  Goals of Care: TBD.  Code Status and Medical Interventions:   Ordered at: 03/18/20 2227     Limited Support to NOT Include:    Cardioversion/Defibrillation    Intubation     Level Of Support Discussed With:    Health Care Surrogate     Code Status:    No CPR     Medical Interventions (Level of Support Prior to Arrest):    Limited         HPI:   84 y.o. female with past medical history significant for coronary artery disease post CABG x3, recent MI with stents at [facility] in September 2019, chronic diastolic congestive heart failure, atrial fibrillation status post watchman device, sick sinus syndrome status post pacemaker, aortic stenosis status post TAVR, history of breast cancer, history of CML-treated at [facility] per family currently in remission, chronic kidney disease stage III, diabetes mellitus type 2, chronic anemia, hypertension, hyperlipidemia,  and chronic small vessel.  Recent hospitalization on 2/13/2020 R/T GI hemorrhage and generalized weakness. Patient [facility]2020 related to altered mental status and acute pain.  CT of the chest revealed Large right pleural effusion. Most likely this is congestive failure. Consolidation right middle lobe most likely an inflammatory process. Centrilobular emphysema. Palliative Care Spoke With: family reports continued decline and functional status and quality of life over the last few months and more so over the last few weeks.  Patient had a fall at home in the bathtub last week, since this period of time patient has had increased delirium and complains of pain. Due to the Palliative Care Topics Discussed: palliative care, goals of care, care options, resuscitation status, [facility] and discharge  options we will establish an advance care plan.   Advance Care Planning   Advance Care Planning Discussion: Care conference in the presence of patient's daughter/POAMargarita.  Assessed daughter's overall understanding of patient's current health status, multiple comorbidities, progressive decline, and advanced age.  Daughter verbalizes they have reached a juncture after all other aggressive attempts have failed to transition to focus on quality of life and comfort.  Daughter is familiar with hospice services and currently utilizes them with patient's spouse.  Plans for home with hospice likely today.  Daughter feels she has adequate caregiver support but would need DME equipment delivered prior to discharge.  We discussed at length medications with a goal of comfort.  Completed medical orders for scope of treatment (MOST) form to further delineate care goals to include no CPR, comfort measures, and no feeding tube.  Questions answered and support given.    Review of Systems   Unable to perform ROS: acuity of condition       Past Medical History:   Diagnosis Date   • Aortic stenosis    • Arthritis    • Breast cancer (CMS/[facility])    • CAD (coronary artery disease)    • Cataract    • CHF (congestive heart failure) (CMS/[facility])     recently diagnosed after an ER visit.    • CKD (chronic kidney disease) stage 3, GFR 30-59 ml/min (CMS/[facility]) 4/16/2018   • CML (chronic myelocytic leukemia) (CMS/[facility])    • Diabetes mellitus (CMS/[facility])     Type II   • Elevated cholesterol    • GERD (gastroesophageal reflux disease)    • Hyperlipidemia    • Hypertension    • Macular degeneration    • NSTEMI (non-ST elevated myocardial infarction) (CMS/[facility]) 9/9/2019   • Pancreatitis    • Paroxysmal atrial fibrillation (CMS/[facility])    • Presence of Watchman left atrial appendage closure device 11/26/2018   • Pulmonary hypertension (CMS/[facility])    • Rheumatic fever     during childhood   • Sacrum and coccyx fracture (CMS/[facility])    • Sick sinus syndrome (CMS/[facility])      with pacemaker   • UTI (urinary tract infection)      Past Surgical History:   Procedure Laterality Date   • ANOMALOUS PULMONARY VENOUS RETURN REPAIR, TOTAL     • BACK SURGERY     • BELPHAROPTOSIS REPAIR     • BREAST SURGERY      right mastectomy   • CARDIAC CATHETERIZATION  1999, 2010   • CARDIAC CATHETERIZATION N/A 2/15/2017    Procedure: Left Heart Cath;  Surgeon: Ehsan Crocker MD;  Location:  [facility] CATH [facility]AS[facility];  Service:    • CARDIAC CATHETERIZATION N/A 2/15/2017    Procedure: Right Heart Cath;  Surgeon: Ehsan Crocker MD;  Location:  [facility];  Service:    • CARDIAC CATHETERIZATION N/A 2/15/2017    Procedure: Coronary angiography;  Surgeon: Ehsan Crocker MD;  Location:  [facility];  Service:    • CARDIAC CATHETERIZATION N/A 2/15/2017    Procedure: Left ventriculography;  Surgeon: Ehsan Crocker MD;  Location:  [facility];  Service:    • CARDIAC CATHETERIZATION N/A 2/15/2017    Procedure: Intracardiac echocardiogram;  Surgeon: Ehsan Crocker MD;  Location:  [facility];  Service:    • CARDIAC ELECTROPHYSIOLOGY PROCEDURE N/A 2/3/2017    Procedure: Pacemaker DC new;  Surgeon: Ehsan Crocker MD;  Location:  [facility];  Service:    • CARDIAC SURGERY      TAVR   • CARDIOVERSION     • CATARACT EXTRACTION     • CHOLECYSTECTOMY     • CORONARY ARTERY BYPASS GRAFT  1999    4 vessel    • CORONARY ARTERY BYPASS GRAFT     • CORONARY STENT PLACEMENT     • HYSTERECTOMY  1962   • INSERT / REPLACE / REMOVE PACEMAKER     • MASTECTOMY  2000   • OTHER SURGICAL HISTORY      TAVR 2017     Social History     Socioeconomic History   • Marital status:      Spouse name: Not on file   • Number of children: Not on file   • Years of education: Not on file   • Highest education level: Not on file   Tobacco Use   • Smoking status: Never Smoker   • Smokeless tobacco: Never Used   Substance and Sexual Activity   • Alcohol use: No   •  Drug use: No   • Sexual activity: Defer       Allergies   Allergen Reactions   • Gleevec [Imatinib] Itching   • Bentyl [Dicyclomine] Itching   • Janumet [Sitagliptin-Metformin Hcl] Other (See Comments)     Chest pain   • Sacubitril-Valsartan Itching     Per daughter, Margarita, pt had previous reaction.   • Dilaudid [Hydromorphone] Nausea And Vomiting   • Enalapril Angioedema   • Lopid [Gemfibrozil] Nausea And Vomiting   • Sulfa Antibiotics Other (See Comments)     Syncope     • Ultram [Tramadol] Itching       Current medication reviewed for route, type, dose and frequency and are current per MAR at time of dictation.      Diagnostics: Reviewed    Patient Active Problem List   Diagnosis   • Paroxysmal atrial fibrillation (CMS/HCC)   • Essential hypertension   • Type 2 diabetes mellitus with diabetic peripheral angiopathy without gangrene, with long-term current use of insulin (CMS/HCC)   • Coronary artery disease involving native coronary artery of native heart without angina pectoris   • SSS (sick sinus syndrome) (CMS/HCC)   • Chest pain   • S/P CABG x 3   • Artificial pacemaker   • Tachycardia   • Palpitations   • Mixed hyperlipidemia   • S/P TAVR (transcatheter aortic valve replacement)   • Age-related osteoporosis without current pathological fracture   • Chronic diastolic congestive heart failure (CMS/HCC)   • Malignant neoplasm of breast (CMS/HCC)   • CKD (chronic kidney disease) stage 3, GFR 30-59 ml/min (CMS/HCC)   • Thrombocytosis (CMS/HCC)   • Iron deficiency   • MPN (myeloproliferative neoplasm) (CMS/HCC)   • Very low iron stores in bone marrow   • CML (chronic myeloid leukemia) (CMS/HCC)   • Gastritis   • Constipation   • Moderate malnutrition (CMS/HCC)   • Moderate episode of recurrent major depressive disorder (CMS/HCC)   • Gastrointestinal hemorrhage with melena   • Generalized weakness   • Pleural effusion on right   • Delirium       Physical Exam:    /66 (BP Location: Left arm, Patient Position:  "Lying)   Pulse 70   Temp 97.6 °F (36.4 °C)   Resp 16   Ht 152.4 cm (60\")   Wt 38.1 kg (84 lb)   SpO2 98%   BMI 16.41 kg/m²     Physical Exam   Constitutional: She appears well-developed. She appears cachectic. She has a sickly appearance. She appears ill. No distress.   HENT:   Head: Normocephalic and atraumatic.   Eyes: Lids are normal.   Neck: Normal range of motion.   Cardiovascular: Normal rate, regular rhythm, normal heart sounds and intact distal pulses.   Pulmonary/Chest: Effort normal. No respiratory distress. She has decreased breath sounds.   Abdominal: Soft. Bowel sounds are normal.   Musculoskeletal: Normal range of motion.   Neurological: She is unresponsive.   Resting comfortably in bed. Occasional grimace.   Skin: Skin is warm and dry.       Patient status: Disease state: No further treatment being pursued.  Functional status: Palliative Performance Scale Score: Performance 30% based on the following measures: Ambulation: Totally bed bound, Activity and Evidence of Disease: Unable to do any work, extensive evidence of disease, Self-Care: Total care required,  Intake: Reduced, LOC: Full, drowsy or confusion   ECOG Status(4) Completely disabled, unable to carry out self-care.  Totally confined to bed or chair.  Nutritional status: Albumin 3.90. Body mass index is 16.41 kg/m².         Family support: The patient receives support from her daughter and extended family..  POA/Healthcare surrogate-advance directive on file.  Previously completed MOST form.    Impression/Problem List:    1.  Chronic myeloid leukemia, per family in remission  2.  Moderate cerebral and cerebellar volume loss with chronic microvascular disease per CT  3.  Large right pleural effusion  4.  Chronic kidney disease stage III  5.  Coronary artery disease, status post CABG and stents  6.  Chronic diastolic congestive heart failure  7.  Aortic stenosis status post TAVR  8.  Diabetes mellitus, type II  9.  Hypertension  10.  " Hyperlipidemia  11.  Generalized weakness  12.  Advanced age  13.  Sick sinus syndrome status post pacemaker  14.  Atrial fibrillation status post watchman  15.  Angiodysplasia of colon  16.  Recent gastrointestinal hemorrhage with melena  17.  Chronic anemia  18.  Centrilobular emphysema      Recommendations/Plan:  1. plan: Goals of care include CODE STATUS no CPR/comfort measures.      2.  Palliative care encounter  -Daughter has elected to transition to comfort care.  -CODE STATUS changes as delineated above.  -Plan home with hospice  -Completed new MOST form. Previous copy voided.  Scanned to EMR.    3.  Comfort care  -Palliative admission set as ordered  -Morphine concentrated solution as needed pain  -Haldol concentrated solution as needed delirium/agitation.  -Scopolamine patch as needed excess secretions  -Da Silva catheter for comfort.  -Recommend discontinue any treatments not considered in line with comfort.        Thank you for this consult and allowing us to participate in patient's plan of care. Palliative Care Team will continue to follow patient.     Time spent: 90 minutes spent reviewing medical and medication records, assessing and examining patient, discussing with family, answering questions, providing some guidance about a plan and documentation of care, and coordinating care with other healthcare members, with > 50% time spent face to face.   55 minutes spent on advance care planning.    Linda Sage, APRN  3/19/2020

## 2020-03-19 NOTE — H&P
[facility] Medicine Services  HISTORY AND PHYSICAL    Date of Admission: 3/18/2020  Primary Care Physician: Leonardo Zepeda DO    Subjective     Chief Complaint: Altered mental status, acute pain    History of Present Illness  Lakesha Costello is an 84-year-old female with a vast medical history to include breast cancer, CML currently being treated with [facility]- oncologist in [place], coronary artery disease, congestive heart failure, TAVR, sick sinus syndrome with pacemaker, presence of watchman device, paroxysmal atrial fibrillation-followed by cardiology in [place], diabetes mellitus type 2, chronic kidney disease stage III, please see below for complete list.  Patient's daughter at bedside provides history of present illness.  Patient has been having increasing confusion and memory problems for approximately 1 month.  However starting yesterday she developed increasing confusion and change in mentation and today she has been agitated, hollering out in pain apparently worse with breathing and she is at times grabbing her head and stating she is hurting.  Patient's  has created cancer and home health nurse was in attendance when patient was having outbursts therefore she did call daughter who is power of  and she did bring her mother to the emergency department.  ER work-up revealed stable creatinine 1.23, chronic anemia that is stable, large right pleural effusion that is new.  Vital signs are stable.  There is no other acute findings.  Daughter states primary care provider has been trying to get patient to agree to hospice care however patient currently wants to continue with treatment.  Daughter does wish for mother to be limited DNR however.  Patient is admitted for further evaluation treatment.    Review of Systems   Patient unable to provide meaningful review of systems due to altered mental status.      Past Medical History:   Past Medical History:    Diagnosis Date   • Aortic stenosis    • Arthritis    • Breast cancer (CMS/[facility])    • CAD (coronary artery disease)    • Cataract    • CHF (congestive heart failure) (CMS/[facility])     recently diagnosed after an ER visit.    • CKD (chronic kidney disease) stage 3, GFR 30-59 ml/min (CMS/[facility]) 4/16/2018   • CML (chronic myelocytic leukemia) (CMS/[facility])    • Diabetes mellitus (CMS/[facility])     Type II   • Elevated cholesterol    • GERD (gastroesophageal reflux disease)    • Hyperlipidemia    • Hypertension    • Macular degeneration    • NSTEMI (non-ST elevated myocardial infarction) (CMS/[facility]) 9/9/2019   • Pancreatitis    • Paroxysmal atrial fibrillation (CMS/[facility])    • Presence of Watchman left atrial appendage closure device 11/26/2018   • Pulmonary hypertension (CMS/[facility])    • Rheumatic fever     during childhood   • Sacrum and coccyx fracture (CMS/[facility])    • Sick sinus syndrome (CMS/[facility])     with pacemaker   • UTI (urinary tract infection)        Past Surgical History:   Past Surgical History:   Procedure Laterality Date   • ANOMALOUS PULMONARY VENOUS RETURN REPAIR, TOTAL     • BACK SURGERY     • BELPHAROPTOSIS REPAIR     • BREAST SURGERY      right mastectomy   • CARDIAC CATHETERIZATION  1999, 2010   • CARDIAC CATHETERIZATION N/A 2/15/2017    Procedure: Left Heart Cath;  Surgeon: Ehsan Crocker MD;  Location:  [facility] CATH [facility];  Service:    • CARDIAC CATHETERIZATION N/A 2/15/2017    Procedure: Right Heart Cath;  Surgeon: Ehsan Crocker MD;  Location:  [facility];  Service:    • CARDIAC CATHETERIZATION N/A 2/15/2017    Procedure: Coronary angiography;  Surgeon: Ehsan Crocker MD;  Location:  [facility];  Service:    • CARDIAC CATHETERIZATION N/A 2/15/2017    Procedure: Left ventriculography;  Surgeon: Ehsan Crocker MD;  Location:  [facility];  Service:    • CARDIAC CATHETERIZATION N/A 2/15/2017    Procedure: Intracardiac echocardiogram;  Surgeon: Ehsan Crocker MD;   Location:  [facility];  Service:    • CARDIAC ELECTROPHYSIOLOGY PROCEDURE N/A 2/3/2017    Procedure: Pacemaker DC new;  Surgeon: Ehsan Crocker MD;  Location:  [facility]ATH [facility];  Service:    • CARDIAC SURGERY      TAVR   • CARDIOVERSION     • CATARACT EXTRACTION     • CHOLECYSTECTOMY     • CORONARY ARTERY BYPASS GRAFT  1999    4 vessel    • CORONARY ARTERY BYPASS GRAFT     • CORONARY STENT PLACEMENT     • HYSTERECTOMY  1962   • INSERT / REPLACE / REMOVE PACEMAKER     • MASTECTOMY  2000   • OTHER SURGICAL HISTORY      TAVR 2017       Family History: family history includes Breast cancer in her mother; Cancer in her brother; Coronary artery disease in her father; Diabetes in her father; Heart attack in her father; No Known Problems in her maternal grandfather, maternal grandmother, paternal grandfather, and paternal grandmother.    Social History:  reports that she has never smoked. She has never used smokeless tobacco. She reports that she does not drink alcohol or use drugs.    Code Status: Limited, no intubation, defibrillation or CPR, patient's daughter Margarita Ignacio is power of .      Allergies:  Allergies   Allergen Reactions   • Gleevec [Imatinib] Itching   • Bentyl [Dicyclomine] Itching   • Janumet [Sitagliptin-Metformin Hcl] Other (See Comments)     Chest pain   • Sacubitril-Valsartan Itching     Per daughter, Margarita, pt had previous reaction.   • Dilaudid [Hydromorphone] Nausea And Vomiting   • Enalapril Angioedema   • Lopid [Gemfibrozil] Nausea And Vomiting   • Sulfa Antibiotics Other (See Comments)     Syncope     • Ultram [Tramadol] Itching       Medications:  Prior to Admission medications    Medication Sig Start Date End Date Taking? Authorizing Provider   aspirin 81 MG EC tablet Take 81 mg by mouth Daily.    ProviderMinda MD   bisacodyl (DULCOLAX) 10 MG suppository Insert 10 mg into the rectum Daily As Needed for Constipation.    ProviderMinda MD    calcium carbonate (OS-MICHAEL) 600 MG tablet Take 600 mg by mouth Daily.    Minda Salvador MD   cholecalciferol (VITAMIN D3) 1000 units tablet Take 1,000 Units by mouth Daily.    Minda Salvador MD   clopidogrel (PLAVIX) 75 MG tablet Take 75 mg by mouth Daily.    Minda Salvador MD   dasatinib (SPRYCEL) 50 MG chemo tablet Take 1 tablet by mouth Daily. 1/6/20   Minda Salvador MD   ferrous sulfate 325 (65 FE) MG tablet Take 1 tablet by mouth 2 (Two) Times a Day With Meals. 2/15/20   Daryl Pritchard DO   furosemide (LASIX) 40 MG tablet Take 60 mg by mouth Daily. Take 1 tablet (40 mg) in the morning and 1/2 tablet (20 mg) in the evening.    Minda Salvador MD   glipizide (GLUCOTROL) 5 MG tablet Take 1 tablet by mouth 2 (Two) Times a Day. 12/4/19   Minda Salvador MD   insulin detemir (LEVEMIR FLEXTOUCH) 100 UNIT/ML injection Inject 15 Units under the skin into the appropriate area as directed Every Night. 12/4/19   Leonardo Zepeda, DO   metoprolol succinate XL (TOPROL-XL) 25 MG 24 hr tablet Take 25 mg by mouth Daily.    Minda Salvador MD   mirtazapine (REMERON) 15 MG tablet Take 7.5 mg by mouth Every Night.    Minda Salvador MD   Multiple Vitamins-Minerals (PRESERVISION/LUTEIN) capsule Take 1 capsule by mouth 2 (Two) Times a Day.    Minda Salvador MD   nitroglycerin (NITROSTAT) 0.4 MG SL tablet Place 0.4 mg under the tongue Every 5 (Five) Minutes As Needed for Chest Pain. Take no more than 3 doses in 15 minutes.    Minda Salvador MD   pantoprazole (PROTONIX) 40 MG EC tablet Take 1 tablet by mouth Daily. 8/22/19   Leonardo Zepeda, DO   polyethyl glycol-propyl glycol (SYSTANE) 0.4-0.3 % solution ophthalmic solution Administer 1 drop to both eyes Daily.    Minda Salvador MD   polyethylene glycol (MIRALAX) packet Take 17 g by mouth Daily As Needed (constipation).    Minda Salvador MD   rosuvastatin (CRESTOR) 20 MG tablet Take 20 mg by  "mouth Every Night.    ProviderMinda MD   spironolactone (ALDACTONE) 25 MG tablet Take 25 mg by mouth Daily.    ProviderMinda MD   triamcinolone (KENALOG) 0.1 % cream Apply 1 application topically to the appropriate area as directed 2 (Two) Times a Day. 5/20/19 5/20/20  Minda Salvador MD   venlafaxine XR (EFFEXOR-XR) 75 MG 24 hr capsule Take 1 capsule by mouth Daily. 1/13/20   Leonardo Zepeda,    vitamin B-12 (CYANOCOBALAMIN) 500 MCG tablet Take 1,000 mcg by mouth Daily.    ProviderMinda MD       Objective     /64 (BP Location: Left arm, Patient Position: Sitting)   Pulse 70   Temp 98 °F (36.7 °C) (Oral)   Resp 16   Ht 152.4 cm (60\")   Wt 38.1 kg (84 lb)   SpO2 100%   BMI 16.41 kg/m²   Physical Exam   Constitutional: She appears well-developed.   Frail, advanced age   HENT:   Head: Normocephalic and atraumatic.   Eyes: Pupils are equal, round, and reactive to light.   Pale conjunctivae   Neck: Normal range of motion. No JVD present.   Cardiovascular: Normal rate.   Murmur heard.  Paced rhythm at 70   Pulmonary/Chest: Effort normal and breath sounds normal. No respiratory distress.   Abdominal: Soft. Bowel sounds are normal. She exhibits no distension.   Musculoskeletal: She exhibits no edema.   Generalized weakness and debility   Neurological:   Intermittent episodes of crying out in pain, she does states she is cold and hungry   Skin: Skin is warm and dry.   Psychiatric:   Flat affect       Pertinent Data:   Lab Results (last 72 hours)     Procedure Component Value Units Date/Time    Comprehensive Metabolic Panel [[id number]]  (Abnormal) Collected:  03/18/20 1919    Specimen:  Blood Updated:  03/18/20 1945     Glucose 186 mg/dL      BUN 47 mg/dL      Creatinine 1.23 mg/dL      Sodium 136 mmol/L      Potassium 4.5 mmol/L      Chloride 100 mmol/L      CO2 22.0 mmol/L      Calcium 9.5 mg/dL      Total Protein 7.1 g/dL      Albumin 3.90 g/dL      ALT (SGPT) 16 U/L      " AST (SGOT) 35 U/L      Alkaline Phosphatase 97 U/L      Total Bilirubin 0.4 mg/dL      eGFR Non African Amer 42 mL/min/1.73      Globulin 3.2 gm/dL      A/G Ratio 1.2 g/dL      BUN/Creatinine Ratio 38.2     Anion Gap 14.0 mmol/L     Urinalysis With Culture If Indicated - Urine, Catheter In/Out [[id number]]  (Abnormal) Collected:  03/18/20 1929    Specimen:  Urine, Catheter In/Out Updated:  03/18/20 1939     Color, UA Yellow     Appearance, UA Clear     pH, UA <=5.0     Specific Gravity, UA 1.010     Glucose, UA Negative     Ketones, UA Negative     Bilirubin, UA Negative     Blood, UA Negative     Protein,  mg/dL (2+)     Leuk Esterase, UA Negative     Nitrite, UA Negative     Urobilinogen, UA 0.2 E.U./dL    Urinalysis, Microscopic Only - Urine, Catheter In/Out [[id number]]  (Abnormal) Collected:  03/18/20 1929    Specimen:  Urine, Catheter In/Out Updated:  03/18/20 1939     RBC, UA 0-2 /HPF      WBC, UA 0-2 /HPF      Bacteria, UA None Seen /HPF      Squamous Epithelial Cells, UA 0-2 /HPF      Hyaline Casts, UA None Seen /LPF      Methodology Automated Microscopy    CBC Auto Differential [[id number]]  (Abnormal) Collected:  03/18/20 1919    Specimen:  Blood Updated:  03/18/20 1928     WBC 7.17 10*3/mm3      RBC 3.13 10*6/mm3      Hemoglobin 8.9 g/dL      Hematocrit 28.2 %      MCV 90.1 fL      MCH 28.4 pg      MCHC 31.6 g/dL      RDW 16.1 %      RDW-SD 53.5 fl      MPV 11.3 fL      Platelets 153 10*3/mm3      Neutrophil % 75.0 %      Lymphocyte % 10.6 %      Monocyte % 11.0 %      Eosinophil % 2.5 %      Basophil % 0.6 %      Immature Grans % 0.3 %      Neutrophils, Absolute 5.38 10*3/mm3      Lymphocytes, Absolute 0.76 10*3/mm3      Monocytes, Absolute 0.79 10*3/mm3      Eosinophils, Absolute 0.18 10*3/mm3      Basophils, Absolute 0.04 10*3/mm3      Immature Grans, Absolute 0.02 10*3/mm3      nRBC 0.0 /100 WBC         Imaging Results (Last 24 Hours)     Procedure Component Value Units Date/Time    CT Chest  Without Contrast [[id number]] Collected:  03/18/20 2011     Updated:  03/18/20 2021    Narrative:       EXAMINATION:   CT CHEST WO CONTRAST-  3/18/2020 8:11 PM CDT     HISTORY: CT CHEST WO CONTRAST- 3/18/2020 7:35 PM CDT     HISTORY: Chest trauma, blunt     COMPARISON: 06/08/2019     DOSE LENGTH PRODUCT: 69115 mGy cm. Automated exposure control was also  utilized to decrease patient radiation dose.     TECHNIQUE: Serial helical tomographic images of the chest were acquired.  Multiplanar reformatted images were provided for review.     FINDINGS:  Substernal thyroid is present on the right. This was present since  03/23/2016 associated calcification is present there is a moderate  substernal extension of the thyroid into the mediastinum..      Centrilobular emphysema changes present. Right lower lobe is obscured by  large right pleural effusion. Smaller left pleural effusion..      There is focal consolidation in the right middle lobe.. The trachea and  bronchial tree are patent.     Cardiac silhouettes enlarged.. Prosthetic cardiac valve is present in  the aortic and mitral positions.. No obvious hilar adenopathy..  Extensive vascular calcifications present in the aorta     Kyphoplasty at L2 is noted. Wires are present from previous median  sternotomy..     No acute findings are seen in the visualized portion of the upper  abdomen.       Impression:       1. Large right pleural effusion. Most likely this is congestive failure.  2. Consolidation right middle lobe most likely an inflammatory process.  3. Centrilobular emphysema        This report was finalized on 03/18/2020 20:18 by Dr. Philip Styles MD.    CT Head Without Contrast [[id number]] Collected:  03/18/20 2007     Updated:  03/18/20 2012    Narrative:       EXAMINATION:   CT HEAD WO CONTRAST-  3/18/2020 8:07 PM CDT     HISTORY: CT BRAIN without contrast 3/18/2020 7:35 PM CDT     HISTORY: Confusion     COMPARISON: 10/06/2019      DLP: 2427 mGy cm     TECHNIQUE:  Serial axial tomographic images of the brain were obtained  without the use of intravenous contrast.      FINDINGS:   The midline structures are nondisplaced. There is moderate cerebral and  cerebellar volume loss, with an associated increase in the prominence of  the ventricles and sulci. The basilar cisterns are normal in size and  configuration. There is no evidence of intracranial hemorrhage or  mass-effect. There is low attenuation in the periventricular white  matter, consistent with chronic ischemic change. Chronic calcification  in the basal ganglia region is again noted. There are no abnormal  extra-axial fluid collections. There is no evidence of tonsillar  herniation.      The included orbits and their contents are unremarkable. The visualized  paranasal sinuses, mastoid air cells and middle ear cavities are clear.  The visualized osseous structures and overlying soft tissues of the  skull and face are intact.        Impression:       Moderate cerebral and cerebellar volume loss with chronic microvascular  disease but no evidence of acute intracranial process.        This report was finalized on 03/18/2020 20:09 by Dr. Philip Styles MD.          6/8/2019 ECHO   Interpretation Summary     · Left ventricular systolic function is normal.  · Left ventricular wall thickness is consistent with mild concentric hypertrophy.  · Prosthetic aortic valve (20mm Sunita TAVR) with acceptable transvalvular gradients. There is perivalvular regurgitation on the posterior side, ~4mm.  · Moderate mitral regurgitaiton.  · Left atrial cavity size is moderately dilated.  · Right ventricular cavity is severely dilated.  · Mildly reduced right ventricular systolic function noted.  · Moderate tricuspid valve regurgitation is present.  · Calculated right ventricular systolic pressure from tricuspid regurgitation is 43 mmHg.       I have personally reviewed and interpreted the radiology studies and ECG obtained at time of  admission.     Assessment / Plan     Assessment:   Active Hospital Problems    Diagnosis   • **Pleural effusion on right   • Delirium   • Generalized weakness   • CML (chronic myeloid leukemia) (CMS/[facility])   • CKD (chronic kidney disease) stage 3, GFR 30-59 ml/min (CMS/[facility])     GFR 32 in 4/2018     • S/P TAVR (transcatheter aortic valve replacement)   • Artificial pacemaker   • Type 2 diabetes mellitus with diabetic peripheral angiopathy without gangrene, with long-term current use of insulin (CMS/[facility])   • SSS (sick sinus syndrome) (CMS/[facility])   • Coronary artery disease involving native coronary artery of native heart without angina pectoris     S/p CABG     • Essential hypertension   Acute pain    Plan:   1.  Admit as inpatient  2.  Pain management  3.  Home medications reviewed and restarted as appropriate  4.  DVT prophylaxis with SCDs  5.  Labs in a.m. to include PT and PTT  6.  Thoracentesis in a.m.  7.  MRI of the brain in a.m.  8.  Incentive spirometry, supplemental O2 as needed, RT to initiate breathing treatment protocol    I discussed the patient's findings and my recommendations with: Faisal Bernal MD  Time spent: 45 minutes    Patient seen and examined by me on 3/18/2020 at 9:30 PM.    FACUNDO Li  03/18/20   23:53     I personally evaluated and examined the patient in conjunction with FACUNDO Li and agree with the assessment, treatment plan, and disposition of the patient as recorded by her. My history, exam, and further recommendations are:     Patient eval at time of admission agree with all assessment plans and examination as above.    Faisal Bernal MD  03/19/20  08:30

## 2020-03-19 NOTE — PLAN OF CARE
Problem: Patient Care Overview  Goal: Plan of Care Review  Outcome: Ongoing (interventions implemented as appropriate)  Flowsheets (Taken 3/19/2020 0250)  Progress: no change  Plan of Care Reviewed With: patient; daughter  Outcome Summary: Pt came up from ER. She complains of R side pain, PO pain meds given. She shows nonverbal indications of pain/agitation. IV Ativan x1 with good relief. NPO for Thoracentesis in AM and MRI of jaky in AM. NO CPR. Bed check is on. Daughter has permission from [facility] to be at bedside. Tele running V paced.  Pt is confused. VSS. Will cont to monitor.

## 2020-03-20 NOTE — OUTREACH NOTE
Prep Survey      Responses   Mu-ism facility patient discharged from?  [place]   Is LACE score < 7 ?  No   Eligibility  Not Eligible   What are the reasons patient is not eligible?  Hospice/Pallative Care   Does the patient have one of the following disease processes/diagnoses(primary or secondary)?  Other   Prep survey completed?  Yes          Linda Marroquin RN

## 2020-03-27 PROBLEM — Z51.5 HOSPICE CARE PATIENT: Status: ACTIVE | Noted: 2020-03-20

## 2020-08-25 LAB — HOLD SPECIMEN: NORMAL

## 2024-05-15 NOTE — PROGRESS NOTES
Dual Chamber Pacemaker Evaluation Report  REMOTE/LATITUDE    August 13, 2018    Primary Cardiologist: Obi  : Guidant Model: [employer] MRI EL L131  Implant date: 2/3/2017    Reason for evaluation: remote transmission post-cardioversion for AF  Indication for pacemaker: sick sinus syndrome    Measurements  Atrial sensing - P wave: 6.1 mV  Atrial threshold: N/R  Atrial lead impedance: 812 ohms  Ventricular sensing - R wave: 20.3 mV  Ventricular threshold: 0.6 V @ 0.4 ms  Ventricular lead impedance:   1036 ohms     Diagnostic Data  Atrial paced: 3 %  Ventricular paced: 3 %      Episodes:  Cardioversion performed on 8/2/2018.  Patient was out of AF until 8/8/2018 when AF episodes recurred.  AF burden has been 100% since 8/8/2018; ventricular rates controlled.  Anticoagulated with coumadin.      Battery status: satisfactory, estimated 10.5 years remaining      Final Parameters  Mode:  DDDR  Lower rate: 60 bpm   Upper rate: 130 bpm  AV Delay: paced- 220-300 ms  Hfyhry-782-296 ms  Atrial - Amplitude: 2 V   Pulse width: 0.4 ms   Sensitivity: 0.25 mV     Ventricular - Amplitude: 2 V  Pulse width: 0.4 ms  Sensitivity: 0.6 mV    Changes made: N/A--remote transmission  Conclusions: normal pacemaker function and adequate battery reserve    Follow up: Every 6 months      For Your Information     If your provider ordered any imaging for you today. Our pre-scheduling services will be reaching out to you within 2 business days to schedule this. Prescheduling Services can be reached by calling [phone].    If you are in need of a medication refill, please use one of the following options. You can expect your medication to be filled within 2-3 business days.   1. Call your pharmacy for all medication refills and renewals.   2. [email or web address]- [email or web address]  3. Call your providers office    If your provider ordered testing today, you will be notified of your lab results within 3-5 business days and imaging results within 7-14 business days, unless specified otherwise. If you have not received your results within the allotted business days please call your provider's office. Have you signed up for the [facility], if not please consider doing so to receive results on the aayush as soon as they have been resulted by the system. [email or web address]     Medication Prior Authorizations may take up to 30 days for approval/denial.     You may be receiving a survey.  Please take the time to complete this, as your feedback is very important to us!  We strive to make your experience exceptional, and your comments help us with that goal.  We look forward to hearing from you!    For all future appointments please arrive 15 minutes prior to your scheduled visit.     Patient Contact Center Business Office: assistance with medical billing & financial inquires [phone]

## (undated) DEVICE — PINNACLE INTRODUCER SHEATH: Brand: PINNACLE

## (undated) DEVICE — PERCLOSE PROGLIDE™ SUTURE-MEDIATED CLOSURE SYSTEM: Brand: PERCLOSE PROGLIDE™

## (undated) DEVICE — GOLDVAC PUSH BUTTON ELECTROSURGICAL SMOKE EVACUATION HANDPIECE: Brand: GOLDVAC

## (undated) DEVICE — SOL IRR NACL 0.9PCT BT 1000ML

## (undated) DEVICE — CATH ULTRASND ECHO ACUNAV FOR ACUSON 8F 90CM

## (undated) DEVICE — MODEL AT P65, P/N 701554-001KIT CONTENTS: HAND CONTROLLER, 3-WAY HIGH-PRESSURE STOPCOCK WITH ROTATING END AND PREMIUM HIGH-PRESSURE TUBING: Brand: ANGIOTOUCH® KIT

## (undated) DEVICE — SWAN-GANZ POLYMER BLEND TRUE SIZE C-TIP CONTROLCATH TD: Brand: SWAN-GANZ CONTROLCATH TRUE SIZE

## (undated) DEVICE — SKIN AFFIX SURG ADHESIVE 72/CS 0.55ML: Brand: MEDLINE

## (undated) DEVICE — EACH LANGSTON DUAL LUMEN CATHETER IS INDICATED FOR DELIVERY OF CONTRAST MEDIUM IN ANGIOGRAPHIC STUDIES AND FOR SIMULTANEOUS PRESSURE MEASUREMENT FROM TWO SITES. THIS TYPE OF PRESSURE MEASUREMENT IS USEFUL IN DETERMINING TRANSVALVULAR, INTRAVASCULAR AND INTRAVENTRICULAR PRESSURE GRADIENTS.: Brand: LANGSTON® DUAL LUMEN CATHETER

## (undated) DEVICE — PK PM 30

## (undated) DEVICE — INTRO TEAR AWAY/LVD W/SD PRT 6F 13CM

## (undated) DEVICE — ELECTRD PAD DEFIB A/

## (undated) DEVICE — CANN CO2/O2 NASL A/

## (undated) DEVICE — MODEL BT2000 P/N 700287-012KIT CONTENTS: MANIFOLD WITH SALINE AND CONTRAST PORTS, SALINE TUBING WITH SPIKE AND HAND SYRINGE, TRANSDUCER: Brand: BT2000 AUTOMATED MANIFOLD KIT

## (undated) DEVICE — SYS TRNSEP ACC BRK ACROSS A/ 71CM

## (undated) DEVICE — CATH MONTR SWANGANZ WP 2L 7F110CM

## (undated) DEVICE — CVR CONN CATH SWIFTLINK ACUSON

## (undated) DEVICE — GW STARTER FXD CORE J .035 3X150CM 3MM

## (undated) DEVICE — CATH DIAG IMPULSE M/ PK 145 5FR

## (undated) DEVICE — INTRO SHEATH FASTCATH TRNSEP SL1 .032IN 8F 63CM

## (undated) DEVICE — APPL CHLORAPREP W/TINT 26ML ORNG

## (undated) DEVICE — KT MANIFLD RT CUST BHPAD

## (undated) DEVICE — STPCK 3/WY HP M/RA W/OFF/HNDL 1050PSI STRL

## (undated) DEVICE — DISPOSABLE SURGICAL CABLE

## (undated) DEVICE — SOL NS 500ML

## (undated) DEVICE — SOLIDIFIER LIQUI LOC PLUS 2000CC

## (undated) DEVICE — CANNULA,ADULT,SOFT-TOUCH,7TUBE,SC: Brand: MEDLINE

## (undated) DEVICE — PK CATH CARD 30

## (undated) DEVICE — NDL ART SECUR LK 18G 2 3/4IN

## (undated) DEVICE — GW STARTER FXD CORE J .035 3X260CM 3MM

## (undated) DEVICE — GW ZIPWIRE STD ANGL .035IN 150CM

## (undated) DEVICE — Device: Brand: MEDEX